# Patient Record
Sex: FEMALE | Race: WHITE | NOT HISPANIC OR LATINO | Employment: OTHER | URBAN - METROPOLITAN AREA
[De-identification: names, ages, dates, MRNs, and addresses within clinical notes are randomized per-mention and may not be internally consistent; named-entity substitution may affect disease eponyms.]

---

## 2017-03-01 ENCOUNTER — TELEPHONE (OUTPATIENT)
Dept: FAMILY MEDICINE | Facility: CLINIC | Age: 42
End: 2017-03-01

## 2017-03-01 DIAGNOSIS — Z11.3 SCREENING FOR STD (SEXUALLY TRANSMITTED DISEASE): Primary | ICD-10-CM

## 2017-03-01 NOTE — TELEPHONE ENCOUNTER
----- Message from Hanh Nguyen sent at 3/1/2017  2:39 PM CST -----  Patient is requesting a call back from nurse she is scheduled for labs on 03/14/17 she is requesting additional test contact patient at [phone].    Thank you

## 2017-03-07 ENCOUNTER — PATIENT OUTREACH (OUTPATIENT)
Dept: ADMINISTRATIVE | Facility: HOSPITAL | Age: 42
End: 2017-03-07

## 2017-03-07 NOTE — LETTER
March 13, 2017    Dinah Mitchell  [address] [place] [address]             [facility]  [address]  [place] [address]  Phone: [phone] Dear Mrs. Mitchell:    We have tried to reach you by mychart unsuccessfully.    [facility] is committed to your overall health.  To help you get the most out of each of your visits, we will review your information to make sure you are up to date on all of your recommended tests and/or procedures.       Dr. Sofia Nichole has found that you may be due for a mammogram and a flu immunization.     If you have had any of the above done at another facility, please bring the records or information with you so that your record at [facility] will be complete.     If you are currently taking medication, please bring it with you to your appointment for review.     If you have any questions or concerns, please don't hesitate to call.    Thank you for letting us care for you,  Eneida Foreman LPN Clinical Care Coordinator  [facility] [place]  [phone]

## 2017-03-14 ENCOUNTER — LAB VISIT (OUTPATIENT)
Dept: LAB | Facility: HOSPITAL | Age: 42
End: 2017-03-14
Attending: FAMILY MEDICINE
Payer: COMMERCIAL

## 2017-03-14 ENCOUNTER — TELEPHONE (OUTPATIENT)
Dept: FAMILY MEDICINE | Facility: CLINIC | Age: 42
End: 2017-03-14

## 2017-03-14 DIAGNOSIS — Z11.3 SCREENING FOR STD (SEXUALLY TRANSMITTED DISEASE): ICD-10-CM

## 2017-03-14 DIAGNOSIS — Z82.69 FAMILY HISTORY OF SYSTEMIC LUPUS ERYTHEMATOSUS: ICD-10-CM

## 2017-03-14 DIAGNOSIS — Z82.69 FAMILY HISTORY OF SYSTEMIC LUPUS ERYTHEMATOSUS: Primary | ICD-10-CM

## 2017-03-14 DIAGNOSIS — Z00.00 ROUTINE HEALTH MAINTENANCE: ICD-10-CM

## 2017-03-14 LAB
25(OH)D3+25(OH)D2 SERPL-MCNC: 25 NG/ML
ALBUMIN SERPL BCP-MCNC: 3.8 G/DL
ALP SERPL-CCNC: 53 U/L
ALT SERPL W/O P-5'-P-CCNC: 28 U/L
ANION GAP SERPL CALC-SCNC: 10 MMOL/L
AST SERPL-CCNC: 24 U/L
BASOPHILS # BLD AUTO: 0.03 K/UL
BASOPHILS NFR BLD: 0.5 %
BILIRUB SERPL-MCNC: 0.3 MG/DL
BUN SERPL-MCNC: 9 MG/DL
CALCIUM SERPL-MCNC: 9.2 MG/DL
CHLORIDE SERPL-SCNC: 107 MMOL/L
CHOLEST/HDLC SERPL: 4.6 {RATIO}
CO2 SERPL-SCNC: 23 MMOL/L
CREAT SERPL-MCNC: 1 MG/DL
CRP SERPL-MCNC: 4.1 MG/L
DIFFERENTIAL METHOD: ABNORMAL
EOSINOPHIL # BLD AUTO: 0.3 K/UL
EOSINOPHIL NFR BLD: 4.7 %
ERYTHROCYTE [DISTWIDTH] IN BLOOD BY AUTOMATED COUNT: 12.6 %
ERYTHROCYTE [SEDIMENTATION RATE] IN BLOOD BY WESTERGREN METHOD: 3 MM/HR
EST. GFR  (AFRICAN AMERICAN): >60 ML/MIN/1.73 M^2
EST. GFR  (NON AFRICAN AMERICAN): >60 ML/MIN/1.73 M^2
GLUCOSE SERPL-MCNC: 99 MG/DL
HCT VFR BLD AUTO: 46 %
HDL/CHOLESTEROL RATIO: 21.6 %
HDLC SERPL-MCNC: 190 MG/DL
HDLC SERPL-MCNC: 41 MG/DL
HGB BLD-MCNC: 15.4 G/DL
LDLC SERPL CALC-MCNC: 118.8 MG/DL
LYMPHOCYTES # BLD AUTO: 1.5 K/UL
LYMPHOCYTES NFR BLD: 24.8 %
MCH RBC QN AUTO: 31.2 PG
MCHC RBC AUTO-ENTMCNC: 33.5 %
MCV RBC AUTO: 93 FL
MONOCYTES # BLD AUTO: 0.4 K/UL
MONOCYTES NFR BLD: 5.7 %
NEUTROPHILS # BLD AUTO: 3.9 K/UL
NEUTROPHILS NFR BLD: 64 %
NONHDLC SERPL-MCNC: 149 MG/DL
PLATELET # BLD AUTO: 239 K/UL
PMV BLD AUTO: 11.2 FL
POTASSIUM SERPL-SCNC: 4.4 MMOL/L
PROT SERPL-MCNC: 7.2 G/DL
RBC # BLD AUTO: 4.93 M/UL
RHEUMATOID FACT SERPL-ACNC: <10 IU/ML
SODIUM SERPL-SCNC: 140 MMOL/L
T4 FREE SERPL-MCNC: 0.98 NG/DL
TRIGL SERPL-MCNC: 151 MG/DL
TSH SERPL DL<=0.005 MIU/L-ACNC: 4.12 UIU/ML
VIT B12 SERPL-MCNC: >2000 PG/ML
WBC # BLD AUTO: 6.12 K/UL

## 2017-03-14 PROCEDURE — 80053 COMPREHEN METABOLIC PANEL: CPT

## 2017-03-14 PROCEDURE — 82607 VITAMIN B-12: CPT

## 2017-03-14 PROCEDURE — 85651 RBC SED RATE NONAUTOMATED: CPT | Mod: PO

## 2017-03-14 PROCEDURE — 80061 LIPID PANEL: CPT

## 2017-03-14 PROCEDURE — 36415 COLL VENOUS BLD VENIPUNCTURE: CPT | Mod: PO

## 2017-03-14 PROCEDURE — 86140 C-REACTIVE PROTEIN: CPT

## 2017-03-14 PROCEDURE — 83036 HEMOGLOBIN GLYCOSYLATED A1C: CPT

## 2017-03-14 PROCEDURE — 86038 ANTINUCLEAR ANTIBODIES: CPT

## 2017-03-14 PROCEDURE — 86431 RHEUMATOID FACTOR QUANT: CPT

## 2017-03-14 PROCEDURE — 82306 VITAMIN D 25 HYDROXY: CPT

## 2017-03-14 PROCEDURE — 84443 ASSAY THYROID STIM HORMONE: CPT

## 2017-03-14 PROCEDURE — 84439 ASSAY OF FREE THYROXINE: CPT

## 2017-03-14 PROCEDURE — 86803 HEPATITIS C AB TEST: CPT

## 2017-03-14 PROCEDURE — 86703 HIV-1/HIV-2 1 RESULT ANTBDY: CPT

## 2017-03-14 PROCEDURE — 85025 COMPLETE CBC W/AUTO DIFF WBC: CPT

## 2017-03-14 NOTE — TELEPHONE ENCOUNTER
Pt states that her TWO first cousins has lupus and she would like to be tested. She had the lab draw extra tube of blood today when she had other labs done. If order is added lab will have to be called.  Please advise

## 2017-03-14 NOTE — TELEPHONE ENCOUNTER
----- Message from Rimma Keenan sent at 3/14/2017 11:21 AM CDT -----  Contact: self  Patient called regarding having labs completed. One of the labs were not ordered but marily blood anyway, need the orders to be sent to the lab before the blood goes bad, today. Please contact [phone] (home) and [phone]

## 2017-03-14 NOTE — TELEPHONE ENCOUNTER
Spoke to pt, she stated her cousin has lupus and thought she could have labs drawn for that as well but need to call lab today to to give order.  She had her labs drawn this am in [place].  Will need lab order put in at the end of the day if possible. Please advise.

## 2017-03-14 NOTE — TELEPHONE ENCOUNTER
----- Message from Rimma Keenan sent at 3/14/2017 11:21 AM CDT -----  Contact: self  Patient called regarding having labs completed. One of the labs were not ordered but marily blood anyway, need the orders to be sent to the lab before the blood goes bad, today. Please contact [phone] (home) and [phone]

## 2017-03-15 LAB
ANA SER QL IF: NORMAL
ESTIMATED AVG GLUCOSE: 114 MG/DL
HBA1C MFR BLD HPLC: 5.6 %
HCV AB SERPL QL IA: NEGATIVE
HIV 1+2 AB+HIV1 P24 AG SERPL QL IA: NEGATIVE

## 2017-03-21 ENCOUNTER — OFFICE VISIT (OUTPATIENT)
Dept: FAMILY MEDICINE | Facility: CLINIC | Age: 42
End: 2017-03-21
Payer: COMMERCIAL

## 2017-03-21 ENCOUNTER — TELEPHONE (OUTPATIENT)
Dept: FAMILY MEDICINE | Facility: CLINIC | Age: 42
End: 2017-03-21

## 2017-03-21 VITALS
TEMPERATURE: 98 F | BODY MASS INDEX: 43.61 KG/M2 | WEIGHT: 277.88 LBS | DIASTOLIC BLOOD PRESSURE: 70 MMHG | HEART RATE: 84 BPM | HEIGHT: 67 IN | SYSTOLIC BLOOD PRESSURE: 116 MMHG

## 2017-03-21 DIAGNOSIS — E03.9 HYPOTHYROIDISM, UNSPECIFIED TYPE: ICD-10-CM

## 2017-03-21 DIAGNOSIS — Z00.00 ROUTINE GENERAL MEDICAL EXAMINATION AT A HEALTH CARE FACILITY: Primary | ICD-10-CM

## 2017-03-21 DIAGNOSIS — J45.20 RAD (REACTIVE AIRWAY DISEASE), MILD INTERMITTENT, UNCOMPLICATED: ICD-10-CM

## 2017-03-21 DIAGNOSIS — E66.01 MORBID OBESITY, UNSPECIFIED OBESITY TYPE: ICD-10-CM

## 2017-03-21 DIAGNOSIS — R06.83 SNORING: ICD-10-CM

## 2017-03-21 PROCEDURE — 99999 PR PBB SHADOW E&M-EST. PATIENT-LVL III: CPT | Mod: PBBFAC,,, | Performed by: FAMILY MEDICINE

## 2017-03-21 PROCEDURE — 99396 PREV VISIT EST AGE 40-64: CPT | Mod: S$GLB,,, | Performed by: FAMILY MEDICINE

## 2017-03-21 RX ORDER — MONTELUKAST SODIUM 10 MG/1
10 TABLET ORAL NIGHTLY
Qty: 30 TABLET | Refills: 11 | Status: SHIPPED | OUTPATIENT
Start: 2017-03-21 | End: 2017-04-20

## 2017-03-21 RX ORDER — LEVOTHYROXINE SODIUM 50 UG/1
TABLET ORAL
Qty: 30 TABLET | Refills: 5 | Status: SHIPPED | OUTPATIENT
Start: 2017-03-21 | End: 2017-10-14 | Stop reason: SDUPTHER

## 2017-03-21 RX ORDER — LEVOTHYROXINE SODIUM 200 UG/1
TABLET ORAL
Qty: 30 TABLET | Refills: 5 | Status: SHIPPED | OUTPATIENT
Start: 2017-03-21 | End: 2017-10-14 | Stop reason: SDUPTHER

## 2017-03-21 NOTE — MR AVS SNAPSHOT
[facility]  [address]  [place] [address]  Phone: [phone]  Fax: [phone]                  Dinah Mitchell   3/21/2017 2:00 PM   Office Visit    Description:  Female : 1975   Provider:  Sofia Nichole MD   Department:  [facility]           Reason for Visit     Annual Exam           Diagnoses this Visit        Comments    Routine general medical examination at a health care facility    -  Primary     Hypothyroidism, unspecified type         Snoring         Morbid obesity, unspecified obesity type         RAD (reactive airway disease), mild intermittent, uncomplicated                To Do List           Future Appointments        Provider Department Dept Phone    2017 9:00 AM Anastacio Nevarez MD [facility] [phone]      Goals (5 Years of Data)     None      Follow-Up and Disposition     Return in about 1 year (around 3/21/2018), or if symptoms worsen or fail to improve, for annual exam.       These Medications        Disp Refills Start End    levothyroxine (SYNTHROID) 200 MCG tablet 30 tablet 5 3/21/2017     TAKE 1 TABLET (200 MCG TOTAL) BY MOUTH ONCE DAILY.    Pharmacy: [facility]/pharmacy #[id number] [place], [place] [address] Ph #: [phone]       levothyroxine (SYNTHROID) 50 MCG tablet 30 tablet 5 3/21/2017     TAKE 50 MCG TABLET BY MOUTH ALONG WITH 200 MCG TABLET EVERY DAY FOR A TOTAL DAILY DOSE OF 250MCG.    Pharmacy: [facility]/pharmacy #[address] [place] [address] [place] [address] Ph #: [phone]       beclomethasone (QVAR) 80 mcg/actuation Aero 120 each 5 3/21/2017 3/21/2018    Inhale 1 puff into the lungs 2 (two) times daily. Rinse mouth after use. Controller. - Inhalation    Pharmacy: [facility]/pharmacy #[id number] - [place], [place] - [address] Ph #: [phone]       montelukast (SINGULAIR) 10 mg tablet 30 tablet 11 3/21/2017 2017    Take 1 tablet (10 mg total) by mouth every evening. - Oral    Pharmacy:  [facility]/pharmacy [place][id number] - [place] [address] [address] #: [phone]         [facility]     [facility] Nurse Care Line - 24/7 Assistance  Registered nurses in the [facility] provide clinical advisement, health education, appointment booking, and other advisory services.  Call for this free service at [phone].             Medications           Message regarding Medications     Verify the changes and/or additions to your medication regime listed below are the same as discussed with your clinician today.  If any of these changes or additions are incorrect, please notify your healthcare provider.        START taking these NEW medications        Refills    beclomethasone (QVAR) 80 mcg/actuation Aero 5    Sig: Inhale 1 puff into the lungs 2 (two) times daily. Rinse mouth after use. Controller.    Class: Normal    Route: Inhalation    montelukast (SINGULAIR) 10 mg tablet 11    Sig: Take 1 tablet (10 mg total) by mouth every evening.    Class: Normal    Route: Oral           Verify that the below list of medications is an accurate representation of the medications you are currently taking.  If none reported, the list may be blank. If incorrect, please contact your healthcare provider. Carry this list with you in case of emergency.           Current Medications     albuterol (PROVENTIL) 2.5 mg /3 mL (0.083 %) nebulizer solution Take 3 mLs (2.5 mg total) by nebulization every 6 (six) hours as needed for Wheezing or Shortness of Breath.    alprazolam (XANAX) 1 MG tablet Take 1 tablet (1 mg total) by mouth daily as needed for Anxiety.    buPROPion (WELLBUTRIN XL) 150 MG TB24 tablet Take 1 tablet (150 mg total) by mouth once daily.    diphenoxylate-atropine 2.5-0.025 mg (LOMOTIL) 2.5-0.025 mg per tablet Take 1 tablet by mouth 4 (four) times daily as needed for Diarrhea.    duloxetine (CYMBALTA) 30 MG capsule Take 1 capsule (30 mg total) by mouth once daily. Take with 60 mg tabs for total of 90  "mg    duloxetine (CYMBALTA) 60 MG capsule Take 1 capsule (60 mg total) by mouth once daily. Take one tab with 30 mg tab for a total of 90 mg daily.    levothyroxine (SYNTHROID) 200 MCG tablet TAKE 1 TABLET (200 MCG TOTAL) BY MOUTH ONCE DAILY.    levothyroxine (SYNTHROID) 50 MCG tablet TAKE 50 MCG TABLET BY MOUTH ALONG WITH 200 MCG TABLET EVERY DAY FOR A TOTAL DAILY DOSE OF 250MCG.    tramadol (ULTRAM) 50 mg tablet TAKE 1 TABLET BY MOUTH DAILY TO TWICE DAILY FOR SEVERE PAIN    trazodone (DESYREL) 100 MG tablet Take 2 tablets (200 mg total) by mouth nightly as needed for Insomnia.    beclomethasone (QVAR) 80 mcg/actuation Aero Inhale 1 puff into the lungs 2 (two) times daily. Rinse mouth after use. Controller.    LYRICA 50 mg capsule Take 50 mg by mouth 2 (two) times daily.    medroxyprogesterone (DEPO-SUBQ PROVERA) 104 mg/0.65 mL injection Inject 0.65 mLs (104 mg total) into the skin every 3 (three) months.    montelukast (SINGULAIR) 10 mg tablet Take 1 tablet (10 mg total) by mouth every evening.    zinc gluconate 50 mg tablet Take 50 mg by mouth once daily.           Clinical Reference Information           Your Vitals Were     BP Pulse Temp Height Weight BMI    116/70 84 98.4 °F (36.9 °C) 5' 7" (1.702 m) 126.1 kg (277 lb 14.2 oz) 43.52 kg/m2      Blood Pressure          Most Recent Value    BP  116/70      Allergies as of 3/21/2017     Prazosin    Lamotrigine    Sulfa (Sulfonamide Antibiotics)      Immunizations Administered on Date of Encounter - 3/21/2017     None      Orders Placed During Today's Visit      Normal Orders This Visit    Ambulatory consult to Sleep Disorders       Language Assistance Services     ATTENTION: Language assistance services are available, free of charge. Please call [phone].      ATENCIÓN: Si stephenla jhoana, tiene a saini disposición servicios gratuitos de asistencia lingüística. Llame al [phone].     CHÚ Ý: N?u b?n nói Ti?ng Vi?t, có các d?ch v? h? tr? ngôn ng? mi?n lolita moralez " doretha martinez?nMarli G?i s? [phone].         [facility] complies with applicable Federal civil rights laws and does not discriminate on the basis of race, color, national origin, age, disability, or sex.

## 2017-03-21 NOTE — PROGRESS NOTES
Subjective:       Patient ID: Dinah Mitchell is a 42 y.o. female.    Chief Complaint: Annual Exam (pt is c/o fatigue )    HPI   The patient is a 42 year old female with a past medical history significant for morbid obesity, depression, hypothyroidism, Insomnia, and fibromyalgia. The patient is here for her annual exam and to discuss test results. The patient reports being more emotional as of late following discontinuation of her depo-provera shot. The patient also has longstanding complaints with fatigue and seasonal allergies.    Review of Systems   Constitutional: Positive for activity change and fatigue. Negative for chills, fever and unexpected weight change.   HENT: Positive for postnasal drip and sinus pressure. Negative for ear discharge, ear pain, sneezing, sore throat and trouble swallowing.    Respiratory: Negative for cough, shortness of breath and wheezing.    Cardiovascular: Negative for chest pain, palpitations and leg swelling.   Gastrointestinal: Negative for abdominal distention, abdominal pain, constipation, diarrhea, nausea and vomiting.   Musculoskeletal: Positive for back pain.   Allergic/Immunologic: Positive for environmental allergies.   Neurological: Positive for headaches.   Psychiatric/Behavioral: Positive for dysphoric mood and sleep disturbance. Negative for self-injury and suicidal ideas. The patient is not nervous/anxious.        Objective:      Physical Exam   Constitutional: She is oriented to person, place, and time. She appears well-developed and well-nourished.   HENT:   Head: Normocephalic and atraumatic.   Right Ear: External ear normal.   Left Ear: External ear normal.   Nose: Nose normal.   Mouth/Throat: Oropharynx is clear and moist.   Eyes: Conjunctivae and EOM are normal. Pupils are equal, round, and reactive to light.   Neck: Normal range of motion. Neck supple.   Cardiovascular: Normal rate, regular rhythm, normal heart sounds and intact distal pulses.     Pulmonary/Chest: Effort normal and breath sounds normal.   Abdominal: Soft. Bowel sounds are normal.   Neurological: She is alert and oriented to person, place, and time. She has normal reflexes.   Skin: Skin is warm and dry.   Psychiatric: Her behavior is normal. Judgment and thought content normal.   Patient is visibly distressed and emotional.         Routine general medical examination at a health care facility  Anticipatory guidance reviewed.  Hypothyroidism, unspecified type  -     levothyroxine (SYNTHROID) 200 MCG tablet; TAKE 1 TABLET (200 MCG TOTAL) BY MOUTH ONCE DAILY.  Dispense: 30 tablet; Refill: 5  Stable.  Snoring  -     Ambulatory consult to Sleep Disorders  Needs review.  Morbid obesity, unspecified obesity type  -     Ambulatory consult to Sleep Disorders  Goal weight reviewed as well as need for lifestyle modifications to incl caloric restriction, high protein/low carb, increased water intake, muscle-building exercises as well as cardio.    RAD (reactive airway disease), mild intermittent, uncomplicated  -     beclomethasone (QVAR) 80 mcg/actuation Aero; Inhale 1 puff into the lungs 2 (two) times daily. Rinse mouth after use. Controller.  Dispense: 120 each; Refill: 5  -     montelukast (SINGULAIR) 10 mg tablet; Take 1 tablet (10 mg total) by mouth every evening.  Dispense: 30 tablet; Refill: 11  Side effects and precautions of medication use reviewed with patient, expressed understanding. No questions or concerns.   Other orders  -     levothyroxine (SYNTHROID) 50 MCG tablet; TAKE 50 MCG TABLET BY MOUTH ALONG WITH 200 MCG TABLET EVERY DAY FOR A TOTAL DAILY DOSE OF 250MCG.  Dispense: 30 tablet; Refill: 5  Depression  - Depression managed by Dr. Wells, has an appointment scheduled for next week.  - Depression currently managed with Bupropion 150 mg, Duloxetine 90 mg, and Trazodone 200 mg.

## 2017-03-21 NOTE — TELEPHONE ENCOUNTER
----- Message from Haydee Barrett sent at 3/21/2017  3:27 PM CDT -----  Contact: Maria E [phone]  Patient told her that she needs the brand name synthroid.  Please correct the script.  Thank you!

## 2017-03-22 RX ORDER — DIPHENOXYLATE HYDROCHLORIDE AND ATROPINE SULFATE 2.5; .025 MG/1; MG/1
1 TABLET ORAL 4 TIMES DAILY PRN
Qty: 30 TABLET | Refills: 1 | Status: SHIPPED | OUTPATIENT
Start: 2017-03-22 | End: 2017-07-17 | Stop reason: SDUPTHER

## 2017-03-22 NOTE — TELEPHONE ENCOUNTER
"Synthroid was sent yesterday as "LAUREN" = brand name only. Please confirm with pharmacy.  Lomotil rx signed.  "

## 2017-03-22 NOTE — TELEPHONE ENCOUNTER
----- Message from Randi Combs sent at 3/22/2017  9:41 AM CDT -----  Contact: Patient  Patient needs a prescription for brand name Synthroid, 200 micro mg and 50 micro mg.  Patient also needs a refill Lomotril sent to [facility] on [address], [phone]. The [facility] stated they received prescription for Synthroid but it was for Generic not the brand. Any questions call patient at [phone].

## 2017-03-22 NOTE — TELEPHONE ENCOUNTER
Pt is requesting name brand only Synthroid 50mcg and Synthroid 200mcg. Also req refill on Lomotil. Please review and advise.

## 2017-03-23 NOTE — TELEPHONE ENCOUNTER
Spoke with Zoe at [facility], it was filled as name brand only and pt picked it up yesterday.

## 2017-03-29 ENCOUNTER — OFFICE VISIT (OUTPATIENT)
Dept: PSYCHIATRY | Facility: CLINIC | Age: 42
End: 2017-03-29
Payer: COMMERCIAL

## 2017-03-29 VITALS
BODY MASS INDEX: 43.52 KG/M2 | SYSTOLIC BLOOD PRESSURE: 147 MMHG | HEART RATE: 86 BPM | HEIGHT: 67 IN | WEIGHT: 277.25 LBS | DIASTOLIC BLOOD PRESSURE: 74 MMHG

## 2017-03-29 DIAGNOSIS — F60.3 BORDERLINE PERSONALITY DISORDER: ICD-10-CM

## 2017-03-29 DIAGNOSIS — F33.2 MAJOR DEPRESSIVE DISORDER, RECURRENT, SEVERE WITHOUT PSYCHOTIC FEATURES: Primary | ICD-10-CM

## 2017-03-29 DIAGNOSIS — E66.01 MORBID OBESITY WITH BMI OF 40.0-44.9, ADULT: ICD-10-CM

## 2017-03-29 DIAGNOSIS — F41.0 PANIC DISORDER WITHOUT AGORAPHOBIA: ICD-10-CM

## 2017-03-29 DIAGNOSIS — F43.10 PTSD (POST-TRAUMATIC STRESS DISORDER): ICD-10-CM

## 2017-03-29 DIAGNOSIS — F90.9 ATTENTION DEFICIT HYPERACTIVITY DISORDER (ADHD), UNSPECIFIED ADHD TYPE: ICD-10-CM

## 2017-03-29 PROCEDURE — 99999 PR PBB SHADOW E&M-EST. PATIENT-LVL II: CPT | Mod: PBBFAC,,, | Performed by: PSYCHIATRY & NEUROLOGY

## 2017-03-29 PROCEDURE — 99214 OFFICE O/P EST MOD 30 MIN: CPT | Mod: S$GLB,,, | Performed by: PSYCHIATRY & NEUROLOGY

## 2017-03-29 PROCEDURE — 90833 PSYTX W PT W E/M 30 MIN: CPT | Mod: S$GLB,,, | Performed by: PSYCHIATRY & NEUROLOGY

## 2017-03-29 PROCEDURE — 1160F RVW MEDS BY RX/DR IN RCRD: CPT | Mod: S$GLB,,, | Performed by: PSYCHIATRY & NEUROLOGY

## 2017-03-29 RX ORDER — BUPROPION HYDROCHLORIDE 150 MG/1
150 TABLET ORAL DAILY
Qty: 30 TABLET | Refills: 0 | Status: SHIPPED | OUTPATIENT
Start: 2017-03-29 | End: 2017-05-24 | Stop reason: SDUPTHER

## 2017-03-29 RX ORDER — NALTREXONE HYDROCHLORIDE 50 MG/1
25 TABLET, FILM COATED ORAL DAILY
Qty: 15 TABLET | Refills: 0 | Status: SHIPPED | OUTPATIENT
Start: 2017-03-29 | End: 2017-04-24

## 2017-03-29 RX ORDER — ALPRAZOLAM 1 MG/1
1 TABLET ORAL DAILY PRN
Qty: 30 TABLET | Refills: 0 | Status: SHIPPED | OUTPATIENT
Start: 2017-03-29 | End: 2017-05-24 | Stop reason: SDUPTHER

## 2017-03-29 RX ORDER — TRAZODONE HYDROCHLORIDE 100 MG/1
200 TABLET ORAL NIGHTLY PRN
Qty: 60 TABLET | Refills: 0 | Status: SHIPPED | OUTPATIENT
Start: 2017-03-29 | End: 2017-05-24 | Stop reason: SDUPTHER
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
## 2017-04-20 ENCOUNTER — TELEPHONE (OUTPATIENT)
Dept: FAMILY MEDICINE | Facility: CLINIC | Age: 42
End: 2017-04-20

## 2017-04-20 NOTE — TELEPHONE ENCOUNTER
----- Message from Chepe Tobar sent at 4/19/2017  3:52 PM CDT -----  Contact: Patient  Patient called regarding sleep study please call back to advise at [phone]. Thanks,

## 2017-04-20 NOTE — TELEPHONE ENCOUNTER
Pt called and advised that she has not beenSpoke to Dedrick at Dr giles' office. She stated they have made 2 attempts to contact pt to schedule sleep study.  I advised her per pt stated she would be home all day today.  Dedrick advised that she will contact pt today to schedule. Tried to call pt but no answer, no voicemail.

## 2017-04-21 ENCOUNTER — TELEPHONE (OUTPATIENT)
Dept: PSYCHIATRY | Facility: CLINIC | Age: 42
End: 2017-04-21

## 2017-04-21 DIAGNOSIS — Z12.31 OTHER SCREENING MAMMOGRAM: ICD-10-CM

## 2017-04-21 NOTE — TELEPHONE ENCOUNTER
Returned patient phone call couldn't leave message. Patient stated she was having issues with pharmacy.  Vikki

## 2017-04-24 ENCOUNTER — PATIENT OUTREACH (OUTPATIENT)
Dept: ADMINISTRATIVE | Facility: HOSPITAL | Age: 42
End: 2017-04-24

## 2017-04-24 NOTE — PROGRESS NOTES
A mammo bulk order was pended for this patient.    Last OV with Dr. Nichole 3/2017.  Due for a mammogram and a flu immunization     Letter/Embuehart message sent to notify patient.

## 2017-04-24 NOTE — LETTER
May 2, 2017    Dinah Mitchell  [address] [place] [address]             [facility]  [address]  [place] [address]  Phone: [phone] Dear Mrs. Mitchell:    We have tried to reach you by mychart unsuccessfully.    [facility] is committed to your overall health.  To help you get the most out of each of your visits, we will review your information to make sure you are up to date on all of your recommended tests and/or procedures.  We have Dr. Sofia Nichole listed as your primary care provider.     She has found that you may be due for a mammogram and a flu immunization.     If you have had any of the above done at an outside facility, please let us know so I can update your record.  If you have a copy of these records, please provide a copy for us to scan into your chart.  If not, please provide that provider/facilities contact information so that we may obtain copies from that facility.     Otherwise, please schedule these appointments at your earliest convenience.  You are due for your annual follow up with Dr. Nichole in March of 2018.     If you have any questions or concerns, please don't hesitate to call.    Thank you for letting us care for you,  Eneida Foreman LPN Clinical Care Coordinator  [facility] [place]  [phone]

## 2017-04-24 NOTE — TELEPHONE ENCOUNTER
Spoke with patient who stated that she needs our office to call the pharmacy and cancel the naltrexone, which she is not taking. So she can  the lomotil and trazodone; which the pharmacy is holding because of the drug interactions. She stated she needs those- not the diet pill.   Schedule patient for Wednesday 4/26/2017

## 2017-05-24 ENCOUNTER — OFFICE VISIT (OUTPATIENT)
Dept: PSYCHIATRY | Facility: CLINIC | Age: 42
End: 2017-05-24
Payer: COMMERCIAL

## 2017-05-24 VITALS
HEART RATE: 84 BPM | WEIGHT: 286.25 LBS | DIASTOLIC BLOOD PRESSURE: 90 MMHG | BODY MASS INDEX: 44.93 KG/M2 | HEIGHT: 67 IN | SYSTOLIC BLOOD PRESSURE: 155 MMHG

## 2017-05-24 DIAGNOSIS — F43.10 PTSD (POST-TRAUMATIC STRESS DISORDER): ICD-10-CM

## 2017-05-24 DIAGNOSIS — F33.2 MAJOR DEPRESSIVE DISORDER, RECURRENT, SEVERE WITHOUT PSYCHOTIC FEATURES: Primary | ICD-10-CM

## 2017-05-24 DIAGNOSIS — F41.0 PANIC DISORDER WITHOUT AGORAPHOBIA: ICD-10-CM

## 2017-05-24 DIAGNOSIS — F60.3 BORDERLINE PERSONALITY DISORDER: ICD-10-CM

## 2017-05-24 DIAGNOSIS — E66.01 MORBID OBESITY WITH BMI OF 40.0-44.9, ADULT: ICD-10-CM

## 2017-05-24 PROCEDURE — 99214 OFFICE O/P EST MOD 30 MIN: CPT | Mod: S$GLB,,, | Performed by: PSYCHIATRY & NEUROLOGY

## 2017-05-24 PROCEDURE — 1160F RVW MEDS BY RX/DR IN RCRD: CPT | Mod: S$GLB,,, | Performed by: PSYCHIATRY & NEUROLOGY

## 2017-05-24 PROCEDURE — 99999 PR PBB SHADOW E&M-EST. PATIENT-LVL II: CPT | Mod: PBBFAC,,, | Performed by: PSYCHIATRY & NEUROLOGY

## 2017-05-24 PROCEDURE — 90833 PSYTX W PT W E/M 30 MIN: CPT | Mod: S$GLB,,, | Performed by: PSYCHIATRY & NEUROLOGY

## 2017-05-24 RX ORDER — TRAZODONE HYDROCHLORIDE 100 MG/1
200 TABLET ORAL NIGHTLY PRN
Qty: 60 TABLET | Refills: 2 | Status: SHIPPED | OUTPATIENT
Start: 2017-05-24 | End: 2017-07-13 | Stop reason: SDUPTHER

## 2017-05-24 RX ORDER — BUPROPION HYDROCHLORIDE 150 MG/1
150 TABLET ORAL DAILY
Qty: 30 TABLET | Refills: 2 | Status: SHIPPED | OUTPATIENT
Start: 2017-05-24 | End: 2017-07-13 | Stop reason: SDUPTHER

## 2017-05-24 RX ORDER — ALPRAZOLAM 1 MG/1
1 TABLET ORAL DAILY PRN
Qty: 30 TABLET | Refills: 2 | Status: SHIPPED | OUTPATIENT
Start: 2017-05-24 | End: 2017-07-13 | Stop reason: SDUPTHER
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
## 2017-05-27 DIAGNOSIS — J40 BRONCHITIS: ICD-10-CM

## 2017-05-30 RX ORDER — ALBUTEROL SULFATE 90 UG/1
2 AEROSOL, METERED RESPIRATORY (INHALATION) EVERY 6 HOURS PRN
Qty: 18 INHALER | Refills: 0 | Status: SHIPPED | OUTPATIENT
Start: 2017-05-30 | End: 2018-09-05 | Stop reason: SDUPTHER

## 2017-06-19 ENCOUNTER — LAB VISIT (OUTPATIENT)
Dept: LAB | Facility: HOSPITAL | Age: 42
End: 2017-06-19
Attending: MEDICAL GENETICS
Payer: COMMERCIAL

## 2017-06-19 ENCOUNTER — OFFICE VISIT (OUTPATIENT)
Dept: GENETICS | Facility: CLINIC | Age: 42
End: 2017-06-19
Payer: COMMERCIAL

## 2017-06-19 VITALS — WEIGHT: 276.88 LBS | BODY MASS INDEX: 43.46 KG/M2 | HEIGHT: 67 IN

## 2017-06-19 DIAGNOSIS — H02.403 PTOSIS, BOTH EYELIDS: ICD-10-CM

## 2017-06-19 DIAGNOSIS — E66.01 MORBID OBESITY WITH BMI OF 40.0-44.9, ADULT: ICD-10-CM

## 2017-06-19 DIAGNOSIS — E88.40 MITOCHONDRIAL METABOLISM DISORDER: Primary | ICD-10-CM

## 2017-06-19 DIAGNOSIS — R27.0 ATAXIA: ICD-10-CM

## 2017-06-19 DIAGNOSIS — Z83.49: ICD-10-CM

## 2017-06-19 DIAGNOSIS — E88.40 MITOCHONDRIAL METABOLISM DISORDER: ICD-10-CM

## 2017-06-19 PROCEDURE — 83919 ORGANIC ACIDS QUAL EACH: CPT

## 2017-06-19 PROCEDURE — 99999 PR PBB SHADOW E&M-EST. PATIENT-LVL III: CPT | Mod: PBBFAC,,, | Performed by: MEDICAL GENETICS

## 2017-06-19 PROCEDURE — 99245 OFF/OP CONSLTJ NEW/EST HI 55: CPT | Mod: S$GLB,,, | Performed by: MEDICAL GENETICS

## 2017-06-19 RX ORDER — MONTELUKAST SODIUM 10 MG/1
TABLET ORAL
COMMUNITY
Start: 2017-06-18 | End: 2017-08-24 | Stop reason: SDUPTHER

## 2017-06-19 RX ORDER — LEVOCARNITINE 330 MG/1
660 TABLET ORAL 2 TIMES DAILY
Qty: 120 TABLET | Refills: 6 | Status: SHIPPED | OUTPATIENT
Start: 2017-06-19 | End: 2017-12-01 | Stop reason: SDUPTHER

## 2017-06-19 RX ORDER — OMEGA-3-ACID ETHYL ESTERS 1 G/1
2 CAPSULE, LIQUID FILLED ORAL 2 TIMES DAILY
Qty: 360 CAPSULE | Refills: 3 | Status: SHIPPED | OUTPATIENT
Start: 2017-06-19 | End: 2017-08-15

## 2017-06-19 NOTE — LETTER
June 19, 2017      Sofia Nichole MD  [address]  [place] [address]           [facility]  [address]  [facility] [address]  Phone: [phone]          Patient: Dinah Mitchell   MR Number: [id number]   YOB: 1975   Date of Visit: 6/19/2017       Dear Dr. Sofia Nichole:    Thank you for referring Dinah Mitchell to me for evaluation. Attached you will find relevant portions of my assessment and plan of care.    If you have questions, please do not hesitate to call me. I look forward to following Dinah Mitchell along with you.    Sincerely,    Anastacio Nevarez MD    Enclosure  CC:  No Recipients    If you would like to receive this communication electronically, please contact [email or web address] or [phone] to request more information on [employer] Link access.    For providers and/or their staff who would like to refer a patient to [facility], please contact us through our one-stop-shop provider referral line, [facility], at [phone].    If you feel you have received this communication in error or would no longer like to receive these types of communications, please e-mail [email or web address]

## 2017-06-20 ENCOUNTER — PATIENT MESSAGE (OUTPATIENT)
Dept: GENETICS | Facility: CLINIC | Age: 42
End: 2017-06-20

## 2017-06-20 NOTE — PROGRESS NOTES
Shante Mitchell  DOS: 17  : 79  MRN: [id number]    REFERING MD: Sofia Nichole MD                                                                               REASON FOR CONSULT: Our Medical Genetic Service was asked to evaluate this 42-year-old  female with a family history of a mitochondrial disorder. Mrs. Mitchell presents accompanied by her mother who assisted in providing the history..    HISTORY OF PRESENT ILLNESS: Mrs. Mitchell has a history of fibromyalgia, depression, fatigue and hypothyroidism. She reports that her symptoms started about 6 years ago. The patient reports a decline in memory/ cognitive skills.  The patient also reports poor sleep, a loss of interest in daily activities (like driving, taking a bath) and weight gain of about 100lbs. Mrs. Mitchell is followed by psychiatry but is not taking any medication for depression. She denied suicidal ideation today.     Mrs. Mitchell was evaluated by a rheumatologist and diagnosed with osteoarthritis. She has progressively worsening gait as well as ptosis. She reports inappropriate sweating and face flushing. The patient reports that she has breathing difficulty and snores. She had a sleep study many years ago.  Other concerns include poor wound healing, IBS, asthma, migraines and back pain. Past surgeries include right ankle tendon repair, tubal ligation, liposuction and bariatric surgery that apparently failed.     Mrs. Mitchell sister was diagnosed with sensory ataxic neuropathy, dysarthria, and ophthalmoparesis (MIMI) but I dont have a copy of the lab report. I do have a note from Dr. Saldaña from [facility] stating her sisters diagnosis. Mrs. Mitchell was recommended to see me by Dr. Saldaña.    PAST MEDICAL HISTORY: as above.     DEVELOPMENTAL HISTORY: Normal. Mrs. Mitchell has a degree in biology and chemistry. She worked as a forensic drug . Mrs. Mitchell has not worked in 6 years and has not applied for disability.    MEDICATIONS:  alprazolam (XANAX) 1 MG tablet     beclomethasone (QVAR) 80 mcg/actuation Aero    buPROPion (WELLBUTRIN XL) 150 MG TB24 tablet    diphenoxylate-atropine 2.5-0.025 mg (LOMOTIL) 2.5-0.025 mg per tablet    levothyroxine (SYNTHROID) 200 MCG tablet    levothyroxine (SYNTHROID) 50 MCG tablet    montelukast (SINGULAIR) 10 mg tablet    omega-3 acid ethyl esters (LOVAZA) 1 gram capsule    tramadol (ULTRAM) 50 mg tablet    trazodone (DESYREL) 100 MG tablet    VENTOLIN HFA 90 mcg/actuation inhaler     ALLERGIES: Prazocin, Lamotrigine, sulfa    SOCIAL HISTORY: Mrs. Mitchell has recently  from her . She was  10 years. The patient moved to the area in 9973-9938. Her family lives in [place]. Her parents are providing some financial help.     FAMILY HISTORY: At this visit, a 3 generation pedigree was constructed and will be scanned in the patients chart.  Mrs. Mitchell has no children. She has 3 full sisters. Her sister Terese who is 51-years-old has a diagnosis of MIMI. Per report, Terese was evaluated at various centers ( [facility] in [place], [place] in [place] and [place]). She had genetic testing and muscle biopsy (unfortunately copies of results were not available for our review). I dont have a genetic report showing POLG mutations. I do have a note from Dr. Saldaña from [facility] stating her sisters diagnosis    The patients other sister Kitty has a history of seizures. Marck son who is 23-years-old has Asperger. The patients parents are living. The father is 75-years-old and has a cardiac arrhythmia. The mother is 71-years-old and has a history of diabetes and IBS. On the maternal side, there is a first cousin who  in her 40s. Per report, this cousin had medical issues similar to Terese. This cousins daughter has a history of epilepsy and  at the age of 18. The remainder of the family was unremarkable. Maternal ancestry is [place]. Paternal ancestry is English and Ivorian. Ashkenazi [place] ancestry and  "consanguinity were denied.     PHYSICAL EXAM: Wt: 125.6 kg, Ht: 5' 7", BMI: 43.37 kg/m²  HEENT:  Her head is normocephalic and no dysmorphic features. Extraocular movements somewhat sluggish and she does have a mild ptosis bilaterally.  NECK:  Supple.  CHEST:  Normally formed.  HEART:  Regular rate and rhythm.  ABDOMEN:  Soft, non-tender, non-distended.  No organomegaly.  MUSCULOSKELETAL:  No appreciable dysmorphic features in the hands or feet. No hyperextensibility.  NEUROLOGIC: strength 5/5, DTRs 2+, normal speech and cognition. Her gait did not seem abnormal to me. She could do tandem gait.    IMPRESSION: I reviewed the patients medical and family history. 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 Recurrence in her sister is consistent with recessive inheritance. I do need her sisters report so that I could test the patient for known POLG mutations. Otherwise, broader workup would need to be considered including nuclear mitochondrial gene panel, mtDNA or Whole Exome Sequencing. Glenn obtained her metabolic studies today as well as POLG known mutation testing.     I did discuss possible treatment which would include antioxidant vitamins such as CoQ10, NAC or lipoic. I did start her on Levocarnitine today which could help with energy and will consider leucovorin which could help with cognition and memory. Glenn also recommended a sleep study and cornstarch at night. Glenn advised against fasting and she needs to have regular nutrition to prevent catabolism and exercise without overexertion.    RECOMMENDATIONS:  1. Sisters MIMI testing results.  2. Test the patient for known mutations or expanded workup.  3. Metabolic studies today.  4. PSG for PATRICIA.  5. Regular nutrition to prevent catabolism, cornstarch at night and exercise without overexertion.  6. Consider " antioxidants.  7. Levocarnitine 660 mg bid and consider leucovorin.  8. Follow up in 3 months.    TIME SPENT:  80 minutes, >50% counseling. The note will be faxed to the referring physician and is in epic.     Anastacio Nevarez M.D.                                                               Sylvia Christian MS  Section Head - Medical Genetics                                                  Genetic Counselor           [facility]

## 2017-06-21 ENCOUNTER — TELEPHONE (OUTPATIENT)
Dept: GENETICS | Facility: CLINIC | Age: 42
End: 2017-06-21

## 2017-06-23 ENCOUNTER — PATIENT MESSAGE (OUTPATIENT)
Dept: GENETICS | Facility: CLINIC | Age: 42
End: 2017-06-23

## 2017-06-23 LAB — ORGANIC ACIDS UR QL: NORMAL

## 2017-06-26 ENCOUNTER — TELEPHONE (OUTPATIENT)
Dept: GENETICS | Facility: CLINIC | Age: 42
End: 2017-06-26

## 2017-06-26 NOTE — TELEPHONE ENCOUNTER
Spoke with pt and informed her that Dr. Nevarez has been in clinic and that's why he hasn't emailed her back yet. Also informed her that I sent the PA for [place] on Friday. Waiting on a decision. Pt verbalized understanding.

## 2017-06-26 NOTE — TELEPHONE ENCOUNTER
----- Message from Megan Fang sent at 6/26/2017  1:08 PM CDT -----  Contact: Dinah [phone]  Dinah says she is still waiting for PA on the fish oil. She would also like to confirm the dosage for levothyroxine. Please call and advuse

## 2017-06-28 ENCOUNTER — TELEPHONE (OUTPATIENT)
Dept: GENETICS | Facility: CLINIC | Age: 42
End: 2017-06-28

## 2017-06-28 NOTE — TELEPHONE ENCOUNTER
Spoke with Madeline and informed her that PA was sent and we were waiting on pt's ins. Madeline verbalized understanding.

## 2017-06-28 NOTE — TELEPHONE ENCOUNTER
----- Message from Karyn Eli sent at 6/28/2017 12:41 PM CDT -----  Contact: antonina from [facility] [phone]  Selam would like a call back in regards to pt rx

## 2017-06-29 LAB
ACYLCARNITINE/C0 UR-RTO: 14.9 {RATIO} (ref 0.7–3.4)
CARNITINE FREE/CREAT UR: 10 NMOL/MG CR (ref 77–214)
CARNITINE/CREAT UR-RTO: 159 NMOL/MG CR (ref 180–412)
CLINICAL BIOCHEMIST REVIEW: ABNORMAL

## 2017-06-30 ENCOUNTER — TELEPHONE (OUTPATIENT)
Dept: FAMILY MEDICINE | Facility: CLINIC | Age: 42
End: 2017-06-30

## 2017-06-30 ENCOUNTER — PATIENT MESSAGE (OUTPATIENT)
Dept: FAMILY MEDICINE | Facility: CLINIC | Age: 42
End: 2017-06-30

## 2017-06-30 NOTE — TELEPHONE ENCOUNTER
----- Message from Adry Fuentes sent at 6/30/2017 12:05 PM CDT -----  Had a sleep study / shows obstructive disorder/ diagnosed with an assortment of diseases/ saw Genetics at main campus / her sister is terminal / asking to be fit in to see Dr Nichole /   call [phone]

## 2017-07-03 ENCOUNTER — PATIENT MESSAGE (OUTPATIENT)
Dept: GENETICS | Facility: CLINIC | Age: 42
End: 2017-07-03

## 2017-07-05 ENCOUNTER — PATIENT MESSAGE (OUTPATIENT)
Dept: GENETICS | Facility: CLINIC | Age: 42
End: 2017-07-05

## 2017-07-06 ENCOUNTER — OFFICE VISIT (OUTPATIENT)
Dept: FAMILY MEDICINE | Facility: CLINIC | Age: 42
End: 2017-07-06
Payer: COMMERCIAL

## 2017-07-06 VITALS
HEART RATE: 76 BPM | HEIGHT: 67 IN | BODY MASS INDEX: 42.28 KG/M2 | TEMPERATURE: 99 F | WEIGHT: 269.38 LBS | DIASTOLIC BLOOD PRESSURE: 72 MMHG | SYSTOLIC BLOOD PRESSURE: 98 MMHG

## 2017-07-06 DIAGNOSIS — E88.40 MITOCHONDRIAL METABOLISM DISORDER: ICD-10-CM

## 2017-07-06 DIAGNOSIS — F33.2 MAJOR DEPRESSIVE DISORDER, RECURRENT, SEVERE WITHOUT PSYCHOTIC FEATURES: Primary | ICD-10-CM

## 2017-07-06 DIAGNOSIS — N92.1 MENOMETRORRHAGIA: ICD-10-CM

## 2017-07-06 LAB
BILIRUB UR QL STRIP: NEGATIVE
CLARITY UR REFRACT.AUTO: ABNORMAL
COLOR UR AUTO: YELLOW
GLUCOSE UR QL STRIP: NEGATIVE
HGB UR QL STRIP: NEGATIVE
KETONES UR QL STRIP: NEGATIVE
LEUKOCYTE ESTERASE UR QL STRIP: NEGATIVE
NITRITE UR QL STRIP: NEGATIVE
PH UR STRIP: 5 [PH] (ref 5–8)
PROT UR QL STRIP: NEGATIVE
SP GR UR STRIP: 1.02 (ref 1–1.03)
URN SPEC COLLECT METH UR: ABNORMAL
UROBILINOGEN UR STRIP-ACNC: NEGATIVE EU/DL

## 2017-07-06 PROCEDURE — 81003 URINALYSIS AUTO W/O SCOPE: CPT

## 2017-07-06 PROCEDURE — 99214 OFFICE O/P EST MOD 30 MIN: CPT | Mod: S$GLB,,, | Performed by: FAMILY MEDICINE

## 2017-07-06 PROCEDURE — 99999 PR PBB SHADOW E&M-EST. PATIENT-LVL III: CPT | Mod: PBBFAC,,, | Performed by: FAMILY MEDICINE

## 2017-07-06 NOTE — PROGRESS NOTES
Subjective:       Patient ID: Dinah Mitchell is a 42 y.o. female.    Chief Complaint: Follow-up (pt states she recently saw a genetics specialist, Geri 6/19 was told disability needs to be started by PCP. )    HPI   Patient here today for f/u. She is accompanied to the office by her mom.  Currently under care of genetics specialist as her sister has a mitochondrial disease. Patient is very worried about the potential for confirmed diagnosis as well as long-term affects.  Also, complicated by unexpected, and recent separation from her . She is very stressed by this occurrence.  Considering need to apply for disability. Discussed starting process with SSI office.  Having heavy, frequent periods.   Has f/u with sleep clinic scheduled to discuss apnea test.    Review of Systems   Constitutional: Positive for activity change, fatigue and unexpected weight change.   HENT: Positive for trouble swallowing (chronic). Negative for hearing loss and rhinorrhea.    Eyes: Negative for discharge and visual disturbance.   Respiratory: Positive for apnea (test updated, waiting for f/u-eqpt) and wheezing. Negative for cough and chest tightness.    Cardiovascular: Positive for chest pain (attributed to stress). Negative for palpitations.   Gastrointestinal: Positive for constipation and diarrhea. Negative for blood in stool and vomiting.   Endocrine: Negative for polydipsia and polyuria.   Genitourinary: Positive for menstrual problem. Negative for difficulty urinating, dysuria and hematuria.   Musculoskeletal: Positive for arthralgias and neck pain. Negative for joint swelling.   Neurological: Positive for weakness and headaches.   Psychiatric/Behavioral: Positive for confusion and dysphoric mood.       Objective:      Physical Exam   Constitutional: She is oriented to person, place, and time. She appears well-developed and well-nourished. No distress.   HENT:   Head: Normocephalic and atraumatic.   Nose: Nose normal.  "  Mouth/Throat: Oropharynx is clear and moist.   Eyes: Conjunctivae are normal. No scleral icterus.   Cardiovascular: Normal rate and regular rhythm.    Pulmonary/Chest: Effort normal and breath sounds normal. No respiratory distress.   Musculoskeletal: Normal range of motion. She exhibits no edema.   Neurological: She is alert and oriented to person, place, and time.   Skin: Skin is warm and dry.   Psychiatric: She has a normal mood and affect. Her behavior is normal.   Nursing note and vitals reviewed.        Major depressive disorder, recurrent, severe without psychotic features  Denies si/hi. Compliant with regimen. Using xanax 2-3x/week for "headaches". New stressor related to health and  filing for divorce.  Menometrorrhagia  -     POCT urine pregnancy  -     Urinalysis; Future  Update lab. Recommend menstrual calendar and keep appt with gyn next month.  Mitochondrial metabolism disorder  Per genetics. Workup is ongoing.    "

## 2017-07-07 ENCOUNTER — HOSPITAL ENCOUNTER (OUTPATIENT)
Dept: RADIOLOGY | Facility: HOSPITAL | Age: 42
Discharge: HOME | End: 2017-07-07
Attending: FAMILY MEDICINE
Payer: COMMERCIAL

## 2017-07-07 DIAGNOSIS — Z12.31 OTHER SCREENING MAMMOGRAM: ICD-10-CM

## 2017-07-07 PROCEDURE — 77067 SCR MAMMO BI INCL CAD: CPT | Mod: 26,,, | Performed by: RADIOLOGY

## 2017-07-07 PROCEDURE — 77063 BREAST TOMOSYNTHESIS BI: CPT | Mod: 26,,, | Performed by: RADIOLOGY

## 2017-07-07 PROCEDURE — 77067 SCR MAMMO BI INCL CAD: CPT | Mod: TC

## 2017-07-10 ENCOUNTER — TELEPHONE (OUTPATIENT)
Dept: PSYCHIATRY | Facility: CLINIC | Age: 42
End: 2017-07-10

## 2017-07-10 NOTE — TELEPHONE ENCOUNTER
"Patient called requesting to be seen today if possible due to her spouse leaving their relationship.     Dr. Wells offered to see patient at 3 pm today 7/10/17. I called patient to offer time and she stated " I sedated myself so its not a good idea for me to be driving." I took a Xanax 1 mg, I was having bad anxiety and hysterical over the news I was hearing." It didn't work so after an hour I took another one."     I ask patient if she was in any danger of hurting herself or other and she said, No" I made an appointment with a grieving counselor Ms. Celena Lindsey in [place] for this  Wednesday 12th she specializes in relationships" I will keep my appointment with Dr. Wells for Thursday." Thanks for calling and offering appointment for today.  Vikki"

## 2017-07-13 ENCOUNTER — TELEPHONE (OUTPATIENT)
Dept: PSYCHIATRY | Facility: CLINIC | Age: 42
End: 2017-07-13

## 2017-07-13 ENCOUNTER — OFFICE VISIT (OUTPATIENT)
Dept: PSYCHIATRY | Facility: CLINIC | Age: 42
End: 2017-07-13
Payer: COMMERCIAL

## 2017-07-13 VITALS
WEIGHT: 267.5 LBS | BODY MASS INDEX: 41.99 KG/M2 | DIASTOLIC BLOOD PRESSURE: 66 MMHG | HEART RATE: 90 BPM | SYSTOLIC BLOOD PRESSURE: 103 MMHG | HEIGHT: 67 IN

## 2017-07-13 DIAGNOSIS — E88.40 MITOCHONDRIAL METABOLISM DISORDER: ICD-10-CM

## 2017-07-13 DIAGNOSIS — F43.10 PTSD (POST-TRAUMATIC STRESS DISORDER): ICD-10-CM

## 2017-07-13 DIAGNOSIS — E66.01 MORBID OBESITY WITH BMI OF 40.0-44.9, ADULT: ICD-10-CM

## 2017-07-13 DIAGNOSIS — F33.2 MAJOR DEPRESSIVE DISORDER, RECURRENT, SEVERE WITHOUT PSYCHOTIC FEATURES: Primary | ICD-10-CM

## 2017-07-13 DIAGNOSIS — F60.3 BORDERLINE PERSONALITY DISORDER: ICD-10-CM

## 2017-07-13 DIAGNOSIS — F90.0 ATTENTION DEFICIT HYPERACTIVITY DISORDER (ADHD), PREDOMINANTLY INATTENTIVE TYPE: ICD-10-CM

## 2017-07-13 DIAGNOSIS — F41.0 PANIC DISORDER WITHOUT AGORAPHOBIA: ICD-10-CM

## 2017-07-13 PROCEDURE — 99999 PR PBB SHADOW E&M-EST. PATIENT-LVL III: CPT | Mod: PBBFAC,,, | Performed by: PSYCHIATRY & NEUROLOGY

## 2017-07-13 PROCEDURE — 99214 OFFICE O/P EST MOD 30 MIN: CPT | Mod: S$GLB,,, | Performed by: PSYCHIATRY & NEUROLOGY

## 2017-07-13 PROCEDURE — 90833 PSYTX W PT W E/M 30 MIN: CPT | Mod: S$GLB,,, | Performed by: PSYCHIATRY & NEUROLOGY

## 2017-07-13 RX ORDER — ALPRAZOLAM 1 MG/1
1 TABLET ORAL DAILY PRN
Qty: 45 TABLET | Refills: 0 | Status: SHIPPED | OUTPATIENT
Start: 2017-07-13 | End: 2017-08-15 | Stop reason: SDUPTHER

## 2017-07-13 RX ORDER — TRAZODONE HYDROCHLORIDE 100 MG/1
200 TABLET ORAL NIGHTLY PRN
Qty: 60 TABLET | Refills: 2 | Status: SHIPPED | OUTPATIENT
Start: 2017-07-13 | End: 2017-08-15 | Stop reason: SDUPTHER

## 2017-07-13 RX ORDER — BUPROPION HYDROCHLORIDE 150 MG/1
150 TABLET ORAL DAILY
Qty: 30 TABLET | Refills: 2 | Status: SHIPPED | OUTPATIENT
Start: 2017-07-13 | End: 2017-08-15 | Stop reason: SDUPTHER

## 2017-07-13 NOTE — TELEPHONE ENCOUNTER
Patient was told by pharmacist her refill on prescription Xanax was too early for pick-up. Pharmacy requesting a note from Dr. Wells stating this would be a one time only early pick-up.    Patient states she has enough tablet for tonight, will  refill tomorrow.   Vikki

## 2017-07-13 NOTE — TELEPHONE ENCOUNTER
Called and spoke to the pharmacy.    One time early refill will be allowed.   #45 tabs for use of 1-1.5 tabs/day  Next month will order 30 tabs only.
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
## 2017-07-17 RX ORDER — DIPHENOXYLATE HYDROCHLORIDE AND ATROPINE SULFATE 2.5; .025 MG/1; MG/1
1 TABLET ORAL 4 TIMES DAILY PRN
Qty: 30 TABLET | Refills: 0 | Status: SHIPPED | OUTPATIENT
Start: 2017-07-17 | End: 2017-07-31 | Stop reason: SDUPTHER

## 2017-07-25 ENCOUNTER — TELEPHONE (OUTPATIENT)
Dept: GENETICS | Facility: CLINIC | Age: 42
End: 2017-07-25

## 2017-07-25 NOTE — TELEPHONE ENCOUNTER
----- Message from Kinza Weems sent at 7/25/2017  1:52 PM CDT -----  Contact: [phone]   Patient us Calling to give doctor information on what she should be tested for.

## 2017-07-25 NOTE — TELEPHONE ENCOUNTER
Spoke with pt who was calling to inform Dr. Nevarez that the pt's sister's doctor in [place], [place] will be faxing over records on the condition that her sister has per Dr. Nevarez.

## 2017-07-26 ENCOUNTER — PATIENT MESSAGE (OUTPATIENT)
Dept: GENETICS | Facility: CLINIC | Age: 42
End: 2017-07-26

## 2017-07-31 RX ORDER — DIPHENOXYLATE HYDROCHLORIDE AND ATROPINE SULFATE 2.5; .025 MG/1; MG/1
1 TABLET ORAL 4 TIMES DAILY PRN
Qty: 30 TABLET | Refills: 2 | Status: SHIPPED | OUTPATIENT
Start: 2017-07-31 | End: 2018-03-05 | Stop reason: SDUPTHER

## 2017-07-31 NOTE — TELEPHONE ENCOUNTER
----- Message from Lorena Castellanos sent at 7/31/2017  1:09 PM CDT -----  [facility] request [facility] Lom[facility]  She is on her last pill today. She states she cannot be without it.    [id number] [place] [facility] [address]  [address]  [facility] [address]  Phone: [phone] Fax: [phone] (home) or cell [phone]

## 2017-08-01 ENCOUNTER — TELEPHONE (OUTPATIENT)
Dept: FAMILY MEDICINE | Facility: CLINIC | Age: 42
End: 2017-08-01

## 2017-08-01 NOTE — TELEPHONE ENCOUNTER
----- Message from Lillian Freitas sent at 8/1/2017  2:09 PM CDT -----  Contact: pt   Pt states went to the chiropractor have lumbar compression fractures and whats to know if she needs a copy of the xray....[phone] (home)

## 2017-08-02 ENCOUNTER — PATIENT MESSAGE (OUTPATIENT)
Dept: GENETICS | Facility: CLINIC | Age: 42
End: 2017-08-02

## 2017-08-03 ENCOUNTER — TELEPHONE (OUTPATIENT)
Dept: GENETICS | Facility: CLINIC | Age: 42
End: 2017-08-03

## 2017-08-03 ENCOUNTER — TELEPHONE (OUTPATIENT)
Dept: FAMILY MEDICINE | Facility: CLINIC | Age: 42
End: 2017-08-03

## 2017-08-03 NOTE — TELEPHONE ENCOUNTER
----- Message from Dior Hernandez sent at 8/3/2017 10:47 AM CDT -----  Dr. Sanchez/April [phone] is calling because they would like to email office patient's xrays but does not have an email address. Please call back or fax them back at [phone].

## 2017-08-03 NOTE — TELEPHONE ENCOUNTER
----- Message from Anastacio Nevarez MD sent at 8/2/2017 12:57 PM CDT -----  Contact: Self [phone]  sure  ----- Message -----  From: Regina Gale MA  Sent: 8/1/2017  11:45 AM  To: Anastacio Nevarez MD    I gave you the test results for her sister ( I labeled it as for her sister with her mrn). Would you need to see her back with her sister?  ----- Message -----  From: Analisa Andrade  Sent: 8/1/2017  11:39 AM  To: [facility] Staff    Self [phone]-----calling to spk with the nurse regarding the the test results from her sister Lesa Negron so the abv provider will know how to test her. The pt is trying to see if those results on her sister was received because she haven't back from anyone. The pt is requesting a call back

## 2017-08-07 ENCOUNTER — PATIENT MESSAGE (OUTPATIENT)
Dept: GENETICS | Facility: CLINIC | Age: 42
End: 2017-08-07

## 2017-08-11 ENCOUNTER — PATIENT MESSAGE (OUTPATIENT)
Dept: GENETICS | Facility: CLINIC | Age: 42
End: 2017-08-11

## 2017-08-13 ENCOUNTER — PATIENT MESSAGE (OUTPATIENT)
Dept: GENETICS | Facility: CLINIC | Age: 42
End: 2017-08-13

## 2017-08-13 RX ORDER — ACETAMINOPHEN 500 MG
1 TABLET ORAL DAILY
Qty: 30 CAPSULE | Refills: 3 | COMMUNITY
Start: 2017-08-13 | End: 2017-08-15

## 2017-08-13 RX ORDER — FERROUS SULFATE 325(65) MG
325 TABLET ORAL
Qty: 30 TABLET | Refills: 0 | COMMUNITY
Start: 2017-08-13 | End: 2017-08-15

## 2017-08-15 ENCOUNTER — OFFICE VISIT (OUTPATIENT)
Dept: PSYCHIATRY | Facility: CLINIC | Age: 42
End: 2017-08-15
Payer: COMMERCIAL

## 2017-08-15 VITALS
WEIGHT: 265 LBS | DIASTOLIC BLOOD PRESSURE: 74 MMHG | HEART RATE: 84 BPM | BODY MASS INDEX: 41.59 KG/M2 | SYSTOLIC BLOOD PRESSURE: 111 MMHG | HEIGHT: 67 IN

## 2017-08-15 DIAGNOSIS — F43.10 PTSD (POST-TRAUMATIC STRESS DISORDER): ICD-10-CM

## 2017-08-15 DIAGNOSIS — E66.01 MORBID OBESITY WITH BMI OF 40.0-44.9, ADULT: ICD-10-CM

## 2017-08-15 DIAGNOSIS — F60.3 BORDERLINE PERSONALITY DISORDER: ICD-10-CM

## 2017-08-15 DIAGNOSIS — F33.2 MAJOR DEPRESSIVE DISORDER, RECURRENT, SEVERE WITHOUT PSYCHOTIC FEATURES: Primary | ICD-10-CM

## 2017-08-15 DIAGNOSIS — F41.0 PANIC DISORDER WITHOUT AGORAPHOBIA: ICD-10-CM

## 2017-08-15 PROCEDURE — 99999 PR PBB SHADOW E&M-EST. PATIENT-LVL III: CPT | Mod: PBBFAC,,, | Performed by: PSYCHIATRY & NEUROLOGY

## 2017-08-15 PROCEDURE — 99214 OFFICE O/P EST MOD 30 MIN: CPT | Mod: S$GLB,,, | Performed by: PSYCHIATRY & NEUROLOGY

## 2017-08-15 PROCEDURE — 90833 PSYTX W PT W E/M 30 MIN: CPT | Mod: S$GLB,,, | Performed by: PSYCHIATRY & NEUROLOGY

## 2017-08-15 PROCEDURE — 3008F BODY MASS INDEX DOCD: CPT | Mod: S$GLB,,, | Performed by: PSYCHIATRY & NEUROLOGY

## 2017-08-15 RX ORDER — ALPRAZOLAM 1 MG/1
1 TABLET ORAL DAILY PRN
Qty: 30 TABLET | Refills: 0 | Status: SHIPPED | OUTPATIENT
Start: 2017-08-15 | End: 2017-09-19 | Stop reason: SDUPTHER

## 2017-08-15 RX ORDER — TRAZODONE HYDROCHLORIDE 100 MG/1
200 TABLET ORAL NIGHTLY PRN
Qty: 60 TABLET | Refills: 2 | Status: SHIPPED | OUTPATIENT
Start: 2017-08-15 | End: 2017-09-19 | Stop reason: SDUPTHER

## 2017-08-15 RX ORDER — BUPROPION HYDROCHLORIDE 150 MG/1
150 TABLET ORAL DAILY
Qty: 30 TABLET | Refills: 2 | Status: SHIPPED | OUTPATIENT
Start: 2017-08-15 | End: 2017-09-19 | Stop reason: SDUPTHER
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
## 2017-08-24 RX ORDER — MONTELUKAST SODIUM 10 MG/1
10 TABLET ORAL DAILY
Qty: 90 TABLET | Refills: 3 | Status: SHIPPED | OUTPATIENT
Start: 2017-08-24 | End: 2018-09-11 | Stop reason: SDUPTHER

## 2017-08-26 ENCOUNTER — PATIENT MESSAGE (OUTPATIENT)
Dept: GENETICS | Facility: CLINIC | Age: 42
End: 2017-08-26

## 2017-09-19 ENCOUNTER — OFFICE VISIT (OUTPATIENT)
Dept: PSYCHIATRY | Facility: CLINIC | Age: 42
End: 2017-09-19
Payer: COMMERCIAL

## 2017-09-19 VITALS
SYSTOLIC BLOOD PRESSURE: 143 MMHG | BODY MASS INDEX: 39.48 KG/M2 | WEIGHT: 251.56 LBS | HEIGHT: 67 IN | HEART RATE: 80 BPM | DIASTOLIC BLOOD PRESSURE: 64 MMHG

## 2017-09-19 DIAGNOSIS — F90.0 ATTENTION DEFICIT HYPERACTIVITY DISORDER (ADHD), PREDOMINANTLY INATTENTIVE TYPE: ICD-10-CM

## 2017-09-19 DIAGNOSIS — F33.2 MAJOR DEPRESSIVE DISORDER, RECURRENT, SEVERE WITHOUT PSYCHOTIC FEATURES: ICD-10-CM

## 2017-09-19 DIAGNOSIS — F60.3 BORDERLINE PERSONALITY DISORDER: ICD-10-CM

## 2017-09-19 DIAGNOSIS — F43.10 PTSD (POST-TRAUMATIC STRESS DISORDER): Primary | ICD-10-CM

## 2017-09-19 DIAGNOSIS — F41.0 PANIC DISORDER WITHOUT AGORAPHOBIA: ICD-10-CM

## 2017-09-19 PROCEDURE — 99214 OFFICE O/P EST MOD 30 MIN: CPT | Mod: S$GLB,,, | Performed by: PSYCHIATRY & NEUROLOGY

## 2017-09-19 PROCEDURE — 99999 PR PBB SHADOW E&M-EST. PATIENT-LVL II: CPT | Mod: PBBFAC,,, | Performed by: PSYCHIATRY & NEUROLOGY

## 2017-09-19 PROCEDURE — 3008F BODY MASS INDEX DOCD: CPT | Mod: S$GLB,,, | Performed by: PSYCHIATRY & NEUROLOGY

## 2017-09-19 PROCEDURE — 90833 PSYTX W PT W E/M 30 MIN: CPT | Mod: S$GLB,,, | Performed by: PSYCHIATRY & NEUROLOGY

## 2017-09-19 RX ORDER — TRAZODONE HYDROCHLORIDE 100 MG/1
200 TABLET ORAL NIGHTLY PRN
Qty: 60 TABLET | Refills: 2 | Status: SHIPPED | OUTPATIENT
Start: 2017-09-19 | End: 2017-10-18

## 2017-09-19 RX ORDER — ALPRAZOLAM 1 MG/1
1 TABLET ORAL DAILY PRN
Qty: 30 TABLET | Refills: 0 | Status: SHIPPED | OUTPATIENT
Start: 2017-09-19 | End: 2017-10-18 | Stop reason: SDUPTHER

## 2017-09-19 RX ORDER — BUPROPION HYDROCHLORIDE 150 MG/1
150 TABLET ORAL DAILY
Qty: 30 TABLET | Refills: 2 | Status: SHIPPED | OUTPATIENT
Start: 2017-09-19 | End: 2017-10-18 | Stop reason: SDUPTHER
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
## 2017-09-26 ENCOUNTER — PATIENT MESSAGE (OUTPATIENT)
Dept: GENETICS | Facility: CLINIC | Age: 42
End: 2017-09-26

## 2017-10-14 DIAGNOSIS — E03.9 HYPOTHYROIDISM, UNSPECIFIED TYPE: ICD-10-CM

## 2017-10-16 RX ORDER — LEVOTHYROXINE SODIUM 50 UG/1
TABLET ORAL
Qty: 30 TABLET | Refills: 1 | Status: SHIPPED | OUTPATIENT
Start: 2017-10-16 | End: 2017-12-12 | Stop reason: SDUPTHER

## 2017-10-16 RX ORDER — LEVOTHYROXINE SODIUM 200 UG/1
TABLET ORAL
Qty: 30 TABLET | Refills: 1 | Status: SHIPPED | OUTPATIENT
Start: 2017-10-16 | End: 2017-12-12 | Stop reason: SDUPTHER

## 2017-10-18 ENCOUNTER — OFFICE VISIT (OUTPATIENT)
Dept: PSYCHIATRY | Facility: CLINIC | Age: 42
End: 2017-10-18
Payer: COMMERCIAL

## 2017-10-18 VITALS
HEART RATE: 79 BPM | DIASTOLIC BLOOD PRESSURE: 63 MMHG | WEIGHT: 249.13 LBS | SYSTOLIC BLOOD PRESSURE: 114 MMHG | HEIGHT: 67 IN | BODY MASS INDEX: 39.1 KG/M2

## 2017-10-18 DIAGNOSIS — F41.0 PANIC DISORDER WITHOUT AGORAPHOBIA: ICD-10-CM

## 2017-10-18 DIAGNOSIS — F33.2 MAJOR DEPRESSIVE DISORDER, RECURRENT, SEVERE WITHOUT PSYCHOTIC FEATURES: ICD-10-CM

## 2017-10-18 DIAGNOSIS — E03.9 HYPOTHYROIDISM, UNSPECIFIED TYPE: ICD-10-CM

## 2017-10-18 DIAGNOSIS — F43.10 PTSD (POST-TRAUMATIC STRESS DISORDER): ICD-10-CM

## 2017-10-18 PROCEDURE — 90833 PSYTX W PT W E/M 30 MIN: CPT | Mod: S$GLB,,, | Performed by: PSYCHIATRY & NEUROLOGY

## 2017-10-18 PROCEDURE — 99999 PR PBB SHADOW E&M-EST. PATIENT-LVL II: CPT | Mod: PBBFAC,,, | Performed by: PSYCHIATRY & NEUROLOGY

## 2017-10-18 PROCEDURE — 99214 OFFICE O/P EST MOD 30 MIN: CPT | Mod: S$GLB,,, | Performed by: PSYCHIATRY & NEUROLOGY

## 2017-10-18 RX ORDER — AMITRIPTYLINE HYDROCHLORIDE 50 MG/1
50 TABLET, FILM COATED ORAL NIGHTLY
Qty: 30 TABLET | Refills: 0 | Status: SHIPPED | OUTPATIENT
Start: 2017-10-18 | End: 2017-11-06 | Stop reason: SDUPTHER

## 2017-10-18 RX ORDER — BUPROPION HYDROCHLORIDE 150 MG/1
150 TABLET ORAL DAILY
Qty: 30 TABLET | Refills: 2 | Status: SHIPPED | OUTPATIENT
Start: 2017-10-18 | End: 2017-10-19 | Stop reason: SDUPTHER

## 2017-10-18 RX ORDER — LEVOTHYROXINE SODIUM 200 UG/1
TABLET ORAL
Qty: 30 TABLET | Refills: 5 | OUTPATIENT
Start: 2017-10-18

## 2017-10-18 RX ORDER — ALPRAZOLAM 1 MG/1
1 TABLET ORAL DAILY PRN
Qty: 30 TABLET | Refills: 0 | Status: SHIPPED | OUTPATIENT
Start: 2017-10-18 | End: 2017-11-15 | Stop reason: SDUPTHER

## 2017-10-18 RX ORDER — LEVOTHYROXINE SODIUM 50 UG/1
TABLET ORAL
Qty: 30 TABLET | Refills: 5 | OUTPATIENT
Start: 2017-10-18
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
## 2017-10-19 ENCOUNTER — TELEPHONE (OUTPATIENT)
Dept: PSYCHIATRY | Facility: CLINIC | Age: 42
End: 2017-10-19

## 2017-10-19 DIAGNOSIS — F33.2 MAJOR DEPRESSIVE DISORDER, RECURRENT, SEVERE WITHOUT PSYCHOTIC FEATURES: ICD-10-CM

## 2017-10-19 RX ORDER — BUPROPION HYDROCHLORIDE 150 MG/1
150 TABLET ORAL DAILY
Qty: 90 TABLET | Refills: 0 | Status: SHIPPED | OUTPATIENT
Start: 2017-10-19 | End: 2017-11-15 | Stop reason: SDUPTHER

## 2017-10-31 ENCOUNTER — TELEPHONE (OUTPATIENT)
Dept: GENETICS | Facility: CLINIC | Age: 42
End: 2017-10-31

## 2017-10-31 NOTE — TELEPHONE ENCOUNTER
Spoke with pt and rescheduled appt on 12/4 to 12/1 at 11am per Dr. Nevarez. Pt verbalized understanding.

## 2017-11-07 RX ORDER — AMITRIPTYLINE HYDROCHLORIDE 50 MG/1
50 TABLET, FILM COATED ORAL NIGHTLY
Qty: 30 TABLET | Refills: 0 | Status: SHIPPED | OUTPATIENT
Start: 2017-11-07 | End: 2017-11-15

## 2017-11-11 ENCOUNTER — NURSE TRIAGE (OUTPATIENT)
Dept: ADMINISTRATIVE | Facility: CLINIC | Age: 42
End: 2017-11-11
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
## 2017-11-13 NOTE — TELEPHONE ENCOUNTER
Spoke with pt, she states her hand looks great,she is keeping clean and covered. She did not see Urgent Care as she does not feel it is bad enough to need to be seen, advised her to call back with any changes and we will get her in to be evaluated. She expressed understanding.

## 2017-11-14 ENCOUNTER — TELEPHONE (OUTPATIENT)
Dept: PSYCHIATRY | Facility: CLINIC | Age: 42
End: 2017-11-14

## 2017-11-14 NOTE — TELEPHONE ENCOUNTER
"Patient called tearful, state   " the only thing that calms me down is taking my prescription Xanax, it stops me from having bad thoughts." I don't want to say it because I don't want to be institutionalize."     I asked patient if she was in any danger of harming herself or other, states "NO"     " it's just sometimes I feel this way but will never act upon it." I know to go to the ER."    Patient had a follow up appointment was rescheduled for 11/15/2017. Patient was grateful. Mode changed expressed gratitude.  Vikki"
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
## 2017-11-15 ENCOUNTER — TELEPHONE (OUTPATIENT)
Dept: PSYCHIATRY | Facility: CLINIC | Age: 42
End: 2017-11-15

## 2017-11-15 ENCOUNTER — OFFICE VISIT (OUTPATIENT)
Dept: PSYCHIATRY | Facility: CLINIC | Age: 42
End: 2017-11-15
Payer: COMMERCIAL

## 2017-11-15 VITALS
HEART RATE: 79 BPM | HEIGHT: 67 IN | WEIGHT: 251.63 LBS | DIASTOLIC BLOOD PRESSURE: 69 MMHG | SYSTOLIC BLOOD PRESSURE: 141 MMHG | BODY MASS INDEX: 39.49 KG/M2

## 2017-11-15 DIAGNOSIS — F41.0 PANIC DISORDER WITHOUT AGORAPHOBIA: ICD-10-CM

## 2017-11-15 DIAGNOSIS — E66.01 MORBID OBESITY WITH BMI OF 40.0-44.9, ADULT: Primary | ICD-10-CM

## 2017-11-15 DIAGNOSIS — F33.2 MAJOR DEPRESSIVE DISORDER, RECURRENT, SEVERE WITHOUT PSYCHOTIC FEATURES: ICD-10-CM

## 2017-11-15 DIAGNOSIS — E88.40 MITOCHONDRIAL METABOLISM DISORDER: ICD-10-CM

## 2017-11-15 DIAGNOSIS — F43.10 PTSD (POST-TRAUMATIC STRESS DISORDER): ICD-10-CM

## 2017-11-15 PROCEDURE — 99214 OFFICE O/P EST MOD 30 MIN: CPT | Mod: S$GLB,,, | Performed by: PSYCHIATRY & NEUROLOGY

## 2017-11-15 PROCEDURE — 99999 PR PBB SHADOW E&M-EST. PATIENT-LVL III: CPT | Mod: PBBFAC,,, | Performed by: PSYCHIATRY & NEUROLOGY

## 2017-11-15 PROCEDURE — 90833 PSYTX W PT W E/M 30 MIN: CPT | Mod: S$GLB,,, | Performed by: PSYCHIATRY & NEUROLOGY

## 2017-11-15 RX ORDER — BUPROPION HYDROCHLORIDE 150 MG/1
150 TABLET ORAL DAILY
Qty: 90 TABLET | Refills: 0 | Status: SHIPPED | OUTPATIENT
Start: 2017-11-15 | End: 2018-01-18 | Stop reason: SDUPTHER

## 2017-11-15 RX ORDER — ALPRAZOLAM 1 MG/1
1 TABLET ORAL DAILY PRN
Qty: 30 TABLET | Refills: 1 | Status: SHIPPED | OUTPATIENT
Start: 2017-11-15 | End: 2018-01-18 | Stop reason: SDUPTHER

## 2017-11-15 RX ORDER — PRAZOSIN HYDROCHLORIDE 1 MG/1
1 CAPSULE ORAL NIGHTLY
Qty: 30 CAPSULE | Refills: 1 | Status: SHIPPED | OUTPATIENT
Start: 2017-11-15 | End: 2018-01-18 | Stop reason: SDUPTHER

## 2017-11-15 RX ORDER — FLUOXETINE 10 MG/1
10 CAPSULE ORAL DAILY
Qty: 30 CAPSULE | Refills: 1 | Status: SHIPPED | OUTPATIENT
Start: 2017-11-15 | End: 2018-03-04 | Stop reason: SDUPTHER
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
## 2017-11-15 NOTE — TELEPHONE ENCOUNTER
Patient called [facility] pharmacy personnel told her it was too early for her prescription Xanax to be picked up.  Dr. Wells would need to contact pharmacy for clearance on early pick-up. [phone].    Called pharmacy spoke with Ms. Jimenez a pharmacy employee, [facility] patient last  date was 10/27/2017 count #30.  Vikki
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
## 2017-11-21 ENCOUNTER — TELEPHONE (OUTPATIENT)
Dept: PSYCHIATRY | Facility: CLINIC | Age: 42
End: 2017-11-21

## 2017-11-21 ENCOUNTER — PATIENT MESSAGE (OUTPATIENT)
Dept: PSYCHIATRY | Facility: CLINIC | Age: 42
End: 2017-11-21

## 2017-11-21 RX ORDER — DOXEPIN HYDROCHLORIDE 25 MG/1
25 CAPSULE ORAL NIGHTLY
Qty: 30 CAPSULE | Refills: 0 | Status: SHIPPED | OUTPATIENT
Start: 2017-11-21 | End: 2017-12-07 | Stop reason: SDUPTHER

## 2017-11-21 NOTE — TELEPHONE ENCOUNTER
"Has only used xanax 2x since her appt 1wk ago! Big improvement. "the prozac really is helping. It scares me that I got that low, but I'm feeling better." has been out of the house the last 2 days, getting errands done, cooking herself dinner at night.     Sleep has been the only cont'd issue.     Pt reports previous trials of lunesta and ambien- ineffective.     Also previously failed elavil and trazodone trials.    seroquel led to weight gain.    Will start a trial of doxepin 25mg po qhs PRN insomnia  Will talk to pt next week regarding response to doxepin  Denies acute safety concerns.  "

## 2017-11-21 NOTE — TELEPHONE ENCOUNTER
"Patient called to reschedule her follow-up appointment with  for 12/1/2017 due to same day Genetic testing on the [facility]. Would not be able to be seen for 6 months if appointment is missed.    Patient states " theres an improvement and can feel the difference on Prozac 10 mg." but sad and crying at night still can't sleep at night"    Please advice [phone]  Vikki    "

## 2017-11-21 NOTE — TELEPHONE ENCOUNTER
Called the pt back due to her message- no answer and no v/m prompt.     Per vikki, the pt reported she is tolerating the Prozac well. Evenings most difficult time- tearful, missing ex .     prozac was just started 6 days ago- per Vikki the pt is aware it will take more time for the medication to become fully effective.     Denies acute safety concerns. Only called to cancel appt for 12/1.

## 2017-12-01 ENCOUNTER — LAB VISIT (OUTPATIENT)
Dept: LAB | Facility: HOSPITAL | Age: 42
End: 2017-12-01
Attending: MEDICAL GENETICS
Payer: COMMERCIAL

## 2017-12-01 ENCOUNTER — OFFICE VISIT (OUTPATIENT)
Dept: GENETICS | Facility: CLINIC | Age: 42
End: 2017-12-01
Payer: COMMERCIAL

## 2017-12-01 VITALS — HEIGHT: 67 IN | WEIGHT: 248.25 LBS | BODY MASS INDEX: 38.96 KG/M2

## 2017-12-01 DIAGNOSIS — E88.40 MITOCHONDRIAL METABOLISM DISORDER: ICD-10-CM

## 2017-12-01 DIAGNOSIS — R53.83 FATIGUE, UNSPECIFIED TYPE: ICD-10-CM

## 2017-12-01 DIAGNOSIS — F33.2 MAJOR DEPRESSIVE DISORDER, RECURRENT, SEVERE WITHOUT PSYCHOTIC FEATURES: ICD-10-CM

## 2017-12-01 DIAGNOSIS — R27.0 ATAXIA: ICD-10-CM

## 2017-12-01 DIAGNOSIS — R53.83 FATIGUE, UNSPECIFIED TYPE: Primary | ICD-10-CM

## 2017-12-01 DIAGNOSIS — H02.403 PTOSIS, BOTH EYELIDS: ICD-10-CM

## 2017-12-01 PROCEDURE — 99215 OFFICE O/P EST HI 40 MIN: CPT | Mod: S$GLB,,, | Performed by: MEDICAL GENETICS

## 2017-12-01 PROCEDURE — 99358 PROLONG SERVICE W/O CONTACT: CPT | Mod: S$GLB,,, | Performed by: MEDICAL GENETICS

## 2017-12-01 PROCEDURE — 83520 IMMUNOASSAY QUANT NOS NONAB: CPT

## 2017-12-01 PROCEDURE — 99999 PR PBB SHADOW E&M-EST. PATIENT-LVL IV: CPT | Mod: PBBFAC,,, | Performed by: MEDICAL GENETICS

## 2017-12-01 RX ORDER — LEVOCARNITINE 330 MG/1
990 TABLET ORAL 2 TIMES DAILY
Qty: 180 TABLET | Refills: 6 | Status: SHIPPED | OUTPATIENT
Start: 2017-12-01 | End: 2020-03-17 | Stop reason: SDUPTHER

## 2017-12-01 NOTE — PROGRESS NOTES
Dinah Mitchell  DOS: 19  : 79  MRN: [id number]    HISTORY OF PRESENT ILLNESS: Ive seen this 42-year-old  female with a family history of a mitochondrial disorder. She has a history of fibromyalgia, depression, fatigue and hypothyroidism. She reports that her symptoms started about 6 years ago. The patient reports a decline in memory/ cognitive skills.  The patient also reports poor sleep, a loss of interest in daily activities (like driving, taking a bath) and weight gain of about 100lbs. Mrs. Mitchell is followed by psychiatry but is not taking any medication for depression. She denied suicidal ideation today.     Mrs. Mitchell was evaluated by a rheumatologist and diagnosed with osteoarthritis. She has progressively worsening gait as well as ptosis. She reports inappropriate sweating and face flushing. The patient reports that she has breathing difficulty and snores. She had a sleep study many years ago.  Other concerns include poor wound healing, IBS, asthma, migraines and back pain. Past surgeries include right ankle tendon repair, tubal ligation, liposuction and bariatric surgery that apparently failed.     Mrs. Mitchell sister was diagnosed with sensory ataxic neuropathy, dysarthria, and ophthalmoparesis (MIMI) but I didnt have a copy of the lab report. I do have a note from Dr. Saldaña from [facility] stating her sisters diagnosis. Mrs. Mitchell was recommended to see me by Dr. Saldaña.    At the original visit, I thought that MIMI was a possibility but I had to see biallelic POLG gene mutations. Neither Mrs. Mitchell nor her sister had any POLG mutations from the records. Moreover, Glenn obtained a panel of 319 nuclear sherrie genes and mtDNA and only 1 unclear variant in a recessive gene was found so this test was nondiagnostic. The patient now presents for a follow up. Glenn placed her on levocarnitine since her carnitine was low.    PAST MEDICAL HISTORY: as above.     DEVELOPMENTAL HISTORY: Normal.   Stephen has a degree in biology and chemistry. She worked as a forensic drug . Mrs. Mitchell has not worked in 6 years and has not applied for disability.    MEDICATIONS: alprazolam (XANAX) 1 MG tablet     beclomethasone (QVAR) 80 mcg/actuation Aero    buPROPion (WELLBUTRIN XL) 150 MG TB24 tablet    diphenoxylate-atropine 2.5-0.025 mg (LOMOTIL) 2.5-0.025 mg per tablet    levothyroxine (SYNTHROID) 200 MCG tablet    Levocarnitine 660 mg bid   montelukast (SINGULAIR) 10 mg tablet    omega-3 acid ethyl esters (LOVAZA) 1 gram capsule    tramadol (ULTRAM) 50 mg tablet    trazodone (DESYREL) 100 MG tablet    VENTOLIN HFA 90 mcg/actuation inhaler     ALLERGIES: Prazocin, Lamotrigine, sulfa    SOCIAL HISTORY: Mrs. Mitchell has recently  from her . She was  10 years. The patient moved to the area in [id number]. Her family lives in [place]. Her parents are providing some financial help.     FAMILY HISTORY: At this visit, a 3 generation pedigree was constructed and will be scanned in the patients chart.  Mrs. Mitchell has no children. She has 3 full sisters. Her sister Terese who is 51-years-old has a diagnosis of MIMI. Per report, Terese was evaluated at various centers ( [facility] in [place], [facility] in [place] and [place]). She had genetic testing and muscle biopsy (unfortunately copies of results were not available for our review). I dont have a genetic report showing POLG mutations. I do have a note from Dr. Saldaña from [facility] stating her sisters diagnosis    The patients other sister Kitty has a history of seizures. Marck son who is 23-years-old has Asperger. The patients parents are living. The father is 75-years-old and has a cardiac arrhythmia. The mother is 71-years-old and has a history of diabetes and IBS. On the maternal side, there is a first cousin who  in her 40s. Per report, this cousin had medical issues similar to Terese. This cousins daughter has a history of epilepsy and  at the  "age of 18. The remainder of the family was unremarkable. Maternal ancestry is Pashto. Paternal ancestry is English and Qatari. Ashkenazi Pentecostal ancestry and consanguinity were denied.     PHYSICAL EXAM: Wt: 248 lbs (20 lbs loss), Ht: 5' 7", BMI: 38. She did look thinner.  HEENT:  Her head is normocephalic and no dysmorphic features. Extraocular movements somewhat sluggish and she does have a mild ptosis bilaterally.  NECK:  Supple.  CHEST:  Normally formed.  HEART:  Regular rate and rhythm.  ABDOMEN:  Soft, non-tender, non-distended.  No organomegaly.  MUSCULOSKELETAL:  No appreciable dysmorphic features in the hands or feet. No hyperextensibility.  NEUROLOGIC: strength 5/5, DTRs 2+, normal speech and cognition. Her gait did not seem abnormal to me. She could do tandem gait.    IMPRESSION: I reviewed the patients medical and family history. She does not have MIMI since she doesnt have a single POLG mutation. Her 319 nuclear sherrie gene panel and mtDNA were unrevealing as well. She stated that her sister might have been diagnosed with Jaclyns disease. Glenn requested to provide these records. Well consider Whole Exome Sequencing and brain MRI. I did obtain GDF15 which is a better sherrie biomarker, as well as glutathione and carnitine.    I did discuss possible treatment which would include antioxidant vitamins such as CoQ10, NAC or lipoic. I did increase her Levocarnitine today and recommended Active Q (ubiquinol). Glenn also recommended a sleep study and cornstarch at night. Glenn advised against fasting and she needs to have regular nutrition to prevent catabolism and exercise without overexertion.    RECOMMENDATIONS:  1. Sisters records showing Jaclyns diagnosis.  2. GDF15, glutathione and carnitine.  3. PSG for PATRICIA.  4. Regular nutrition to prevent catabolism, cornstarch at night and exercise without overexertion.  5. Consider antioxidants.  6. Levocarnitine 990 mg bid, ActiveQ 300 mg bid.  7. Follow up in 3 " months.    Time spent: 60 minutes, more than 50% was spent in counseling. Additional 60 minutes were spent without direct contact, on research of the patients complex medical findings and formulating further diagnostic steps and treatment. The note is in epic.      Anastacio Nevarez M.D.                                                                 Section Head - Medical Genetics                                                    [facility]

## 2017-12-03 ENCOUNTER — PATIENT MESSAGE (OUTPATIENT)
Dept: GENETICS | Facility: CLINIC | Age: 42
End: 2017-12-03

## 2017-12-06 LAB
ACYLCARNITINE SERPL-SCNC: 13 UMOL/L (ref 5–29)
CARNITINE FREE SERPL-SCNC: 31 UMOL/L (ref 25–60)
CARNITINE SERPL-SCNC: 0.4 UMOL/L (ref 0.1–1)
CARNITINE SERPL-SCNC: 44 UMOL/L (ref 34–86)

## 2017-12-07 RX ORDER — DOXEPIN HYDROCHLORIDE 25 MG/1
50 CAPSULE ORAL NIGHTLY
Qty: 30 CAPSULE | Refills: 0 | Status: SHIPPED | OUTPATIENT
Start: 2017-12-07 | End: 2018-01-08 | Stop reason: ALTCHOICE

## 2017-12-08 LAB — MAYO MISCELLANEOUS RESULT (REF): NORMAL

## 2017-12-09 ENCOUNTER — PATIENT MESSAGE (OUTPATIENT)
Dept: GENETICS | Facility: CLINIC | Age: 42
End: 2017-12-09

## 2017-12-12 DIAGNOSIS — E03.9 HYPOTHYROIDISM, UNSPECIFIED TYPE: ICD-10-CM

## 2017-12-12 NOTE — PROGRESS NOTES
Refill Authorization Note     is requesting a refill authorization.    Brief assessment and rationale for refill: APPROVE: prr  Amount/Quantity of medication ordered: 90d         Refills Authorized: Yes  If authorized number of refills: 0           Medication Therapy Plan: fT4 WNL; approve 3 more mo   Name and strength of medication: levothyroxine 250 mcg (200 +50 mcg)  How patient will take medication: t1t po daily   Medication reconciliation completed: No  Comments:   Lab Results   Component Value Date    TSH 4.119 (H) 03/14/2017    T7SBGEV 6.5 01/03/2005    FREET4 0.98 03/14/2017

## 2017-12-13 RX ORDER — LEVOTHYROXINE SODIUM 50 UG/1
TABLET ORAL
Qty: 90 TABLET | Refills: 0 | Status: SHIPPED | OUTPATIENT
Start: 2017-12-13 | End: 2018-03-13 | Stop reason: SDUPTHER

## 2017-12-13 RX ORDER — LEVOTHYROXINE SODIUM 200 UG/1
TABLET ORAL
Qty: 90 TABLET | Refills: 0 | Status: SHIPPED | OUTPATIENT
Start: 2017-12-13 | End: 2018-03-18 | Stop reason: SDUPTHER

## 2017-12-18 ENCOUNTER — TELEPHONE (OUTPATIENT)
Dept: GENETICS | Facility: CLINIC | Age: 42
End: 2017-12-18

## 2017-12-18 DIAGNOSIS — R29.6 FALLS FREQUENTLY: Primary | ICD-10-CM

## 2017-12-18 NOTE — TELEPHONE ENCOUNTER
Spoke to Ms. Mitchell who is requesting an MRI. She informed me that her sister's records showing 'brain lesions' were sent to Dr Nevarez. I checked EPIC/ checked with [facility] but could not locate these records. Dr Nevarez told me that he did get clinic notes from the sister's neurologist. He does not feel the additional information would help guide additional testing for Ms. Mitchell.     Ms. Mitchell was frustrated to hear this. She was tearful.  She told me that she might not have insurance due to her divorce. She is meeting with a  to discuss an application for disability. Ms. Mitchell wants to see a neurologist and is requesting a referral. She also wants Dr Nevarez to recommend someone who can evaluate her for her history of falls.

## 2017-12-19 ENCOUNTER — PATIENT MESSAGE (OUTPATIENT)
Dept: GENETICS | Facility: CLINIC | Age: 42
End: 2017-12-19

## 2017-12-28 ENCOUNTER — PATIENT MESSAGE (OUTPATIENT)
Dept: GENETICS | Facility: CLINIC | Age: 42
End: 2017-12-28

## 2018-01-08 ENCOUNTER — TELEPHONE (OUTPATIENT)
Dept: PSYCHIATRY | Facility: CLINIC | Age: 43
End: 2018-01-08

## 2018-01-08 RX ORDER — DOXEPIN HYDROCHLORIDE 25 MG/1
50 CAPSULE ORAL NIGHTLY
Qty: 30 CAPSULE | Refills: 0 | OUTPATIENT
Start: 2018-01-08 | End: 2019-01-08

## 2018-01-08 RX ORDER — MIRTAZAPINE 15 MG/1
15 TABLET, FILM COATED ORAL NIGHTLY
Qty: 10 TABLET | Refills: 0 | Status: SHIPPED | OUTPATIENT
Start: 2018-01-08 | End: 2018-01-18 | Stop reason: ALTCHOICE

## 2018-01-08 RX ORDER — FLUOXETINE 10 MG/1
10 CAPSULE ORAL DAILY
Qty: 30 CAPSULE | Refills: 0 | Status: SHIPPED | OUTPATIENT
Start: 2018-01-08 | End: 2018-01-18 | Stop reason: SDUPTHER

## 2018-01-08 NOTE — TELEPHONE ENCOUNTER
"I spoke to Dinah in coverage for Dr Wells.  I spoke to the patient about 15 mins on phone.  She called regarding insomnia which she has lengthy hx of x apprx 8 yrs.  She has not slept x 3 nights.  She had a PSG and has been dx with PATRICIA - now using CPAP.  She endorses problems with sleep onset and maintenance.   Her anxiety levels are high, holidays have been very hard.  She mentioned several times that her  left her, he was their only income, she is barely leaving the home.  Pt is down, but denies any suicidal ideations. Dogs are a source of support.       Sleep hygiene: she drinks 3 Coke zeroes a day (stops at 5-6 pm), has a sound machine, tried aroma therapy, tries to reduce TV at night, although she could likely do this earlier.     Pt reports prior PSG showed her brain does not "shut down" at night.  She woke up x 2 when sedated during prior colonoscopy.     Pt has a thyroid condition - consideration should be given to rechecking TSH given high dose of synthroid that she takes    She was also seen by genetics - sister has a mitochrondrial d/o. Pt on levocarnitine    Currently, she is taking Prozac 10 mg and Wellbutrin  mg both in am, and Prazosin 1 mg nightly.  Some benefit noted from low dose SSRI.   No longer taking Tramadol.   Takes Xanax about every other day.   Doxepin 50 mg was ineffective for sleep - last dose 2 nights ago.     Other prior sleep trials: Amitriptyline, Seroquel (worked, but huge appetite increase), Trazodone (to 200 mg, worked, then got tolerant), Doxepin, Lunesta, Ambien, Temazepam (she took # 2 30 mg caps with poor response).      Complicated pt - likely has some tolerance to benzodiazepines. She took 1 mg of Xanax at night, which makes her a little tired, but does not help her to sleep.  She is trying to avoid taking this med.     Will add Mirtazapine 15 mg - advised may try 1/2 tab, if not asleep within 30-45 mins, can take the other 1/2 tab.  Pt advised of risks of " sedation, wt gain/increased appetite.  # 10 tabs sent.  Will forward message to Dr Wells for her to follow up if this tx is effective.  Her current appt with Dr Wells is 1/23/18  Pt would benefit from CBT-I.  Will need additional counseling regarding sleep hygiene, reduction of caffeine, to keep sleep diary.

## 2018-01-08 NOTE — TELEPHONE ENCOUNTER
Patient called to state that she has not slept in 3 days. At her last appointment she was started on doxepin 25 mg two tablets nightly and it is not working    Next appointment with dr. Wells is 1/23/2018 at 1:30pm  Added to cancellation waitlist    Advised patient dr. Wells is not in clinic- patient states she can not wait one more night  Please advise

## 2018-01-09 ENCOUNTER — TELEPHONE (OUTPATIENT)
Dept: PSYCHIATRY | Facility: CLINIC | Age: 43
End: 2018-01-09

## 2018-01-09 NOTE — TELEPHONE ENCOUNTER
"Tried to reach the pt regarding her phone message with  yesterday who was providing coverage for me-    She answered her home phone after 2 missed calls from me on cell-     Reports she picked up remeron this morning from pharmacy- therefore has not yet tried. "i'm just exhausted. i'll try it and we can talk about it later."     Instructed pt to try 1/2 tab initially and if still awake 1 hr later, take addt'l 1/2 tab. Discuss r/b/se's of med. PVU.     Will speak via phone later this week regarding response to med.               "

## 2018-01-09 NOTE — TELEPHONE ENCOUNTER
Called to f/u with the pt regarding her conversation with  and new medxn, Remeron. No answer. Left v/m. Will try again later.

## 2018-01-18 ENCOUNTER — TELEPHONE (OUTPATIENT)
Dept: PSYCHIATRY | Facility: CLINIC | Age: 43
End: 2018-01-18

## 2018-01-18 ENCOUNTER — OFFICE VISIT (OUTPATIENT)
Dept: PSYCHIATRY | Facility: CLINIC | Age: 43
End: 2018-01-18
Payer: COMMERCIAL

## 2018-01-18 VITALS
HEART RATE: 82 BPM | WEIGHT: 269.13 LBS | HEIGHT: 67 IN | BODY MASS INDEX: 42.24 KG/M2 | DIASTOLIC BLOOD PRESSURE: 72 MMHG | SYSTOLIC BLOOD PRESSURE: 137 MMHG

## 2018-01-18 DIAGNOSIS — F41.0 PANIC DISORDER WITHOUT AGORAPHOBIA: ICD-10-CM

## 2018-01-18 DIAGNOSIS — F60.3 BORDERLINE PERSONALITY DISORDER: ICD-10-CM

## 2018-01-18 DIAGNOSIS — F43.10 PTSD (POST-TRAUMATIC STRESS DISORDER): ICD-10-CM

## 2018-01-18 DIAGNOSIS — F90.2 ATTENTION DEFICIT HYPERACTIVITY DISORDER (ADHD), COMBINED TYPE: ICD-10-CM

## 2018-01-18 DIAGNOSIS — F33.2 MAJOR DEPRESSIVE DISORDER, RECURRENT, SEVERE WITHOUT PSYCHOTIC FEATURES: Primary | ICD-10-CM

## 2018-01-18 DIAGNOSIS — E66.01 MORBID OBESITY WITH BMI OF 40.0-44.9, ADULT: ICD-10-CM

## 2018-01-18 PROCEDURE — 99999 PR PBB SHADOW E&M-EST. PATIENT-LVL II: CPT | Mod: PBBFAC,,, | Performed by: PSYCHIATRY & NEUROLOGY

## 2018-01-18 PROCEDURE — 99214 OFFICE O/P EST MOD 30 MIN: CPT | Mod: S$GLB,,, | Performed by: PSYCHIATRY & NEUROLOGY

## 2018-01-18 RX ORDER — FLUOXETINE HYDROCHLORIDE 20 MG/1
20 CAPSULE ORAL DAILY
Qty: 30 CAPSULE | Refills: 0 | Status: SHIPPED | OUTPATIENT
Start: 2018-01-18 | End: 2018-03-01 | Stop reason: SDUPTHER

## 2018-01-18 RX ORDER — ALPRAZOLAM 1 MG/1
1 TABLET ORAL DAILY PRN
Qty: 30 TABLET | Refills: 1 | Status: SHIPPED | OUTPATIENT
Start: 2018-01-18 | End: 2018-03-13 | Stop reason: SDUPTHER

## 2018-01-18 RX ORDER — PRAZOSIN HYDROCHLORIDE 1 MG/1
1 CAPSULE ORAL NIGHTLY
Qty: 30 CAPSULE | Refills: 1 | Status: SHIPPED | OUTPATIENT
Start: 2018-01-18 | End: 2018-03-13 | Stop reason: SDUPTHER

## 2018-01-18 RX ORDER — ZOLPIDEM TARTRATE 6.25 MG/1
6.25 TABLET, FILM COATED, EXTENDED RELEASE ORAL NIGHTLY PRN
Qty: 30 TABLET | Refills: 0 | Status: SHIPPED | OUTPATIENT
Start: 2018-01-18 | End: 2018-01-21 | Stop reason: SDUPTHER

## 2018-01-18 RX ORDER — BUPROPION HYDROCHLORIDE 150 MG/1
150 TABLET ORAL DAILY
Qty: 90 TABLET | Refills: 0 | Status: SHIPPED | OUTPATIENT
Start: 2018-01-18 | End: 2018-03-13 | Stop reason: SDUPTHER
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
## 2018-01-18 NOTE — TELEPHONE ENCOUNTER
Patient called advising PA required for belsomra. PA initiated and medication is NOT covered by patient's plan. Will require alternative med   Please advise

## 2018-01-21 ENCOUNTER — PATIENT MESSAGE (OUTPATIENT)
Dept: PSYCHIATRY | Facility: CLINIC | Age: 43
End: 2018-01-21

## 2018-01-21 DIAGNOSIS — F43.10 PTSD (POST-TRAUMATIC STRESS DISORDER): ICD-10-CM

## 2018-01-21 RX ORDER — ZOLPIDEM TARTRATE 12.5 MG/1
12.5 TABLET, FILM COATED, EXTENDED RELEASE ORAL NIGHTLY PRN
Qty: 30 TABLET | Refills: 0 | Status: SHIPPED | OUTPATIENT
Start: 2018-01-21 | End: 2018-03-13 | Stop reason: SDUPTHER

## 2018-01-22 ENCOUNTER — TELEPHONE (OUTPATIENT)
Dept: NEUROLOGY | Facility: CLINIC | Age: 43
End: 2018-01-22

## 2018-01-22 ENCOUNTER — OFFICE VISIT (OUTPATIENT)
Dept: NEUROLOGY | Facility: CLINIC | Age: 43
End: 2018-01-22
Payer: COMMERCIAL

## 2018-01-22 VITALS
SYSTOLIC BLOOD PRESSURE: 139 MMHG | RESPIRATION RATE: 20 BRPM | HEART RATE: 89 BPM | DIASTOLIC BLOOD PRESSURE: 98 MMHG | BODY MASS INDEX: 41.46 KG/M2 | WEIGHT: 264.13 LBS | HEIGHT: 67 IN

## 2018-01-22 DIAGNOSIS — G62.9 NEUROPATHY: ICD-10-CM

## 2018-01-22 DIAGNOSIS — H02.403 PTOSIS OF BOTH EYELIDS: Primary | ICD-10-CM

## 2018-01-22 DIAGNOSIS — R27.0 ATAXIA: ICD-10-CM

## 2018-01-22 DIAGNOSIS — R29.898 WEAKNESS OF LEFT HAND: ICD-10-CM

## 2018-01-22 PROCEDURE — 99205 OFFICE O/P NEW HI 60 MIN: CPT | Mod: S$GLB,,, | Performed by: PSYCHIATRY & NEUROLOGY

## 2018-01-22 PROCEDURE — 99999 PR PBB SHADOW E&M-EST. PATIENT-LVL III: CPT | Mod: PBBFAC,,, | Performed by: PSYCHIATRY & NEUROLOGY

## 2018-01-22 RX ORDER — NABUMETONE 500 MG/1
500 TABLET, FILM COATED ORAL 2 TIMES DAILY
COMMUNITY
End: 2018-09-25

## 2018-01-22 RX ORDER — METHYLPREDNISOLONE 4 MG/1
TABLET ORAL
COMMUNITY
Start: 2018-01-19 | End: 2018-03-13 | Stop reason: ALTCHOICE

## 2018-01-22 RX ORDER — METHOCARBAMOL 500 MG/1
500 TABLET, FILM COATED ORAL 2 TIMES DAILY
COMMUNITY
End: 2018-02-15 | Stop reason: SDUPTHER

## 2018-01-22 NOTE — LETTER
January 22, 2018      Anastacio Nevarez MD  [address]  [place] [address]           [facility]  [address] [facility] [address] [place] [address]  Phone: [phone]  Fax: [phone]          Patient: Dinah Mitchell   MR Number: [id number]   YOB: 1975   Date of Visit: 1/22/2018       Dear Dr. Anastacio Nevarez:    Thank you for referring Dinah Mitchell to me for evaluation. Attached you will find relevant portions of my assessment and plan of care.    If you have questions, please do not hesitate to call me. I look forward to following Dinah Mitchell along with you.    Sincerely,    Nilam Fuentes MD    Enclosure  CC:  No Recipients    If you would like to receive this communication electronically, please contact [email or web address] or [phone] to request more information on [employer] access.    For providers and/or their staff who would like to refer a patient to [facility], please contact us through our one-stop-shop provider referral line, [facility], operated [facility], at [phone].    If you feel you have received this communication in error or would no longer like to receive these types of communications, please e-mail [email or web address]

## 2018-01-22 NOTE — PROGRESS NOTES
"    Date: 1/22/2018    Patient ID: Dinah Mitchell is a 42 y.o. female.    Referring Provider:  Anastacio Nevarez MD    Chief Complaint: Fall (started 4-5 years ago but getting worse ); Numbness (occasionally to hands and feet); Headache; and Extremity Weakness ("sometimes a glass of water is too heavy" )      History of Present Illness:  Ms. Mitchell is a 42 y.o. female who presents referred by Anastacio Nevarez MD today for evaluation of frequent falls.     Her past medical history is significant for depression, borderline personality disorder, PTSD, fibromyalgia, and hypothyroidism. She is followed by Dr. Wells in psychiatry. She has a family history of mitochondrial disease in her sister (dr. Saldaña at [facility] diagnosed her with MIMI (sensory ataxic neuropathy, dysarthria, and opthalmoparesis) but there is question of whether it is actually Jaclyn's). Her sister reportedly has lesions on her MRI. Her sister had weakness on one side, drooping eyelids, loss of coordination. She is doing better now. The patient recently visited with Dr. Nevarez and underwent some genetic testing which did not reveal any mitochondrial POLG genetic mutations. He was considering whole exome sequencing and MRI brain but these have not been performed. He requested neurology consult to evaluate her abnormal gait.     At her visit with Dr. Nevarez, she noted progressively worsening gait and bilateral ptosis. She feels that her legs are in quicksand and don't move normally. This has been going on and worsening over the past 4-5 years. She also notes lack of temperature sensation in her hands when picking up hot and cold things and her feet as well. She has noticed this worsening over the past 1-1.5 years. She notes weakness in picking up a glass, especially with her left hand. She feels this fluctuates. Her depth perception is off but the ophthalmologist said her vision was normal. This has been progressing over the past 3 years. Her " ptosis started about 2 years ago. This is worse when she is tired but they are never completely open. The right side is worse than the left. She denies double vision. Her last fall was last week and she is falling more frequently.     She has some muscle aches but this is not a significant problem. Of note, her serum lactate and pyruvate are normal. She has not had a MRI or EMG/NCS.     Her other sister, Mirian, has seizures. The sister with the mitochondrial disease also has seizures. Her mother had a stroke at age 30 there is no obvious residual deficit now. The patient has no history of seizures or stroke-like episodes.    The patient has frequent migraine headaches. These are mostly when waking in the morning. She hardly has them at night. They are severe and last 20 minutes to hours. Her sister, Mirian, with epilepsy has migraines as well.      She feels her intellect has declined and has difficulty getting words out or stop mid-sentence. She has been on depoprovera since age 18. She is a never-smoker.     Review of Systems:   Comprehensive review of systems is negative except as mentioned in the HPI    Allergies:  Review of patient's allergies indicates:   Allergen Reactions    Lamotrigine      Other reaction(s): Rash  Other reaction(s): Nausea    Sulfa (sulfonamide antibiotics) Nausea Only     Other reaction(s): Unknown       Current Medications:  Current Outpatient Prescriptions   Medication Sig Dispense Refill    ACETYLCYSTEINE (N-ACETYL-L-CYSTEINE MISC) 750 mg by Misc.(Non-Drug; Combo Route) route 4 (four) times daily.      ALPRAZolam (XANAX) 1 MG tablet Take 1 tablet (1 mg total) by mouth daily as needed for Anxiety. 30 tablet 1    buPROPion (WELLBUTRIN XL) 150 MG TB24 tablet Take 1 tablet (150 mg total) by mouth once daily. 90 tablet 0    diphenoxylate-atropine 2.5-0.025 mg (LOMOTIL) 2.5-0.025 mg per tablet Take 1 tablet by mouth 4 (four) times daily as needed for Diarrhea. 30 tablet 2    FLUoxetine  (PROZAC) 20 MG capsule Take 1 capsule (20 mg total) by mouth once daily. 30 capsule 0    levOCARNitine (CARNITOR) 330 mg Tab Take 3 tablets (990 mg total) by mouth 2 (two) times daily. 180 tablet 6    methocarbamol (ROBAXIN) 500 MG Tab Take 500 mg by mouth 2 (two) times daily.      methylPREDNISolone (MEDROL DOSEPACK) 4 mg tablet       montelukast (SINGULAIR) 10 mg tablet Take 1 tablet (10 mg total) by mouth once daily. 90 tablet 3    nabumetone (RELAFEN) 500 MG tablet Take 500 mg by mouth 2 (two) times daily.      prazosin (MINIPRESS) 1 MG Cap Take 1 capsule (1 mg total) by mouth every evening. For ptsd related nightmares 30 capsule 1    SYNTHROID 200 mcg tablet TAKE 1 TABLET (200 MCG TOTAL) BY MOUTH ONCE DAILY. 90 tablet 0    SYNTHROID 50 mcg tablet TAKE 50 MCG TABLET BY MOUTH ALONG WITH 200 MCG TABLET EVERY DAY FOR A TOTAL DAILY DOSE OF 250MCG. 90 tablet 0    tramadol (ULTRAM) 50 mg tablet TAKE 1 TABLET BY MOUTH DAILY TO TWICE DAILY FOR SEVERE PAIN  0    VENTOLIN HFA 90 mcg/actuation inhaler INHALE 2 PUFFS INTO THE LUNGS EVERY 6 (SIX) HOURS AS NEEDED FOR WHEEZING. 18 Inhaler 0    zolpidem (AMBIEN CR) 12.5 MG CR tablet Take 1 tablet (12.5 mg total) by mouth nightly as needed for Insomnia. 30 tablet 0     No current facility-administered medications for this visit.        Past Medical History:  Past Medical History:   Diagnosis Date    Abnormal Pap smear of cervix     colpo/ LEEP    Abnormal Pap smear of vagina     [facility]    Depression     Fibromyalgia     Thyroid disease        Past Surgical History:  Past Surgical History:   Procedure Laterality Date    BREAST SURGERY  1997    CERVICAL BIOPSY  W/ LOOP ELECTRODE EXCISION      LAPAROSCOPIC GASTRIC BANDING      removed due to abscess    PELVIC LAPAROSCOPY      TENDON REPAIR Right     x2       Family History:  family history includes Cancer (age of onset: 65) in her maternal grandfather; Cervical cancer in her sister; Colon cancer in her maternal  "grandmother; Diabetes in her mother and sister; Heart disease in her father; Hyperlipidemia in her father and mother; Hypertension in her mother; Mental illness in her mother and sister; Mitochondrial disorder in her sister; Ovarian cancer in her cousin, paternal aunt, and paternal aunt; Seizures in her sister and sister; Stroke (age of onset: 30) in her mother.    Social History:   reports that she has never smoked. She has never used smokeless tobacco. She reports that she drinks alcohol. She reports that she does not use drugs.    Physical Exam:  Vitals:    01/22/18 0812   BP: (!) 139/98   Pulse: 89   Resp: 20   Weight: 119.8 kg (264 lb 1.6 oz)   Height: 5' 7" (1.702 m)   PainSc:   8   PainLoc: Generalized     Body mass index is 41.36 kg/m².  General: Well developed, well nourished.  No acute distress.  HEENT: Atraumatic, normocephalic.  Cardiovascular: No carotid bruits.   Musculoskeletal: No obvious joint deformities, moves all extremities well.  Skin: No obvious rashes    Neurological Exam:  Mental status: Awake, alert, and oriented. Able to give a clear history.   Speech/Language: No dysarthria or aphasia on conversation.   Cranial nerves: Pupils equal round and reactive to light (7mm bilaterally, briskly reactive), bilateral ptosis (right > left), facilitation on exam with sustained upgaze has improvement bilaterally, extraocular movements intact, facial strength and sensation intact bilaterally, palate and tongue midline, hearing grossly intact bilaterally.  Motor: 5 out of 5 strength throughout the upper and lower extremities bilaterally except the left hand which has 3/5 in finger extensors and dorsal interossei. Normal bulk and tone. No myotonia.   Sensation: Intact to vibration bilaterally. Diminished to pinprick bilateral hands to just past the fingers. Normal pinprick in lower extremities.   DTR: 2+ at the knees and biceps bilaterally. Toes mute.   Coordination: Ataxia in left foot. Finger nose finger " and finger to nose is normal.   Gait: narrow based, slightly ataxic, difficulty with tandem gait.     Data:  I have personally reviewed the referring provider's notes, labs, & imaging made available to me today.     Labs:  CBC:   Lab Results   Component Value Date    WBC 6.12 03/14/2017    HGB 15.4 03/14/2017    HCT 46.0 03/14/2017     03/14/2017    MCV 93 03/14/2017    RDW 12.6 03/14/2017     BMP:   Lab Results   Component Value Date     03/14/2017    K 4.4 03/14/2017     03/14/2017    CO2 23 03/14/2017    BUN 9 03/14/2017    CREATININE 1.0 03/14/2017    GLU 99 03/14/2017    CALCIUM 9.2 03/14/2017     LFTS;   Lab Results   Component Value Date    PROT 7.2 03/14/2017    ALBUMIN 3.8 03/14/2017    BILITOT 0.3 03/14/2017    AST 24 03/14/2017    ALKPHOS 53 (L) 03/14/2017    ALT 28 03/14/2017     COAGS: No results found for: INR, PROTIME, PTT  FLP:   Lab Results   Component Value Date    CHOL 190 03/14/2017    HDL 41 03/14/2017    LDLCALC 118.8 03/14/2017    TRIG 151 (H) 03/14/2017    CHOLHDL 21.6 03/14/2017         Assessment and Plan:  Ms. Micthell is a 42 y.o. female referred to me by Anastacio eNvarez MD for evaluation of falls and ptosis.     Ms. Mitchell story and examination are concerning for a neurologic cause for her symptoms and warrants further evaluation. Localization is difficult. I do not know if this localizes to the CNS, neuromuscular junction (high possibility given the facilitation on examination), peripheral nerve, or muscle. We will obtain a MRI brain to look for lesions consistent with mitochondrial disease. Also, given her cognitive concerns, we will evaluate this. The neuromuscular junction, peripheral nerve, and muscle will be evaluated on EMG/NCS (including rep stim given the facilitation and consideration of single fiber to rule out NMJ disease). These tests will help guide further lab testing but I will check a creatinine and creatinine kinase.     Certainly, mitochondrial  disease could unify her diagnosis. She has been evaluated by Dr. Nevarez of medical genetics and had negative testing thus far, though whole exome sequencing may be the next step. This can be guided by the other testing and ruling out other diseases.     I will see her back in follow up after the testing to go over results and order additional tests if needed.     Ptosis of both eyelids  -     MRI Brain W WO Contrast; Future; Expected date: 01/22/2018  -     Creatinine, serum; Future; Expected date: 01/22/2018  -     CK; Future; Expected date: 01/22/2018  -     EMG W/ ULTRASOUND AND NERVE CONDUCTION TEST 4 Extremities; Future    Ataxia  -     MRI Brain W WO Contrast; Future; Expected date: 01/22/2018  -     Creatinine, serum; Future; Expected date: 01/22/2018  -     CK; Future; Expected date: 01/22/2018  -     EMG W/ ULTRASOUND AND NERVE CONDUCTION TEST 4 Extremities; Future    Neuropathy  -     MRI Brain W WO Contrast; Future; Expected date: 01/22/2018  -     Creatinine, serum; Future; Expected date: 01/22/2018  -     CK; Future; Expected date: 01/22/2018  -     EMG W/ ULTRASOUND AND NERVE CONDUCTION TEST 4 Extremities; Future    Weakness of left hand  -     MRI Brain W WO Contrast; Future; Expected date: 01/22/2018  -     Creatinine, serum; Future; Expected date: 01/22/2018  -     CK; Future; Expected date: 01/22/2018  -     EMG W/ ULTRASOUND AND NERVE CONDUCTION TEST 4 Extremities; Future

## 2018-01-23 NOTE — TELEPHONE ENCOUNTER
How often is she taking the diarrhea medication? The atropine component can create some neuro affects, and she is currently undergoing an extensive w/u.

## 2018-01-25 ENCOUNTER — PATIENT MESSAGE (OUTPATIENT)
Dept: PSYCHIATRY | Facility: CLINIC | Age: 43
End: 2018-01-25

## 2018-01-29 ENCOUNTER — LAB VISIT (OUTPATIENT)
Dept: LAB | Facility: HOSPITAL | Age: 43
End: 2018-01-29
Attending: PSYCHIATRY & NEUROLOGY
Payer: COMMERCIAL

## 2018-01-29 DIAGNOSIS — R29.898 WEAKNESS OF LEFT HAND: ICD-10-CM

## 2018-01-29 DIAGNOSIS — G62.9 NEUROPATHY: ICD-10-CM

## 2018-01-29 DIAGNOSIS — H02.403 PTOSIS OF BOTH EYELIDS: ICD-10-CM

## 2018-01-29 DIAGNOSIS — R27.0 ATAXIA: ICD-10-CM

## 2018-01-29 LAB
CK SERPL-CCNC: 86 U/L
CREAT SERPL-MCNC: 0.9 MG/DL
EST. GFR  (AFRICAN AMERICAN): >60 ML/MIN/1.73 M^2
EST. GFR  (NON AFRICAN AMERICAN): >60 ML/MIN/1.73 M^2

## 2018-01-29 PROCEDURE — 36415 COLL VENOUS BLD VENIPUNCTURE: CPT | Mod: PO

## 2018-01-29 PROCEDURE — 82565 ASSAY OF CREATININE: CPT

## 2018-01-29 PROCEDURE — 82550 ASSAY OF CK (CPK): CPT

## 2018-01-31 NOTE — TELEPHONE ENCOUNTER
Attempted to contact pt to find out how often she is taking diarrhea medication.     No answer; left message to return call.

## 2018-02-01 ENCOUNTER — PATIENT MESSAGE (OUTPATIENT)
Dept: PSYCHIATRY | Facility: CLINIC | Age: 43
End: 2018-02-01

## 2018-02-01 ENCOUNTER — TELEPHONE (OUTPATIENT)
Dept: PSYCHIATRY | Facility: CLINIC | Age: 43
End: 2018-02-01

## 2018-02-01 NOTE — TELEPHONE ENCOUNTER
I called the pt to discuss this message- I left a v/m.     The pt is currently prescribed ambien cr 12.5mg po qhs PRN insomnia- this is max recommended dose and she should not be taking more than 1 tab nightly. Rx was ordered on 1/21 with instructions given via portal in response to a pt message to me.     Left v/m asking her to please call back at [phone]

## 2018-02-02 NOTE — TELEPHONE ENCOUNTER
Spoke with pt and she said that a 30 day supply last her at least 4-5 months; she only takes it when she leaves the house or goes out to eat.

## 2018-02-05 ENCOUNTER — HOSPITAL ENCOUNTER (OUTPATIENT)
Dept: RADIOLOGY | Facility: HOSPITAL | Age: 43
Discharge: HOME | End: 2018-02-05
Attending: PSYCHIATRY & NEUROLOGY
Payer: COMMERCIAL

## 2018-02-05 DIAGNOSIS — R29.898 WEAKNESS OF LEFT HAND: ICD-10-CM

## 2018-02-05 DIAGNOSIS — G62.9 NEUROPATHY: ICD-10-CM

## 2018-02-05 DIAGNOSIS — G93.5 CHIARI I MALFORMATION: Primary | ICD-10-CM

## 2018-02-05 DIAGNOSIS — H02.403 PTOSIS OF BOTH EYELIDS: ICD-10-CM

## 2018-02-05 DIAGNOSIS — R27.0 ATAXIA: ICD-10-CM

## 2018-02-05 PROCEDURE — 70553 MRI BRAIN STEM W/O & W/DYE: CPT | Mod: TC,PO

## 2018-02-05 PROCEDURE — A9585 GADOBUTROL INJECTION: HCPCS | Mod: PO | Performed by: PSYCHIATRY & NEUROLOGY

## 2018-02-05 PROCEDURE — 25500020 PHARM REV CODE 255: Mod: PO | Performed by: PSYCHIATRY & NEUROLOGY

## 2018-02-05 PROCEDURE — 70553 MRI BRAIN STEM W/O & W/DYE: CPT | Mod: 26,,, | Performed by: RADIOLOGY

## 2018-02-05 RX ORDER — GADOBUTROL 604.72 MG/ML
10 INJECTION INTRAVENOUS
Status: COMPLETED | OUTPATIENT
Start: 2018-02-05 | End: 2018-02-05

## 2018-02-05 RX ORDER — FLUOXETINE 10 MG/1
10 CAPSULE ORAL DAILY
Qty: 30 CAPSULE | Refills: 0 | OUTPATIENT
Start: 2018-02-05 | End: 2019-02-05

## 2018-02-05 RX ORDER — DIPHENOXYLATE HYDROCHLORIDE AND ATROPINE SULFATE 2.5; .025 MG/1; MG/1
1 TABLET ORAL 4 TIMES DAILY PRN
Qty: 30 TABLET | Refills: 1 | Status: SHIPPED | OUTPATIENT
Start: 2018-02-05 | End: 2018-09-03 | Stop reason: SDUPTHER

## 2018-02-05 RX ADMIN — GADOBUTROL 10 ML: 604.72 INJECTION INTRAVENOUS at 11:02

## 2018-02-05 NOTE — PROGRESS NOTES
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 This is a relatively common finding and usually does not cause symptoms. However, when we see this, we want to get a MRI of the spinal cord to see if this malformation is affecting the spinal cord. I have put in the orders for this and the nurses can help you schedule these tests. We will go over all of this and your EMG results in more detail at your follow up appointment.

## 2018-02-07 ENCOUNTER — PATIENT MESSAGE (OUTPATIENT)
Dept: FAMILY MEDICINE | Facility: CLINIC | Age: 43
End: 2018-02-07

## 2018-02-15 ENCOUNTER — TELEPHONE (OUTPATIENT)
Dept: NEUROLOGY | Facility: CLINIC | Age: 43
End: 2018-02-15

## 2018-02-15 RX ORDER — TRAMADOL HYDROCHLORIDE 50 MG/1
50 TABLET ORAL EVERY 6 HOURS PRN
Qty: 30 TABLET | Refills: 0 | Status: SHIPPED | OUTPATIENT
Start: 2018-02-15 | End: 2018-03-14 | Stop reason: SDUPTHER

## 2018-02-15 RX ORDER — METHOCARBAMOL 500 MG/1
500 TABLET, FILM COATED ORAL 2 TIMES DAILY PRN
Qty: 30 TABLET | Refills: 0 | Status: SHIPPED | OUTPATIENT
Start: 2018-02-15 | End: 2018-03-14 | Stop reason: SDUPTHER

## 2018-02-15 NOTE — TELEPHONE ENCOUNTER
Spoke with pharmacy; verified they hadn't checked the voicemail yet but now the prescription was ready. Called patient to informed her of prescription being ready.

## 2018-02-15 NOTE — TELEPHONE ENCOUNTER
Spoke with patient. She stated that the back of her head and neck have had a lot of pain mainly in the last 2 days; causing her to have only slept 1 hour in two days, though she stated that she did sleep a little better last night. She complains of frequent charley horses in the back of her back and feet. She stated that the pain is on and off, but when it's on, it feels like a knife is in the back of her neck. She has had spells yesterday of stopping mid-sentence and forgetting what's going on; she stated that this happens sometimes normally but she didn't know if it was increased because of this or because she had a lack of sleep; hasn't happened today. She stated that this all stated to happen 2 weeks before she saw Dr. Fuentes; was seen by her Orthopedic surgeon and pain management and given Tramadol and Robaxin. She took the last of her Tramadol and Robaxin; Relafen and Advil did not help at all. She wanted to know if Dr. Fuentes could prescribe the Tramadol and Robaxin for her or if there was something else she thought would help the patient. Please advise.

## 2018-02-15 NOTE — TELEPHONE ENCOUNTER
----- Message from Chloe Barger sent at 2/14/2018  3:20 PM CST -----  Contact: self  Patient called asking for advice about pain in back of neck and head is really bad. Patient also states she is getting morelia horses in the back of neck and head.  Please call patient at [phone]. Thanks!

## 2018-02-15 NOTE — TELEPHONE ENCOUNTER
----- Message from Lena Cruz sent at 2/15/2018 10:50 AM CST -----  Contact: Patient  Patient is calling to let the doctors office know that the pharmacy did not receive her prescription for traMADol (ULTRAM) 50 mg tablet when her other one was sent over.  Please call patient once this has been called in.  Call Back#[phone]  Thanks     [facility]/pharmacy #[id number] - [place], [place] - [address] [facility] [address]  [address] [address]  Phone: [phone] Fax: [phone]

## 2018-02-15 NOTE — TELEPHONE ENCOUNTER
I have sent through a one time refill of those medications to get her through until we can complete the workup. If she has been given the prescriptions by pain management in the past though, she should have a contract and see them for future refills.

## 2018-02-21 ENCOUNTER — TELEPHONE (OUTPATIENT)
Dept: NEUROLOGY | Facility: CLINIC | Age: 43
End: 2018-02-21

## 2018-02-21 NOTE — TELEPHONE ENCOUNTER
Spoke with patient. She expressed being concerned to get her MRIs done because it's more than one and she stated that she had a hard time with the one she did before. She wanted to know if taking her Xanax 1mg tablet prior to the MRI would help or if you had any other ideas on what she could do to calm her anxiety about it. Please advise.

## 2018-02-21 NOTE — TELEPHONE ENCOUNTER
----- Message from Lena Cruz sent at 2/21/2018 12:06 PM CST -----  Contact: Patient  Patient is calling to speak with someone about possible sedation for her upcoming MRI's on 2/27.  Please call patient to discus.  Call Back#[phone]  Thanks

## 2018-02-27 ENCOUNTER — HOSPITAL ENCOUNTER (OUTPATIENT)
Dept: RADIOLOGY | Facility: HOSPITAL | Age: 43
Discharge: HOME | End: 2018-02-27
Attending: PSYCHIATRY & NEUROLOGY
Payer: COMMERCIAL

## 2018-02-27 DIAGNOSIS — G93.5 CHIARI I MALFORMATION: ICD-10-CM

## 2018-02-27 PROCEDURE — 72156 MRI NECK SPINE W/O & W/DYE: CPT | Mod: 26,,, | Performed by: RADIOLOGY

## 2018-02-27 PROCEDURE — 72157 MRI CHEST SPINE W/O & W/DYE: CPT | Mod: 26,,, | Performed by: RADIOLOGY

## 2018-02-27 PROCEDURE — A9585 GADOBUTROL INJECTION: HCPCS | Mod: PO | Performed by: PSYCHIATRY & NEUROLOGY

## 2018-02-27 PROCEDURE — 25500020 PHARM REV CODE 255: Mod: PO | Performed by: PSYCHIATRY & NEUROLOGY

## 2018-02-27 PROCEDURE — 72156 MRI NECK SPINE W/O & W/DYE: CPT | Mod: TC,PO

## 2018-02-27 PROCEDURE — 72157 MRI CHEST SPINE W/O & W/DYE: CPT | Mod: TC,PO

## 2018-02-27 RX ORDER — GADOBUTROL 604.72 MG/ML
10 INJECTION INTRAVENOUS
Status: COMPLETED | OUTPATIENT
Start: 2018-02-27 | End: 2018-02-27

## 2018-02-27 RX ADMIN — GADOBUTROL 10 ML: 604.72 INJECTION INTRAVENOUS at 02:02

## 2018-03-01 DIAGNOSIS — F41.0 PANIC DISORDER WITHOUT AGORAPHOBIA: ICD-10-CM

## 2018-03-01 DIAGNOSIS — F43.10 PTSD (POST-TRAUMATIC STRESS DISORDER): ICD-10-CM

## 2018-03-01 DIAGNOSIS — F60.3 BORDERLINE PERSONALITY DISORDER: ICD-10-CM

## 2018-03-01 DIAGNOSIS — F33.2 MAJOR DEPRESSIVE DISORDER, RECURRENT, SEVERE WITHOUT PSYCHOTIC FEATURES: ICD-10-CM

## 2018-03-01 RX ORDER — FLUOXETINE HYDROCHLORIDE 20 MG/1
20 CAPSULE ORAL DAILY
Qty: 30 CAPSULE | Refills: 0 | Status: SHIPPED | OUTPATIENT
Start: 2018-03-01 | End: 2018-03-13 | Stop reason: DRUGHIGH

## 2018-03-02 ENCOUNTER — TELEPHONE (OUTPATIENT)
Dept: GENETICS | Facility: CLINIC | Age: 43
End: 2018-03-02

## 2018-03-03 DIAGNOSIS — E03.9 HYPOTHYROIDISM, UNSPECIFIED TYPE: ICD-10-CM

## 2018-03-04 RX ORDER — FLUOXETINE 10 MG/1
10 CAPSULE ORAL DAILY
Qty: 30 CAPSULE | Refills: 1 | Status: SHIPPED | OUTPATIENT
Start: 2018-03-04 | End: 2018-03-13 | Stop reason: DRUGHIGH

## 2018-03-05 ENCOUNTER — TELEPHONE (OUTPATIENT)
Dept: NEUROLOGY | Facility: CLINIC | Age: 43
End: 2018-03-05

## 2018-03-05 RX ORDER — LEVOTHYROXINE SODIUM 200 UG/1
TABLET ORAL
Qty: 90 TABLET | Refills: 0 | Status: SHIPPED | OUTPATIENT
Start: 2018-03-05 | End: 2018-03-13 | Stop reason: SDUPTHER

## 2018-03-05 RX ORDER — LEVOTHYROXINE SODIUM 50 UG/1
TABLET ORAL
Qty: 90 TABLET | Refills: 0 | Status: SHIPPED | OUTPATIENT
Start: 2018-03-05 | End: 2018-06-06

## 2018-03-05 NOTE — TELEPHONE ENCOUNTER
----- Message from Beti Parks sent at 3/5/2018 12:26 PM CST -----  Contact: Patient  Dinah, patient [phone] calling to speak with office regarding rescheduling upcoming appointments, patient states that her dog may need to be put down and does not want to leave her side so she will need to reschedule. Please advise. Thanks.

## 2018-03-05 NOTE — PROGRESS NOTES
Refill Authorization Note     is requesting a refill authorization.    Brief assessment and rationale for refill: APPROVE: needs labs  Amount/Quantity of medication ordered: 90d         Refills Authorized: Yes  If authorized number of refills: 0        Medication-related problems identified:   Requires labs  Therapeutic duplication  Medication Therapy Plan: Will order; NTBS: of note; pt has 2 fluoxetine dosages listed; made with psych so cannot read notes so see what she should be taking   Name and strength of medication: SYNTHROID 50 MCG TABLET / 200 mcg  How patient will take medication: t1t po daily   Medication reconciliation completed: No  Comments: Dc'd duplicate meds  (lomotil)  Lab Results   Component Value Date    TSH 4.119 (H) 03/14/2017    O2SBTYN 6.5 01/03/2005    FREET4 0.98 03/14/2017

## 2018-03-09 NOTE — TELEPHONE ENCOUNTER
----- Message from Lillian Freitas sent at 3/9/2018  4:55 PM CST -----  Contact: pt  Pt states have no medicine left needs called in please her methocarbamol (ROBAXIN) 500 MG Tab and traMADol (ULTRAM) 50 mg tablet..[phone] (home)           .  [facility]/pharmacy #[id number] - [place], [place] - [address]  [address]  [facility] [address]  Phone: [phone] Fax: [phone]

## 2018-03-10 RX ORDER — METHOCARBAMOL 500 MG/1
500 TABLET, FILM COATED ORAL 2 TIMES DAILY PRN
Qty: 30 TABLET | Refills: 0 | OUTPATIENT
Start: 2018-03-10

## 2018-03-10 RX ORDER — TRAMADOL HYDROCHLORIDE 50 MG/1
50 TABLET ORAL EVERY 6 HOURS PRN
Qty: 30 TABLET | Refills: 0 | OUTPATIENT
Start: 2018-03-10

## 2018-03-10 RX ORDER — TRAMADOL HYDROCHLORIDE 50 MG/1
TABLET ORAL
Qty: 30 TABLET | Refills: 0 | OUTPATIENT
Start: 2018-03-10

## 2018-03-12 ENCOUNTER — TELEPHONE (OUTPATIENT)
Dept: NEUROLOGY | Facility: CLINIC | Age: 43
End: 2018-03-12

## 2018-03-12 NOTE — TELEPHONE ENCOUNTER
----- Message from Roopa Carver sent at 3/12/2018  3:06 PM CDT -----  Contact: Patient  Dinah, patient [phone], Patient is calling for a referral to Pain Management in [facility], with [facility].. Please advise, thanks.

## 2018-03-12 NOTE — TELEPHONE ENCOUNTER
Spoke with patient. She stated that she wanted a referral for pain management. She began to talk about how bad the pain was and about her life; one of her dogs passed of cancer last year and one is currently dying of cancer, her  left her for someone else and took her step sons, and she recently found out her mother is passing. She began to cry on the phone and express losing hope with everything that was going on and not knowing how things could look positive for her. I promised her that I would do everything I could to get things starting to move in a positive direction for her and that I would call her back; twelve minute call ended. I spoke with Charles Brewer about the patient and she found a cancellation and put it on hold for Wednesday. I called the patient and offered the appointment on Wednesday with Dr. Samano. She began to thank me for how fast I was able to work her in and stated that I was able to make her have some hope for things going right for her. I expressed to the patient that she can always call or message through [email or web address] if she needs something and I will do anything I can to help her. Nineteen minute call ended.

## 2018-03-13 ENCOUNTER — OFFICE VISIT (OUTPATIENT)
Dept: PSYCHIATRY | Facility: CLINIC | Age: 43
End: 2018-03-13
Payer: COMMERCIAL

## 2018-03-13 VITALS
BODY MASS INDEX: 40.24 KG/M2 | DIASTOLIC BLOOD PRESSURE: 64 MMHG | HEART RATE: 70 BPM | HEIGHT: 67 IN | WEIGHT: 256.38 LBS | SYSTOLIC BLOOD PRESSURE: 125 MMHG

## 2018-03-13 DIAGNOSIS — F33.2 MAJOR DEPRESSIVE DISORDER, RECURRENT, SEVERE WITHOUT PSYCHOTIC FEATURES: Primary | ICD-10-CM

## 2018-03-13 DIAGNOSIS — F60.3 BORDERLINE PERSONALITY DISORDER: ICD-10-CM

## 2018-03-13 DIAGNOSIS — E66.01 MORBID OBESITY WITH BMI OF 40.0-44.9, ADULT: ICD-10-CM

## 2018-03-13 DIAGNOSIS — H02.403 PTOSIS, BOTH EYELIDS: ICD-10-CM

## 2018-03-13 DIAGNOSIS — F43.10 PTSD (POST-TRAUMATIC STRESS DISORDER): ICD-10-CM

## 2018-03-13 DIAGNOSIS — Z98.84 BARIATRIC SURGERY STATUS: ICD-10-CM

## 2018-03-13 DIAGNOSIS — F90.0 ATTENTION DEFICIT HYPERACTIVITY DISORDER (ADHD), PREDOMINANTLY INATTENTIVE TYPE: ICD-10-CM

## 2018-03-13 DIAGNOSIS — F41.0 PANIC DISORDER WITHOUT AGORAPHOBIA: ICD-10-CM

## 2018-03-13 PROCEDURE — 99214 OFFICE O/P EST MOD 30 MIN: CPT | Mod: S$GLB,,, | Performed by: PSYCHIATRY & NEUROLOGY

## 2018-03-13 PROCEDURE — 99999 PR PBB SHADOW E&M-EST. PATIENT-LVL II: CPT | Mod: PBBFAC,,, | Performed by: PSYCHIATRY & NEUROLOGY

## 2018-03-13 RX ORDER — BUPROPION HYDROCHLORIDE 150 MG/1
150 TABLET ORAL DAILY
Qty: 90 TABLET | Refills: 0 | Status: SHIPPED | OUTPATIENT
Start: 2018-03-13 | End: 2018-05-11 | Stop reason: SDUPTHER

## 2018-03-13 RX ORDER — PRAZOSIN HYDROCHLORIDE 1 MG/1
1 CAPSULE ORAL NIGHTLY
Qty: 90 CAPSULE | Refills: 1 | Status: SHIPPED | OUTPATIENT
Start: 2018-03-13 | End: 2018-05-11 | Stop reason: SDUPTHER

## 2018-03-13 RX ORDER — FLUOXETINE HYDROCHLORIDE 40 MG/1
40 CAPSULE ORAL DAILY
Qty: 90 CAPSULE | Refills: 0 | Status: SHIPPED | OUTPATIENT
Start: 2018-03-13 | End: 2018-06-10 | Stop reason: SDUPTHER

## 2018-03-13 RX ORDER — DIMENHYDRINATE 50 MG
50 TABLET ORAL NIGHTLY PRN
COMMUNITY
End: 2018-09-25

## 2018-03-13 RX ORDER — ZOLPIDEM TARTRATE 12.5 MG/1
12.5 TABLET, FILM COATED, EXTENDED RELEASE ORAL NIGHTLY PRN
Qty: 30 TABLET | Refills: 0 | Status: SHIPPED | OUTPATIENT
Start: 2018-03-13 | End: 2018-04-10 | Stop reason: SDUPTHER

## 2018-03-13 RX ORDER — ALPRAZOLAM 1 MG/1
1 TABLET ORAL DAILY PRN
Qty: 30 TABLET | Refills: 1 | Status: SHIPPED | OUTPATIENT
Start: 2018-03-13 | End: 2018-05-11 | Stop reason: SDUPTHER
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
## 2018-03-14 ENCOUNTER — TELEPHONE (OUTPATIENT)
Dept: NEUROLOGY | Facility: CLINIC | Age: 43
End: 2018-03-14

## 2018-03-14 ENCOUNTER — OFFICE VISIT (OUTPATIENT)
Dept: NEUROLOGY | Facility: CLINIC | Age: 43
End: 2018-03-14
Payer: COMMERCIAL

## 2018-03-14 DIAGNOSIS — M79.18 CERVICAL MYOFASCIAL PAIN SYNDROME: ICD-10-CM

## 2018-03-14 DIAGNOSIS — G93.5 CHIARI MALFORMATION TYPE I: ICD-10-CM

## 2018-03-14 DIAGNOSIS — M47.812 SPONDYLOSIS OF CERVICAL REGION WITHOUT MYELOPATHY OR RADICULOPATHY: ICD-10-CM

## 2018-03-14 PROCEDURE — 99215 OFFICE O/P EST HI 40 MIN: CPT | Mod: S$GLB,,, | Performed by: PSYCHIATRY & NEUROLOGY

## 2018-03-14 PROCEDURE — 99999 PR PBB SHADOW E&M-EST. PATIENT-LVL II: CPT | Mod: PBBFAC,,, | Performed by: PSYCHIATRY & NEUROLOGY

## 2018-03-14 RX ORDER — TRAMADOL HYDROCHLORIDE 50 MG/1
50 TABLET ORAL EVERY 6 HOURS PRN
Qty: 30 TABLET | Refills: 0 | Status: SHIPPED | OUTPATIENT
Start: 2018-03-14 | End: 2018-03-26 | Stop reason: SDUPTHER

## 2018-03-14 RX ORDER — TIZANIDINE 4 MG/1
TABLET ORAL
Qty: 30 TABLET | Refills: 3 | Status: SHIPPED | OUTPATIENT
Start: 2018-03-14 | End: 2018-07-14 | Stop reason: SDUPTHER

## 2018-03-14 RX ORDER — METHOCARBAMOL 500 MG/1
500 TABLET, FILM COATED ORAL 2 TIMES DAILY PRN
Qty: 30 TABLET | Refills: 3 | Status: SHIPPED | OUTPATIENT
Start: 2018-03-14 | End: 2018-08-16 | Stop reason: SDUPTHER

## 2018-03-14 NOTE — TELEPHONE ENCOUNTER
"Called pt in regards to rescheduling injections appointment. Pt did not answer, unable to leave voicemail "busy signal".  "

## 2018-03-14 NOTE — PATIENT INSTRUCTIONS
TESTING:  -- new brain MRI CINE protocol to assess for flow of spinal fluid    PREVENTION OF PAIN:   -- start tizandine (new muscle relaxer) at night - try without ambien first to know how it affects you   -- return to clinic for neck trigger point injections (muscle injections)    AS-NEEDED TREATMENT OF PAIN:  -- resume tramadol 50mg 4x per day as needed #30   -- resume methocarbamol twice a day as needed - take only day time, and not at same time as tizanidine

## 2018-03-14 NOTE — TELEPHONE ENCOUNTER
----- Message from Wei Ramos sent at 3/14/2018 12:17 PM CDT -----  Contact: same  Patient called in and cancelled her injection appt for 3/19/18 and would like a call back to schedule sometime for next week.  Patient call back number is [phone]

## 2018-03-14 NOTE — LETTER
March 14, 2018      Nilam Fuentes MD  [address] [facility] [address] [place] [address]           [facility] [place]  [address] [facility] [address] [place] [address]  Phone: [phone]  Fax: [phone]          Patient: Dinah Mitchell   MR Number: [id number]   YOB: 1975   Date of Visit: 3/14/2018       Dear Dr. Nilam Fuentes:    Thank you for referring Dinah Mitchell to me for evaluation. Attached you will find relevant portions of my assessment and plan of care.    If you have questions, please do not hesitate to call me. I look forward to following Dinah Mitchell along with you.    Sincerely,    Noa Samano MD    Enclosure  CC:  No Recipients    If you would like to receive this communication electronically, please contact [email or web address] or [phone] to request more information on [employer] access.    For providers and/or their staff who would like to refer a patient to [facility], please contact us through our one-stop-shop provider referral line, [facility], at [phone].    If you feel you have received this communication in error or would no longer like to receive these types of communications, please e-mail [email or web address]

## 2018-03-14 NOTE — PROGRESS NOTES
"Date of service: 3/14/2018  Referring provider: Dr. Nilam Fuentes    Subjective:      Chief complaint: Headache       Patient ID: Dinah Mitchell is a 43 y.o. depression, borderline personality disorder, PTSD, fibromyalgia, and hypothyroidism presenting for evaluation of headaches and neck pain, new patient to me, had seen Dr Fuentes for other issues     History of Present Illness    ORIGINAL PAIN HISTORY - 3/14/18   Age at onset and course over time: 2011 and worse over time, this happened during a time when she had a long commute. Neck is her worst pain - just left to midline of middle neck. Was going to [facility] pain management receiving epidural steroids which would last only 2 weeks. She had a CMBB and was planning for RFA but wanted to get cervical MRI first and second opinion  Location: neck (sharp, stabbing); hands and feet (tingling, numb), hip/lower back (dull, aching). Also has intermittent left sided headaches starting occipitally radiating to temple.   Quality: as above  Severity: current 8.5; range 7 to 10  Duration: hours  Frequency: daily  Alleviated by: medication, ice  Exacerbated by: movement, light noise (head pain)  Associated with: left arm/hand gets numb/weak "a lot" no radicular pain; headaches on left which are random or triggered by straining in restroom/coughing/laughing. Headaches worst in the AM within 2 hours of waking up. Headaches do not wake her up but neck pain does. Having some depth perception issues causing her to break her foot - eyes checked and were ok. Transient visual loss? When in a lof of pain, not clear TVO with valsalva.   Sleep habits:    Current acute treatment:  -- ibuprofen    (xanax 1mg daily PRN for PTSD, anxiety from psychiatrist)     Current prevention:  (wellbutrin)  (prozac)    Previously tried:   -- TENS   -- tramadol - helped with overall pain   -- methocarbamol - helped with overall pain   -- relafen - did not help     Procedural history:   -- cervical " GREGOR  -- lumbar GREGOR  -- chiropractor for hip /neck - helped only hip  -- PT - none for neck just foot     Review of patient's allergies indicates:   Allergen Reactions    Lamotrigine      Other reaction(s): Rash  Other reaction(s): Nausea    Sulfa (sulfonamide antibiotics) Nausea Only     Other reaction(s): Unknown     Current Outpatient Prescriptions   Medication Sig Dispense Refill    ACETYLCYSTEINE (N-ACETYL-L-CYSTEINE MISC) 750 mg by Misc.(Non-Drug; Combo Route) route 4 (four) times daily.      ALPRAZolam (XANAX) 1 MG tablet Take 1 tablet (1 mg total) by mouth daily as needed for Anxiety. 30 tablet 1    buPROPion (WELLBUTRIN XL) 150 MG TB24 tablet Take 1 tablet (150 mg total) by mouth once daily. 90 tablet 0    dimenhyDRINATE (DRAMAMINE) 50 MG tablet Take 50 mg by mouth nightly as needed.      diphenoxylate-atropine 2.5-0.025 mg (LOMOTIL) 2.5-0.025 mg per tablet TAKE 1 TABLET BY MOUTH 4 (FOUR) TIMES DAILY AS NEEDED FOR DIARRHEA. 30 tablet 1    FLUoxetine (PROZAC) 40 MG capsule Take 1 capsule (40 mg total) by mouth once daily. 90 capsule 0    levOCARNitine (CARNITOR) 330 mg Tab Take 3 tablets (990 mg total) by mouth 2 (two) times daily. 180 tablet 6    methocarbamol (ROBAXIN) 500 MG Tab Take 1 tablet (500 mg total) by mouth 2 (two) times daily as needed. 30 tablet 3    montelukast (SINGULAIR) 10 mg tablet Take 1 tablet (10 mg total) by mouth once daily. 90 tablet 3    nabumetone (RELAFEN) 500 MG tablet Take 500 mg by mouth 2 (two) times daily.      prazosin (MINIPRESS) 1 MG Cap Take 1 capsule (1 mg total) by mouth every evening. For ptsd related nightmares 90 capsule 1    SYNTHROID 200 mcg tablet TAKE 1 TABLET (200 MCG TOTAL) BY MOUTH ONCE DAILY. 90 tablet 0    SYNTHROID 50 mcg tablet TAKE 50 MCG TABLET BY MOUTH ALONG WITH 200 MCG TABLET EVERY DAY FOR A TOTAL DAILY DOSE OF 250MCG. 90 tablet 0    tiZANidine (ZANAFLEX) 4 MG tablet Half or full tablet by mouth at night as needed for muscle spasm 30  "tablet 3    traMADol (ULTRAM) 50 mg tablet Take 1 tablet (50 mg total) by mouth every 6 (six) hours as needed for Pain (severe pain). 30 tablet 0    VENTOLIN HFA 90 mcg/actuation inhaler INHALE 2 PUFFS INTO THE LUNGS EVERY 6 (SIX) HOURS AS NEEDED FOR WHEEZING. 18 Inhaler 0    zolpidem (AMBIEN CR) 12.5 MG CR tablet Take 1 tablet (12.5 mg total) by mouth nightly as needed for Insomnia. 30 tablet 0     No current facility-administered medications for this visit.        Past Medical History  Past Medical History:   Diagnosis Date    Abnormal Pap smear of cervix     colpo/ LEEP    Abnormal Pap smear of vagina     [facility]    Depression     Fibromyalgia     Thyroid disease        Past Surgical History  Past Surgical History:   Procedure Laterality Date    BREAST SURGERY  1997    CERVICAL BIOPSY  W/ LOOP ELECTRODE EXCISION      LAPAROSCOPIC GASTRIC BANDING      removed due to abscess    PELVIC LAPAROSCOPY      TENDON REPAIR Right     x2       Family History  Family History   Problem Relation Age of Onset    Diabetes Sister     Seizures Sister     Mitochondrial disorder Sister      "trent"    Mental illness Sister     Seizures Sister     Cervical cancer Sister     Ovarian cancer Cousin     Ovarian cancer Paternal Aunt     Colon cancer Maternal Grandmother     Cancer Maternal Grandfather 65     throat ca    Diabetes Mother     Stroke Mother 30    Mental illness Mother      depression    Hyperlipidemia Mother     Hypertension Mother     Hyperlipidemia Father     Heart disease Father      "athletes heart"    Ovarian cancer Paternal Aunt     Breast cancer Neg Hx        Social History  Social History     Social History    Marital status:      Spouse name: N/A    Number of children: N/A    Years of education: N/A     Occupational History    Not on file.     Social History Main Topics    Smoking status: Never Smoker    Smokeless tobacco: Never Used    Alcohol use Yes      Comment: " occasional    Drug use: No    Sexual activity: Yes     Partners: Male     Birth control/ protection: Injection      Comment: depo since age 18     Other Topics Concern    Not on file     Social History Narrative    Not on disability, not currently working.        Review of Systems  Constitutional: no fever, no chills  Eyes: + change to vision, no redness, no tearing  Ears, nose, mouth, throat: no hearing loss, no tinnitus, no rhinorrhea, no difficulty swallowing   Cardiovascular: no palpitations  Respiratory: no shortness of breath  Gastrointestinal: no diarrhea, no constipation +nausea  Musculoskeletal: + joint pain +muscle pain   Skin: no rashes  Neurologic: + numbness, + weakness, change to speech, loss of coordination   Endocrine: no heat or cold intolerance +fatigue  Heme: no night sweats  Psych: + depression     Objective:        There were no vitals filed for this visit.  There is no height or weight on file to calculate BMI.    Constitutional: appears in no acute distress, well-developed, well-nourished     Eyes: normal conjunctiva, PERRLA, bilateral right worse than left ptosis (baseline). Optic discs sharp bilaterally.     Ears, nose, mouth, throat: external appearance of ears and nose normal, hearing reduced on left     Cardiovascular: regular rate and rhythm, no murmurs appreciated    Respiratory: unlabored respirations, breath sounds normal bilaterally    Gastrointestinal: no visible abdominal masses, no guarding, no visible hernia    Musculoskeletal: normal tone in all four extremities. No atrophy. No abnormal movements. Strength in all muscles groups of the upper and lower extremities is 5/5. Normal gait and station. Digits and nails normal.      Spine:  CERVICAL SPINE:  INSPECTION: no scars   ROM: slightly limited   MUSCLE SPASM: bilateral   FACET LOADING: left  SPURLING: neg  JEZ tender: bilateral     Psychiatric: normal judgment and insight. Oriented to person, place, and time.     Neurologic:    Cortical functions: recent and remote memory intact, normal attention span and concentration, speech fluent, adequate fund of knowledge   Cranial nerves: visual fields full, PERRLA, EOMI, facial sensation intact in V1-V3, symmetric facial strength, hearing intact to finger rub, palate elevates symmetrically, shoulder shrug 5/5, tongue protrudes midline   Reflexes: 2+ in the upper and lower extremities, no Vu, no babinski   Sensation: reduced to temperature in a length dependent manner in the hands and to knees, vibration reduced toes, ankles, knees  Coordination: normal    Data Review:     I have personally reviewed the referring provider's notes, labs, & imaging made available to me today.     RADIOLOGY STUDIES:  I have personally reviewed the pertinent images performed.     MRI cervical spine 2/27/18  1. The cerebellar tonsils extend below the foramen magnum approximately 8-9 mm, consistent with Chiari 1 malformation.  The cord is normal without evidence of syrinx, signal abnormality or abnormal enhancement.  2. There is mild degenerative change.  There is, however, no significant spinal canal or foraminal stenosis.  There is no cord compression.  There is no suspected nerve root compression.  Please see above discussion.    Results for orders placed or performed during the hospital encounter of 02/05/18   MRI Brain W WO Contrast    Narrative    Routine Multiplanar imaging through this 42-year-old females brain was obtained with utilization of 10 cc of gadolinium contrast    Hyperostosis frontalis appears to be present.     A Chiari one malformation appears to be present with the cerebellar tonsils extending 9 mm inferior to the foramen magnum with so-called pegging of the cerebellar tonsils.    An empty sella configuration of the pituitary gland is noted.    No diffusion positivity is identified to suggest recent infarction.    No gradient  susceptibility was noted just just presence of acute blood  products.    No worrisome enhancing lesions are noted intracranially    No diffusion positivity is identified to suggest recent infarction.    Impression    1. The cerebellar tonsils appear to extend inferior to the foramen magnum suggestive of a Chiari one malformation. No obvious cord signal abnormalities noted within the visualized cord, given the patient's history further evaluation of the cord may be of use to exclude a syrinx        Electronically signed by: Ismael Richmond MD  Date:     02/05/18  Time:    12:04    Results for orders placed or performed during the hospital encounter of 09/25/15   CT Head Without Contrast    Narrative    Comparison: None    Technique: 5 mm noncontrast axial images through the brain were obtained.    Findings:     The brain is normally formed with no indication of acute/recent major vascular distribution cerebral infarction, intraparenchymal hemorrhage, or intra-axial space occupying lesion. There is preserved gray-white matter junction differentiation.     The ventricular system is normal in appearance for age. No hydrocephalus. No effacement of the skull-base cisterns. No abnormal extra-axial fluid collections or blood products.     The paranasal sinuses and mastoid air cells are unremarkable.  There is incidentally observed hyperostosis frontalis.    Impression         No acute  intracranial abnormalities are appreciated.      Electronically signed by: Toyin Shin MD  Date:     09/25/15  Time:    13:41        Lab Results   Component Value Date     03/14/2017    K 4.4 03/14/2017     03/14/2017    CO2 23 03/14/2017    BUN 9 03/14/2017    CREATININE 0.9 01/29/2018    GLU 99 03/14/2017    HGBA1C 5.6 03/14/2017    AST 24 03/14/2017    ALT 28 03/14/2017    ALBUMIN 3.8 03/14/2017    PROT 7.2 03/14/2017    BILITOT 0.3 03/14/2017    CHOL 190 03/14/2017    HDL 41 03/14/2017    LDLCALC 118.8 03/14/2017    TRIG 151 (H) 03/14/2017       Lab Results   Component Value Date     WBC 6.12 03/14/2017    HGB 15.4 03/14/2017    HCT 46.0 03/14/2017    MCV 93 03/14/2017     03/14/2017       Lab Results   Component Value Date    TSH 4.119 (H) 03/14/2017       Assessment & Plan:       Problem List Items Addressed This Visit        Neuro    Chiari malformation type I    Overview     Headaches do have mild Chiari features (worst in Am get better as day goes on), posterior, increased with valsalva so will pursue CINE imaging to assess flow around the CM junction and if necessary after this is completed refer to neurosurgery          Relevant Orders    MRI Brain W WO Contrast    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    Overview     Mild cervical facet arthropathy otherwise unremarkable C spine         Relevant Medications    traMADol (ULTRAM) 50 mg tablet       Orthopedic    Cervical myofascial pain syndrome    Overview     I do agree her pain is partially facetogenic on the left, and she has very mild facet arthropathy at the mid-levels on her MRI, however she has a significant degree of muscle spasm and taking care of this alone may help and avoid CMBB/RFA         Relevant Medications    traMADol (ULTRAM) 50 mg tablet    methocarbamol (ROBAXIN) 500 MG Tab    tiZANidine (ZANAFLEX) 4 MG tablet              TESTING:  -- new brain MRI CINE protocol to assess for flow of spinal fluid    PREVENTION OF PAIN:   -- start tizandine (new muscle relaxer) at night - try without ambien first to know how it affects you   -- return to clinic for neck trigger point injections (muscle injections)    AS-NEEDED TREATMENT OF PAIN:  -- resume tramadol 50mg 4x per day as needed #30   -- resume methocarbamol twice a day as needed - take only day time, and not at same time as tizanidine     No Follow-up on file. TPI     A total of 60 minutes were spent face to face with the patient and more than half of this time was spent coordinating care and/or counseling.       Noa Samano MD

## 2018-03-15 ENCOUNTER — TELEPHONE (OUTPATIENT)
Dept: NEUROLOGY | Facility: CLINIC | Age: 43
End: 2018-03-15

## 2018-03-15 NOTE — TELEPHONE ENCOUNTER
----- Message from Lena Cruz sent at 3/15/2018 12:09 PM CDT -----  Contact: Patient  Patient is calling to reschedule her procedure that she was supposed to have on 3/19.  Please call patient to reschedule.  Call Back#[phone]  Thanks

## 2018-03-18 DIAGNOSIS — E03.9 HYPOTHYROIDISM, UNSPECIFIED TYPE: ICD-10-CM

## 2018-03-20 ENCOUNTER — PROCEDURE VISIT (OUTPATIENT)
Dept: NEUROLOGY | Facility: CLINIC | Age: 43
End: 2018-03-20
Payer: COMMERCIAL

## 2018-03-20 VITALS
HEIGHT: 67 IN | RESPIRATION RATE: 19 BRPM | HEART RATE: 83 BPM | DIASTOLIC BLOOD PRESSURE: 82 MMHG | SYSTOLIC BLOOD PRESSURE: 113 MMHG | WEIGHT: 256 LBS | BODY MASS INDEX: 40.18 KG/M2

## 2018-03-20 DIAGNOSIS — M79.18 CERVICAL MYOFASCIAL PAIN SYNDROME: Primary | ICD-10-CM

## 2018-03-20 DIAGNOSIS — R11.0 NAUSEA: ICD-10-CM

## 2018-03-20 DIAGNOSIS — M54.81 BILATERAL OCCIPITAL NEURALGIA: ICD-10-CM

## 2018-03-20 PROCEDURE — 20553 NJX 1/MLT TRIGGER POINTS 3/>: CPT | Mod: S$GLB,,, | Performed by: PSYCHIATRY & NEUROLOGY

## 2018-03-20 PROCEDURE — 64405 NJX AA&/STRD GR OCPL NRV: CPT | Mod: 50,59,S$GLB, | Performed by: PSYCHIATRY & NEUROLOGY

## 2018-03-20 RX ORDER — BUPIVACAINE HYDROCHLORIDE 2.5 MG/ML
20 INJECTION, SOLUTION EPIDURAL; INFILTRATION; INTRACAUDAL ONCE
Status: COMPLETED | OUTPATIENT
Start: 2018-03-20 | End: 2018-03-20

## 2018-03-20 RX ORDER — ONDANSETRON 2 MG/ML
8 INJECTION INTRAMUSCULAR; INTRAVENOUS ONCE
Status: COMPLETED | OUTPATIENT
Start: 2018-03-20 | End: 2018-03-20

## 2018-03-20 RX ORDER — LEVOTHYROXINE SODIUM 200 UG/1
TABLET ORAL
Qty: 90 TABLET | Refills: 0 | Status: SHIPPED | OUTPATIENT
Start: 2018-03-20 | End: 2018-12-31

## 2018-03-20 RX ORDER — LEVOTHYROXINE SODIUM 50 UG/1
TABLET ORAL
Qty: 90 TABLET | Refills: 0 | Status: SHIPPED | OUTPATIENT
Start: 2018-03-20 | End: 2018-05-29 | Stop reason: SDUPTHER

## 2018-03-20 RX ORDER — TRIAMCINOLONE ACETONIDE 40 MG/ML
40 INJECTION, SUSPENSION INTRA-ARTICULAR; INTRAMUSCULAR ONCE
Status: COMPLETED | OUTPATIENT
Start: 2018-03-20 | End: 2018-03-20

## 2018-03-20 RX ORDER — LIDOCAINE HYDROCHLORIDE 10 MG/ML
5 INJECTION, SOLUTION EPIDURAL; INFILTRATION; INTRACAUDAL; PERINEURAL ONCE
Status: COMPLETED | OUTPATIENT
Start: 2018-03-20 | End: 2018-03-20

## 2018-03-20 RX ADMIN — TRIAMCINOLONE ACETONIDE 40 MG: 40 INJECTION, SUSPENSION INTRA-ARTICULAR; INTRAMUSCULAR at 03:03

## 2018-03-20 RX ADMIN — ONDANSETRON 8 MG: 2 INJECTION INTRAMUSCULAR; INTRAVENOUS at 03:03

## 2018-03-20 RX ADMIN — BUPIVACAINE HYDROCHLORIDE 50 MG: 2.5 INJECTION, SOLUTION EPIDURAL; INFILTRATION; INTRACAUDAL at 03:03

## 2018-03-20 RX ADMIN — LIDOCAINE HYDROCHLORIDE 50 MG: 10 INJECTION, SOLUTION EPIDURAL; INFILTRATION; INTRACAUDAL; PERINEURAL at 03:03

## 2018-03-20 NOTE — PROCEDURES
Procedures   PROCEDURE #1     Greater Occipital Nerve Block, Bilateral     Diagnosis: occipital neuralgia     After risks and benefits were explained including bleeding, infection, worsening of the pain, damage to the area being injected, weakness, allergic reaction to medications, vascular injection, and nerve damage, signed consent was obtained. All questions were answered.     The area of the greater occipital nerve was identified as a point 1/3rds the distance  between the occipital protuberance and the ipsilateral mastoid process. The identified areas were prepped three times with alcohol and the alcohol allowed to dry. Next, a 27 gauge 1 inch needle was placed in the anatomical area of the JEZ. Once reproduction of the pain was elicited and negative aspiration confirmed, I proceeded with the injection. The exact procedure was repeated on the contralateral side.     Adequacy of the block was confirmed by sensory examination demonstrating anesthesia in the territory of the JEZ     Medication used: Marcaine 0.25%, lidocaine 1%, and Triamcinolone 20mg     Total volume injected:  6cc    Estimated blood loss: none    The patient tolerated the procedure well and there were no complications.     -----------------------------------------------------------------------------------------------------------------    PROCEDURE #2     TRIGGER POINT INJECTION (CERVICAL)     Indication: cervical myofascial pain     Anesthesia: none     After risks and benefits were explained including bleeding, infection, worsening of the pain, damage to the area being injected, weakness, allergic reaction to medications, vascular injection, and nerve damage, signed consent was obtained. All questions were answered.     Trigger points were identified by palpation of tight muscle fibers with reproduction of patient's pain and referral pattern upon palpation. The identified areas were prepped three times with alcohol and the alcohol allowed to  dry. Next, a 27 gauge 1 inch needle was placed in the anatomical area of the trigger point ot be injected. Once negative aspiration of air and heme was confirmed, I proceeded with the injection.     Medications used: bupivicaine 0.25% (18mg), lidocaine 1% (20mg) and Triamcinolone 20mg     Total volume injected: 15cc     Trigger points injected:  -- right and left suboccipital muscles   -- right and left semispinalis capitus   -- left upper trapezius    Estimated blood loss: none     The patient did tolerate the procedure well and there were no complications.    RTC as scheduled

## 2018-03-21 ENCOUNTER — PATIENT MESSAGE (OUTPATIENT)
Dept: NEUROLOGY | Facility: CLINIC | Age: 43
End: 2018-03-21

## 2018-03-26 DIAGNOSIS — M47.812 SPONDYLOSIS OF CERVICAL REGION WITHOUT MYELOPATHY OR RADICULOPATHY: ICD-10-CM

## 2018-03-26 DIAGNOSIS — M79.18 CERVICAL MYOFASCIAL PAIN SYNDROME: ICD-10-CM

## 2018-03-26 RX ORDER — TRAMADOL HYDROCHLORIDE 50 MG/1
50 TABLET ORAL EVERY 6 HOURS PRN
Qty: 120 TABLET | Refills: 0 | Status: SHIPPED | OUTPATIENT
Start: 2018-03-26 | End: 2018-04-25

## 2018-03-26 NOTE — TELEPHONE ENCOUNTER
----- Message from Maryan Bolton sent at 3/26/2018  3:40 PM CDT -----  Contact: self   Patient need a refill on Tramadol please send to [facility], any questions please call back at [phone] (home)     [facility]/pharmacy #[id number] - [place], [place] - [address]  [address]  [facility] [address]  Phone: [phone] Fax: [phone]

## 2018-03-28 ENCOUNTER — TELEPHONE (OUTPATIENT)
Dept: NEUROLOGY | Facility: CLINIC | Age: 43
End: 2018-03-28

## 2018-03-28 ENCOUNTER — PATIENT MESSAGE (OUTPATIENT)
Dept: GENETICS | Facility: CLINIC | Age: 43
End: 2018-03-28

## 2018-03-28 ENCOUNTER — PATIENT MESSAGE (OUTPATIENT)
Dept: NEUROLOGY | Facility: CLINIC | Age: 43
End: 2018-03-28

## 2018-03-28 ENCOUNTER — TELEPHONE (OUTPATIENT)
Dept: GENETICS | Facility: CLINIC | Age: 43
End: 2018-03-28

## 2018-03-28 RX ORDER — PREDNISONE 10 MG/1
TABLET ORAL
Qty: 20 TABLET | Refills: 0 | Status: SHIPPED | OUTPATIENT
Start: 2018-03-28 | End: 2018-05-29 | Stop reason: SDUPTHER

## 2018-03-28 NOTE — TELEPHONE ENCOUNTER
LM for pt asking her to not show up for appt on 4/3. Dr. Nevarez will not be in. I'll also send pt a msg in [facility]

## 2018-03-28 NOTE — TELEPHONE ENCOUNTER
----- Message from Roopa Carver sent at 3/28/2018  1:09 PM CDT -----  Dinah, [phone], Patient is calling about the severe neck pain and is not able to get any relief. She would like to discuss other options. She has had the pain since the last visit on 3/20/18 and it is getting worst every day. IM to Nurse. Offered the [facility] on call nurse, patient declined. She is using medication and ice. Please advise. Thanks.

## 2018-03-31 ENCOUNTER — PATIENT MESSAGE (OUTPATIENT)
Dept: NEUROLOGY | Facility: CLINIC | Age: 43
End: 2018-03-31

## 2018-04-02 ENCOUNTER — TELEPHONE (OUTPATIENT)
Dept: FAMILY MEDICINE | Facility: CLINIC | Age: 43
End: 2018-04-02

## 2018-04-02 ENCOUNTER — HOSPITAL ENCOUNTER (OUTPATIENT)
Dept: RADIOLOGY | Facility: HOSPITAL | Age: 43
Discharge: HOME | End: 2018-04-02
Attending: PSYCHIATRY & NEUROLOGY
Payer: COMMERCIAL

## 2018-04-02 DIAGNOSIS — F33.2 MAJOR DEPRESSIVE DISORDER, RECURRENT, SEVERE WITHOUT PSYCHOTIC FEATURES: ICD-10-CM

## 2018-04-02 DIAGNOSIS — F41.0 PANIC DISORDER WITHOUT AGORAPHOBIA: ICD-10-CM

## 2018-04-02 DIAGNOSIS — F43.10 PTSD (POST-TRAUMATIC STRESS DISORDER): ICD-10-CM

## 2018-04-02 DIAGNOSIS — F60.3 BORDERLINE PERSONALITY DISORDER: ICD-10-CM

## 2018-04-02 DIAGNOSIS — G93.5 CHIARI MALFORMATION TYPE I: ICD-10-CM

## 2018-04-02 PROCEDURE — 70551 MRI BRAIN STEM W/O DYE: CPT | Mod: TC,PO

## 2018-04-02 PROCEDURE — 70551 MRI BRAIN STEM W/O DYE: CPT | Mod: 26,,, | Performed by: RADIOLOGY

## 2018-04-02 RX ORDER — FLUOXETINE HYDROCHLORIDE 20 MG/1
20 CAPSULE ORAL DAILY
Qty: 30 CAPSULE | Refills: 0 | OUTPATIENT
Start: 2018-04-02 | End: 2019-04-02

## 2018-04-02 RX ORDER — ONDANSETRON 4 MG/1
4 TABLET, ORALLY DISINTEGRATING ORAL EVERY 6 HOURS PRN
Qty: 30 TABLET | Refills: 3 | Status: SHIPPED | OUTPATIENT
Start: 2018-04-02 | End: 2018-12-04

## 2018-04-03 NOTE — TELEPHONE ENCOUNTER
Thyroid levels are abnormal, Significantly elevated     looking at current medication levels and previous lab test the findings are suspicious for previous noncompliance followed by a dosage increase then sudden compliance resulting in a rapid increase in levels.     Although I can't confirm this and it's not my intention to accuse the patient of any intentional wrongdoing, but that is my concern     Need to find out immediately how much medication she is actively taking. it looks like she should be on 250 MCG daily (one 200 mcg tab and one 50 mcg tab)   Although the 50 MCG tablet is listed twice, is it possible that she's taking more than prescribed?     Is it possible that she wasn't taking it regularly and then all the sudden started taking it regularly?     either way we need to reduce the dosage but I can not be certain exactly how much to reduce this until I know exactly what she is taking.     So instruct her to hold her medication for the next few days, then provide me with information so I know exactly what she has been taking.     Also patient does have a history of mental health disorders and it's not clear to me how reliable she is with providing information and/or following instructions on medication. I don't know if there's any deficit here or not but the more information I have the better I can help her until her PCP returns

## 2018-04-03 NOTE — TELEPHONE ENCOUNTER
LM for pt to return call.  Need to find out how pt is takign thyroid medication (see Dr. Avelar;s previous message..)

## 2018-04-05 ENCOUNTER — PATIENT MESSAGE (OUTPATIENT)
Dept: FAMILY MEDICINE | Facility: CLINIC | Age: 43
End: 2018-04-05

## 2018-04-05 NOTE — TELEPHONE ENCOUNTER
Her TSH is very low, undetectable.  Her T4 is elevated.  This means that her thyroid levels are too high, in other words the dosage of her medication is too high    Previous readings suggested thyroid levels were low.    So there has to be some explanation as to why her levels have been low up to this point and then now suddenly they're too high.  Again compliance or irregularities in taking her medication must be considered.    If not then I other possibilities include: the dosage of the medication may have changed or possibly the brand changed  Rarely somebody can develop hyperthyroid after years of low thyroid but this is not very common.    My recommendation would be to reduce the dosage to the 200 MCG only and follow with her primary care physician in the next few weeks for further evaluation.

## 2018-04-05 NOTE — TELEPHONE ENCOUNTER
Pt said that she is taking 250 mg of thyroid medication every day. She takes 200 mcg with 50 mcg daiy.     Please see prior message.     Thank you.

## 2018-04-10 DIAGNOSIS — F43.10 PTSD (POST-TRAUMATIC STRESS DISORDER): ICD-10-CM

## 2018-04-10 RX ORDER — ZOLPIDEM TARTRATE 12.5 MG/1
12.5 TABLET, FILM COATED, EXTENDED RELEASE ORAL NIGHTLY PRN
Qty: 30 TABLET | Refills: 0 | Status: SHIPPED | OUTPATIENT
Start: 2018-04-10 | End: 2018-05-11 | Stop reason: SDUPTHER

## 2018-04-19 ENCOUNTER — TELEPHONE (OUTPATIENT)
Dept: NEUROLOGY | Facility: CLINIC | Age: 43
End: 2018-04-19

## 2018-04-19 RX ORDER — PREDNISONE 10 MG/1
TABLET ORAL
Qty: 20 TABLET | Refills: 0 | Status: SHIPPED | OUTPATIENT
Start: 2018-04-19 | End: 2018-09-25

## 2018-04-19 RX ORDER — BUTALBITAL, ACETAMINOPHEN AND CAFFEINE 50; 325; 40 MG/1; MG/1; MG/1
TABLET ORAL
Qty: 14 TABLET | Refills: 0 | Status: SHIPPED | OUTPATIENT
Start: 2018-04-19 | End: 2018-09-25

## 2018-04-19 NOTE — TELEPHONE ENCOUNTER
"Returned call and spoke with patient. Informed patient per Dr. Samano. "Ok. I cannot offer her another block then until after I return in May. I will send over a Fioricet taper which she can use in the meantime and follow with a prednisone taper if the fioricet alone is insufficient." Appointment scheduled in May. Patient verbalized understanding.   "

## 2018-04-19 NOTE — TELEPHONE ENCOUNTER
Called and spoke with patient. Offered appointment for 3:45 pm today. Patient declined. She said she has not slept in 3 days to her headache and she does not trust her self driving. I instructed patient not to drive and asked if she had anyone that could drive her. Patient said no. Please advise.

## 2018-04-19 NOTE — TELEPHONE ENCOUNTER
----- Message from Joy Aguirre sent at 4/19/2018 10:57 AM CDT -----  Contact: patient  Type: Needs Medical Advice    Who Called:  Patient  Symptoms (please be specific):  NA  How long has patient had these symptoms: HEATH  Pharmacy name and phone #: [facility]  Best Call Back Number:   Additional Information: Calling to schedule another procedure. Please advise.

## 2018-05-01 ENCOUNTER — PROCEDURE VISIT (OUTPATIENT)
Dept: NEUROLOGY | Facility: CLINIC | Age: 43
End: 2018-05-01
Payer: COMMERCIAL

## 2018-05-01 DIAGNOSIS — R29.898 WEAKNESS OF LEFT HAND: ICD-10-CM

## 2018-05-01 DIAGNOSIS — R27.0 ATAXIA: ICD-10-CM

## 2018-05-01 DIAGNOSIS — G62.9 NEUROPATHY: ICD-10-CM

## 2018-05-01 DIAGNOSIS — H02.403 PTOSIS OF BOTH EYELIDS: ICD-10-CM

## 2018-05-01 PROCEDURE — 95886 MUSC TEST DONE W/N TEST COMP: CPT | Mod: S$GLB,,, | Performed by: PSYCHIATRY & NEUROLOGY

## 2018-05-01 PROCEDURE — 95937 NEUROMUSCULAR JUNCTION TEST: CPT | Mod: S$GLB,,, | Performed by: PSYCHIATRY & NEUROLOGY

## 2018-05-01 PROCEDURE — 95912 NRV CNDJ TEST 11-12 STUDIES: CPT | Mod: S$GLB,,, | Performed by: PSYCHIATRY & NEUROLOGY

## 2018-05-02 ENCOUNTER — PATIENT MESSAGE (OUTPATIENT)
Dept: GENETICS | Facility: CLINIC | Age: 43
End: 2018-05-02

## 2018-05-02 ENCOUNTER — OFFICE VISIT (OUTPATIENT)
Dept: GENETICS | Facility: CLINIC | Age: 43
End: 2018-05-02
Payer: COMMERCIAL

## 2018-05-02 VITALS
HEART RATE: 80 BPM | SYSTOLIC BLOOD PRESSURE: 137 MMHG | BODY MASS INDEX: 40 KG/M2 | HEIGHT: 67 IN | DIASTOLIC BLOOD PRESSURE: 88 MMHG | WEIGHT: 254.88 LBS

## 2018-05-02 DIAGNOSIS — G93.5 CHIARI MALFORMATION TYPE I: ICD-10-CM

## 2018-05-02 DIAGNOSIS — R27.0 ATAXIA: ICD-10-CM

## 2018-05-02 DIAGNOSIS — E88.40 MITOCHONDRIAL METABOLISM DISORDER: Primary | ICD-10-CM

## 2018-05-02 DIAGNOSIS — F60.3 BORDERLINE PERSONALITY DISORDER: ICD-10-CM

## 2018-05-02 DIAGNOSIS — R44.9 SENSORY DEFICIT PRESENT: Primary | ICD-10-CM

## 2018-05-02 DIAGNOSIS — H02.403 PTOSIS, BOTH EYELIDS: ICD-10-CM

## 2018-05-02 PROCEDURE — 99358 PROLONG SERVICE W/O CONTACT: CPT | Mod: S$GLB,,, | Performed by: MEDICAL GENETICS

## 2018-05-02 PROCEDURE — 99999 PR PBB SHADOW E&M-EST. PATIENT-LVL V: CPT | Mod: PBBFAC,,, | Performed by: MEDICAL GENETICS

## 2018-05-02 PROCEDURE — 99215 OFFICE O/P EST HI 40 MIN: CPT | Mod: S$GLB,,, | Performed by: MEDICAL GENETICS

## 2018-05-02 RX ORDER — DRONABINOL 2.5 MG/1
2.5 CAPSULE ORAL
Qty: 60 CAPSULE | Refills: 3 | Status: SHIPPED | OUTPATIENT
Start: 2018-05-02 | End: 2024-02-26

## 2018-05-02 NOTE — PROGRESS NOTES
Dinah Mitchell  DOS: 18  : 79  MRN: [id number]    HISTORY OF PRESENT ILLNESS: Ivkelle seen this 43-year-old  female with a family history of a mitochondrial disorder. She has a history of fibromyalgia, depression, fatigue and hypothyroidism. She reports that her symptoms started about 6 years ago. The patient reports a decline in memory/ cognitive skills.  The patient also reports poor sleep, a loss of interest in daily activities (like driving, taking a bath) and weight gain of about 100lbs. Mrs. Mitchell is followed by psychiatry but is not taking any medication for depression. She has a diagnosis of borderline personality disorder.    Mrs. Mitchell was evaluated by a rheumatologist and diagnosed with osteoarthritis. She has progressively worsening gait as well as ptosis. She reports inappropriate sweating and face flushing. The patient reports that she has breathing difficulty and snores. She had a sleep study many years ago.  Other concerns include poor wound healing, IBS, asthma, migraines and back pain. Past surgeries include right ankle tendon repair, tubal ligation, liposuction and bariatric surgery that apparently failed.     Mrs. Mitchell sister was diagnosed with sensory ataxic neuropathy, dysarthria, and ophthalmoparesis (MIMI) but I didnt have a copy of the lab report. I do have a note from Dr. Saldaña from [facility] stating her sisters diagnosis. Mrs. Mitchell was recommended to see me by Dr. Saldaña.    At the original visit, I thought that MIMI was a possibility but I had to see biallelic POLG gene mutations. Neither Mrs. Mitchell nor her sister had any POLG mutations from the records. Moreover, Glenn obtained a panel of 319 nuclear sherrie genes and mtDNA and only 1 unclear variant in a recessive gene was found so this test was nondiagnostic. The patient now presents for a follow up. Glenn placed her on levocarnitine since her carnitine was low and it improved her energy. She was diagnosed with Chiari  type 1 and treated by neurology. Her EMG/NCV were normal.    PAST MEDICAL HISTORY: ADHD (attention deficit hyperactivity disorder)  Bariatric surgery status  Major depressive disorder, recurrent, severe without psychotic features  PTSD (post-traumatic stress disorder)  Panic disorder without agoraphobia  Borderline personality disorder  Morbid obesity with BMI of 40.0-44.9, adult  Mitochondrial metabolism disorder  Ptosis, both eyelids  FHx: endocrine and metabolic disease  Ataxia  Chiari malformation type I  Cervical myofascial pain syndrome  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    DEVELOPMENTAL HISTORY: Normal. Mrs. Mitchell has a degree in biology and chemistry. She worked as a forensic drug . Mrs. Mitchell has not worked in 6 years and has not applied for disability.    MEDICATIONS:    ALPRAZolam (XANAX) 1 MG tablet    buPROPion (WELLBUTRIN XL) 150 MG TB24 tablet    butalbital-acetaminophen-caffeine -40 mg (FIORICET, ESGIC) -40 mg per tablet    dimenhyDRINATE (DRAMAMINE) 50 MG tablet    diphenoxylate-atropine 2.5-0.025 mg (LOMOTIL) 2.5-0.025 mg per tablet    FLUoxetine (PROZAC) 40 MG capsule    levOCARNitine (CARNITOR) 330 mg Tab    methocarbamol (ROBAXIN) 500 MG Tab    montelukast (SINGULAIR) 10 mg tablet    nabumetone (RELAFEN) 500 MG tablet    ondansetron (ZOFRAN-ODT) 4 MG TbDL    prazosin (MINIPRESS) 1 MG Cap    predniSONE (DELTASONE) 10 MG tablet    SYNTHROID 50 mcg tablet    tiZANidine (ZANAFLEX) 4 MG tablet    VENTOLIN HFA 90 mcg/actuation inhaler    zolpidem (AMBIEN CR) 12.5 MG CR tablet      ALLERGIES: Prazocin, Lamotrigine, sulfa    SOCIAL HISTORY: Mrs. Mitchell has recently  from her . She was  10 years. The patient moved to the area in [id number]. Her family lives in [place]. Her parents are providing some financial help.     FAMILY HISTORY: A 3 generation pedigree was scanned in the patients chart.  Mrs. Mitchell has no children.  "She has 3 full sisters. Her sister Terese who is 51-years-old has a diagnosis of MIMI. Per report, Terese was evaluated at various centers ( [facility] in [place], [place] in [place] and [place]). She had genetic testing and muscle biopsy (her records however show no mitochondrial gene defects). The patients other sister Kitty has a history of seizures. Marck son who is 23-years-old has Asperger. The patients parents are living. The father is 75-years-old and has a cardiac arrhythmia. The mother is 71-years-old and has a history of diabetes and IBS. On the maternal side, there is a first cousin who  in her 40s. Per report, this cousin had medical issues similar to Terese. This cousins daughter has a history of epilepsy and  at the age of 18. The remainder of the family was unremarkable. Maternal ancestry is [place]. Paternal ancestry is English and [place]. Ashkenazi Evangelical ancestry and consanguinity were denied.     PHYSICAL EXAM: Wt: 254 lbs, Ht: 5' 7", BMI: 39.9. Morbidly obese.  Her head is normocephalic and no dysmorphic features. Extraocular movements somewhat sluggish and she does have a mild ptosis bilaterally (MG excluded). She has no hyperextensibility. She has normal strength, speech and cognition. Shes teary.    IMPRESSION: I reviewed the patients medical and family history. She does not have MIMI since she doesnt have biallelic POLG mutations. Her 319 nuclear sherrie gene panel and mtDNA were unrevealing as well. Her GDF15 was normal (more specific sherrie marker). She stated that her sister might have been diagnosed with Jaclyns disease. Glenn requested to provide these records but have not found any confirmation of that in her records. We can consider Whole Exome Sequencing and brain MRI.     She does have low glutathione (measure of antioxidant activity in blood) so Ill prescribe subcutaneous injections of N-acetyl-L-cysteine (NAC) which is a glutathione precursor. She does have a lot of difficulty with " nausea so Pollye prescribed marinol. We can consider gabapentin for pain in the future (Lyrica didnt help her). Glenn again discussed the important of nutrition and exercise. Pollye referred her to PT for cervical muscle tightness.    RECOMMENDATIONS:  1. Regular nutrition and exercise without overexertion.  2. NAC 50 mg SC qod.  3. Marinol 2.5 mg bid.  4. PT referral.    Time spent: 60 minutes, more than 50% was spent in counseling. Additional 60 minutes were spent without direct contact, on research of the patients complex medical findings and formulating further diagnostic steps and treatment. The note is in epic.      Anastacio Nevarez M.D.                                                                 Section Head - Medical Genetics                                                    [facility]

## 2018-05-05 ENCOUNTER — PATIENT MESSAGE (OUTPATIENT)
Dept: GENETICS | Facility: CLINIC | Age: 43
End: 2018-05-05

## 2018-05-08 ENCOUNTER — TELEPHONE (OUTPATIENT)
Dept: NEUROLOGY | Facility: CLINIC | Age: 43
End: 2018-05-08

## 2018-05-08 NOTE — TELEPHONE ENCOUNTER
----- Message from Benny Jasso sent at 5/8/2018 10:00 AM CDT -----  Contact: pt  She's calling in regards to her scheduled procedure today, [phone] (home)

## 2018-05-08 NOTE — TELEPHONE ENCOUNTER
Called pt in regards to rescheduling appointment today. Appointment successfully rescheduled. Pt verbalized understanding.

## 2018-05-10 ENCOUNTER — PATIENT MESSAGE (OUTPATIENT)
Dept: PSYCHIATRY | Facility: CLINIC | Age: 43
End: 2018-05-10

## 2018-05-11 ENCOUNTER — TELEPHONE (OUTPATIENT)
Dept: NEUROLOGY | Facility: CLINIC | Age: 43
End: 2018-05-11

## 2018-05-11 DIAGNOSIS — F41.0 PANIC DISORDER WITHOUT AGORAPHOBIA: ICD-10-CM

## 2018-05-11 DIAGNOSIS — F60.3 BORDERLINE PERSONALITY DISORDER: ICD-10-CM

## 2018-05-11 DIAGNOSIS — F43.10 PTSD (POST-TRAUMATIC STRESS DISORDER): ICD-10-CM

## 2018-05-11 DIAGNOSIS — F33.2 MAJOR DEPRESSIVE DISORDER, RECURRENT, SEVERE WITHOUT PSYCHOTIC FEATURES: ICD-10-CM

## 2018-05-11 RX ORDER — PRAZOSIN HYDROCHLORIDE 1 MG/1
1 CAPSULE ORAL NIGHTLY
Qty: 90 CAPSULE | Refills: 1 | Status: SHIPPED | OUTPATIENT
Start: 2018-05-11 | End: 2018-06-06 | Stop reason: SDUPTHER

## 2018-05-11 RX ORDER — FLUOXETINE HYDROCHLORIDE 40 MG/1
40 CAPSULE ORAL DAILY
Qty: 90 CAPSULE | Refills: 0 | Status: SHIPPED | OUTPATIENT
Start: 2018-05-11 | End: 2018-06-06 | Stop reason: SDUPTHER

## 2018-05-11 RX ORDER — ALPRAZOLAM 1 MG/1
1 TABLET ORAL DAILY PRN
Qty: 30 TABLET | Refills: 1 | Status: SHIPPED | OUTPATIENT
Start: 2018-05-11 | End: 2018-06-06 | Stop reason: SDUPTHER

## 2018-05-11 RX ORDER — ZOLPIDEM TARTRATE 12.5 MG/1
12.5 TABLET, FILM COATED, EXTENDED RELEASE ORAL NIGHTLY PRN
Qty: 30 TABLET | Refills: 0 | Status: SHIPPED | OUTPATIENT
Start: 2018-05-11 | End: 2018-06-06 | Stop reason: SDUPTHER

## 2018-05-11 RX ORDER — BUPROPION HYDROCHLORIDE 150 MG/1
150 TABLET ORAL DAILY
Qty: 90 TABLET | Refills: 0 | Status: SHIPPED | OUTPATIENT
Start: 2018-05-11 | End: 2018-06-06 | Stop reason: SDUPTHER

## 2018-05-11 NOTE — TELEPHONE ENCOUNTER
Called pt in regards to rescheduling appointment. No answer, unable to leave voicemail due to no voicemail being set up.

## 2018-05-29 ENCOUNTER — LAB VISIT (OUTPATIENT)
Dept: LAB | Facility: HOSPITAL | Age: 43
End: 2018-05-29
Attending: PSYCHIATRY & NEUROLOGY
Payer: COMMERCIAL

## 2018-05-29 ENCOUNTER — OFFICE VISIT (OUTPATIENT)
Dept: NEUROLOGY | Facility: CLINIC | Age: 43
End: 2018-05-29
Payer: COMMERCIAL

## 2018-05-29 VITALS
SYSTOLIC BLOOD PRESSURE: 129 MMHG | DIASTOLIC BLOOD PRESSURE: 81 MMHG | RESPIRATION RATE: 20 BRPM | HEART RATE: 78 BPM | HEIGHT: 67 IN | BODY MASS INDEX: 38.91 KG/M2 | WEIGHT: 247.88 LBS

## 2018-05-29 DIAGNOSIS — F33.2 MAJOR DEPRESSIVE DISORDER, RECURRENT, SEVERE WITHOUT PSYCHOTIC FEATURES: ICD-10-CM

## 2018-05-29 DIAGNOSIS — R41.3 MEMORY LOSS: Primary | ICD-10-CM

## 2018-05-29 DIAGNOSIS — R27.0 ATAXIA: ICD-10-CM

## 2018-05-29 DIAGNOSIS — H02.403 PTOSIS, BOTH EYELIDS: ICD-10-CM

## 2018-05-29 DIAGNOSIS — R41.3 MEMORY LOSS: ICD-10-CM

## 2018-05-29 DIAGNOSIS — R44.9 SENSORY DEFICIT PRESENT: ICD-10-CM

## 2018-05-29 DIAGNOSIS — E88.40 MITOCHONDRIAL METABOLISM DISORDER: ICD-10-CM

## 2018-05-29 DIAGNOSIS — F90.0 ATTENTION DEFICIT HYPERACTIVITY DISORDER (ADHD), PREDOMINANTLY INATTENTIVE TYPE: ICD-10-CM

## 2018-05-29 DIAGNOSIS — F60.3 BORDERLINE PERSONALITY DISORDER: ICD-10-CM

## 2018-05-29 DIAGNOSIS — M79.18 CERVICAL MYOFASCIAL PAIN SYNDROME: ICD-10-CM

## 2018-05-29 DIAGNOSIS — G93.5 CHIARI MALFORMATION TYPE I: ICD-10-CM

## 2018-05-29 DIAGNOSIS — F43.10 PTSD (POST-TRAUMATIC STRESS DISORDER): ICD-10-CM

## 2018-05-29 LAB
25(OH)D3+25(OH)D2 SERPL-MCNC: 19 NG/ML
CERULOPLASMIN SERPL-MCNC: 22 MG/DL
FOLATE SERPL-MCNC: 6.7 NG/ML
VIT B12 SERPL-MCNC: 591 PG/ML

## 2018-05-29 PROCEDURE — 84425 ASSAY OF VITAMIN B-1: CPT

## 2018-05-29 PROCEDURE — 84630 ASSAY OF ZINC: CPT

## 2018-05-29 PROCEDURE — 99215 OFFICE O/P EST HI 40 MIN: CPT | Mod: S$GLB,,, | Performed by: PSYCHIATRY & NEUROLOGY

## 2018-05-29 PROCEDURE — 3008F BODY MASS INDEX DOCD: CPT | Mod: CPTII,S$GLB,, | Performed by: PSYCHIATRY & NEUROLOGY

## 2018-05-29 PROCEDURE — 82390 ASSAY OF CERULOPLASMIN: CPT

## 2018-05-29 PROCEDURE — 84207 ASSAY OF VITAMIN B-6: CPT

## 2018-05-29 PROCEDURE — 82607 VITAMIN B-12: CPT

## 2018-05-29 PROCEDURE — 99999 PR PBB SHADOW E&M-EST. PATIENT-LVL III: CPT | Mod: PBBFAC,,, | Performed by: PSYCHIATRY & NEUROLOGY

## 2018-05-29 PROCEDURE — 84446 ASSAY OF VITAMIN E: CPT

## 2018-05-29 PROCEDURE — 82746 ASSAY OF FOLIC ACID SERUM: CPT

## 2018-05-29 PROCEDURE — 84255 ASSAY OF SELENIUM: CPT

## 2018-05-29 PROCEDURE — 82306 VITAMIN D 25 HYDROXY: CPT

## 2018-05-29 PROCEDURE — 84252 ASSAY OF VITAMIN B-2: CPT

## 2018-05-29 NOTE — PROGRESS NOTES
Date: 5/29/2018    Patient ID: Dinah Mitchell is a 43 y.o. female.    Chief Complaint: Fall      History of Present Illness:  Ms. Mitchell is a 43 y.o. female who presents for follow-up after testing.     She had an EMG which was normal and did not show any signs of myopathy, myasthenia with rep stim, or peripheral neuropathy. Dr. Mcgowan recommended bloodwork looking for vitamin deficiencies but these have not been drawn. She had a gastric band but this abscessed and it was taken out. She had it removed for 5-6 years.     Her  has not found a genetic disorder in her that would explain her symptoms. She had MRI brain which showed hypoostosis of the frontal bone and chiari malformation. There were not any definite concerning findings on the MRI for mitochondrial disease. The MRI of the cervical and thoracic spine were unremarkable. She has been started on levocarnitine and NAC injections and marinol for nausea (helps significantly). The levocarnitine helps with her energy level but she is still very fatigued.     She saw Dr. Samano for headaches and chiari malformation. MRI CINE protocol was ordered by Dr. Samano and this showed adequate CSF flow around the chiari. She can tell when she strains, bends over, carry something heavy that she can't function for 15-20 seconds and it can start a headache that lasts all day. It is a different type of headache. She had an occipital nerve block by Dr. Samano.     The headaches, nausea, and pain are most bothersome. She finds her memory is worse. She is on multiple medications that can affect thinking and memory including ambien, zanaflex, prazosin, relafen, robaxin, prozac, wellbutrin, and xanax.     Review of Systems:   Comprehensive review of systems is negative except as mentioned in the HPI    Allergies:  Review of patient's allergies indicates:   Allergen Reactions    Lamotrigine      Other reaction(s): Rash  Other reaction(s): Nausea    Sulfa (sulfonamide  antibiotics) Nausea Only     Other reaction(s): Unknown       Current Medications:  Current Outpatient Prescriptions   Medication Sig Dispense Refill    ACETYLCYSTEINE (N-ACETYL-L-CYSTEINE MISC) 750 mg by Misc.(Non-Drug; Combo Route) route 4 (four) times daily.      ALPRAZolam (XANAX) 1 MG tablet Take 1 tablet (1 mg total) by mouth daily as needed for Anxiety. 30 tablet 1    buPROPion (WELLBUTRIN XL) 150 MG TB24 tablet Take 1 tablet (150 mg total) by mouth once daily. 90 tablet 0    butalbital-acetaminophen-caffeine -40 mg (FIORICET, ESGIC) -40 mg per tablet 1 tab PO TID x 3 days, then 1 tab BID x 2 days, then 1 tab daily x 1 day then stop 14 tablet 0    dimenhyDRINATE (DRAMAMINE) 50 MG tablet Take 50 mg by mouth nightly as needed.      diphenoxylate-atropine 2.5-0.025 mg (LOMOTIL) 2.5-0.025 mg per tablet TAKE 1 TABLET BY MOUTH 4 (FOUR) TIMES DAILY AS NEEDED FOR DIARRHEA. 30 tablet 1    dronabinol (MARINOL) 2.5 MG capsule Take 1 capsule (2.5 mg total) by mouth 2 (two) times daily before meals. 60 capsule 3    FLUoxetine (PROZAC) 40 MG capsule Take 1 capsule (40 mg total) by mouth once daily. 90 capsule 0    levOCARNitine (CARNITOR) 330 mg Tab Take 3 tablets (990 mg total) by mouth 2 (two) times daily. 180 tablet 6    methocarbamol (ROBAXIN) 500 MG Tab Take 1 tablet (500 mg total) by mouth 2 (two) times daily as needed. 30 tablet 3    montelukast (SINGULAIR) 10 mg tablet Take 1 tablet (10 mg total) by mouth once daily. 90 tablet 3    ondansetron (ZOFRAN-ODT) 4 MG TbDL Take 1 tablet (4 mg total) by mouth every 6 (six) hours as needed (nausea). 30 tablet 3    prazosin (MINIPRESS) 1 MG Cap Take 1 capsule (1 mg total) by mouth every evening. For ptsd related nightmares 90 capsule 1    predniSONE (DELTASONE) 10 MG tablet 4 tab PO in AM  X 2 days, 3 tab in AM x 2 days, 2 tab in AM x 2 days, 1 tab in AM x 2 days then stop. 20 tablet 0    SYNTHROID 200 mcg tablet TAKE 1 TABLET (200 MCG TOTAL) BY  MOUTH ONCE DAILY. 90 tablet 0    SYNTHROID 50 mcg tablet TAKE 50 MCG TABLET BY MOUTH ALONG WITH 200 MCG TABLET EVERY DAY FOR A TOTAL DAILY DOSE OF 250MCG. 90 tablet 0    tiZANidine (ZANAFLEX) 4 MG tablet Half or full tablet by mouth at night as needed for muscle spasm 30 tablet 3    UNABLE TO FIND Physical Therapy - evaluate and treat neck muscles to alleviate cramping 1 each 0    UNABLE TO FIND N-acetyl-L-cysteine 50 mg subcutaneously 3 times a week 10 mL 6    VENTOLIN HFA 90 mcg/actuation inhaler INHALE 2 PUFFS INTO THE LUNGS EVERY 6 (SIX) HOURS AS NEEDED FOR WHEEZING. 18 Inhaler 0    zolpidem (AMBIEN CR) 12.5 MG CR tablet Take 1 tablet (12.5 mg total) by mouth nightly as needed for Insomnia. 30 tablet 0    nabumetone (RELAFEN) 500 MG tablet Take 500 mg by mouth 2 (two) times daily.       No current facility-administered medications for this visit.        Past Medical History:  Past Medical History:   Diagnosis Date    Abnormal Pap smear of cervix     colpo/ LEEP    Abnormal Pap smear of vagina     CK[facility]    Depression     Fibromyalgia     Thyroid disease        Past Surgical History:  Past Surgical History:   Procedure Laterality Date    BREAST SURGERY  1997    CERVICAL BIOPSY  W/ LOOP ELECTRODE EXCISION      LAPAROSCOPIC GASTRIC BANDING      removed due to abscess    PELVIC LAPAROSCOPY      TENDON REPAIR Right     x2       Family History:  family history includes Cancer (age of onset: 65) in her maternal grandfather; Cervical cancer in her sister; Colon cancer in her maternal grandmother; Diabetes in her mother and sister; Heart disease in her father; Hyperlipidemia in her father and mother; Hypertension in her mother; Mental illness in her mother and sister; Mitochondrial disorder in her sister; Ovarian cancer in her cousin, paternal aunt, and paternal aunt; Seizures in her sister and sister; Stroke (age of onset: 30) in her mother.    Social History:   reports that she has never smoked. She has  "never used smokeless tobacco. She reports that she drinks alcohol. She reports that she does not use drugs.    Physical Exam:  Vitals:    05/29/18 0750   BP: 129/81   Pulse: 78   Resp: 20   Weight: 112.4 kg (247 lb 14.4 oz)   Height: 5' 7" (1.702 m)   PainSc:   5   PainLoc: Neck     Body mass index is 38.83 kg/m².  General: Well developed, well nourished.  No acute distress.  HEENT: Atraumatic, normocephalic.  Musculoskeletal: No obvious joint deformities, moves all extremities well.  Skin: No obvious rashes    Neurological Exam:  Mental status: Awake, alert. MMSE 27/30 (didn't know date, recall 1/3 but got a second word with clues).  Speech language: No dysarthria or aphasia on conversation  Cranial nerves: Face symmetric, bilateral ptosis.     Data:  I have personally reviewed the referring provider's notes, labs, & imaging made available to me today.       Labs:  CBC:   Lab Results   Component Value Date    WBC 6.12 03/14/2017    HGB 15.4 03/14/2017    HCT 46.0 03/14/2017     03/14/2017    MCV 93 03/14/2017    RDW 12.6 03/14/2017     BMP:   Lab Results   Component Value Date     03/14/2017    K 4.4 03/14/2017     03/14/2017    CO2 23 03/14/2017    BUN 9 03/14/2017    CREATININE 0.9 01/29/2018    GLU 99 03/14/2017    CALCIUM 9.2 03/14/2017     LFTS;   Lab Results   Component Value Date    PROT 7.2 03/14/2017    ALBUMIN 3.8 03/14/2017    BILITOT 0.3 03/14/2017    AST 24 03/14/2017    ALKPHOS 53 (L) 03/14/2017    ALT 28 03/14/2017     COAGS: No results found for: INR, PROTIME, PTT  FLP:   Lab Results   Component Value Date    CHOL 190 03/14/2017    HDL 41 03/14/2017    LDLCALC 118.8 03/14/2017    TRIG 151 (H) 03/14/2017    CHOLHDL 21.6 03/14/2017         Imaging:  I have personally reviewed the imaging, MRI brain revealed a chiari malformation of 9mm but good CSF flow surrounding. She does have hyperostosis of the frontalis bone.    Assessment and Plan:  Ms. Mitchell is a 43 y.o. female who returns " for followup after testing. She is most concerned today about her chiari malformation, pain, headaches, nausea, and memory loss.     Regarding the headaches and pain, I will defer to Dr. Samano. The patient needs to see her back in follow up.     I reassured the patient that the chiari is not the cause for her memory problems. The memory concerns are potentially due to psychiatric comorbidities and medications. She reports getting sentences and words mixed up and wonders if she could have dyslexia. We will obtain vitamin levels and neuropsych testing for further evaluation. I will see her back after this testing to review results but contact her regarding the vitamin levels. She will continue to follow up with psychiatry.     She wonders if she needs to be scanned periodically for her chiari malformation and I discussed that usually we don't unless there is concerning for progressive symptoms.     She will follow with her  regarding possible whole exome sequencing and for treatment of a presumed mitochondrial disorder.       Memory loss  -     Vitamin B12; Future; Expected date: 05/29/2018  -     FOLATE; Future; Expected date: 05/29/2018  -     VITAMIN D; Future; Expected date: 05/29/2018  -     Neuropsychological testing; Future; Expected date: 05/30/2018    Ataxia    Chiari malformation type I    Major depressive disorder, recurrent, severe without psychotic features    PTSD (post-traumatic stress disorder)    Attention deficit hyperactivity disorder (ADHD), predominantly inattentive type    Borderline personality disorder    Ptosis, both eyelids    Mitochondrial metabolism disorder    Cervical myofascial pain syndrome      Greater than 50% of the patient's 40 minute clinic visit was spent on counseling and arranging care.

## 2018-05-30 ENCOUNTER — TELEPHONE (OUTPATIENT)
Dept: NEUROLOGY | Facility: CLINIC | Age: 43
End: 2018-05-30

## 2018-05-30 RX ORDER — TRAMADOL HYDROCHLORIDE 50 MG/1
50 TABLET ORAL EVERY 6 HOURS PRN
Qty: 120 TABLET | Refills: 0 | Status: SHIPPED | OUTPATIENT
Start: 2018-05-30 | End: 2018-08-16 | Stop reason: SDUPTHER

## 2018-05-30 NOTE — TELEPHONE ENCOUNTER
Returned call and spoke with patient. Patient appointment rescheduled. Informed patient that I was going to send the refill request to Dr. Samano. Patient verbalized understanding.

## 2018-05-30 NOTE — TELEPHONE ENCOUNTER
----- Message from Lena Cruz sent at 5/30/2018  3:40 PM CDT -----  Contact: Patient  The patient is calling to see if she can come in sooner for her procedure that is scheduled for 6/21.  She is also requesting a refill on Tramadol.  Call Back#[phone]  Thanks     [id number] - [place], [place] - [address]  [facility] [address]  Phone: [phone] Fax: [phone]

## 2018-05-31 LAB
PYRIDOXAL SERPL-MCNC: 37 UG/L (ref 5–50)
VIT B2 SERPL-MCNC: 2 MCG/L (ref 1–19)
ZINC SERPL-MCNC: 77 UG/DL (ref 60–130)

## 2018-06-01 LAB
A-TOCOPHEROL VIT E SERPL-MCNC: 1069 UG/DL (ref 500–1800)
VIT B1 SERPL-MCNC: 43 UG/L (ref 38–122)

## 2018-06-02 LAB — SELENIUM SERPL-MCNC: 133 UG/L (ref 23–190)

## 2018-06-06 ENCOUNTER — OFFICE VISIT (OUTPATIENT)
Dept: PSYCHIATRY | Facility: CLINIC | Age: 43
End: 2018-06-06
Payer: COMMERCIAL

## 2018-06-06 VITALS
BODY MASS INDEX: 39.35 KG/M2 | HEIGHT: 67 IN | DIASTOLIC BLOOD PRESSURE: 77 MMHG | SYSTOLIC BLOOD PRESSURE: 134 MMHG | WEIGHT: 250.69 LBS | HEART RATE: 79 BPM

## 2018-06-06 DIAGNOSIS — F41.0 PANIC DISORDER WITHOUT AGORAPHOBIA: ICD-10-CM

## 2018-06-06 DIAGNOSIS — E66.01 MORBID OBESITY WITH BMI OF 40.0-44.9, ADULT: ICD-10-CM

## 2018-06-06 DIAGNOSIS — F33.2 MAJOR DEPRESSIVE DISORDER, RECURRENT, SEVERE WITHOUT PSYCHOTIC FEATURES: ICD-10-CM

## 2018-06-06 DIAGNOSIS — F90.0 ATTENTION DEFICIT HYPERACTIVITY DISORDER (ADHD), PREDOMINANTLY INATTENTIVE TYPE: Primary | ICD-10-CM

## 2018-06-06 DIAGNOSIS — F60.3 BORDERLINE PERSONALITY DISORDER: ICD-10-CM

## 2018-06-06 DIAGNOSIS — F43.10 PTSD (POST-TRAUMATIC STRESS DISORDER): ICD-10-CM

## 2018-06-06 DIAGNOSIS — G93.5 CHIARI MALFORMATION TYPE I: ICD-10-CM

## 2018-06-06 PROCEDURE — 99214 OFFICE O/P EST MOD 30 MIN: CPT | Mod: S$GLB,,, | Performed by: PSYCHIATRY & NEUROLOGY

## 2018-06-06 PROCEDURE — 3008F BODY MASS INDEX DOCD: CPT | Mod: CPTII,S$GLB,, | Performed by: PSYCHIATRY & NEUROLOGY

## 2018-06-06 PROCEDURE — 90833 PSYTX W PT W E/M 30 MIN: CPT | Mod: S$GLB,,, | Performed by: PSYCHIATRY & NEUROLOGY

## 2018-06-06 PROCEDURE — 99999 PR PBB SHADOW E&M-EST. PATIENT-LVL III: CPT | Mod: PBBFAC,,, | Performed by: PSYCHIATRY & NEUROLOGY

## 2018-06-06 RX ORDER — ZOLPIDEM TARTRATE 12.5 MG/1
12.5 TABLET, FILM COATED, EXTENDED RELEASE ORAL NIGHTLY PRN
Qty: 30 TABLET | Refills: 0 | Status: SHIPPED | OUTPATIENT
Start: 2018-06-06 | End: 2018-07-16 | Stop reason: SDUPTHER

## 2018-06-06 RX ORDER — ALPRAZOLAM 1 MG/1
1 TABLET ORAL DAILY PRN
Qty: 30 TABLET | Refills: 1 | Status: SHIPPED | OUTPATIENT
Start: 2018-06-06 | End: 2018-08-16 | Stop reason: SDUPTHER

## 2018-06-06 RX ORDER — BUPROPION HYDROCHLORIDE 150 MG/1
150 TABLET ORAL DAILY
Qty: 90 TABLET | Refills: 0 | Status: SHIPPED | OUTPATIENT
Start: 2018-06-06 | End: 2018-09-25 | Stop reason: SDUPTHER

## 2018-06-06 RX ORDER — FLUOXETINE HYDROCHLORIDE 40 MG/1
40 CAPSULE ORAL DAILY
Qty: 90 CAPSULE | Refills: 0 | Status: SHIPPED | OUTPATIENT
Start: 2018-06-06 | End: 2018-09-25 | Stop reason: SDUPTHER

## 2018-06-06 RX ORDER — PRAZOSIN HYDROCHLORIDE 1 MG/1
1 CAPSULE ORAL NIGHTLY
Qty: 90 CAPSULE | Refills: 1 | Status: SHIPPED | OUTPATIENT
Start: 2018-06-06 | End: 2018-09-25 | Stop reason: SDUPTHER
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
## 2018-06-07 ENCOUNTER — TELEPHONE (OUTPATIENT)
Dept: NEUROLOGY | Facility: CLINIC | Age: 43
End: 2018-06-07

## 2018-06-07 NOTE — TELEPHONE ENCOUNTER
----- Message from Olimpia Florian sent at 6/7/2018 12:15 PM CDT -----  Contact: self  Patient will like to cancel and reschedule her nerve block that is schedule for 6/12    Please call patient back [phone]

## 2018-06-09 DIAGNOSIS — F60.3 BORDERLINE PERSONALITY DISORDER: ICD-10-CM

## 2018-06-09 DIAGNOSIS — F41.0 PANIC DISORDER WITHOUT AGORAPHOBIA: ICD-10-CM

## 2018-06-09 DIAGNOSIS — F33.2 MAJOR DEPRESSIVE DISORDER, RECURRENT, SEVERE WITHOUT PSYCHOTIC FEATURES: ICD-10-CM

## 2018-06-09 DIAGNOSIS — F43.10 PTSD (POST-TRAUMATIC STRESS DISORDER): ICD-10-CM

## 2018-06-10 RX ORDER — FLUOXETINE HYDROCHLORIDE 40 MG/1
40 CAPSULE ORAL DAILY
Qty: 90 CAPSULE | Refills: 0 | Status: SHIPPED | OUTPATIENT
Start: 2018-06-10 | End: 2018-09-25 | Stop reason: SDUPTHER

## 2018-06-19 ENCOUNTER — PROCEDURE VISIT (OUTPATIENT)
Dept: NEUROLOGY | Facility: CLINIC | Age: 43
End: 2018-06-19
Payer: COMMERCIAL

## 2018-06-19 VITALS
SYSTOLIC BLOOD PRESSURE: 136 MMHG | HEIGHT: 67 IN | DIASTOLIC BLOOD PRESSURE: 101 MMHG | RESPIRATION RATE: 16 BRPM | WEIGHT: 250.69 LBS | BODY MASS INDEX: 39.35 KG/M2 | HEART RATE: 74 BPM

## 2018-06-19 DIAGNOSIS — M54.81 BILATERAL OCCIPITAL NEURALGIA: Primary | ICD-10-CM

## 2018-06-19 PROCEDURE — 64405 NJX AA&/STRD GR OCPL NRV: CPT | Mod: 50,S$GLB,, | Performed by: PSYCHIATRY & NEUROLOGY

## 2018-06-19 PROCEDURE — 64450 NJX AA&/STRD OTHER PN/BRANCH: CPT | Mod: 50,51,S$GLB, | Performed by: PSYCHIATRY & NEUROLOGY

## 2018-06-19 RX ORDER — BUPIVACAINE HYDROCHLORIDE 2.5 MG/ML
7 INJECTION, SOLUTION EPIDURAL; INFILTRATION; INTRACAUDAL ONCE
Status: COMPLETED | OUTPATIENT
Start: 2018-06-19 | End: 2018-06-19

## 2018-06-19 RX ORDER — GABAPENTIN 600 MG/1
600 TABLET ORAL 3 TIMES DAILY
Qty: 90 TABLET | Refills: 11 | Status: SHIPPED | OUTPATIENT
Start: 2018-06-19 | End: 2019-08-09 | Stop reason: SDUPTHER

## 2018-06-19 RX ORDER — LIDOCAINE HYDROCHLORIDE 10 MG/ML
2 INJECTION, SOLUTION EPIDURAL; INFILTRATION; INTRACAUDAL; PERINEURAL ONCE
Status: COMPLETED | OUTPATIENT
Start: 2018-06-19 | End: 2018-06-19

## 2018-06-19 RX ORDER — TRIAMCINOLONE ACETONIDE 40 MG/ML
40 INJECTION, SUSPENSION INTRA-ARTICULAR; INTRAMUSCULAR ONCE
Status: COMPLETED | OUTPATIENT
Start: 2018-06-19 | End: 2018-06-19

## 2018-06-19 RX ADMIN — LIDOCAINE HYDROCHLORIDE 20 MG: 10 INJECTION, SOLUTION EPIDURAL; INFILTRATION; INTRACAUDAL; PERINEURAL at 05:06

## 2018-06-19 RX ADMIN — TRIAMCINOLONE ACETONIDE 40 MG: 40 INJECTION, SUSPENSION INTRA-ARTICULAR; INTRAMUSCULAR at 05:06

## 2018-06-19 RX ADMIN — BUPIVACAINE HYDROCHLORIDE 17.5 MG: 2.5 INJECTION, SOLUTION EPIDURAL; INFILTRATION; INTRACAUDAL at 05:06

## 2018-06-19 NOTE — PROCEDURES
Procedures     Greater and Lesser Occipital Nerve Block, Bilateral     After risks and benefits were explained including bleeding, infection, worsening of the pain, damage to the area being injected, weakness, allergic reaction to medications, vascular injection, and nerve damage, signed consent was obtained. All questions were answered.     The area of the greater occipital nerve was identified as a point 1/3rds the distance  between the occipital protuberance and the ipsilateral mastoid process. The area of the lesser occipital nerve was identified as a point 2/3rds the distance between the occipital protuberance and the ipsilateral mastoid process.The identified areas were prepped three times with alcohol and the alcohol allowed to dry. Next, a 27 gauge 1 inch needle was placed in the anatomical area of the JEZ. Once reproduction of the pain was elicited and negative aspiration confirmed, I proceeded with the injection. This was repeated for the STU. The exact procedure was repeated on the contralateral side.     Adequacy of the block was confirmed by sensory examination demonstrating anesthesia in the territory of the JEZ and the STU.     Medication used: Marcaine 0.25% (17.5 mg), Lidocaine 1% (20mg) and Triamcinolone 40mg     Total volume infused:  10cc    The patient did tolerate the procedure well and there were no complications.    RTC later in week

## 2018-06-19 NOTE — PATIENT INSTRUCTIONS
Gabapentin (Neurontin brand)    Gabapentin is an anti-epileptic medication that is beneficial for several other neurological conditions. While it is only FDA approved for seizures and a specific type of nerve pain (post-herpetic neuralgia), it has proven useful in the treatment of diabetic and other painful neuropathies, trigeminal or occipital neuralgia, and migraines. While it is generally quite safe and well tolerated, it may cause side effects, including but not limited to the following:     Dizziness   Ankle swelling   Weight gain   Sleepiness / sedation   Difficulty thinking or concentrating   Suicidal thoughts (rare; a potential problem with any seizure drug)    Serious reactions are very rare. Dosing should be increased gradually. It is usually administered three times a day. Use the following schedule as a guide to find the lowest dose that seems to be effective for you. Check off each dose increase as you go. Usually the dose is increased every 3-5 days.        Number of 600mg Tablets       Morning  Noon  Evening    Week 1  None  none  Half    Week 2 Half  none  Half    Week 3  Half  Half  Half    Week 4  Half  Half  Full    Week 5 Full  Half  Full    Week 6 Full  Full  Full    Week 7  Full  Full  1 and a half    Week 8 1 and a half  Full  1 and a half    Week 9  1 and a half  1 and a half  1 and a half    Week 10  1 and a half  1 and a half  2   Week 11  2  1 and a half  2   Week 12  2  2 2         This schedule will bring you up to a fairly high dose, 1200 mg three times a day. Do not increase dose by more than ½ tablet per day every 3 days. If needed, you can increase more slowly than suggested. Stop increasing your dose if you experience side effects.Many patients find that 600mg 3x/day is optimal.    If your condition responds to the medication at a lower strength, stop increasing your dose - the idea is to find the minimally effective strength you need to feel better. If you are better at 1  pill 3x / day, then that is your dose!    If you believe you are experiencing a severe side effect, discontinue the medication and call your doctor or contact an emergency room.

## 2018-06-22 ENCOUNTER — TELEPHONE (OUTPATIENT)
Dept: NEUROLOGY | Facility: CLINIC | Age: 43
End: 2018-06-22

## 2018-07-14 DIAGNOSIS — M79.18 CERVICAL MYOFASCIAL PAIN SYNDROME: ICD-10-CM

## 2018-07-16 DIAGNOSIS — F43.10 PTSD (POST-TRAUMATIC STRESS DISORDER): ICD-10-CM

## 2018-07-16 RX ORDER — TIZANIDINE 4 MG/1
TABLET ORAL
Qty: 30 TABLET | Refills: 11 | Status: ON HOLD | OUTPATIENT
Start: 2018-07-16 | End: 2018-12-17 | Stop reason: HOSPADM

## 2018-07-16 RX ORDER — ZOLPIDEM TARTRATE 12.5 MG/1
12.5 TABLET, FILM COATED, EXTENDED RELEASE ORAL NIGHTLY PRN
Qty: 30 TABLET | Refills: 0 | Status: SHIPPED | OUTPATIENT
Start: 2018-07-16 | End: 2018-08-16 | Stop reason: SDUPTHER

## 2018-08-16 DIAGNOSIS — F43.10 PTSD (POST-TRAUMATIC STRESS DISORDER): ICD-10-CM

## 2018-08-16 DIAGNOSIS — M79.18 CERVICAL MYOFASCIAL PAIN SYNDROME: ICD-10-CM

## 2018-08-16 DIAGNOSIS — F41.0 PANIC DISORDER WITHOUT AGORAPHOBIA: ICD-10-CM

## 2018-08-17 RX ORDER — ZOLPIDEM TARTRATE 12.5 MG/1
12.5 TABLET, FILM COATED, EXTENDED RELEASE ORAL NIGHTLY PRN
Qty: 30 TABLET | Refills: 0 | Status: SHIPPED | OUTPATIENT
Start: 2018-08-17 | End: 2018-09-15 | Stop reason: SDUPTHER

## 2018-08-17 RX ORDER — ALPRAZOLAM 1 MG/1
1 TABLET ORAL DAILY PRN
Qty: 30 TABLET | Refills: 1 | Status: SHIPPED | OUTPATIENT
Start: 2018-08-17 | End: 2018-11-15 | Stop reason: SDUPTHER

## 2018-08-17 RX ORDER — METHOCARBAMOL 500 MG/1
500 TABLET, FILM COATED ORAL 2 TIMES DAILY PRN
Qty: 30 TABLET | Refills: 3 | Status: ON HOLD | OUTPATIENT
Start: 2018-08-17 | End: 2018-12-15 | Stop reason: HOSPADM

## 2018-08-17 RX ORDER — TRAMADOL HYDROCHLORIDE 50 MG/1
50 TABLET ORAL EVERY 6 HOURS PRN
Qty: 120 TABLET | Refills: 0 | Status: SHIPPED | OUTPATIENT
Start: 2018-08-17 | End: 2018-10-15 | Stop reason: SDUPTHER

## 2018-09-04 RX ORDER — DIPHENOXYLATE HYDROCHLORIDE AND ATROPINE SULFATE 2.5; .025 MG/1; MG/1
1 TABLET ORAL 4 TIMES DAILY PRN
Qty: 30 TABLET | Refills: 1 | Status: SHIPPED | OUTPATIENT
Start: 2018-09-04 | End: 2019-09-04

## 2018-09-05 DIAGNOSIS — F43.10 PTSD (POST-TRAUMATIC STRESS DISORDER): ICD-10-CM

## 2018-09-05 DIAGNOSIS — J40 BRONCHITIS: ICD-10-CM

## 2018-09-06 RX ORDER — ALBUTEROL SULFATE 90 UG/1
2 AEROSOL, METERED RESPIRATORY (INHALATION) EVERY 6 HOURS PRN
Qty: 18 INHALER | Refills: 0 | Status: SHIPPED | OUTPATIENT
Start: 2018-09-06 | End: 2018-10-09 | Stop reason: SDUPTHER

## 2018-09-12 ENCOUNTER — PATIENT MESSAGE (OUTPATIENT)
Dept: NEUROLOGY | Facility: CLINIC | Age: 43
End: 2018-09-12

## 2018-09-12 RX ORDER — MONTELUKAST SODIUM 10 MG/1
10 TABLET ORAL DAILY
Qty: 90 TABLET | Refills: 1 | Status: SHIPPED | OUTPATIENT
Start: 2018-09-12 | End: 2019-03-13 | Stop reason: SDUPTHER

## 2018-09-13 ENCOUNTER — PATIENT MESSAGE (OUTPATIENT)
Dept: NEUROLOGY | Facility: CLINIC | Age: 43
End: 2018-09-13

## 2018-09-14 ENCOUNTER — PATIENT MESSAGE (OUTPATIENT)
Dept: NEUROLOGY | Facility: CLINIC | Age: 43
End: 2018-09-14

## 2018-09-15 DIAGNOSIS — F43.10 PTSD (POST-TRAUMATIC STRESS DISORDER): ICD-10-CM

## 2018-09-16 RX ORDER — ZOLPIDEM TARTRATE 12.5 MG/1
12.5 TABLET, FILM COATED, EXTENDED RELEASE ORAL NIGHTLY PRN
Qty: 30 TABLET | Refills: 0 | Status: SHIPPED | OUTPATIENT
Start: 2018-09-16 | End: 2018-09-25 | Stop reason: SDUPTHER

## 2018-09-19 ENCOUNTER — TELEPHONE (OUTPATIENT)
Dept: NEUROLOGY | Facility: CLINIC | Age: 43
End: 2018-09-19

## 2018-09-19 ENCOUNTER — PROCEDURE VISIT (OUTPATIENT)
Dept: NEUROLOGY | Facility: CLINIC | Age: 43
End: 2018-09-19
Payer: COMMERCIAL

## 2018-09-19 VITALS
RESPIRATION RATE: 17 BRPM | DIASTOLIC BLOOD PRESSURE: 79 MMHG | BODY MASS INDEX: 39.76 KG/M2 | WEIGHT: 253.31 LBS | HEIGHT: 67 IN | HEART RATE: 75 BPM | SYSTOLIC BLOOD PRESSURE: 135 MMHG

## 2018-09-19 DIAGNOSIS — M54.81 BILATERAL OCCIPITAL NEURALGIA: Primary | ICD-10-CM

## 2018-09-19 DIAGNOSIS — M79.18 CERVICAL MYOFASCIAL PAIN SYNDROME: ICD-10-CM

## 2018-09-19 PROCEDURE — 64405 NJX AA&/STRD GR OCPL NRV: CPT | Mod: 50,S$GLB,, | Performed by: PSYCHIATRY & NEUROLOGY

## 2018-09-19 PROCEDURE — 20552 NJX 1/MLT TRIGGER POINT 1/2: CPT | Mod: 51,S$GLB,, | Performed by: PSYCHIATRY & NEUROLOGY

## 2018-09-19 RX ORDER — LIDOCAINE HYDROCHLORIDE 10 MG/ML
5 INJECTION, SOLUTION EPIDURAL; INFILTRATION; INTRACAUDAL; PERINEURAL ONCE
Status: COMPLETED | OUTPATIENT
Start: 2018-09-19 | End: 2018-09-19

## 2018-09-19 RX ORDER — BUPIVACAINE HYDROCHLORIDE 2.5 MG/ML
10 INJECTION, SOLUTION EPIDURAL; INFILTRATION; INTRACAUDAL ONCE
Status: COMPLETED | OUTPATIENT
Start: 2018-09-19 | End: 2018-09-19

## 2018-09-19 RX ORDER — TRIAMCINOLONE ACETONIDE 40 MG/ML
40 INJECTION, SUSPENSION INTRA-ARTICULAR; INTRAMUSCULAR ONCE
Status: COMPLETED | OUTPATIENT
Start: 2018-09-19 | End: 2018-09-19

## 2018-09-19 RX ADMIN — LIDOCAINE HYDROCHLORIDE 50 MG: 10 INJECTION, SOLUTION EPIDURAL; INFILTRATION; INTRACAUDAL; PERINEURAL at 12:09

## 2018-09-19 RX ADMIN — TRIAMCINOLONE ACETONIDE 40 MG: 40 INJECTION, SUSPENSION INTRA-ARTICULAR; INTRAMUSCULAR at 12:09

## 2018-09-19 RX ADMIN — BUPIVACAINE HYDROCHLORIDE 25 MG: 2.5 INJECTION, SOLUTION EPIDURAL; INFILTRATION; INTRACAUDAL at 12:09

## 2018-09-19 NOTE — PROCEDURES
PROCEDURE #1     Greater and Lesser Occipital Nerve Block, Bilateral     Diagnosis: occipital neuralgia     After risks and benefits were explained including bleeding, infection, worsening of the pain, damage to the area being injected, weakness, allergic reaction to medications, vascular injection, and nerve damage, signed consent was obtained. All questions were answered.     The area of the greater occipital nerve was identified as a point 1/3rds the distance  between the occipital protuberance and the ipsilateral mastoid process. The area of the lesser occipital nerve was identified as a point 2/3rds the distance between the occipital protuberance and the ipsilateral mastoid process.The identified areas were prepped three times with alcohol and the alcohol allowed to dry. Next, a 27 gauge 1 inch needle was placed in the anatomical area of the JEZ. Once reproduction of the pain was elicited and negative aspiration confirmed, I proceeded with the injection. This was repeated for the STU. The exact procedure was repeated on the contralateral side.     Adequacy of the block was confirmed by sensory examination demonstrating anesthesia in the territory of the JEZ and the STU.     Medication used: Marcaine 0.25% (18mg), lidocaine 1% (20mg) and Triamcinolone 40mg     Total volume injected:  10cc    Estimated blood loss: none    The patient tolerated the procedure well and there were no complications.     -----------------------------------------------------------------------------------------------------------------    PROCEDURE #2     TRIGGER POINT INJECTION (CERVICAL), 1 muscle unilateral    Indication: cervical myofascial pain     Anesthesia: none     After risks and benefits were explained including bleeding, infection, worsening of the pain, damage to the area being injected, weakness, allergic reaction to medications, vascular injection, and nerve damage, signed consent was obtained. All questions were answered.      Trigger points were identified by palpation of tight muscle fibers with reproduction of patient's pain and referral pattern upon palpation. The identified areas were prepped three times with alcohol and the alcohol allowed to dry. Next, a 27 gauge 1 inch needle was placed in the anatomical area of the trigger point ot be injected. Once negative aspiration of air and heme was confirmed, I proceeded with the injection.     Medications used: bupivicaine 0.25%, lidocaine 1%     Total volume injected: 3cc    Trigger points injected:  -- semispinalis capitus     Estimated blood loss: none     The patient did tolerate the procedure well and there were no complications.    RTC as needed

## 2018-09-20 ENCOUNTER — TELEPHONE (OUTPATIENT)
Dept: NEUROLOGY | Facility: CLINIC | Age: 43
End: 2018-09-20

## 2018-09-20 ENCOUNTER — PATIENT MESSAGE (OUTPATIENT)
Dept: NEUROLOGY | Facility: CLINIC | Age: 43
End: 2018-09-20

## 2018-09-20 NOTE — TELEPHONE ENCOUNTER
----- Message from Andrey Hooper sent at 9/20/2018 10:01 AM CDT -----  Type: Needs Medical Advice    Who Called:  Self   Symptoms (please be specific):  NA How long has patient had these symptoms:    Pharmacy name and phone #:    Best Call Back Number: [phone]  Additional Information: Patient is schedule with a neurosurgeon at [facility] main [facility]. Patient states she need orders for mri of the head,neck. The mri films can not be more than 6 months in order to see neurosurgeon.

## 2018-09-20 NOTE — TELEPHONE ENCOUNTER
----- Message from Estrellita Caballero sent at 9/20/2018  9:43 AM CDT -----  Contact: self @ [phone]  Would like to schedule a Cons with Dr Mak for Chiari malformation.  Pt says she read on-line that Dr Mak specializes in this.

## 2018-09-22 ENCOUNTER — PATIENT MESSAGE (OUTPATIENT)
Dept: NEUROLOGY | Facility: CLINIC | Age: 43
End: 2018-09-22

## 2018-09-25 ENCOUNTER — OFFICE VISIT (OUTPATIENT)
Dept: PSYCHIATRY | Facility: CLINIC | Age: 43
End: 2018-09-25
Payer: COMMERCIAL

## 2018-09-25 VITALS
BODY MASS INDEX: 39.08 KG/M2 | DIASTOLIC BLOOD PRESSURE: 94 MMHG | HEIGHT: 67 IN | HEART RATE: 71 BPM | SYSTOLIC BLOOD PRESSURE: 141 MMHG | WEIGHT: 249 LBS

## 2018-09-25 DIAGNOSIS — G93.5 CHIARI MALFORMATION TYPE I: ICD-10-CM

## 2018-09-25 DIAGNOSIS — F41.0 PANIC DISORDER WITHOUT AGORAPHOBIA: ICD-10-CM

## 2018-09-25 DIAGNOSIS — F33.2 MAJOR DEPRESSIVE DISORDER, RECURRENT, SEVERE WITHOUT PSYCHOTIC FEATURES: ICD-10-CM

## 2018-09-25 DIAGNOSIS — F60.3 BORDERLINE PERSONALITY DISORDER: ICD-10-CM

## 2018-09-25 DIAGNOSIS — F43.10 PTSD (POST-TRAUMATIC STRESS DISORDER): Primary | ICD-10-CM

## 2018-09-25 DIAGNOSIS — E66.01 MORBID OBESITY WITH BMI OF 40.0-44.9, ADULT: ICD-10-CM

## 2018-09-25 PROCEDURE — 90833 PSYTX W PT W E/M 30 MIN: CPT | Mod: S$GLB,,, | Performed by: PSYCHIATRY & NEUROLOGY

## 2018-09-25 PROCEDURE — 3008F BODY MASS INDEX DOCD: CPT | Mod: CPTII,S$GLB,, | Performed by: PSYCHIATRY & NEUROLOGY

## 2018-09-25 PROCEDURE — 99999 PR PBB SHADOW E&M-EST. PATIENT-LVL III: CPT | Mod: PBBFAC,,, | Performed by: PSYCHIATRY & NEUROLOGY

## 2018-09-25 PROCEDURE — 99214 OFFICE O/P EST MOD 30 MIN: CPT | Mod: S$GLB,,, | Performed by: PSYCHIATRY & NEUROLOGY

## 2018-09-25 RX ORDER — FLUOXETINE HYDROCHLORIDE 40 MG/1
40 CAPSULE ORAL DAILY
Qty: 90 CAPSULE | Refills: 0 | Status: SHIPPED | OUTPATIENT
Start: 2018-09-25 | End: 2018-12-11 | Stop reason: SDUPTHER

## 2018-09-25 RX ORDER — ZOLPIDEM TARTRATE 12.5 MG/1
12.5 TABLET, FILM COATED, EXTENDED RELEASE ORAL NIGHTLY PRN
Qty: 30 TABLET | Refills: 0 | Status: SHIPPED | OUTPATIENT
Start: 2018-09-25 | End: 2018-11-15 | Stop reason: SDUPTHER

## 2018-09-25 RX ORDER — PRAZOSIN HYDROCHLORIDE 1 MG/1
1 CAPSULE ORAL NIGHTLY
Qty: 90 CAPSULE | Refills: 1 | Status: SHIPPED | OUTPATIENT
Start: 2018-09-25 | End: 2018-12-11 | Stop reason: SDUPTHER

## 2018-09-25 RX ORDER — BUPROPION HYDROCHLORIDE 150 MG/1
150 TABLET ORAL DAILY
Qty: 90 TABLET | Refills: 0 | Status: SHIPPED | OUTPATIENT
Start: 2018-09-25 | End: 2018-12-11 | Stop reason: SDUPTHER
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
## 2018-10-09 DIAGNOSIS — J40 BRONCHITIS: ICD-10-CM

## 2018-10-09 RX ORDER — ALBUTEROL SULFATE 90 UG/1
AEROSOL, METERED RESPIRATORY (INHALATION)
Qty: 18 INHALER | Refills: 0 | Status: SHIPPED | OUTPATIENT
Start: 2018-10-09 | End: 2020-06-09

## 2018-10-09 NOTE — TELEPHONE ENCOUNTER
Tried to reach pt. No answer, left msg to call back.    [facility] Follow up and labs according to wog.

## 2018-10-09 NOTE — LETTER
October 17, 2018    Dinah Mitchell  [address]  [place] [address]             [facility]  [address]  [place] [address]  Phone: [phone]  Fax: [phone] Dear Mrs. Mitchell:    Please contact the office at your earliest convenience to schedule an appointment to see your primary care provider and blood work.        If you have any questions or concerns, please don't hesitate to call.    Sincerely,        Adriana Roblero LPN

## 2018-10-15 DIAGNOSIS — E03.9 HYPOTHYROIDISM, UNSPECIFIED TYPE: ICD-10-CM

## 2018-10-15 RX ORDER — TRAMADOL HYDROCHLORIDE 50 MG/1
50 TABLET ORAL EVERY 6 HOURS PRN
Qty: 120 TABLET | Refills: 0 | Status: SHIPPED | OUTPATIENT
Start: 2018-10-15 | End: 2018-11-14

## 2018-10-17 NOTE — TELEPHONE ENCOUNTER
Tried to reach pt. No answer, left msg to call back.    [facility] Schedule [facility]. Sending letter/

## 2018-10-22 RX ORDER — LEVOTHYROXINE SODIUM 200 UG/1
TABLET ORAL
Qty: 90 TABLET | Refills: 0 | OUTPATIENT
Start: 2018-10-22

## 2018-10-23 ENCOUNTER — PATIENT MESSAGE (OUTPATIENT)
Dept: NEUROLOGY | Facility: CLINIC | Age: 43
End: 2018-10-23

## 2018-10-24 ENCOUNTER — TELEPHONE (OUTPATIENT)
Dept: GENETICS | Facility: CLINIC | Age: 43
End: 2018-10-24

## 2018-10-24 ENCOUNTER — TELEPHONE (OUTPATIENT)
Dept: PEDIATRIC NEUROLOGY | Facility: CLINIC | Age: 43
End: 2018-10-24

## 2018-10-24 NOTE — TELEPHONE ENCOUNTER
----- Message from Mattbarbara Sy sent at 10/24/2018  8:07 AM CDT -----  Contact: Self [phone]  Needs Advice    Reason for call: reschedule appt         Communication Preference: Self [phone]    Additional Information:  Pt stated that her dog had emergency surgery on yesterday and she has no one to watch him. If possible she would like to reschedule her appt within the next few weeks. She is requesting a call back.

## 2018-10-24 NOTE — TELEPHONE ENCOUNTER
----- Message from Elina Hooper sent at 10/24/2018 12:06 PM CDT -----  Contact: THE [phone]  Patient Returning Call from [facility]    Who Left Message for Patient: Dr. Nevarez  office  Communication Preference:Requesting a call back   Additional Information: Calling about moving her apt . She spoke to the nurse earlier today

## 2018-10-24 NOTE — TELEPHONE ENCOUNTER
Spoke with pt and rescheduled appt form Thursday 10/25 to 12/14 at 10am. Pt verbalized understanding.
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
## 2018-11-06 ENCOUNTER — TELEPHONE (OUTPATIENT)
Dept: NEUROLOGY | Facility: CLINIC | Age: 43
End: 2018-11-06

## 2018-11-06 NOTE — TELEPHONE ENCOUNTER
Called patient and scheduled TPI per patient request. Appointment successfully scheduled. Patient verbalized understanding.

## 2018-11-06 NOTE — TELEPHONE ENCOUNTER
----- Message from Chepe Tobar sent at 11/6/2018 11:58 AM CST -----  Contact: patient  Type: Needs Medical Advice    Who Called:  patient  Symptoms (please be specific):    How long has patient had these symptoms:    Pharmacy name and phone #:    Best Call Back Number: [phone]  Additional Information: requesting a call back regarding scheduling injections patient is aware that  is out

## 2018-11-07 ENCOUNTER — TELEPHONE (OUTPATIENT)
Dept: GENETICS | Facility: CLINIC | Age: 43
End: 2018-11-07

## 2018-11-07 NOTE — TELEPHONE ENCOUNTER
Spoke with pt and rescheduled appt from 12/14 to 12/28 per Dr. Nevarez. Pt verbalized understanding.

## 2018-11-13 ENCOUNTER — TELEPHONE (OUTPATIENT)
Dept: NEUROLOGY | Facility: CLINIC | Age: 43
End: 2018-11-13

## 2018-11-13 NOTE — TELEPHONE ENCOUNTER
----- Message from Anton Tong sent at 11/13/2018 10:01 AM CST -----  Contact: Self  Patient needs to cancel her procedure scheduled for tomorrow 11-14  Please call back to maryule [phone] (home)

## 2018-11-13 NOTE — TELEPHONE ENCOUNTER
Called patient in regards to rescheduling appointment per patient request. Patient verbalized understanding.

## 2018-11-15 DIAGNOSIS — F41.0 PANIC DISORDER WITHOUT AGORAPHOBIA: ICD-10-CM

## 2018-11-15 DIAGNOSIS — F43.10 PTSD (POST-TRAUMATIC STRESS DISORDER): ICD-10-CM

## 2018-11-16 RX ORDER — ALPRAZOLAM 1 MG/1
1 TABLET ORAL DAILY PRN
Qty: 30 TABLET | Refills: 2 | Status: SHIPPED | OUTPATIENT
Start: 2018-11-16 | End: 2019-02-23 | Stop reason: SDUPTHER

## 2018-11-16 RX ORDER — ZOLPIDEM TARTRATE 12.5 MG/1
12.5 TABLET, FILM COATED, EXTENDED RELEASE ORAL NIGHTLY PRN
Qty: 30 TABLET | Refills: 2 | Status: SHIPPED | OUTPATIENT
Start: 2018-11-16 | End: 2018-12-11 | Stop reason: SDUPTHER

## 2018-11-20 ENCOUNTER — TELEPHONE (OUTPATIENT)
Dept: NEUROSURGERY | Facility: CLINIC | Age: 43
End: 2018-11-20

## 2018-11-20 ENCOUNTER — OFFICE VISIT (OUTPATIENT)
Dept: NEUROSURGERY | Facility: CLINIC | Age: 43
End: 2018-11-20
Payer: COMMERCIAL

## 2018-11-20 DIAGNOSIS — G93.5 CHIARI I MALFORMATION: Primary | ICD-10-CM

## 2018-11-20 DIAGNOSIS — R51.9 INTRACTABLE HEADACHE, UNSPECIFIED CHRONICITY PATTERN, UNSPECIFIED HEADACHE TYPE: ICD-10-CM

## 2018-11-20 PROCEDURE — 99999 PR PBB SHADOW E&M-EST. PATIENT-LVL III: CPT | Mod: PBBFAC,,, | Performed by: NEUROLOGICAL SURGERY

## 2018-11-20 PROCEDURE — 99204 OFFICE O/P NEW MOD 45 MIN: CPT | Mod: S$GLB,,, | Performed by: NEUROLOGICAL SURGERY

## 2018-11-20 NOTE — PROGRESS NOTES
Subjective:    I, Khloe Cummins, attest that this documentation has been prepared under the direction and in the presence of GENNARO Busch MD.     Patient ID: Dinah Mitchell is a 43 y.o. female.    Chief Complaint: No chief complaint on file.    HPI   Pt is a 43 y.o. female who presents for evaluation of chiari malformation. Pt states that she endures exertional related anterior and occipital HA with associated nausea, and falls; occipital HA more severe. Pt states that she has endured HA for the past 4 years. Pt currently on Neurontin.     Review of Systems   Constitutional: Negative for chills, fatigue and fever.   HENT: Negative for sinus pressure and trouble swallowing.    Eyes: Negative.  Negative for visual disturbance.   Respiratory: Negative.    Cardiovascular: Negative.    Gastrointestinal: Positive for nausea (Associated with HA). Negative for vomiting.   Endocrine: Negative.    Genitourinary: Negative.    Musculoskeletal: Negative.    Neurological: Positive for headaches. Negative for dizziness, seizures, syncope, speech difficulty, weakness and numbness.       Objective:      Physical Exam:  Nursing note and vitals reviewed.    Constitutional: She appears well-developed.     Eyes: Pupils are equal, round, and reactive to light. Conjunctivae and EOM are normal.     Cardiovascular: Normal rate, regular rhythm, normal pulses and intact distal pulses.     Abdominal: Soft.     Psych/Behavior: She is alert. She is oriented to person, place, and time. She has a normal mood and affect.     Musculoskeletal: Gait is normal.        Neck: Range of motion is full. There is no tenderness. Muscle strength is 5/5. Tone is normal.        Back: Range of motion is full. There is no tenderness. Muscle strength is 5/5. Tone is normal.        Right Upper Extremities: Range of motion is full. There is no tenderness. Muscle strength is 5/5. Tone is normal.        Left Upper Extremities: Range of motion is full. There is no  tenderness. Muscle strength is 5/5. Tone is normal.       Right Lower Extremities: Range of motion is full. There is no tenderness. Muscle strength is 5/5. Tone is normal.        Left Lower Extremities: Range of motion is full. There is no tenderness. Muscle strength is 5/5. Tone is normal.     Neurological:        Coordination: She has a normal Romberg Test, normal finger to nose coordination, normal heel to shin coordination and normal tandem walking coordination.        DTRs: DTRs are normal. Tricep reflexes are 2+ on the right side and 2+ on the left side. Bicep reflexes are 2+ on the right side and 2+ on the left side. Brachioradialis reflexes are 2+ on the right side and 2+ on the left side. Patellar reflexes are 2+ on the right side and 2+ on the left side. Achilles reflexes are 2+ on the right side and 2+ on the left side.        Cranial nerves: Cranial nerve(s) II, III, IV, V, VI, VII, VIII, IX, X, XI and XII are intact.       Pt who has bifrontal and also occipital parietal HA. Occipital HA are dominant type. She states that the HA are exacerbated by activity, coughing, sneezing and valsalva maneuver.     In clinic, HA are reproducible by valsalva maneuver and appear to be pretty debilitating.    No focal deficits.   Cranial nerves intact.     Imaging:   MRI C spine, dated 2/18, shows cerebellar tonsils do sit past ring of C1 with some flattening of the dorsal aspect of the cerebellum. No obvious syrinx. I do see a little CSF past the cerebellum.     I, GENNARO Busch MD, personally reviewed the imaging and interpreted independent of the radiology report.    Assessment/Plan:   Pt with radiographic and clinical evidence of chiari malformation. Becoming more and more symptomatic and is now impairing her daily living, making her HA fairly intractable. I think this warrants surgical intervention. Pt needs C1 laminectomy and duraplasty. Plan for MRI of cervical spine with CSF flow study and follow her objectively down  the road.     I have discussed the risks/benefits, indications, and alternatives for the proposed procedure in detail. I have answered all of their questions and patient wishes to proceed with surgery. We will schedule patient.     I, GENNARO Busch MD, personally performed the services described in this documentation. All medical record entries made by the scribe, Khloe Cummins, were at my direction and in my presence.  I have reviewed the chart and agree that the record reflects my personal performance and is accurate and complete.

## 2018-11-20 NOTE — H&P (VIEW-ONLY)
PT Name: Joe Herrera  MR #: [id number]    Physician Query Form - Postoperative Relationship Clarification     CDS/: Brittany Valverde RN, CCDS             Contact information: [email or web address]    This form is a permanent document in the medical record.     Query Date: November 15, 2017    Dear Provider,    By submitting this query, we are merely seeking further clarification of documentation. Please utilize your independent clinical judgment when addressing the question(s) below.    The Medical Record contains the following:    Supporting Clinical Findings   Location in Medical Record     underwent closure of the VSD and ASD with resection of RVOT muscle bundles and a pulmonary valvotomy on 11/10.    Cardiogenic postoperative shock    Continue milrinone infusion at 0.5mcg/kg/min and calcium gtt at 15    Arrhythmias all throughout shift.  HR , 12 lead obtained, cardiology at bedside   Perfusion poor when in tachycardic rhythm.  Cold, lowest temp 92.9 placed bear hugger on patient to warm   11/11 progress note                  11/11 nurse plan of care        Please clarify the term post operative   [ X ] Condition occurred in the post operative period (not a complication of surgery)   [  ] Condition is a complication of surgery   [  ] Other intended meaning (please specify) ____________________________     Subjective:    I, Khloe Cummins, attest that this documentation has been prepared under the direction and in the presence of GENNARO Busch MD.     Patient ID: Dinah Mitchell is a 43 y.o. female.    Chief Complaint: No chief complaint on file.    HPI   Pt is a 43 y.o. female who presents for evaluation of chiari malformation. Pt states that she endures exertional related anterior and occipital HA with associated nausea, and falls; occipital HA more severe. Pt states that she has endured HA for the past 4 years. Pt currently on Neurontin.     Review of Systems   Constitutional: Negative for chills, fatigue and fever.   HENT: Negative for sinus pressure and trouble swallowing.    Eyes: Negative.  Negative for visual disturbance.   Respiratory: Negative.    Cardiovascular: Negative.    Gastrointestinal: Positive for nausea (Associated with HA). Negative for vomiting.   Endocrine: Negative.    Genitourinary: Negative.    Musculoskeletal: Negative.    Neurological: Positive for headaches. Negative for dizziness, seizures, syncope, speech difficulty, weakness and numbness.       Objective:      Physical Exam:  Nursing note and vitals reviewed.    Constitutional: She appears well-developed.     Eyes: Pupils are equal, round, and reactive to light. Conjunctivae and EOM are normal.     Cardiovascular: Normal rate, regular rhythm, normal pulses and intact distal pulses.     Abdominal: Soft.     Psych/Behavior: She is alert. She is oriented to person, place, and time. She has a normal mood and affect.     Musculoskeletal: Gait is normal.        Neck: Range of motion is full. There is no tenderness. Muscle strength is 5/5. Tone is normal.        Back: Range of motion is full. There is no tenderness. Muscle strength is 5/5. Tone is normal.        Right Upper Extremities: Range of motion is full. There is no tenderness. Muscle strength is 5/5. Tone is normal.        Left Upper Extremities: Range of motion is full. There is no  tenderness. Muscle strength is 5/5. Tone is normal.       Right Lower Extremities: Range of motion is full. There is no tenderness. Muscle strength is 5/5. Tone is normal.        Left Lower Extremities: Range of motion is full. There is no tenderness. Muscle strength is 5/5. Tone is normal.     Neurological:        Coordination: She has a normal Romberg Test, normal finger to nose coordination, normal heel to shin coordination and normal tandem walking coordination.        DTRs: DTRs are normal. Tricep reflexes are 2+ on the right side and 2+ on the left side. Bicep reflexes are 2+ on the right side and 2+ on the left side. Brachioradialis reflexes are 2+ on the right side and 2+ on the left side. Patellar reflexes are 2+ on the right side and 2+ on the left side. Achilles reflexes are 2+ on the right side and 2+ on the left side.        Cranial nerves: Cranial nerve(s) II, III, IV, V, VI, VII, VIII, IX, X, XI and XII are intact.       Pt who has bifrontal and also occipital parietal HA. Occipital HA are dominant type. She states that the HA are exacerbated by activity, coughing, sneezing and valsalva maneuver.     In clinic, HA are reproducible by valsalva maneuver and appear to be pretty debilitating.    No focal deficits.   Cranial nerves intact.     Imaging:   MRI C spine, dated 2/18, shows cerebellar tonsils do sit past ring of C1 with some flattening of the dorsal aspect of the cerebellum. No obvious syrinx. I do see a little CSF past the cerebellum.     I, GENNARO Busch MD, personally reviewed the imaging and interpreted independent of the radiology report.    Assessment/Plan:   Pt with radiographic and clinical evidence of chiari malformation. Becoming more and more symptomatic and is now impairing her daily living, making her HA fairly intractable. I think this warrants surgical intervention. Pt needs C1 laminectomy and duraplasty. Plan for MRI of cervical spine with CSF flow study and follow her objectively down  the road.     I have discussed the risks/benefits, indications, and alternatives for the proposed procedure in detail. I have answered all of their questions and patient wishes to proceed with surgery. We will schedule patient.     I, GENNARO Busch MD, personally performed the services described in this documentation. All medical record entries made by the scribe, Khloe Cummins, were at my direction and in my presence.  I have reviewed the chart and agree that the record reflects my personal performance and is accurate and complete.

## 2018-11-26 ENCOUNTER — TELEPHONE (OUTPATIENT)
Dept: PREADMISSION TESTING | Facility: HOSPITAL | Age: 43
End: 2018-11-26

## 2018-11-26 ENCOUNTER — TELEPHONE (OUTPATIENT)
Dept: FAMILY MEDICINE | Facility: CLINIC | Age: 43
End: 2018-11-26

## 2018-11-26 ENCOUNTER — PATIENT MESSAGE (OUTPATIENT)
Dept: SURGERY | Facility: HOSPITAL | Age: 43
End: 2018-11-26

## 2018-11-26 ENCOUNTER — PATIENT MESSAGE (OUTPATIENT)
Dept: GENETICS | Facility: CLINIC | Age: 43
End: 2018-11-26

## 2018-11-26 ENCOUNTER — ANESTHESIA EVENT (OUTPATIENT)
Dept: SURGERY | Facility: HOSPITAL | Age: 43
DRG: 025 | End: 2018-11-26
Payer: COMMERCIAL

## 2018-11-26 DIAGNOSIS — Z01.818 PREOPERATIVE TESTING: Primary | ICD-10-CM

## 2018-11-26 NOTE — TELEPHONE ENCOUNTER
----- Message from Stephanie Kerr RN sent at 11/26/2018  8:33 AM CST -----  Please schedule poc appt and lab no sooner than 11/29. Thanks!

## 2018-11-26 NOTE — ANESTHESIA PREPROCEDURE EVALUATION
"    Anesthesia Assessment: Preoperative EQUATION     Planned Procedure: Procedure(s) (LRB):  DECOMPRESSION, CHIARI MALFORMATION, BY 1ST CERVICAL VERTEBRA POSTERIOR ARCH REMOVAL (N/A)  Requested Anesthesia Type:General  Surgeon: Sergio Busch MD  Service: Neurosurgery  Known or anticipated Date of Surgery:12/13/2018     Surgeon notes: reviewed     Electronic QUestionnaire Assessment completed via nurse interview with patient.      NO AQ     Triage considerations:         Previous anesthesia records:Not available  Patient stated "woke up" twice during a colonoscopy but did not have any other problems with general anesthesia with any of her surgeries.  Last PCP note: seeing new PCP 12/4 Erinn Davey with [facility] in [place].  Subspecialty notes: Neurology, Psychiatry, genetics & NEUROSURGERY     Other important co-morbidities: PER Epic: OBESITY, THYROID DISEASE,PATRICIA,& FIBROMYALGIA     Tests already available:  Available tests,  > 1 year ago , within [facility] .12/27/2016 EKG                            Instructions given. (See in Nurse's note)     Optimization:  Anesthesia Preop Clinic Assessment  Indicated    Medical Opinion Indicated                            Plan:              Testing:  CBC, BMP, PT/INR, PTT, T&S, UA and TSH   Pre-anesthesia  visit                                        Visit focus: concerns in complex and/or prolonged anesthesia                           Consultation:Patient's PCP for re-evaluation Patient's PCP for a statement of optimization                            Patient  has previously scheduled Medical Appointment:11/29 IBAN SCHRADER, 12/4 ERINN DAVEY &MRI     Navigation: Tests Scheduled. TBD                        Consults scheduled.TBD                        Results will be tracked by Preop Clinic.                                     Electronically signed by Stephanie Kerr RN at 11/26/2018  8:20 AM       Pre-admit on 12/13/2018            Detailed Report    12/5 Labs and UA " resulted and noted.   Stephanie Kerr RN BSN   Preop Center                                                                                                                  [facility]  Anesthesia Pre-Operative Evaluation         Patient Name: Dinah Mitchell  YOB: 1975  MRN: [id number]    SUBJECTIVE:     Pre-operative evaluation for Procedure(s) (LRB):  DECOMPRESSION, CHIARI MALFORMATION, BY 1ST CERVICAL VERTEBRA POSTERIOR ARCH REMOVAL (N/A)     12/13/2018    Dinah Mitchell is a 43 y.o. female w/ a significant PMHx of ADHD, hx of bariatric sx, and chiari malformation with associated HA, nausea and falls who presents for the above procedure.      Prev airway: None documented.       Patient Active Problem List   Diagnosis    ADHD (attention deficit hyperactivity disorder)    Bariatric surgery status    Major depressive disorder, recurrent, severe without psychotic features    PTSD (post-traumatic stress disorder)    Panic disorder without agoraphobia    Borderline personality disorder    Ptosis, both eyelids    Ataxia    Chiari malformation type I    Cervical myofascial pain syndrome    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    Memory loss    Bilateral occipital neuralgia       Review of patient's allergies indicates:   Allergen Reactions    Lamotrigine      Other reaction(s): Rash  Other reaction(s): Nausea    Sulfa (sulfonamide antibiotics) Nausea Only     Other reaction(s): Unknown       Current Inpatient Medications:      No current facility-administered medications on file prior to encounter.      Current Outpatient Medications on File Prior to Encounter   Medication Sig Dispense Refill    ALPRAZolam (XANAX) 1 MG tablet TAKE 1 TABLET (1 MG TOTAL) BY MOUTH DAILY AS NEEDED FOR ANXIETY. (Patient taking differently: Take 1 mg by mouth every 6 (six) hours as needed for Anxiety. ) 30 tablet 2    dronabinol (MARINOL) 2.5 MG capsule Take 1 capsule  (2.5 mg total) by mouth 2 (two) times daily before meals. 60 capsule 3    gabapentin (NEURONTIN) 600 MG tablet Take 1 tablet (600 mg total) by mouth 3 (three) times daily. 90 tablet 11    levOCARNitine (CARNITOR) 330 mg Tab Take 3 tablets (990 mg total) by mouth 2 (two) times daily. 180 tablet 6    medroxyPROGESTERone (DEPO-PROVERA) 150 mg/mL injection Inject 150 mg into the muscle every 3 (three) months.      methocarbamol (ROBAXIN) 500 MG Tab TAKE 1 TABLET (500 MG TOTAL) BY MOUTH 2 (TWO) TIMES DAILY AS NEEDED. 30 tablet 3    montelukast (SINGULAIR) 10 mg tablet TAKE 1 TABLET (10 MG TOTAL) BY MOUTH ONCE DAILY. 90 tablet 1    tiZANidine (ZANAFLEX) 4 MG tablet TAKE HALF OR FULL TABLET BY MOUTH AT NIGHT AS NEEDED FOR MUSCLE SPASM 30 tablet 11    UNABLE TO FIND N-acetyl-L-cysteine 50 mg subcutaneously 3 times a week 10 mL 6    VENTOLIN HFA 90 mcg/actuation inhaler TAKE 2 PUFFS BY MOUTH EVERY 6 HOURS AS NEEDED FOR WHEEZE 18 Inhaler 0    diphenoxylate-atropine 2.5-0.025 mg (LOMOTIL) 2.5-0.025 mg per tablet TAKE 1 TABLET BY MOUTH 4 (FOUR) TIMES DAILY AS NEEDED FOR DIARRHEA. 30 tablet 1    SYNTHROID 200 mcg tablet TAKE 1 TABLET (200 MCG TOTAL) BY MOUTH ONCE DAILY. 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     PATRICIA (obstructive sleep apnea)     severe - doesn't use CPAP    Panic attack     PTSD (post-traumatic stress disorder)        Past Surgical History:   Procedure Laterality Date    ANKLE SURGERY Right     x2 (#1 for ligament injury and #2 for fx and  ligament)    AUGMENTATION OF BREAST      BREAST SURGERY  1997    B implants    CERVICAL BIOPSY  W/ LOOP ELECTRODE EXCISION      LAPAROSCOPIC GASTRIC BANDING      with subsequent removal due to abscess    LIPOSUCTION      x 2    PELVIC LAPAROSCOPY      for endometriosis eval with BTL    TUBAL LIGATION      wtih pelvic lap        Social History     Socioeconomic History    Marital status:      Spouse name: Not on file    Number of children: Not on file    Years of education: Not on file    Highest education level: Not on file   Social Needs    Financial resource strain: Not on file    Food insecurity - worry: Not on file    Food insecurity - inability: Not on file    Transportation needs - medical: Not on file    Transportation needs - non-medical: Not on file   Occupational History    Occupation: [employer]     Tobacco Use    Smoking status: Never Smoker    Smokeless tobacco: Never Used   Substance and Sexual Activity    Alcohol use: Yes     Comment: occasional    Drug use: No    Sexual activity: Yes     Partners: Male     Birth control/protection: Injection     Comment: depo since age 18   Other Topics Concern    Not on file   Social History Narrative    Not on disability, not currently working.       OBJECTIVE:     Vital Signs Range (Last 24H):  Temp:  [36.7 °C (98.1 °F)]   Pulse:  [83]   Resp:  [16]   BP: (138)/(86)   SpO2:  [98 %]       CBC:   Lab Results   Component Value Date    WBC 5.90 12/04/2018    HGB 13.1 12/04/2018    HCT 39.7 12/04/2018    MCV 97 12/04/2018     12/04/2018       CMP:   CMP  Sodium   Date Value Ref Range Status   12/04/2018 138 136 - 145 mmol/L Final     Potassium   Date Value Ref Range Status   12/04/2018 4.3 3.5 - 5.1 mmol/L Final     Chloride   Date Value Ref Range Status   12/04/2018 105 95 - 110 mmol/L Final     CO2   Date Value Ref Range Status   12/04/2018 28 23 - 29 mmol/L Final     Glucose   Date Value Ref Range Status   12/04/2018 97  70 - 110 mg/dL Final     BUN, Bld   Date Value Ref Range Status   2018 13 6 - 20 mg/dL Final     Creatinine   Date Value Ref Range Status   2018 0.9 0.5 - 1.4 mg/dL Final     Calcium   Date Value Ref Range Status   2018 9.5 8.7 - 10.5 mg/dL Final     Total Protein   Date Value Ref Range Status   2018 7.5 6.0 - 8.4 g/dL Final     Albumin   Date Value Ref Range Status   2018 4.2 3.5 - 5.2 g/dL Final     Total Bilirubin   Date Value Ref Range Status   2018 0.6 0.1 - 1.0 mg/dL Final     Comment:     For infants and newborns, interpretation of results should be based  on gestational age, weight and in agreement with clinical  observations.  Premature Infant recommended reference ranges:  Up to 24 hours.............<8.0 mg/dL  Up to 48 hours............<12.0 mg/dL  3-5 days..................<15.0 mg/dL  6-29 days.................<15.0 mg/dL       Alkaline Phosphatase   Date Value Ref Range Status   2018 66 55 - 135 U/L Final     AST   Date Value Ref Range Status   2018 22 10 - 40 U/L Final     ALT   Date Value Ref Range Status   2018 25 10 - 44 U/L Final     Anion Gap   Date Value Ref Range Status   2018 5 (L) 8 - 16 mmol/L Final     eGFR if    Date Value Ref Range Status   2018 >60.0 >60 mL/min/1.73 m^2 Final     eGFR if non    Date Value Ref Range Status   2018 >60.0 >60 mL/min/1.73 m^2 Final     Comment:     Calculation used to obtain the estimated glomerular filtration  rate (eGFR) is the CKD-EPI equation.          Lab Results   Component Value Date    INR 1.0 2018       Diagnostic Studies: No relevant studies.    EK18  NSR 78 bpm      ASSESSMENT/PLAN:         Anesthesia Evaluation    I have reviewed the Patient Summary Reports.     I have reviewed the Nursing Notes.   I have reviewed the Medications.     Review of Systems  Anesthesia Hx:  No problems with previous Anesthesia Denies Hx of Anesthetic  "complications  History of prior surgery of interest to airway management or planning: Previous anesthesia: General 2 years ago: Right ankle surgery with general anesthesia.  Denies Family Hx of Anesthesia complications.  Personal Hx of Anesthesia complications  Unintended Unpleasent Intraoperative Awareness (during Colonoscopy) and during MAC / sedation only   Social:  Non-Smoker, Social Alcohol Use  Denies Tobacco Use. 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 We do not have records from this reported disorder. She has had multiple anesthetics previously without complication.     EENT/Dental:  EENT/Dental Normal Throat Symptoms  include Swallowing difficulty or pain   Jaw Problems:  Clicking (TMJ)   Cardiovascular:   Exercise tolerance: poor Denies Hypertension.  Denies CAD.     Denies Angina. ECG has been reviewed.  Functional Capacity good / => 4 METS, can walk up two flight of stairs: denies CP/SOB  Denies Deep Venous Thrombosis (DVT)   Denies Hypertension.    Pulmonary:   Asthma Denies Shortness of breath. Sleep Apnea, CPAP  Asthma: (used inhaler 2 nights ago: due to SOB (Unsure if panic attack or asthma))  last episode was < 1 month ago.   Inhaler use is rescue inhaler PRN.  Obstructive Sleep Apnea (PATRICIA). No CPAP use   Renal/:  Renal/ Normal   Denies Kidney Function/Disease    Hepatic/GI:   GERD (infrequent, no meds) Denies Hepatitis.  Hepatic/GI Symptoms: heartburn.  Denies Liver Disease    Musculoskeletal:  Muscle Disorders: Fibromyalgia  Joint Disease:  Arthritis, Osteoarthritis  Cervical Spine Disorder Spondylosis   Neurological:   Denies CVA. Headaches Denies Seizures. Chiari 1 malformation Neuro Symptoms of numbness, tingling BIlateral hand/BLEDx of Headaches Osteoarthritis  Denies Seizure Disorder  Nervous System Malformations, Chiari " Malformation (Type 1)  Denies CVA - Cerebrovasular Accident  Denies TIA - Transient Ischemic Attack    Endocrine:   Denies Diabetes. Hypothyroidism Recently had mildly elevated TSH, supposed to increase her synthroid dose but has not started yet Denies Diabetes  Thyroid Disease Hx of Hypothyroidism, Treated with Replacement Rx  Metabolic Disorders, Morbid Obesity / BMI > 40  Psych:   anxiety depression  Anxiety Disorder and Panic Attacks.  Attention Deficit Disorder. Depression and Major Depression, Treated. Borderline Personality Disorder         Physical Exam  General:  Well nourished, Obesity    Airway/Jaw/Neck:  Airway Findings: Mouth Opening: Normal Tongue: Normal  General Airway Assessment: Adult  Mallampati: II  TM Distance: Normal, at least 6 cm  Jaw/Neck Findings:  Micrognathia: Negative Neck ROM: Extension Decreased, Mild, Decreased Lateral Motion, to the right, to the left, Extension Painful  Neck Findings:  Girth Increased   TMJ, jaw pops    Dental:  Dental Findings: In tact   Chest/Lungs:  Chest/Lungs Findings: Clear to auscultation, Normal Respiratory Rate     Heart/Vascular:  Heart Findings: Rate: Normal  Rhythm: Regular Rhythm  Sounds: Normal  Vascular Findings: Normal    Abdomen:  Abdomen Findings:  Normal     Musculoskeletal:  Musculoskeletal Findings:    Skin:  Skin Findings:     Mental Status:  Mental Status Findings:  Alert and Oriented, Cooperative         Anesthesia Plan  Type of Anesthesia, risks & benefits discussed:  Anesthesia Type:  general  Patient's Preference:   Intra-op Monitoring Plan: standard ASA monitors and arterial line  Intra-op Monitoring Plan Comments:   Post Op Pain Control Plan: per primary service following discharge from PACU, IV/PO Opioids PRN and multimodal analgesia  Post Op Pain Control Plan Comments:   Induction:   IV  Beta Blocker:  Patient is not currently on a Beta-Blocker (No further documentation required).       Informed Consent: Patient understands risks and  agrees with Anesthesia plan.  Questions answered. Anesthesia consent signed with patient.  ASA Score: 3     Day of Surgery Review of History & Physical:    H&P update referred to the surgeon.     Anesthesia Plan Notes: Plan GETA. A-line post-induction. All questions answered. Informed consent obtained. Pt agrees to proceed.           Ready For Surgery From Anesthesia Perspective.     Cleared per PCP (Attila) 12/10/18    Maru Busch MD

## 2018-11-26 NOTE — PRE-PROCEDURE INSTRUCTIONS
Patient stated woke up twice during a colonoscopy but has had no other problems with anesthesia with any other surgeries.Will need medical clearance from your PCP. She said she has a new PCP, Amaya Aiken at [facility] in [place]. She has an appt with her on 12/4.She said she has a form from Dr Busch with labs needed to give toher PCP for preop testing.  Will also need a poc appt. Our  will call to set up this appt.Preop instructions given. Hold asa, asa containing products, nsaids, vitamins and supplements one week prior to surgery. Verbalizes understanding.

## 2018-11-26 NOTE — PRE ADMISSION SCREENING
Anesthesia Assessment: Preoperative EQUATION    Planned Procedure: Procedure(s) (LRB):  DECOMPRESSION, CHIARI MALFORMATION, BY 1ST CERVICAL VERTEBRA POSTERIOR ARCH REMOVAL (N/A)  Requested Anesthesia Type:General  Surgeon: Sergio Busch MD  Service: Neurosurgery  Known or anticipated Date of Surgery:12/13/2018    Surgeon notes: reviewed    Electronic QUestionnaire Assessment completed via nurse interview with patient.      NO AQ    Triage considerations:       Previous anesthesia records:Not available  Patient stated woke up twice during a colonoscopy but did not have any other problems with anesthesia with any of her surgeries.  Last PCP note: seeing new PCP 12/4 Erinn Davey with [facility] in [place].  Subspecialty notes: Neurology, Psychiatry, genetics & NEUROSURGERY    Other important co-morbidities: PER Epic: OBESITY, THYROID DISEASE,PATRICIA,& FIBROMYALGIA     Tests already available:  Available tests,  > 1 year ago , within [facility] .12/27/2016 EKG            Instructions given. (See in Nurse's note)    Optimization:  Anesthesia Preop Clinic Assessment  Indicated    Medical Opinion Indicated       Plan:    Testing:  CBC, BMP, PT/INR, PTT, T&S, UA and TSH   Pre-anesthesia  visit       Visit focus: concerns in complex and/or prolonged anesthesia     Consultation:Patient's PCP for re-evaluation Patient's PCP for a statement of optimization      Patient  has previously scheduled Medical Appointment:11/29 IBAN SCHRADER, 12/4 ERINN DAVEY &MRI    Navigation: Tests Scheduled. TBD             Consults scheduled.TBD             Results will be tracked by Preop Clinic.

## 2018-11-29 ENCOUNTER — TELEPHONE (OUTPATIENT)
Dept: NEUROSURGERY | Facility: CLINIC | Age: 43
End: 2018-11-29

## 2018-11-29 NOTE — TELEPHONE ENCOUNTER
----- Message from Osvaldo Ftizpatrick sent at 11/29/2018  3:00 PM CST -----  Contact: Pt. [phone]  Needs Advice    Reason for call:The patient would like to speak to someone regarding a steroid shot she's trying to get before surgery. Please contact the patient to discuss further.          Communication Preference:PHONE     Additional Information:

## 2018-12-03 RX ORDER — MEDROXYPROGESTERONE ACETATE 150 MG/ML
150 INJECTION, SUSPENSION INTRAMUSCULAR
COMMUNITY
End: 2020-06-09

## 2018-12-04 ENCOUNTER — HOSPITAL ENCOUNTER (OUTPATIENT)
Dept: RADIOLOGY | Facility: HOSPITAL | Age: 43
Discharge: HOME | End: 2018-12-04
Attending: FAMILY MEDICINE
Payer: MEDICARE

## 2018-12-04 ENCOUNTER — HOSPITAL ENCOUNTER (OUTPATIENT)
Dept: RADIOLOGY | Facility: HOSPITAL | Age: 43
Discharge: HOME | End: 2018-12-04
Attending: SPECIALIST
Payer: MEDICARE

## 2018-12-04 ENCOUNTER — OFFICE VISIT (OUTPATIENT)
Dept: OBSTETRICS AND GYNECOLOGY | Facility: CLINIC | Age: 43
End: 2018-12-04
Payer: MEDICARE

## 2018-12-04 ENCOUNTER — CLINICAL SUPPORT (OUTPATIENT)
Dept: CARDIOLOGY | Facility: CLINIC | Age: 43
End: 2018-12-04
Payer: MEDICARE

## 2018-12-04 ENCOUNTER — HOSPITAL ENCOUNTER (OUTPATIENT)
Dept: RADIOLOGY | Facility: HOSPITAL | Age: 43
Discharge: HOME | End: 2018-12-04
Attending: NEUROLOGICAL SURGERY
Payer: COMMERCIAL

## 2018-12-04 ENCOUNTER — OFFICE VISIT (OUTPATIENT)
Dept: FAMILY MEDICINE | Facility: CLINIC | Age: 43
End: 2018-12-04
Payer: MEDICARE

## 2018-12-04 VITALS
HEART RATE: 60 BPM | BODY MASS INDEX: 39.12 KG/M2 | RESPIRATION RATE: 18 BRPM | HEIGHT: 67 IN | DIASTOLIC BLOOD PRESSURE: 80 MMHG | TEMPERATURE: 99 F | WEIGHT: 249.25 LBS | SYSTOLIC BLOOD PRESSURE: 140 MMHG

## 2018-12-04 VITALS — BODY MASS INDEX: 38.99 KG/M2 | WEIGHT: 248.44 LBS | HEIGHT: 67 IN

## 2018-12-04 VITALS — BODY MASS INDEX: 38.92 KG/M2 | HEIGHT: 67 IN | WEIGHT: 248 LBS

## 2018-12-04 DIAGNOSIS — Q07.00 ARNOLD-CHIARI MALFORMATION: ICD-10-CM

## 2018-12-04 DIAGNOSIS — Z00.00 ANNUAL PHYSICAL EXAM: Primary | ICD-10-CM

## 2018-12-04 DIAGNOSIS — E66.9 OBESITY (BMI 35.0-39.9 WITHOUT COMORBIDITY): ICD-10-CM

## 2018-12-04 DIAGNOSIS — G47.33 OSA (OBSTRUCTIVE SLEEP APNEA): ICD-10-CM

## 2018-12-04 DIAGNOSIS — N94.6 DYSMENORRHEA: ICD-10-CM

## 2018-12-04 DIAGNOSIS — Z00.00 ANNUAL PHYSICAL EXAM: ICD-10-CM

## 2018-12-04 DIAGNOSIS — Z01.419 WELL WOMAN EXAM: ICD-10-CM

## 2018-12-04 DIAGNOSIS — G93.5 CHIARI I MALFORMATION: ICD-10-CM

## 2018-12-04 DIAGNOSIS — R51.9 INTRACTABLE HEADACHE, UNSPECIFIED CHRONICITY PATTERN, UNSPECIFIED HEADACHE TYPE: ICD-10-CM

## 2018-12-04 DIAGNOSIS — Z01.419 WELL WOMAN EXAM: Primary | ICD-10-CM

## 2018-12-04 DIAGNOSIS — Z01.818 PRE-OPERATIVE CLEARANCE: ICD-10-CM

## 2018-12-04 DIAGNOSIS — E03.4 HYPOTHYROIDISM DUE TO ACQUIRED ATROPHY OF THYROID: ICD-10-CM

## 2018-12-04 DIAGNOSIS — Z30.42 ENCOUNTER FOR DEPO-PROVERA CONTRACEPTION: ICD-10-CM

## 2018-12-04 PROBLEM — Z83.49: Status: RESOLVED | Noted: 2017-06-19 | Resolved: 2018-12-04

## 2018-12-04 PROBLEM — E88.40 MITOCHONDRIAL METABOLISM DISORDER: Status: RESOLVED | Noted: 2017-06-19 | Resolved: 2018-12-04

## 2018-12-04 LAB
B-HCG UR QL: NEGATIVE
CTP QC/QA: YES

## 2018-12-04 PROCEDURE — 88175 CYTOPATH C/V AUTO FLUID REDO: CPT

## 2018-12-04 PROCEDURE — G0008 ADMIN INFLUENZA VIRUS VAC: HCPCS | Mod: S$GLB,,, | Performed by: FAMILY MEDICINE

## 2018-12-04 PROCEDURE — 71046 X-RAY EXAM CHEST 2 VIEWS: CPT | Mod: 26,,, | Performed by: RADIOLOGY

## 2018-12-04 PROCEDURE — 72141 MRI NECK SPINE W/O DYE: CPT | Mod: TC,PO

## 2018-12-04 PROCEDURE — 90686 IIV4 VACC NO PRSV 0.5 ML IM: CPT | Mod: S$GLB,,, | Performed by: FAMILY MEDICINE

## 2018-12-04 PROCEDURE — 96372 THER/PROPH/DIAG INJ SC/IM: CPT | Mod: PBBFAC,PN

## 2018-12-04 PROCEDURE — G0101 CA SCREEN;PELVIC/BREAST EXAM: HCPCS | Mod: S$PBB,,, | Performed by: SPECIALIST

## 2018-12-04 PROCEDURE — 72141 MRI NECK SPINE W/O DYE: CPT | Mod: 26,,, | Performed by: RADIOLOGY

## 2018-12-04 PROCEDURE — 99215 OFFICE O/P EST HI 40 MIN: CPT | Mod: PBBFAC,25,PN | Performed by: SPECIALIST

## 2018-12-04 PROCEDURE — 93010 ELECTROCARDIOGRAM REPORT: CPT | Mod: S$PBB,,, | Performed by: INTERNAL MEDICINE

## 2018-12-04 PROCEDURE — 93005 ELECTROCARDIOGRAM TRACING: CPT | Mod: PBBFAC,PO | Performed by: INTERNAL MEDICINE

## 2018-12-04 PROCEDURE — 71046 X-RAY EXAM CHEST 2 VIEWS: CPT | Mod: TC,FY,PO

## 2018-12-04 PROCEDURE — 99396 PREV VISIT EST AGE 40-64: CPT | Mod: 25,S$GLB,, | Performed by: FAMILY MEDICINE

## 2018-12-04 PROCEDURE — 81025 URINE PREGNANCY TEST: CPT | Mod: PBBFAC,PN | Performed by: SPECIALIST

## 2018-12-04 PROCEDURE — 87624 HPV HI-RISK TYP POOLED RSLT: CPT

## 2018-12-04 PROCEDURE — 77063 BREAST TOMOSYNTHESIS BI: CPT | Mod: 26,,, | Performed by: RADIOLOGY

## 2018-12-04 PROCEDURE — 77063 BREAST TOMOSYNTHESIS BI: CPT | Mod: TC,PN

## 2018-12-04 PROCEDURE — 77067 SCR MAMMO BI INCL CAD: CPT | Mod: 26,,, | Performed by: RADIOLOGY

## 2018-12-04 PROCEDURE — 99999 PR PBB SHADOW E&M-EST. PATIENT-LVL V: CPT | Mod: PBBFAC,,, | Performed by: SPECIALIST

## 2018-12-04 PROCEDURE — 77067 SCR MAMMO BI INCL CAD: CPT | Mod: TC,PN

## 2018-12-04 RX ORDER — MEDROXYPROGESTERONE ACETATE 150 MG/ML
150 INJECTION, SUSPENSION INTRAMUSCULAR
Status: DISCONTINUED | OUTPATIENT
Start: 2018-12-04 | End: 2020-08-04

## 2018-12-04 RX ADMIN — MEDROXYPROGESTERONE ACETATE 150 MG: 150 INJECTION, SUSPENSION INTRAMUSCULAR at 03:12

## 2018-12-04 NOTE — Clinical Note
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

## 2018-12-04 NOTE — PROGRESS NOTES
Subjective:       Patient ID: Dinah Mitchell is a 43 y.o. female.    Chief Complaint: Establish Care (Flu shot today) and Pre-op Exam (Chiari Decompression Surgery:  18,  Dr Busch)    HPI   The patient is a 43-year-old who is here today to establish care with me and to have a preop evaluation.  She is scheduled for Arnold-Chiari decompression surgery on  with Dr. Busch.  She needs preop labs, chest x-ray and EKG.  She is anxious about having the surgery but is hopeful she will have symptom improvement following the surgery.    Today we discussed the followin)   PATRICIA.  She has been told that she has severe sleep apnea while ago.  She does not wear a CPAP.   2)    Hypothyroidism.  She is currently taking Synthroid 200 mcg once a day.  Her dose was recently adjusted after an abnormal TSH value.  She is due to have a repeat TSH after this recent dose change and would be willing to have this done today  3) borderline personality disorder, depression, panic attacks and PTSD.  She does see a psychiatrist regularly.  She currently takes Wellbutrin  mg once a day, Prozac 40 mg, Minipress 1 mg at night, and p.r.n. Xanax   3)  Fibromyalgia.  She is taking Neurontin 600 mg 3 times a day.  She is also taking Robaxin or Zanaflex as needed and she alternates between these 2 medicines    4) asthma.  She does use her albuterol infrequently.  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  She is following with the .  She is currently taking carnitine, subcu injections of NAC and p.r.n Marinol  6) IBS.  She does have chronic diarrhea from IBS.  She has met with the gastroenterology team previously and had unremarkable testing  7)  health maintenance issues.  She is scheduled to have her Pap smear and mammogram later today.  She does want to resume Depo-Provera for cycle regulation.  She does need a flu shot and then after that her immunizations are  up-to-date      Review of Systems   Constitutional: Negative for appetite change, chills, diaphoresis, fatigue, fever and unexpected weight change.   HENT: Negative for congestion, dental problem, ear pain, hearing loss, postnasal drip, rhinorrhea, sneezing, sore throat and trouble swallowing.    Eyes: Negative for photophobia, pain, discharge and visual disturbance.   Respiratory: Negative for cough, chest tightness, shortness of breath and wheezing.    Cardiovascular: Negative for chest pain, palpitations and leg swelling.   Gastrointestinal: Positive for diarrhea (from IBS). Negative for abdominal distention, abdominal pain, blood in stool, constipation, nausea and vomiting.   Endocrine: Negative for cold intolerance, heat intolerance, polydipsia and polyuria.   Genitourinary: Negative for dysuria, flank pain, frequency, genital sores, hematuria, menstrual problem and vaginal discharge.   Musculoskeletal: Negative for arthralgias, joint swelling and myalgias.   Skin: Negative for rash.   Neurological: Positive for weakness and headaches. Negative for dizziness, syncope and light-headedness.   Hematological: Negative for adenopathy. Does not bruise/bleed easily.   Psychiatric/Behavioral: Positive for dysphoric mood. Negative for self-injury, sleep disturbance and suicidal ideas. The patient is nervous/anxious.        Objective:      Physical Exam   Constitutional: She is oriented to person, place, and time. She appears well-developed and well-nourished. No distress.   HENT:   Head: Normocephalic and atraumatic.   Right Ear: Hearing, tympanic membrane, external ear and ear canal normal.   Left Ear: Hearing, tympanic membrane, external ear and ear canal normal.   Nose: Nose normal.   Mouth/Throat: Oropharynx is clear and moist and mucous membranes are normal. No oral lesions. No oropharyngeal exudate, posterior oropharyngeal edema or posterior oropharyngeal erythema.   Eyes: Conjunctivae, EOM and lids are normal.  "Pupils are equal, round, and reactive to light. No scleral icterus.   Neck: Normal range of motion. Neck supple. Carotid bruit is not present. No thyroid mass and no thyromegaly present.   Cardiovascular: Normal rate, regular rhythm and normal heart sounds.  No extrasystoles are present. PMI is not displaced. Exam reveals no gallop.   No murmur heard.  Pulmonary/Chest: Effort normal and breath sounds normal. No accessory muscle usage. No respiratory distress.   Clear to auscultation bilaterally.   Abdominal: Soft. Normal appearance and bowel sounds are normal. She exhibits no abdominal bruit. There is no hepatosplenomegaly. There is no tenderness. There is no rebound.   Lymphadenopathy:        Head (right side): No submental and no submandibular adenopathy present.        Head (left side): No submental and no submandibular adenopathy present.        Right cervical: No superficial cervical, no deep cervical and no posterior cervical adenopathy present.       Left cervical: No superficial cervical, no deep cervical and no posterior cervical adenopathy present.        Right: No supraclavicular adenopathy present.        Left: No supraclavicular adenopathy present.   Neurological: She is alert and oriented to person, place, and time. She has normal strength. No cranial nerve deficit or sensory deficit.   Skin: Skin is warm, dry and intact.   Psychiatric: Her speech is normal and behavior is normal. Thought content normal. Cognition and memory are normal. She exhibits a depressed mood.     Blood pressure (!) 140/80, pulse 60, temperature 98.9 °F (37.2 °C), temperature source Oral, resp. rate 18, height 5' 7" (1.702 m), weight 113 kg (249 lb 3.7 oz), last menstrual period 11/20/2018.Body mass index is 39.03 kg/m².            A/P:  1) Arnold Chiari.  She will follow up with the neurosurgeon as planned.  We will check preop labs, EKG and chest x-ray.  I anticipate that she will be a low risk surgical candidate aside from " her PATRICIA and possible mitochondrial disorder but will make the final determination once her preop testing is completed  2) PATRICIA.  Severe by history.  New to me.  Status unknown. When she recovers from her surgery, we will  refer her for a repeat sleep evaluation   3)  Hypothyroidism.  New to me.  Status unknown.  We will check a TSH  4) borderline personality disorder, depression, panic attacks and PTSD.  New to me.  Follow up with Psychiatry and continue with medication for their direction    5) fibromyalgia.  New to me.  Continue with Neurontin and as needed muscle relaxers alternating between Robaxin and Zanaflex  6) asthma.  New to me.  Mild and intermittent.    Continue with p.r.n. albuterol.  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  Follow up with  and continue with medication as planned  8) IBS.  New to me.  Chronic.  9)  obesity with a BMI of 39.  When she is able to, she will work on weight loss by improving her diet and exercising  10)    health maintenance issues.  She will keep her appointment for her Pap smear and her mammogram later today.  We will administer flu shot today.  When she is able to, she will work on weight loss

## 2018-12-04 NOTE — PROGRESS NOTES
"42 yo WF presents for annual gyn evaluation, in addition, complains amenorrhea. Pt denies vaginal d/c, dysuria, DUB, constipation/diarrhea, weight loss/gain. H/O BTL.    Past Medical History:   Diagnosis Date    Abnormal Pap smear of cervix     colpo/ LEEP and CKC    Arnold-Chiari deformity     Asthma     Borderline personality disorder     Carnitine deficiency     Depression     Fibromyalgia     Hypothyroidism     IBS (irritable bowel syndrome)     GI smith negative    PATRICIA (obstructive sleep apnea)     severe - doesn't use CPAP    Panic attack     PTSD (post-traumatic stress disorder)        Past Surgical History:   Procedure Laterality Date    ANKLE SURGERY Right     x2 (#1 for ligament injury and #2 for fx and ligament)    BREAST SURGERY  1997    B implants    CERVICAL BIOPSY  W/ LOOP ELECTRODE EXCISION      LAPAROSCOPIC GASTRIC BANDING      with subsequent removal due to abscess    LIPOSUCTION      x 2    PELVIC LAPAROSCOPY      for endometriosis eval with BTL    TUBAL LIGATION      wtih pelvic lap        Family History   Problem Relation Age of Onset    Diabetes Sister     Seizures Sister     Mitochondrial disorder Sister         "trent"    Mental illness Sister     Seizures Sister     Cervical cancer Sister     Ovarian cancer Cousin     Ovarian cancer Paternal Aunt     Colon cancer Maternal Grandmother     Cancer Maternal Grandfather 65        throat ca    Diabetes Mother     Stroke Mother 30    Mental illness Mother         depression    Hyperlipidemia Mother     Hypertension Mother     Hyperlipidemia Father     Heart disease Father         "athletes heart"    Ovarian cancer Paternal Aunt     Breast cancer Neg Hx        Social History     Socioeconomic History    Marital status:      Spouse name: None    Number of children: None    Years of education: None    Highest education level: None   Social Needs    Financial resource strain: None    Food insecurity - " worry: None    Food insecurity - inability: None    Transportation needs - medical: None    Transportation needs - non-medical: None   Occupational History    Occupation: [employer]     Tobacco Use    Smoking status: Never Smoker    Smokeless tobacco: Never Used   Substance and Sexual Activity    Alcohol use: Yes     Comment: occasional    Drug use: No    Sexual activity: Yes     Partners: Male     Birth control/protection: Injection     Comment: depkelly since age 18   Other Topics Concern    None   Social History Narrative    Not on disability, not currently working.       Current Outpatient Medications   Medication Sig Dispense Refill    ALPRAZolam (XANAX) 1 MG tablet TAKE 1 TABLET (1 MG TOTAL) BY MOUTH DAILY AS NEEDED FOR ANXIETY. 30 tablet 2    buPROPion (WELLBUTRIN XL) 150 MG TB24 tablet Take 1 tablet (150 mg total) by mouth once daily. 90 tablet 0    diphenoxylate-atropine 2.5-0.025 mg (LOMOTIL) 2.5-0.025 mg per tablet TAKE 1 TABLET BY MOUTH 4 (FOUR) TIMES DAILY AS NEEDED FOR DIARRHEA. 30 tablet 1    dronabinol (MARINOL) 2.5 MG capsule Take 1 capsule (2.5 mg total) by mouth 2 (two) times daily before meals. 60 capsule 3    FLUoxetine (PROZAC) 40 MG capsule Take 1 capsule (40 mg total) by mouth once daily. 90 capsule 0    gabapentin (NEURONTIN) 600 MG tablet Take 1 tablet (600 mg total) by mouth 3 (three) times daily. 90 tablet 11    levOCARNitine (CARNITOR) 330 mg Tab Take 3 tablets (990 mg total) by mouth 2 (two) times daily. 180 tablet 6    medroxyPROGESTERone (DEPO-PROVERA) 150 mg/mL injection Inject 150 mg into the muscle every 3 (three) months.      methocarbamol (ROBAXIN) 500 MG Tab TAKE 1 TABLET (500 MG TOTAL) BY MOUTH 2 (TWO) TIMES DAILY AS NEEDED. 30 tablet 3    montelukast (SINGULAIR) 10 mg tablet TAKE 1 TABLET (10 MG TOTAL) BY MOUTH ONCE DAILY. 90 tablet 1    prazosin (MINIPRESS) 1 MG Cap Take 1 capsule (1 mg total) by mouth every evening. For ptsd related nightmares 90  capsule 1    SYNTHROID 200 mcg tablet TAKE 1 TABLET (200 MCG TOTAL) BY MOUTH ONCE DAILY. 90 tablet 0    tiZANidine (ZANAFLEX) 4 MG tablet TAKE HALF OR FULL TABLET BY MOUTH AT NIGHT AS NEEDED FOR MUSCLE SPASM 30 tablet 11    VENTOLIN HFA 90 mcg/actuation inhaler TAKE 2 PUFFS BY MOUTH EVERY 6 HOURS AS NEEDED FOR WHEEZE 18 Inhaler 0    zolpidem (AMBIEN CR) 12.5 MG CR tablet TAKE 1 TABLET (12.5 MG TOTAL) BY MOUTH NIGHTLY AS NEEDED FOR INSOMNIA. 30 tablet 2    UNABLE TO FIND N-acetyl-L-cysteine 50 mg subcutaneously 3 times a week 10 mL 6     No current facility-administered medications for this visit.        Review of patient's allergies indicates:   Allergen Reactions    Lamotrigine      Other reaction(s): Rash  Other reaction(s): Nausea    Sulfa (sulfonamide antibiotics) Nausea Only     Other reaction(s): Unknown       Review of System:   General: no chills, fever, night sweats, weight gain or weight loss  Psychological: no depression or suicidal ideation  Breasts: no new or changing breast lumps, nipple discharge or masses.  Respiratory: no cough, shortness of breath, or wheezing  Cardiovascular: no chest pain or dyspnea on exertion  Gastrointestinal: no abdominal pain, change in bowel habits, or black or bloody stools  Genito-Urinary: no incontinence, urinary frequency/urgency or vulvar/vaginal symptoms, pelvic pain or abnormal vaginal bleeding. POSITIVE DYSMENORRHEA  Musculoskeletal: no gait disturbance or muscular weakness                                              General Appearance    A and O x 4, Cooperative, no distress   Breasts    Abdomen   Symmetrical, no masses, no discharge, skin changes , erythema or retraction. No adenopathy  Soft, non-tender, bowel sounds active all four quadrants,  no masses, no organomegaly    Genitourinary:   External rectal exam shows no thrombosed external hemorrhoids.   Pelvic exam was performed with patient supine.  No labial fusion.  There is no rash, lesion or injury  on the right labia.  There is no rash, lesion or injury on the left labia.  No bleeding and no signs of injury around the vaginal introitus, urethra is without lesions and well supported. The cervix is visualized with no discharge, lesions or friability.  No vaginal discharge found.  No significant Cystocele, Enterocele or rectocele, and uterus well supported.  Bimanual exam:  The urethra is normal to palpation and there are no palpable vaginal wall masses.  Uterus is not deviated, not enlarged, not fixed, normal shape and not tender.  Cervix exhibits no motion tenderness.   Right adnexum displays no mass and no tenderness.  Left adnexum displays no mass and no tenderness.   Extremities: Extremities normal, atraumatic, no cyanosis or edema                       PAP submitted    I discussed dysmenorrhea and discussed a possible trial of induced amenorrhea   Discussed risks and benefits of DPMA and pt desires same.  Also discussed importance of screening mammogram as well as monthly BSE  Will begin DMA  Mammo screeningtoday  Will follow

## 2018-12-04 NOTE — LETTER
December 4, 2018      Amaya Aiken MD  [address] [address] [address]           [facility] at [address], [address] [place] [id number]  Phone: [phone]  Fax: [phone]          Patient: Dinah Mitchell   MR Number: [id number]   YOB: 1975   Date of Visit: 12/4/2018       Dear Dr. Amaya Aiken:    Thank you for referring Dinah Mitchell to me for evaluation. Attached you will find relevant portions of my assessment and plan of care.    If you have questions, please do not hesitate to call me. I look forward to following Dinah Mitchell along with you.    Sincerely,    Nikhil Franco MD    Enclosure  CC:  No Recipients    If you would like to receive this communication electronically, please contact [email or web address] or [phone] to request more information on [facility] access.    For providers and/or their staff who would like to refer a patient to [facility], please contact us through our one-stop-shop provider referral line, [facility], at [phone].    If you feel you have received this communication in error or would no longer like to receive these types of communications, please e-mail [email or web address]

## 2018-12-05 ENCOUNTER — TELEPHONE (OUTPATIENT)
Dept: NEUROSURGERY | Facility: CLINIC | Age: 43
End: 2018-12-05

## 2018-12-05 ENCOUNTER — PATIENT MESSAGE (OUTPATIENT)
Dept: FAMILY MEDICINE | Facility: CLINIC | Age: 43
End: 2018-12-05

## 2018-12-05 RX ORDER — TRAMADOL HYDROCHLORIDE 50 MG/1
50 TABLET ORAL EVERY 6 HOURS
Status: ON HOLD | COMMUNITY
End: 2018-12-15 | Stop reason: HOSPADM

## 2018-12-05 NOTE — TELEPHONE ENCOUNTER
----- Message from Nba Kat sent at 12/5/2018  9:22 AM CST -----  Contact: Patient @ [phone]  Patient requesting a return call with questions she has relating to surgery, pls call to discuss

## 2018-12-07 ENCOUNTER — PATIENT MESSAGE (OUTPATIENT)
Dept: FAMILY MEDICINE | Facility: CLINIC | Age: 43
End: 2018-12-07

## 2018-12-07 ENCOUNTER — TELEPHONE (OUTPATIENT)
Dept: NEUROSURGERY | Facility: CLINIC | Age: 43
End: 2018-12-07

## 2018-12-07 ENCOUNTER — TELEPHONE (OUTPATIENT)
Dept: FAMILY MEDICINE | Facility: CLINIC | Age: 43
End: 2018-12-07

## 2018-12-07 ENCOUNTER — HOSPITAL ENCOUNTER (OUTPATIENT)
Dept: PREADMISSION TESTING | Facility: HOSPITAL | Age: 43
Discharge: HOME | End: 2018-12-07
Attending: ANESTHESIOLOGY
Payer: COMMERCIAL

## 2018-12-07 VITALS
RESPIRATION RATE: 16 BRPM | WEIGHT: 247 LBS | DIASTOLIC BLOOD PRESSURE: 89 MMHG | BODY MASS INDEX: 38.77 KG/M2 | HEIGHT: 67 IN | HEART RATE: 75 BPM | SYSTOLIC BLOOD PRESSURE: 130 MMHG | TEMPERATURE: 99 F | OXYGEN SATURATION: 97 %

## 2018-12-07 DIAGNOSIS — E03.4 HYPOTHYROIDISM DUE TO ACQUIRED ATROPHY OF THYROID: Primary | ICD-10-CM

## 2018-12-07 NOTE — TELEPHONE ENCOUNTER
----- Message from Jarocho Guevara RN sent at 12/7/2018  3:10 PM CST -----  Regarding: Home Health questions  Ms. Mitchell is scheduled with Dr. Busch on Thursday 12/13. She has questions about possible Home Health Care after surgery since her parents are elderly. She is requesting a phone call.    Thanks,  Jarocho Guevara RN  [facility]  Ext. [phone]

## 2018-12-07 NOTE — TELEPHONE ENCOUNTER
----- Message from Jarocho Guevara RN sent at 12/7/2018  3:05 PM CST -----  Regarding: Final PCP clearance  We are looking for a final PCP clearance before surgery scheduled with Dr. Busch  on 12/13/18 for Decompression of Chiari Malformation. She was seen by Dr. Aiken on 12/4, clearance was pending labs.    Thanks,  Jarocho Guevara RN  [facility]  Ext. [phone]

## 2018-12-07 NOTE — DISCHARGE INSTRUCTIONS
Your surgery has been scheduled for:__________________________________________    You should report to:  ____[facility], located on the [place] side of the first floor of the           [facility] ([phone])  ____[facility], located on the [facility] side of the            Second floor of the [facility] ([phone])  ____3rd Floor [facility] located on the [facility] side of the [facility] [phone]  Please Note   - Tell your doctor if you take Aspirin, products containing Aspirin, herbal medications  or blood thinners, such as Coumadin, Ticlid, or Plavix.  (Consult your provider regarding holding or stopping before surgery).  - Arrange for someone to drive you home following surgery.  You will not be allowed to leave the surgical facility alone or drive yourself home following sedation and anesthesia.  Before Surgery  - Stop taking all herbal medications 14days prior to surgery  - No Motrin/Advil (Ibuprofen) 7 days before surgery  - No Aleve (Naproxen) 7 days before surgery  - Stop Taking Asprin, products containing Asprin _____days before surgery  - Stop taking blood thinners_______days before surgery  - No Goody's/BC  Powder 7 days before surgery  - Refrain from drinking alcoholic beverages for 24hours before and after surgery  - Stop or limit smoking _________days before surgery  - You may take Tylenol for pain  Night before Surgery  Stop ALL solid food, gum, candy (including vitamins) 8 hours before arrival time.  (Please note: If your surgeon gives you different eating and drinking instructions, please follow surgeon's directions.)  Stop all CLOUDY liquids: coffee with creamer, formula, tube feeds, cloudy juices, non-human milk and breast milk with additives, 6 hours prior to arrival time.  Stop plain breast milk 4 hours prior to arrival time.  The patient should be ENCOURAGED to drink carbohydrate-rich  clear liquids (sports drinks, clear juices) until 2 hours prior to arrival time.  CLEAR liquids include only water, black coffee NO creamer, clear oral rehydration drinks, clear sports drinks or clear fruit juices (no orange juice, no pulpy juices, no apple cider). Advise patients if they can read newsprint through the liquid, it qualifies as clear liquid.   IF IN DOUBT, drink water instead.   - Take a shower or bath (shower is recommended).  Bathe with Hibiclens soap or an antibacterial soap from the neck down.  If not supplied by your surgeon, hibiclens soap will need to be purchased over the counter in pharmacy.  Rinse soap off thoroughly.  - Shampoo your hair with your regular shampoo  The Day of Surgery  · NOTHING TO  DRINK 2 hours before arrival time. If you are told to take medication on the morning of surgery, it may be taken with a sip of water.   - Take another bath or shower with hibiclens or any antibacterial soap, to reduce the chance of infection.  - Take heart and blood pressure medications with a small sip of water, as advised by the perioperative team.  - Do not take fluid pills  - You may brush your teeth and rinse your mouth, but do not swall any additional water.   - Do not apply perfumes, powder, body lotions or deodorant on the day of surgery.  - Nail polish should be removed.  - Do not wear makeup or moisturizer  - Wear comfortable clothes, such as a button front shirt and loose fitting pants.  - Leave all jewelry, including body piercings, and valuables at home.    - Bring any devices you will neeed after surgery such as crutches or canes.  - If you have sleep apnea, please bring your CPAP machine  In the event that your physical condition changes including the onset of a cold or respiratory illness, or if you have to delay or cancel your surgery, please notify your surgeon.  Anesthesia: General Anesthesia     You are watched continuously during your procedure by your anesthesia provider.      Youre due to have surgery. During surgery, youll be given medicine called anesthesia or anesthetic. This will keep you comfortable and pain-free. Your anesthesia provider will use general anesthesia.  What is general anesthesia?  General anesthesia puts you into a state like deep sleep. It goes into the bloodstream (IV anesthetics), into the lungs (gas anesthetics), or both. You feel nothing during the procedure. You will not remember it. During the procedure, the anesthesia provider monitors you continuously. He or she checks your heart rate and rhythm, blood pressure, breathing, and blood oxygen.  · IV anesthetics. IV anesthetics are given through an IV line in your arm. Theyre often given first. This is so you are asleep before a gas anesthetic is started. Some kinds of IV anesthetics relieve pain. Others relax you. Your doctor will decide which kind is best in your case.  · Gas anesthetics. Gas anesthetics are breathed into the lungs. They are often used to keep you asleep. They can be given through a facemask or a tube placed in your larynx or trachea (breathing tube).  ? If you have a facemask, your anesthesia provider will most likely place it over your nose and mouth while youre still awake. Youll breathe oxygen through the mask as your IV anesthetic is started. Gas anesthetic may be added through the mask.  ? If you have a tube in the larynx or trachea, it will be inserted into your throat after youre asleep.  Anesthesia tools and medicines  You will likely have:  · IV anesthetics. These are put into an IV line into your bloodstream.  · Gas anesthetics. You breathe these anesthetics into your lungs, where they pass into your bloodstream.  · Pulse oximeter. This is a small clip that is attached to the end of your finger. This measures your blood oxygen level.  · Electrocardiography leads (electrodes). These are small sticky pads that are placed on your chest. They record your heart rate and  rhythm.  · Blood pressure cuff. This reads your blood pressure.  Risks and possible complications  General anesthesia has some risks. These include:  · Breathing problems  · Nausea and vomiting  · Sore throat or hoarseness (usually temporary)  · Allergic reaction to the anesthetic  · Irregular heartbeat (rare)  · Cardiac arrest (rare)   Anesthesia safety  · Follow all instructions you are given for how long not to eat or drink before your procedure.  · Be sure your doctor knows what medicines and drugs you take. This includes over-the-counter medicines, herbs, supplements, alcohol or other drugs. You will be asked when those were last taken.  · Have an adult family member or friend drive you home after the procedure.  · For the first 24 hours after your surgery:  ? Do not drive or use heavy equipment.  ? Do not make important decisions or sign legal documents. If important decisions or signing legal documents is necessary during the first 24 hours after surgery, have a trusted family member or spouse act on your behalf.  ? Avoid alcohol.  ? Have a responsible adult stay with you. He or she can watch for problems and help keep you safe.  Date Last Reviewed: 12/1/2016  © 0027-4346 [employer]. [address] [place] [address], [place] [address]. All rights reserved. This information is not intended as a substitute for professional medical care. Always follow your healthcare professional's instructions

## 2018-12-09 ENCOUNTER — PATIENT MESSAGE (OUTPATIENT)
Dept: SURGERY | Facility: HOSPITAL | Age: 43
End: 2018-12-09

## 2018-12-10 ENCOUNTER — TELEPHONE (OUTPATIENT)
Dept: FAMILY MEDICINE | Facility: CLINIC | Age: 43
End: 2018-12-10

## 2018-12-10 NOTE — TELEPHONE ENCOUNTER
Pls notify surgeon:    Preop eval shows pt is a low risk surgical candidate  No absolute contraindications to proceeding with surgery

## 2018-12-11 ENCOUNTER — OFFICE VISIT (OUTPATIENT)
Dept: PSYCHIATRY | Facility: CLINIC | Age: 43
End: 2018-12-11
Payer: MEDICARE

## 2018-12-11 VITALS
DIASTOLIC BLOOD PRESSURE: 91 MMHG | SYSTOLIC BLOOD PRESSURE: 141 MMHG | BODY MASS INDEX: 38.4 KG/M2 | WEIGHT: 244.69 LBS | HEIGHT: 67 IN | HEART RATE: 76 BPM

## 2018-12-11 DIAGNOSIS — F60.3 BORDERLINE PERSONALITY DISORDER: ICD-10-CM

## 2018-12-11 DIAGNOSIS — G93.5 CHIARI MALFORMATION TYPE I: ICD-10-CM

## 2018-12-11 DIAGNOSIS — F43.10 PTSD (POST-TRAUMATIC STRESS DISORDER): ICD-10-CM

## 2018-12-11 DIAGNOSIS — F90.0 ATTENTION DEFICIT HYPERACTIVITY DISORDER (ADHD), PREDOMINANTLY INATTENTIVE TYPE: Primary | ICD-10-CM

## 2018-12-11 DIAGNOSIS — F33.2 MAJOR DEPRESSIVE DISORDER, RECURRENT, SEVERE WITHOUT PSYCHOTIC FEATURES: ICD-10-CM

## 2018-12-11 DIAGNOSIS — F41.0 PANIC DISORDER WITHOUT AGORAPHOBIA: ICD-10-CM

## 2018-12-11 LAB
HPV HR 12 DNA CVX QL NAA+PROBE: NEGATIVE
HPV16 AG SPEC QL: NEGATIVE
HPV18 DNA SPEC QL NAA+PROBE: NEGATIVE

## 2018-12-11 PROCEDURE — 90833 PSYTX W PT W E/M 30 MIN: CPT | Mod: ,,, | Performed by: PSYCHIATRY & NEUROLOGY

## 2018-12-11 PROCEDURE — 99213 OFFICE O/P EST LOW 20 MIN: CPT | Mod: PBBFAC,PO | Performed by: PSYCHIATRY & NEUROLOGY

## 2018-12-11 PROCEDURE — 99999 PR PBB SHADOW E&M-EST. PATIENT-LVL III: CPT | Mod: PBBFAC,,, | Performed by: PSYCHIATRY & NEUROLOGY

## 2018-12-11 PROCEDURE — 99214 OFFICE O/P EST MOD 30 MIN: CPT | Mod: S$PBB,,, | Performed by: PSYCHIATRY & NEUROLOGY

## 2018-12-11 RX ORDER — LEVOTHYROXINE SODIUM 200 UG/1
200 TABLET ORAL DAILY
Qty: 30 TABLET | Refills: 1 | Status: SHIPPED | OUTPATIENT
Start: 2018-12-11 | End: 2019-02-09 | Stop reason: SDUPTHER

## 2018-12-11 RX ORDER — ZOLPIDEM TARTRATE 12.5 MG/1
12.5 TABLET, FILM COATED, EXTENDED RELEASE ORAL NIGHTLY PRN
Qty: 30 TABLET | Refills: 2 | Status: SHIPPED | OUTPATIENT
Start: 2018-12-11 | End: 2019-06-11 | Stop reason: SDUPTHER

## 2018-12-11 RX ORDER — FLUOXETINE HYDROCHLORIDE 40 MG/1
40 CAPSULE ORAL DAILY
Qty: 90 CAPSULE | Refills: 0 | Status: SHIPPED | OUTPATIENT
Start: 2018-12-11 | End: 2019-06-11 | Stop reason: SDUPTHER

## 2018-12-11 RX ORDER — LEVOTHYROXINE SODIUM 25 UG/1
25 TABLET ORAL DAILY
Qty: 30 TABLET | Refills: 1 | Status: SHIPPED | OUTPATIENT
Start: 2018-12-11 | End: 2019-02-06 | Stop reason: SDUPTHER

## 2018-12-11 RX ORDER — BUPROPION HYDROCHLORIDE 150 MG/1
150 TABLET ORAL DAILY
Qty: 90 TABLET | Refills: 0 | Status: SHIPPED | OUTPATIENT
Start: 2018-12-11 | End: 2019-02-28 | Stop reason: SDUPTHER

## 2018-12-11 RX ORDER — PRAZOSIN HYDROCHLORIDE 1 MG/1
1 CAPSULE ORAL NIGHTLY
Qty: 90 CAPSULE | Refills: 1 | Status: SHIPPED | OUTPATIENT
Start: 2018-12-11 | End: 2019-02-28 | Stop reason: SDUPTHER

## 2018-12-12 ENCOUNTER — TELEPHONE (OUTPATIENT)
Dept: NEUROSURGERY | Facility: CLINIC | Age: 43
End: 2018-12-12

## 2018-12-12 NOTE — TELEPHONE ENCOUNTER
Spoke with patient informed her of her 10:30 am surgery time. Patient has been informed to fast after midnight the night before surgery. Patient was informed to take a complete bath or shower the night before and the morning of surgery. Patient was instructed to report to the 2nd floor day of surgery center.

## 2018-12-13 ENCOUNTER — HOSPITAL ENCOUNTER (INPATIENT)
Facility: HOSPITAL | Age: 43
LOS: 4 days | Discharge: HOME HEALTH | DRG: 025 | End: 2018-12-17
Attending: NEUROLOGICAL SURGERY | Admitting: NEUROLOGICAL SURGERY
Payer: COMMERCIAL

## 2018-12-13 ENCOUNTER — ANESTHESIA (OUTPATIENT)
Dept: SURGERY | Facility: HOSPITAL | Age: 43
DRG: 025 | End: 2018-12-13
Payer: COMMERCIAL

## 2018-12-13 DIAGNOSIS — Q07.00 ARNOLD-CHIARI MALFORMATION: Primary | ICD-10-CM

## 2018-12-13 LAB
ANION GAP SERPL CALC-SCNC: 8 MMOL/L
B-HCG UR QL: NEGATIVE
BASOPHILS # BLD AUTO: 0.02 K/UL
BASOPHILS NFR BLD: 0.3 %
BUN SERPL-MCNC: 14 MG/DL
CALCIUM SERPL-MCNC: 8.5 MG/DL
CHLORIDE SERPL-SCNC: 107 MMOL/L
CO2 SERPL-SCNC: 21 MMOL/L
CREAT SERPL-MCNC: 0.9 MG/DL
CTP QC/QA: YES
DIFFERENTIAL METHOD: ABNORMAL
EOSINOPHIL # BLD AUTO: 0 K/UL
EOSINOPHIL NFR BLD: 0.6 %
ERYTHROCYTE [DISTWIDTH] IN BLOOD BY AUTOMATED COUNT: 12.4 %
EST. GFR  (AFRICAN AMERICAN): >60 ML/MIN/1.73 M^2
EST. GFR  (NON AFRICAN AMERICAN): >60 ML/MIN/1.73 M^2
GLUCOSE SERPL-MCNC: 133 MG/DL
HCT VFR BLD AUTO: 37.1 %
HGB BLD-MCNC: 12.5 G/DL
IMM GRANULOCYTES # BLD AUTO: 0.03 K/UL
IMM GRANULOCYTES NFR BLD AUTO: 0.4 %
LYMPHOCYTES # BLD AUTO: 1.4 K/UL
LYMPHOCYTES NFR BLD: 19 %
MCH RBC QN AUTO: 32.6 PG
MCHC RBC AUTO-ENTMCNC: 33.7 G/DL
MCV RBC AUTO: 97 FL
MONOCYTES # BLD AUTO: 0.2 K/UL
MONOCYTES NFR BLD: 2.8 %
NEUTROPHILS # BLD AUTO: 5.6 K/UL
NEUTROPHILS NFR BLD: 76.9 %
NRBC BLD-RTO: 0 /100 WBC
PLATELET # BLD AUTO: 243 K/UL
PMV BLD AUTO: 10.6 FL
POTASSIUM SERPL-SCNC: 4.5 MMOL/L
RBC # BLD AUTO: 3.83 M/UL
SODIUM SERPL-SCNC: 136 MMOL/L
WBC # BLD AUTO: 7.21 K/UL

## 2018-12-13 PROCEDURE — 37000008 HC ANESTHESIA 1ST 15 MINUTES: Performed by: NEUROLOGICAL SURGERY

## 2018-12-13 PROCEDURE — 71000033 HC RECOVERY, INTIAL HOUR: Performed by: NEUROLOGICAL SURGERY

## 2018-12-13 PROCEDURE — 36000711: Performed by: NEUROLOGICAL SURGERY

## 2018-12-13 PROCEDURE — 00NW0ZZ RELEASE CERVICAL SPINAL CORD, OPEN APPROACH: ICD-10-PCS | Performed by: NEUROLOGICAL SURGERY

## 2018-12-13 PROCEDURE — 27201423 OPTIME MED/SURG SUP & DEVICES STERILE SUPPLY: Performed by: NEUROLOGICAL SURGERY

## 2018-12-13 PROCEDURE — 37000009 HC ANESTHESIA EA ADD 15 MINS: Performed by: NEUROLOGICAL SURGERY

## 2018-12-13 PROCEDURE — 25000003 PHARM REV CODE 250: Performed by: NEUROLOGICAL SURGERY

## 2018-12-13 PROCEDURE — D9220A PRA ANESTHESIA: Mod: CRNA,,, | Performed by: NURSE ANESTHETIST, CERTIFIED REGISTERED

## 2018-12-13 PROCEDURE — 63600175 PHARM REV CODE 636 W HCPCS: Performed by: NURSE ANESTHETIST, CERTIFIED REGISTERED

## 2018-12-13 PROCEDURE — 20000000 HC ICU ROOM

## 2018-12-13 PROCEDURE — 81025 URINE PREGNANCY TEST: CPT | Performed by: NEUROLOGICAL SURGERY

## 2018-12-13 PROCEDURE — 27201037 HC PRESSURE MONITORING SET UP

## 2018-12-13 PROCEDURE — 63600175 PHARM REV CODE 636 W HCPCS: Performed by: NEUROLOGICAL SURGERY

## 2018-12-13 PROCEDURE — 69990 MICROSURGERY ADD-ON: CPT | Mod: ,,, | Performed by: NEUROLOGICAL SURGERY

## 2018-12-13 PROCEDURE — 71000039 HC RECOVERY, EACH ADD'L HOUR: Performed by: NEUROLOGICAL SURGERY

## 2018-12-13 PROCEDURE — 36000710: Performed by: NEUROLOGICAL SURGERY

## 2018-12-13 PROCEDURE — 00U207Z SUPPLEMENT DURA MATER WITH AUTOLOGOUS TISSUE SUBSTITUTE, OPEN APPROACH: ICD-10-PCS | Performed by: NEUROLOGICAL SURGERY

## 2018-12-13 PROCEDURE — 80048 BASIC METABOLIC PNL TOTAL CA: CPT

## 2018-12-13 PROCEDURE — 63600175 PHARM REV CODE 636 W HCPCS: Performed by: ANESTHESIOLOGY

## 2018-12-13 PROCEDURE — 36415 COLL VENOUS BLD VENIPUNCTURE: CPT

## 2018-12-13 PROCEDURE — C1713 ANCHOR/SCREW BN/BN,TIS/BN: HCPCS | Performed by: NEUROLOGICAL SURGERY

## 2018-12-13 PROCEDURE — 25000003 PHARM REV CODE 250: Performed by: NURSE ANESTHETIST, CERTIFIED REGISTERED

## 2018-12-13 PROCEDURE — D9220A PRA ANESTHESIA: Mod: ANES,,, | Performed by: ANESTHESIOLOGY

## 2018-12-13 PROCEDURE — 8E09XBZ COMPUTER ASSISTED PROCEDURE OF HEAD AND NECK REGION: ICD-10-PCS | Performed by: NEUROLOGICAL SURGERY

## 2018-12-13 PROCEDURE — 85025 COMPLETE CBC W/AUTO DIFF WBC: CPT

## 2018-12-13 PROCEDURE — 61343 CRNEC SOPL CRV LAM DCMPRN: CPT | Mod: ,,, | Performed by: NEUROLOGICAL SURGERY

## 2018-12-13 PROCEDURE — 00NC0ZZ RELEASE CEREBELLUM, OPEN APPROACH: ICD-10-PCS | Performed by: NEUROLOGICAL SURGERY

## 2018-12-13 RX ORDER — ACETAMINOPHEN 325 MG/1
650 TABLET ORAL EVERY 6 HOURS PRN
Status: DISCONTINUED | OUTPATIENT
Start: 2018-12-13 | End: 2018-12-17

## 2018-12-13 RX ORDER — MUPIROCIN 20 MG/G
OINTMENT TOPICAL
Status: DISCONTINUED | OUTPATIENT
Start: 2018-12-13 | End: 2018-12-14

## 2018-12-13 RX ORDER — BACITRACIN 50000 [IU]/1
INJECTION, POWDER, FOR SOLUTION INTRAMUSCULAR
Status: DISCONTINUED | OUTPATIENT
Start: 2018-12-13 | End: 2018-12-13 | Stop reason: HOSPADM

## 2018-12-13 RX ORDER — LIDOCAINE HYDROCHLORIDE AND EPINEPHRINE 10; 10 MG/ML; UG/ML
INJECTION, SOLUTION INFILTRATION; PERINEURAL
Status: DISCONTINUED | OUTPATIENT
Start: 2018-12-13 | End: 2018-12-13 | Stop reason: HOSPADM

## 2018-12-13 RX ORDER — PROPOFOL 10 MG/ML
VIAL (ML) INTRAVENOUS CONTINUOUS PRN
Status: DISCONTINUED | OUTPATIENT
Start: 2018-12-13 | End: 2018-12-13

## 2018-12-13 RX ORDER — FENTANYL CITRATE 50 UG/ML
INJECTION, SOLUTION INTRAMUSCULAR; INTRAVENOUS
Status: DISCONTINUED | OUTPATIENT
Start: 2018-12-13 | End: 2018-12-13

## 2018-12-13 RX ORDER — DEXAMETHASONE SODIUM PHOSPHATE 4 MG/ML
INJECTION, SOLUTION INTRA-ARTICULAR; INTRALESIONAL; INTRAMUSCULAR; INTRAVENOUS; SOFT TISSUE
Status: DISCONTINUED | OUTPATIENT
Start: 2018-12-13 | End: 2018-12-13

## 2018-12-13 RX ORDER — PHENYLEPHRINE HYDROCHLORIDE 10 MG/ML
INJECTION INTRAVENOUS
Status: DISCONTINUED | OUTPATIENT
Start: 2018-12-13 | End: 2018-12-13

## 2018-12-13 RX ORDER — BUPROPION HYDROCHLORIDE 150 MG/1
150 TABLET ORAL DAILY
Status: DISCONTINUED | OUTPATIENT
Start: 2018-12-14 | End: 2018-12-17 | Stop reason: HOSPADM

## 2018-12-13 RX ORDER — HYDROMORPHONE HYDROCHLORIDE 1 MG/ML
0.2 INJECTION, SOLUTION INTRAMUSCULAR; INTRAVENOUS; SUBCUTANEOUS EVERY 5 MIN PRN
Status: DISCONTINUED | OUTPATIENT
Start: 2018-12-13 | End: 2018-12-15 | Stop reason: HOSPADM

## 2018-12-13 RX ORDER — FLUOXETINE HYDROCHLORIDE 20 MG/1
40 CAPSULE ORAL DAILY
Status: DISCONTINUED | OUTPATIENT
Start: 2018-12-14 | End: 2018-12-17 | Stop reason: HOSPADM

## 2018-12-13 RX ORDER — SODIUM CHLORIDE AND POTASSIUM CHLORIDE 150; 900 MG/100ML; MG/100ML
INJECTION, SOLUTION INTRAVENOUS CONTINUOUS
Status: DISCONTINUED | OUTPATIENT
Start: 2018-12-13 | End: 2018-12-14

## 2018-12-13 RX ORDER — ONDANSETRON 2 MG/ML
4 INJECTION INTRAMUSCULAR; INTRAVENOUS DAILY PRN
Status: DISCONTINUED | OUTPATIENT
Start: 2018-12-13 | End: 2018-12-15 | Stop reason: HOSPADM

## 2018-12-13 RX ORDER — HYDROMORPHONE HYDROCHLORIDE 1 MG/ML
0.5 INJECTION, SOLUTION INTRAMUSCULAR; INTRAVENOUS; SUBCUTANEOUS
Status: DISCONTINUED | OUTPATIENT
Start: 2018-12-13 | End: 2018-12-15

## 2018-12-13 RX ORDER — ROCURONIUM BROMIDE 10 MG/ML
INJECTION, SOLUTION INTRAVENOUS
Status: DISCONTINUED | OUTPATIENT
Start: 2018-12-13 | End: 2018-12-13

## 2018-12-13 RX ORDER — CEFAZOLIN SODIUM 1 G/3ML
2 INJECTION, POWDER, FOR SOLUTION INTRAMUSCULAR; INTRAVENOUS
Status: COMPLETED | OUTPATIENT
Start: 2018-12-14 | End: 2018-12-14

## 2018-12-13 RX ORDER — MUPIROCIN 20 MG/G
1 OINTMENT TOPICAL
Status: COMPLETED | OUTPATIENT
Start: 2018-12-13 | End: 2018-12-13

## 2018-12-13 RX ORDER — CEFAZOLIN SODIUM 1 G/3ML
2 INJECTION, POWDER, FOR SOLUTION INTRAMUSCULAR; INTRAVENOUS
Status: COMPLETED | OUTPATIENT
Start: 2018-12-13 | End: 2018-12-13

## 2018-12-13 RX ORDER — ONDANSETRON 8 MG/1
8 TABLET, ORALLY DISINTEGRATING ORAL EVERY 6 HOURS PRN
Status: DISCONTINUED | OUTPATIENT
Start: 2018-12-13 | End: 2018-12-17 | Stop reason: HOSPADM

## 2018-12-13 RX ORDER — LEVOTHYROXINE SODIUM 25 UG/1
25 TABLET ORAL DAILY
Status: DISCONTINUED | OUTPATIENT
Start: 2018-12-14 | End: 2018-12-17 | Stop reason: HOSPADM

## 2018-12-13 RX ORDER — DIAZEPAM 5 MG/1
5 TABLET ORAL EVERY 6 HOURS PRN
Status: DISCONTINUED | OUTPATIENT
Start: 2018-12-13 | End: 2018-12-15

## 2018-12-13 RX ORDER — FENTANYL CITRATE 50 UG/ML
50 INJECTION, SOLUTION INTRAMUSCULAR; INTRAVENOUS EVERY 5 MIN PRN
Status: DISCONTINUED | OUTPATIENT
Start: 2018-12-13 | End: 2018-12-15 | Stop reason: HOSPADM

## 2018-12-13 RX ORDER — ACETAMINOPHEN 10 MG/ML
INJECTION, SOLUTION INTRAVENOUS
Status: DISCONTINUED | OUTPATIENT
Start: 2018-12-13 | End: 2018-12-13

## 2018-12-13 RX ORDER — MUPIROCIN 20 MG/G
1 OINTMENT TOPICAL 2 TIMES DAILY
Status: DISCONTINUED | OUTPATIENT
Start: 2018-12-13 | End: 2018-12-17 | Stop reason: HOSPADM

## 2018-12-13 RX ORDER — BACITRACIN ZINC 500 UNIT/G
OINTMENT (GRAM) TOPICAL
Status: DISCONTINUED | OUTPATIENT
Start: 2018-12-13 | End: 2018-12-13 | Stop reason: HOSPADM

## 2018-12-13 RX ORDER — ACETAMINOPHEN 650 MG/1
650 SUPPOSITORY RECTAL EVERY 6 HOURS PRN
Status: DISCONTINUED | OUTPATIENT
Start: 2018-12-13 | End: 2018-12-17

## 2018-12-13 RX ORDER — ACETAMINOPHEN 325 MG/1
650 TABLET ORAL EVERY 4 HOURS PRN
Status: DISCONTINUED | OUTPATIENT
Start: 2018-12-13 | End: 2018-12-17 | Stop reason: HOSPADM

## 2018-12-13 RX ORDER — HEPARIN SODIUM 5000 [USP'U]/ML
5000 INJECTION, SOLUTION INTRAVENOUS; SUBCUTANEOUS EVERY 8 HOURS
Status: DISCONTINUED | OUTPATIENT
Start: 2018-12-14 | End: 2018-12-17 | Stop reason: HOSPADM

## 2018-12-13 RX ORDER — PROPOFOL 10 MG/ML
VIAL (ML) INTRAVENOUS
Status: DISCONTINUED | OUTPATIENT
Start: 2018-12-13 | End: 2018-12-13

## 2018-12-13 RX ORDER — SODIUM CHLORIDE 0.9 % (FLUSH) 0.9 %
3 SYRINGE (ML) INJECTION
Status: DISCONTINUED | OUTPATIENT
Start: 2018-12-13 | End: 2018-12-15 | Stop reason: HOSPADM

## 2018-12-13 RX ORDER — HYDROCODONE BITARTRATE AND ACETAMINOPHEN 5; 325 MG/1; MG/1
1 TABLET ORAL EVERY 4 HOURS PRN
Status: DISCONTINUED | OUTPATIENT
Start: 2018-12-13 | End: 2018-12-15

## 2018-12-13 RX ORDER — MIDAZOLAM HYDROCHLORIDE 1 MG/ML
INJECTION, SOLUTION INTRAMUSCULAR; INTRAVENOUS
Status: DISCONTINUED | OUTPATIENT
Start: 2018-12-13 | End: 2018-12-13

## 2018-12-13 RX ORDER — LIDOCAINE HCL/PF 100 MG/5ML
SYRINGE (ML) INTRAVENOUS
Status: DISCONTINUED | OUTPATIENT
Start: 2018-12-13 | End: 2018-12-13

## 2018-12-13 RX ORDER — PANTOPRAZOLE SODIUM 40 MG/10ML
40 INJECTION, POWDER, LYOPHILIZED, FOR SOLUTION INTRAVENOUS DAILY
Status: DISCONTINUED | OUTPATIENT
Start: 2018-12-14 | End: 2018-12-15

## 2018-12-13 RX ORDER — SODIUM CHLORIDE 9 MG/ML
INJECTION, SOLUTION INTRAVENOUS CONTINUOUS PRN
Status: DISCONTINUED | OUTPATIENT
Start: 2018-12-13 | End: 2018-12-13

## 2018-12-13 RX ORDER — SODIUM CHLORIDE 0.9 % (FLUSH) 0.9 %
3 SYRINGE (ML) INJECTION
Status: DISCONTINUED | OUTPATIENT
Start: 2018-12-13 | End: 2018-12-17 | Stop reason: HOSPADM

## 2018-12-13 RX ORDER — KETAMINE HCL IN 0.9 % NACL 50 MG/5 ML
SYRINGE (ML) INTRAVENOUS
Status: DISCONTINUED | OUTPATIENT
Start: 2018-12-13 | End: 2018-12-13

## 2018-12-13 RX ORDER — LEVOTHYROXINE SODIUM 100 UG/1
200 TABLET ORAL DAILY
Status: DISCONTINUED | OUTPATIENT
Start: 2018-12-14 | End: 2018-12-17 | Stop reason: HOSPADM

## 2018-12-13 RX ADMIN — SODIUM CHLORIDE, SODIUM GLUCONATE, SODIUM ACETATE, POTASSIUM CHLORIDE, MAGNESIUM CHLORIDE, SODIUM PHOSPHATE, DIBASIC, AND POTASSIUM PHOSPHATE: .53; .5; .37; .037; .03; .012; .00082 INJECTION, SOLUTION INTRAVENOUS at 04:12

## 2018-12-13 RX ADMIN — DEXAMETHASONE SODIUM PHOSPHATE 8 MG: 4 INJECTION, SOLUTION INTRAMUSCULAR; INTRAVENOUS at 03:12

## 2018-12-13 RX ADMIN — FENTANYL CITRATE 100 MCG: 50 INJECTION, SOLUTION INTRAMUSCULAR; INTRAVENOUS at 03:12

## 2018-12-13 RX ADMIN — SODIUM CHLORIDE, SODIUM GLUCONATE, SODIUM ACETATE, POTASSIUM CHLORIDE, MAGNESIUM CHLORIDE, SODIUM PHOSPHATE, DIBASIC, AND POTASSIUM PHOSPHATE: .53; .5; .37; .037; .03; .012; .00082 INJECTION, SOLUTION INTRAVENOUS at 03:12

## 2018-12-13 RX ADMIN — SODIUM CHLORIDE 0.25 MCG/KG/MIN: 9 INJECTION, SOLUTION INTRAVENOUS at 04:12

## 2018-12-13 RX ADMIN — CEFAZOLIN 2 G: 330 INJECTION, POWDER, FOR SOLUTION INTRAMUSCULAR; INTRAVENOUS at 03:12

## 2018-12-13 RX ADMIN — HYDROMORPHONE HYDROCHLORIDE 0.5 MG: 1 INJECTION, SOLUTION INTRAMUSCULAR; INTRAVENOUS; SUBCUTANEOUS at 09:12

## 2018-12-13 RX ADMIN — MIDAZOLAM HYDROCHLORIDE 2 MG: 1 INJECTION, SOLUTION INTRAMUSCULAR; INTRAVENOUS at 03:12

## 2018-12-13 RX ADMIN — PHENYLEPHRINE HYDROCHLORIDE 100 MCG: 10 INJECTION INTRAVENOUS at 04:12

## 2018-12-13 RX ADMIN — FENTANYL CITRATE 50 MCG: 50 INJECTION, SOLUTION INTRAMUSCULAR; INTRAVENOUS at 07:12

## 2018-12-13 RX ADMIN — PROPOFOL 50 MG: 10 INJECTION, EMULSION INTRAVENOUS at 04:12

## 2018-12-13 RX ADMIN — Medication 10 MG: at 05:12

## 2018-12-13 RX ADMIN — Medication 10 MG: at 06:12

## 2018-12-13 RX ADMIN — PROPOFOL 20 MG: 10 INJECTION, EMULSION INTRAVENOUS at 07:12

## 2018-12-13 RX ADMIN — MUPIROCIN 1 G: 20 OINTMENT TOPICAL at 01:12

## 2018-12-13 RX ADMIN — FENTANYL CITRATE 50 MCG: 50 INJECTION INTRAMUSCULAR; INTRAVENOUS at 08:12

## 2018-12-13 RX ADMIN — MUPIROCIN 1 G: 20 OINTMENT TOPICAL at 09:12

## 2018-12-13 RX ADMIN — REMIFENTANIL HYDROCHLORIDE 0.05 MCG/KG/MIN: 1 INJECTION, POWDER, LYOPHILIZED, FOR SOLUTION INTRAVENOUS at 03:12

## 2018-12-13 RX ADMIN — ACETAMINOPHEN 1000 MG: 10 INJECTION, SOLUTION INTRAVENOUS at 06:12

## 2018-12-13 RX ADMIN — HYDROCODONE BITARTRATE AND ACETAMINOPHEN 1 TABLET: 5; 325 TABLET ORAL at 07:12

## 2018-12-13 RX ADMIN — SODIUM CHLORIDE: 0.9 INJECTION, SOLUTION INTRAVENOUS at 03:12

## 2018-12-13 RX ADMIN — SODIUM CHLORIDE AND POTASSIUM CHLORIDE: .9; .15 SOLUTION INTRAVENOUS at 08:12

## 2018-12-13 RX ADMIN — PROPOFOL 200 MG: 10 INJECTION, EMULSION INTRAVENOUS at 03:12

## 2018-12-13 RX ADMIN — FENTANYL CITRATE 50 MCG: 50 INJECTION INTRAMUSCULAR; INTRAVENOUS at 07:12

## 2018-12-13 RX ADMIN — HYDROMORPHONE HYDROCHLORIDE 0.5 MG: 1 INJECTION, SOLUTION INTRAMUSCULAR; INTRAVENOUS; SUBCUTANEOUS at 10:12

## 2018-12-13 RX ADMIN — LIDOCAINE HYDROCHLORIDE 100 MG: 20 INJECTION, SOLUTION INTRAVENOUS at 03:12

## 2018-12-13 RX ADMIN — ROCURONIUM BROMIDE 5 MG: 10 INJECTION, SOLUTION INTRAVENOUS at 03:12

## 2018-12-13 RX ADMIN — PROPOFOL 150 MCG/KG/MIN: 10 INJECTION, EMULSION INTRAVENOUS at 03:12

## 2018-12-13 RX ADMIN — Medication 20 MG: at 04:12

## 2018-12-13 NOTE — H&P
HPI   Pt is a 43 y.o. female who presents for evaluation of chiari malformation. Pt states that she endures exertional related anterior and occipital HA with associated nausea, and falls; occipital HA more severe. Pt states that she has endured HA for the past 4 years. Pt currently on Neurontin.      Review of Systems   Constitutional: Negative for chills, fatigue and fever.   HENT: Negative for sinus pressure and trouble swallowing.    Eyes: Negative.  Negative for visual disturbance.   Respiratory: Negative.    Cardiovascular: Negative.    Gastrointestinal: Positive for nausea (Associated with HA). Negative for vomiting.   Endocrine: Negative.    Genitourinary: Negative.    Musculoskeletal: Negative.    Neurological: Positive for headaches. Negative for dizziness, seizures, syncope, speech difficulty, weakness and numbness.       Objective:   Physical Exam:  Nursing note and vitals reviewed.     Constitutional: She appears well-developed.      Eyes: Pupils are equal, round, and reactive to light. Conjunctivae and EOM are normal.      Cardiovascular: Normal rate, regular rhythm, normal pulses and intact distal pulses.      Abdominal: Soft.     Psych/Behavior: She is alert. She is oriented to person, place, and time. She has a normal mood and affect.     Musculoskeletal: Gait is normal.        Neck: Range of motion is full. There is no tenderness. Muscle strength is 5/5. Tone is normal.        Back: Range of motion is full. There is no tenderness. Muscle strength is 5/5. Tone is normal.        Right Upper Extremities: Range of motion is full. There is no tenderness. Muscle strength is 5/5. Tone is normal.        Left Upper Extremities: Range of motion is full. There is no tenderness. Muscle strength is 5/5. Tone is normal.       Right Lower Extremities: Range of motion is full. There is no tenderness. Muscle strength is 5/5. Tone is normal.        Left Lower Extremities: Range of motion is full. There is no  tenderness. Muscle strength is 5/5. Tone is normal.     Neurological:        Coordination: She has a normal Romberg Test, normal finger to nose coordination, normal heel to shin coordination and normal tandem walking coordination.        DTRs: DTRs are normal. Tricep reflexes are 2+ on the right side and 2+ on the left side. Bicep reflexes are 2+ on the right side and 2+ on the left side. Brachioradialis reflexes are 2+ on the right side and 2+ on the left side. Patellar reflexes are 2+ on the right side and 2+ on the left side. Achilles reflexes are 2+ on the right side and 2+ on the left side.        Cranial nerves: Cranial nerve(s) II, III, IV, V, VI, VII, VIII, IX, X, XI and XII are intact.       Pt who has bifrontal and also occipital parietal HA. Occipital HA are dominant type. She states that the HA are exacerbated by activity, coughing, sneezing and valsalva maneuver.      In clinic, HA are reproducible by valsalva maneuver and appear to be pretty debilitating.    No focal deficits.   Cranial nerves intact.      Imaging:   MRI C spine, dated 2/18, shows cerebellar tonsils do sit past ring of C1 with some flattening of the dorsal aspect of the cerebellum. No obvious syrinx. I do see a little CSF past the cerebellum.         Assessment/Plan:   Pt with radiographic and clinical evidence of chiari malformation. Becoming more and more symptomatic and is now impairing her daily living, making her HA fairly intractable. I think this warrants surgical intervention. Pt needs C1 laminectomy and duraplasty. Plan for MRI of cervical spine with CSF flow study and follow her objectively down the road.      I have discussed the risks/benefits, indications, and alternatives for the proposed procedure in detail. I have answered all of their questions and patient wishes to proceed with surgery. We will schedule patient.

## 2018-12-14 PROBLEM — E03.9 THYROID ACTIVITY DECREASED: Status: ACTIVE | Noted: 2018-12-14

## 2018-12-14 PROBLEM — E03.9 ACQUIRED HYPOTHYROIDISM: Status: ACTIVE | Noted: 2018-12-14

## 2018-12-14 LAB
ANION GAP SERPL CALC-SCNC: 8 MMOL/L
BASOPHILS # BLD AUTO: 0.03 K/UL
BASOPHILS NFR BLD: 0.2 %
BUN SERPL-MCNC: 12 MG/DL
CALCIUM SERPL-MCNC: 8.6 MG/DL
CHLORIDE SERPL-SCNC: 110 MMOL/L
CO2 SERPL-SCNC: 18 MMOL/L
CREAT SERPL-MCNC: 0.8 MG/DL
DIFFERENTIAL METHOD: ABNORMAL
EOSINOPHIL # BLD AUTO: 0 K/UL
EOSINOPHIL NFR BLD: 0 %
ERYTHROCYTE [DISTWIDTH] IN BLOOD BY AUTOMATED COUNT: 12.3 %
EST. GFR  (AFRICAN AMERICAN): >60 ML/MIN/1.73 M^2
EST. GFR  (NON AFRICAN AMERICAN): >60 ML/MIN/1.73 M^2
GLUCOSE SERPL-MCNC: 131 MG/DL
HCT VFR BLD AUTO: 38.4 %
HGB BLD-MCNC: 13.1 G/DL
IMM GRANULOCYTES # BLD AUTO: 0.09 K/UL
IMM GRANULOCYTES NFR BLD AUTO: 0.7 %
LYMPHOCYTES # BLD AUTO: 0.6 K/UL
LYMPHOCYTES NFR BLD: 4.6 %
MAGNESIUM SERPL-MCNC: 2.3 MG/DL
MCH RBC QN AUTO: 32.5 PG
MCHC RBC AUTO-ENTMCNC: 34.1 G/DL
MCV RBC AUTO: 95 FL
MONOCYTES # BLD AUTO: 0.3 K/UL
MONOCYTES NFR BLD: 2.4 %
NEUTROPHILS # BLD AUTO: 11.5 K/UL
NEUTROPHILS NFR BLD: 92.1 %
NRBC BLD-RTO: 0 /100 WBC
PHOSPHATE SERPL-MCNC: 2.7 MG/DL
PLATELET # BLD AUTO: 285 K/UL
PMV BLD AUTO: 10.9 FL
POTASSIUM SERPL-SCNC: 5 MMOL/L
RBC # BLD AUTO: 4.03 M/UL
SODIUM SERPL-SCNC: 136 MMOL/L
WBC # BLD AUTO: 12.5 K/UL

## 2018-12-14 PROCEDURE — 63600175 PHARM REV CODE 636 W HCPCS: Performed by: NEUROLOGICAL SURGERY

## 2018-12-14 PROCEDURE — 25000003 PHARM REV CODE 250: Performed by: NURSE PRACTITIONER

## 2018-12-14 PROCEDURE — 63600175 PHARM REV CODE 636 W HCPCS: Performed by: NURSE PRACTITIONER

## 2018-12-14 PROCEDURE — 84100 ASSAY OF PHOSPHORUS: CPT

## 2018-12-14 PROCEDURE — 99223 1ST HOSP IP/OBS HIGH 75: CPT | Mod: ,,, | Performed by: PSYCHIATRY & NEUROLOGY

## 2018-12-14 PROCEDURE — 83735 ASSAY OF MAGNESIUM: CPT

## 2018-12-14 PROCEDURE — C9113 INJ PANTOPRAZOLE SODIUM, VIA: HCPCS | Performed by: NEUROLOGICAL SURGERY

## 2018-12-14 PROCEDURE — 80048 BASIC METABOLIC PNL TOTAL CA: CPT

## 2018-12-14 PROCEDURE — 85025 COMPLETE CBC W/AUTO DIFF WBC: CPT

## 2018-12-14 PROCEDURE — 20000000 HC ICU ROOM

## 2018-12-14 PROCEDURE — 25000003 PHARM REV CODE 250: Performed by: NEUROLOGICAL SURGERY

## 2018-12-14 PROCEDURE — 63600175 PHARM REV CODE 636 W HCPCS: Performed by: ANESTHESIOLOGY

## 2018-12-14 PROCEDURE — 94761 N-INVAS EAR/PLS OXIMETRY MLT: CPT

## 2018-12-14 RX ORDER — HYDRALAZINE HYDROCHLORIDE 20 MG/ML
INJECTION INTRAMUSCULAR; INTRAVENOUS
Status: DISCONTINUED
Start: 2018-12-14 | End: 2018-12-14 | Stop reason: WASHOUT

## 2018-12-14 RX ORDER — METHOCARBAMOL 500 MG/1
500 TABLET, FILM COATED ORAL 2 TIMES DAILY PRN
Status: DISCONTINUED | OUTPATIENT
Start: 2018-12-14 | End: 2018-12-17 | Stop reason: HOSPADM

## 2018-12-14 RX ORDER — HYDRALAZINE HYDROCHLORIDE 20 MG/ML
10 INJECTION INTRAMUSCULAR; INTRAVENOUS EVERY 4 HOURS PRN
Status: DISCONTINUED | OUTPATIENT
Start: 2018-12-14 | End: 2018-12-17 | Stop reason: HOSPADM

## 2018-12-14 RX ORDER — HYDRALAZINE HYDROCHLORIDE 20 MG/ML
10 INJECTION INTRAMUSCULAR; INTRAVENOUS
Status: DISCONTINUED | OUTPATIENT
Start: 2018-12-14 | End: 2018-12-14

## 2018-12-14 RX ORDER — GABAPENTIN 300 MG/1
600 CAPSULE ORAL 3 TIMES DAILY
Status: DISCONTINUED | OUTPATIENT
Start: 2018-12-14 | End: 2018-12-17 | Stop reason: HOSPADM

## 2018-12-14 RX ORDER — TIZANIDINE 4 MG/1
4 TABLET ORAL NIGHTLY
Status: DISCONTINUED | OUTPATIENT
Start: 2018-12-14 | End: 2018-12-15

## 2018-12-14 RX ADMIN — HYDROMORPHONE HYDROCHLORIDE 0.5 MG: 1 INJECTION, SOLUTION INTRAMUSCULAR; INTRAVENOUS; SUBCUTANEOUS at 04:12

## 2018-12-14 RX ADMIN — HEPARIN SODIUM 5000 UNITS: 5000 INJECTION, SOLUTION INTRAVENOUS; SUBCUTANEOUS at 02:12

## 2018-12-14 RX ADMIN — HYDROMORPHONE HYDROCHLORIDE 0.5 MG: 1 INJECTION, SOLUTION INTRAMUSCULAR; INTRAVENOUS; SUBCUTANEOUS at 10:12

## 2018-12-14 RX ADMIN — HYDROMORPHONE HYDROCHLORIDE 0.5 MG: 1 INJECTION, SOLUTION INTRAMUSCULAR; INTRAVENOUS; SUBCUTANEOUS at 05:12

## 2018-12-14 RX ADMIN — HYDROCODONE BITARTRATE AND ACETAMINOPHEN 1 TABLET: 5; 325 TABLET ORAL at 07:12

## 2018-12-14 RX ADMIN — HYDROMORPHONE HYDROCHLORIDE 0.5 MG: 1 INJECTION, SOLUTION INTRAMUSCULAR; INTRAVENOUS; SUBCUTANEOUS at 12:12

## 2018-12-14 RX ADMIN — DIAZEPAM 5 MG: 5 TABLET ORAL at 03:12

## 2018-12-14 RX ADMIN — HEPARIN SODIUM 5000 UNITS: 5000 INJECTION, SOLUTION INTRAVENOUS; SUBCUTANEOUS at 05:12

## 2018-12-14 RX ADMIN — HEPARIN SODIUM 5000 UNITS: 5000 INJECTION, SOLUTION INTRAVENOUS; SUBCUTANEOUS at 10:12

## 2018-12-14 RX ADMIN — DIAZEPAM 5 MG: 5 TABLET ORAL at 09:12

## 2018-12-14 RX ADMIN — FLUOXETINE HYDROCHLORIDE 40 MG: 20 CAPSULE ORAL at 09:12

## 2018-12-14 RX ADMIN — LEVOTHYROXINE SODIUM 200 MCG: 100 TABLET ORAL at 09:12

## 2018-12-14 RX ADMIN — HYDROCODONE BITARTRATE AND ACETAMINOPHEN 1 TABLET: 5; 325 TABLET ORAL at 10:12

## 2018-12-14 RX ADMIN — CEFAZOLIN 2 G: 330 INJECTION, POWDER, FOR SOLUTION INTRAMUSCULAR; INTRAVENOUS at 07:12

## 2018-12-14 RX ADMIN — DIAZEPAM 5 MG: 5 TABLET ORAL at 02:12

## 2018-12-14 RX ADMIN — METHOCARBAMOL TABLETS 500 MG: 500 TABLET, COATED ORAL at 12:12

## 2018-12-14 RX ADMIN — HYDROCODONE BITARTRATE AND ACETAMINOPHEN 1 TABLET: 5; 325 TABLET ORAL at 02:12

## 2018-12-14 RX ADMIN — CEFAZOLIN 2 G: 330 INJECTION, POWDER, FOR SOLUTION INTRAMUSCULAR; INTRAVENOUS at 12:12

## 2018-12-14 RX ADMIN — GABAPENTIN 600 MG: 300 CAPSULE ORAL at 11:12

## 2018-12-14 RX ADMIN — HYDROMORPHONE HYDROCHLORIDE 0.5 MG: 1 INJECTION, SOLUTION INTRAMUSCULAR; INTRAVENOUS; SUBCUTANEOUS at 09:12

## 2018-12-14 RX ADMIN — HYDROCODONE BITARTRATE AND ACETAMINOPHEN 1 TABLET: 5; 325 TABLET ORAL at 12:12

## 2018-12-14 RX ADMIN — PANTOPRAZOLE SODIUM 40 MG: 40 INJECTION, POWDER, LYOPHILIZED, FOR SOLUTION INTRAVENOUS at 09:12

## 2018-12-14 RX ADMIN — BUPROPION HYDROCHLORIDE 150 MG: 150 TABLET, FILM COATED, EXTENDED RELEASE ORAL at 11:12

## 2018-12-14 RX ADMIN — GABAPENTIN 600 MG: 300 CAPSULE ORAL at 07:12

## 2018-12-14 RX ADMIN — LEVOTHYROXINE SODIUM 25 MCG: 25 TABLET ORAL at 09:12

## 2018-12-14 RX ADMIN — HYDROMORPHONE HYDROCHLORIDE 0.5 MG: 1 INJECTION, SOLUTION INTRAMUSCULAR; INTRAVENOUS; SUBCUTANEOUS at 07:12

## 2018-12-14 RX ADMIN — MUPIROCIN 1 G: 20 OINTMENT TOPICAL at 10:12

## 2018-12-14 RX ADMIN — ONDANSETRON 4 MG: 2 INJECTION INTRAMUSCULAR; INTRAVENOUS at 07:12

## 2018-12-14 RX ADMIN — HYDROMORPHONE HYDROCHLORIDE 0.5 MG: 1 INJECTION, SOLUTION INTRAMUSCULAR; INTRAVENOUS; SUBCUTANEOUS at 01:12

## 2018-12-14 RX ADMIN — HYDROMORPHONE HYDROCHLORIDE 0.5 MG: 1 INJECTION, SOLUTION INTRAMUSCULAR; INTRAVENOUS; SUBCUTANEOUS at 02:12

## 2018-12-14 RX ADMIN — DIAZEPAM 5 MG: 5 TABLET ORAL at 10:12

## 2018-12-14 RX ADMIN — SODIUM CHLORIDE AND POTASSIUM CHLORIDE: .9; .15 SOLUTION INTRAVENOUS at 07:12

## 2018-12-14 NOTE — SUBJECTIVE & OBJECTIVE
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     PATRICIA (obstructive sleep apnea)     severe - doesn't use CPAP    Panic attack     PTSD (post-traumatic stress disorder)      Past Surgical History:   Procedure Laterality Date    ANKLE SURGERY Right     x2 (#1 for ligament injury and #2 for fx and ligament)    AUGMENTATION OF BREAST      BREAST SURGERY  1997    B implants    CERVICAL BIOPSY  W/ LOOP ELECTRODE EXCISION      LAPAROSCOPIC GASTRIC BANDING      with subsequent removal due to abscess    LIPOSUCTION      x 2    PELVIC LAPAROSCOPY      for endometriosis eval with BTL    TUBAL LIGATION      wtih pelvic lap       No current facility-administered medications on file prior to encounter.      Current Outpatient Medications on File Prior to Encounter   Medication Sig Dispense Refill    ALPRAZolam (XANAX) 1 MG tablet TAKE 1 TABLET (1 MG TOTAL) BY MOUTH DAILY AS NEEDED FOR ANXIETY. (Patient taking differently: Take 1 mg by mouth every 6 (six) hours as needed for Anxiety. ) 30 tablet 2    dronabinol (MARINOL) 2.5 MG capsule Take 1 capsule (2.5 mg total) by mouth 2 (two) times daily before meals. 60 capsule 3    gabapentin (NEURONTIN) 600 MG tablet Take 1 tablet (600 mg total) by mouth 3 (three) times daily. 90 tablet 11    levOCARNitine (CARNITOR) 330 mg Tab Take 3 tablets (990 mg total) by mouth 2 (two) times daily. 180 tablet 6    medroxyPROGESTERone (DEPO-PROVERA) 150 mg/mL injection Inject 150 mg into the muscle every 3 (three) months.      methocarbamol (ROBAXIN) 500 MG Tab TAKE 1 TABLET (500 MG TOTAL) BY MOUTH 2 (TWO) TIMES DAILY AS NEEDED. 30 tablet 3    montelukast (SINGULAIR) 10 mg  "tablet TAKE 1 TABLET (10 MG TOTAL) BY MOUTH ONCE DAILY. 90 tablet 1    tiZANidine (ZANAFLEX) 4 MG tablet TAKE HALF OR FULL TABLET BY MOUTH AT NIGHT AS NEEDED FOR MUSCLE SPASM 30 tablet 11    UNABLE TO FIND N-acetyl-L-cysteine 50 mg subcutaneously 3 times a week 10 mL 6    VENTOLIN HFA 90 mcg/actuation inhaler TAKE 2 PUFFS BY MOUTH EVERY 6 HOURS AS NEEDED FOR WHEEZE 18 Inhaler 0    diphenoxylate-atropine 2.5-0.025 mg (LOMOTIL) 2.5-0.025 mg per tablet TAKE 1 TABLET BY MOUTH 4 (FOUR) TIMES DAILY AS NEEDED FOR DIARRHEA. 30 tablet 1    SYNTHROID 200 mcg tablet TAKE 1 TABLET (200 MCG TOTAL) BY MOUTH ONCE DAILY. 90 tablet 0    [DISCONTINUED] amphetamine-dextroamphetamine (AMPHETAMINE-DEXTROAMPHETAMINE) 20 mg Tab Take 2 Tabs PO qAM 60 tablet 0      Allergies: Lamotrigine and Sulfa (sulfonamide antibiotics)    Family History   Problem Relation Age of Onset    Diabetes Sister     Seizures Sister     Mitochondrial disorder Sister         "trent"    Mental illness Sister     Seizures Sister     Cervical cancer Sister     Cervical cancer Sister     Ovarian cancer Cousin     Ovarian cancer Paternal Aunt     Colon cancer Maternal Grandmother     Cancer Maternal Grandfather 65        throat ca    Diabetes Mother     Stroke Mother 30    Mental illness Mother         depression    Hyperlipidemia Mother     Hypertension Mother     Hyperlipidemia Father     Heart disease Father         "athletes heart"    Ovarian cancer Paternal Aunt     Cervical cancer Paternal Cousin     Cervical cancer Other     Breast cancer Neg Hx      Social History     Tobacco Use    Smoking status: Never Smoker    Smokeless tobacco: Never Used   Substance Use Topics    Alcohol use: Yes     Comment: occasional    Drug use: No     Review of Systems   Constitutional: Negative for fever.   HENT: Negative for ear pain.    Eyes: Negative for photophobia, pain and visual disturbance.   Respiratory: Negative for shortness of breath.  "   Cardiovascular: Negative for chest pain.   Gastrointestinal: Negative for abdominal distention.   Musculoskeletal: Negative for neck pain and neck stiffness.   Skin: Negative for color change.   Neurological: Positive for headaches. Negative for dizziness, seizures, speech difficulty, light-headedness and numbness.   Psychiatric/Behavioral: Negative for agitation, behavioral problems and confusion.     Objective:     Vitals:    Temp: 98.2 °F (36.8 °C)  Pulse: 72  Rhythm: sinus bradycardia, normal sinus rhythm  BP: 132/67  MAP (mmHg): 94  Resp: 11  SpO2: 96 %  O2 Device (Oxygen Therapy): room air    Temp  Min: 97.5 °F (36.4 °C)  Max: 98.4 °F (36.9 °C)  Pulse  Min: 51  Max: 97  BP  Min: 126/68  Max: 168/99  MAP (mmHg)  Min: 89  Max: 126  Resp  Min: 8  Max: 31  SpO2  Min: 93 %  Max: 100 %    12/13 0701 - 12/14 0700  In: 2450 [I.V.:2450]  Out: 2185 [Urine:2185]           Physical Exam   Constitutional: She appears well-developed.   HENT:   Head: Normocephalic and atraumatic.   Eyes: Pupils are equal, round, and reactive to light.   Neck: Normal range of motion.   Cardiovascular: Normal rate and regular rhythm.   Pulmonary/Chest: Effort normal and breath sounds normal.   Abdominal: Soft. Bowel sounds are normal.   Neurological:   E4V5M6 GCS (15)   AAOx4   Cranial II - XII grossly intact   Strength   RUE +5/5  LUE +5/5  RLE +5/5   LLE  +5/5  Sensory, Motor, and Coordination intact      Skin: Skin is warm and dry.       Today I personally reviewed pertinent medications, lines/drains/airways, imaging, laboratory results, notably:      Physical Exam:    Constitutional: She appears well-developed.     Eyes: Pupils are equal, round, and reactive to light.     Cardiovascular: Normal rate and regular rhythm.     Abdominal: Soft. Bowel sounds are normal.     Neurological:   E4V5M6 GCS (15)   AAOx4   Cranial II - XII grossly intact   Strength   RUE +5/5  LUE +5/5  RLE +5/5   LLE  +5/5  Sensory, Motor, and Coordination intact

## 2018-12-14 NOTE — PROGRESS NOTES
Notified Alida Ott. NP, that pt has not voided in last 6-7 hours, bladder scanned pt which showed >1000ml. Pt had to be bladder scanned and straight cathed twice over night. NP aware, states to straight cath again.

## 2018-12-14 NOTE — HPI
Pt is a 44 y/o female with a PMHx of PTSD, Mood Disorder, ADHD, and Hypothyroidism s/p decompression of chiari malformation by removal of the 1st posterior cervical arch. Pt was seen in the PACU for admission to [facility]. Pt prior to procedure report complaints of falls and HA. Pt admitted to [facility] for monitoring.

## 2018-12-14 NOTE — ANESTHESIA POSTPROCEDURE EVALUATION
"Anesthesia Post Evaluation    Patient: Dinah Mitchell    Procedure(s) Performed: Procedure(s) (LRB):  DECOMPRESSION, CHIARI MALFORMATION, BY 1ST CERVICAL VERTEBRA POSTERIOR ARCH REMOVAL (N/A)    Final Anesthesia Type: general  Patient location during evaluation: PACU  Patient participation: Yes- Able to Participate  Level of consciousness: awake  Post-procedure vital signs: reviewed and stable  Pain management: adequate  Airway patency: patent  PONV status at discharge: No PONV  Anesthetic complications: no      Cardiovascular status: hemodynamically stable  Respiratory status: spontaneous ventilation and face mask  Hydration status: euvolemic  Follow-up not needed.        Visit Vitals  /86 (BP Location: Right arm, Patient Position: Lying)   Pulse 81   Temp 36.5 °C (97.7 °F) (Temporal)   Resp 14   Ht 5' 7" (1.702 m)   Wt 112 kg (247 lb)   LMP 11/12/2018   SpO2 100%   Breastfeeding? No   BMI 38.69 kg/m²       Pain/Jatin Score: Pain Rating Prior to Med Admin: 10 (12/13/2018  9:01 PM)        "

## 2018-12-14 NOTE — HOSPITAL COURSE
12/12: Surgical Decompression Chiari Malformation   12/13: Admit to [facility] s/p surgery    12/14: stepdown to NSGY team

## 2018-12-14 NOTE — PROGRESS NOTES
Pt with sensation to void but unable to. Bladder scan 975ml. Straight cath 1160ml. Notified AV Gonzalez. Orders to bladder scan q4hr.

## 2018-12-14 NOTE — ASSESSMENT & PLAN NOTE
Levothyroxine 225 mcg   - pt reports 250 mcg   - home meds 225 mg - needs clarification  TSH panel

## 2018-12-14 NOTE — PROGRESS NOTES
Pt transported to CT scan via bed at 0450. Connected to cardiac monitor. Neuro intact. VS stable. Returned to PACU at 0505. No change in status. No signs of distress noted.

## 2018-12-14 NOTE — ASSESSMENT & PLAN NOTE
Neurosurgery Following   S/p Decompression Chiari Malformation (12/12 )   Diazepam ~ muscle spasms PRN   Q1H neuro checks   SBP goal < 160  PT/OT/SLP

## 2018-12-14 NOTE — H&P
[facility]  Neurocritical Care  History & Physical    Admit Date: 12/13/2018  Service Date: 12/14/2018  Length of Stay: 1    Subjective:     Chief Complaint: <principal problem not specified>    History of Present Illness: Pt is a 44 y/o female with a PMHx of PTSD, Mood Disorder, ADHD, and Hypothyroidism s/p decompression of chiari malformation by removal of the 1st posterior cervical arch. Pt was seen in the PACU for admission to [facility]. Pt prior to procedure report complaints of falls and HA. Pt admitted to [facility] for monitoring.     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     PATRICIA (obstructive sleep apnea)     severe - doesn't use CPAP    Panic attack     PTSD (post-traumatic stress disorder)      Past Surgical History:   Procedure Laterality Date    ANKLE SURGERY Right     x2 (#1 for ligament injury and #2 for fx and ligament)    AUGMENTATION OF BREAST      BREAST SURGERY  1997    B implants    CERVICAL BIOPSY  W/ LOOP ELECTRODE EXCISION      LAPAROSCOPIC GASTRIC BANDING      with subsequent removal due to abscess    LIPOSUCTION      x 2    PELVIC LAPAROSCOPY      for endometriosis eval with BTL    TUBAL LIGATION      wtih pelvic lap       No current facility-administered medications on file prior to encounter.      Current Outpatient Medications on File Prior to Encounter   Medication Sig Dispense Refill    ALPRAZolam (XANAX) 1 MG tablet TAKE 1 TABLET (1 MG TOTAL) BY MOUTH DAILY AS NEEDED FOR ANXIETY. (Patient taking differently: Take 1 mg by mouth every 6 (six) hours as needed for Anxiety. ) 30 tablet 2    dronabinol (MARINOL) 2.5 MG capsule Take 1 capsule (2.5 mg  "total) by mouth 2 (two) times daily before meals. 60 capsule 3    gabapentin (NEURONTIN) 600 MG tablet Take 1 tablet (600 mg total) by mouth 3 (three) times daily. 90 tablet 11    levOCARNitine (CARNITOR) 330 mg Tab Take 3 tablets (990 mg total) by mouth 2 (two) times daily. 180 tablet 6    medroxyPROGESTERone (DEPO-PROVERA) 150 mg/mL injection Inject 150 mg into the muscle every 3 (three) months.      methocarbamol (ROBAXIN) 500 MG Tab TAKE 1 TABLET (500 MG TOTAL) BY MOUTH 2 (TWO) TIMES DAILY AS NEEDED. 30 tablet 3    montelukast (SINGULAIR) 10 mg tablet TAKE 1 TABLET (10 MG TOTAL) BY MOUTH ONCE DAILY. 90 tablet 1    tiZANidine (ZANAFLEX) 4 MG tablet TAKE HALF OR FULL TABLET BY MOUTH AT NIGHT AS NEEDED FOR MUSCLE SPASM 30 tablet 11    UNABLE TO FIND N-acetyl-L-cysteine 50 mg subcutaneously 3 times a week 10 mL 6    VENTOLIN HFA 90 mcg/actuation inhaler TAKE 2 PUFFS BY MOUTH EVERY 6 HOURS AS NEEDED FOR WHEEZE 18 Inhaler 0    diphenoxylate-atropine 2.5-0.025 mg (LOMOTIL) 2.5-0.025 mg per tablet TAKE 1 TABLET BY MOUTH 4 (FOUR) TIMES DAILY AS NEEDED FOR DIARRHEA. 30 tablet 1    SYNTHROID 200 mcg tablet TAKE 1 TABLET (200 MCG TOTAL) BY MOUTH ONCE DAILY. 90 tablet 0    [DISCONTINUED] amphetamine-dextroamphetamine (AMPHETAMINE-DEXTROAMPHETAMINE) 20 mg Tab Take 2 Tabs PO qAM 60 tablet 0      Allergies: Lamotrigine and Sulfa (sulfonamide antibiotics)    Family History   Problem Relation Age of Onset    Diabetes Sister     Seizures Sister     Mitochondrial disorder Sister         "trent"    Mental illness Sister     Seizures Sister     Cervical cancer Sister     Cervical cancer Sister     Ovarian cancer Cousin     Ovarian cancer Paternal Aunt     Colon cancer Maternal Grandmother     Cancer Maternal Grandfather 65        throat ca    Diabetes Mother     Stroke Mother 30    Mental illness Mother         depression    Hyperlipidemia Mother     Hypertension Mother     Hyperlipidemia Father     Heart " "disease Father         "athletes heart"    Ovarian cancer Paternal Aunt     Cervical cancer Paternal Cousin     Cervical cancer Other     Breast cancer Neg Hx      Social History     Tobacco Use    Smoking status: Never Smoker    Smokeless tobacco: Never Used   Substance Use Topics    Alcohol use: Yes     Comment: occasional    Drug use: No     Review of Systems   Constitutional: Negative for fever.   HENT: Negative for ear pain.    Eyes: Negative for photophobia, pain and visual disturbance.   Respiratory: Negative for shortness of breath.    Cardiovascular: Negative for chest pain.   Gastrointestinal: Negative for abdominal distention.   Musculoskeletal: Negative for neck pain and neck stiffness.   Skin: Negative for color change.   Neurological: Positive for headaches. Negative for dizziness, seizures, speech difficulty, light-headedness and numbness.   Psychiatric/Behavioral: Negative for agitation, behavioral problems and confusion.     Objective:     Vitals:    Temp: 97.7 °F (36.5 °C)  Pulse: 64  Rhythm: normal sinus rhythm  BP: 132/70  MAP (mmHg): 93  Resp: 13  SpO2: 96 %  O2 Device (Oxygen Therapy): Simple Face Mask    Temp  Min: 97.7 °F (36.5 °C)  Max: 98.1 °F (36.7 °C)  Pulse  Min: 62  Max: 87  BP  Min: 126/68  Max: 168/99  MAP (mmHg)  Min: 90  Max: 126  Resp  Min: 8  Max: 22  SpO2  Min: 93 %  Max: 100 %    No intake/output data recorded.           Physical Exam   Constitutional: She appears well-developed.   HENT:   Head: Normocephalic and atraumatic.   Eyes: Pupils are equal, round, and reactive to light.   Neck: Normal range of motion.   Cardiovascular: Normal rate and regular rhythm.   Pulmonary/Chest: Effort normal and breath sounds normal.   Abdominal: Soft. Bowel sounds are normal.   Neurological:   E4V5M6 GCS (15)   AAOx4   Cranial II - XII grossly intact   Strength   RUE +5/5  LUE +5/5  RLE +5/5   LLE  +5/5  Sensory, Motor, and Coordination intact      Skin: Skin is warm and dry. "       Today I personally reviewed pertinent medications, lines/drains/airways, imaging, laboratory results, notably:        Assessment/Plan:     Neuro   Arnold-Chiari malformation    Neurosurgery Following   S/p Decompression Chiari Malformation (12/12 )   Diazepam ~ muscle spasms PRN   Q1H neuro checks   SBP goal < 160  PT/OT/SLP      Psychiatric   Major depressive disorder, recurrent, severe without psychotic features    Home meds restart   - Bupropion 150   - Fluoxetine 40 mg        Endocrine   Thyroid activity decreased    Levothyroxine 225 mcg   - pt reports 250 mcg   - home meds 225 mg - needs clarification  TSH panel           The patient is being Prophylaxed for:  Venous Thromboembolism with: Mechanical  Stress Ulcer with: None  Ventilator Pneumonia with: not applicable    Activity Orders          None        Level 3   I spent >70 minutes reviewing patient records, examining, and counseling the patient with greater than 50% of the time spent with direct patient care and coordination.       Michael Yoon PA-C  Neurocritical Care  [facility]

## 2018-12-14 NOTE — SUBJECTIVE & OBJECTIVE
Review of Systems  Constitutional: Denies fevers, weight loss, chills, or weakness.  Eyes: Denies changes in vision.  ENT: Denies dysphagia, nasal discharge, ear pain or discharge.  Cardiovascular: Denies chest pain, palpitations, orthopnea, or claudication.  Respiratory: Denies shortness of breath, cough, hemoptysis, or wheezing.  GI: Denies nausea/vomitting, hematochezia, melena, abd pain, or changes in appetite.  : Denies dysuria, incontinence, or hematuria.  Musculoskeletal: Denies joint pain or myalgias. Complains of neck pain  Skin/breast: Denies rashes, lumps, lesions, or discharge.  Neurologic: Denies headache, dizziness, vertigo, or paresthesias.  Psychiatric: Denies changes in mood or hallucinations.  Endocrine: Denies polyuria, polydipsia, heat/cold intolerance.  Hematologic/Lymph: Denies lymphadenopathy, easy bruising or easy bleeding.  Allergic/Immunologic: Denies rash, rhinitis.   Objective:     Vitals:  Temp: 98.2 °F (36.8 °C)  Pulse: 72  Rhythm: sinus bradycardia, normal sinus rhythm  BP: 132/67  MAP (mmHg): 94  Resp: 12  SpO2: 99 %  O2 Device (Oxygen Therapy): room air    Temp  Min: 97.5 °F (36.4 °C)  Max: 98.4 °F (36.9 °C)  Pulse  Min: 51  Max: 97  BP  Min: 126/68  Max: 168/99  MAP (mmHg)  Min: 89  Max: 126  Resp  Min: 8  Max: 31  SpO2  Min: 93 %  Max: 100 %    12/13 0701 - 12/14 0700  In: 2450 [I.V.:2450]  Out: 2185 [Urine:2185]           Physical Exam  GA: Alert, comfortable, anxious  HEENT: No scleral icterus or JVD.   Pulmonary: Clear to auscultation A/L.  Cardiac: RRR S1 & S2 w/o rubs/murmurs/gallops.   Abdominal: Bowel sounds present x 4. No appreciable hepatosplenomegaly.  Skin: No jaundice, rashes, or visible lesions.  Neuro:  --GCS: E4 V5 M6  --Mental Status:  AAOX4, follows commands,   --CN II-XII grossly intact.   --Pupils 3->2mm, PERRL.   --Corneal reflex, gag, cough intact.  --TALAVERA spontaneously    Medications:  Continuous Scheduled  buPROPion 150 mg Daily   FLUoxetine 40 mg Daily    gabapentin 600 mg TID   heparin (porcine) 5,000 Units Q8H   levothyroxine 200 mcg Daily   levothyroxine 25 mcg Daily   mupirocin 1 g BID   pantoprazole 40 mg Daily   sodium chloride 0.9% 250 mL Once   tiZANidine 4 mg QHS   PRN  acetaminophen 650 mg Q6H PRN   acetaminophen 650 mg Q6H PRN   acetaminophen 650 mg Q4H PRN   diazePAM 5 mg Q6H PRN   fentaNYL 50 mcg Q5 Min PRN   hydrALAZINE 10 mg Q4H PRN   HYDROcodone-acetaminophen 1 tablet Q4H PRN   HYDROmorphone 0.2 mg Q5 Min PRN   HYDROmorphone 0.5 mg Q1H PRN   methocarbamol 500 mg BID PRN   mupirocin  On Call Procedure   ondansetron 8 mg Q6H PRN   ondansetron 4 mg Daily PRN   promethazine (PHENERGAN) IVPB 6.25 mg Q10 Min PRN   sodium chloride 0.9% 3 mL PRN   sodium chloride 0.9% 3 mL PRN     Today I personally reviewed pertinent medications, lines/drains/airways, imaging, laboratory results,    Diet  Diet Adult Regular (IDDSI Level 7)

## 2018-12-14 NOTE — TRANSFER OF CARE
"Anesthesia Transfer of Care Note    Patient: Dinah Mitchell    Procedure(s) Performed: Procedure(s) (LRB):  DECOMPRESSION, CHIARI MALFORMATION, BY 1ST CERVICAL VERTEBRA POSTERIOR ARCH REMOVAL (N/A)    Patient location: PACU    Anesthesia Type: general    Transport from OR: Transported from OR on 100% O2 by closed face mask with adequate spontaneous ventilation    Post pain: adequate analgesia    Post assessment: no apparent anesthetic complications    Post vital signs: stable    Level of consciousness: awake    Nausea/Vomiting: no nausea/vomiting    Complications: none    Transfer of care protocol was followed      Last vitals:   Visit Vitals  /86 (BP Location: Right arm, Patient Position: Lying)   Pulse 81   Temp 36.5 °C (97.7 °F) (Temporal)   Resp 14   Ht 5' 7" (1.702 m)   Wt 112 kg (247 lb)   LMP 11/12/2018   SpO2 100%   Breastfeeding? No   BMI 38.69 kg/m²     "

## 2018-12-14 NOTE — ASSESSMENT & PLAN NOTE
44 yo female with chiari I malformation now s/p decompression (12/13).    No acute events overnight. Surgical incision id dry, intact, and non-inflammed.    --Continue care per primary team.  --Pt cleared from neurosurgical perspective for stepdown under neurosurgery service.   --We will continue to monitor closely, please contact us with any questions or concerns.

## 2018-12-14 NOTE — PROGRESS NOTES
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     PATRICIA (obstructive sleep apnea)     severe - doesn't use CPAP    Panic attack     PTSD (post-traumatic stress disorder)      Past Surgical History:   Procedure Laterality Date    ANKLE SURGERY Right     x2 (#1 for ligament injury and #2 for fx and ligament)    AUGMENTATION OF BREAST      BREAST SURGERY  1997    B implants    CERVICAL BIOPSY  W/ LOOP ELECTRODE EXCISION      LAPAROSCOPIC GASTRIC BANDING      with subsequent removal due to abscess    LIPOSUCTION      x 2    PELVIC LAPAROSCOPY      for endometriosis eval with BTL    TUBAL LIGATION      wtih pelvic lap       No current facility-administered medications on file prior to encounter.      Current Outpatient Medications on File Prior to Encounter   Medication Sig Dispense Refill    ALPRAZolam (XANAX) 1 MG tablet TAKE 1 TABLET (1 MG TOTAL) BY MOUTH DAILY AS NEEDED FOR ANXIETY. (Patient taking differently: Take 1 mg by mouth every 6 (six) hours as needed for Anxiety. ) 30 tablet 2    dronabinol (MARINOL) 2.5 MG capsule Take 1 capsule (2.5 mg total) by mouth 2 (two) times daily before meals. 60 capsule 3    gabapentin (NEURONTIN) 600 MG tablet Take 1 tablet (600 mg total) by mouth 3 (three) times daily. 90 tablet 11    levOCARNitine (CARNITOR) 330 mg Tab Take 3 tablets (990 mg total) by mouth 2 (two) times daily. 180 tablet  "6    medroxyPROGESTERone (DEPO-PROVERA) 150 mg/mL injection Inject 150 mg into the muscle every 3 (three) months.      methocarbamol (ROBAXIN) 500 MG Tab TAKE 1 TABLET (500 MG TOTAL) BY MOUTH 2 (TWO) TIMES DAILY AS NEEDED. 30 tablet 3    montelukast (SINGULAIR) 10 mg tablet TAKE 1 TABLET (10 MG TOTAL) BY MOUTH ONCE DAILY. 90 tablet 1    tiZANidine (ZANAFLEX) 4 MG tablet TAKE HALF OR FULL TABLET BY MOUTH AT NIGHT AS NEEDED FOR MUSCLE SPASM 30 tablet 11    UNABLE TO FIND N-acetyl-L-cysteine 50 mg subcutaneously 3 times a week 10 mL 6    VENTOLIN HFA 90 mcg/actuation inhaler TAKE 2 PUFFS BY MOUTH EVERY 6 HOURS AS NEEDED FOR WHEEZE 18 Inhaler 0    diphenoxylate-atropine 2.5-0.025 mg (LOMOTIL) 2.5-0.025 mg per tablet TAKE 1 TABLET BY MOUTH 4 (FOUR) TIMES DAILY AS NEEDED FOR DIARRHEA. 30 tablet 1    SYNTHROID 200 mcg tablet TAKE 1 TABLET (200 MCG TOTAL) BY MOUTH ONCE DAILY. 90 tablet 0    [DISCONTINUED] amphetamine-dextroamphetamine (AMPHETAMINE-DEXTROAMPHETAMINE) 20 mg Tab Take 2 Tabs PO qAM 60 tablet 0      Allergies: Lamotrigine and Sulfa (sulfonamide antibiotics)    Family History   Problem Relation Age of Onset    Diabetes Sister     Seizures Sister     Mitochondrial disorder Sister         "trent"    Mental illness Sister     Seizures Sister     Cervical cancer Sister     Cervical cancer Sister     Ovarian cancer Cousin     Ovarian cancer Paternal Aunt     Colon cancer Maternal Grandmother     Cancer Maternal Grandfather 65        throat ca    Diabetes Mother     Stroke Mother 30    Mental illness Mother         depression    Hyperlipidemia Mother     Hypertension Mother     Hyperlipidemia Father     Heart disease Father         "athletes heart"    Ovarian cancer Paternal Aunt     Cervical cancer Paternal Cousin     Cervical cancer Other     Breast cancer Neg Hx      Social History     Tobacco Use    Smoking status: Never Smoker    Smokeless tobacco: Never Used   Substance Use Topics "    Alcohol use: Yes     Comment: occasional    Drug use: No     Review of Systems   Constitutional: Negative for fever.   HENT: Negative for ear pain.    Eyes: Negative for photophobia, pain and visual disturbance.   Respiratory: Negative for shortness of breath.    Cardiovascular: Negative for chest pain.   Gastrointestinal: Negative for abdominal distention.   Musculoskeletal: Negative for neck pain and neck stiffness.   Skin: Negative for color change.   Neurological: Positive for headaches. Negative for dizziness, seizures, speech difficulty, light-headedness and numbness.   Psychiatric/Behavioral: Negative for agitation, behavioral problems and confusion.     Objective:     Vitals:    Temp: 98.2 °F (36.8 °C)  Pulse: 72  Rhythm: sinus bradycardia, normal sinus rhythm  BP: 132/67  MAP (mmHg): 94  Resp: 11  SpO2: 96 %  O2 Device (Oxygen Therapy): room air    Temp  Min: 97.5 °F (36.4 °C)  Max: 98.4 °F (36.9 °C)  Pulse  Min: 51  Max: 97  BP  Min: 126/68  Max: 168/99  MAP (mmHg)  Min: 89  Max: 126  Resp  Min: 8  Max: 31  SpO2  Min: 93 %  Max: 100 %    12/13 0701 - 12/14 0700  In: 2450 [I.V.:2450]  Out: 2185 [Urine:2185]           Physical Exam   Constitutional: She appears well-developed.   HENT:   Head: Normocephalic and atraumatic.   Eyes: Pupils are equal, round, and reactive to light.   Neck: Normal range of motion.   Cardiovascular: Normal rate and regular rhythm.   Pulmonary/Chest: Effort normal and breath sounds normal.   Abdominal: Soft. Bowel sounds are normal.   Neurological:   E4V5M6 GCS (15)   AAOx4   Cranial II - XII grossly intact   Strength   RUE +5/5  LUE +5/5  RLE +5/5   LLE  +5/5  Sensory, Motor, and Coordination intact      Skin: Skin is warm and dry.       Today I personally reviewed pertinent medications, lines/drains/airways, imaging, laboratory results, notably:      Physical Exam:    Constitutional: She appears well-developed.     Eyes: Pupils are equal, round, and reactive to light.      Cardiovascular: Normal rate and regular rhythm.     Abdominal: Soft. Bowel sounds are normal.     Neurological:   E4V5M6 GCS (15)   AAOx4   Cranial II - XII grossly intact   Strength   RUE +5/5  LUE +5/5  RLE +5/5   LLE  +5/5  Sensory, Motor, and Coordination intact            Assessment/Plan:     Arnold-Chiari malformation    44 yo female with chiari I malformation now s/p decompression (12/13).    No acute events overnight. Surgical incision id dry, intact, and non-inflammed.    --Continue care per primary team.  --Pt cleared from neurosurgical perspective for stepdown under neurosurgery service.   --We will continue to monitor closely, please contact us with any questions or concerns.         Derick Rodriguez MD  Neurosurgery  [facility]

## 2018-12-14 NOTE — PROGRESS NOTES
Pt unable to void. AV Gonzalez at bedside. Orders to bladder scan if >200ml straight cath. Bladder scan shows 925ml. Straight cath performed. Urine output 1025ml. See flowsheet for details.

## 2018-12-14 NOTE — OP NOTE
[facility]  Neurosurgery  Operative Note    SUMMARY      Date of Procedure: 12/13/2018     Procedure: Procedure(s) (LRB):  DECOMPRESSION, CHIARI MALFORMATION, BY 1ST CERVICAL VERTEBRA POSTERIOR ARCH REMOVAL (N/A)     Surgeon(s) and Role:     * Sergio Busch MD - Primary    Assisting Surgeon: Darrius Santacruz MD    Pre-Operative Diagnosis: Chiari I malformation [G93.5]    Post-Operative Diagnosis: Post-Op Diagnosis Codes:     * Chiari I malformation [G93.5]    Anesthesia: General    Technical Procedures Used: Chiari decompression    Description of the Findings of the Procedure: see full op note    Complications: No    Estimated Blood Loss (EBL): * No values recorded between 12/13/2018  4:29 PM and 12/13/2018  6:41 PM *           Specimens:   Specimen (12h ago, onward)    None           Implants: * No implants in log *           Condition: Good    Disposition: ICU - extubated and stable.

## 2018-12-14 NOTE — SUBJECTIVE & OBJECTIVE
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     PATRICIA (obstructive sleep apnea)     severe - doesn't use CPAP    Panic attack     PTSD (post-traumatic stress disorder)      Past Surgical History:   Procedure Laterality Date    ANKLE SURGERY Right     x2 (#1 for ligament injury and #2 for fx and ligament)    AUGMENTATION OF BREAST      BREAST SURGERY  1997    B implants    CERVICAL BIOPSY  W/ LOOP ELECTRODE EXCISION      LAPAROSCOPIC GASTRIC BANDING      with subsequent removal due to abscess    LIPOSUCTION      x 2    PELVIC LAPAROSCOPY      for endometriosis eval with BTL    TUBAL LIGATION      wtih pelvic lap       No current facility-administered medications on file prior to encounter.      Current Outpatient Medications on File Prior to Encounter   Medication Sig Dispense Refill    ALPRAZolam (XANAX) 1 MG tablet TAKE 1 TABLET (1 MG TOTAL) BY MOUTH DAILY AS NEEDED FOR ANXIETY. (Patient taking differently: Take 1 mg by mouth every 6 (six) hours as needed for Anxiety. ) 30 tablet 2    dronabinol (MARINOL) 2.5 MG capsule Take 1 capsule (2.5 mg total) by mouth 2 (two) times daily before meals. 60 capsule 3    gabapentin (NEURONTIN) 600 MG tablet Take 1 tablet (600 mg total) by mouth 3 (three) times daily. 90 tablet 11    levOCARNitine (CARNITOR) 330 mg Tab Take 3 tablets (990 mg total) by mouth 2 (two) times daily. 180 tablet 6    medroxyPROGESTERone (DEPO-PROVERA) 150 mg/mL injection Inject 150 mg into the muscle every 3 (three) months.      methocarbamol (ROBAXIN) 500 MG Tab TAKE 1 TABLET (500 MG TOTAL) BY MOUTH 2 (TWO) TIMES DAILY AS NEEDED. 30 tablet 3    montelukast (SINGULAIR) 10 mg  "tablet TAKE 1 TABLET (10 MG TOTAL) BY MOUTH ONCE DAILY. 90 tablet 1    tiZANidine (ZANAFLEX) 4 MG tablet TAKE HALF OR FULL TABLET BY MOUTH AT NIGHT AS NEEDED FOR MUSCLE SPASM 30 tablet 11    UNABLE TO FIND N-acetyl-L-cysteine 50 mg subcutaneously 3 times a week 10 mL 6    VENTOLIN HFA 90 mcg/actuation inhaler TAKE 2 PUFFS BY MOUTH EVERY 6 HOURS AS NEEDED FOR WHEEZE 18 Inhaler 0    diphenoxylate-atropine 2.5-0.025 mg (LOMOTIL) 2.5-0.025 mg per tablet TAKE 1 TABLET BY MOUTH 4 (FOUR) TIMES DAILY AS NEEDED FOR DIARRHEA. 30 tablet 1    SYNTHROID 200 mcg tablet TAKE 1 TABLET (200 MCG TOTAL) BY MOUTH ONCE DAILY. 90 tablet 0    [DISCONTINUED] amphetamine-dextroamphetamine (AMPHETAMINE-DEXTROAMPHETAMINE) 20 mg Tab Take 2 Tabs PO qAM 60 tablet 0      Allergies: Lamotrigine and Sulfa (sulfonamide antibiotics)    Family History   Problem Relation Age of Onset    Diabetes Sister     Seizures Sister     Mitochondrial disorder Sister         "trent"    Mental illness Sister     Seizures Sister     Cervical cancer Sister     Cervical cancer Sister     Ovarian cancer Cousin     Ovarian cancer Paternal Aunt     Colon cancer Maternal Grandmother     Cancer Maternal Grandfather 65        throat ca    Diabetes Mother     Stroke Mother 30    Mental illness Mother         depression    Hyperlipidemia Mother     Hypertension Mother     Hyperlipidemia Father     Heart disease Father         "athletes heart"    Ovarian cancer Paternal Aunt     Cervical cancer Paternal Cousin     Cervical cancer Other     Breast cancer Neg Hx      Social History     Tobacco Use    Smoking status: Never Smoker    Smokeless tobacco: Never Used   Substance Use Topics    Alcohol use: Yes     Comment: occasional    Drug use: No     Review of Systems   Constitutional: Negative for fever.   HENT: Negative for ear pain.    Eyes: Negative for photophobia, pain and visual disturbance.   Respiratory: Negative for shortness of breath.  "   Cardiovascular: Negative for chest pain.   Gastrointestinal: Negative for abdominal distention.   Musculoskeletal: Negative for neck pain and neck stiffness.   Skin: Negative for color change.   Neurological: Positive for headaches. Negative for dizziness, seizures, speech difficulty, light-headedness and numbness.   Psychiatric/Behavioral: Negative for agitation, behavioral problems and confusion.     Objective:     Vitals:    Temp: 97.7 °F (36.5 °C)  Pulse: 64  Rhythm: normal sinus rhythm  BP: 132/70  MAP (mmHg): 93  Resp: 13  SpO2: 96 %  O2 Device (Oxygen Therapy): Simple Face Mask    Temp  Min: 97.7 °F (36.5 °C)  Max: 98.1 °F (36.7 °C)  Pulse  Min: 62  Max: 87  BP  Min: 126/68  Max: 168/99  MAP (mmHg)  Min: 90  Max: 126  Resp  Min: 8  Max: 22  SpO2  Min: 93 %  Max: 100 %    No intake/output data recorded.           Physical Exam   Constitutional: She appears well-developed.   HENT:   Head: Normocephalic and atraumatic.   Eyes: Pupils are equal, round, and reactive to light.   Neck: Normal range of motion.   Cardiovascular: Normal rate and regular rhythm.   Pulmonary/Chest: Effort normal and breath sounds normal.   Abdominal: Soft. Bowel sounds are normal.   Neurological:   E4V5M6 GCS (15)   AAOx4   Cranial II - XII grossly intact   Strength   RUE +5/5  LUE +5/5  RLE +5/5   LLE  +5/5  Sensory, Motor, and Coordination intact      Skin: Skin is warm and dry.       Today I personally reviewed pertinent medications, lines/drains/airways, imaging, laboratory results, notably:

## 2018-12-14 NOTE — ASSESSMENT & PLAN NOTE
Neurosurgery Following   S/p Decompression Chiari Malformation (12/12 )   Diazepam ~ muscle spasms PRN   Q1H neuro checks   SBP goal < 160  PT/OT/SLP   12/14: restarted home med gabapentin and PRN methocarbamol  -NAEON, pending stepdown to NSGY team

## 2018-12-15 LAB — PHOSPHATE SERPL-MCNC: 2.7 MG/DL

## 2018-12-15 PROCEDURE — 84100 ASSAY OF PHOSPHORUS: CPT

## 2018-12-15 PROCEDURE — 25000003 PHARM REV CODE 250: Performed by: NURSE PRACTITIONER

## 2018-12-15 PROCEDURE — 63600175 PHARM REV CODE 636 W HCPCS: Performed by: NEUROLOGICAL SURGERY

## 2018-12-15 PROCEDURE — 20600001 HC STEP DOWN PRIVATE ROOM

## 2018-12-15 PROCEDURE — 36415 COLL VENOUS BLD VENIPUNCTURE: CPT

## 2018-12-15 PROCEDURE — 25000003 PHARM REV CODE 250: Performed by: NEUROLOGICAL SURGERY

## 2018-12-15 PROCEDURE — 25000003 PHARM REV CODE 250: Performed by: PHYSICIAN ASSISTANT

## 2018-12-15 PROCEDURE — 63600175 PHARM REV CODE 636 W HCPCS: Performed by: PHYSICIAN ASSISTANT

## 2018-12-15 PROCEDURE — C9113 INJ PANTOPRAZOLE SODIUM, VIA: HCPCS | Performed by: NEUROLOGICAL SURGERY

## 2018-12-15 RX ORDER — OXYCODONE AND ACETAMINOPHEN 7.5; 325 MG/1; MG/1
1 TABLET ORAL EVERY 4 HOURS PRN
Status: DISCONTINUED | OUTPATIENT
Start: 2018-12-15 | End: 2018-12-17 | Stop reason: HOSPADM

## 2018-12-15 RX ORDER — HYDROCODONE BITARTRATE AND ACETAMINOPHEN 5; 325 MG/1; MG/1
1 TABLET ORAL EVERY 6 HOURS PRN
Qty: 45 TABLET | Refills: 0 | Status: SHIPPED | OUTPATIENT
Start: 2018-12-15 | End: 2018-12-27 | Stop reason: SDUPTHER

## 2018-12-15 RX ORDER — DIAZEPAM 5 MG/1
5 TABLET ORAL EVERY 6 HOURS
Status: DISCONTINUED | OUTPATIENT
Start: 2018-12-15 | End: 2018-12-15

## 2018-12-15 RX ORDER — DIAZEPAM 5 MG/1
5 TABLET ORAL EVERY 6 HOURS PRN
Qty: 45 TABLET | Refills: 0 | Status: SHIPPED | OUTPATIENT
Start: 2018-12-15 | End: 2018-12-31

## 2018-12-15 RX ORDER — DIAZEPAM 5 MG/1
5 TABLET ORAL EVERY 6 HOURS
Status: DISCONTINUED | OUTPATIENT
Start: 2018-12-15 | End: 2018-12-17 | Stop reason: HOSPADM

## 2018-12-15 RX ORDER — HYDROMORPHONE HYDROCHLORIDE 1 MG/ML
0.5 INJECTION, SOLUTION INTRAMUSCULAR; INTRAVENOUS; SUBCUTANEOUS EVERY 4 HOURS PRN
Status: DISPENSED | OUTPATIENT
Start: 2018-12-15 | End: 2018-12-16

## 2018-12-15 RX ADMIN — LEVOTHYROXINE SODIUM 200 MCG: 100 TABLET ORAL at 07:12

## 2018-12-15 RX ADMIN — GABAPENTIN 600 MG: 300 CAPSULE ORAL at 08:12

## 2018-12-15 RX ADMIN — MUPIROCIN 1 G: 20 OINTMENT TOPICAL at 09:12

## 2018-12-15 RX ADMIN — HYDROMORPHONE HYDROCHLORIDE 0.5 MG: 1 INJECTION, SOLUTION INTRAMUSCULAR; INTRAVENOUS; SUBCUTANEOUS at 07:12

## 2018-12-15 RX ADMIN — HEPARIN SODIUM 5000 UNITS: 5000 INJECTION, SOLUTION INTRAVENOUS; SUBCUTANEOUS at 02:12

## 2018-12-15 RX ADMIN — HYDROMORPHONE HYDROCHLORIDE 0.5 MG: 1 INJECTION, SOLUTION INTRAMUSCULAR; INTRAVENOUS; SUBCUTANEOUS at 03:12

## 2018-12-15 RX ADMIN — OXYCODONE AND ACETAMINOPHEN 1 TABLET: 7.5; 325 TABLET ORAL at 06:12

## 2018-12-15 RX ADMIN — HEPARIN SODIUM 5000 UNITS: 5000 INJECTION, SOLUTION INTRAVENOUS; SUBCUTANEOUS at 08:12

## 2018-12-15 RX ADMIN — PANTOPRAZOLE SODIUM 40 MG: 40 INJECTION, POWDER, LYOPHILIZED, FOR SOLUTION INTRAVENOUS at 08:12

## 2018-12-15 RX ADMIN — HYDROMORPHONE HYDROCHLORIDE 0.5 MG: 1 INJECTION, SOLUTION INTRAMUSCULAR; INTRAVENOUS; SUBCUTANEOUS at 01:12

## 2018-12-15 RX ADMIN — HYDROCODONE BITARTRATE AND ACETAMINOPHEN 1 TABLET: 5; 325 TABLET ORAL at 01:12

## 2018-12-15 RX ADMIN — FLUOXETINE HYDROCHLORIDE 40 MG: 20 CAPSULE ORAL at 07:12

## 2018-12-15 RX ADMIN — HYDROCODONE BITARTRATE AND ACETAMINOPHEN 1 TABLET: 5; 325 TABLET ORAL at 10:12

## 2018-12-15 RX ADMIN — HYDROMORPHONE HYDROCHLORIDE 0.5 MG: 1 INJECTION, SOLUTION INTRAMUSCULAR; INTRAVENOUS; SUBCUTANEOUS at 04:12

## 2018-12-15 RX ADMIN — LEVOTHYROXINE SODIUM 25 MCG: 25 TABLET ORAL at 08:12

## 2018-12-15 RX ADMIN — BUPROPION HYDROCHLORIDE 150 MG: 150 TABLET, FILM COATED, EXTENDED RELEASE ORAL at 08:12

## 2018-12-15 RX ADMIN — METHOCARBAMOL TABLETS 500 MG: 500 TABLET, COATED ORAL at 06:12

## 2018-12-15 RX ADMIN — DIAZEPAM 5 MG: 5 TABLET ORAL at 04:12

## 2018-12-15 RX ADMIN — Medication 0.5 MG: at 08:12

## 2018-12-15 RX ADMIN — HYDROMORPHONE HYDROCHLORIDE 0.5 MG: 1 INJECTION, SOLUTION INTRAMUSCULAR; INTRAVENOUS; SUBCUTANEOUS at 12:12

## 2018-12-15 RX ADMIN — OXYCODONE AND ACETAMINOPHEN 1 TABLET: 7.5; 325 TABLET ORAL at 02:12

## 2018-12-15 RX ADMIN — GABAPENTIN 600 MG: 300 CAPSULE ORAL at 02:12

## 2018-12-15 RX ADMIN — DIAZEPAM 5 MG: 5 TABLET ORAL at 06:12

## 2018-12-15 RX ADMIN — HYDROMORPHONE HYDROCHLORIDE 0.5 MG: 1 INJECTION, SOLUTION INTRAMUSCULAR; INTRAVENOUS; SUBCUTANEOUS at 06:12

## 2018-12-15 RX ADMIN — DIAZEPAM 5 MG: 5 TABLET ORAL at 12:12

## 2018-12-15 RX ADMIN — HYDROCODONE BITARTRATE AND ACETAMINOPHEN 1 TABLET: 5; 325 TABLET ORAL at 06:12

## 2018-12-15 RX ADMIN — Medication 0.5 MG: at 04:12

## 2018-12-15 RX ADMIN — MUPIROCIN 1 G: 20 OINTMENT TOPICAL at 08:12

## 2018-12-15 RX ADMIN — HEPARIN SODIUM 5000 UNITS: 5000 INJECTION, SOLUTION INTRAVENOUS; SUBCUTANEOUS at 06:12

## 2018-12-15 NOTE — HOSPITAL COURSE
12/15/2018 Poor pain control. Regimen adjusted.  12/16: Endorses positional swelling, better pain control, requesting HH.   12/17/2018 HAs, incisional pain improved. Stable for dc home today.

## 2018-12-15 NOTE — SUBJECTIVE & OBJECTIVE
"Interval History: POD#2. Reports pain uncontrolled on current regimen. Denies n/v, HAs.    Medications:  Continuous Infusions:  Scheduled Meds:   buPROPion  150 mg Oral Daily    diazePAM  5 mg Oral Q6H    FLUoxetine  40 mg Oral Daily    gabapentin  600 mg Oral TID    heparin (porcine)  5,000 Units Subcutaneous Q8H    levothyroxine  200 mcg Oral Daily    levothyroxine  25 mcg Oral Daily    mupirocin  1 g Nasal BID    pantoprazole  40 mg Intravenous Daily     PRN Meds:acetaminophen, acetaminophen, acetaminophen, hydrALAZINE, HYDROmorphone, methocarbamol, ondansetron, oxyCODONE-acetaminophen, sodium chloride 0.9%     Review of Systems  Objective:     Weight: 112.1 kg (247 lb 3.2 oz)  Body mass index is 38.72 kg/m².  Vital Signs (Most Recent):  Temp: 98 °F (36.7 °C) (12/15/18 1132)  Pulse: 74 (12/15/18 1132)  Resp: 18 (12/15/18 1132)  BP: 118/63 (12/15/18 1132)  SpO2: 98 % (12/15/18 1132) Vital Signs (24h Range):  Temp:  [96.7 °F (35.9 °C)-99 °F (37.2 °C)] 98 °F (36.7 °C)  Pulse:  [60-83] 74  Resp:  [16-22] 18  SpO2:  [94 %-100 %] 98 %  BP: (108-170)/(53-79) 118/63                           Urethral Catheter 12/14/18 1930 16 Fr. (Active)   Site Assessment Clean;Intact 12/15/2018  7:58 AM   Collection Container Urimeter 12/15/2018  7:58 AM   Securement Method secured to top of thigh w/ adhesive device 12/15/2018  7:58 AM   Catheter Care Performed yes 12/15/2018  7:58 AM   Reason for Continuing Urinary Catheterization Urinary retention 12/15/2018  7:58 AM   CAUTI Prevention Bundle StatLock in place w 1" slack;Intact seal between catheter & drainage tubing;Drainage bag off the floor;Green sheeting clip in use;No dependent loops or kinks;Drainage bag not overfilled (<2/3 full);Drainage bag below bladder 12/15/2018  7:58 AM   Output (mL) 300 mL 12/14/2018 10:00 PM       Neurosurgery Physical Exam  General: well developed, well nourished, no distress.   Head: normocephalic, atraumatic  Neurologic: Alert and oriented. " Thought content appropriate.  GCS: Motor: 6/Verbal: 5/Eyes: 4 GCS Total: 15  Mental Status: Awake, Alert, Oriented x 4  Language: No aphasia  Speech: No dysarthria  Cranial nerves: face symmetric, tongue midline, CN II-XII grossly intact.   Eyes: pupils equal, round, reactive to light with accomodation, EOMI.  Pulmonary: normal respirations, no signs of respiratory distress  Abdomen: soft, non-distended, not tender to palpation  Sensory: intact to light touch throughout    Motor Strength: Moves all extremities spontaneously with good tone.  Full strength upper and lower extremities. No abnormal movements seen.     DTR's: 2 + and symmetric in UE and LE  Pronator Drift: no drift noted  Finger-to-nose: Intact bilaterally  Vu: absent  Clonus: absent  Babinski: absent  Pulses: 2+ and symmetric radial and dorsalis pedis.  Skin: Skin is warm, dry and intact.    Incisions c/d/i well approximated with staples.      Significant Labs:  Recent Labs   Lab 12/13/18 1943 12/14/18  0404   * 131*    136   K 4.5 5.0    110   CO2 21* 18*   BUN 14 12   CREATININE 0.9 0.8   CALCIUM 8.5* 8.6*   MG  --  2.3     Recent Labs   Lab 12/13/18 1943 12/14/18  0404   WBC 7.21 12.50   HGB 12.5 13.1   HCT 37.1 38.4    285     No results for input(s): LABPT, INR, APTT in the last 48 hours.  Microbiology Results (last 7 days)     ** No results found for the last 168 hours. **        None    Significant Diagnostics:  I have reviewed all pertinent imaging results/findings within the past 24 hours.

## 2018-12-15 NOTE — PLAN OF CARE
Problem: Infection  Goal: Infection Symptom Resolution    Intervention: Prevent or Manage Infection   12/15/18 0230   Prevent or Manage Infection   Fever Reduction/Comfort Measures lightweight bedding;lightweight clothing   Infection Management aseptic technique maintained

## 2018-12-15 NOTE — CARE UPDATE
Spoke with Dr. Feldman about pt bladder scan result of >999. MD order to place morris since pt has been straight cathed x 3 and is still unable to void on her own.

## 2018-12-15 NOTE — ASSESSMENT & PLAN NOTE
44 yo female with chiari I malformation now s/p decompression (12/13).      - Doing well post operatively.  - Post operative imaging is satisfactory.  - Drain: none  - Pain control: Scheduled valium Q6h, incr PO pain med to percocet 7.5  - Atelectasis ppx: IS Q2h while awake.  - DVT ppx: TEDs, SCDs, SQH.  - GI: Diet as tolerated. Continue bowel regimen.  - Bacitracin to incision BID.  - PT/OT: n/a  - Disposition: Home tomorrow vs Monday

## 2018-12-15 NOTE — PROGRESS NOTES
"[facility]  Neurosurgery  Progress Note    Subjective:     History of Present Illness: 43 y.o. female who presents for elective decompression of Chiari malformation.    Post-Op Info:  Procedure(s) (LRB):  DECOMPRESSION, CHIARI MALFORMATION, BY 1ST CERVICAL VERTEBRA POSTERIOR ARCH REMOVAL (N/A)   2 Days Post-Op     Interval History: POD#2. Reports pain uncontrolled on current regimen. Denies n/v, HAs.    Medications:  Continuous Infusions:  Scheduled Meds:   buPROPion  150 mg Oral Daily    diazePAM  5 mg Oral Q6H    FLUoxetine  40 mg Oral Daily    gabapentin  600 mg Oral TID    heparin (porcine)  5,000 Units Subcutaneous Q8H    levothyroxine  200 mcg Oral Daily    levothyroxine  25 mcg Oral Daily    mupirocin  1 g Nasal BID    pantoprazole  40 mg Intravenous Daily     PRN Meds:acetaminophen, acetaminophen, acetaminophen, hydrALAZINE, HYDROmorphone, methocarbamol, ondansetron, oxyCODONE-acetaminophen, sodium chloride 0.9%     Review of Systems  Objective:     Weight: 112.1 kg (247 lb 3.2 oz)  Body mass index is 38.72 kg/m².  Vital Signs (Most Recent):  Temp: 98 °F (36.7 °C) (12/15/18 1132)  Pulse: 74 (12/15/18 1132)  Resp: 18 (12/15/18 1132)  BP: 118/63 (12/15/18 1132)  SpO2: 98 % (12/15/18 1132) Vital Signs (24h Range):  Temp:  [96.7 °F (35.9 °C)-99 °F (37.2 °C)] 98 °F (36.7 °C)  Pulse:  [60-83] 74  Resp:  [16-22] 18  SpO2:  [94 %-100 %] 98 %  BP: (108-170)/(53-79) 118/63                           Urethral Catheter 12/14/18 1930 16 Fr. (Active)   Site Assessment Clean;Intact 12/15/2018  7:58 AM   Collection Container Urimeter 12/15/2018  7:58 AM   Securement Method secured to top of thigh w/ adhesive device 12/15/2018  7:58 AM   Catheter Care Performed yes 12/15/2018  7:58 AM   Reason for Continuing Urinary Catheterization Urinary retention 12/15/2018  7:58 AM   CAUTI Prevention Bundle StatLock in place w 1" slack;Intact seal between catheter & drainage tubing;Drainage bag off the " floor;Green sheeting clip in use;No dependent loops or kinks;Drainage bag not overfilled (<2/3 full);Drainage bag below bladder 12/15/2018  7:58 AM   Output (mL) 300 mL 12/14/2018 10:00 PM       Neurosurgery Physical Exam  General: well developed, well nourished, no distress.   Head: normocephalic, atraumatic  Neurologic: Alert and oriented. Thought content appropriate.  GCS: Motor: 6/Verbal: 5/Eyes: 4 GCS Total: 15  Mental Status: Awake, Alert, Oriented x 4  Language: No aphasia  Speech: No dysarthria  Cranial nerves: face symmetric, tongue midline, CN II-XII grossly intact.   Eyes: pupils equal, round, reactive to light with accomodation, EOMI.  Pulmonary: normal respirations, no signs of respiratory distress  Abdomen: soft, non-distended, not tender to palpation  Sensory: intact to light touch throughout    Motor Strength: Moves all extremities spontaneously with good tone.  Full strength upper and lower extremities. No abnormal movements seen.     DTR's: 2 + and symmetric in UE and LE  Pronator Drift: no drift noted  Finger-to-nose: Intact bilaterally  Vu: absent  Clonus: absent  Babinski: absent  Pulses: 2+ and symmetric radial and dorsalis pedis.  Skin: Skin is warm, dry and intact.    Incisions c/d/i well approximated with staples.      Significant Labs:  Recent Labs   Lab 12/13/18 1943 12/14/18  0404   * 131*    136   K 4.5 5.0    110   CO2 21* 18*   BUN 14 12   CREATININE 0.9 0.8   CALCIUM 8.5* 8.6*   MG  --  2.3     Recent Labs   Lab 12/13/18 1943 12/14/18  0404   WBC 7.21 12.50   HGB 12.5 13.1   HCT 37.1 38.4    285     No results for input(s): LABPT, INR, APTT in the last 48 hours.  Microbiology Results (last 7 days)     ** No results found for the last 168 hours. **        None    Significant Diagnostics:  I have reviewed all pertinent imaging results/findings within the past 24 hours.    Assessment/Plan:     * Arnold-Chiari malformation    42 yo female with chiari I  malformation now s/p decompression (12/13).      - Doing well post operatively.  - Post operative imaging is satisfactory.  - Drain: none  - Pain control: Scheduled valium Q6h, incr PO pain med to percocet 7.5  - Atelectasis ppx: IS Q2h while awake.  - DVT ppx: TEDs, SCDs, SQH.  - GI: Diet as tolerated. Continue bowel regimen.  - Bacitracin to incision BID.  - PT/OT: n/a  - Disposition: Home tomorrow vs Monday           Alida Ott PA-C  Neurosurgery  [facility]

## 2018-12-16 LAB — PHOSPHATE SERPL-MCNC: 3.1 MG/DL

## 2018-12-16 PROCEDURE — 36415 COLL VENOUS BLD VENIPUNCTURE: CPT

## 2018-12-16 PROCEDURE — 63600175 PHARM REV CODE 636 W HCPCS: Performed by: NEUROLOGICAL SURGERY

## 2018-12-16 PROCEDURE — 84100 ASSAY OF PHOSPHORUS: CPT

## 2018-12-16 PROCEDURE — 25000003 PHARM REV CODE 250: Performed by: NURSE PRACTITIONER

## 2018-12-16 PROCEDURE — 25000003 PHARM REV CODE 250: Performed by: NEUROLOGICAL SURGERY

## 2018-12-16 PROCEDURE — 20600001 HC STEP DOWN PRIVATE ROOM

## 2018-12-16 PROCEDURE — 25000003 PHARM REV CODE 250: Performed by: PHYSICIAN ASSISTANT

## 2018-12-16 PROCEDURE — 63600175 PHARM REV CODE 636 W HCPCS: Performed by: PHYSICIAN ASSISTANT

## 2018-12-16 RX ORDER — POLYETHYLENE GLYCOL 3350 17 G/17G
17 POWDER, FOR SOLUTION ORAL DAILY
Status: DISCONTINUED | OUTPATIENT
Start: 2018-12-17 | End: 2018-12-17 | Stop reason: HOSPADM

## 2018-12-16 RX ORDER — DOCUSATE SODIUM 100 MG/1
100 CAPSULE, LIQUID FILLED ORAL DAILY
Status: DISCONTINUED | OUTPATIENT
Start: 2018-12-17 | End: 2018-12-17 | Stop reason: HOSPADM

## 2018-12-16 RX ADMIN — DIAZEPAM 5 MG: 5 TABLET ORAL at 06:12

## 2018-12-16 RX ADMIN — FLUOXETINE HYDROCHLORIDE 40 MG: 20 CAPSULE ORAL at 08:12

## 2018-12-16 RX ADMIN — BUPROPION HYDROCHLORIDE 150 MG: 150 TABLET, FILM COATED, EXTENDED RELEASE ORAL at 08:12

## 2018-12-16 RX ADMIN — OXYCODONE AND ACETAMINOPHEN 1 TABLET: 7.5; 325 TABLET ORAL at 11:12

## 2018-12-16 RX ADMIN — HEPARIN SODIUM 5000 UNITS: 5000 INJECTION, SOLUTION INTRAVENOUS; SUBCUTANEOUS at 08:12

## 2018-12-16 RX ADMIN — LEVOTHYROXINE SODIUM 25 MCG: 25 TABLET ORAL at 08:12

## 2018-12-16 RX ADMIN — Medication 0.5 MG: at 03:12

## 2018-12-16 RX ADMIN — HEPARIN SODIUM 5000 UNITS: 5000 INJECTION, SOLUTION INTRAVENOUS; SUBCUTANEOUS at 02:12

## 2018-12-16 RX ADMIN — METHOCARBAMOL TABLETS 500 MG: 500 TABLET, COATED ORAL at 08:12

## 2018-12-16 RX ADMIN — OXYCODONE AND ACETAMINOPHEN 1 TABLET: 7.5; 325 TABLET ORAL at 02:12

## 2018-12-16 RX ADMIN — LEVOTHYROXINE SODIUM 200 MCG: 100 TABLET ORAL at 08:12

## 2018-12-16 RX ADMIN — DIAZEPAM 5 MG: 5 TABLET ORAL at 12:12

## 2018-12-16 RX ADMIN — HEPARIN SODIUM 5000 UNITS: 5000 INJECTION, SOLUTION INTRAVENOUS; SUBCUTANEOUS at 06:12

## 2018-12-16 RX ADMIN — DIAZEPAM 5 MG: 5 TABLET ORAL at 11:12

## 2018-12-16 RX ADMIN — GABAPENTIN 600 MG: 300 CAPSULE ORAL at 02:12

## 2018-12-16 RX ADMIN — GABAPENTIN 600 MG: 300 CAPSULE ORAL at 08:12

## 2018-12-16 RX ADMIN — OXYCODONE AND ACETAMINOPHEN 1 TABLET: 7.5; 325 TABLET ORAL at 06:12

## 2018-12-16 RX ADMIN — MUPIROCIN 1 G: 20 OINTMENT TOPICAL at 09:12

## 2018-12-16 RX ADMIN — Medication 0.5 MG: at 11:12

## 2018-12-16 RX ADMIN — OXYCODONE AND ACETAMINOPHEN 1 TABLET: 7.5; 325 TABLET ORAL at 12:12

## 2018-12-16 RX ADMIN — OXYCODONE AND ACETAMINOPHEN 1 TABLET: 7.5; 325 TABLET ORAL at 10:12

## 2018-12-16 RX ADMIN — OXYCODONE AND ACETAMINOPHEN 1 TABLET: 7.5; 325 TABLET ORAL at 04:12

## 2018-12-16 NOTE — SUBJECTIVE & OBJECTIVE
"Interval History: POD#3, endorses improved pain control and positional swelling from lying on one side of head. Requesting HH.     Medications:  Continuous Infusions:  Scheduled Meds:   buPROPion  150 mg Oral Daily    diazePAM  5 mg Oral Q6H    FLUoxetine  40 mg Oral Daily    gabapentin  600 mg Oral TID    heparin (porcine)  5,000 Units Subcutaneous Q8H    levothyroxine  200 mcg Oral Daily    levothyroxine  25 mcg Oral Daily    mupirocin  1 g Nasal BID     PRN Meds:acetaminophen, acetaminophen, acetaminophen, hydrALAZINE, methocarbamol, ondansetron, oxyCODONE-acetaminophen, sodium chloride 0.9%     Review of Systems  Objective:     Weight: 112.1 kg (247 lb 3.2 oz)  Body mass index is 38.72 kg/m².  Vital Signs (Most Recent):  Temp: 98.1 °F (36.7 °C) (12/16/18 1513)  Pulse: 79 (12/16/18 1513)  Resp: 18 (12/16/18 1513)  BP: 136/73 (12/16/18 1513)  SpO2: 97 % (12/16/18 1513) Vital Signs (24h Range):  Temp:  [97.2 °F (36.2 °C)-98.4 °F (36.9 °C)] 98.1 °F (36.7 °C)  Pulse:  [69-80] 79  Resp:  [16-18] 18  SpO2:  [94 %-99 %] 97 %  BP: (114-136)/(58-90) 136/73                           Urethral Catheter 12/14/18 1930 16 Fr. (Active)   Site Assessment Clean;Intact 12/16/2018  8:30 AM   Collection Container Leg bag 12/16/2018  8:30 AM   Securement Method secured to top of thigh w/ adhesive device 12/16/2018  8:30 AM   Catheter Care Performed yes 12/15/2018  7:58 AM   Reason for Continuing Urinary Catheterization Urinary retention 12/15/2018  7:58 AM   CAUTI Prevention Bundle StatLock in place w 1" slack;Intact seal between catheter & drainage tubing;Drainage bag off the floor;Green sheeting clip in use;No dependent loops or kinks;Drainage bag not overfilled (<2/3 full);Drainage bag below bladder 12/15/2018  7:58 AM   Output (mL) 1500 mL 12/15/2018  6:13 PM       Neurosurgery Physical Exam   General: well developed, well nourished, no distress.   Head: normocephalic, atraumatic  Neurologic: Alert and oriented. Thought " content appropriate.  GCS: Motor: 6/Verbal: 5/Eyes: 4 GCS Total: 15  Mental Status: Awake, Alert, Oriented x 4  Language: No aphasia  Speech: No dysarthria  Cranial nerves: face symmetric, tongue midline, CN II-XII grossly intact.   Eyes: pupils equal, round, reactive to light with accomodation, EOMI.  Sensory: intact to light touch throughout     Motor Strength: Moves all extremities spontaneously with good tone.  Full strength upper and lower extremities. No abnormal movements seen.      DTR's: 2 + and symmetric in UE and LE  Pronator Drift: no drift noted  Finger-to-nose: Intact bilaterally  Skin: Skin is warm, dry and intact.     Incisions c/d/i well approximated with staples. Slight swelling to L of midline in comparison to R (patient has been lying on R side of head, states is improved).        Significant Labs:  No results for input(s): GLU, NA, K, CL, CO2, BUN, CREATININE, CALCIUM, MG in the last 48 hours.  No results for input(s): WBC, HGB, HCT, PLT in the last 48 hours.  No results for input(s): LABPT, INR, APTT in the last 48 hours.  Microbiology Results (last 7 days)     ** No results found for the last 168 hours. **        Recent Lab Results       12/16/18  0643        Phosphorus 3.1           Significant Diagnostics:  I have reviewed all pertinent imaging results/findings within the past 24 hours.

## 2018-12-16 NOTE — PROGRESS NOTES
"[facility]  Neurosurgery  Progress Note    Subjective:     History of Present Illness: 43 y.o. female who presents for elective decompression of Chiari malformation.    Post-Op Info:  Procedure(s) (LRB):  DECOMPRESSION, CHIARI MALFORMATION, BY 1ST CERVICAL VERTEBRA POSTERIOR ARCH REMOVAL (N/A)   3 Days Post-Op     Interval History: POD#3, endorses improved pain control and positional swelling from lying on one side of head. Requesting HH.     Medications:  Continuous Infusions:  Scheduled Meds:   buPROPion  150 mg Oral Daily    diazePAM  5 mg Oral Q6H    FLUoxetine  40 mg Oral Daily    gabapentin  600 mg Oral TID    heparin (porcine)  5,000 Units Subcutaneous Q8H    levothyroxine  200 mcg Oral Daily    levothyroxine  25 mcg Oral Daily    mupirocin  1 g Nasal BID     PRN Meds:acetaminophen, acetaminophen, acetaminophen, hydrALAZINE, methocarbamol, ondansetron, oxyCODONE-acetaminophen, sodium chloride 0.9%     Review of Systems  Objective:     Weight: 112.1 kg (247 lb 3.2 oz)  Body mass index is 38.72 kg/m².  Vital Signs (Most Recent):  Temp: 98.1 °F (36.7 °C) (12/16/18 1513)  Pulse: 79 (12/16/18 1513)  Resp: 18 (12/16/18 1513)  BP: 136/73 (12/16/18 1513)  SpO2: 97 % (12/16/18 1513) Vital Signs (24h Range):  Temp:  [97.2 °F (36.2 °C)-98.4 °F (36.9 °C)] 98.1 °F (36.7 °C)  Pulse:  [69-80] 79  Resp:  [16-18] 18  SpO2:  [94 %-99 %] 97 %  BP: (114-136)/(58-90) 136/73                           Urethral Catheter 12/14/18 1930 16 Fr. (Active)   Site Assessment Clean;Intact 12/16/2018  8:30 AM   Collection Container Leg bag 12/16/2018  8:30 AM   Securement Method secured to top of thigh w/ adhesive device 12/16/2018  8:30 AM   Catheter Care Performed yes 12/15/2018  7:58 AM   Reason for Continuing Urinary Catheterization Urinary retention 12/15/2018  7:58 AM   CAUTI Prevention Bundle StatLock in place w 1" slack;Intact seal between catheter & drainage tubing;Drainage bag off the floor;Green sheeting " clip in use;No dependent loops or kinks;Drainage bag not overfilled (<2/3 full);Drainage bag below bladder 12/15/2018  7:58 AM   Output (mL) 1500 mL 12/15/2018  6:13 PM       Neurosurgery Physical Exam   General: well developed, well nourished, no distress.   Head: normocephalic, atraumatic  Neurologic: Alert and oriented. Thought content appropriate.  GCS: Motor: 6/Verbal: 5/Eyes: 4 GCS Total: 15  Mental Status: Awake, Alert, Oriented x 4  Language: No aphasia  Speech: No dysarthria  Cranial nerves: face symmetric, tongue midline, CN II-XII grossly intact.   Eyes: pupils equal, round, reactive to light with accomodation, EOMI.  Sensory: intact to light touch throughout     Motor Strength: Moves all extremities spontaneously with good tone.  Full strength upper and lower extremities. No abnormal movements seen.      DTR's: 2 + and symmetric in UE and LE  Pronator Drift: no drift noted  Finger-to-nose: Intact bilaterally  Skin: Skin is warm, dry and intact.     Incisions c/d/i well approximated with staples. Slight swelling to L of midline in comparison to R (patient has been lying on R side of head, states is improved).        Significant Labs:  No results for input(s): GLU, NA, K, CL, CO2, BUN, CREATININE, CALCIUM, MG in the last 48 hours.  No results for input(s): WBC, HGB, HCT, PLT in the last 48 hours.  No results for input(s): LABPT, INR, APTT in the last 48 hours.  Microbiology Results (last 7 days)     ** No results found for the last 168 hours. **        Recent Lab Results       12/16/18  0643        Phosphorus 3.1           Significant Diagnostics:  I have reviewed all pertinent imaging results/findings within the past 24 hours.    Assessment/Plan:     * Arnold-Chiari malformation    42 yo female with chiari I malformation now s/p decompression (12/13).    - Doing well post operatively, pain control improved  - Post operative imaging is satisfactory.  - Drain: none  - Pain control: Scheduled valium Q6h,  incr PO pain med to percocet 7.5  - Atelectasis ppx: IS Q2h while awake.  - DVT ppx: TEDs, SCDs, SQH.  - GI: Diet as tolerated. Continue bowel regimen.  - Bacitracin to incision BID.  - PT/OT: n/a  - Disposition: Home tomorrow vs Monday. Patient requesting home health.            Raeann Roth MD  Neurosurgery  [facility]

## 2018-12-16 NOTE — ASSESSMENT & PLAN NOTE
44 yo female with chiari I malformation now s/p decompression (12/13).    - Doing well post operatively, pain control improved  - Post operative imaging is satisfactory.  - Drain: none  - Pain control: Scheduled valium Q6h, incr PO pain med to percocet 7.5  - Atelectasis ppx: IS Q2h while awake.  - DVT ppx: TEDs, SCDs, SQH.  - GI: Diet as tolerated. Continue bowel regimen.  - Bacitracin to incision BID.  - PT/OT: n/a  - Disposition: Home tomorrow vs Monday. Patient requesting home health.

## 2018-12-17 VITALS
HEART RATE: 69 BPM | OXYGEN SATURATION: 98 % | TEMPERATURE: 99 F | SYSTOLIC BLOOD PRESSURE: 121 MMHG | RESPIRATION RATE: 16 BRPM | DIASTOLIC BLOOD PRESSURE: 75 MMHG | BODY MASS INDEX: 38.8 KG/M2 | WEIGHT: 247.19 LBS | HEIGHT: 67 IN

## 2018-12-17 LAB — PHOSPHATE SERPL-MCNC: 3.7 MG/DL

## 2018-12-17 PROCEDURE — 25000003 PHARM REV CODE 250: Performed by: NURSE PRACTITIONER

## 2018-12-17 PROCEDURE — 84100 ASSAY OF PHOSPHORUS: CPT

## 2018-12-17 PROCEDURE — 25000003 PHARM REV CODE 250: Performed by: NEUROLOGICAL SURGERY

## 2018-12-17 PROCEDURE — 25000003 PHARM REV CODE 250: Performed by: PHYSICIAN ASSISTANT

## 2018-12-17 PROCEDURE — 36415 COLL VENOUS BLD VENIPUNCTURE: CPT

## 2018-12-17 PROCEDURE — 63600175 PHARM REV CODE 636 W HCPCS: Performed by: NEUROLOGICAL SURGERY

## 2018-12-17 RX ORDER — DIAZEPAM 5 MG/1
5 TABLET ORAL EVERY 6 HOURS PRN
Qty: 30 TABLET | Refills: 0 | Status: SHIPPED | OUTPATIENT
Start: 2018-12-17 | End: 2018-12-27 | Stop reason: SDUPTHER

## 2018-12-17 RX ORDER — OXYCODONE AND ACETAMINOPHEN 7.5; 325 MG/1; MG/1
1 TABLET ORAL EVERY 6 HOURS PRN
Qty: 30 TABLET | Refills: 0 | Status: SHIPPED | OUTPATIENT
Start: 2018-12-17 | End: 2018-12-31

## 2018-12-17 RX ADMIN — DOCUSATE SODIUM 100 MG: 100 CAPSULE, LIQUID FILLED ORAL at 08:12

## 2018-12-17 RX ADMIN — BUPROPION HYDROCHLORIDE 150 MG: 150 TABLET, FILM COATED, EXTENDED RELEASE ORAL at 08:12

## 2018-12-17 RX ADMIN — DIAZEPAM 5 MG: 5 TABLET ORAL at 05:12

## 2018-12-17 RX ADMIN — LEVOTHYROXINE SODIUM 25 MCG: 25 TABLET ORAL at 08:12

## 2018-12-17 RX ADMIN — HEPARIN SODIUM 5000 UNITS: 5000 INJECTION, SOLUTION INTRAVENOUS; SUBCUTANEOUS at 05:12

## 2018-12-17 RX ADMIN — METHOCARBAMOL TABLETS 500 MG: 500 TABLET, COATED ORAL at 09:12

## 2018-12-17 RX ADMIN — GABAPENTIN 600 MG: 300 CAPSULE ORAL at 08:12

## 2018-12-17 RX ADMIN — OXYCODONE AND ACETAMINOPHEN 1 TABLET: 7.5; 325 TABLET ORAL at 08:12

## 2018-12-17 RX ADMIN — OXYCODONE AND ACETAMINOPHEN 1 TABLET: 7.5; 325 TABLET ORAL at 03:12

## 2018-12-17 RX ADMIN — POLYETHYLENE GLYCOL 3350 17 G: 17 POWDER, FOR SOLUTION ORAL at 08:12

## 2018-12-17 RX ADMIN — OXYCODONE AND ACETAMINOPHEN 1 TABLET: 7.5; 325 TABLET ORAL at 01:12

## 2018-12-17 RX ADMIN — FLUOXETINE HYDROCHLORIDE 40 MG: 20 CAPSULE ORAL at 08:12

## 2018-12-17 RX ADMIN — LEVOTHYROXINE SODIUM 200 MCG: 100 TABLET ORAL at 08:12

## 2018-12-17 RX ADMIN — DIAZEPAM 5 MG: 5 TABLET ORAL at 11:12

## 2018-12-17 RX ADMIN — MUPIROCIN 1 G: 20 OINTMENT TOPICAL at 08:12

## 2018-12-17 NOTE — ASSESSMENT & PLAN NOTE
44 yo female with chiari I malformation now s/p decompression (12/13).    - Doing well post operatively, pain control improved  - Post operative imaging is satisfactory.  - Drain: none  - Pain control: Scheduled valium Q6h, incr PO pain med to percocet 7.5  - Atelectasis ppx: IS Q2h while awake.  - DVT ppx: TEDs, SCDs, SQH.  - GI: Diet as tolerated. Continue bowel regimen.  - Bacitracin to incision BID.  - Remove morris today.  - PT/OT: n/a  - Disposition: Home today with home health once voids after morris removed.

## 2018-12-17 NOTE — PLAN OF CARE
CM met with patient to obtain discharge planning assessment.  Patient is POD 4 for decompression of chiari malformation.  Planned discharge is home with home health - Plan (A) or home - Plan (B).    PCP:  Amaya Aiken MD     Payor:  Payor: [facility] BLUE SHIELD / Plan: BLUE CONNECT / Product Type: HMO /      Pharmacy:    [id number] - [place], [place] - [address]  [address]  [facility] [address]  Phone: [phone] Fax: [phone]       12/17/18 6840   Discharge Assessment   Assessment Type Discharge Planning Assessment   Confirmed/corrected address and phone number on facesheet? Yes   Assessment information obtained from? Patient   Communicated expected length of stay with patient/caregiver no   Prior to hospitilization cognitive status: Alert/Oriented   Prior to hospitalization functional status: Assistive Equipment   Current cognitive status: Alert/Oriented   Current Functional Status: Independent   Lives With alone   Able to Return to Prior Arrangements yes   Is patient able to care for self after discharge? Yes   Who are your caregiver(s) and their phone number(s)? Maricruz - mother [phone]   Patient's perception of discharge disposition home health   Readmission Within the Last 30 Days no previous admission in last 30 days   Patient currently being followed by outpatient case management? No   Patient currently receives any other outside agency services? No   Equipment Currently Used at Home walker, rolling;cane, quad;bath bench   Do you have any problems affording any of your prescribed medications? No   Is the patient taking medications as prescribed? yes   Does the patient have transportation home? Yes   Transportation Anticipated family or friend will provide   Discharge Plan A Home Health   Discharge Plan B Home   Patient/Family in Agreement with Plan yes

## 2018-12-17 NOTE — NURSING
Discharge instructions reviewed with pt and family. All questions encouraged and answered. IV removed. Prescriptions given. Pt voided x1 following morris removal. W/C escort down to parking garage provided. Family to provide transportation home. All belongings with pt.

## 2018-12-17 NOTE — PROGRESS NOTES
[facility]  Neurosurgery  Progress Note    Subjective:     History of Present Illness: 43 y.o. female who presents for elective decompression of Chiari malformation.    Post-Op Info:  Procedure(s) (LRB):  DECOMPRESSION, CHIARI MALFORMATION, BY 1ST CERVICAL VERTEBRA POSTERIOR ARCH REMOVAL (N/A)   4 Days Post-Op     Interval History: POD#4. Improved pain control and positional swelling from lying on one side of head. Requesting HH. Stable for dc today.    Medications:  Continuous Infusions:  Scheduled Meds:   buPROPion  150 mg Oral Daily    diazePAM  5 mg Oral Q6H    docusate sodium  100 mg Oral Daily    FLUoxetine  40 mg Oral Daily    gabapentin  600 mg Oral TID    heparin (porcine)  5,000 Units Subcutaneous Q8H    levothyroxine  200 mcg Oral Daily    levothyroxine  25 mcg Oral Daily    mupirocin  1 g Nasal BID    polyethylene glycol  17 g Oral Daily     PRN Meds:acetaminophen, hydrALAZINE, methocarbamol, ondansetron, oxyCODONE-acetaminophen, sodium chloride 0.9%     Review of Systems    Objective:     Weight: 112.1 kg (247 lb 3.2 oz)  Body mass index is 38.72 kg/m².  Vital Signs (Most Recent):  Temp: 99 °F (37.2 °C) (12/17/18 1126)  Pulse: 69 (12/17/18 1126)  Resp: 16 (12/17/18 0739)  BP: 121/75 (12/17/18 1126)  SpO2: 98 % (12/17/18 1126) Vital Signs (24h Range):  Temp:  [98.1 °F (36.7 °C)-99.1 °F (37.3 °C)] 99 °F (37.2 °C)  Pulse:  [64-81] 69  Resp:  [16-18] 16  SpO2:  [96 %-98 %] 98 %  BP: (107-136)/(52-91) 121/75     Date 12/17/18 0700 - 12/18/18 0659   Shift 7759-1873 7754-8693 4801-7123 24 Hour Total   INTAKE   Shift Total(mL/kg)       OUTPUT   Urine(mL/kg/hr) 550   550   Shift Total(mL/kg) 550(4.9)   550(4.9)   Weight (kg) 112.1 112.1 112.1 112.1                        Urethral Catheter 12/14/18 1930 16 Fr. (Active)   Site Assessment Clean;Intact 12/16/2018  8:30 AM   Collection Container Leg bag 12/16/2018  8:30 AM   Securement Method secured to top of thigh w/ adhesive device  "12/16/2018  8:30 AM   Catheter Care Performed yes 12/15/2018  7:58 AM   Reason for Continuing Urinary Catheterization Urinary retention 12/15/2018  7:58 AM   CAUTI Prevention Bundle StatLock in place w 1" slack;Intact seal between catheter & drainage tubing;Drainage bag off the floor;Green sheeting clip in use;No dependent loops or kinks;Drainage bag not overfilled (<2/3 full);Drainage bag below bladder 12/15/2018  7:58 AM   Output (mL) 1500 mL 12/15/2018  6:13 PM       Neurosurgery Physical Exam     General: well developed, well nourished, no distress.   Head: normocephalic, atraumatic  Neurologic: Alert and oriented. Thought content appropriate.  GCS: Motor: 6/Verbal: 5/Eyes: 4 GCS Total: 15  Mental Status: Awake, Alert, Oriented x 4  Language: No aphasia  Speech: No dysarthria  Cranial nerves: face symmetric, tongue midline, CN II-XII grossly intact.   Eyes: pupils equal, round, reactive to light with accomodation, EOMI.  Sensory: intact to light touch throughout     Motor Strength: Moves all extremities spontaneously with good tone.  Full strength upper and lower extremities. No abnormal movements seen.      DTR's: 2 + and symmetric in UE and LE  Pronator Drift: no drift noted  Finger-to-nose: Intact bilaterally  Skin: Skin is warm, dry and intact.     Incisions c/d/i well approximated with staples. Slight swelling to L of midline in comparison to R (patient has been lying on R side of head, states is improved).        Significant Labs:  No results for input(s): GLU, NA, K, CL, CO2, BUN, CREATININE, CALCIUM, MG in the last 48 hours.  No results for input(s): WBC, HGB, HCT, PLT in the last 48 hours.  No results for input(s): LABPT, INR, APTT in the last 48 hours.  Microbiology Results (last 7 days)     ** No results found for the last 168 hours. **        Recent Lab Results       12/17/18  0517        Phosphorus 3.7           Significant Diagnostics:  I have reviewed all pertinent imaging results/findings within " the past 24 hours.    Assessment/Plan:     * Arnold-Chiari malformation    42 yo female with chiari I malformation now s/p decompression (12/13).    - Doing well post operatively, pain control improved  - Post operative imaging is satisfactory.  - Drain: none  - Pain control: Scheduled valium Q6h, incr PO pain med to percocet 7.5  - Atelectasis ppx: IS Q2h while awake.  - DVT ppx: TEDs, SCDs, SQH.  - GI: Diet as tolerated. Continue bowel regimen.  - Bacitracin to incision BID.  - Remove morris today.  - PT/OT: n/a  - Disposition: Home today with home health once voids after morris removed.           MONIKA TariqC  Neurosurgery  [facility]

## 2018-12-17 NOTE — OP NOTE
DATE OF PROCEDURE:  12/13/2018.    PREOPERATIVE DIAGNOSIS:  Chiari I malformation.    POSTOPERATIVE DIAGNOSIS:  Chiari I malformation.    OPERATIVE PROCEDURE UNDERGONE:  Suboccipital decompressive craniectomy, a C1   laminectomy with duraplasty with microsurgical technique.    SURGEON:  Sergio Busch M.D.    :  Darrius Santacruz M.D. (RES)/    ANESTHESIA:  General.    INDICATIONS FOR PROCEDURE:  This is a 43-year-old female with a history of   intractable Valsalva-induced headaches, who had failed medical treatment and was   found to have a significant Chiari malformation, and hence, we opted for   surgical intervention.    OPERATIVE NOTE IN DETAIL:  The patient was taken in the Operating Room,   anesthetized and intubated by Anesthesia.  Preoperative antibiotics were   administered.  The patient was placed in the prone position in a Sousa head   pin.  Head and neck were shaved, prepped and draped in sterile fashion.  A   midline incision was carried out.  Dissection was taken down to expose the   suboccipital region and the C1 lamina.  A craniotome was used to complete a C1   laminectomy.  Two sri holes were made in the suboccipital area.  Using a   craniotome, we were able to complete the craniectomy.  M8 was used to drill   through the foramen magnum.  The ligamentous band at the craniocervical junction   was taken down.  The microscope was brought in the field.  The dura was opened   in a linear fashion protecting the arachnoid.  The dura was tacked up.  Then,   microsurgical technique was used to perform a subarachnoid dissection and was   able to find the cerebellar tonsils were low, exposed the outlet of the fourth   ventricle.  We did buzz the bilateral tonsils to be able to ensure that the flow   of CSF out of the fourth ventricle was unimpeded.  Then, we turned our   attention to the occipital area, made a separate incision, harvested a   pericranial graft, used that for the duraplasty  repair under microsurgical   technique reinforced dural closure with DuraSeal, closed the graft site wound   and the rest of the wound in layers.  A sterile dressing was put in place.  The   patient was taken out of [place] head pin, extubated and brought to the ICU   without any problems or complication.  EBL was around 50 mL.  No specimens were   sent.      ARELY/DOMINIK  dd: 12/17/2018 10:18:31 (CST)  td: 12/17/2018 11:26:31 (CST)  Doc ID   #[id number]  Job ID #[id number]    CC:

## 2018-12-17 NOTE — PLAN OF CARE
Patient discharged home.  The patient will have home health upon discharge with [facility] which was set up per .  Family provided transportation home.  Neurosurgery clinic to schedule follow up appointment.    Future Appointments   Date Time Provider Department [place]   12/27/2018  2:20 PM Melanie Hdz RN [facility] NEUROS7 [facility]   1/18/2019 11:00 AM Anastacio Nevarez MD [facility]   1/22/2019 10:10 AM LAB, [facility] LAB [place]   1/29/2019  2:45 PM Sergio Busch MD [facility] NEUROS [place] [facility]   2/28/2019  1:00 PM Cori Wells MD [facility] PSYCH [place]        12/17/18 9810   Final Note   Assessment Type Final Discharge Note   Anticipated Discharge Disposition Home-Health   Hospital Follow Up  Appt(s) scheduled? (Neurosurgery clinic to schedule follow up appointment.)   Discharge plans and expectations educations in teach back method with documentation complete? Yes

## 2018-12-17 NOTE — SUBJECTIVE & OBJECTIVE
"Interval History: POD#4. Improved pain control and positional swelling from lying on one side of head. Requesting HH. Stable for dc today.    Medications:  Continuous Infusions:  Scheduled Meds:   buPROPion  150 mg Oral Daily    diazePAM  5 mg Oral Q6H    docusate sodium  100 mg Oral Daily    FLUoxetine  40 mg Oral Daily    gabapentin  600 mg Oral TID    heparin (porcine)  5,000 Units Subcutaneous Q8H    levothyroxine  200 mcg Oral Daily    levothyroxine  25 mcg Oral Daily    mupirocin  1 g Nasal BID    polyethylene glycol  17 g Oral Daily     PRN Meds:acetaminophen, hydrALAZINE, methocarbamol, ondansetron, oxyCODONE-acetaminophen, sodium chloride 0.9%     Review of Systems    Objective:     Weight: 112.1 kg (247 lb 3.2 oz)  Body mass index is 38.72 kg/m².  Vital Signs (Most Recent):  Temp: 99 °F (37.2 °C) (12/17/18 1126)  Pulse: 69 (12/17/18 1126)  Resp: 16 (12/17/18 0739)  BP: 121/75 (12/17/18 1126)  SpO2: 98 % (12/17/18 1126) Vital Signs (24h Range):  Temp:  [98.1 °F (36.7 °C)-99.1 °F (37.3 °C)] 99 °F (37.2 °C)  Pulse:  [64-81] 69  Resp:  [16-18] 16  SpO2:  [96 %-98 %] 98 %  BP: (107-136)/(52-91) 121/75     Date 12/17/18 0700 - 12/18/18 0659   Shift 0334-6769 3706-2756 8864-7305 24 Hour Total   INTAKE   Shift Total(mL/kg)       OUTPUT   Urine(mL/kg/hr) 550   550   Shift Total(mL/kg) 550(4.9)   550(4.9)   Weight (kg) 112.1 112.1 112.1 112.1                        Urethral Catheter 12/14/18 1930 16 Fr. (Active)   Site Assessment Clean;Intact 12/16/2018  8:30 AM   Collection Container Leg bag 12/16/2018  8:30 AM   Securement Method secured to top of thigh w/ adhesive device 12/16/2018  8:30 AM   Catheter Care Performed yes 12/15/2018  7:58 AM   Reason for Continuing Urinary Catheterization Urinary retention 12/15/2018  7:58 AM   CAUTI Prevention Bundle StatLock in place w 1" slack;Intact seal between catheter & drainage tubing;Drainage bag off the floor;Green sheeting clip in use;No dependent loops or " kinks;Drainage bag not overfilled (<2/3 full);Drainage bag below bladder 12/15/2018  7:58 AM   Output (mL) 1500 mL 12/15/2018  6:13 PM       Neurosurgery Physical Exam     General: well developed, well nourished, no distress.   Head: normocephalic, atraumatic  Neurologic: Alert and oriented. Thought content appropriate.  GCS: Motor: 6/Verbal: 5/Eyes: 4 GCS Total: 15  Mental Status: Awake, Alert, Oriented x 4  Language: No aphasia  Speech: No dysarthria  Cranial nerves: face symmetric, tongue midline, CN II-XII grossly intact.   Eyes: pupils equal, round, reactive to light with accomodation, EOMI.  Sensory: intact to light touch throughout     Motor Strength: Moves all extremities spontaneously with good tone.  Full strength upper and lower extremities. No abnormal movements seen.      DTR's: 2 + and symmetric in UE and LE  Pronator Drift: no drift noted  Finger-to-nose: Intact bilaterally  Skin: Skin is warm, dry and intact.     Incisions c/d/i well approximated with staples. Slight swelling to L of midline in comparison to R (patient has been lying on R side of head, states is improved).        Significant Labs:  No results for input(s): GLU, NA, K, CL, CO2, BUN, CREATININE, CALCIUM, MG in the last 48 hours.  No results for input(s): WBC, HGB, HCT, PLT in the last 48 hours.  No results for input(s): LABPT, INR, APTT in the last 48 hours.  Microbiology Results (last 7 days)     ** No results found for the last 168 hours. **        Recent Lab Results       12/17/18  0517        Phosphorus 3.7           Significant Diagnostics:  I have reviewed all pertinent imaging results/findings within the past 24 hours.

## 2018-12-17 NOTE — DISCHARGE INSTRUCTIONS
Please follow ONLY the instructions that are checked below.    Activity Restrictions:  [x]  Return to work will be determined on an individual basis.  [x]  No lifting greater than 10 pounds.  [x]  No driving or operating machinery:  [x]  until cleared by your surgeon.  [x]  while taking narcotic pain medications or muscle relaxants.  [x]  Increase ambulation over the next 2 weeks so that you are walking 2 miles per day at 2 weeks post-operatively.  [x]  Walk on paved surfaces only. It is okay to walk up and down stairs while holding onto a side rail.  [x]  No sexual activity for 2-3 weeks.    Discharge Medication/Follow-up:  [x]  Please refer to discharge medication reconciliation form.  [x]  Do not take ANY non-steroidal anti-inflammatory drugs (NSAIDS), including the following: ibuprofen, naprosyn, Aleve, Advil, Indocin, Mobic, or Celebrex for:  [x]  4 weeks  []  8 weeks  []  6 months  [x]  Prescriptions for appropriate medication will be given upon discharge.   []  Pain control:             []  Other:             [x]  Take docusate (Colace 100 mg): take one capsule a day as needed for constipation. You can get this over the counter.  [x]  Follow-up appointment:  [x]  10-14 days post-op for wound check by physician assistant/nurse  [x]  4-6 weeks with MD:  [x]  An appointment will be mailed to you.    Wound Care:  []  Remove dressing or bandaid in    days.  [x]  No bandage required. Keep your incision open to the air.  [x]  You may shower on the 2nd day after your surgery. Have the force of water hit you opposite from the incision. Pat the incision dry after your shower; do not scrub the incision.  [x]  You cannot take a bath until 8 weeks after surgery.  [x]  Apply bacitracin to incision twice a day for  12  more days.    Call your doctor or go to the Emergency Room for any signs of infection, including: increased redness, drainage, pain, or fever (temperature ?101.5 for 24 hours). Call your doctor or go to the  Emergency Room if there are any localized neurological changes; problems with speech, vision, numbness, tingling, weakness, or severe headache; or for other concerns.    Special Instructions:  [x]  No use of tobacco products.  [x]  Diet: Please eat a regular diet as tolerated.  []  Other diet:              Specific physician instructions:           Physicians need 3 days' notice for pain medicine to be refilled. Pain medicine will only be refilled between 8 AM and 5 PM, Monday through Friday, due to Food and Drug Administration regulation of documentation.    If you have any questions about this form, please call [phone].    Form No. 29305 (Revised 10/31/2013)

## 2018-12-17 NOTE — PLAN OF CARE
[facility] following for DC needs. SW in communication with CM.    Patient does not have a preference for HH. SW sent HH orders to [facility]  via [facility].      12/17/18 1147   Post-Acute Status   Post-Acute Authorization Placement   Post-Acute Placement Status Referrals Sent     UPDATE 1:30 PM  [facility] accepted the patient.     Harriett Juares LMSW  [facility]  [id number]

## 2018-12-18 NOTE — DISCHARGE SUMMARY
[facility]  Neurosurgery  Discharge Summary      Patient Name: Dinah Mitchell  MRN: [id number]  Admission Date: 12/13/2018  Hospital Length of Stay: 4 days  Discharge Date and Time: 12/17/2018  2:35 PM  Attending Physician: No att. providers found   Discharging Provider: Alida Ott PA-C  Primary Care Provider: Amaya Aiken MD    HPI:   43 y.o. female who presents for elective decompression of Chiari malformation.    Procedure(s) (LRB):  DECOMPRESSION, CHIARI MALFORMATION, BY 1ST CERVICAL VERTEBRA POSTERIOR ARCH REMOVAL (N/A)     Hospital Course: 12/15/2018 Poor pain control. Regimen adjusted.  12/16: Endorses positional swelling, better pain control, requesting HH.   12/17/2018 HAs, incisional pain improved. Stable for dc home today.    Consults: none    Significant Diagnostic Studies: cbc, bmp, mri flow study    Pending Diagnostic Studies:     None        Final Active Diagnoses:    Diagnosis Date Noted POA    PRINCIPAL PROBLEM:  Arnold-Chiari malformation [Q07.00] 12/13/2018 Yes    Thyroid activity decreased [E03.9] 12/14/2018 Yes      Problems Resolved During this Admission:      Discharged Condition: stable    Disposition: Home-Health Care [facility]    Follow Up: See the patient instruction tab for detailed discharge instructions and follow up information.    Patient Instructions:      Ambulatory referral to Home Health   Referral Priority: Routine Referral Type: Home Health   Referral Reason: Specialty Services Required   Requested Specialty: Home Health Services   Number of Visits Requested: 1     Medications:  Reconciled Home Medications:      Medication List      START taking these medications    * diazePAM 5 MG tablet  Commonly known as:  VALIUM  Take 1 tablet (5 mg total) by mouth every 6 (six) hours as needed (muscle spasm).     * diazePAM 5 MG tablet  Commonly known as:  VALIUM  Take 1 tablet (5 mg total) by mouth every 6 (six) hours as needed (muscle spasm).      HYDROcodone-acetaminophen 5-325 mg per tablet  Commonly known as:  NORCO  Take 1 tablet by mouth every 6 (six) hours as needed for Pain.     oxyCODONE-acetaminophen 7.5-325 mg per tablet  Commonly known as:  PERCOCET  Take 1 tablet by mouth every 6 (six) hours as needed for Pain.         * This list has 2 medication(s) that are the same as other medications prescribed for you. Read the directions carefully, and ask your doctor or other care provider to review them with you.            CHANGE how you take these medications    ALPRAZolam 1 MG tablet  Commonly known as:  XANAX  TAKE 1 TABLET (1 MG TOTAL) BY MOUTH DAILY AS NEEDED FOR ANXIETY.  What changed:  when to take this        CONTINUE taking these medications    buPROPion 150 MG TB24 tablet  Commonly known as:  WELLBUTRIN XL  Take 1 tablet (150 mg total) by mouth once daily.     diphenoxylate-atropine 2.5-0.025 mg 2.5-0.025 mg per tablet  Commonly known as:  LOMOTIL  TAKE 1 TABLET BY MOUTH 4 (FOUR) TIMES DAILY AS NEEDED FOR DIARRHEA.     dronabinol 2.5 MG capsule  Commonly known as:  MARINOL  Take 1 capsule (2.5 mg total) by mouth 2 (two) times daily before meals.     FLUoxetine 40 MG capsule  Take 1 capsule (40 mg total) by mouth once daily.     gabapentin 600 MG tablet  Commonly known as:  NEURONTIN  Take 1 tablet (600 mg total) by mouth 3 (three) times daily.     levOCARNitine 330 mg Tab  Commonly known as:  CARNITOR  Take 3 tablets (990 mg total) by mouth 2 (two) times daily.     medroxyPROGESTERone 150 mg/mL injection  Commonly known as:  DEPO-PROVERA  Inject 150 mg into the muscle every 3 (three) months.     montelukast 10 mg tablet  Commonly known as:  SINGULAIR  TAKE 1 TABLET (10 MG TOTAL) BY MOUTH ONCE DAILY.     prazosin 1 MG Cap  Commonly known as:  MINIPRESS  Take 1 capsule (1 mg total) by mouth every evening. For ptsd related nightmares     * SYNTHROID 200 MCG tablet  Generic drug:  levothyroxine  TAKE 1 TABLET (200 MCG TOTAL) BY MOUTH ONCE  DAILY.     * levothyroxine 200 MCG tablet  Commonly known as:  SYNTHROID  Take 1 tablet (200 mcg total) by mouth once daily.     * levothyroxine 25 MCG tablet  Commonly known as:  SYNTHROID  Take 1 tablet (25 mcg total) by mouth once daily.     UNABLE TO FIND  N-acetyl-L-cysteine 50 mg subcutaneously 3 times a week     VENTOLIN HFA 90 mcg/actuation inhaler  Generic drug:  albuterol  TAKE 2 PUFFS BY MOUTH EVERY 6 HOURS AS NEEDED FOR WHEEZE     zolpidem 12.5 MG CR tablet  Commonly known as:  AMBIEN CR  Take 1 tablet (12.5 mg total) by mouth nightly as needed for Insomnia.         * This list has 3 medication(s) that are the same as other medications prescribed for you. Read the directions carefully, and ask your doctor or other care provider to review them with you.            STOP taking these medications    dextroamphetamine-amphetamine 20 mg tablet  Commonly known as:  AMPHETAMINE SALT COMBO     methocarbamol 500 MG Tab  Commonly known as:  ROBAXIN     tiZANidine 4 MG tablet  Commonly known as:  ZANAFLEX     traMADol 50 mg tablet  Commonly known as:  ULTRAM            Alida Ott PA-C  Neurosurgery  [facility]

## 2018-12-19 ENCOUNTER — PATIENT OUTREACH (OUTPATIENT)
Dept: ADMINISTRATIVE | Facility: CLINIC | Age: 43
End: 2018-12-19

## 2018-12-19 ENCOUNTER — TELEPHONE (OUTPATIENT)
Dept: FAMILY MEDICINE | Facility: CLINIC | Age: 43
End: 2018-12-19

## 2018-12-19 ENCOUNTER — TELEPHONE (OUTPATIENT)
Dept: NEUROSURGERY | Facility: CLINIC | Age: 43
End: 2018-12-19

## 2018-12-19 NOTE — TELEPHONE ENCOUNTER
Advised that I spoke with someone the other day, they can have ST and OT evaluate her if needed. Just send us the order for DR Busch to sign

## 2018-12-19 NOTE — TELEPHONE ENCOUNTER
Please advise regarding patient's current medication regimen, patient requesting advice on whether Ambien should be taken with other medications (oxycodone, hydrocodone).

## 2018-12-19 NOTE — TELEPHONE ENCOUNTER
----- Message from Bozena Crooks LPN sent at 12/19/2018  3:46 PM CST -----  Spoke with patient during TCC call. Patient is concerned about taking Ambien while taking other narcotics. Would like to know if she can take the Ambien a few hours after taking pain meds. Please call patient to discuss.    Respectfully,  Bozena Crooks LPN  [facility]    [email or web address]       Please do not reply to this message, as this inbox is not routinely monitored.

## 2018-12-19 NOTE — PATIENT INSTRUCTIONS
Sepsis     To treat sepsis, antibiotics and fluids may by given through an intravenous (IV) line.     Sepsis happens when your body responds with widespread inflammation to a bad infection or bacteremia--the presence of bacteria in your bloodstream. Sepsis can be deadly. Blood pressure may drop and the lungs and kidneys may start to fail. Emergency care for sepsis is crucial.  Risk factors  Those most at risk for sepsis are:  · Infants or older adults  · People who have an illness that weakens their immune system, such as cancer, AIDS, or diabetes  · People being treated with chemotherapy medicines or radiation, which weakens the immune system  · People who have had a transplant  · People with a very severe infection such as pneumonia, meningitis, or a urinary tract infection  When to go to the emergency department (ED)  Sepsis is an emergency. Go to the nearest ED if you have a fever with any of these symptoms:  · Chills and shaking  · Rapid heartbeat and breathing  · Trouble breathing  · Severe nausea or uncontrolled vomiting  · Confusion, disorientation, drowsiness, or dizziness  · Decreased urination  · Severe pain, including in the back or joints   What to expect in the ED  · Blood and urine tests are done to look for bacteria. They also check for organ failure.  · Blood, urine, or sputum cultures may be taken. The samples are sent to a lab. They are placed in a special container. Any bacteria should grow in 24 hours.  · X-rays or other imaging tests may be done.  A person with sepsis will be admitted to the hospital and treated with antibiotics. Treatment may also include oxygen and intravenous fluids.  Date Last Reviewed: 10/1/2016  © 7254-6017 [employer]. [address] [place] [address], [place] [address]. All rights reserved. This information is not intended as a substitute for professional medical care. Always follow your healthcare professional's instructions.

## 2018-12-19 NOTE — TELEPHONE ENCOUNTER
----- Message from Osvaldo Fitzpatrick sent at 12/19/2018  8:46 AM CST -----  Contact: Kylee ([facility]) [phone]  Needs Advice    Reason for call: Kylee called to speak to someone regarding speech therapy. Please contact her to discuss further.          Communication Preference:PHONE     Additional Information:

## 2018-12-20 ENCOUNTER — TELEPHONE (OUTPATIENT)
Dept: PSYCHIATRY | Facility: CLINIC | Age: 43
End: 2018-12-20

## 2018-12-20 NOTE — TELEPHONE ENCOUNTER
Called pt to discuss her question.     Recommended she NOT cont ambien while taking opioid pain mgmt. She expressed understanding and agrees to cont'd pain mgmt per neurosurgery. Once pain has subsided and pain meds weaned she can restart txmt with ambien.

## 2018-12-20 NOTE — TELEPHONE ENCOUNTER
----- Message from Vikki Chavez MA sent at 12/19/2018  4:05 PM CST -----      ----- Message -----  From: Ning Alonzo MA  Sent: 12/19/2018   3:59 PM  To: [facility] Staff        ----- Message -----  From: Bozena Crooks LPN  Sent: 12/19/2018   3:46 PM  To: Attila Bedoya, Narayan [facility]TRAN Staff    Spoke with patient during TCC call. Patient is concerned about taking Ambien while taking other narcotics. Would like to know if she can take the Ambien a few hours after taking pain meds. Please call patient to discuss.    Respectfully,  Bozena Crooks LPN  [facility]    [email or web address]       Please do not reply to this message, as this inbox is not routinely monitored.

## 2018-12-20 NOTE — TELEPHONE ENCOUNTER
Called the pt to discuss her question. I recommended that she NOT take ambien while still taking narcotic pain medication.     She expresses understanding. Will cont with appropriate pain mgmt per neurosurgery

## 2018-12-26 RX ORDER — DIAZEPAM 5 MG/1
TABLET ORAL
Qty: 30 TABLET | Refills: 0 | OUTPATIENT
Start: 2018-12-26

## 2018-12-26 NOTE — TELEPHONE ENCOUNTER
----- Message from Estrellita Caballero sent at 12/26/2018 10:55 AM CST -----  Contact: self @ [phone]  Pt is req a refill for diazePAM (VALIUM) 5 MG tablet and oxycodone 7.5/325.  Pt says she is out of medication.  Pls call pt when ready to be picked up.

## 2018-12-26 NOTE — TELEPHONE ENCOUNTER
----- Message from Estrellita Caballero sent at 12/26/2018 10:55 AM CST -----  Contact: self @ [phone]  Pt is req a refill for diazePAM (VALIUM) 5 MG tablet and oxycodone 7.5/325.  Pt says she is out of medication.  Pls call pt when ready to be picked up.

## 2018-12-26 NOTE — TELEPHONE ENCOUNTER
----- Message from Estrellita Caballero sent at 12/26/2018 10:55 AM CST -----  Contact: self @ [phone]  Pt is req a refill for diazePAM (VALIUM) 5 MG tablet and oxycodone 7.5/325.  Pt says she is out of medication.  Pls call pt when ready to be picked up.

## 2018-12-27 ENCOUNTER — TELEPHONE (OUTPATIENT)
Dept: NEUROSURGERY | Facility: CLINIC | Age: 43
End: 2018-12-27

## 2018-12-27 ENCOUNTER — CLINICAL SUPPORT (OUTPATIENT)
Dept: NEUROSURGERY | Facility: CLINIC | Age: 43
End: 2018-12-27
Payer: COMMERCIAL

## 2018-12-27 PROCEDURE — 99999 PR PBB SHADOW E&M-EST. PATIENT-LVL I: CPT | Mod: PBBFAC,,,

## 2018-12-27 RX ORDER — HYDROCODONE BITARTRATE AND ACETAMINOPHEN 5; 325 MG/1; MG/1
1 TABLET ORAL EVERY 6 HOURS PRN
Qty: 60 TABLET | Refills: 0 | Status: SHIPPED | OUTPATIENT
Start: 2018-12-27 | End: 2019-02-12

## 2018-12-27 RX ORDER — DIAZEPAM 5 MG/1
5 TABLET ORAL EVERY 6 HOURS PRN
Qty: 30 TABLET | Refills: 0 | Status: SHIPPED | OUTPATIENT
Start: 2018-12-27 | End: 2019-01-09 | Stop reason: SDUPTHER

## 2018-12-27 NOTE — PROGRESS NOTES
Patient seen in clinic for 2 week post op s/p chiari decompression with Dr Busch on 12/13/2018           Incision on posterior head and neck assessed, removed staples patient tolerated well, little redness and irritation, no swelling or purulent drainage, edges well approximated.     _    Patient was instructed as follows:    Discontinue Bacitracin after tonight.   May shower normally but pat dry after shower.   Do not submerge wound in bath tub or go swimming until released by the physician   Keep incision clean, dry and open to air as much as possible.   Patient encouraged to walk as much as possible but advised to walk with family member or friend and rest as necessary.   No lifting >10lbs.   Avoid bending and twisting the area of your surgery more than 45 degrees from neutral position in any direction.   Return to work will be determined on an individual basis.   No driving or operating machinery while taking narcotic pain medication or muscle relaxants and until cleared by a surgeon.      A copy of post-operative instructions provided to the patient.    All questions were answered. Patient will follow up with Dr Busch 01/29/2019 . Patient was encouraged to call clinic with any future concerns prior to follow up appt. If any worsening symptoms, patient should report to ED.       Melanie Hdz RN, BSN  Neurosurgery

## 2018-12-27 NOTE — TELEPHONE ENCOUNTER
----- Message from Santasammy Dominguez sent at 12/27/2018  3:57 PM CST -----  Contact: Pt  Calling in regards to her prescriptions not being covered by her insuracnce for HYDROcodone-acetaminophen (NORCO) 5-325 mg per tablet and diazePAM (VALIUM) 5 MG tablet. Pt stated she has to pay out of pocket, but would like to know if there was something to be done so she can get the medication through her insurance instead of paying out of pocket.       Call back number: [phone] or Home: [phone]

## 2018-12-28 VITALS — TEMPERATURE: 99 F

## 2018-12-31 ENCOUNTER — OFFICE VISIT (OUTPATIENT)
Dept: FAMILY MEDICINE | Facility: CLINIC | Age: 43
End: 2018-12-31
Payer: MEDICARE

## 2018-12-31 VITALS
RESPIRATION RATE: 18 BRPM | HEIGHT: 67 IN | HEART RATE: 88 BPM | TEMPERATURE: 99 F | BODY MASS INDEX: 37.54 KG/M2 | SYSTOLIC BLOOD PRESSURE: 110 MMHG | WEIGHT: 239.19 LBS | DIASTOLIC BLOOD PRESSURE: 70 MMHG

## 2018-12-31 DIAGNOSIS — Z09 HOSPITAL DISCHARGE FOLLOW-UP: Primary | ICD-10-CM

## 2018-12-31 DIAGNOSIS — Q07.00 ARNOLD-CHIARI MALFORMATION: ICD-10-CM

## 2018-12-31 DIAGNOSIS — S46.812A STRAIN OF LEFT TRAPEZIUS MUSCLE, INITIAL ENCOUNTER: ICD-10-CM

## 2018-12-31 DIAGNOSIS — F33.2 MAJOR DEPRESSIVE DISORDER, RECURRENT, SEVERE WITHOUT PSYCHOTIC FEATURES: ICD-10-CM

## 2018-12-31 DIAGNOSIS — M47.812 SPONDYLOSIS OF CERVICAL REGION WITHOUT MYELOPATHY OR RADICULOPATHY: ICD-10-CM

## 2018-12-31 PROCEDURE — 99214 OFFICE O/P EST MOD 30 MIN: CPT | Mod: S$GLB,,, | Performed by: NURSE PRACTITIONER

## 2018-12-31 PROCEDURE — 99214 PR OFFICE/OUTPT VISIT, EST, LEVL IV, 30-39 MIN: ICD-10-PCS | Mod: S$GLB,,, | Performed by: NURSE PRACTITIONER

## 2018-12-31 RX ORDER — TIZANIDINE 4 MG/1
1 TABLET ORAL NIGHTLY
COMMUNITY
End: 2019-03-19 | Stop reason: SDUPTHER

## 2018-12-31 NOTE — PROGRESS NOTES
Subjective:       Patient ID: Dinah Mitchell is a 43 y.o. female.    Chief Complaint: Hospital Follow Up (Decompression Chiari Malformation surgery: Surgery 12/13)    HPI Answers for HPI/ROS submitted by the patient on 12/29/2018   activity change: Yes  unexpected weight change: No  neck pain: Yes  hearing loss: No  rhinorrhea: No  trouble swallowing: Yes  eye discharge: No  visual disturbance: No  chest tightness: No  wheezing: No  chest pain: No  palpitations: No  blood in stool: No  constipation: No  vomiting: No  diarrhea: No  polydipsia: No  polyuria: No  difficulty urinating: No  hematuria: No  menstrual problem: No  dysuria: No  joint swelling: No  arthralgias: Yes  headaches: Yes  weakness: Yes  confusion: Yes  dysphoric mood: Yes    She is here for hospital follow up. She had Decompression Chiari Malformation repair. She feels that the headaches are better, but not completely resolved. She has a complicated PMH, PSH. She has followed up with the neurosurgery doctor. The incision to the back of her neck and scalp has healed well. She states she has been over doing it. She has her mother and aunt here helping her but states she ends up having to take care of them. She states the lifted a heavy bag of garbage and pulled the left upper back area. She is aware that she is only two weeks post surgery and that she is not cleared to lift more than 5 lbs. She has to followed instructions. She denies any vision changes. She states she is tired all the time and feels that since the surgery she is a little more depressed. She states she is taking her medication as prescribed for her depression. She does not feel like she wants to harm herself. She denies any other concerns. See ROS.    The following portion of the patients history was reviewed and updated as appropriate: allergies, current medications, past medical and surgical history. Past social history and problem list reviewed. Family PMH and Past social history  "reviewed. Tobacco, Illicit drug use reviewed.     She is doing OT/PT. Having some left trapezius spasms and pain. She is taking pain medication and muscle relaxers. Left hand numbness is better. No fever.     The following portion of the patients history was reviewed and updated as appropriate: allergies, current medications, past medical and surgical history. Past social history and problem list reviewed. Family PMH and Past social history reviewed. Tobacco, Illicit drug use reviewed.     Review of Systems   Constitutional: Positive for activity change and fatigue. Negative for fever and unexpected weight change.        Two weeks post surgery   HENT: Positive for trouble swallowing (she was evaluted for swollowing issues when in the hospital. no choking). Negative for hearing loss and rhinorrhea.    Eyes: Negative for discharge and visual disturbance.   Respiratory: Negative for chest tightness and wheezing.    Cardiovascular: Negative for chest pain and palpitations.   Gastrointestinal: Negative for blood in stool, constipation, diarrhea and vomiting.   Endocrine: Negative for polydipsia and polyuria.   Genitourinary: Negative for difficulty urinating, dysuria, hematuria and menstrual problem.   Musculoskeletal: Positive for arthralgias and neck pain. Negative for joint swelling.   Neurological: Positive for weakness and headaches (getting better).   Psychiatric/Behavioral: Positive for dysphoric mood and sleep disturbance (due to neck discomfort.). Negative for confusion.       Objective:     /70   Pulse 88   Temp 98.8 °F (37.1 °C) (Oral)   Resp 18   Ht 5' 7" (1.702 m)   Wt 108.5 kg (239 lb 3.2 oz)   LMP 11/15/2018 (Approximate)   BMI 37.46 kg/m²      Physical Exam   Constitutional: oriented to person, place, and time. well-developed and well-nourished. she appears tired.  HENT: normal nares, throat clear. TM and canals clear.  Head: Normocephalic.   Eyes: Conjunctivae are normal. Pupils are equal, " round, and reactive to light.   Neck: Normal range of motion. Neck supple. No tracheal deviation present. No thyromegaly present.   Cardiovascular: Normal rate, regular rhythm and normal heart sounds.    Pulmonary/Chest: Effort normal and breath sounds normal. No respiratory distress. No wheezes.   Abdominal: Soft. Bowel sounds are normal. No distension. There is no tenderness.   Musculoskeletal: Normal range of motion. Good flection to neck. She has tightness and spasms noted to the left trapezius muscle.  strong, equal   Neurological: oriented to person, place, and time. facial expression. Facial expressions symmetrical. No slurred speech.  Skin: Skin is warm and dry. No rashes or lesions. Surgical incision to back of scalp and neck is healing well.   Psychiatric: depressed mood and affect.Behavior is normal. Judgment and thought content normal.   Assessment:       1. Hospital discharge follow-up    2. Arnold-Chiari malformation    3. 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    4. Major depressive disorder, recurrent, severe without psychotic features    5. Strain of left trapezius muscle, initial encounter        Plan:         Dinah was seen today for hospital follow up.    Diagnoses and all orders for this visit:    Hospital discharge follow-up    Arnold-Chiari malformation: incision is healing well. Headaches have improved. No neurological deficits noted. She is to follow with Neurology.     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: continue to follow with neurosurgery.    Major depressive disorder, recurrent, severe without psychotic features: continue on current medications.     Strain of the left trapezius: light stretching, moist heat, massage. She needs to STOP lifting heavy objects. She is only 2 weeks post op and not cleared for lifting. She is aware of the dangers of not following post op neurosurgical recommendations.    Continue current  medication  Take medications only as prescribed  Healthy diet, exercise  Adequate rest  Adequate hydration  Avoid allergens  Avoid excessive caffeine

## 2018-12-31 NOTE — PROGRESS NOTES
Answers for HPI/ROS submitted by the patient on 12/29/2018   activity change: Yes  unexpected weight change: No  neck pain: Yes  hearing loss: No  rhinorrhea: No  trouble swallowing: Yes  eye discharge: No  visual disturbance: No  chest tightness: No  wheezing: No  chest pain: No  palpitations: No  blood in stool: No  constipation: No  vomiting: No  diarrhea: No  polydipsia: No  polyuria: No  difficulty urinating: No  hematuria: No  menstrual problem: No  dysuria: No  joint swelling: No  arthralgias: Yes  headaches: Yes  weakness: Yes  confusion: Yes  dysphoric mood: Yes

## 2019-01-02 ENCOUNTER — TELEPHONE (OUTPATIENT)
Dept: ADMINISTRATIVE | Facility: CLINIC | Age: 44
End: 2019-01-02

## 2019-01-03 ENCOUNTER — TELEPHONE (OUTPATIENT)
Dept: GENETICS | Facility: CLINIC | Age: 44
End: 2019-01-03

## 2019-01-03 NOTE — TELEPHONE ENCOUNTER
----- Message from Denise Hooper sent at 1/3/2019  3:26 PM CST -----  Contact: [phone]  PT  Patient Requesting Sooner Appointment.     Reason for sooner appt.: Follow up apt    When is the first available appointment? 7-    Communication Preference: [phone]    Additional Information: pt had decompression surgery and pt wants to be seen earlier.  She can't drive now, but she needs to seen before July 2019. Pt wants to be seen in March 2019 please.

## 2019-01-04 ENCOUNTER — TELEPHONE (OUTPATIENT)
Dept: NEUROSURGERY | Facility: CLINIC | Age: 44
End: 2019-01-04

## 2019-01-04 NOTE — TELEPHONE ENCOUNTER
[facility] called to notify me the patients blood pressure waS 136/101. PATIENT SAID SHE HAS A HEADACHE at the base of her head. I asked if there was any swelling or drainage around the incision, the nurse said no, that the incision looks great. I advised them and the patient to keep an eye on the pressure, maybe a log. If it is consistently high then she needs to see her PCP

## 2019-01-13 RX ORDER — DIAZEPAM 5 MG/1
TABLET ORAL
Qty: 30 TABLET | Refills: 0 | Status: SHIPPED | OUTPATIENT
Start: 2019-01-13 | End: 2019-02-12

## 2019-01-18 ENCOUNTER — TELEPHONE (OUTPATIENT)
Dept: FAMILY MEDICINE | Facility: CLINIC | Age: 44
End: 2019-01-18

## 2019-01-18 DIAGNOSIS — R13.10 DYSPHAGIA, UNSPECIFIED TYPE: Primary | ICD-10-CM

## 2019-01-18 NOTE — TELEPHONE ENCOUNTER
----- Message from Chepe Tobar sent at 1/17/2019  1:59 PM CST -----  Contact: Raeann  Type: Needs Medical Advice    Who Called:  Raeann with [facility]  Symptoms (please be specific):    How long has patient had these symptoms:    Pharmacy name and phone #:    Best Call Back Number: [phone]  Additional Information:patient has been experiencing difficulties with swallowing requesting order for modified swallowing study,

## 2019-01-21 ENCOUNTER — TELEPHONE (OUTPATIENT)
Dept: FAMILY MEDICINE | Facility: CLINIC | Age: 44
End: 2019-01-21

## 2019-01-21 DIAGNOSIS — Z30.42 ENCOUNTER FOR SURVEILLANCE OF INJECTABLE CONTRACEPTIVE: Primary | ICD-10-CM

## 2019-01-21 RX ORDER — TRAMADOL HYDROCHLORIDE 50 MG/1
50 TABLET ORAL EVERY 6 HOURS PRN
Qty: 120 TABLET | Refills: 0 | Status: SHIPPED | OUTPATIENT
Start: 2019-01-21 | End: 2019-02-20

## 2019-01-21 RX ORDER — MEDROXYPROGESTERONE ACETATE 150 MG/ML
150 INJECTION, SUSPENSION INTRAMUSCULAR
Status: SHIPPED | OUTPATIENT
Start: 2019-01-21 | End: 2019-10-18

## 2019-01-21 NOTE — TELEPHONE ENCOUNTER
----- Message from Osvaldo Fitzpatrick sent at 1/21/2019  8:55 AM CST -----  Contact: Pt. [phone]  Needs Advice    Reason for call:  The patient would like to speak to someone regarding her pain medication. Please contact the patient to discuss further.          Communication Preference:PHONE    Additional Information:

## 2019-01-21 NOTE — TELEPHONE ENCOUNTER
This should be fine. She will need a pregnancy test before we give it. She had her last one 12/4/18, please check the Depo calender to see when her next one is due. The orders are placed.

## 2019-01-21 NOTE — TELEPHONE ENCOUNTER
Modified barium swallow study must be done at [facility] ( [facility] ) aware. Centralized sched # [phone] given to [facility] so that pt can set up test date. Order has been entered --lp

## 2019-01-21 NOTE — TELEPHONE ENCOUNTER
Patient normally gets her Depo injections through her GYN, .  States that since she had brain surgery, she can't drive and would like to come here for her injections from now on.  Patient thinks she was given the date February 6th.  Started her cycle today.  Please advise.

## 2019-01-21 NOTE — TELEPHONE ENCOUNTER
----- Message from Enrique Shepherd sent at 1/21/2019  9:08 AM CST -----  Type: Needs Medical Advice    Who Called:  Patient    Best Call Back Number: [phone]  Additional Information: Caller states that she would like a callback regarding a depo shot

## 2019-01-22 NOTE — TELEPHONE ENCOUNTER
Pt is due Feb 19th - March 5 th . Pt aware she needs pregnancy test prior . Pt will call back to sched an appt .--lp

## 2019-01-23 RX ORDER — TIZANIDINE 4 MG/1
4 TABLET ORAL NIGHTLY
Qty: 30 TABLET | Refills: 0 | Status: CANCELLED | OUTPATIENT
Start: 2019-01-23

## 2019-01-23 RX ORDER — METHOCARBAMOL 750 MG/1
750 TABLET, FILM COATED ORAL 4 TIMES DAILY
Qty: 60 TABLET | Refills: 0 | Status: CANCELLED | OUTPATIENT
Start: 2019-01-23 | End: 2019-02-07

## 2019-01-23 RX ORDER — TRAMADOL HYDROCHLORIDE 50 MG/1
50 TABLET ORAL EVERY 6 HOURS PRN
Qty: 60 TABLET | Refills: 0 | Status: CANCELLED | OUTPATIENT
Start: 2019-01-23

## 2019-01-28 ENCOUNTER — PATIENT MESSAGE (OUTPATIENT)
Dept: NEUROLOGY | Facility: CLINIC | Age: 44
End: 2019-01-28

## 2019-01-29 ENCOUNTER — OFFICE VISIT (OUTPATIENT)
Dept: NEUROSURGERY | Facility: CLINIC | Age: 44
End: 2019-01-29
Payer: COMMERCIAL

## 2019-01-29 VITALS — SYSTOLIC BLOOD PRESSURE: 119 MMHG | HEART RATE: 79 BPM | DIASTOLIC BLOOD PRESSURE: 79 MMHG | TEMPERATURE: 98 F

## 2019-01-29 DIAGNOSIS — G93.5 CHIARI I MALFORMATION: Primary | ICD-10-CM

## 2019-01-29 PROCEDURE — 99024 PR POST-OP FOLLOW-UP VISIT: ICD-10-PCS | Mod: S$GLB,,, | Performed by: NEUROLOGICAL SURGERY

## 2019-01-29 PROCEDURE — 99024 POSTOP FOLLOW-UP VISIT: CPT | Mod: S$GLB,,, | Performed by: NEUROLOGICAL SURGERY

## 2019-01-29 PROCEDURE — 99999 PR PBB SHADOW E&M-EST. PATIENT-LVL IV: ICD-10-PCS | Mod: PBBFAC,,, | Performed by: NEUROLOGICAL SURGERY

## 2019-01-29 PROCEDURE — 99999 PR PBB SHADOW E&M-EST. PATIENT-LVL IV: CPT | Mod: PBBFAC,,, | Performed by: NEUROLOGICAL SURGERY

## 2019-01-29 RX ORDER — METHOCARBAMOL 500 MG/1
TABLET, FILM COATED ORAL
COMMUNITY
End: 2020-03-26 | Stop reason: CLARIF

## 2019-01-29 NOTE — PROGRESS NOTES
Subjective:    I, Khloe Cummins, attest that this documentation has been prepared under the direction and in the presence of GENNARO Busch MD.     Patient ID: Dinah Mitchell is a 43 y.o. female.    Chief Complaint: Follow-up    HPI   Pt is a 43 y.o. female who presents for follow up after last evaluation on 11/20/2018.Pt staes that her HA have significantly decreased pre-op, but does note some neck stifness.     Review of Systems   Constitutional: Negative for chills, fatigue and fever.   HENT: Negative for sinus pressure and trouble swallowing.    Eyes: Negative.  Negative for visual disturbance.   Respiratory: Negative.    Cardiovascular: Negative.    Gastrointestinal: Negative.  Negative for nausea and vomiting.   Endocrine: Negative.    Genitourinary: Negative.    Musculoskeletal: Positive for neck stiffness.   Neurological: Negative for dizziness, seizures, syncope, speech difficulty, weakness and numbness.       Objective:      Physical Exam:  Nursing note and vitals reviewed.    Constitutional: She appears well-developed.     Eyes: Pupils are equal, round, and reactive to light. Conjunctivae and EOM are normal.     Cardiovascular: Normal rate, regular rhythm, normal pulses and intact distal pulses.     Abdominal: Soft.     Psych/Behavior: She is alert. She is oriented to person, place, and time. She has a normal mood and affect.     Musculoskeletal: Gait is normal.        Neck: Range of motion is full. There is no tenderness. Muscle strength is 5/5. Tone is normal.        Back: Range of motion is full. There is no tenderness. Muscle strength is 5/5. Tone is normal.        Right Upper Extremities: Range of motion is full. There is no tenderness. Muscle strength is 5/5. Tone is normal.        Left Upper Extremities: Range of motion is full. There is no tenderness. Muscle strength is 5/5. Tone is normal.       Right Lower Extremities: Range of motion is full. There is no tenderness. Muscle strength is 5/5. Tone is  normal.        Left Lower Extremities: Range of motion is full. There is no tenderness. Muscle strength is 5/5. Tone is normal.     Neurological:        Coordination: She has a normal Romberg Test, normal finger to nose coordination, normal heel to shin coordination and normal tandem walking coordination.        DTRs: DTRs are normal. Tricep reflexes are 2+ on the right side and 2+ on the left side. Bicep reflexes are 2+ on the right side and 2+ on the left side. Brachioradialis reflexes are 2+ on the right side and 2+ on the left side. Patellar reflexes are 2+ on the right side and 2+ on the left side. Achilles reflexes are 2+ on the right side and 2+ on the left side.        Cranial nerves: Cranial nerve(s) II, III, IV, V, VI, VII, VIII, IX, X, XI and XII are intact.       Pt is doing well postoperatively her wound is well-healed is no signs of pseudomeningocele.  Patient has no focal deficits.    Imaging:       GENNARO CRUZ MD, personally reviewed the imaging and interpreted independent of the radiology report.      Assessment/Plan:   Pt with a history of Chiari 1 malformation status post suboccipital craniectomy, C1 laminectomy, and autologous duraplasty done on 12/13/2018.  Patient is doing well we will advance her activity level and plan for follow-up MRI scan in 6 months    GENNARO CRUZ MD, personally performed the services described in this documentation. All medical record entries made by the scribe, Khloe Cummins, were at my direction and in my presence.  I have reviewed the chart and agree that the record reflects my personal performance and is accurate and complete.

## 2019-02-07 RX ORDER — LEVOTHYROXINE SODIUM 25 UG/1
TABLET ORAL
Qty: 30 TABLET | Refills: 0 | Status: SHIPPED | OUTPATIENT
Start: 2019-02-07 | End: 2019-03-31 | Stop reason: SDUPTHER

## 2019-02-07 NOTE — TELEPHONE ENCOUNTER
DEFER; SYNTHROID 25 mcg tablet,  last commented Needs Labs 01/19 (6 weeks), lab rescheduled for 3/19. Defer to you for refill.   [Of note, both strengths due]

## 2019-02-07 NOTE — PROGRESS NOTES
Refill Authorization Note     is requesting a refill authorization.    Brief assessment and rationale for refill: DEFER; last commented Needs Labs 01/19  Name and strength of medication: SYNTHROID 25 mcg tablet  Medication-related problems identified: Therapeutic duplication    Medication Therapy Plan: Thyroid-224mcg daily dose- lco(lov); Per pt email from 12/07/18 (see bookmark)--labs orginally requested for 01/2019 (6 weeks) future labs are scheduled for march ; defer to you     Medication reconciliation completed: No   Pharmacist Review Requested: No          How patient will take medication: t1t po qd          Comments: DCed duplicates  Last Thyroid (12 months or within 3 months of dosage adjustment)  Lab Results   Component Value Date    TSH 5.198 (H) 12/04/2018    TSH <0.010 (L) 04/02/2018    TSH 4.119 (H) 03/14/2017    Lab Results   Component Value Date    FREET4 1.26 12/04/2018    FREET4 1.66 (H) 04/02/2018    FREET4 0.98 03/14/2017        Appointments (past 12 m or future 3m authorizing provider)  LAST VISIT DATE  Amaya Aiken MD 12/4/2018         NEXT VISIT DATE  Amaya Aiken MD Visit date not found      Future Appointments   Date Time Provider Department Center   3/1/2019 10:00 AM Anastacio Nevarez MD [facility] GENETIC [address]   3/6/2019  3:00 PM [facility] LAB [place]   3/8/2019 10:30 AM Noa Samano MD [facility] HEADACH [place]

## 2019-02-11 RX ORDER — LEVOTHYROXINE SODIUM 200 UG/1
TABLET ORAL
Qty: 30 TABLET | Refills: 0 | Status: SHIPPED | OUTPATIENT
Start: 2019-02-11 | End: 2019-03-31 | Stop reason: SDUPTHER

## 2019-02-11 NOTE — TELEPHONE ENCOUNTER
DEFER; SYNTHROID 200 mcg tablet    Last commented Needs Labs 01/19 (6 weeks), lab rescheduled for 3/6/19. Defer to you for refill.   [Of note, other strengths (25 mcg) filled by you on 2/7/19]

## 2019-02-11 NOTE — PROGRESS NOTES
Refill Authorization Note     is requesting a refill authorization.    Brief assessment and rationale for refill: DEFER; Needs follow-up lab  Name and strength of medication: SYNTHROID 200 mcg tablet (levothyroxine)       Medication Therapy Plan: Thyroid-225 mcg daily dose- lco(lov); Per pt email from 12/07/18 (see bookmark)--labs orginally requested for 01/2019 (6 weeks) future labs are scheduled for march ; defer to you     Medication reconciliation completed: No              How patient will take medication: t1t po qd          Comments: see below

## 2019-02-11 NOTE — PROGRESS NOTES
Refill Authorization Note     is requesting a refill authorization.    Brief assessment and rationale for refill: APPROVE: prr  Name and strength of medication: SYNTHROID 200 mcg tablet (levothyroxine)       Medication Therapy Plan: Thyroid-2245cg daily dose- lco(lov); Per pt email from 12/07/18 (see bookmark)--labs orginally requested for 01/2019 (6 weeks) future labs are scheduled for march ; defer to you     Medication reconciliation completed: No              How patient will take medication: t1t po qd          Comments:   Last Thyroid (12 months or within 3 months of dosage adjustment)  Lab Results   Component Value Date    TSH 5.198 (H) 12/04/2018    TSH <0.010 (L) 04/02/2018    TSH 4.119 (H) 03/14/2017    Lab Results   Component Value Date    FREET4 1.26 12/04/2018    FREET4 1.66 (H) 04/02/2018    FREET4 0.98 03/14/2017        Appointments (past 12 m or future  authorizing provider)  LAST VISIT DATE  Amaya Aiken MD 12/4/2018         NEXT VISIT DATE  Amaya Aiken MD Visit date not found        Future Appointments   Date Time Provider Department Center   2/12/2019  1:15 PM Noa Samano MD [facility] HEADACH [place]   2/28/2019  1:00 PM Cori Wells MD [facility] PSYCH [place]   3/1/2019 10:00 AM Anastacio Nevarez MD [facility] GENETIC [address]   3/6/2019  3:00 PM LAB [facility] LAB [place]

## 2019-02-12 ENCOUNTER — OFFICE VISIT (OUTPATIENT)
Dept: NEUROLOGY | Facility: CLINIC | Age: 44
End: 2019-02-12
Payer: COMMERCIAL

## 2019-02-12 VITALS
SYSTOLIC BLOOD PRESSURE: 117 MMHG | BODY MASS INDEX: 37.27 KG/M2 | DIASTOLIC BLOOD PRESSURE: 78 MMHG | RESPIRATION RATE: 16 BRPM | HEART RATE: 71 BPM | HEIGHT: 67 IN | WEIGHT: 237.44 LBS

## 2019-02-12 DIAGNOSIS — M54.81 BILATERAL OCCIPITAL NEURALGIA: ICD-10-CM

## 2019-02-12 DIAGNOSIS — G93.5 CHIARI MALFORMATION TYPE I: ICD-10-CM

## 2019-02-12 DIAGNOSIS — M79.18 CERVICAL MYOFASCIAL PAIN SYNDROME: ICD-10-CM

## 2019-02-12 PROCEDURE — 3008F BODY MASS INDEX DOCD: CPT | Mod: CPTII,S$GLB,, | Performed by: PSYCHIATRY & NEUROLOGY

## 2019-02-12 PROCEDURE — 3008F PR BODY MASS INDEX (BMI) DOCUMENTED: ICD-10-PCS | Mod: CPTII,S$GLB,, | Performed by: PSYCHIATRY & NEUROLOGY

## 2019-02-12 PROCEDURE — 99999 PR PBB SHADOW E&M-EST. PATIENT-LVL IV: ICD-10-PCS | Mod: PBBFAC,,, | Performed by: PSYCHIATRY & NEUROLOGY

## 2019-02-12 PROCEDURE — 99214 PR OFFICE/OUTPT VISIT, EST, LEVL IV, 30-39 MIN: ICD-10-PCS | Mod: S$GLB,,, | Performed by: PSYCHIATRY & NEUROLOGY

## 2019-02-12 PROCEDURE — 99999 PR PBB SHADOW E&M-EST. PATIENT-LVL IV: CPT | Mod: PBBFAC,,, | Performed by: PSYCHIATRY & NEUROLOGY

## 2019-02-12 PROCEDURE — 99214 OFFICE O/P EST MOD 30 MIN: CPT | Mod: S$GLB,,, | Performed by: PSYCHIATRY & NEUROLOGY

## 2019-02-12 NOTE — PATIENT INSTRUCTIONS
TESTING:  -- none     REFERRALS:  -- none     PREVENTION OF PAIN:   -- continue gabapentin 600mg three times per day     AS-NEEDED TREATMENT OF PAIN:  -- continue tramadol as needed

## 2019-02-12 NOTE — PROGRESS NOTES
Date of service: 2/12/2019  Referring provider: No ref. provider found    Subjective:      Chief complaint: Headache and Neck Pain       Patient ID: Dinah Mitchell is a 43 y.o. depression, borderline personality disorder, PTSD, fibromyalgia, and hypothyroidism presenting for follow-up of headache and neck pain    History of Present Illness    INTERVAL HISTORY - 2/12/19     In the interim since last visit one year ago she has had bilateral occipital nerve block in cervical trigger point injection x3 with me with good relief.      She reports, that while I was on leave, she felt worse and worse. She was more nauseous and kept falling. She saw Dr. Busch for her Chiari malformation, which I previously recommended against surgery on as her symptoms were not clearly Chiari related and she had a non-obstructive CSF flow pattern on CINE. She had posterior fossa decompression on 12/13/18. She had severe headache and neck pain afterwards. She says the surgery helped her disabling headaches, she doesn't have headache any more when straining or bending over but she remains with the other headaches documented below. She has multiple questions about the surgery itself.     She reports she is still wobbly. She still has depth perception issues.     New symptoms - lump she feels on the the posterior parietal scalp, this area has never hurt before. Neck feels weak. Has to lay against something for support. Arms feel more weak then before. Sensation in her feet is returning.     Headaches - left temple, gradual onset, photo/phonophobia/nausea. These occur 4 days per week. Last at least 1-2 hours.     Her current pain score is seven for headache, nine for neck.  With a range of 6-9.    ORIGINAL PAIN HISTORY - 3/14/18   Age at onset and course over time: 2011 and worse over time, this happened during a time when she had a long commute. Neck is her worst pain - just left to midline of middle neck. Was going to [facility] pain management  "receiving epidural steroids which would last only 2 weeks. She had a CMBB and was planning for RFA but wanted to get cervical MRI first and second opinion  Location: neck (sharp, stabbing); hands and feet (tingling, numb), hip/lower back (dull, aching). Also has intermittent left sided headaches starting occipitally radiating to temple.   Quality: as above  Severity: current 8.5; range 7 to 10  Duration: hours  Frequency: daily  Alleviated by: medication, ice  Exacerbated by: movement, light noise (head pain)  Associated with: left arm/hand gets numb/weak "a lot" no radicular pain; headaches on left which are random or triggered by straining in restroom/coughing/laughing. Headaches worst in the AM within 2 hours of waking up. Headaches do not wake her up but neck pain does. Having some depth perception issues causing her to break her foot - eyes checked and were ok. Transient visual loss? When in a lof of pain, not clear TVO with valsalva.   Sleep habits:    Current acute treatment:  -- tramadol 50mg - 2 tab per daily, daily   -- robaxin during the day   -- tizanidine 4mg nightly     (xanax 1mg daily PRN for PTSD, anxiety from psychiatrist)     Current prevention:  -- gabapentin 600mg TID   (wellbutrin)  (prozac)    Previously tried:   -- TENS   -- tramadol - helped with overall pain   -- methocarbamol - helped with overall pain   -- relafen - did not help     Procedural history:   -- cervical GREGOR  -- lumbar GREGOR  -- chiropractor for hip /neck - helped only hip  -- PT - none for neck just foot     Review of patient's allergies indicates:   Allergen Reactions    Lamotrigine      Other reaction(s): Rash  Other reaction(s): Nausea    Sulfa (sulfonamide antibiotics) Nausea Only     Other reaction(s): Unknown     Current Outpatient Medications   Medication Sig Dispense Refill    ALPRAZolam (XANAX) 1 MG tablet TAKE 1 TABLET (1 MG TOTAL) BY MOUTH DAILY AS NEEDED FOR ANXIETY. (Patient taking differently: Take 1 mg by " mouth every 6 (six) hours as needed for Anxiety. ) 30 tablet 2    buPROPion (WELLBUTRIN XL) 150 MG TB24 tablet Take 1 tablet (150 mg total) by mouth once daily. 90 tablet 0    diphenoxylate-atropine 2.5-0.025 mg (LOMOTIL) 2.5-0.025 mg per tablet TAKE 1 TABLET BY MOUTH 4 (FOUR) TIMES DAILY AS NEEDED FOR DIARRHEA. 30 tablet 1    dronabinol (MARINOL) 2.5 MG capsule Take 1 capsule (2.5 mg total) by mouth 2 (two) times daily before meals. 60 capsule 3    FLUoxetine 40 MG capsule Take 1 capsule (40 mg total) by mouth once daily. 90 capsule 0    gabapentin (NEURONTIN) 600 MG tablet Take 1 tablet (600 mg total) by mouth 3 (three) times daily. 90 tablet 11    levOCARNitine (CARNITOR) 330 mg Tab Take 3 tablets (990 mg total) by mouth 2 (two) times daily. 180 tablet 6    medroxyPROGESTERone (DEPO-PROVERA) 150 mg/mL injection Inject 150 mg into the muscle every 3 (three) months.      methocarbamol (ROBAXIN) 500 MG Tab       prazosin (MINIPRESS) 1 MG Cap Take 1 capsule (1 mg total) by mouth every evening. For ptsd related nightmares 90 capsule 1    SYNTHROID 200 mcg tablet TAKE 1 TABLET (200 MCG TOTAL) BY MOUTH ONCE DAILY. 30 tablet 0    SYNTHROID 25 mcg tablet TAKE 1 TABLET (25 MCG TOTAL) BY MOUTH ONCE DAILY. 30 tablet 0    tiZANidine (ZANAFLEX) 4 MG tablet Take 1 tablet by mouth every evening.      traMADol (ULTRAM) 50 mg tablet TAKE 1 TABLET (50 MG TOTAL) BY MOUTH EVERY 6 (SIX) HOURS AS NEEDED FOR PAIN. 120 tablet 0    UNABLE TO FIND N-acetyl-L-cysteine 50 mg subcutaneously 3 times a week 10 mL 6    VENTOLIN HFA 90 mcg/actuation inhaler TAKE 2 PUFFS BY MOUTH EVERY 6 HOURS AS NEEDED FOR WHEEZE 18 Inhaler 0    zolpidem (AMBIEN CR) 12.5 MG CR tablet Take 1 tablet (12.5 mg total) by mouth nightly as needed for Insomnia. 30 tablet 2    montelukast (SINGULAIR) 10 mg tablet TAKE 1 TABLET (10 MG TOTAL) BY MOUTH ONCE DAILY. 90 tablet 1     Current Facility-Administered Medications   Medication Dose Route Frequency  "Provider Last Rate Last Dose    medroxyPROGESTERone (DEPO-PROVERA) injection 150 mg  150 mg Intramuscular Q90 Days Nikhil Franco MD   150 mg at 12/04/18 1508    medroxyPROGESTERone (DEPO-PROVERA) injection 150 mg  150 mg Intramuscular Q90 Days Barbi Wilson NP           Past Medical History  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     PATRICIA (obstructive sleep apnea)     severe - doesn't use CPAP    Panic attack     PTSD (post-traumatic stress disorder)        Past Surgical History  Past Surgical History:   Procedure Laterality Date    ANKLE SURGERY Right     x2 (#1 for ligament injury and #2 for fx and ligament)    AUGMENTATION OF BREAST      BREAST SURGERY  1997    B implants    CERVICAL BIOPSY  W/ LOOP ELECTRODE EXCISION      DECOMPRESSION, CHIARI MALFORMATION, BY 1ST CERVICAL VERTEBRA POSTERIOR ARCH REMOVAL N/A 12/13/2018    Per[facility]    LAPAROSCOPIC GASTRIC BANDING      with subsequent removal due to abscess    LIPOSUCTION      x 2    PELVIC LAPAROSCOPY      for endometriosis eval with BTL    TUBAL LIGATION      wtih pelvic lap        Family History  Family History   Problem Relation Age of Onset    Diabetes Sister     Seizures Sister     Mitochondrial disorder Sister         "trent"    Mental illness Sister     Seizures Sister     Cervical cancer Sister     Cervical cancer Sister     Ovarian cancer Cousin     Ovarian cancer Paternal Aunt     Colon cancer Maternal Grandmother     Cancer Maternal Grandfather 65        throat ca    Diabetes Mother     Stroke Mother 30    Mental illness Mother         depression    " "Hyperlipidemia Mother     Hypertension Mother     Hyperlipidemia Father     Heart disease Father         "athletes heart"    Ovarian cancer Paternal Aunt     Cervical cancer Paternal Cousin     Cervical cancer Other     Breast cancer Neg Hx        Social History  Social History     Socioeconomic History    Marital status:      Spouse name: Not on file    Number of children: Not on file    Years of education: Not on file    Highest education level: Not on file   Social Needs    Financial resource strain: Not on file    Food insecurity - worry: Not on file    Food insecurity - inability: Not on file    Transportation needs - medical: Not on file    Transportation needs - non-medical: Not on file   Occupational History    Occupation: [employer]     Tobacco Use    Smoking status: Never Smoker    Smokeless tobacco: Never Used   Substance and Sexual Activity    Alcohol use: Yes     Comment: occasional    Drug use: No    Sexual activity: Yes     Partners: Male     Birth control/protection: Injection     Comment: depo since age 18   Other Topics Concern    Not on file   Social History Narrative    Not on disability, not currently working.        Review of Systems  14-point review of systems as follows:   No check mariam indicates NEGATIVE response   Constitutional: [] weight loss, [x] change to appetite   Eyes: [] change in vision, [] double vision   Ears, nose, mouth, throat: [] frequent nose bleeds, [] ringing in the ears   Respiratory: [] cough, [] wheezing   Cardiovascular: [] chest pain, [] palpitations   Gastrointestinal: [] jaundice, [] nausea/vomiting   Genitourinary: [] incontinence, [] burning with urination   Hematologic/lymphatic: [x] easy bruising/bleeding, [] night sweats   Neurological: [x] numbness, [x] weakness   Endocrine: [] fatigue, [] heat/cold intolerance   Allergy/Immunologic: [] fevers, [] chills   Musculoskeletal: [x] muscle pain, [] joint pain   Psychiatric: " [] thoughts of harming self/others, [] depression   Integumentary: [] rashes, [] sores that do not heal       Objective:        Vitals:    02/12/19 1322   BP: 117/78   Pulse: 71   Resp: 16     Body mass index is 37.19 kg/m².    2/12/19  General: Well developed, well nourished.  No acute distress.  HEENT: Atraumatic, normocephalic. Bilateral ptosis (chronic).  Optic discs sharp bilaterally. The lumps she showed me are bony prominences of the calvarium  Neck: Trachea midline. Well-healed midline posterior surgical scar.  Multiple areas of muscle tenderness around neck  Cardiovascular: Vitals reviewed. Normal peripheral perfusion.   Pulmonary: No increased work of breathing.  Abdomen/GI: No guarding  Musculoskeletal: No obvious joint deformities, moves all extremities symmetrically and well.  No drift, no orbit, symmetric finger tapping.     Neurological exam:  Mental status: Awake and alert. Oriented to situation.  Speech fluent and appropriate. Recent and remote memory appear to be intact.  Fund of knowledge normal.  Cranial nerves: Pupils equal round, extraocular movements intact, facial strength intact bilaterally, reports hearing is asymmetric  Sensation:  Normal to temperature  Reflexes: 2+ throughout, negative Vu  Coordination:  Normal finger-to-nose and heel-to-shin  Gait: Normal         3/14/18   Constitutional: appears in no acute distress, well-developed, well-nourished     Eyes: normal conjunctiva, PERRLA, bilateral right worse than left ptosis (baseline). Optic discs sharp bilaterally.     Ears, nose, mouth, throat: external appearance of ears and nose normal, hearing reduced on left     Cardiovascular: regular rate and rhythm, no murmurs appreciated    Respiratory: unlabored respirations, breath sounds normal bilaterally    Gastrointestinal: no visible abdominal masses, no guarding, no visible hernia    Musculoskeletal: normal tone in all four extremities. No atrophy. No abnormal movements. Strength  in all muscles groups of the upper and lower extremities is 5/5. Normal gait and station. Digits and nails normal.      Spine:  CERVICAL SPINE:  INSPECTION: no scars   ROM: slightly limited   MUSCLE SPASM: bilateral   FACET LOADING: left  SPURLING: neg  JEZ tender: bilateral     Psychiatric: normal judgment and insight. Oriented to person, place, and time.     Neurologic:   Cortical functions: recent and remote memory intact, normal attention span and concentration, speech fluent, adequate fund of knowledge   Cranial nerves: visual fields full, PERRLA, EOMI, facial sensation intact in V1-V3, symmetric facial strength, hearing intact to finger rub, palate elevates symmetrically, shoulder shrug 5/5, tongue protrudes midline   Reflexes: 2+ in the upper and lower extremities, no Vu, no babinski   Sensation: reduced to temperature in a length dependent manner in the hands and to knees, vibration reduced toes, ankles, knees  Coordination: normal    Data Review:     I have personally reviewed the referring provider's notes, labs, & imaging made available to me today.     RADIOLOGY STUDIES:  I have personally reviewed the pertinent images performed.     MRI cervical spine 2/27/18  1. The cerebellar tonsils extend below the foramen magnum approximately 8-9 mm, consistent with Chiari 1 malformation.  The cord is normal without evidence of syrinx, signal abnormality or abnormal enhancement.  2. There is mild degenerative change.  There is, however, no significant spinal canal or foraminal stenosis.  There is no cord compression.  There is no suspected nerve root compression.  Please see above discussion.    Results for orders placed or performed during the hospital encounter of 02/05/18   MRI Brain W WO Contrast    Narrative    Routine Multiplanar imaging through this 42-year-old females brain was obtained with utilization of 10 cc of gadolinium contrast    Hyperostosis frontalis appears to be present.     A Chiari one  malformation appears to be present with the cerebellar tonsils extending 9 mm inferior to the foramen magnum with so-called pegging of the cerebellar tonsils.    An empty sella configuration of the pituitary gland is noted.    No diffusion positivity is identified to suggest recent infarction.    No gradient  susceptibility was noted just just presence of acute blood products.    No worrisome enhancing lesions are noted intracranially    No diffusion positivity is identified to suggest recent infarction.    Impression    1. The cerebellar tonsils appear to extend inferior to the foramen magnum suggestive of a Chiari one malformation. No obvious cord signal abnormalities noted within the visualized cord, given the patient's history further evaluation of the cord may be of use to exclude a syrinx        Electronically signed by: Ismael Richmond MD  Date:     02/05/18  Time:    12:04    Results for orders placed or performed during the hospital encounter of 09/25/15   CT Head Without Contrast    Narrative    Comparison: None    Technique: 5 mm noncontrast axial images through the brain were obtained.    Findings:     The brain is normally formed with no indication of acute/recent major vascular distribution cerebral infarction, intraparenchymal hemorrhage, or intra-axial space occupying lesion. There is preserved gray-white matter junction differentiation.     The ventricular system is normal in appearance for age. No hydrocephalus. No effacement of the skull-base cisterns. No abnormal extra-axial fluid collections or blood products.     The paranasal sinuses and mastoid air cells are unremarkable.  There is incidentally observed hyperostosis frontalis.    Impression         No acute  intracranial abnormalities are appreciated.      Electronically signed by: Toyin Shin MD  Date:     09/25/15  Time:    13:41        Lab Results   Component Value Date     12/14/2018    K 5.0 12/14/2018    MG 2.3 12/14/2018    CL  110 12/14/2018    CO2 18 (L) 12/14/2018    BUN 12 12/14/2018    CREATININE 0.8 12/14/2018     (H) 12/14/2018    HGBA1C 4.9 12/04/2018    AST 22 12/04/2018    ALT 25 12/04/2018    ALBUMIN 4.2 12/04/2018    PROT 7.5 12/04/2018    BILITOT 0.6 12/04/2018    CHOL 232 (H) 12/04/2018    HDL 61 12/04/2018    LDLCALC 152.4 12/04/2018    TRIG 93 12/04/2018       Lab Results   Component Value Date    WBC 12.50 12/14/2018    HGB 13.1 12/14/2018    HCT 38.4 12/14/2018    MCV 95 12/14/2018     12/14/2018       Lab Results   Component Value Date    TSH 5.198 (H) 12/04/2018       Assessment & Plan:       Problem List Items Addressed This Visit        Neuro    Chiari malformation type I    Overview     13 mm below foramen magnum, seen on imaging from 2/18 and 11/18 and stable.  Some but not all symptoms could have been attributed to Chiari malformation and as exam was largely normal, and CINE 3/2018 was without CSF obstruction I did not recommend surgery at this time which I explained to patient multiple times during her visits for nerve blocks/injections. In my absence she felt that her symptoms were progressing so she saught consultation with neurosurgery and had decompression done on 12/13/18. Valsalva induced pain resolved as did the numbness she felt in her arms and legs (there was no syrinx on scans) but migrainous headaches, myofacial neck pain, vision issues, hearing issues continue. I again explained that these symptoms are not related to Chiari. I explained the muscular issus related to surgery will take time to heal and before we make arrangements for this we should allow more time to pass.             Orthopedic    Cervical myofascial pain syndrome    Overview     No further trigger points in the upper neck can be offered at this time    In the past we had been planning on a visit to address her neck pain with interventional approaches but she cancelled these appointments.         Bilateral occipital  neuralgia    Overview     No further occipital nerve blocks can be offered based on the level of posterior fossa decompression that she has had                   TESTING:  -- none     REFERRALS:  -- none     PREVENTION OF PAIN:   -- continue gabapentin 600mg three times per day     AS-NEEDED TREATMENT OF PAIN:  -- continue tramadol as needed      Follow-up in about 2 months (around 4/12/2019).         Noa Samano MD

## 2019-02-19 ENCOUNTER — TELEPHONE (OUTPATIENT)
Dept: NEUROLOGY | Facility: CLINIC | Age: 44
End: 2019-02-19

## 2019-02-19 NOTE — TELEPHONE ENCOUNTER
----- Message from Chepe Tobar sent at 2/19/2019  9:59 AM CST -----  Contact: patient  Type: Needs Medical Advice    Who Called:  Patient  Symptoms (please be specific):    How long has patient had these symptoms:    Pharmacy name and phone #:    Best Call Back Number: [phone]  Additional Information: wanted to know if she could continue to have physica therapy and speech and swallow therapy at home?,stated can't turn head to drive call back to advise

## 2019-02-20 RX ORDER — TIZANIDINE 4 MG/1
TABLET ORAL
Qty: 30 TABLET | Refills: 0 | OUTPATIENT
Start: 2019-02-20

## 2019-02-20 NOTE — TELEPHONE ENCOUNTER
"Returned call and spoke with patient. Informed patient per Dr. Samano, "I did not refer patient to physical therapy. At this point, any therapy, in my mind, would fall under post surgical care thus, those questions should be directed to Dr. Busch." Patient verbalized understanding.   "

## 2019-02-21 ENCOUNTER — PATIENT MESSAGE (OUTPATIENT)
Dept: GENETICS | Facility: CLINIC | Age: 44
End: 2019-02-21

## 2019-02-23 DIAGNOSIS — F43.10 PTSD (POST-TRAUMATIC STRESS DISORDER): ICD-10-CM

## 2019-02-23 DIAGNOSIS — F41.0 PANIC DISORDER WITHOUT AGORAPHOBIA: ICD-10-CM

## 2019-02-23 RX ORDER — ALPRAZOLAM 1 MG/1
1 TABLET ORAL DAILY PRN
Qty: 30 TABLET | Refills: 2 | Status: SHIPPED | OUTPATIENT
Start: 2019-02-23 | End: 2019-02-28 | Stop reason: SDUPTHER

## 2019-02-28 ENCOUNTER — OFFICE VISIT (OUTPATIENT)
Dept: PSYCHIATRY | Facility: CLINIC | Age: 44
End: 2019-02-28
Payer: COMMERCIAL

## 2019-02-28 VITALS
HEART RATE: 71 BPM | HEIGHT: 67 IN | BODY MASS INDEX: 37.84 KG/M2 | DIASTOLIC BLOOD PRESSURE: 85 MMHG | SYSTOLIC BLOOD PRESSURE: 124 MMHG | WEIGHT: 241.13 LBS

## 2019-02-28 DIAGNOSIS — F90.0 ATTENTION DEFICIT HYPERACTIVITY DISORDER (ADHD), PREDOMINANTLY INATTENTIVE TYPE: ICD-10-CM

## 2019-02-28 DIAGNOSIS — F41.0 PANIC DISORDER WITHOUT AGORAPHOBIA: ICD-10-CM

## 2019-02-28 DIAGNOSIS — F33.2 MAJOR DEPRESSIVE DISORDER, RECURRENT, SEVERE WITHOUT PSYCHOTIC FEATURES: Primary | ICD-10-CM

## 2019-02-28 DIAGNOSIS — Q07.00 ARNOLD-CHIARI MALFORMATION: ICD-10-CM

## 2019-02-28 DIAGNOSIS — G93.5 CHIARI MALFORMATION TYPE I: ICD-10-CM

## 2019-02-28 DIAGNOSIS — F60.3 BORDERLINE PERSONALITY DISORDER: ICD-10-CM

## 2019-02-28 DIAGNOSIS — F43.10 PTSD (POST-TRAUMATIC STRESS DISORDER): ICD-10-CM

## 2019-02-28 PROCEDURE — 3008F PR BODY MASS INDEX (BMI) DOCUMENTED: ICD-10-PCS | Mod: CPTII,S$GLB,, | Performed by: PSYCHIATRY & NEUROLOGY

## 2019-02-28 PROCEDURE — 99214 PR OFFICE/OUTPT VISIT, EST, LEVL IV, 30-39 MIN: ICD-10-PCS | Mod: S$GLB,,, | Performed by: PSYCHIATRY & NEUROLOGY

## 2019-02-28 PROCEDURE — 99999 PR PBB SHADOW E&M-EST. PATIENT-LVL III: ICD-10-PCS | Mod: PBBFAC,,, | Performed by: PSYCHIATRY & NEUROLOGY

## 2019-02-28 PROCEDURE — 99999 PR PBB SHADOW E&M-EST. PATIENT-LVL III: CPT | Mod: PBBFAC,,, | Performed by: PSYCHIATRY & NEUROLOGY

## 2019-02-28 PROCEDURE — 99214 OFFICE O/P EST MOD 30 MIN: CPT | Mod: S$GLB,,, | Performed by: PSYCHIATRY & NEUROLOGY

## 2019-02-28 PROCEDURE — 90833 PSYTX W PT W E/M 30 MIN: CPT | Mod: S$GLB,,, | Performed by: PSYCHIATRY & NEUROLOGY

## 2019-02-28 PROCEDURE — 3008F BODY MASS INDEX DOCD: CPT | Mod: CPTII,S$GLB,, | Performed by: PSYCHIATRY & NEUROLOGY

## 2019-02-28 PROCEDURE — 90833 PR PSYCHOTHERAPY W/PATIENT W/E&M, 30 MIN (ADD ON): ICD-10-PCS | Mod: S$GLB,,, | Performed by: PSYCHIATRY & NEUROLOGY

## 2019-02-28 RX ORDER — ALPRAZOLAM 1 MG/1
1 TABLET ORAL DAILY PRN
Qty: 30 TABLET | Refills: 2 | Status: SHIPPED | OUTPATIENT
Start: 2019-02-28 | End: 2019-06-11 | Stop reason: SDUPTHER

## 2019-02-28 RX ORDER — BUPROPION HYDROCHLORIDE 150 MG/1
150 TABLET ORAL DAILY
Qty: 90 TABLET | Refills: 0 | Status: SHIPPED | OUTPATIENT
Start: 2019-02-28 | End: 2019-06-11 | Stop reason: SDUPTHER

## 2019-02-28 RX ORDER — PRAZOSIN HYDROCHLORIDE 1 MG/1
1 CAPSULE ORAL NIGHTLY
Qty: 90 CAPSULE | Refills: 1 | Status: SHIPPED | OUTPATIENT
Start: 2019-02-28 | End: 2020-01-30
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
## 2019-03-13 RX ORDER — MONTELUKAST SODIUM 10 MG/1
TABLET ORAL
Qty: 90 TABLET | Refills: 1 | Status: SHIPPED | OUTPATIENT
Start: 2019-03-13 | End: 2020-03-30 | Stop reason: SDUPTHER

## 2019-03-19 RX ORDER — TIZANIDINE 4 MG/1
4 TABLET ORAL NIGHTLY
Qty: 30 TABLET | Refills: 11 | Status: SHIPPED | OUTPATIENT
Start: 2019-03-19 | End: 2020-01-03

## 2019-03-19 NOTE — TELEPHONE ENCOUNTER
----- Message from Olimpia Florian sent at 3/19/2019 11:43 AM CDT -----  Contact: self  Patient need refill on tiZANidine (ZANAFLEX) 4 MG tablet     And tramdol 50 mg        Please call to advice [phone] (home)         Patient states please call her if scripts can not be filled [phone] (home)       [facility]/pharmacy #[id number] - [place], [place] - [address]  [address] [address]  Phone: [phone] Fax: [phone]                  Thank you

## 2019-03-22 RX ORDER — TRAMADOL HYDROCHLORIDE 50 MG/1
50 TABLET ORAL EVERY 6 HOURS PRN
Qty: 60 TABLET | Refills: 0 | Status: SHIPPED | OUTPATIENT
Start: 2019-03-22 | End: 2019-04-24 | Stop reason: SDUPTHER

## 2019-03-27 ENCOUNTER — TELEPHONE (OUTPATIENT)
Dept: NEUROLOGY | Facility: CLINIC | Age: 44
End: 2019-03-27

## 2019-03-27 NOTE — TELEPHONE ENCOUNTER
Received fax from [facility] for medical clearance for dental work. Clearance form completed from a neurological standpoint. Referred them to get post surgery clearance from the neurosurgeon. Clearance form faxed back to [facility] at [phone].

## 2019-03-27 NOTE — TELEPHONE ENCOUNTER
----- Message from Hanh Quigley sent at 3/27/2019 12:10 PM CDT -----  Type: Needs Medical Clearance    Who Called:  Tory with [facility]  Best Call Back Number: [phone]  Additional Information: Needs clearance for patient's dental work/please call back.

## 2019-03-27 NOTE — TELEPHONE ENCOUNTER
Called and spoke with  at [facility]. Informed them I had faxed it over at 8:11 am this morning. They did not receive it. Clearance with successfully sent confirmation faxed to them again.

## 2019-03-29 ENCOUNTER — TELEPHONE (OUTPATIENT)
Dept: FAMILY MEDICINE | Facility: CLINIC | Age: 44
End: 2019-03-29

## 2019-03-29 NOTE — TELEPHONE ENCOUNTER
Pt needs a pregnancy test before she gets next depo injection . Sinus issues as well would like an appt. Scheduled her with PCP.  Pt verbalized appt date, time, and location with no further questions

## 2019-03-29 NOTE — TELEPHONE ENCOUNTER
----- Message from Anton Tong sent at 3/29/2019 10:14 AM CDT -----  Contact: Patient  Advised needs to see the Dr and would like to come in for her Depo Provera shot with the nurse. Would also like to see the Dr for a sinus issues. No appnt available until 04-16-19.  Please call [phone] ()

## 2019-04-01 RX ORDER — LEVOTHYROXINE SODIUM 200 UG/1
TABLET ORAL
Qty: 30 TABLET | Refills: 0 | Status: SHIPPED | OUTPATIENT
Start: 2019-04-01 | End: 2019-04-15 | Stop reason: SDUPTHER

## 2019-04-01 RX ORDER — LEVOTHYROXINE SODIUM 25 UG/1
TABLET ORAL
Qty: 30 TABLET | Refills: 0 | Status: SHIPPED | OUTPATIENT
Start: 2019-04-01 | End: 2019-09-12

## 2019-04-11 ENCOUNTER — PATIENT MESSAGE (OUTPATIENT)
Dept: FAMILY MEDICINE | Facility: CLINIC | Age: 44
End: 2019-04-11

## 2019-04-15 RX ORDER — LEVOTHYROXINE SODIUM 200 UG/1
TABLET ORAL
Qty: 30 TABLET | Refills: 0 | Status: SHIPPED | OUTPATIENT
Start: 2019-04-15 | End: 2019-07-10 | Stop reason: SDUPTHER

## 2019-04-15 NOTE — TELEPHONE ENCOUNTER
Patient scheduled for TSH on Wednesday 4/17/19. Please advise regarding refill when results are received.

## 2019-04-16 RX ORDER — TRAMADOL HYDROCHLORIDE 50 MG/1
TABLET ORAL
Qty: 60 TABLET | Refills: 0 | Status: SHIPPED | OUTPATIENT
Start: 2019-04-16 | End: 2019-11-25

## 2019-04-18 ENCOUNTER — TELEPHONE (OUTPATIENT)
Dept: NEUROSURGERY | Facility: CLINIC | Age: 44
End: 2019-04-18

## 2019-04-18 NOTE — TELEPHONE ENCOUNTER
----- Message from Jaclyn Orozco RN sent at 4/18/2019  8:09 AM CDT -----  Tory from [facility] called asking about clearance for sedation for this patient. She said she had sent something over to us but wasn't sure she had received it back yet. Pt is at the office for a procedure. Tory can be reached at [phone].

## 2019-04-18 NOTE — TELEPHONE ENCOUNTER
Notified that she had a chiari decompression, no contraindication to getting sedation for a dental procedure

## 2019-04-24 RX ORDER — TRAMADOL HYDROCHLORIDE 50 MG/1
TABLET ORAL
Qty: 60 TABLET | Refills: 0 | Status: SHIPPED | OUTPATIENT
Start: 2019-04-24 | End: 2019-05-29 | Stop reason: SDUPTHER

## 2019-04-29 RX ORDER — METHOCARBAMOL 750 MG/1
TABLET, FILM COATED ORAL
Qty: 60 TABLET | Refills: 0 | Status: SHIPPED | OUTPATIENT
Start: 2019-04-29 | End: 2019-07-10 | Stop reason: SDUPTHER

## 2019-05-30 RX ORDER — TRAMADOL HYDROCHLORIDE 50 MG/1
TABLET ORAL
Qty: 60 TABLET | Refills: 0 | Status: SHIPPED | OUTPATIENT
Start: 2019-05-30 | End: 2019-07-10 | Stop reason: SDUPTHER

## 2019-06-11 ENCOUNTER — OFFICE VISIT (OUTPATIENT)
Dept: PSYCHIATRY | Facility: CLINIC | Age: 44
End: 2019-06-11
Payer: MEDICARE

## 2019-06-11 DIAGNOSIS — F43.10 PTSD (POST-TRAUMATIC STRESS DISORDER): ICD-10-CM

## 2019-06-11 DIAGNOSIS — F60.3 BORDERLINE PERSONALITY DISORDER: ICD-10-CM

## 2019-06-11 DIAGNOSIS — F90.0 ATTENTION DEFICIT HYPERACTIVITY DISORDER (ADHD), PREDOMINANTLY INATTENTIVE TYPE: ICD-10-CM

## 2019-06-11 DIAGNOSIS — F33.2 MAJOR DEPRESSIVE DISORDER, RECURRENT, SEVERE WITHOUT PSYCHOTIC FEATURES: Primary | ICD-10-CM

## 2019-06-11 DIAGNOSIS — F41.0 PANIC DISORDER WITHOUT AGORAPHOBIA: ICD-10-CM

## 2019-06-11 PROCEDURE — 99214 OFFICE O/P EST MOD 30 MIN: CPT | Mod: S$PBB,,, | Performed by: PSYCHIATRY & NEUROLOGY

## 2019-06-11 PROCEDURE — 99999 PR PBB SHADOW E&M-EST. PATIENT-LVL I: ICD-10-PCS | Mod: PBBFAC,,, | Performed by: PSYCHIATRY & NEUROLOGY

## 2019-06-11 PROCEDURE — 99214 PR OFFICE/OUTPT VISIT, EST, LEVL IV, 30-39 MIN: ICD-10-PCS | Mod: S$PBB,,, | Performed by: PSYCHIATRY & NEUROLOGY

## 2019-06-11 PROCEDURE — 90833 PR PSYCHOTHERAPY W/PATIENT W/E&M, 30 MIN (ADD ON): ICD-10-PCS | Mod: ,,, | Performed by: PSYCHIATRY & NEUROLOGY

## 2019-06-11 PROCEDURE — 99999 PR PBB SHADOW E&M-EST. PATIENT-LVL I: CPT | Mod: PBBFAC,,, | Performed by: PSYCHIATRY & NEUROLOGY

## 2019-06-11 PROCEDURE — 99211 OFF/OP EST MAY X REQ PHY/QHP: CPT | Mod: PBBFAC,PO | Performed by: PSYCHIATRY & NEUROLOGY

## 2019-06-11 PROCEDURE — 90833 PSYTX W PT W E/M 30 MIN: CPT | Mod: ,,, | Performed by: PSYCHIATRY & NEUROLOGY

## 2019-06-11 RX ORDER — LEVOTHYROXINE SODIUM 200 UG/1
TABLET ORAL
Qty: 30 TABLET | Refills: 0 | OUTPATIENT
Start: 2019-06-11

## 2019-06-11 RX ORDER — ZOLPIDEM TARTRATE 12.5 MG/1
12.5 TABLET, FILM COATED, EXTENDED RELEASE ORAL NIGHTLY PRN
Qty: 30 TABLET | Refills: 2 | Status: SHIPPED | OUTPATIENT
Start: 2019-06-11 | End: 2019-09-04 | Stop reason: SDUPTHER

## 2019-06-11 RX ORDER — FLUOXETINE HYDROCHLORIDE 40 MG/1
40 CAPSULE ORAL DAILY
Qty: 90 CAPSULE | Refills: 0 | Status: SHIPPED | OUTPATIENT
Start: 2019-06-11 | End: 2019-11-08 | Stop reason: SDUPTHER

## 2019-06-11 RX ORDER — ALPRAZOLAM 1 MG/1
1 TABLET ORAL DAILY PRN
Qty: 30 TABLET | Refills: 2 | Status: SHIPPED | OUTPATIENT
Start: 2019-06-11 | End: 2019-12-07 | Stop reason: SDUPTHER

## 2019-06-11 RX ORDER — BUPROPION HYDROCHLORIDE 150 MG/1
150 TABLET ORAL DAILY
Qty: 90 TABLET | Refills: 0 | Status: SHIPPED | OUTPATIENT
Start: 2019-06-11 | End: 2020-01-02
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
## 2019-07-10 RX ORDER — TRAMADOL HYDROCHLORIDE 50 MG/1
TABLET ORAL
Qty: 60 TABLET | Refills: 0 | Status: SHIPPED | OUTPATIENT
Start: 2019-07-10 | End: 2019-09-06 | Stop reason: SDUPTHER

## 2019-07-10 RX ORDER — METHOCARBAMOL 750 MG/1
TABLET, FILM COATED ORAL
Qty: 60 TABLET | Refills: 0 | Status: SHIPPED | OUTPATIENT
Start: 2019-07-10 | End: 2019-08-07 | Stop reason: SDUPTHER

## 2019-07-11 RX ORDER — LEVOTHYROXINE SODIUM 200 UG/1
TABLET ORAL
Qty: 30 TABLET | Refills: 0 | Status: SHIPPED | OUTPATIENT
Start: 2019-07-11 | End: 2019-09-12 | Stop reason: SDUPTHER

## 2019-08-05 RX ORDER — LEVOTHYROXINE SODIUM 200 UG/1
TABLET ORAL
Qty: 30 TABLET | Refills: 0 | OUTPATIENT
Start: 2019-08-05

## 2019-08-08 RX ORDER — LEVOTHYROXINE SODIUM 200 UG/1
TABLET ORAL
Qty: 30 TABLET | Refills: 0 | OUTPATIENT
Start: 2019-08-08

## 2019-08-08 RX ORDER — METHOCARBAMOL 750 MG/1
TABLET, FILM COATED ORAL
Qty: 60 TABLET | Refills: 0 | Status: SHIPPED | OUTPATIENT
Start: 2019-08-08 | End: 2019-12-07 | Stop reason: SDUPTHER

## 2019-08-09 RX ORDER — GABAPENTIN 600 MG/1
600 TABLET ORAL 3 TIMES DAILY
Qty: 90 TABLET | Refills: 11 | Status: SHIPPED | OUTPATIENT
Start: 2019-08-09 | End: 2020-03-18 | Stop reason: SDUPTHER

## 2019-08-22 ENCOUNTER — TELEPHONE (OUTPATIENT)
Dept: NEUROSURGERY | Facility: CLINIC | Age: 44
End: 2019-08-22

## 2019-08-22 ENCOUNTER — PATIENT MESSAGE (OUTPATIENT)
Dept: FAMILY MEDICINE | Facility: CLINIC | Age: 44
End: 2019-08-22

## 2019-08-22 DIAGNOSIS — E78.5 HYPERLIPIDEMIA, UNSPECIFIED HYPERLIPIDEMIA TYPE: Primary | ICD-10-CM

## 2019-08-22 NOTE — TELEPHONE ENCOUNTER
Was able to reassign a sooner date for 6 MOS F/U with MRI for visit with [facility] could leave message will mail appt slip to address on file today

## 2019-08-29 ENCOUNTER — TELEPHONE (OUTPATIENT)
Dept: NEUROSURGERY | Facility: CLINIC | Age: 44
End: 2019-08-29

## 2019-08-29 NOTE — TELEPHONE ENCOUNTER
Patient requested  different radiology dept in  [place]  Instead of [facility] will mail new scan appt slip today

## 2019-09-04 DIAGNOSIS — F43.10 PTSD (POST-TRAUMATIC STRESS DISORDER): ICD-10-CM

## 2019-09-05 RX ORDER — ZOLPIDEM TARTRATE 12.5 MG/1
12.5 TABLET, FILM COATED, EXTENDED RELEASE ORAL NIGHTLY PRN
Qty: 30 TABLET | Refills: 2 | Status: SHIPPED | OUTPATIENT
Start: 2019-09-05 | End: 2019-12-09 | Stop reason: SDUPTHER

## 2019-09-09 RX ORDER — TRAMADOL HYDROCHLORIDE 50 MG/1
TABLET ORAL
Qty: 60 TABLET | Refills: 0 | Status: SHIPPED | OUTPATIENT
Start: 2019-09-09 | End: 2019-11-25

## 2019-09-11 ENCOUNTER — LAB VISIT (OUTPATIENT)
Dept: LAB | Facility: HOSPITAL | Age: 44
End: 2019-09-11
Attending: FAMILY MEDICINE
Payer: MEDICARE

## 2019-09-11 ENCOUNTER — OFFICE VISIT (OUTPATIENT)
Dept: NEUROLOGY | Facility: CLINIC | Age: 44
End: 2019-09-11
Payer: MEDICARE

## 2019-09-11 VITALS
SYSTOLIC BLOOD PRESSURE: 148 MMHG | WEIGHT: 272.81 LBS | RESPIRATION RATE: 16 BRPM | BODY MASS INDEX: 42.82 KG/M2 | HEIGHT: 67 IN | DIASTOLIC BLOOD PRESSURE: 102 MMHG

## 2019-09-11 DIAGNOSIS — R27.0 ATAXIA: ICD-10-CM

## 2019-09-11 DIAGNOSIS — G43.719 CHRONIC MIGRAINE WITHOUT AURA, INTRACTABLE, WITHOUT STATUS MIGRAINOSUS: Primary | ICD-10-CM

## 2019-09-11 DIAGNOSIS — E78.5 HYPERLIPIDEMIA, UNSPECIFIED HYPERLIPIDEMIA TYPE: ICD-10-CM

## 2019-09-11 DIAGNOSIS — E03.4 HYPOTHYROIDISM DUE TO ACQUIRED ATROPHY OF THYROID: ICD-10-CM

## 2019-09-11 DIAGNOSIS — R53.1 WEAKNESS: ICD-10-CM

## 2019-09-11 LAB
CHOLEST SERPL-MCNC: 212 MG/DL (ref 120–199)
CHOLEST/HDLC SERPL: 3.9 {RATIO} (ref 2–5)
HDLC SERPL-MCNC: 54 MG/DL (ref 40–75)
HDLC SERPL: 25.5 % (ref 20–50)
LDLC SERPL CALC-MCNC: 122.8 MG/DL (ref 63–159)
NONHDLC SERPL-MCNC: 158 MG/DL
TRIGL SERPL-MCNC: 176 MG/DL (ref 30–150)
TSH SERPL DL<=0.005 MIU/L-ACNC: 2.01 UIU/ML (ref 0.4–4)

## 2019-09-11 PROCEDURE — 99999 PR PBB SHADOW E&M-EST. PATIENT-LVL III: ICD-10-PCS | Mod: PBBFAC,,, | Performed by: PSYCHIATRY & NEUROLOGY

## 2019-09-11 PROCEDURE — 99214 PR OFFICE/OUTPT VISIT, EST, LEVL IV, 30-39 MIN: ICD-10-PCS | Mod: S$PBB,,, | Performed by: PSYCHIATRY & NEUROLOGY

## 2019-09-11 PROCEDURE — 80061 LIPID PANEL: CPT

## 2019-09-11 PROCEDURE — 99214 OFFICE O/P EST MOD 30 MIN: CPT | Mod: S$PBB,,, | Performed by: PSYCHIATRY & NEUROLOGY

## 2019-09-11 PROCEDURE — 36415 COLL VENOUS BLD VENIPUNCTURE: CPT | Mod: PO

## 2019-09-11 PROCEDURE — 84443 ASSAY THYROID STIM HORMONE: CPT

## 2019-09-11 PROCEDURE — 99213 OFFICE O/P EST LOW 20 MIN: CPT | Mod: PBBFAC,PO | Performed by: PSYCHIATRY & NEUROLOGY

## 2019-09-11 PROCEDURE — 99999 PR PBB SHADOW E&M-EST. PATIENT-LVL III: CPT | Mod: PBBFAC,,, | Performed by: PSYCHIATRY & NEUROLOGY

## 2019-09-11 RX ORDER — GALCANEZUMAB 120 MG/ML
INJECTION, SOLUTION SUBCUTANEOUS
Qty: 2 ML | Refills: 0 | Status: SHIPPED | OUTPATIENT
Start: 2019-09-11 | End: 2019-10-09

## 2019-09-11 NOTE — PATIENT INSTRUCTIONS
TESTING:  -- none     REFERRALS:  -- home physical therapy for arm strength and falling    PREVENTION OF PAIN:   -- continue gabapentin 600mg three times per day   -- Start Emgality injection once every 28 days (2 prescriptions, first month give yourself TWO shots, all other months give yourself ONE shot) (will come from [facility], they will call you)    AS-NEEDED TREATMENT OF PAIN:  -- continue tramadol as needed

## 2019-09-11 NOTE — PROGRESS NOTES
Date of service: 9/11/2019  Referring provider: No ref. provider found    Subjective:      Chief complaint: No chief complaint on file.       Patient ID: Dinah Mitchell is a 44 y.o. depression, borderline personality disorder, PTSD, fibromyalgia, and hypothyroidism presenting for follow-up of headache and neck pain    History of Present Illness    INTERVAL HISTORY - 9/11/19    She was last seen approximately 7 months ago.  At that point she was having headaches 4 days per week.  We continued her regimen of gabapentin and tramadol    She is on gabapentin 600 mg 3 times a day, Robaxin, tizanidine, the tramadol has not been filled since July.    She sustained some head trauma, hit her left back of head on the cabinet, 6/9/19. Feels nauseous, worse headache.     Today she reports she is about the same.  She reports memory loss, confusion, headache, nausea, falling frequently, neck pain, numbness in the hands and feet, no depth perception.    The headaches are left temporal, back of the head, feels like a vice , neck pain. Her current pain score several much for to 10.    She feels like she is coming down with the flu.     Headaches, nausea, and falling are her most concerning symptoms. Has not been in PT.     2 types of headaches  - temple, associated with light sensitivity, aggravated by activiity. Another like a vice . Wakes up with them.    Her dog passed away      INTERVAL HISTORY - 2/12/19     In the interim since last visit one year ago she has had bilateral occipital nerve block in cervical trigger point injection x3 with me with good relief.      She reports, that while I was on leave, she felt worse and worse. She was more nauseous and kept falling. She saw Dr. Busch for her Chiari malformation, which I previously recommended against surgery on as her symptoms were not clearly Chiari related and she had a non-obstructive CSF flow pattern on CINE. She had posterior fossa decompression on 12/13/18. She had  "severe headache and neck pain afterwards. She says the surgery helped her disabling headaches, she doesn't have headache any more when straining or bending over but she remains with the other headaches documented below. She has multiple questions about the surgery itself.     She reports she is still wobbly. She still has depth perception issues.     New symptoms - lump she feels on the the posterior parietal scalp, this area has never hurt before. Neck feels weak. Has to lay against something for support. Arms feel more weak then before. Sensation in her feet is returning.     Headaches - left temple, gradual onset, photo/phonophobia/nausea. These occur 4 days per week. Last at least 1-2 hours.     Her current pain score is seven for headache, nine for neck.  With a range of 6-9.    ORIGINAL PAIN HISTORY - 3/14/18   Age at onset and course over time: 2011 and worse over time, this happened during a time when she had a long commute. Neck is her worst pain - just left to midline of middle neck. Was going to [facility] pain management receiving epidural steroids which would last only 2 weeks. She had a CMBB and was planning for RFA but wanted to get cervical MRI first and second opinion  Location: neck (sharp, stabbing); hands and feet (tingling, numb), hip/lower back (dull, aching). Also has intermittent left sided headaches starting occipitally radiating to temple.   Quality: as above  Severity: current 8.5; range 7 to 10  Duration: hours  Frequency: daily  Alleviated by: medication, ice  Exacerbated by: movement, light noise (head pain)  Associated with: left arm/hand gets numb/weak "a lot" no radicular pain; headaches on left which are random or triggered by straining in restroom/coughing/laughing. Headaches worst in the AM within 2 hours of waking up. Headaches do not wake her up but neck pain does. Having some depth perception issues causing her to break her foot - eyes checked and were ok. Transient visual " loss? When in a lof of pain, not clear TVO with valsalva.   Sleep habits:    Current acute treatment:  -- tramadol 50mg - 2 tab per daily, daily   -- robaxin during the day   -- tizanidine 4mg nightly     (xanax 1mg daily PRN for PTSD, anxiety from psychiatrist)     Current prevention:  -- gabapentin 600mg TID   (wellbutrin)  (prozac)    Previously tried:   -- TENS   -- tramadol - helped with overall pain   -- methocarbamol - helped with overall pain   -- relafen - did not help     Procedural history:   -- cervical GREGOR  -- lumbar GREGOR  -- chiropractor for hip /neck - helped only hip  -- PT - none for neck just foot     Review of patient's allergies indicates:   Allergen Reactions    Lamotrigine      Other reaction(s): Rash  Other reaction(s): Nausea    Sulfa (sulfonamide antibiotics) Nausea Only     Other reaction(s): Unknown     Current Outpatient Medications   Medication Sig Dispense Refill    ALPRAZolam (XANAX) 1 MG tablet Take 1 tablet (1 mg total) by mouth daily as needed for Anxiety. 30 tablet 2    buPROPion (WELLBUTRIN XL) 150 MG TB24 tablet Take 1 tablet (150 mg total) by mouth once daily. 90 tablet 0    dronabinol (MARINOL) 2.5 MG capsule Take 1 capsule (2.5 mg total) by mouth 2 (two) times daily before meals. 60 capsule 3    FLUoxetine 40 MG capsule Take 1 capsule (40 mg total) by mouth once daily. 90 capsule 0    gabapentin (NEURONTIN) 600 MG tablet TAKE 1 TABLET (600 MG TOTAL) BY MOUTH 3 (THREE) TIMES DAILY. 90 tablet 11    levOCARNitine (CARNITOR) 330 mg Tab Take 3 tablets (990 mg total) by mouth 2 (two) times daily. 180 tablet 6    medroxyPROGESTERone (DEPO-PROVERA) 150 mg/mL injection Inject 150 mg into the muscle every 3 (three) months.      methocarbamol (ROBAXIN) 500 MG Tab       methocarbamol (ROBAXIN) 750 MG Tab TAKE 1 TABLET TWICE A DAY AS NEEDED FOR MUSCLE SPASMS 60 tablet 0    montelukast (SINGULAIR) 10 mg tablet TAKE 1 TABLET BY MOUTH EVERY DAY 90 tablet 1    prazosin (MINIPRESS)  1 MG Cap Take 1 capsule (1 mg total) by mouth every evening. For ptsd related nightmares 90 capsule 1    SYNTHROID 200 mcg tablet TAKE 1 TABLET (200 MCG TOTAL) BY MOUTH ONCE DAILY. 30 tablet 0    SYNTHROID 25 mcg tablet TAKE 1 TABLET (25 MCG TOTAL) BY MOUTH ONCE DAILY. 30 tablet 0    tiZANidine (ZANAFLEX) 4 MG tablet Take 1 tablet (4 mg total) by mouth every evening. 30 tablet 11    traMADol (ULTRAM) 50 mg tablet TAKE 1 TABLET EVERY 6 HOURS AS NEEDED FOR PAIN 60 tablet 0    traMADol (ULTRAM) 50 mg tablet TAKE 1 TABLET BY MOUTH EVERY 6 HOURS AS NEEDED FOR PAIN 60 tablet 0    UNABLE TO FIND N-acetyl-L-cysteine 50 mg subcutaneously 3 times a week 10 mL 6    VENTOLIN HFA 90 mcg/actuation inhaler TAKE 2 PUFFS BY MOUTH EVERY 6 HOURS AS NEEDED FOR WHEEZE 18 Inhaler 0    zolpidem (AMBIEN CR) 12.5 MG CR tablet TAKE 1 TABLET (12.5 MG TOTAL) BY MOUTH NIGHTLY AS NEEDED FOR INSOMNIA. 30 tablet 2    EMGALITY  mg/mL PnIj Inject 240mg (2 injections) subcutaneously at separate sites, once (loading dose). Start maintenance dose 28 days later. 2 Syringe 0    [START ON 10/9/2019] galcanezumab-gnlm (EMGALITY PEN) 120 mg/mL PnIj Inject 120 mg into the skin every 28 days. 1 Syringe 11     Current Facility-Administered Medications   Medication Dose Route Frequency Provider Last Rate Last Dose    medroxyPROGESTERone (DEPO-PROVERA) injection 150 mg  150 mg Intramuscular Q90 Days Nikhil Franco MD   150 mg at 12/04/18 1508    medroxyPROGESTERone (DEPO-PROVERA) injection 150 mg  150 mg Intramuscular Q90 Days Barbi Wilson NP           Past Medical History  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  "    PATRICIA (obstructive sleep apnea)     severe - doesn't use CPAP    Panic attack     PTSD (post-traumatic stress disorder)        Past Surgical History  Past Surgical History:   Procedure Laterality Date    ANKLE SURGERY Right     x2 (#1 for ligament injury and #2 for fx and ligament)    AUGMENTATION OF BREAST      BREAST SURGERY  1997    B implants    CERVICAL BIOPSY  W/ LOOP ELECTRODE EXCISION      DECOMPRESSION, CHIARI MALFORMATION, BY 1ST CERVICAL VERTEBRA POSTERIOR ARCH REMOVAL N/A 12/13/2018    [facility] [employer] [facility]    LAPAROSCOPIC GASTRIC BANDING      with subsequent removal due to abscess    LIPOSUCTION      x 2    PELVIC LAPAROSCOPY      for endometriosis eval with BTL    TUBAL LIGATION      wtih pelvic lap        Family History  Family History   Problem Relation Age of Onset    Diabetes Sister     Seizures Sister     Mitochondrial disorder Sister         "trent"    Mental illness Sister     Seizures Sister     Cervical cancer Sister     Cervical cancer Sister     Ovarian cancer Cousin     Ovarian cancer Paternal Aunt     Colon cancer Maternal Grandmother     Cancer Maternal Grandfather 65        throat ca    Diabetes Mother     Stroke Mother 30    Mental illness Mother         depression    Hyperlipidemia Mother     Hypertension Mother     Hyperlipidemia Father     Heart disease Father         "athletes heart"    Ovarian cancer Paternal Aunt     Cervical cancer Paternal Cousin     Cervical cancer Other     Breast cancer Neg Hx        Social History  Social History     Socioeconomic History    Marital status:      Spouse name: Not on file    Number of children: Not on file    Years of education: Not on file    Highest education level: Not on file   Occupational History    Occupation: foresenic     Social Needs    Financial resource strain: Not on file    Food insecurity:     Worry: Not on file     Inability: Not on " file    Transportation needs:     Medical: Yes     Non-medical: Yes   Tobacco Use    Smoking status: Never Smoker    Smokeless tobacco: Never Used   Substance and Sexual Activity    Alcohol use: Yes     Frequency: Monthly or less     Drinks per session: 1 or 2     Binge frequency: Never     Comment: occasional    Drug use: No    Sexual activity: Yes     Partners: Male     Birth control/protection: Injection     Comment: depo since age 18   Lifestyle    Physical activity:     Days per week: 0 days     Minutes per session: 0 min    Stress: Very much   Relationships    Social connections:     Talks on phone: More than three times a week     Gets together: Never     Attends Restorationist service: Not on file     Active member of club or organization: No     Attends meetings of clubs or organizations: Never     Relationship status:    Other Topics Concern    Not on file   Social History Narrative    Not on disability, not currently working.        Review of Systems  14-point review of systems as follows:   No check mariam indicates NEGATIVE response   Constitutional: [] weight loss, [x] change to appetite   Eyes: [] change in vision, [] double vision   Ears, nose, mouth, throat: [] frequent nose bleeds, [] ringing in the ears   Respiratory: [] cough, [] wheezing   Cardiovascular: [] chest pain, [] palpitations   Gastrointestinal: [] jaundice, [] nausea/vomiting   Genitourinary: [] incontinence, [] burning with urination   Hematologic/lymphatic: [x] easy bruising/bleeding, [] night sweats   Neurological: [x] numbness, [x] weakness   Endocrine: [] fatigue, [] heat/cold intolerance   Allergy/Immunologic: [] fevers, [] chills   Musculoskeletal: [x] muscle pain, [] joint pain   Psychiatric: [] thoughts of harming self/others, [] depression   Integumentary: [] rashes, [] sores that do not heal       Objective:        Vitals:    09/11/19 1512   BP: (!) 148/102   Resp: 16     Body mass index is 42.73 kg/m².      Reflexes full throughout  Confrontational strength 5/5 in all cervical dermatomes    2/12/19  General: Well developed, well nourished.  No acute distress.  HEENT: Atraumatic, normocephalic. Bilateral ptosis (chronic).  Optic discs sharp bilaterally. The lumps she showed me are bony prominences of the calvarium  Neck: Trachea midline. Well-healed midline posterior surgical scar.  Multiple areas of muscle tenderness around neck  Cardiovascular: Vitals reviewed. Normal peripheral perfusion.   Pulmonary: No increased work of breathing.  Abdomen/GI: No guarding  Musculoskeletal: No obvious joint deformities, moves all extremities symmetrically and well.  No drift, no orbit, symmetric finger tapping.     Neurological exam:  Mental status: Awake and alert. Oriented to situation.  Speech fluent and appropriate. Recent and remote memory appear to be intact.  Fund of knowledge normal.  Cranial nerves: Pupils equal round, extraocular movements intact, facial strength intact bilaterally, reports hearing is asymmetric  Sensation:  Normal to temperature  Reflexes: 2+ throughout, negative Vu  Coordination:  Normal finger-to-nose and heel-to-shin  Gait: Normal         3/14/18   Constitutional: appears in no acute distress, well-developed, well-nourished     Eyes: normal conjunctiva, PERRLA, bilateral right worse than left ptosis (baseline). Optic discs sharp bilaterally.     Ears, nose, mouth, throat: external appearance of ears and nose normal, hearing reduced on left     Cardiovascular: regular rate and rhythm, no murmurs appreciated    Respiratory: unlabored respirations, breath sounds normal bilaterally    Gastrointestinal: no visible abdominal masses, no guarding, no visible hernia    Musculoskeletal: normal tone in all four extremities. No atrophy. No abnormal movements. Strength in all muscles groups of the upper and lower extremities is 5/5. Normal gait and station. Digits and nails normal.      Spine:  CERVICAL  SPINE:  INSPECTION: no scars   ROM: slightly limited   MUSCLE SPASM: bilateral   FACET LOADING: left  SPURLING: neg  JEZ tender: bilateral     Psychiatric: normal judgment and insight. Oriented to person, place, and time.     Neurologic:   Cortical functions: recent and remote memory intact, normal attention span and concentration, speech fluent, adequate fund of knowledge   Cranial nerves: visual fields full, PERRLA, EOMI, facial sensation intact in V1-V3, symmetric facial strength, hearing intact to finger rub, palate elevates symmetrically, shoulder shrug 5/5, tongue protrudes midline   Reflexes: 2+ in the upper and lower extremities, no Vu, no babinski   Sensation: reduced to temperature in a length dependent manner in the hands and to knees, vibration reduced toes, ankles, knees  Coordination: normal    Data Review:     I have personally reviewed the referring provider's notes, labs, & imaging made available to me today.     RADIOLOGY STUDIES:  I have personally reviewed the pertinent images performed.     MRI cervical spine 2/27/18  1. The cerebellar tonsils extend below the foramen magnum approximately 8-9 mm, consistent with Chiari 1 malformation.  The cord is normal without evidence of syrinx, signal abnormality or abnormal enhancement.  2. There is mild degenerative change.  There is, however, no significant spinal canal or foraminal stenosis.  There is no cord compression.  There is no suspected nerve root compression.  Please see above discussion.    Results for orders placed or performed during the hospital encounter of 02/05/18   MRI Brain W WO Contrast    Narrative    Routine Multiplanar imaging through this 42-year-old females brain was obtained with utilization of 10 cc of gadolinium contrast    Hyperostosis frontalis appears to be present.     A Chiari one malformation appears to be present with the cerebellar tonsils extending 9 mm inferior to the foramen magnum with so-called pegging of the  cerebellar tonsils.    An empty sella configuration of the pituitary gland is noted.    No diffusion positivity is identified to suggest recent infarction.    No gradient  susceptibility was noted just just presence of acute blood products.    No worrisome enhancing lesions are noted intracranially    No diffusion positivity is identified to suggest recent infarction.    Impression    1. The cerebellar tonsils appear to extend inferior to the foramen magnum suggestive of a Chiari one malformation. No obvious cord signal abnormalities noted within the visualized cord, given the patient's history further evaluation of the cord may be of use to exclude a syrinx        Electronically signed by: Ismael Richmond MD  Date:     02/05/18  Time:    12:04    Results for orders placed or performed during the hospital encounter of 09/25/15   CT Head Without Contrast    Narrative    Comparison: None    Technique: 5 mm noncontrast axial images through the brain were obtained.    Findings:     The brain is normally formed with no indication of acute/recent major vascular distribution cerebral infarction, intraparenchymal hemorrhage, or intra-axial space occupying lesion. There is preserved gray-white matter junction differentiation.     The ventricular system is normal in appearance for age. No hydrocephalus. No effacement of the skull-base cisterns. No abnormal extra-axial fluid collections or blood products.     The paranasal sinuses and mastoid air cells are unremarkable.  There is incidentally observed hyperostosis frontalis.    Impression         No acute  intracranial abnormalities are appreciated.      Electronically signed by: Toyin Shin MD  Date:     09/25/15  Time:    13:41        Lab Results   Component Value Date     12/14/2018    K 5.0 12/14/2018    MG 2.3 12/14/2018     12/14/2018    CO2 18 (L) 12/14/2018    BUN 12 12/14/2018    CREATININE 0.8 12/14/2018     (H) 12/14/2018    HGBA1C 4.9  12/04/2018    AST 22 12/04/2018    ALT 25 12/04/2018    ALBUMIN 4.2 12/04/2018    PROT 7.5 12/04/2018    BILITOT 0.6 12/04/2018    CHOL 232 (H) 12/04/2018    HDL 61 12/04/2018    LDLCALC 152.4 12/04/2018    TRIG 93 12/04/2018       Lab Results   Component Value Date    WBC 12.50 12/14/2018    HGB 13.1 12/14/2018    HCT 38.4 12/14/2018    MCV 95 12/14/2018     12/14/2018       Lab Results   Component Value Date    TSH 5.198 (H) 12/04/2018       Assessment & Plan:       Problem List Items Addressed This Visit        Neuro    Chronic migraine without aura, intractable, without status migrainosus - Primary    Overview     The patient has chronic migraines (G43.719) and suffers from headaches more than 15 days a month lasting more than 4 hours a day with no relief of symptoms despite trying multiple medications including but not limited to anti-epileptics (GABAPENTIN, LAMOTRIGINE), and antidepressants (FLUOXETINE). Botox treatment was approved for chronic migraines in October 2010. The patient will be an ideal candidate for Botox. We are planning for 3 treatments 3 months apart and aiming for at least 50% improvement in the symptoms. If we see no improvement after 3 treatments, we will discontinue the injections.           Relevant Medications    EMGALITY  mg/mL PnIj    galcanezumab-gnlm (EMGALITY PEN) 120 mg/mL PnIj (Start on 10/9/2019)    Ataxia    Relevant Orders    Ambulatory Referral to Home Health      Other Visit Diagnoses     Weakness        Relevant Orders    Ambulatory Referral to Home Health              TESTING:  -- none     REFERRALS:  -- home physical therapy for arm strength and falling    PREVENTION OF PAIN:   -- continue gabapentin 600mg three times per day   -- Start Emgality injection once every 28 days (2 prescriptions, first month give yourself TWO shots, all other months give yourself ONE shot) (will come from [facility], they will call you)    AS-NEEDED TREATMENT OF  PAIN:  -- continue tramadol as needed      Follow up in about 3 months (around 12/11/2019).         Noa Samano MD

## 2019-09-12 ENCOUNTER — TELEPHONE (OUTPATIENT)
Dept: PHARMACY | Facility: CLINIC | Age: 44
End: 2019-09-12

## 2019-09-12 ENCOUNTER — PATIENT MESSAGE (OUTPATIENT)
Dept: FAMILY MEDICINE | Facility: CLINIC | Age: 44
End: 2019-09-12

## 2019-09-12 PROCEDURE — G0180 PR HOME HEALTH MD CERTIFICATION: ICD-10-PCS | Mod: ,,, | Performed by: PSYCHIATRY & NEUROLOGY

## 2019-09-12 PROCEDURE — G0180 MD CERTIFICATION HHA PATIENT: HCPCS | Mod: ,,, | Performed by: PSYCHIATRY & NEUROLOGY

## 2019-09-12 RX ORDER — LEVOTHYROXINE SODIUM 200 UG/1
200 TABLET ORAL
Qty: 90 TABLET | Refills: 1 | Status: SHIPPED | OUTPATIENT
Start: 2019-09-12 | End: 2020-02-04

## 2019-09-12 NOTE — TELEPHONE ENCOUNTER
No  set up. Calling to notify the patient we received the prescription for Emgality, and to see if she has any active prescription insurance. We will continue to follow up.

## 2019-09-17 ENCOUNTER — TELEPHONE (OUTPATIENT)
Dept: PHARMACY | Facility: CLINIC | Age: 44
End: 2019-09-17

## 2019-09-18 ENCOUNTER — EXTERNAL HOME HEALTH (OUTPATIENT)
Dept: HOME HEALTH SERVICES | Facility: HOSPITAL | Age: 44
End: 2019-09-18
Payer: MEDICARE

## 2019-09-20 ENCOUNTER — TELEPHONE (OUTPATIENT)
Dept: PHARMACY | Facility: CLINIC | Age: 44
End: 2019-09-20

## 2019-09-26 ENCOUNTER — PATIENT MESSAGE (OUTPATIENT)
Dept: PHARMACY | Facility: CLINIC | Age: 44
End: 2019-09-26

## 2019-09-26 NOTE — TELEPHONE ENCOUNTER
Notified the patient we received the prescription for Emgality. She confirmed she does NOT have insurance right now. We will look into assistance for her. Patient voiced understanding.

## 2019-09-27 NOTE — TELEPHONE ENCOUNTER
FYI: Patient would like to cash pay for her Emgality   while [facility] work on [employer] PAP - Please send prescription to [facility] for (CASH PAY), which has been added to the patients EPIC profile. Patient has been notified and provided with the necessary info to call and schedule a delivery.    MD and Staff Message Sent

## 2019-09-27 NOTE — TELEPHONE ENCOUNTER
Action Required:     I have faxed over [employer] forms to your office. Completion of the Assistance forms need to be completed by the physician for the patients Emgality prescription assistance. Please complete the prescription sections of the  forms.     Once completed, you can fax them to [facility]. Any questions or concerns regarding the program or completion of the forms, please reach out to [facility].     Md and staff message sent

## 2019-10-07 ENCOUNTER — TELEPHONE (OUTPATIENT)
Dept: NEUROSURGERY | Facility: CLINIC | Age: 44
End: 2019-10-07

## 2019-10-07 ENCOUNTER — HOSPITAL ENCOUNTER (OUTPATIENT)
Dept: RADIOLOGY | Facility: HOSPITAL | Age: 44
Discharge: HOME | End: 2019-10-07
Attending: NEUROLOGICAL SURGERY
Payer: MEDICARE

## 2019-10-07 DIAGNOSIS — G93.5 CHIARI I MALFORMATION: ICD-10-CM

## 2019-10-07 PROCEDURE — 72141 MRI NECK SPINE W/O DYE: CPT | Mod: 26,,, | Performed by: RADIOLOGY

## 2019-10-07 PROCEDURE — 72141 MRI CERVICAL SPINE WITHOUT CONTRAST: ICD-10-PCS | Mod: 26,,, | Performed by: RADIOLOGY

## 2019-10-07 PROCEDURE — 72141 MRI NECK SPINE W/O DYE: CPT | Mod: TC,PO

## 2019-10-07 NOTE — TELEPHONE ENCOUNTER
----- Message from Skylar Henley sent at 10/7/2019 11:14 AM CDT -----  Contact: jessica /[phone]/[phone]    Name of Who is Calling:     What is the request in detail:  Request  Call back in reference to MRI ..patient is at location waiting Please contact to further discuss and advise      Can the clinic reply by [facility]: no     What Number to Call Back if not in [facility]:  jessica [phone]/yisel

## 2019-10-12 DIAGNOSIS — G43.719 CHRONIC MIGRAINE WITHOUT AURA, INTRACTABLE, WITHOUT STATUS MIGRAINOSUS: ICD-10-CM

## 2019-10-14 ENCOUNTER — PATIENT OUTREACH (OUTPATIENT)
Dept: ADMINISTRATIVE | Facility: OTHER | Age: 44
End: 2019-10-14

## 2019-10-14 ENCOUNTER — TELEPHONE (OUTPATIENT)
Dept: HOME HEALTH SERVICES | Facility: HOSPITAL | Age: 44
End: 2019-10-14

## 2019-10-14 RX ORDER — GALCANEZUMAB 120 MG/ML
INJECTION, SOLUTION SUBCUTANEOUS
Qty: 2 SYRINGE | Refills: 0 | OUTPATIENT
Start: 2019-10-14

## 2019-10-15 ENCOUNTER — OFFICE VISIT (OUTPATIENT)
Dept: NEUROSURGERY | Facility: CLINIC | Age: 44
End: 2019-10-15
Payer: MEDICARE

## 2019-10-15 VITALS
BODY MASS INDEX: 44.29 KG/M2 | DIASTOLIC BLOOD PRESSURE: 63 MMHG | HEIGHT: 67 IN | TEMPERATURE: 98 F | HEART RATE: 75 BPM | SYSTOLIC BLOOD PRESSURE: 126 MMHG | WEIGHT: 282.19 LBS

## 2019-10-15 DIAGNOSIS — M50.20 CERVICAL DISC HERNIATION: Primary | ICD-10-CM

## 2019-10-15 DIAGNOSIS — G93.5 CHIARI I MALFORMATION: ICD-10-CM

## 2019-10-15 PROCEDURE — 99213 OFFICE O/P EST LOW 20 MIN: CPT | Mod: PBBFAC | Performed by: NEUROLOGICAL SURGERY

## 2019-10-15 PROCEDURE — 99999 PR PBB SHADOW E&M-EST. PATIENT-LVL III: ICD-10-PCS | Mod: PBBFAC,,, | Performed by: NEUROLOGICAL SURGERY

## 2019-10-15 PROCEDURE — 99999 PR PBB SHADOW E&M-EST. PATIENT-LVL III: CPT | Mod: PBBFAC,,, | Performed by: NEUROLOGICAL SURGERY

## 2019-10-15 PROCEDURE — 99214 OFFICE O/P EST MOD 30 MIN: CPT | Mod: S$PBB,,, | Performed by: NEUROLOGICAL SURGERY

## 2019-10-15 PROCEDURE — 99214 PR OFFICE/OUTPT VISIT, EST, LEVL IV, 30-39 MIN: ICD-10-PCS | Mod: S$PBB,,, | Performed by: NEUROLOGICAL SURGERY

## 2019-10-16 ENCOUNTER — TELEPHONE (OUTPATIENT)
Dept: PAIN MEDICINE | Facility: CLINIC | Age: 44
End: 2019-10-16

## 2019-10-16 ENCOUNTER — PATIENT MESSAGE (OUTPATIENT)
Dept: NEUROSURGERY | Facility: CLINIC | Age: 44
End: 2019-10-16

## 2019-10-16 ENCOUNTER — TELEPHONE (OUTPATIENT)
Dept: HOME HEALTH SERVICES | Facility: HOSPITAL | Age: 44
End: 2019-10-16

## 2019-10-16 DIAGNOSIS — M50.20 CERVICAL DISC HERNIATION: Primary | ICD-10-CM

## 2019-10-16 NOTE — PROGRESS NOTES
Patient comes back to see me for follow-up after my last evaluation of her on January 29, 2019.  This is a patient who you we had perform a suboccipital decompressive craniectomy with C1 laminectomy and duraplasty on 12/13/2018 for significant Chiari malformation.  Postoperatively her exertional headaches in a posterior headaches are gone but she still has a fair amount of neck pain and neck discomfort.  She complains now of severe neck pain with certain activities but states that her headaches are much better.    Currently most of her pain is neck going down the trapezius and more paraspinal not really going down the arms or legs.    On exam her neck wound is well-healed is no evidence of of a pseudomeningocele.  She has 5/5 in both upper and lower extremities all muscle groups tested she has no Vu's no clonus.  Her reflexes equal and symmetric throughout.    Her follow-up MRI scan of the cervical spine shows excellent resolution of the Chiari.  She had no pseudomeningocele and that the cerebellar tonsils of elevated self and well above the foramen magnum.  She has a C5-6 disc bulge that is been little bit worse compared to the scan done last year but no significant central or foraminal stenosis.    Overall I think patient has done very well from a Bridgett prospective her symptoms are better radiographically no evidence of Chiari I think was going on now is probably cervical disc related will try physical therapy and a C5-C6 epidural injection see how she does.

## 2019-10-16 NOTE — TELEPHONE ENCOUNTER
----- Message from Charlotte Mcdowell sent at 10/16/2019 11:20 AM CDT -----  Contact: patient   Pt saw her neurosurgeon  yesterday and he put in orders for some shots for her neck, she want to know if Dr Samano can do it. Please call back at [phone] (home)

## 2019-10-17 DIAGNOSIS — G43.719 CHRONIC MIGRAINE WITHOUT AURA, INTRACTABLE, WITHOUT STATUS MIGRAINOSUS: ICD-10-CM

## 2019-10-17 NOTE — TELEPHONE ENCOUNTER
Returned call and spoke with patient. Patient will need to pay cash for her Emgality. Patient would like it sent to [facility].

## 2019-10-17 NOTE — TELEPHONE ENCOUNTER
Returned call and spoke with patient. Informed patient per Dr. Samano that she could do the procedure but on at the C7-T1 level. Patient says she trust Dr. Samano and would prefer her to do it. Patient will discuss further with Dr. Samano at her appointment in November. Patient will call and cancel her procedure with pain management.

## 2019-10-17 NOTE — TELEPHONE ENCOUNTER
----- Message from Tiff Arana sent at 10/17/2019  2:38 PM CDT -----  Contact: patient  Type: Needs Medical Advice    Who Called:  patient  Symptoms (please be specific):  na  How long has patient had these symptoms:  wil[facility]  Pharmacy name and phone #:  [facility]  Best Call Back Number: [phone]  Additional Information: Patient states she needs galcanezumab-gnlm (EMGALITY PEN) 120 mg/mL PnIj,, has to be sent to pharmacy as soon as possible patient states she has paid thousands of dollars for this medication and needs to have a refill .  Please call to advise as soon as possible. Thanks!

## 2019-10-22 DIAGNOSIS — G43.719 CHRONIC MIGRAINE WITHOUT AURA, INTRACTABLE, WITHOUT STATUS MIGRAINOSUS: ICD-10-CM

## 2019-10-22 RX ORDER — GALCANEZUMAB 120 MG/ML
INJECTION, SOLUTION SUBCUTANEOUS
Qty: 2 SYRINGE | Refills: 0 | Status: SHIPPED | OUTPATIENT
Start: 2019-10-22 | End: 2019-12-08 | Stop reason: SDUPTHER

## 2019-10-23 ENCOUNTER — TELEPHONE (OUTPATIENT)
Dept: PAIN MEDICINE | Facility: CLINIC | Age: 44
End: 2019-10-23

## 2019-10-23 NOTE — TELEPHONE ENCOUNTER
Staff returned called to pt regarding canceling procedure      Pt stated Neurologist on [facility] will do procedures as she does not have family to travel with her to get it done here.    Staff informed pt that schedulers will contact her to cancel the procedure

## 2019-10-23 NOTE — TELEPHONE ENCOUNTER
----- Message from Cornelia Yoder sent at 10/23/2019  3:06 PM CDT -----  Contact: Pt  Name of Who is Calling:JACINTO LINTON [[id number]]    What is the request in detail: Patient state she would like a call back regarding canceling procedure , states the procedure maybe to dangerous Please contact to further discuss and advise    Can the clinic reply by [facility]: no      What Number to Call Back if not in [facility]: [phone]

## 2019-10-31 ENCOUNTER — OFFICE VISIT (OUTPATIENT)
Dept: PSYCHIATRY | Facility: CLINIC | Age: 44
End: 2019-10-31
Payer: MEDICARE

## 2019-10-31 VITALS
DIASTOLIC BLOOD PRESSURE: 94 MMHG | BODY MASS INDEX: 44.22 KG/M2 | HEART RATE: 72 BPM | SYSTOLIC BLOOD PRESSURE: 136 MMHG | HEIGHT: 67 IN | WEIGHT: 281.75 LBS

## 2019-10-31 DIAGNOSIS — F60.3 BORDERLINE PERSONALITY DISORDER: ICD-10-CM

## 2019-10-31 DIAGNOSIS — F33.2 MAJOR DEPRESSIVE DISORDER, RECURRENT, SEVERE WITHOUT PSYCHOTIC FEATURES: Primary | ICD-10-CM

## 2019-10-31 DIAGNOSIS — E66.01 MORBID OBESITY WITH BMI OF 40.0-44.9, ADULT: ICD-10-CM

## 2019-10-31 DIAGNOSIS — F43.10 PTSD (POST-TRAUMATIC STRESS DISORDER): ICD-10-CM

## 2019-10-31 DIAGNOSIS — Q07.00 ARNOLD-CHIARI MALFORMATION: ICD-10-CM

## 2019-10-31 DIAGNOSIS — F90.0 ATTENTION DEFICIT HYPERACTIVITY DISORDER (ADHD), PREDOMINANTLY INATTENTIVE TYPE: ICD-10-CM

## 2019-10-31 DIAGNOSIS — F41.0 PANIC DISORDER WITHOUT AGORAPHOBIA: ICD-10-CM

## 2019-10-31 PROCEDURE — 99214 OFFICE O/P EST MOD 30 MIN: CPT | Mod: S$PBB,,, | Performed by: PSYCHIATRY & NEUROLOGY

## 2019-10-31 PROCEDURE — 90833 PSYTX W PT W E/M 30 MIN: CPT | Mod: ,,, | Performed by: PSYCHIATRY & NEUROLOGY

## 2019-10-31 PROCEDURE — 99999 PR PBB SHADOW E&M-EST. PATIENT-LVL III: CPT | Mod: PBBFAC,,, | Performed by: PSYCHIATRY & NEUROLOGY

## 2019-10-31 PROCEDURE — 99213 OFFICE O/P EST LOW 20 MIN: CPT | Mod: PBBFAC,PO | Performed by: PSYCHIATRY & NEUROLOGY

## 2019-10-31 PROCEDURE — 99214 PR OFFICE/OUTPT VISIT, EST, LEVL IV, 30-39 MIN: ICD-10-PCS | Mod: S$PBB,,, | Performed by: PSYCHIATRY & NEUROLOGY

## 2019-10-31 PROCEDURE — 90833 PR PSYCHOTHERAPY W/PATIENT W/E&M, 30 MIN (ADD ON): ICD-10-PCS | Mod: ,,, | Performed by: PSYCHIATRY & NEUROLOGY

## 2019-10-31 PROCEDURE — 99999 PR PBB SHADOW E&M-EST. PATIENT-LVL III: ICD-10-PCS | Mod: PBBFAC,,, | Performed by: PSYCHIATRY & NEUROLOGY
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
## 2019-11-01 ENCOUNTER — PATIENT MESSAGE (OUTPATIENT)
Dept: NEUROLOGY | Facility: CLINIC | Age: 44
End: 2019-11-01

## 2019-11-01 ENCOUNTER — TELEPHONE (OUTPATIENT)
Dept: NEUROLOGY | Facility: CLINIC | Age: 44
End: 2019-11-01

## 2019-11-04 ENCOUNTER — TELEPHONE (OUTPATIENT)
Dept: NEUROSURGERY | Facility: CLINIC | Age: 44
End: 2019-11-04

## 2019-11-04 DIAGNOSIS — M50.20 CERVICAL DISC HERNIATION: Primary | ICD-10-CM

## 2019-11-04 DIAGNOSIS — R51.9 INTRACTABLE HEADACHE, UNSPECIFIED CHRONICITY PATTERN, UNSPECIFIED HEADACHE TYPE: ICD-10-CM

## 2019-11-06 ENCOUNTER — TELEPHONE (OUTPATIENT)
Dept: NEUROSURGERY | Facility: CLINIC | Age: 44
End: 2019-11-06

## 2019-11-08 DIAGNOSIS — F60.3 BORDERLINE PERSONALITY DISORDER: ICD-10-CM

## 2019-11-08 DIAGNOSIS — F41.0 PANIC DISORDER WITHOUT AGORAPHOBIA: ICD-10-CM

## 2019-11-08 DIAGNOSIS — F33.2 MAJOR DEPRESSIVE DISORDER, RECURRENT, SEVERE WITHOUT PSYCHOTIC FEATURES: ICD-10-CM

## 2019-11-08 DIAGNOSIS — F43.10 PTSD (POST-TRAUMATIC STRESS DISORDER): ICD-10-CM

## 2019-11-08 RX ORDER — FLUOXETINE HYDROCHLORIDE 40 MG/1
CAPSULE ORAL
Qty: 90 CAPSULE | Refills: 0 | Status: SHIPPED | OUTPATIENT
Start: 2019-11-08 | End: 2020-01-08

## 2019-11-15 ENCOUNTER — OFFICE VISIT (OUTPATIENT)
Dept: NEUROLOGY | Facility: CLINIC | Age: 44
End: 2019-11-15
Payer: MEDICARE

## 2019-11-15 VITALS
BODY MASS INDEX: 44.18 KG/M2 | DIASTOLIC BLOOD PRESSURE: 98 MMHG | RESPIRATION RATE: 16 BRPM | SYSTOLIC BLOOD PRESSURE: 167 MMHG | HEIGHT: 67 IN | HEART RATE: 78 BPM | WEIGHT: 281.5 LBS

## 2019-11-15 DIAGNOSIS — M54.12 CERVICAL RADICULOPATHY: ICD-10-CM

## 2019-11-15 DIAGNOSIS — G43.719 CHRONIC MIGRAINE WITHOUT AURA, INTRACTABLE, WITHOUT STATUS MIGRAINOSUS: Primary | ICD-10-CM

## 2019-11-15 DIAGNOSIS — M47.812 SPONDYLOSIS OF CERVICAL REGION WITHOUT MYELOPATHY OR RADICULOPATHY: ICD-10-CM

## 2019-11-15 DIAGNOSIS — R41.3 MEMORY LOSS: ICD-10-CM

## 2019-11-15 PROCEDURE — 99999 PR PBB SHADOW E&M-EST. PATIENT-LVL V: CPT | Mod: PBBFAC,,, | Performed by: PSYCHIATRY & NEUROLOGY

## 2019-11-15 PROCEDURE — 99214 OFFICE O/P EST MOD 30 MIN: CPT | Mod: S$PBB,,, | Performed by: PSYCHIATRY & NEUROLOGY

## 2019-11-15 PROCEDURE — 99214 PR OFFICE/OUTPT VISIT, EST, LEVL IV, 30-39 MIN: ICD-10-PCS | Mod: S$PBB,,, | Performed by: PSYCHIATRY & NEUROLOGY

## 2019-11-15 PROCEDURE — 99999 PR PBB SHADOW E&M-EST. PATIENT-LVL V: ICD-10-PCS | Mod: PBBFAC,,, | Performed by: PSYCHIATRY & NEUROLOGY

## 2019-11-15 PROCEDURE — 99215 OFFICE O/P EST HI 40 MIN: CPT | Mod: PBBFAC,PO | Performed by: PSYCHIATRY & NEUROLOGY

## 2019-11-15 RX ORDER — SODIUM CHLORIDE, SODIUM LACTATE, POTASSIUM CHLORIDE, CALCIUM CHLORIDE 600; 310; 30; 20 MG/100ML; MG/100ML; MG/100ML; MG/100ML
INJECTION, SOLUTION INTRAVENOUS CONTINUOUS
Status: CANCELLED | OUTPATIENT
Start: 2019-12-05

## 2019-11-15 NOTE — H&P (VIEW-ONLY)
Date of service: 11/15/2019  Referring provider: No ref. provider found    Subjective:      Chief complaint: Migraine and Neck Pain       Patient ID: Dinah Mitchell is a 44 y.o. depression, borderline personality disorder, PTSD, fibromyalgia, and hypothyroidism presenting for follow-up of headache and neck pain to discuss procedure    History of Present Illness    INTERVAL HISTORY - 11/15/2019    She was last seen 2 months ago at which point we started Emgality for migraine headache.  We also had her complete home physical therapy for strength and falling.  In the interim she saw her neurosurgeon Dr. Busch who recommended to trial cervical epidural injection.  The patient preferred to do this procedure with me so she did not have to travel to the [place].  She is here to discuss this procedure. She has had ESIs with either Dr Vela in the past with relief of same pain     Her last cervical imaging was 06/09/2019 which showed -   1. Loss of the normal cervical lordosis suggesting paraspinous muscle spasm.  2. Postoperative changes about the skull base.  3. Cervical degenerative disc disease.  Changes within the discs particularly at C4-5 and C5-C6 were present on the previous examination.    In regards to her headaches, she reports the little better.  After taking the shot the headaches are less frequent.  After about 2 and half weeks they come back the same.  The pain is in the back and head, left temple, behind the eyes. Hands go numb but so do feet. Neck pain is mainly axial, shoulder blades to neck. Current pain score is 5 with a range of 2-10.    She remains on Emgality, gabapentin, tramadol, ibuprofen, Marinol for nausea, Robaxin, tizanidine for muscle spasm.  She is not using anything else for rescue    She feels her memory is impaired, she gets confused, for the last couple of years.  For example, today she is very upset because she showed up very early for her appointment, not realizing how early she is  despite her knowing what time her ointment was.  She reports she makes these mistakes often.  She is frustrated with this    INTERVAL HISTORY - 9/11/19    She was last seen approximately 7 months ago.  At that point she was having headaches 4 days per week.  We continued her regimen of gabapentin and tramadol    She is on gabapentin 600 mg 3 times a day, Robaxin, tizanidine, the tramadol has not been filled since July.    She sustained some head trauma, hit her left back of head on the cabinet, 6/9/19. Feels nauseous, worse headache.     Today she reports she is about the same.  She reports memory loss, confusion, headache, nausea, falling frequently, neck pain, numbness in the hands and feet, no depth perception.    The headaches are left temporal, back of the head, feels like a vice , neck pain. Her current pain score several much for to 10.    She feels like she is coming down with the flu.     Headaches, nausea, and falling are her most concerning symptoms. Has not been in PT.     2 types of headaches  - temple, associated with light sensitivity, aggravated by activiity. Another like a vice . Wakes up with them.    Her dog passed away      INTERVAL HISTORY - 2/12/19     In the interim since last visit one year ago she has had bilateral occipital nerve block in cervical trigger point injection x3 with me with good relief.      She reports, that while I was on leave, she felt worse and worse. She was more nauseous and kept falling. She saw Dr. Busch for her Chiari malformation, which I previously recommended against surgery on as her symptoms were not clearly Chiari related and she had a non-obstructive CSF flow pattern on CINE. She had posterior fossa decompression on 12/13/18. She had severe headache and neck pain afterwards. She says the surgery helped her disabling headaches, she doesn't have headache any more when straining or bending over but she remains with the other headaches documented below. She  "has multiple questions about the surgery itself.     She reports she is still wobbly. She still has depth perception issues.     New symptoms - lump she feels on the the posterior parietal scalp, this area has never hurt before. Neck feels weak. Has to lay against something for support. Arms feel more weak then before. Sensation in her feet is returning.     Headaches - left temple, gradual onset, photo/phonophobia/nausea. These occur 4 days per week. Last at least 1-2 hours.     Her current pain score is seven for headache, nine for neck.  With a range of 6-9.    ORIGINAL PAIN HISTORY - 3/14/18   Age at onset and course over time: 2011 and worse over time, this happened during a time when she had a long commute. Neck is her worst pain - just left to midline of middle neck. Was going to [facility] pain management receiving epidural steroids which would last only 2 weeks. She had a CMBB and was planning for RFA but wanted to get cervical MRI first and second opinion  Location: neck (sharp, stabbing); hands and feet (tingling, numb), hip/lower back (dull, aching). Also has intermittent left sided headaches starting occipitally radiating to temple.   Quality: as above  Severity: current 8.5; range 7 to 10  Duration: hours  Frequency: daily  Alleviated by: medication, ice  Exacerbated by: movement, light noise (head pain)  Associated with: left arm/hand gets numb/weak "a lot" no radicular pain; headaches on left which are random or triggered by straining in restroom/coughing/laughing. Headaches worst in the AM within 2 hours of waking up. Headaches do not wake her up but neck pain does. Having some depth perception issues causing her to break her foot - eyes checked and were ok. Transient visual loss? When in a lof of pain, not clear TVO with valsalva.   Sleep habits:    Current acute treatment:  -- tramadol 50mg - 2 tab per daily, daily   -- robaxin during the day   -- tizanidine 4mg nightly     (xanax 1mg daily PRN " for PTSD, anxiety from psychiatrist)     Current prevention:  -- emgality - started 9/2019 - effective to date   -- gabapentin 600mg TID   (wellbutrin)  (prozac)    Previously tried:   -- TENS   -- tramadol - helped with overall pain   -- methocarbamol - helped with overall pain   -- relafen - did not help     Procedural history:   -- cervical GREGOR  -- lumbar GREGOR  -- chiropractor for hip /neck - helped only hip  -- PT - none for neck just foot     Review of patient's allergies indicates:   Allergen Reactions    Lamotrigine      Other reaction(s): Rash  Other reaction(s): Nausea    Sulfa (sulfonamide antibiotics) Nausea Only     Other reaction(s): Unknown     Current Outpatient Medications   Medication Sig Dispense Refill    ALPRAZolam (XANAX) 1 MG tablet Take 1 tablet (1 mg total) by mouth daily as needed for Anxiety. 30 tablet 2    buPROPion (WELLBUTRIN XL) 150 MG TB24 tablet Take 1 tablet (150 mg total) by mouth once daily. 90 tablet 0    dronabinol (MARINOL) 2.5 MG capsule Take 1 capsule (2.5 mg total) by mouth 2 (two) times daily before meals. 60 capsule 3    EMGALITY  mg/mL PnIj INJECT 2 PENS (240MG) SUBQ AT SEPARATE SITES ONCE AS LOADING DOSE THEN MAINTENANCE DOSE 28DAYS LATER 2 Syringe 0    FLUoxetine 40 MG capsule TAKE 1 CAPSULE BY MOUTH EVERY DAY 90 capsule 0    gabapentin (NEURONTIN) 600 MG tablet TAKE 1 TABLET (600 MG TOTAL) BY MOUTH 3 (THREE) TIMES DAILY. 90 tablet 11    levOCARNitine (CARNITOR) 330 mg Tab Take 3 tablets (990 mg total) by mouth 2 (two) times daily. 180 tablet 6    medroxyPROGESTERone (DEPO-PROVERA) 150 mg/mL injection Inject 150 mg into the muscle every 3 (three) months.      methocarbamol (ROBAXIN) 500 MG Tab       methocarbamol (ROBAXIN) 750 MG Tab TAKE 1 TABLET TWICE A DAY AS NEEDED FOR MUSCLE SPASMS 60 tablet 0    montelukast (SINGULAIR) 10 mg tablet TAKE 1 TABLET BY MOUTH EVERY DAY 90 tablet 1    prazosin (MINIPRESS) 1 MG Cap Take 1 capsule (1 mg total) by mouth  every evening. For ptsd related nightmares 90 capsule 1    SYNTHROID 200 mcg tablet Take 1 tablet (200 mcg total) by mouth before breakfast. 90 tablet 1    tiZANidine (ZANAFLEX) 4 MG tablet Take 1 tablet (4 mg total) by mouth every evening. 30 tablet 11    traMADol (ULTRAM) 50 mg tablet TAKE 1 TABLET EVERY 6 HOURS AS NEEDED FOR PAIN 60 tablet 0    traMADol (ULTRAM) 50 mg tablet TAKE 1 TABLET BY MOUTH EVERY 6 HOURS AS NEEDED FOR PAIN 60 tablet 0    UNABLE TO FIND N-acetyl-L-cysteine 50 mg subcutaneously 3 times a week 10 mL 6    VENTOLIN HFA 90 mcg/actuation inhaler TAKE 2 PUFFS BY MOUTH EVERY 6 HOURS AS NEEDED FOR WHEEZE 18 Inhaler 0    zolpidem (AMBIEN CR) 12.5 MG CR tablet TAKE 1 TABLET (12.5 MG TOTAL) BY MOUTH NIGHTLY AS NEEDED FOR INSOMNIA. 30 tablet 2     Current Facility-Administered Medications   Medication Dose Route Frequency Provider Last Rate Last Dose    medroxyPROGESTERone (DEPO-PROVERA) injection 150 mg  150 mg Intramuscular Q90 Days Nikhil Franco MD   150 mg at 12/04/18 1508       Past Medical History  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     PATRICIA (obstructive sleep apnea)     severe - doesn't use CPAP    Panic attack     PTSD (post-traumatic stress disorder)        Past Surgical History  Past Surgical History:   Procedure Laterality Date    ANKLE SURGERY Right     x2 (#1 for ligament injury and #2 for fx and ligament)    AUGMENTATION OF BREAST      BREAST SURGERY  1997    B implants    CERVICAL BIOPSY  W/ LOOP ELECTRODE EXCISION      DECOMPRESSION OF CHIARI MALFORMATION BY REMOVAL OF POSTERIOR ARCH OF FIRST CERVICAL VERTEBRA N/A 12/13/2018    Procedure:  "DECOMPRESSION, CHIARI MALFORMATION, BY 1ST CERVICAL VERTEBRA POSTERIOR ARCH REMOVAL;  Surgeon: Sergio Busch MD;  [facility];  Service: Neurosurgery;  Laterality: N/A;    LAPAROSCOPIC GASTRIC BANDING      with subsequent removal due to abscess    LIPOSUCTION      x 2    PELVIC LAPAROSCOPY      for endometriosis eval with BTL    TUBAL LIGATION      wtih pelvic lap        Family History  Family History   Problem Relation Age of Onset    Diabetes Sister     Seizures Sister     Mitochondrial disorder Sister         "trent"    Mental illness Sister     Seizures Sister     Cervical cancer Sister     Cervical cancer Sister     Ovarian cancer Cousin     Ovarian cancer Paternal Aunt     Colon cancer Maternal Grandmother     Cancer Maternal Grandfather 65        throat ca    Diabetes Mother     Stroke Mother 30    Mental illness Mother         depression    Hyperlipidemia Mother     Hypertension Mother     Hyperlipidemia Father     Heart disease Father         "athletes heart"    Ovarian cancer Paternal Aunt     Cervical cancer Paternal Cousin     Cervical cancer Other     Breast cancer Neg Hx        Social History  Social History     Socioeconomic History    Marital status:      Spouse name: Not on file    Number of children: Not on file    Years of education: Not on file    Highest education level: Not on file   Occupational History    Occupation: foresenic     Social Needs    Financial resource strain: Not on file    Food insecurity:     Worry: Not on file     Inability: Not on file    Transportation needs:     Medical: Yes     Non-medical: Yes   Tobacco Use    Smoking status: Never Smoker    Smokeless tobacco: Never Used   Substance and Sexual Activity    Alcohol use: Yes     Frequency: Monthly or less     Drinks per session: 1 or 2     Binge frequency: Never     Comment: occasional    Drug use: No    Sexual activity: Yes     Partners: Male     Birth " control/protection: Injection     Comment: depo since age 18   Lifestyle    Physical activity:     Days per week: 0 days     Minutes per session: 0 min    Stress: Very much   Relationships    Social connections:     Talks on phone: More than three times a week     Gets together: Never     Attends [employer] service: Not on file     Active member of club or organization: No     Attends meetings of clubs or organizations: Never     Relationship status:    Other Topics Concern    Not on file   Social History Narrative    Not on disability, not currently working.        Review of Systems  14-point review of systems as follows:   No check mariam indicates NEGATIVE response   Constitutional: [] weight loss, [x] change to appetite   Eyes: [] change in vision, [] double vision   Ears, nose, mouth, throat: [] frequent nose bleeds, [] ringing in the ears   Respiratory: [] cough, [] wheezing   Cardiovascular: [] chest pain, [] palpitations   Gastrointestinal: [] jaundice, [] nausea/vomiting   Genitourinary: [] incontinence, [] burning with urination   Hematologic/lymphatic: [] easy bruising/bleeding, [] night sweats   Neurological: [x] numbness, [x] weakness   Endocrine: [] fatigue, [] heat/cold intolerance   Allergy/Immunologic: [] fevers, [] chills   Musculoskeletal: [] muscle pain, [] joint pain   Psychiatric: [] thoughts of harming self/others, [] depression   Integumentary: [] rashes, [] sores that do not heal       Objective:        Vitals:    11/15/19 0757   BP: (!) 167/98   Pulse: 78   Resp: 16     Body mass index is 44.09 kg/m².     Reflexes full throughout  Confrontational strength 5/5 in all cervical dermatomes  Sensation to temperature intact all cervical dermatomes    CERVICAL SPINE:  INSPECTION:  Posterior decompression scar, tender  ROM:  Limited rotation  MUSCLE SPASM:  Diffuse  FACET LOADING:  Bilateral  SPURLING:  Negative      2/12/19  General: Well developed, well nourished.  No acute  distress.  HEENT: Atraumatic, normocephalic. Bilateral ptosis (chronic).  Optic discs sharp bilaterally. The lumps she showed me are bony prominences of the calvarium  Neck: Trachea midline. Well-healed midline posterior surgical scar.  Multiple areas of muscle tenderness around neck  Cardiovascular: Vitals reviewed. Normal peripheral perfusion.   Pulmonary: No increased work of breathing.  Abdomen/GI: No guarding  Musculoskeletal: No obvious joint deformities, moves all extremities symmetrically and well.  No drift, no orbit, symmetric finger tapping.     Neurological exam:  Mental status: Awake and alert. Oriented to situation.  Speech fluent and appropriate. Recent and remote memory appear to be intact.  Fund of knowledge normal.  Cranial nerves: Pupils equal round, extraocular movements intact, facial strength intact bilaterally, reports hearing is asymmetric  Sensation:  Normal to temperature  Reflexes: 2+ throughout, negative Vu  Coordination:  Normal finger-to-nose and heel-to-shin  Gait: Normal         3/14/18   Constitutional: appears in no acute distress, well-developed, well-nourished     Eyes: normal conjunctiva, PERRLA, bilateral right worse than left ptosis (baseline). Optic discs sharp bilaterally.     Ears, nose, mouth, throat: external appearance of ears and nose normal, hearing reduced on left     Cardiovascular: regular rate and rhythm, no murmurs appreciated    Respiratory: unlabored respirations, breath sounds normal bilaterally    Gastrointestinal: no visible abdominal masses, no guarding, no visible hernia    Musculoskeletal: normal tone in all four extremities. No atrophy. No abnormal movements. Strength in all muscles groups of the upper and lower extremities is 5/5. Normal gait and station. Digits and nails normal.      Spine:  CERVICAL SPINE:  INSPECTION: no scars   ROM: slightly limited   MUSCLE SPASM: bilateral   FACET LOADING: left  SPURLING: neg  JEZ tender: bilateral      Psychiatric: normal judgment and insight. Oriented to person, place, and time.     Neurologic:   Cortical functions: recent and remote memory intact, normal attention span and concentration, speech fluent, adequate fund of knowledge   Cranial nerves: visual fields full, PERRLA, EOMI, facial sensation intact in V1-V3, symmetric facial strength, hearing intact to finger rub, palate elevates symmetrically, shoulder shrug 5/5, tongue protrudes midline   Reflexes: 2+ in the upper and lower extremities, no Vu, no babinski   Sensation: reduced to temperature in a length dependent manner in the hands and to knees, vibration reduced toes, ankles, knees  Coordination: normal    Data Review:     I have personally reviewed the referring provider's notes, labs, & imaging made available to me today.     RADIOLOGY STUDIES:  I have personally reviewed the pertinent images performed.     MRI cervical spine 2/27/18  1. The cerebellar tonsils extend below the foramen magnum approximately 8-9 mm, consistent with Chiari 1 malformation.  The cord is normal without evidence of syrinx, signal abnormality or abnormal enhancement.  2. There is mild degenerative change.  There is, however, no significant spinal canal or foraminal stenosis.  There is no cord compression.  There is no suspected nerve root compression.  Please see above discussion.    Results for orders placed or performed during the hospital encounter of 02/05/18   MRI Brain W WO Contrast    Narrative    Routine Multiplanar imaging through this 42-year-old females brain was obtained with utilization of 10 cc of gadolinium contrast    Hyperostosis frontalis appears to be present.     A Chiari one malformation appears to be present with the cerebellar tonsils extending 9 mm inferior to the foramen magnum with so-called pegging of the cerebellar tonsils.    An empty sella configuration of the pituitary gland is noted.    No diffusion positivity is identified to suggest  recent infarction.    No gradient  susceptibility was noted just just presence of acute blood products.    No worrisome enhancing lesions are noted intracranially    No diffusion positivity is identified to suggest recent infarction.    Impression    1. The cerebellar tonsils appear to extend inferior to the foramen magnum suggestive of a Chiari one malformation. No obvious cord signal abnormalities noted within the visualized cord, given the patient's history further evaluation of the cord may be of use to exclude a syrinx        Electronically signed by: Ismael Richmond MD  Date:     02/05/18  Time:    12:04    Results for orders placed or performed during the hospital encounter of 09/25/15   CT Head Without Contrast    Narrative    Comparison: None    Technique: 5 mm noncontrast axial images through the brain were obtained.    Findings:     The brain is normally formed with no indication of acute/recent major vascular distribution cerebral infarction, intraparenchymal hemorrhage, or intra-axial space occupying lesion. There is preserved gray-white matter junction differentiation.     The ventricular system is normal in appearance for age. No hydrocephalus. No effacement of the skull-base cisterns. No abnormal extra-axial fluid collections or blood products.     The paranasal sinuses and mastoid air cells are unremarkable.  There is incidentally observed hyperostosis frontalis.    Impression         No acute  intracranial abnormalities are appreciated.      Electronically signed by: Toyin Shin MD  Date:     09/25/15  Time:    13:41        Lab Results   Component Value Date     12/14/2018    K 5.0 12/14/2018    MG 2.3 12/14/2018     12/14/2018    CO2 18 (L) 12/14/2018    BUN 12 12/14/2018    CREATININE 0.8 12/14/2018     (H) 12/14/2018    HGBA1C 4.9 12/04/2018    AST 22 12/04/2018    ALT 25 12/04/2018    ALBUMIN 4.2 12/04/2018    PROT 7.5 12/04/2018    BILITOT 0.6 12/04/2018    CHOL 212 (H)  09/11/2019    HDL 54 09/11/2019    LDLCALC 122.8 09/11/2019    TRIG 176 (H) 09/11/2019       Lab Results   Component Value Date    WBC 12.50 12/14/2018    HGB 13.1 12/14/2018    HCT 38.4 12/14/2018    MCV 95 12/14/2018     12/14/2018       Lab Results   Component Value Date    TSH 2.014 09/11/2019       Assessment & Plan:       Problem List Items Addressed This Visit        Neuro    Chronic migraine without aura, intractable, without status migrainosus - Primary    Overview     The patient has chronic migraines (G43.719) and suffers from headaches more than 15 days a month lasting more than 4 hours a day with no relief of symptoms despite trying multiple medications including but not limited to anti-epileptics (GABAPENTIN, LAMOTRIGINE), and antidepressants (FLUOXETINE).     Trialing Emgality.           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    Overview     Mild cervical facet arthropathy  DDD C4-5, C5-6  Previous good response cervical epidurals, current neck pain may be more postsurgical from the Chiari decompression which I explained to patient may not yield the same result from the cervical epidural but worth trying    This patient has failed multiple conservative and non-invasive therapies. This procedure is medically necessary to improve the patient's quality of life through decreased pain and improved function. To date the patient has tried and failed at least 2 months of opioid medications, physical therapy, and NSAID therapy. After the procedure the plan is to continue home exercise regimen and rehabilitation as tolerated.     Discussed the risk, benefits, and alternatives with the patient. Patient wishes to proceed with scheduling of cervical epidural at C7-T1    The patient voiced understanding that the produce has risk including but not limited to coma, paralysis, myocardial infarction, infection, bleeding, and death.           Memory loss    Overview     Several years,  probably pain and medication induced, refer for neurocognitive evaluation         Relevant Orders    Ambulatory referral to Neuropsychology              TESTING:  -- schedule neurocognitive evaluation     REFERRALS:  -- none     PREVENTION OF PAIN:   -- continue gabapentin 600mg three times per day   -- continue Emgality at this time   -- schedule cervical epidural injection at C7-T1 on 12/5/19 - need , fast after midnight, stop ibuprofen 5 days before (last dose 11/30/19)    AS-NEEDED TREATMENT OF PAIN:  -- continue tramadol as needed    -- continue robaxin as needed     Follow up in about 2 months (around 1/15/2020) for office visit post procedure .         Noa Samano MD

## 2019-11-15 NOTE — PROGRESS NOTES
Date of service: 11/15/2019  Referring provider: No ref. provider found    Subjective:      Chief complaint: Migraine and Neck Pain       Patient ID: Dinah Mitchell is a 44 y.o. depression, borderline personality disorder, PTSD, fibromyalgia, and hypothyroidism presenting for follow-up of headache and neck pain to discuss procedure    History of Present Illness    INTERVAL HISTORY - 11/15/2019    She was last seen 2 months ago at which point we started Emgality for migraine headache.  We also had her complete home physical therapy for strength and falling.  In the interim she saw her neurosurgeon Dr. Bucsh who recommended to trial cervical epidural injection.  The patient preferred to do this procedure with me so she did not have to travel to the [place].  She is here to discuss this procedure. She has had ESIs with either Dr Vela in the past with relief of same pain     Her last cervical imaging was 06/09/2019 which showed -   1. Loss of the normal cervical lordosis suggesting paraspinous muscle spasm.  2. Postoperative changes about the skull base.  3. Cervical degenerative disc disease.  Changes within the discs particularly at C4-5 and C5-C6 were present on the previous examination.    In regards to her headaches, she reports the little better.  After taking the shot the headaches are less frequent.  After about 2 and half weeks they come back the same.  The pain is in the back and head, left temple, behind the eyes. Hands go numb but so do feet. Neck pain is mainly axial, shoulder blades to neck. Current pain score is 5 with a range of 2-10.    She remains on Emgality, gabapentin, tramadol, ibuprofen, Marinol for nausea, Robaxin, tizanidine for muscle spasm.  She is not using anything else for rescue    She feels her memory is impaired, she gets confused, for the last couple of years.  For example, today she is very upset because she showed up very early for her appointment, not realizing how early she is  despite her knowing what time her ointment was.  She reports she makes these mistakes often.  She is frustrated with this    INTERVAL HISTORY - 9/11/19    She was last seen approximately 7 months ago.  At that point she was having headaches 4 days per week.  We continued her regimen of gabapentin and tramadol    She is on gabapentin 600 mg 3 times a day, Robaxin, tizanidine, the tramadol has not been filled since July.    She sustained some head trauma, hit her left back of head on the cabinet, 6/9/19. Feels nauseous, worse headache.     Today she reports she is about the same.  She reports memory loss, confusion, headache, nausea, falling frequently, neck pain, numbness in the hands and feet, no depth perception.    The headaches are left temporal, back of the head, feels like a vice , neck pain. Her current pain score several much for to 10.    She feels like she is coming down with the flu.     Headaches, nausea, and falling are her most concerning symptoms. Has not been in PT.     2 types of headaches  - temple, associated with light sensitivity, aggravated by activiity. Another like a vice . Wakes up with them.    Her dog passed away      INTERVAL HISTORY - 2/12/19     In the interim since last visit one year ago she has had bilateral occipital nerve block in cervical trigger point injection x3 with me with good relief.      She reports, that while I was on leave, she felt worse and worse. She was more nauseous and kept falling. She saw Dr. Busch for her Chiari malformation, which I previously recommended against surgery on as her symptoms were not clearly Chiari related and she had a non-obstructive CSF flow pattern on CINE. She had posterior fossa decompression on 12/13/18. She had severe headache and neck pain afterwards. She says the surgery helped her disabling headaches, she doesn't have headache any more when straining or bending over but she remains with the other headaches documented below. She  "has multiple questions about the surgery itself.     She reports she is still wobbly. She still has depth perception issues.     New symptoms - lump she feels on the the posterior parietal scalp, this area has never hurt before. Neck feels weak. Has to lay against something for support. Arms feel more weak then before. Sensation in her feet is returning.     Headaches - left temple, gradual onset, photo/phonophobia/nausea. These occur 4 days per week. Last at least 1-2 hours.     Her current pain score is seven for headache, nine for neck.  With a range of 6-9.    ORIGINAL PAIN HISTORY - 3/14/18   Age at onset and course over time: 2011 and worse over time, this happened during a time when she had a long commute. Neck is her worst pain - just left to midline of middle neck. Was going to [facility] pain management receiving epidural steroids which would last only 2 weeks. She had a CMBB and was planning for RFA but wanted to get cervical MRI first and second opinion  Location: neck (sharp, stabbing); hands and feet (tingling, numb), hip/lower back (dull, aching). Also has intermittent left sided headaches starting occipitally radiating to temple.   Quality: as above  Severity: current 8.5; range 7 to 10  Duration: hours  Frequency: daily  Alleviated by: medication, ice  Exacerbated by: movement, light noise (head pain)  Associated with: left arm/hand gets numb/weak "a lot" no radicular pain; headaches on left which are random or triggered by straining in restroom/coughing/laughing. Headaches worst in the AM within 2 hours of waking up. Headaches do not wake her up but neck pain does. Having some depth perception issues causing her to break her foot - eyes checked and were ok. Transient visual loss? When in a lof of pain, not clear TVO with valsalva.   Sleep habits:    Current acute treatment:  -- tramadol 50mg - 2 tab per daily, daily   -- robaxin during the day   -- tizanidine 4mg nightly     (xanax 1mg daily PRN " for PTSD, anxiety from psychiatrist)     Current prevention:  -- emgality - started 9/2019 - effective to date   -- gabapentin 600mg TID   (wellbutrin)  (prozac)    Previously tried:   -- TENS   -- tramadol - helped with overall pain   -- methocarbamol - helped with overall pain   -- relafen - did not help     Procedural history:   -- cervical GREGOR  -- lumbar GREGOR  -- chiropractor for hip /neck - helped only hip  -- PT - none for neck just foot     Review of patient's allergies indicates:   Allergen Reactions    Lamotrigine      Other reaction(s): Rash  Other reaction(s): Nausea    Sulfa (sulfonamide antibiotics) Nausea Only     Other reaction(s): Unknown     Current Outpatient Medications   Medication Sig Dispense Refill    ALPRAZolam (XANAX) 1 MG tablet Take 1 tablet (1 mg total) by mouth daily as needed for Anxiety. 30 tablet 2    buPROPion (WELLBUTRIN XL) 150 MG TB24 tablet Take 1 tablet (150 mg total) by mouth once daily. 90 tablet 0    dronabinol (MARINOL) 2.5 MG capsule Take 1 capsule (2.5 mg total) by mouth 2 (two) times daily before meals. 60 capsule 3    EMGALITY  mg/mL PnIj INJECT 2 PENS (240MG) SUBQ AT SEPARATE SITES ONCE AS LOADING DOSE THEN MAINTENANCE DOSE 28DAYS LATER 2 Syringe 0    FLUoxetine 40 MG capsule TAKE 1 CAPSULE BY MOUTH EVERY DAY 90 capsule 0    gabapentin (NEURONTIN) 600 MG tablet TAKE 1 TABLET (600 MG TOTAL) BY MOUTH 3 (THREE) TIMES DAILY. 90 tablet 11    levOCARNitine (CARNITOR) 330 mg Tab Take 3 tablets (990 mg total) by mouth 2 (two) times daily. 180 tablet 6    medroxyPROGESTERone (DEPO-PROVERA) 150 mg/mL injection Inject 150 mg into the muscle every 3 (three) months.      methocarbamol (ROBAXIN) 500 MG Tab       methocarbamol (ROBAXIN) 750 MG Tab TAKE 1 TABLET TWICE A DAY AS NEEDED FOR MUSCLE SPASMS 60 tablet 0    montelukast (SINGULAIR) 10 mg tablet TAKE 1 TABLET BY MOUTH EVERY DAY 90 tablet 1    prazosin (MINIPRESS) 1 MG Cap Take 1 capsule (1 mg total) by mouth  every evening. For ptsd related nightmares 90 capsule 1    SYNTHROID 200 mcg tablet Take 1 tablet (200 mcg total) by mouth before breakfast. 90 tablet 1    tiZANidine (ZANAFLEX) 4 MG tablet Take 1 tablet (4 mg total) by mouth every evening. 30 tablet 11    traMADol (ULTRAM) 50 mg tablet TAKE 1 TABLET EVERY 6 HOURS AS NEEDED FOR PAIN 60 tablet 0    traMADol (ULTRAM) 50 mg tablet TAKE 1 TABLET BY MOUTH EVERY 6 HOURS AS NEEDED FOR PAIN 60 tablet 0    UNABLE TO FIND N-acetyl-L-cysteine 50 mg subcutaneously 3 times a week 10 mL 6    VENTOLIN HFA 90 mcg/actuation inhaler TAKE 2 PUFFS BY MOUTH EVERY 6 HOURS AS NEEDED FOR WHEEZE 18 Inhaler 0    zolpidem (AMBIEN CR) 12.5 MG CR tablet TAKE 1 TABLET (12.5 MG TOTAL) BY MOUTH NIGHTLY AS NEEDED FOR INSOMNIA. 30 tablet 2     Current Facility-Administered Medications   Medication Dose Route Frequency Provider Last Rate Last Dose    medroxyPROGESTERone (DEPO-PROVERA) injection 150 mg  150 mg Intramuscular Q90 Days Nikhil Franco MD   150 mg at 12/04/18 1508       Past Medical History  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     PATRICIA (obstructive sleep apnea)     severe - doesn't use CPAP    Panic attack     PTSD (post-traumatic stress disorder)        Past Surgical History  Past Surgical History:   Procedure Laterality Date    ANKLE SURGERY Right     x2 (#1 for ligament injury and #2 for fx and ligament)    AUGMENTATION OF BREAST      BREAST SURGERY  1997    B implants    CERVICAL BIOPSY  W/ LOOP ELECTRODE EXCISION      DECOMPRESSION OF CHIARI MALFORMATION BY REMOVAL OF POSTERIOR ARCH OF FIRST CERVICAL VERTEBRA N/A 12/13/2018    Procedure:  "DECOMPRESSION, CHIARI MALFORMATION, BY 1ST CERVICAL VERTEBRA POSTERIOR ARCH REMOVAL;  Surgeon: Sergio Busch MD;  [facility];  Service: Neurosurgery;  Laterality: N/A;    LAPAROSCOPIC GASTRIC BANDING      with subsequent removal due to abscess    LIPOSUCTION      x 2    PELVIC LAPAROSCOPY      for endometriosis eval with BTL    TUBAL LIGATION      wtih pelvic lap        Family History  Family History   Problem Relation Age of Onset    Diabetes Sister     Seizures Sister     Mitochondrial disorder Sister         "trent"    Mental illness Sister     Seizures Sister     Cervical cancer Sister     Cervical cancer Sister     Ovarian cancer Cousin     Ovarian cancer Paternal Aunt     Colon cancer Maternal Grandmother     Cancer Maternal Grandfather 65        throat ca    Diabetes Mother     Stroke Mother 30    Mental illness Mother         depression    Hyperlipidemia Mother     Hypertension Mother     Hyperlipidemia Father     Heart disease Father         "athletes heart"    Ovarian cancer Paternal Aunt     Cervical cancer Paternal Cousin     Cervical cancer Other     Breast cancer Neg Hx        Social History  Social History     Socioeconomic History    Marital status:      Spouse name: Not on file    Number of children: Not on file    Years of education: Not on file    Highest education level: Not on file   Occupational History    Occupation: foresenic     Social Needs    Financial resource strain: Not on file    Food insecurity:     Worry: Not on file     Inability: Not on file    Transportation needs:     Medical: Yes     Non-medical: Yes   Tobacco Use    Smoking status: Never Smoker    Smokeless tobacco: Never Used   Substance and Sexual Activity    Alcohol use: Yes     Frequency: Monthly or less     Drinks per session: 1 or 2     Binge frequency: Never     Comment: occasional    Drug use: No    Sexual activity: Yes     Partners: Male     Birth " control/protection: Injection     Comment: depo since age 18   Lifestyle    Physical activity:     Days per week: 0 days     Minutes per session: 0 min    Stress: Very much   Relationships    Social connections:     Talks on phone: More than three times a week     Gets together: Never     Attends [employer] service: Not on file     Active member of club or organization: No     Attends meetings of clubs or organizations: Never     Relationship status:    Other Topics Concern    Not on file   Social History Narrative    Not on disability, not currently working.        Review of Systems  14-point review of systems as follows:   No check mariam indicates NEGATIVE response   Constitutional: [] weight loss, [x] change to appetite   Eyes: [] change in vision, [] double vision   Ears, nose, mouth, throat: [] frequent nose bleeds, [] ringing in the ears   Respiratory: [] cough, [] wheezing   Cardiovascular: [] chest pain, [] palpitations   Gastrointestinal: [] jaundice, [] nausea/vomiting   Genitourinary: [] incontinence, [] burning with urination   Hematologic/lymphatic: [] easy bruising/bleeding, [] night sweats   Neurological: [x] numbness, [x] weakness   Endocrine: [] fatigue, [] heat/cold intolerance   Allergy/Immunologic: [] fevers, [] chills   Musculoskeletal: [] muscle pain, [] joint pain   Psychiatric: [] thoughts of harming self/others, [] depression   Integumentary: [] rashes, [] sores that do not heal       Objective:        Vitals:    11/15/19 0757   BP: (!) 167/98   Pulse: 78   Resp: 16     Body mass index is 44.09 kg/m².     Reflexes full throughout  Confrontational strength 5/5 in all cervical dermatomes  Sensation to temperature intact all cervical dermatomes    CERVICAL SPINE:  INSPECTION:  Posterior decompression scar, tender  ROM:  Limited rotation  MUSCLE SPASM:  Diffuse  FACET LOADING:  Bilateral  SPURLING:  Negative      2/12/19  General: Well developed, well nourished.  No acute  distress.  HEENT: Atraumatic, normocephalic. Bilateral ptosis (chronic).  Optic discs sharp bilaterally. The lumps she showed me are bony prominences of the calvarium  Neck: Trachea midline. Well-healed midline posterior surgical scar.  Multiple areas of muscle tenderness around neck  Cardiovascular: Vitals reviewed. Normal peripheral perfusion.   Pulmonary: No increased work of breathing.  Abdomen/GI: No guarding  Musculoskeletal: No obvious joint deformities, moves all extremities symmetrically and well.  No drift, no orbit, symmetric finger tapping.     Neurological exam:  Mental status: Awake and alert. Oriented to situation.  Speech fluent and appropriate. Recent and remote memory appear to be intact.  Fund of knowledge normal.  Cranial nerves: Pupils equal round, extraocular movements intact, facial strength intact bilaterally, reports hearing is asymmetric  Sensation:  Normal to temperature  Reflexes: 2+ throughout, negative Vu  Coordination:  Normal finger-to-nose and heel-to-shin  Gait: Normal         3/14/18   Constitutional: appears in no acute distress, well-developed, well-nourished     Eyes: normal conjunctiva, PERRLA, bilateral right worse than left ptosis (baseline). Optic discs sharp bilaterally.     Ears, nose, mouth, throat: external appearance of ears and nose normal, hearing reduced on left     Cardiovascular: regular rate and rhythm, no murmurs appreciated    Respiratory: unlabored respirations, breath sounds normal bilaterally    Gastrointestinal: no visible abdominal masses, no guarding, no visible hernia    Musculoskeletal: normal tone in all four extremities. No atrophy. No abnormal movements. Strength in all muscles groups of the upper and lower extremities is 5/5. Normal gait and station. Digits and nails normal.      Spine:  CERVICAL SPINE:  INSPECTION: no scars   ROM: slightly limited   MUSCLE SPASM: bilateral   FACET LOADING: left  SPURLING: neg  JEZ tender: bilateral      Psychiatric: normal judgment and insight. Oriented to person, place, and time.     Neurologic:   Cortical functions: recent and remote memory intact, normal attention span and concentration, speech fluent, adequate fund of knowledge   Cranial nerves: visual fields full, PERRLA, EOMI, facial sensation intact in V1-V3, symmetric facial strength, hearing intact to finger rub, palate elevates symmetrically, shoulder shrug 5/5, tongue protrudes midline   Reflexes: 2+ in the upper and lower extremities, no Vu, no babinski   Sensation: reduced to temperature in a length dependent manner in the hands and to knees, vibration reduced toes, ankles, knees  Coordination: normal    Data Review:     I have personally reviewed the referring provider's notes, labs, & imaging made available to me today.     RADIOLOGY STUDIES:  I have personally reviewed the pertinent images performed.     MRI cervical spine 2/27/18  1. The cerebellar tonsils extend below the foramen magnum approximately 8-9 mm, consistent with Chiari 1 malformation.  The cord is normal without evidence of syrinx, signal abnormality or abnormal enhancement.  2. There is mild degenerative change.  There is, however, no significant spinal canal or foraminal stenosis.  There is no cord compression.  There is no suspected nerve root compression.  Please see above discussion.    Results for orders placed or performed during the hospital encounter of 02/05/18   MRI Brain W WO Contrast    Narrative    Routine Multiplanar imaging through this 42-year-old females brain was obtained with utilization of 10 cc of gadolinium contrast    Hyperostosis frontalis appears to be present.     A Chiari one malformation appears to be present with the cerebellar tonsils extending 9 mm inferior to the foramen magnum with so-called pegging of the cerebellar tonsils.    An empty sella configuration of the pituitary gland is noted.    No diffusion positivity is identified to suggest  recent infarction.    No gradient  susceptibility was noted just just presence of acute blood products.    No worrisome enhancing lesions are noted intracranially    No diffusion positivity is identified to suggest recent infarction.    Impression    1. The cerebellar tonsils appear to extend inferior to the foramen magnum suggestive of a Chiari one malformation. No obvious cord signal abnormalities noted within the visualized cord, given the patient's history further evaluation of the cord may be of use to exclude a syrinx        Electronically signed by: Ismael Richmond MD  Date:     02/05/18  Time:    12:04    Results for orders placed or performed during the hospital encounter of 09/25/15   CT Head Without Contrast    Narrative    Comparison: None    Technique: 5 mm noncontrast axial images through the brain were obtained.    Findings:     The brain is normally formed with no indication of acute/recent major vascular distribution cerebral infarction, intraparenchymal hemorrhage, or intra-axial space occupying lesion. There is preserved gray-white matter junction differentiation.     The ventricular system is normal in appearance for age. No hydrocephalus. No effacement of the skull-base cisterns. No abnormal extra-axial fluid collections or blood products.     The paranasal sinuses and mastoid air cells are unremarkable.  There is incidentally observed hyperostosis frontalis.    Impression         No acute  intracranial abnormalities are appreciated.      Electronically signed by: Toyin Shin MD  Date:     09/25/15  Time:    13:41        Lab Results   Component Value Date     12/14/2018    K 5.0 12/14/2018    MG 2.3 12/14/2018     12/14/2018    CO2 18 (L) 12/14/2018    BUN 12 12/14/2018    CREATININE 0.8 12/14/2018     (H) 12/14/2018    HGBA1C 4.9 12/04/2018    AST 22 12/04/2018    ALT 25 12/04/2018    ALBUMIN 4.2 12/04/2018    PROT 7.5 12/04/2018    BILITOT 0.6 12/04/2018    CHOL 212 (H)  09/11/2019    HDL 54 09/11/2019    LDLCALC 122.8 09/11/2019    TRIG 176 (H) 09/11/2019       Lab Results   Component Value Date    WBC 12.50 12/14/2018    HGB 13.1 12/14/2018    HCT 38.4 12/14/2018    MCV 95 12/14/2018     12/14/2018       Lab Results   Component Value Date    TSH 2.014 09/11/2019       Assessment & Plan:       Problem List Items Addressed This Visit        Neuro    Chronic migraine without aura, intractable, without status migrainosus - Primary    Overview     The patient has chronic migraines (G43.719) and suffers from headaches more than 15 days a month lasting more than 4 hours a day with no relief of symptoms despite trying multiple medications including but not limited to anti-epileptics (GABAPENTIN, LAMOTRIGINE), and antidepressants (FLUOXETINE).     Trialing Emgality.           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    Overview     Mild cervical facet arthropathy  DDD C4-5, C5-6  Previous good response cervical epidurals, current neck pain may be more postsurgical from the Chiari decompression which I explained to patient may not yield the same result from the cervical epidural but worth trying    This patient has failed multiple conservative and non-invasive therapies. This procedure is medically necessary to improve the patient's quality of life through decreased pain and improved function. To date the patient has tried and failed at least 2 months of opioid medications, physical therapy, and NSAID therapy. After the procedure the plan is to continue home exercise regimen and rehabilitation as tolerated.     Discussed the risk, benefits, and alternatives with the patient. Patient wishes to proceed with scheduling of cervical epidural at C7-T1    The patient voiced understanding that the produce has risk including but not limited to coma, paralysis, myocardial infarction, infection, bleeding, and death.           Memory loss    Overview     Several years,  probably pain and medication induced, refer for neurocognitive evaluation         Relevant Orders    Ambulatory referral to Neuropsychology              TESTING:  -- schedule neurocognitive evaluation     REFERRALS:  -- none     PREVENTION OF PAIN:   -- continue gabapentin 600mg three times per day   -- continue Emgality at this time   -- schedule cervical epidural injection at C7-T1 on 12/5/19 - need , fast after midnight, stop ibuprofen 5 days before (last dose 11/30/19)    AS-NEEDED TREATMENT OF PAIN:  -- continue tramadol as needed    -- continue robaxin as needed     Follow up in about 2 months (around 1/15/2020) for office visit post procedure .         Noa Samano MD

## 2019-11-15 NOTE — PATIENT INSTRUCTIONS
Please call our clinic at [phone] or send a message to Dr. Samano on the [employer] portal if there are any changes to the plan described below, for example,if you are not contacted for the requested tests, referral(s) within one week, if you are unable to receive the medications prescribed, or if you feel you need to change the treatment course for any reason.     TESTING:  -- schedule neurocognitive evaluation -- call us if not contacted within 2 weeks     REFERRALS:  -- none     PREVENTION OF PAIN:   -- continue gabapentin 600mg three times per day   -- continue Emgality at this time   -- schedule cervical epidural injection at C7-T1 on 12/5/19 - need , fast after midnight, stop ibuprofen 5 days before (last dose 11/30/19)    AS-NEEDED TREATMENT OF PAIN:  -- continue tramadol as needed    -- continue robaxin as needed

## 2019-11-16 ENCOUNTER — TELEPHONE (OUTPATIENT)
Dept: PAIN MEDICINE | Facility: CLINIC | Age: 44
End: 2019-11-16

## 2019-11-16 NOTE — TELEPHONE ENCOUNTER
This patient is already established with Dr. Samano and looks like she is scheduled for the injection. Thanks

## 2019-11-16 NOTE — TELEPHONE ENCOUNTER
----- Message from Marium Sky LPN sent at 11/15/2019 11:18 AM CST -----      ----- Message -----  From: Renetta Pizano RN  Sent: 11/6/2019   4:24 PM CST  To: Marium Sky LPN        ----- Message -----  From: Melanie Hdz RN  Sent: 11/6/2019   9:25 AM CST  To: [facility] Staff    Can we schedule this patient for injections C5-6 GREGOR per Dr Busch neurosurgery [facility] [place]

## 2019-11-20 RX ORDER — TRAMADOL HYDROCHLORIDE 50 MG/1
TABLET ORAL
Qty: 60 TABLET | Refills: 0 | OUTPATIENT
Start: 2019-11-20

## 2019-11-22 ENCOUNTER — PATIENT MESSAGE (OUTPATIENT)
Dept: SURGERY | Facility: HOSPITAL | Age: 44
End: 2019-11-22

## 2019-11-22 DIAGNOSIS — M47.812 SPONDYLOSIS OF CERVICAL REGION WITHOUT MYELOPATHY OR RADICULOPATHY: Primary | ICD-10-CM

## 2019-11-25 RX ORDER — TRAMADOL HYDROCHLORIDE 50 MG/1
50 TABLET ORAL EVERY 6 HOURS PRN
Qty: 60 TABLET | Refills: 0 | Status: SHIPPED | OUTPATIENT
Start: 2019-11-25 | End: 2019-12-09 | Stop reason: SDUPTHER

## 2019-12-04 DIAGNOSIS — M50.30 DDD (DEGENERATIVE DISC DISEASE), CERVICAL: Primary | ICD-10-CM

## 2019-12-05 ENCOUNTER — HOSPITAL ENCOUNTER (OUTPATIENT)
Facility: HOSPITAL | Age: 44
Discharge: HOME | End: 2019-12-05
Attending: PSYCHIATRY & NEUROLOGY | Admitting: PSYCHIATRY & NEUROLOGY
Payer: MEDICARE

## 2019-12-05 ENCOUNTER — HOSPITAL ENCOUNTER (OUTPATIENT)
Dept: RADIOLOGY | Facility: HOSPITAL | Age: 44
Discharge: HOME | End: 2019-12-05
Attending: PSYCHIATRY & NEUROLOGY
Payer: MEDICARE

## 2019-12-05 DIAGNOSIS — M50.30 DDD (DEGENERATIVE DISC DISEASE), CERVICAL: ICD-10-CM

## 2019-12-05 DIAGNOSIS — G43.719 CHRONIC MIGRAINE WITHOUT AURA, INTRACTABLE, WITHOUT STATUS MIGRAINOSUS: ICD-10-CM

## 2019-12-05 DIAGNOSIS — M54.12 CERVICAL RADICULOPATHY: Primary | ICD-10-CM

## 2019-12-05 DIAGNOSIS — M47.812 SPONDYLOSIS OF CERVICAL REGION WITHOUT MYELOPATHY OR RADICULOPATHY: ICD-10-CM

## 2019-12-05 LAB
B-HCG UR QL: NEGATIVE
CTP QC/QA: YES

## 2019-12-05 PROCEDURE — 62321 PR INJ CERV/THORAC, W/GUIDANCE: ICD-10-PCS | Mod: LT,,, | Performed by: PSYCHIATRY & NEUROLOGY

## 2019-12-05 PROCEDURE — 81025 URINE PREGNANCY TEST: CPT | Mod: PO | Performed by: PSYCHIATRY & NEUROLOGY

## 2019-12-05 PROCEDURE — 25500020 PHARM REV CODE 255: Mod: PO | Performed by: PSYCHIATRY & NEUROLOGY

## 2019-12-05 PROCEDURE — 62321 NJX INTERLAMINAR CRV/THRC: CPT | Mod: PO | Performed by: PSYCHIATRY & NEUROLOGY

## 2019-12-05 PROCEDURE — 25000003 PHARM REV CODE 250: Mod: PO | Performed by: PSYCHIATRY & NEUROLOGY

## 2019-12-05 PROCEDURE — 62321 NJX INTERLAMINAR CRV/THRC: CPT | Mod: LT,,, | Performed by: PSYCHIATRY & NEUROLOGY

## 2019-12-05 PROCEDURE — 76000 FLUOROSCOPY <1 HR PHYS/QHP: CPT | Mod: TC,PO

## 2019-12-05 PROCEDURE — 63600175 PHARM REV CODE 636 W HCPCS: Mod: PO | Performed by: PSYCHIATRY & NEUROLOGY

## 2019-12-05 RX ORDER — SODIUM CHLORIDE, SODIUM LACTATE, POTASSIUM CHLORIDE, CALCIUM CHLORIDE 600; 310; 30; 20 MG/100ML; MG/100ML; MG/100ML; MG/100ML
INJECTION, SOLUTION INTRAVENOUS CONTINUOUS
Status: DISCONTINUED | OUTPATIENT
Start: 2019-12-05 | End: 2019-12-05 | Stop reason: HOSPADM

## 2019-12-05 RX ORDER — METHYLPREDNISOLONE ACETATE 80 MG/ML
INJECTION, SUSPENSION INTRA-ARTICULAR; INTRALESIONAL; INTRAMUSCULAR; SOFT TISSUE
Status: DISCONTINUED | OUTPATIENT
Start: 2019-12-05 | End: 2019-12-05 | Stop reason: HOSPADM

## 2019-12-05 RX ORDER — FENTANYL CITRATE 50 UG/ML
INJECTION, SOLUTION INTRAMUSCULAR; INTRAVENOUS
Status: DISCONTINUED | OUTPATIENT
Start: 2019-12-05 | End: 2019-12-05 | Stop reason: HOSPADM

## 2019-12-05 RX ORDER — MIDAZOLAM HYDROCHLORIDE 1 MG/ML
INJECTION INTRAMUSCULAR; INTRAVENOUS
Status: DISCONTINUED | OUTPATIENT
Start: 2019-12-05 | End: 2019-12-05 | Stop reason: HOSPADM

## 2019-12-05 RX ORDER — LIDOCAINE HYDROCHLORIDE 10 MG/ML
INJECTION, SOLUTION EPIDURAL; INFILTRATION; INTRACAUDAL; PERINEURAL
Status: DISCONTINUED | OUTPATIENT
Start: 2019-12-05 | End: 2019-12-05 | Stop reason: HOSPADM

## 2019-12-05 RX ORDER — SODIUM CHLORIDE 0.9 G/100ML
IRRIGANT IRRIGATION
Status: DISCONTINUED | OUTPATIENT
Start: 2019-12-05 | End: 2019-12-05 | Stop reason: HOSPADM

## 2019-12-05 RX ADMIN — SODIUM CHLORIDE, SODIUM LACTATE, POTASSIUM CHLORIDE, AND CALCIUM CHLORIDE: .6; .31; .03; .02 INJECTION, SOLUTION INTRAVENOUS at 09:12

## 2019-12-05 NOTE — OP NOTE
PROCEDURE DATE: 12/5/2019    Procedure: cervical interlaminar epidural steroid injection under utilizing fluoroscopy.    LEVEL: C7-T1 directed to the left     Diagnosis: Cervical Radiculopathy    POSTOP DIAGNOSIS: SAME    Physician: Noa Samano MD    Medications injected:  Depomedrol 80mg with 4mL sterile, preservative-free normal saline.    Local anesthetic used: Lidocaine 1%, 5 ml.    ANESTHESIA: midazolam 4mg IV, fentanyl 25mcg IV     Complications: none     Estimated blood loss: <5cc     Technique:  A time-out was taken to identify patient and procedure prior to starting the procedure.  IV sedation was administered by a dedicated sedation nurse under my direct supervision. With the patient laying in a prone position with the neck in a mid-flexed forward position, the area was prepped and draped in the usual sterile fashion using ChloraPrep and a fenestrated drape. Then using fluoroscopic guidance under an AP view I identified the C7-T1 interlaminar space as the most accessible and the skin overlying it was anesthetized with 1% lidocaine. Then using both AP and lateral fluoroscopic guidance I advanced a 20-gauge Tuohy needle using the loss-of-resistance to air technique until the tip of the needle was epidurally placed. Proper positioning was confirmed with Omnipaque showing good epidural spread with no signs of intrathecal or intravascular uptake. Once again after negative aspiration of CSF, heme, and negative paresthesia I proceeded to slowly inject a 6 mL mixture of preservative-free sterile normal saline and 8 mg of dexamethasone.    The patient tolerated the procedure well.    The patient was monitored after the procedure and was given post-procedure and discharge instructions to follow at home. The patient was discharged in a stable condition with follow up as previously scheduled.

## 2019-12-05 NOTE — DISCHARGE SUMMARY
[facility]  Discharge Note  Short Stay    Admit Date: 12/5/2019    Discharge Date: 12/5/2019    Attending Physician: Noa Samano MD     Discharge Provider: Noa Samano    Diagnoses:  Active Hospital Problems    Diagnosis  POA    *Cervical radiculopathy [M54.12]  Yes      Resolved Hospital Problems   No resolved problems to display.       Discharged Condition: good    Final Diagnoses: Chronic migraine without aura, intractable, without status migrainosus [G43.719]  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 [M47.812]    Disposition: Home or Self Care    Hospital Course: no complications, uneventful    Outcome of Hospitalization, Treatment, Procedure, or Surgery:  Patient was admitted for outpatient procedure. The patient underwent procedure without complications and are discharged home    Follow up/Patient Instructions:  Follow up as scheduled/Patient has received instructions and follow up date    Medications:  Continue previous medications    Discharge Procedure Orders   Notify your health care provider if you experience any of the following:  temperature >100.4     Notify your health care provider if you experience any of the following:  persistent nausea and vomiting or diarrhea     Notify your health care provider if you experience any of the following:  severe uncontrolled pain     Notify your health care provider if you experience any of the following:  redness, tenderness, or signs of infection (pain, swelling, redness, odor or green/yellow discharge around incision site)     Notify your health care provider if you experience any of the following:  difficulty breathing or increased cough     Notify your health care provider if you experience any of the following:  severe persistent headache     Notify your health care provider if you experience any of the following:  worsening rash     Notify your health care provider if you experience any of the following:  persistent  dizziness, light-headedness, or visual disturbances     Notify your health care provider if you experience any of the following:  increased confusion or weakness     No dressing needed         Discharge Procedure Orders (must include Diet, Follow-up, Activity):   Discharge Procedure Orders (must include Diet, Follow-up, Activity)   Notify your health care provider if you experience any of the following:  temperature >100.4     Notify your health care provider if you experience any of the following:  persistent nausea and vomiting or diarrhea     Notify your health care provider if you experience any of the following:  severe uncontrolled pain     Notify your health care provider if you experience any of the following:  redness, tenderness, or signs of infection (pain, swelling, redness, odor or green/yellow discharge around incision site)     Notify your health care provider if you experience any of the following:  difficulty breathing or increased cough     Notify your health care provider if you experience any of the following:  severe persistent headache     Notify your health care provider if you experience any of the following:  worsening rash     Notify your health care provider if you experience any of the following:  persistent dizziness, light-headedness, or visual disturbances     Notify your health care provider if you experience any of the following:  increased confusion or weakness     No dressing needed

## 2019-12-05 NOTE — DISCHARGE INSTRUCTIONS
PAIN MANAGEMENT    Home care instructions   Apply ice pack to the injection site for 20 minute prior for the first 24 hours for soreness/discomfort at injection site   DO NOT USE HEAT FOR 24 HOURS   Keep site clean and dry for 24 hours, remove bandaid when desired   Do not drive until tomorrow  Take care when walking after a lumbar injection     STEROIDS OR RADIOFREQUENCY    May take 10-14 days for full effects  Avoid strenuous exercises for 2 days        Resume Aspirin, Plavix, or Coumadin the day after the procedure unless other wise instructed  Resume home medication as prescribed today      CALL PHYSICIAN FOR:   Severe increase in your usual pain or appearance of new pain   Prolonged or increasing weakness or numbness in the legs or arms   Fever greater then 100 degrees F..   Drainage from the incision site, redness, active bleeding or increased swelling at the injection site   Headache that increases when your head is upright and decreases when you lie flat    FOR EMERGENCIES:   Go directly to Emergency Department for Shortness of breath, chest pain, or problems breathing

## 2019-12-05 NOTE — INTERVAL H&P NOTE
The patient has been examined and the H&P has been reviewed:    I concur with the findings and no changes have occurred since H&P was written. malampatti 3    Anesthesia/Surgery risks, benefits and alternative options discussed and understood by patient/family.          Active Hospital Problems    Diagnosis  POA    Cervical radiculopathy [M54.12]  Yes      Resolved Hospital Problems   No resolved problems to display.

## 2019-12-06 ENCOUNTER — TELEPHONE (OUTPATIENT)
Dept: NEUROLOGY | Facility: CLINIC | Age: 44
End: 2019-12-06

## 2019-12-06 VITALS
RESPIRATION RATE: 18 BRPM | TEMPERATURE: 98 F | HEART RATE: 78 BPM | OXYGEN SATURATION: 98 % | SYSTOLIC BLOOD PRESSURE: 140 MMHG | DIASTOLIC BLOOD PRESSURE: 74 MMHG

## 2019-12-06 NOTE — TELEPHONE ENCOUNTER
"Called and spoke with patient. Patient reports she is doing  "good". Patient rates her relief at 60%. Appointment scheduled. Patient verbalized understanding.   "

## 2019-12-06 NOTE — TELEPHONE ENCOUNTER
----- Message from Noa Samano MD sent at 12/5/2019 11:42 AM CST -----   Please call patient Friday/next business day to -    3. GREGOR - check on how patient is doing. Determine relief if any. Remind that full relief will take up to 2 weeks. Make sure they have a clinic appt within 2 to 4 weeks for follow up.

## 2019-12-06 NOTE — TELEPHONE ENCOUNTER
----- Message from Noa Samano MD sent at 12/5/2019 10:47 AM CST -----   Please call patient Friday/next business day to -    3. GREGOR - check on how patient is doing. Determine relief if any. Remind that full relief will take up to 2 weeks. Make sure they have a clinic appt within 2 to 4 weeks for follow up.

## 2019-12-07 DIAGNOSIS — F43.10 PTSD (POST-TRAUMATIC STRESS DISORDER): ICD-10-CM

## 2019-12-07 DIAGNOSIS — F41.0 PANIC DISORDER WITHOUT AGORAPHOBIA: ICD-10-CM

## 2019-12-08 DIAGNOSIS — G43.719 CHRONIC MIGRAINE WITHOUT AURA, INTRACTABLE, WITHOUT STATUS MIGRAINOSUS: ICD-10-CM

## 2019-12-09 DIAGNOSIS — M47.812 SPONDYLOSIS OF CERVICAL REGION WITHOUT MYELOPATHY OR RADICULOPATHY: ICD-10-CM

## 2019-12-09 DIAGNOSIS — F43.10 PTSD (POST-TRAUMATIC STRESS DISORDER): ICD-10-CM

## 2019-12-09 RX ORDER — ALPRAZOLAM 1 MG/1
1 TABLET ORAL DAILY PRN
Qty: 30 TABLET | Refills: 2 | Status: SHIPPED | OUTPATIENT
Start: 2019-12-09 | End: 2020-01-30 | Stop reason: SDUPTHER

## 2019-12-09 RX ORDER — METHOCARBAMOL 750 MG/1
TABLET, FILM COATED ORAL
Qty: 60 TABLET | Refills: 0 | Status: SHIPPED | OUTPATIENT
Start: 2019-12-09 | End: 2020-01-05

## 2019-12-10 RX ORDER — TRAMADOL HYDROCHLORIDE 50 MG/1
50 TABLET ORAL EVERY 6 HOURS PRN
Qty: 60 TABLET | Refills: 0 | Status: SHIPPED | OUTPATIENT
Start: 2019-12-10 | End: 2020-01-17

## 2019-12-10 RX ORDER — ZOLPIDEM TARTRATE 12.5 MG/1
12.5 TABLET, FILM COATED, EXTENDED RELEASE ORAL NIGHTLY PRN
Qty: 30 TABLET | Refills: 2 | Status: SHIPPED | OUTPATIENT
Start: 2019-12-10 | End: 2020-01-30 | Stop reason: SDUPTHER

## 2020-01-01 DIAGNOSIS — F33.2 MAJOR DEPRESSIVE DISORDER, RECURRENT, SEVERE WITHOUT PSYCHOTIC FEATURES: ICD-10-CM

## 2020-01-02 RX ORDER — BUPROPION HYDROCHLORIDE 150 MG/1
TABLET ORAL
Qty: 90 TABLET | Refills: 0 | Status: SHIPPED | OUTPATIENT
Start: 2020-01-02 | End: 2020-01-30 | Stop reason: SDUPTHER

## 2020-01-03 RX ORDER — TIZANIDINE 4 MG/1
4 TABLET ORAL NIGHTLY
Qty: 90 TABLET | Refills: 3 | Status: SHIPPED | OUTPATIENT
Start: 2020-01-03 | End: 2020-03-18 | Stop reason: SDUPTHER

## 2020-01-05 RX ORDER — METHOCARBAMOL 500 MG/1
TABLET, FILM COATED ORAL
Qty: 90 TABLET | Refills: 0 | Status: SHIPPED | OUTPATIENT
Start: 2020-01-05 | End: 2020-03-18 | Stop reason: SDUPTHER

## 2020-01-08 DIAGNOSIS — F33.2 MAJOR DEPRESSIVE DISORDER, RECURRENT, SEVERE WITHOUT PSYCHOTIC FEATURES: ICD-10-CM

## 2020-01-08 DIAGNOSIS — F41.0 PANIC DISORDER WITHOUT AGORAPHOBIA: ICD-10-CM

## 2020-01-08 DIAGNOSIS — F60.3 BORDERLINE PERSONALITY DISORDER: ICD-10-CM

## 2020-01-08 DIAGNOSIS — F43.10 PTSD (POST-TRAUMATIC STRESS DISORDER): ICD-10-CM

## 2020-01-08 RX ORDER — FLUOXETINE HYDROCHLORIDE 40 MG/1
CAPSULE ORAL
Qty: 30 CAPSULE | Refills: 2 | Status: SHIPPED | OUTPATIENT
Start: 2020-01-08 | End: 2020-01-30 | Stop reason: SDUPTHER

## 2020-01-13 ENCOUNTER — TELEPHONE (OUTPATIENT)
Dept: FAMILY MEDICINE | Facility: CLINIC | Age: 45
End: 2020-01-13

## 2020-01-17 DIAGNOSIS — M47.812 SPONDYLOSIS OF CERVICAL REGION WITHOUT MYELOPATHY OR RADICULOPATHY: ICD-10-CM

## 2020-01-17 RX ORDER — TRAMADOL HYDROCHLORIDE 50 MG/1
50 TABLET ORAL EVERY 6 HOURS PRN
Qty: 60 TABLET | Refills: 0 | Status: SHIPPED | OUTPATIENT
Start: 2020-01-17 | End: 2020-02-24

## 2020-01-22 ENCOUNTER — TELEPHONE (OUTPATIENT)
Dept: FAMILY MEDICINE | Facility: CLINIC | Age: 45
End: 2020-01-22

## 2020-01-22 RX ORDER — DRONABINOL 2.5 MG/1
2.5 CAPSULE ORAL
Qty: 60 CAPSULE | OUTPATIENT
Start: 2020-01-22

## 2020-01-22 RX ORDER — DIPHENOXYLATE HYDROCHLORIDE AND ATROPINE SULFATE 2.5; .025 MG/1; MG/1
1 TABLET ORAL 4 TIMES DAILY PRN
Qty: 80 TABLET | Refills: 0 | Status: SHIPPED | OUTPATIENT
Start: 2020-01-22 | End: 2024-03-10 | Stop reason: SDUPTHER

## 2020-01-22 RX ORDER — DIPHENOXYLATE HYDROCHLORIDE AND ATROPINE SULFATE 2.5; .025 MG/1; MG/1
1 TABLET ORAL 4 TIMES DAILY PRN
Qty: 30 TABLET | OUTPATIENT
Start: 2020-01-22 | End: 2021-01-21

## 2020-01-22 NOTE — TELEPHONE ENCOUNTER
Fax requesting Rx for Diphenoxylate-atrop 2.5-0.025    Sig: take 1 tablet by mouth 4 (four) times daily as needed for diarrhea    Previously prescribed by Dr. Nichole

## 2020-01-30 ENCOUNTER — OFFICE VISIT (OUTPATIENT)
Dept: FAMILY MEDICINE | Facility: CLINIC | Age: 45
End: 2020-01-30
Payer: MEDICARE

## 2020-01-30 ENCOUNTER — OFFICE VISIT (OUTPATIENT)
Dept: PSYCHIATRY | Facility: CLINIC | Age: 45
End: 2020-01-30
Payer: MEDICARE

## 2020-01-30 VITALS
OXYGEN SATURATION: 98 % | TEMPERATURE: 98 F | HEIGHT: 67 IN | SYSTOLIC BLOOD PRESSURE: 130 MMHG | DIASTOLIC BLOOD PRESSURE: 80 MMHG | WEIGHT: 276.44 LBS | BODY MASS INDEX: 43.39 KG/M2 | HEART RATE: 83 BPM | RESPIRATION RATE: 20 BRPM

## 2020-01-30 VITALS
SYSTOLIC BLOOD PRESSURE: 122 MMHG | WEIGHT: 276.88 LBS | HEIGHT: 67 IN | BODY MASS INDEX: 43.46 KG/M2 | DIASTOLIC BLOOD PRESSURE: 80 MMHG

## 2020-01-30 DIAGNOSIS — F43.10 PTSD (POST-TRAUMATIC STRESS DISORDER): ICD-10-CM

## 2020-01-30 DIAGNOSIS — F60.3 BORDERLINE PERSONALITY DISORDER: ICD-10-CM

## 2020-01-30 DIAGNOSIS — R06.2 WHEEZING: ICD-10-CM

## 2020-01-30 DIAGNOSIS — F90.0 ATTENTION DEFICIT HYPERACTIVITY DISORDER (ADHD), PREDOMINANTLY INATTENTIVE TYPE: ICD-10-CM

## 2020-01-30 DIAGNOSIS — R09.81 NASAL CONGESTION: ICD-10-CM

## 2020-01-30 DIAGNOSIS — F41.0 PANIC DISORDER WITHOUT AGORAPHOBIA: ICD-10-CM

## 2020-01-30 DIAGNOSIS — J45.909 ASTHMA, UNSPECIFIED ASTHMA SEVERITY, UNSPECIFIED WHETHER COMPLICATED, UNSPECIFIED WHETHER PERSISTENT: Primary | ICD-10-CM

## 2020-01-30 DIAGNOSIS — G43.719 CHRONIC MIGRAINE WITHOUT AURA, INTRACTABLE, WITHOUT STATUS MIGRAINOSUS: ICD-10-CM

## 2020-01-30 DIAGNOSIS — F33.2 MAJOR DEPRESSIVE DISORDER, RECURRENT, SEVERE WITHOUT PSYCHOTIC FEATURES: Primary | ICD-10-CM

## 2020-01-30 PROCEDURE — 99213 PR OFFICE/OUTPT VISIT, EST, LEVL III, 20-29 MIN: ICD-10-PCS | Mod: 25,S$GLB,, | Performed by: NURSE PRACTITIONER

## 2020-01-30 PROCEDURE — 99212 OFFICE O/P EST SF 10 MIN: CPT | Mod: PBBFAC,PO | Performed by: PSYCHIATRY & NEUROLOGY

## 2020-01-30 PROCEDURE — 90833 PSYTX W PT W E/M 30 MIN: CPT | Mod: S$GLB,,, | Performed by: PSYCHIATRY & NEUROLOGY

## 2020-01-30 PROCEDURE — 99213 OFFICE O/P EST LOW 20 MIN: CPT | Mod: 25,S$GLB,, | Performed by: NURSE PRACTITIONER

## 2020-01-30 PROCEDURE — 99214 OFFICE O/P EST MOD 30 MIN: CPT | Mod: S$GLB,,, | Performed by: PSYCHIATRY & NEUROLOGY

## 2020-01-30 PROCEDURE — 96372 THER/PROPH/DIAG INJ SC/IM: CPT | Mod: S$GLB,,, | Performed by: NURSE PRACTITIONER

## 2020-01-30 PROCEDURE — 99999 PR PBB SHADOW E&M-EST. PATIENT-LVL II: CPT | Mod: PBBFAC,,, | Performed by: PSYCHIATRY & NEUROLOGY

## 2020-01-30 PROCEDURE — 3008F PR BODY MASS INDEX (BMI) DOCUMENTED: ICD-10-PCS | Mod: S$GLB,,, | Performed by: NURSE PRACTITIONER

## 2020-01-30 PROCEDURE — 99999 PR PBB SHADOW E&M-EST. PATIENT-LVL II: ICD-10-PCS | Mod: PBBFAC,,, | Performed by: PSYCHIATRY & NEUROLOGY

## 2020-01-30 PROCEDURE — 90833 PR PSYCHOTHERAPY W/PATIENT W/E&M, 30 MIN (ADD ON): ICD-10-PCS | Mod: S$GLB,,, | Performed by: PSYCHIATRY & NEUROLOGY

## 2020-01-30 PROCEDURE — 3008F BODY MASS INDEX DOCD: CPT | Mod: S$GLB,,, | Performed by: NURSE PRACTITIONER

## 2020-01-30 PROCEDURE — 96372 PR INJECTION,THERAP/PROPH/DIAG2ST, IM OR SUBCUT: ICD-10-PCS | Mod: S$GLB,,, | Performed by: NURSE PRACTITIONER

## 2020-01-30 PROCEDURE — 99214 PR OFFICE/OUTPT VISIT, EST, LEVL IV, 30-39 MIN: ICD-10-PCS | Mod: S$GLB,,, | Performed by: PSYCHIATRY & NEUROLOGY

## 2020-01-30 RX ORDER — ZOLPIDEM TARTRATE 12.5 MG/1
12.5 TABLET, FILM COATED, EXTENDED RELEASE ORAL NIGHTLY PRN
Qty: 30 TABLET | Refills: 2 | Status: SHIPPED | OUTPATIENT
Start: 2020-01-30 | End: 2020-03-10 | Stop reason: SDUPTHER

## 2020-01-30 RX ORDER — PREDNISONE 20 MG/1
TABLET ORAL
Qty: 18 TABLET | Refills: 0 | Status: SHIPPED | OUTPATIENT
Start: 2020-01-30 | End: 2020-04-26

## 2020-01-30 RX ORDER — FLUTICASONE PROPIONATE 50 MCG
2 SPRAY, SUSPENSION (ML) NASAL DAILY
Qty: 15.8 ML | Refills: 2 | Status: SHIPPED | OUTPATIENT
Start: 2020-01-30 | End: 2020-02-29

## 2020-01-30 RX ORDER — FLUOXETINE HYDROCHLORIDE 40 MG/1
40 CAPSULE ORAL DAILY
Qty: 90 CAPSULE | Refills: 1 | Status: SHIPPED | OUTPATIENT
Start: 2020-01-30 | End: 2020-03-10 | Stop reason: SDUPTHER

## 2020-01-30 RX ORDER — BETAMETHASONE SODIUM PHOSPHATE AND BETAMETHASONE ACETATE 3; 3 MG/ML; MG/ML
6 INJECTION, SUSPENSION INTRA-ARTICULAR; INTRALESIONAL; INTRAMUSCULAR; SOFT TISSUE ONCE
Status: COMPLETED | OUTPATIENT
Start: 2020-01-30 | End: 2020-01-30

## 2020-01-30 RX ORDER — ALPRAZOLAM 1 MG/1
1 TABLET ORAL DAILY PRN
Qty: 30 TABLET | Refills: 2 | Status: SHIPPED | OUTPATIENT
Start: 2020-01-30 | End: 2020-03-10 | Stop reason: SDUPTHER

## 2020-01-30 RX ORDER — BUPROPION HYDROCHLORIDE 150 MG/1
150 TABLET ORAL DAILY
Qty: 90 TABLET | Refills: 1 | Status: SHIPPED | OUTPATIENT
Start: 2020-01-30 | End: 2020-03-10 | Stop reason: SDUPTHER

## 2020-01-30 RX ADMIN — BETAMETHASONE SODIUM PHOSPHATE AND BETAMETHASONE ACETATE 6 MG: 3; 3 INJECTION, SUSPENSION INTRA-ARTICULAR; INTRALESIONAL; INTRAMUSCULAR; SOFT TISSUE at 02:01
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
## 2020-01-30 NOTE — PROGRESS NOTES
Patient, Dinah Mitchell (MRN #[id number]), presented with a recorded BMI of 43.37 kg/m^2 consistent with the definition of morbid obesity (ICD-10 E66.01). The patient's morbid obesity was monitored, evaluated, addressed and/or treated. This addendum to the medical record is made on 01/30/2020.

## 2020-01-30 NOTE — PATIENT INSTRUCTIONS
Educated on supportive care and return precautions to the clinic.  Follow up for further evaluation if S/S worsen or fail to improve.  You have been prescribed a rescue inhaler and a daily maintenances inhaler for the wheezing.  Please begin steroid pack today.   Please use flonase nasal spray one time at night for nasal congestion only.

## 2020-01-30 NOTE — PROGRESS NOTES
Subjective:       Patient ID: Dinah Mitchell is a 44 y.o. female.    Chief Complaint: Sinus Problem (dec 23. had amoxicillin and prednisone); Cough (nonproductive); Fever; and Hand Injury (left hand 3 days ago. fell on it)    Ms. Mitchell is new patient to me.  She is here today for nasal congestion, cough and wheezing.  She had an urgent care visit in December where she was treated with an antibiotic and steroid pack.  She said it has not gone away.  Once she starts coughing she will wheeze and it does not stop.  She is having trouble sleeping with the cough and the nasal congestion.    Vitals:    01/30/20 1420   BP: 130/80   Pulse: 83   Resp: 20   Temp: 98.3 °F (36.8 °C)     Review of Systems   Constitutional: Negative for activity change, appetite change, chills, fatigue and fever.   HENT: Positive for congestion, postnasal drip, rhinorrhea and sore throat. Negative for ear pain, sinus pressure, sinus pain, sneezing and trouble swallowing.    Eyes: Negative for pain, redness and visual disturbance.   Respiratory: Positive for cough, chest tightness, shortness of breath and wheezing. Negative for choking.    Cardiovascular: Negative for chest pain, palpitations and leg swelling.   Gastrointestinal: Negative for abdominal pain, blood in stool, constipation, diarrhea, nausea and vomiting.   Endocrine: Negative.    Genitourinary: Negative for decreased urine volume, difficulty urinating, dysuria, flank pain, hematuria and urgency.   Musculoskeletal: Positive for gait problem. Negative for back pain, myalgias and neck pain.        Left arm pain and tingling after previous fall at home    Skin: Negative for color change, pallor and rash.   Allergic/Immunologic: Negative.    Neurological: Negative for dizziness, speech difficulty, weakness, light-headedness, numbness and headaches.   Hematological: Does not bruise/bleed easily.   Psychiatric/Behavioral: Negative for self-injury and suicidal ideas. The patient is not  nervous/anxious.        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 Vital signs are normal. She appears well-developed and well-nourished. She is active. No distress.   HENT:   Head: Normocephalic and atraumatic. Head is without right periorbital erythema and without left periorbital erythema.   Right Ear: Hearing, tympanic membrane and external ear normal.   Left Ear: Hearing, tympanic membrane and external ear normal.   Nose: Mucosal edema and rhinorrhea present. Right sinus exhibits no maxillary sinus tenderness and no frontal sinus tenderness. Left sinus exhibits no maxillary sinus tenderness and no frontal sinus tenderness.   Mouth/Throat: Uvula is midline and mucous membranes are normal. Uvula swelling present. Posterior oropharyngeal erythema present.   Eyes: Pupils are equal, round, and reactive to light. Conjunctivae and EOM are normal.   Neck: Trachea normal, normal range of motion and full passive range of motion without pain. Neck supple.   Cardiovascular: Normal rate, regular rhythm, S1 normal, S2 normal, normal heart sounds, intact distal pulses and normal pulses.   Pulmonary/Chest: Effort normal. No apnea, no tachypnea and no bradypnea. No respiratory distress. She has no decreased breath sounds. She has wheezes.   Positive for cough and wheezing during clinic visit   Abdominal: Soft. Normal appearance and bowel sounds are normal.   Musculoskeletal: Normal range of  motion.        Arms:  Left arm and left hand did not appear warm, tender, swollen or bruised. Patient had full ROM in left arm and hand as compared to the right side.   Neurological: She is alert and oriented to person, place, and time.   Skin: Skin is warm and dry. Capillary refill takes less than 2 seconds. No lesion, no petechiae and no rash noted. She is not diaphoretic. No erythema.   Psychiatric: Judgment and thought content normal. Her speech is delayed. Her speech is not rapid and/or pressured. She is slowed. Cognition and memory are normal. She exhibits a depressed mood.   Nursing note and vitals reviewed.      Assessment:       1. Asthma, unspecified asthma severity, unspecified whether complicated, unspecified whether persistent    2. Wheezing    3. Nasal congestion        Plan:       Asthma, unspecified asthma severity, unspecified whether complicated, unspecified whether persistent  -     ipratropium-albuterol (COMBIVENT)  mcg/actuation inhaler; Inhale 1 puff into the lungs every 4 (four) hours as needed for Wheezing. Rescue  Dispense: 4 g; Refill: 2  -     albuterol sulfate (PROAIR RESPICLICK) 90 mcg/actuation AePB; Inhale 180 mcg into the lungs every 4 (four) hours. Rescue  Dispense: 1 each; Refill: 2    Wheezing  -     predniSONE (DELTASONE) 20 MG tablet; Take 3 tablets daily for 3 days, then 2 tablets daily for 3 days, then 1 tablet daily for 3 days  Dispense: 18 tablet; Refill: 0  -     betamethasone acetate-betamethasone sodium phosphate injection 6 mg    Nasal congestion  -     fluticasone propionate (FLONASE) 50 mcg/actuation nasal spray; 2 sprays (100 mcg total) by Each Nostril route once daily.  Dispense: 15.8 mL; Refill: 2    Patient requested refills on her inhalers for her asthma diagnosis.  She could not stop coughing and needed relief from the intense coughing episodes.  Both the left and right hand of the patient appeared to be the same color and size upon examination.  There was  not any evidence of swelling, warmth tenderness, bruising or decreased ROM in the left hand and left arm over the right.  Patient had full ROM in left arm and hand without evidence of pain.      Patient was advised to report any changes in her left arm regarding pain, swelling, bruising, discoloration, tenderness or decreased range of motion from her previous fall at home to the clinic immediately.    Follow up in about 1 month (around 2/29/2020), or if symptoms worsen or fail to improve.

## 2020-01-31 RX ORDER — DRONABINOL 2.5 MG/1
2.5 CAPSULE ORAL
Qty: 60 CAPSULE | OUTPATIENT
Start: 2020-01-31

## 2020-02-03 ENCOUNTER — PATIENT OUTREACH (OUTPATIENT)
Dept: ADMINISTRATIVE | Facility: OTHER | Age: 45
End: 2020-02-03

## 2020-02-04 RX ORDER — LEVOTHYROXINE SODIUM 200 UG/1
200 TABLET ORAL
Qty: 90 TABLET | Refills: 1 | Status: SHIPPED | OUTPATIENT
Start: 2020-02-04 | End: 2020-03-11 | Stop reason: SDUPTHER

## 2020-02-13 ENCOUNTER — TELEPHONE (OUTPATIENT)
Dept: NEUROLOGY | Facility: CLINIC | Age: 45
End: 2020-02-13

## 2020-02-13 NOTE — TELEPHONE ENCOUNTER
----- Message from Delia Beltran sent at 2/13/2020  9:23 AM CST -----  Contact: Patient  Patient called to state that her pharmacy told her her script needed a PA.     Medication: galcanezumab-gnlm (EMGALITY PEN) 120 mg/mL PnIj    Pharmacy: [id number]/pharmacy #[id number] - [place] - [address] [phone] (Phone)     Patient phone: [phone]    #pharmacy told her they were sending it over also- worried it won't be filled in time and she will have to pay to start over.#

## 2020-02-23 DIAGNOSIS — M47.812 SPONDYLOSIS OF CERVICAL REGION WITHOUT MYELOPATHY OR RADICULOPATHY: ICD-10-CM

## 2020-02-24 RX ORDER — TRAMADOL HYDROCHLORIDE 50 MG/1
50 TABLET ORAL EVERY 6 HOURS PRN
Qty: 60 TABLET | Refills: 0 | Status: SHIPPED | OUTPATIENT
Start: 2020-02-24 | End: 2020-06-08 | Stop reason: SDUPTHER

## 2020-03-10 DIAGNOSIS — F33.2 MAJOR DEPRESSIVE DISORDER, RECURRENT, SEVERE WITHOUT PSYCHOTIC FEATURES: ICD-10-CM

## 2020-03-10 DIAGNOSIS — F41.0 PANIC DISORDER WITHOUT AGORAPHOBIA: ICD-10-CM

## 2020-03-10 DIAGNOSIS — F43.10 PTSD (POST-TRAUMATIC STRESS DISORDER): ICD-10-CM

## 2020-03-10 DIAGNOSIS — F60.3 BORDERLINE PERSONALITY DISORDER: ICD-10-CM

## 2020-03-10 RX ORDER — ALPRAZOLAM 1 MG/1
1 TABLET ORAL DAILY PRN
Qty: 30 TABLET | Refills: 2 | Status: SHIPPED | OUTPATIENT
Start: 2020-03-10 | End: 2020-04-08 | Stop reason: SDUPTHER

## 2020-03-10 RX ORDER — ZOLPIDEM TARTRATE 12.5 MG/1
12.5 TABLET, FILM COATED, EXTENDED RELEASE ORAL NIGHTLY PRN
Qty: 30 TABLET | Refills: 2 | Status: SHIPPED | OUTPATIENT
Start: 2020-03-10 | End: 2020-04-08 | Stop reason: SDUPTHER

## 2020-03-10 RX ORDER — BUPROPION HYDROCHLORIDE 150 MG/1
150 TABLET ORAL DAILY
Qty: 90 TABLET | Refills: 1 | Status: SHIPPED | OUTPATIENT
Start: 2020-03-10 | End: 2020-04-06

## 2020-03-10 RX ORDER — FLUOXETINE HYDROCHLORIDE 40 MG/1
40 CAPSULE ORAL DAILY
Qty: 90 CAPSULE | Refills: 1 | Status: SHIPPED | OUTPATIENT
Start: 2020-03-10 | End: 2020-04-08

## 2020-03-11 ENCOUNTER — PATIENT MESSAGE (OUTPATIENT)
Dept: NEUROLOGY | Facility: CLINIC | Age: 45
End: 2020-03-11

## 2020-03-11 RX ORDER — DIPHENOXYLATE HYDROCHLORIDE AND ATROPINE SULFATE 2.5; .025 MG/1; MG/1
1 TABLET ORAL 4 TIMES DAILY PRN
Qty: 80 TABLET | Refills: 0 | OUTPATIENT
Start: 2020-03-11

## 2020-03-11 RX ORDER — LEVOTHYROXINE SODIUM 200 UG/1
200 TABLET ORAL
Qty: 90 TABLET | Refills: 2 | Status: SHIPPED | OUTPATIENT
Start: 2020-03-11 | End: 2021-10-08 | Stop reason: SDUPTHER

## 2020-03-12 ENCOUNTER — PATIENT MESSAGE (OUTPATIENT)
Dept: NEUROLOGY | Facility: CLINIC | Age: 45
End: 2020-03-12

## 2020-03-12 ENCOUNTER — TELEPHONE (OUTPATIENT)
Dept: NEUROLOGY | Facility: CLINIC | Age: 45
End: 2020-03-12

## 2020-03-12 ENCOUNTER — PATIENT OUTREACH (OUTPATIENT)
Dept: ADMINISTRATIVE | Facility: OTHER | Age: 45
End: 2020-03-12

## 2020-03-16 ENCOUNTER — PATIENT MESSAGE (OUTPATIENT)
Dept: NEUROLOGY | Facility: CLINIC | Age: 45
End: 2020-03-16

## 2020-03-16 ENCOUNTER — PATIENT MESSAGE (OUTPATIENT)
Dept: FAMILY MEDICINE | Facility: CLINIC | Age: 45
End: 2020-03-16

## 2020-03-17 RX ORDER — LEVOCARNITINE 330 MG/1
990 TABLET ORAL 2 TIMES DAILY
Qty: 180 TABLET | Refills: 6 | Status: SHIPPED | OUTPATIENT
Start: 2020-03-17 | End: 2021-02-10 | Stop reason: SDUPTHER

## 2020-03-18 DIAGNOSIS — G43.719 CHRONIC MIGRAINE WITHOUT AURA, INTRACTABLE, WITHOUT STATUS MIGRAINOSUS: ICD-10-CM

## 2020-03-18 RX ORDER — GABAPENTIN 600 MG/1
600 TABLET ORAL 3 TIMES DAILY
Qty: 90 TABLET | Refills: 11 | Status: SHIPPED | OUTPATIENT
Start: 2020-03-18 | End: 2020-09-01 | Stop reason: SDUPTHER

## 2020-03-18 RX ORDER — METHOCARBAMOL 500 MG/1
TABLET, FILM COATED ORAL
Qty: 90 TABLET | Refills: 0 | Status: SHIPPED | OUTPATIENT
Start: 2020-03-18 | End: 2020-03-26

## 2020-03-18 RX ORDER — TIZANIDINE 4 MG/1
4 TABLET ORAL NIGHTLY
Qty: 90 TABLET | Refills: 3 | Status: SHIPPED | OUTPATIENT
Start: 2020-03-18 | End: 2020-08-31 | Stop reason: SDUPTHER

## 2020-03-25 ENCOUNTER — TELEPHONE (OUTPATIENT)
Dept: NEUROLOGY | Facility: CLINIC | Age: 45
End: 2020-03-25

## 2020-03-25 ENCOUNTER — PATIENT OUTREACH (OUTPATIENT)
Dept: ADMINISTRATIVE | Facility: OTHER | Age: 45
End: 2020-03-25

## 2020-03-25 NOTE — TELEPHONE ENCOUNTER
Spoke with pt and rescheduled appt to virtual visit due to COVID-19 concerns. Appt date/time confirmed. Verbalized understanding.

## 2020-03-26 ENCOUNTER — TELEPHONE (OUTPATIENT)
Dept: FAMILY MEDICINE | Facility: CLINIC | Age: 45
End: 2020-03-26

## 2020-03-26 ENCOUNTER — OFFICE VISIT (OUTPATIENT)
Dept: NEUROLOGY | Facility: CLINIC | Age: 45
End: 2020-03-26
Payer: MEDICARE

## 2020-03-26 ENCOUNTER — PATIENT MESSAGE (OUTPATIENT)
Dept: FAMILY MEDICINE | Facility: CLINIC | Age: 45
End: 2020-03-26

## 2020-03-26 DIAGNOSIS — J45.909 ASTHMA, UNSPECIFIED ASTHMA SEVERITY, UNSPECIFIED WHETHER COMPLICATED, UNSPECIFIED WHETHER PERSISTENT: ICD-10-CM

## 2020-03-26 DIAGNOSIS — M47.812 SPONDYLOSIS OF CERVICAL REGION WITHOUT MYELOPATHY OR RADICULOPATHY: ICD-10-CM

## 2020-03-26 DIAGNOSIS — G43.719 CHRONIC MIGRAINE WITHOUT AURA, INTRACTABLE, WITHOUT STATUS MIGRAINOSUS: Primary | ICD-10-CM

## 2020-03-26 PROCEDURE — 99213 PR OFFICE/OUTPT VISIT, EST, LEVL III, 20-29 MIN: ICD-10-PCS | Mod: 95,,, | Performed by: PSYCHIATRY & NEUROLOGY

## 2020-03-26 PROCEDURE — 99213 OFFICE O/P EST LOW 20 MIN: CPT | Mod: 95,,, | Performed by: PSYCHIATRY & NEUROLOGY

## 2020-03-26 RX ORDER — METHOCARBAMOL 500 MG/1
TABLET, FILM COATED ORAL
Qty: 90 TABLET | Refills: 11 | Status: SHIPPED | OUTPATIENT
Start: 2020-03-26 | End: 2021-03-19

## 2020-03-26 NOTE — PROGRESS NOTES
Date of service: 3/26/2020  Referring provider: No ref. provider found    Subjective:      Chief complaint: Pain       Patient ID: Dinah Mitchell is a 45 y.o. depression, borderline personality disorder, PTSD, fibromyalgia, and hypothyroidism presenting for follow-up of headache and neck pain    History of Present Illness    INTERVAL HISTORY - 3/26/2020    The patient location is: home   The chief complaint leading to consultation is: headache and neck pain   Visit type: Virtual visit with synchronous audio and video  Total time spent with patient: 15 min   Each patient to whom he or she provides medical services by telemedicine is:  (1) informed of the relationship between the physician and patient and the respective role of any other health care provider with respect to management of the patient; and (2) notified that he or she may decline to receive medical services by telemedicine and may withdraw from such care at any time.    Last saw patient on 11/15/2019. At that point we continued Emgality and gabapentin for her migraines and planned a cervical epidural for her neck pain as requested by the neurosurgeon. I did the epidural on 12/5/19. Initial relief on phone call was 60%. I referred her for a neurocognitive evaluation due to persistent concerns with memory.     Today patient reports the cervical epidural made her feel worse for the first few days and then the pain returned to baseline without any further relief. The neck pain continues to be daily. She is dependent on the muscle relaxers for relief. Develops widespread spasms if she misses them.    She has been having bad headaches in parallel with being sick (upper respiratory) since [place]. The emgality is definitely helping. Due to pharmacy issues, she had been late on almost all the doses and finds that the 3 days before she is due she gets horrible headaches.       INTERVAL HISTORY - 11/15/2019    She was last seen 2 months ago at which point we  started Emgality for migraine headache.  We also had her complete home physical therapy for strength and falling.  In the interim she saw her neurosurgeon Dr. Busch who recommended to trial cervical epidural injection.  The patient preferred to do this procedure with me so she did not have to travel to the [place].  She is here to discuss this procedure. She has had ESIs with either Dr Vela in the past with relief of same pain     Her last cervical imaging was 06/09/2019 which showed -   1. Loss of the normal cervical lordosis suggesting paraspinous muscle spasm.  2. Postoperative changes about the skull base.  3. Cervical degenerative disc disease.  Changes within the discs particularly at C4-5 and C5-C6 were present on the previous examination.    In regards to her headaches, she reports the little better.  After taking the shot the headaches are less frequent.  After about 2 and half weeks they come back the same.  The pain is in the back and head, left temple, behind the eyes. Hands go numb but so do feet. Neck pain is mainly axial, shoulder blades to neck. Current pain score is 5 with a range of 2-10.    She remains on Emgality, gabapentin, tramadol, ibuprofen, Marinol for nausea, Robaxin, tizanidine for muscle spasm.  She is not using anything else for rescue    She feels her memory is impaired, she gets confused, for the last couple of years.  For example, today she is very upset because she showed up very early for her appointment, not realizing how early she is despite her knowing what time her ointment was.  She reports she makes these mistakes often.  She is frustrated with this    INTERVAL HISTORY - 9/11/19    She was last seen approximately 7 months ago.  At that point she was having headaches 4 days per week.  We continued her regimen of gabapentin and tramadol    She is on gabapentin 600 mg 3 times a day, Robaxin, tizanidine, the tramadol has not been filled since July.    She sustained some head  trauma, hit her left back of head on the cabinet, 6/9/19. Feels nauseous, worse headache.     Today she reports she is about the same.  She reports memory loss, confusion, headache, nausea, falling frequently, neck pain, numbness in the hands and feet, no depth perception.    The headaches are left temporal, back of the head, feels like a vice , neck pain. Her current pain score several much for to 10.    She feels like she is coming down with the flu.     Headaches, nausea, and falling are her most concerning symptoms. Has not been in PT.     2 types of headaches  - temple, associated with light sensitivity, aggravated by activiity. Another like a vice . Wakes up with them.    Her dog passed away      INTERVAL HISTORY - 2/12/19     In the interim since last visit one year ago she has had bilateral occipital nerve block in cervical trigger point injection x3 with me with good relief.      She reports, that while I was on leave, she felt worse and worse. She was more nauseous and kept falling. She saw Dr. Busch for her Chiari malformation, which I previously recommended against surgery on as her symptoms were not clearly Chiari related and she had a non-obstructive CSF flow pattern on CINE. She had posterior fossa decompression on 12/13/18. She had severe headache and neck pain afterwards. She says the surgery helped her disabling headaches, she doesn't have headache any more when straining or bending over but she remains with the other headaches documented below. She has multiple questions about the surgery itself.     She reports she is still wobbly. She still has depth perception issues.     New symptoms - lump she feels on the the posterior parietal scalp, this area has never hurt before. Neck feels weak. Has to lay against something for support. Arms feel more weak then before. Sensation in her feet is returning.     Headaches - left temple, gradual onset, photo/phonophobia/nausea. These occur 4 days per  "week. Last at least 1-2 hours.     Her current pain score is seven for headache, nine for neck.  With a range of 6-9.    ORIGINAL PAIN HISTORY - 3/14/18   Age at onset and course over time: 2011 and worse over time, this happened during a time when she had a long commute. Neck is her worst pain - just left to midline of middle neck. Was going to [facility] pain management receiving epidural steroids which would last only 2 weeks. She had a CMBB and was planning for RFA but wanted to get cervical MRI first and second opinion  Location: neck (sharp, stabbing); hands and feet (tingling, numb), hip/lower back (dull, aching). Also has intermittent left sided headaches starting occipitally radiating to temple.   Quality: as above  Severity: current 8.5; range 7 to 10  Duration: hours  Frequency: daily  Alleviated by: medication, ice  Exacerbated by: movement, light noise (head pain)  Associated with: left arm/hand gets numb/weak "a lot" no radicular pain; headaches on left which are random or triggered by straining in restroom/coughing/laughing. Headaches worst in the AM within 2 hours of waking up. Headaches do not wake her up but neck pain does. Having some depth perception issues causing her to break her foot - eyes checked and were ok. Transient visual loss? When in a lof of pain, not clear TVO with valsalva.   Sleep habits:    Current acute treatment:  -- tramadol 50mg - 2 tab per daily, daily   -- robaxin during the day   -- tizanidine 4mg nightly     (xanax 1mg daily PRN for PTSD, anxiety from psychiatrist)     Current prevention:  -- emgality - started 9/2019 - effective to date   -- gabapentin 600mg TID   (wellbutrin)  (prozac)    Previously tried:   -- TENS   -- tramadol - helped with overall pain   -- methocarbamol - helped with overall pain   -- relafen - did not help     Procedural history:   -- cervical GREGOR  -- lumbar GREGOR  -- chiropractor for hip /neck - helped only hip  -- PT - none for neck just foot "     Review of patient's allergies indicates:   Allergen Reactions    Lamotrigine      Other reaction(s): Rash  Other reaction(s): Nausea    Sulfa (sulfonamide antibiotics) Nausea Only     Other reaction(s): Unknown     Current Outpatient Medications   Medication Sig Dispense Refill    gabapentin (NEURONTIN) 600 MG tablet Take 1 tablet (600 mg total) by mouth 3 (three) times daily. 90 tablet 11    galcanezumab-gnlm (EMGALITY PEN) 120 mg/mL PnIj Inject 120 mg into the skin every 28 days. 1 Syringe 11    methocarbamoL (ROBAXIN) 500 MG Tab TAKE ONE AND ONE-HALF TABLETS TWICE A DAY AS NEEDED FOR MUSCLE SPASMS 90 tablet 11    tiZANidine (ZANAFLEX) 4 MG tablet Take 1 tablet (4 mg total) by mouth every evening. 90 tablet 3    traMADol (ULTRAM) 50 mg tablet TAKE 1 TABLET (50 MG TOTAL) BY MOUTH EVERY 6 (SIX) HOURS AS NEEDED. 60 tablet 0    albuterol sulfate (PROAIR RESPICLICK) 90 mcg/actuation AePB Inhale 180 mcg into the lungs every 4 (four) hours. Rescue 1 each 2    ALPRAZolam (XANAX) 1 MG tablet Take 1 tablet (1 mg total) by mouth daily as needed for Anxiety. 30 tablet 2    buPROPion (WELLBUTRIN XL) 150 MG TB24 tablet Take 1 tablet (150 mg total) by mouth once daily. 90 tablet 1    diphenoxylate-atropine 2.5-0.025 mg (LOMOTIL) 2.5-0.025 mg per tablet Take 1 tablet by mouth 4 (four) times daily as needed. 80 tablet 0    dronabinol (MARINOL) 2.5 MG capsule Take 1 capsule (2.5 mg total) by mouth 2 (two) times daily before meals. 60 capsule 3    FLUoxetine 40 MG capsule Take 1 capsule (40 mg total) by mouth once daily. 90 capsule 1    ipratropium-albuterol (COMBIVENT)  mcg/actuation inhaler Inhale 1 puff into the lungs every 4 (four) hours as needed for Wheezing. Rescue 4 g 2    levOCARNitine (CARNITOR) 330 mg Tab Take 3 tablets (990 mg total) by mouth 2 (two) times daily. 180 tablet 6    medroxyPROGESTERone (DEPO-PROVERA) 150 mg/mL injection Inject 150 mg into the muscle every 3 (three) months.       montelukast (SINGULAIR) 10 mg tablet TAKE 1 TABLET BY MOUTH EVERY DAY (Patient not taking: Reported on 1/30/2020) 90 tablet 1    predniSONE (DELTASONE) 20 MG tablet Take 3 tablets daily for 3 days, then 2 tablets daily for 3 days, then 1 tablet daily for 3 days 18 tablet 0    SYNTHROID 200 mcg tablet Take 1 tablet (200 mcg total) by mouth before breakfast. 90 tablet 2    UNABLE TO FIND N-acetyl-L-cysteine 50 mg subcutaneously 3 times a week (Patient not taking: Reported on 1/30/2020) 10 mL 6    VENTOLIN HFA 90 mcg/actuation inhaler TAKE 2 PUFFS BY MOUTH EVERY 6 HOURS AS NEEDED FOR WHEEZE 18 Inhaler 0    zolpidem (AMBIEN CR) 12.5 MG CR tablet Take 1 tablet (12.5 mg total) by mouth nightly as needed for Insomnia. 30 tablet 2     Current Facility-Administered Medications   Medication Dose Route Frequency Provider Last Rate Last Dose    medroxyPROGESTERone (DEPO-PROVERA) injection 150 mg  150 mg Intramuscular Q90 Days Nikhil Franco MD   150 mg at 12/04/18 1508       Past Medical History  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     PATRICIA (obstructive sleep apnea)     severe - doesn't use CPAP    Panic attack     PTSD (post-traumatic stress disorder)        Past Surgical History  Past Surgical History:   Procedure Laterality Date    ANKLE SURGERY Right     x2 (#1 for ligament injury and #2 for fx and ligament)    AUGMENTATION OF BREAST      BRAIN SURGERY  12/2018    decompression/craniotomy Arnold-Chiari syndrome    BREAST SURGERY  1997    B implants    CERVICAL BIOPSY  W/ LOOP ELECTRODE EXCISION      DECOMPRESSION OF CHIARI MALFORMATION BY REMOVAL OF POSTERIOR ARCH OF FIRST CERVICAL  "VERTEBRA N/A 12/13/2018    Procedure: DECOMPRESSION, CHIARI MALFORMATION, BY 1ST CERVICAL VERTEBRA POSTERIOR ARCH REMOVAL;  Surgeon: Sergio Busch MD;  [facility];  Service: Neurosurgery;  Laterality: N/A;    EPIDURAL STEROID INJECTION INTO CERVICAL SPINE N/A 12/5/2019    Procedure: Injection-steroid-epidural-cervical-C7-T1;  Surgeon: Noa Samano MD;  [facility];  Service: Neurosurgery;  Laterality: N/A;    LAPAROSCOPIC GASTRIC BANDING      with subsequent removal due to abscess    LIPOSUCTION      x 2    PELVIC LAPAROSCOPY      for endometriosis eval with BTL    TUBAL LIGATION      wtih pelvic lap        Family History  Family History   Problem Relation Age of Onset    Diabetes Sister     Seizures Sister     Mitochondrial disorder Sister         "trent"    Mental illness Sister     Seizures Sister     Cervical cancer Sister     Cervical cancer Sister     Ovarian cancer Cousin     Ovarian cancer Paternal Aunt     Colon cancer Maternal Grandmother     Cancer Maternal Grandfather 65        throat ca    Diabetes Mother     Stroke Mother 30    Mental illness Mother         depression    Hyperlipidemia Mother     Hypertension Mother     Hyperlipidemia Father     Heart disease Father         "athletes heart"    Ovarian cancer Paternal Aunt     Cervical cancer Paternal Cousin     Cervical cancer Other     Breast cancer Neg Hx        Social History  Social History     Socioeconomic History    Marital status:      Spouse name: Not on file    Number of children: Not on file    Years of education: Not on file    Highest education level: Not on file   Occupational History    Occupation: foresenic     Social Needs    Financial resource strain: Not on file    Food insecurity:     Worry: Not on file     Inability: Not on file    Transportation needs:     Medical: Yes     Non-medical: Yes   Tobacco Use    Smoking status: Never Smoker    Smokeless tobacco: " "Never Used   Substance and Sexual Activity    Alcohol use: Yes     Frequency: Monthly or less     Drinks per session: 1 or 2     Binge frequency: Never     Comment: occasional "maybe monthly"    Drug use: No    Sexual activity: Yes     Partners: Male     Birth control/protection: Injection     Comment: depo since age 18   Lifestyle    Physical activity:     Days per week: 0 days     Minutes per session: 0 min    Stress: Very much   Relationships    Social connections:     Talks on phone: More than three times a week     Gets together: Never     Attends Taoism service: Not on file     Active member of club or organization: No     Attends meetings of clubs or organizations: Never     Relationship status:    Other Topics Concern    Not on file   Social History Narrative    Not on disability, not currently working.        Review of Systems  14-point review of systems as follows:   No check mariam indicates NEGATIVE response   Constitutional: [] weight loss, [] change to appetite   Eyes: [] change in vision, [] double vision   Ears, nose, mouth, throat: [] frequent nose bleeds, [] ringing in the ears   Respiratory: [] cough, [] wheezing   Cardiovascular: [] chest pain, [] palpitations   Gastrointestinal: [] jaundice, [] nausea/vomiting   Genitourinary: [] incontinence, [] burning with urination   Hematologic/lymphatic: [] easy bruising/bleeding, [] night sweats   Neurological: [] numbness, [] weakness   Endocrine: [] fatigue, [] heat/cold intolerance   Allergy/Immunologic: [] fevers, [] chills   Musculoskeletal: [x] muscle pain, [] joint pain   Psychiatric: [] thoughts of harming self/others, [x] depression   Integumentary: [] rashes, [] sores that do not heal       Objective:        There were no vitals filed for this visit.  There is no height or weight on file to calculate BMI.     PREVIOUS:   Reflexes full throughout  Confrontational strength 5/5 in all cervical dermatomes  Sensation to temperature " intact all cervical dermatomes    CERVICAL SPINE:  INSPECTION:  Posterior decompression scar, tender  ROM:  Limited rotation  MUSCLE SPASM:  Diffuse  FACET LOADING:  Bilateral  SPURLING:  Negative      2/12/19  General: Well developed, well nourished.  No acute distress.  HEENT: Atraumatic, normocephalic. Bilateral ptosis (chronic).  Optic discs sharp bilaterally. The lumps she showed me are bony prominences of the calvarium  Neck: Trachea midline. Well-healed midline posterior surgical scar.  Multiple areas of muscle tenderness around neck  Cardiovascular: Vitals reviewed. Normal peripheral perfusion.   Pulmonary: No increased work of breathing.  Abdomen/GI: No guarding  Musculoskeletal: No obvious joint deformities, moves all extremities symmetrically and well.  No drift, no orbit, symmetric finger tapping.     Neurological exam:  Mental status: Awake and alert. Oriented to situation.  Speech fluent and appropriate. Recent and remote memory appear to be intact.  Fund of knowledge normal.  Cranial nerves: Pupils equal round, extraocular movements intact, facial strength intact bilaterally, reports hearing is asymmetric  Sensation:  Normal to temperature  Reflexes: 2+ throughout, negative Vu  Coordination:  Normal finger-to-nose and heel-to-shin  Gait: Normal         3/14/18   Constitutional: appears in no acute distress, well-developed, well-nourished     Eyes: normal conjunctiva, PERRLA, bilateral right worse than left ptosis (baseline). Optic discs sharp bilaterally.     Ears, nose, mouth, throat: external appearance of ears and nose normal, hearing reduced on left     Cardiovascular: regular rate and rhythm, no murmurs appreciated    Respiratory: unlabored respirations, breath sounds normal bilaterally    Gastrointestinal: no visible abdominal masses, no guarding, no visible hernia    Musculoskeletal: normal tone in all four extremities. No atrophy. No abnormal movements. Strength in all muscles groups of  the upper and lower extremities is 5/5. Normal gait and station. Digits and nails normal.      Spine:  CERVICAL SPINE:  INSPECTION: no scars   ROM: slightly limited   MUSCLE SPASM: bilateral   FACET LOADING: left  SPURLING: neg  JEZ tender: bilateral     Psychiatric: normal judgment and insight. Oriented to person, place, and time.     Neurologic:   Cortical functions: recent and remote memory intact, normal attention span and concentration, speech fluent, adequate fund of knowledge   Cranial nerves: visual fields full, PERRLA, EOMI, facial sensation intact in V1-V3, symmetric facial strength, hearing intact to finger rub, palate elevates symmetrically, shoulder shrug 5/5, tongue protrudes midline   Reflexes: 2+ in the upper and lower extremities, no Vu, no babinski   Sensation: reduced to temperature in a length dependent manner in the hands and to knees, vibration reduced toes, ankles, knees  Coordination: normal    Data Review:     I have personally reviewed the referring provider's notes, labs, & imaging made available to me today.     RADIOLOGY STUDIES:  I have personally reviewed the pertinent images performed.     MRI cervical spine 2/27/18  1. The cerebellar tonsils extend below the foramen magnum approximately 8-9 mm, consistent with Chiari 1 malformation.  The cord is normal without evidence of syrinx, signal abnormality or abnormal enhancement.  2. There is mild degenerative change.  There is, however, no significant spinal canal or foraminal stenosis.  There is no cord compression.  There is no suspected nerve root compression.  Please see above discussion.    Results for orders placed or performed during the hospital encounter of 02/05/18   MRI Brain W WO Contrast    Narrative    Routine Multiplanar imaging through this 42-year-old females brain was obtained with utilization of 10 cc of gadolinium contrast    Hyperostosis frontalis appears to be present.     A Chiari one malformation appears to be  present with the cerebellar tonsils extending 9 mm inferior to the foramen magnum with so-called pegging of the cerebellar tonsils.    An empty sella configuration of the pituitary gland is noted.    No diffusion positivity is identified to suggest recent infarction.    No gradient  susceptibility was noted just just presence of acute blood products.    No worrisome enhancing lesions are noted intracranially    No diffusion positivity is identified to suggest recent infarction.    Impression    1. The cerebellar tonsils appear to extend inferior to the foramen magnum suggestive of a Chiari one malformation. No obvious cord signal abnormalities noted within the visualized cord, given the patient's history further evaluation of the cord may be of use to exclude a syrinx        Electronically signed by: Ismael Richmond MD  Date:     02/05/18  Time:    12:04    Results for orders placed or performed during the hospital encounter of 09/25/15   CT Head Without Contrast    Narrative    Comparison: None    Technique: 5 mm noncontrast axial images through the brain were obtained.    Findings:     The brain is normally formed with no indication of acute/recent major vascular distribution cerebral infarction, intraparenchymal hemorrhage, or intra-axial space occupying lesion. There is preserved gray-white matter junction differentiation.     The ventricular system is normal in appearance for age. No hydrocephalus. No effacement of the skull-base cisterns. No abnormal extra-axial fluid collections or blood products.     The paranasal sinuses and mastoid air cells are unremarkable.  There is incidentally observed hyperostosis frontalis.    Impression         No acute  intracranial abnormalities are appreciated.      Electronically signed by: Toyin Shin MD  Date:     09/25/15  Time:    13:41        Lab Results   Component Value Date     12/14/2018    K 5.0 12/14/2018    MG 2.3 12/14/2018     12/14/2018    CO2 18  (L) 12/14/2018    BUN 12 12/14/2018    CREATININE 0.8 12/14/2018     (H) 12/14/2018    HGBA1C 4.9 12/04/2018    AST 22 12/04/2018    ALT 25 12/04/2018    ALBUMIN 4.2 12/04/2018    PROT 7.5 12/04/2018    BILITOT 0.6 12/04/2018    CHOL 212 (H) 09/11/2019    HDL 54 09/11/2019    LDLCALC 122.8 09/11/2019    TRIG 176 (H) 09/11/2019       Lab Results   Component Value Date    WBC 12.50 12/14/2018    HGB 13.1 12/14/2018    HCT 38.4 12/14/2018    MCV 95 12/14/2018     12/14/2018       Lab Results   Component Value Date    TSH 2.014 09/11/2019       Assessment & Plan:       Problem List Items Addressed This Visit        Neuro    Chronic migraine without aura, intractable, without status migrainosus - Primary    Overview     The patient has chronic migraines (G43.719) and suffers from headaches more than 15 days a month lasting more than 4 hours a day with no relief of symptoms despite trying multiple medications including but not limited to anti-epileptics (GABAPENTIN, LAMOTRIGINE), and antidepressants (FLUOXETINE).     Doing very well on Emgality; continue            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    Overview     Mild cervical facet arthropathy  DDD C4-5, C5-6  12/5/2019 no relief from cervical GREGOR C7-T1    Next consider trial of bilateral CMBB                   TESTING:  -- none     REFERRALS:  -- none     PREVENTION OF PAIN:   -- continue gabapentin 600mg three times per day   -- continue Emgality at this time   -- return to clinic in 4-6 weeks for consideration of bilateral cervical medial branch blocks     AS-NEEDED TREATMENT OF PAIN:  -- continue tramadol as needed    -- continue robaxin as needed     Follow up in about 6 weeks (around 5/7/2020) for office visit.         Noa Samano MD

## 2020-03-26 NOTE — PATIENT INSTRUCTIONS
TESTING:  -- none     REFERRALS:  -- none     PREVENTION OF PAIN:   -- continue gabapentin 600mg three times per day   -- continue Emgality at this time   -- return to clinic in 4-6 weeks for consideration of bilateral cervical medial branch blocks     AS-NEEDED TREATMENT OF PAIN:  -- continue tramadol as needed    -- continue robaxin as needed

## 2020-03-30 ENCOUNTER — OFFICE VISIT (OUTPATIENT)
Dept: FAMILY MEDICINE | Facility: CLINIC | Age: 45
End: 2020-03-30
Payer: MEDICARE

## 2020-03-30 DIAGNOSIS — J45.901 EXACERBATION OF ASTHMA, UNSPECIFIED ASTHMA SEVERITY, UNSPECIFIED WHETHER PERSISTENT: Primary | ICD-10-CM

## 2020-03-30 PROCEDURE — 99213 PR OFFICE/OUTPT VISIT, EST, LEVL III, 20-29 MIN: ICD-10-PCS | Mod: 95,,, | Performed by: FAMILY MEDICINE

## 2020-03-30 PROCEDURE — 99213 OFFICE O/P EST LOW 20 MIN: CPT | Mod: 95,,, | Performed by: FAMILY MEDICINE

## 2020-03-30 RX ORDER — FLUTICASONE FUROATE AND VILANTEROL 200; 25 UG/1; UG/1
1 POWDER RESPIRATORY (INHALATION) DAILY
Qty: 1 EACH | Refills: 2 | Status: SHIPPED | OUTPATIENT
Start: 2020-03-30 | End: 2020-06-09 | Stop reason: SDUPTHER

## 2020-03-30 RX ORDER — ALBUTEROL SULFATE 90 UG/1
2 AEROSOL, METERED RESPIRATORY (INHALATION) EVERY 6 HOURS PRN
Qty: 18 G | Refills: 2 | Status: SHIPPED | OUTPATIENT
Start: 2020-03-30 | End: 2021-03-30

## 2020-03-30 RX ORDER — MONTELUKAST SODIUM 10 MG/1
10 TABLET ORAL DAILY
Qty: 90 TABLET | Refills: 1 | Status: SHIPPED | OUTPATIENT
Start: 2020-03-30 | End: 2022-07-14 | Stop reason: SDUPTHER

## 2020-03-30 NOTE — TELEPHONE ENCOUNTER
Refill Routing Note     Medication(s) are appropriate for refill:    Non Participating Provider in Refill Center     Appointments  past 15m or future 3m with PCP    Date Provider   Last Visit   12/4/2018 Amaya Aiken MD   Next Visit   3/30/2020 Amaya Aiken MD       Automatic Epic Protocol Generated Data:    Requested Prescriptions   Pending Prescriptions Disp Refills    albuterol sulfate (PROAIR RESPICLICK) 90 mcg/actuation inhaler       Sig: Inhale into the lungs every 4 (four) hours. Rescue       Short Acting Inhaled Beta-Agonists Protocol Passed - 3/30/2020  2:34 PM        Passed - Patient is not currently pregnant        Passed - No positive pregnancy test in past 12 months         Passed - Visit with authorizing provider in past 12 months or upcoming 90 days        Passed - Pulse on file in past 12 months      Last 3 pulse readings   01/30/20 83   12/05/19 78   11/15/19 78                Note created:2:53 PM 03/30/2020

## 2020-03-31 ENCOUNTER — TELEPHONE (OUTPATIENT)
Dept: NEUROLOGY | Facility: CLINIC | Age: 45
End: 2020-03-31

## 2020-03-31 NOTE — TELEPHONE ENCOUNTER
Spoke with pt and informed Robaxin approved with insurance. Pt received through [facility]. Verbalized understanding.

## 2020-04-02 ENCOUNTER — TELEPHONE (OUTPATIENT)
Dept: FAMILY MEDICINE | Facility: CLINIC | Age: 45
End: 2020-04-02

## 2020-04-02 NOTE — PROGRESS NOTES
Subjective:       Patient ID: Dinah Mitchell is a 45 y.o. female.    Chief Complaint: Follow-up    HPI   The patient is a 45-year-old who is here today because she has been sick on and off since Christmas.  Currently, her symptoms include sinus congestion, itchy watery eyes, rhinorrhea, postnasal drainage, and a cough.  Last week she had low-grade fevers in the  range but she has not had any recent fevers.  Usually when she gets sick she will keep a bout of bronchitis for couple of months.  She does have issues with asthma and allergies  She does not need her albuterol inhaler often.  Of note, in January she was treated with an asthma exacerbation and her symptoms did improved but quickly recurred    Review of Systems   Constitutional: Negative for appetite change, chills, diaphoresis, fatigue, fever and unexpected weight change.   HENT: Positive for congestion, postnasal drip and rhinorrhea. Negative for ear pain, sinus pressure, sneezing, sore throat and trouble swallowing.    Eyes: Negative for pain, discharge and visual disturbance.   Respiratory: Positive for cough. Negative for chest tightness, shortness of breath and wheezing.    Cardiovascular: Negative for chest pain, palpitations and leg swelling.   Gastrointestinal: Negative for abdominal distention, abdominal pain, blood in stool, constipation, diarrhea, nausea and vomiting.   Skin: Negative for rash.       Objective:      Physical Exam  There were no vitals taken for this visit.There is no height or weight on file to calculate BMI.          A/P:  1) asthma exacerbation due to URI versus allergic rhinitis.  We are going to start Breo 1 puff once a day and a short course of prednisone.  She is going to resume her Singulair for her allergies and asthma.  She will use her albuterol p.r.n..  She will use Flonase and antihistamine consistently for allergy symptoms.  If she develops any new worsening symptoms, she will let me know.  We will reassess her  response to this treatment in 3-4 weeks      The patient location is: home  The chief complaint leading to consultation is:  Persistent cough  Visit type: Virtual visit with synchronous audio and video  Total time spent with patient: 15 min    Each patient to whom he or she provides medical services by telemedicine is:  (1) informed of the relationship between the physician and patient and the respective role of any other health care provider with respect to management of the patient; and (2) notified that he or she may decline to receive medical services by telemedicine and may withdraw from such care at any time.

## 2020-04-05 DIAGNOSIS — F33.2 MAJOR DEPRESSIVE DISORDER, RECURRENT, SEVERE WITHOUT PSYCHOTIC FEATURES: ICD-10-CM

## 2020-04-06 ENCOUNTER — PATIENT MESSAGE (OUTPATIENT)
Dept: FAMILY MEDICINE | Facility: CLINIC | Age: 45
End: 2020-04-06

## 2020-04-06 RX ORDER — BUPROPION HYDROCHLORIDE 150 MG/1
TABLET ORAL
Qty: 90 TABLET | Refills: 0 | Status: SHIPPED | OUTPATIENT
Start: 2020-04-06 | End: 2020-05-19 | Stop reason: SDUPTHER

## 2020-04-08 ENCOUNTER — OFFICE VISIT (OUTPATIENT)
Dept: PSYCHIATRY | Facility: CLINIC | Age: 45
End: 2020-04-08
Payer: MEDICARE

## 2020-04-08 DIAGNOSIS — F90.0 ATTENTION DEFICIT HYPERACTIVITY DISORDER (ADHD), PREDOMINANTLY INATTENTIVE TYPE: ICD-10-CM

## 2020-04-08 DIAGNOSIS — F33.2 MAJOR DEPRESSIVE DISORDER, RECURRENT, SEVERE WITHOUT PSYCHOTIC FEATURES: Primary | ICD-10-CM

## 2020-04-08 DIAGNOSIS — F43.10 PTSD (POST-TRAUMATIC STRESS DISORDER): ICD-10-CM

## 2020-04-08 DIAGNOSIS — F41.0 PANIC DISORDER WITHOUT AGORAPHOBIA: ICD-10-CM

## 2020-04-08 DIAGNOSIS — F60.3 BORDERLINE PERSONALITY DISORDER: ICD-10-CM

## 2020-04-08 PROCEDURE — 90833 PR PSYCHOTHERAPY W/PATIENT W/E&M, 30 MIN (ADD ON): ICD-10-PCS | Mod: 95,,, | Performed by: PSYCHIATRY & NEUROLOGY

## 2020-04-08 PROCEDURE — 99214 OFFICE O/P EST MOD 30 MIN: CPT | Mod: 95,,, | Performed by: PSYCHIATRY & NEUROLOGY

## 2020-04-08 PROCEDURE — 99214 PR OFFICE/OUTPT VISIT, EST, LEVL IV, 30-39 MIN: ICD-10-PCS | Mod: 95,,, | Performed by: PSYCHIATRY & NEUROLOGY

## 2020-04-08 PROCEDURE — 90833 PSYTX W PT W E/M 30 MIN: CPT | Mod: 95,,, | Performed by: PSYCHIATRY & NEUROLOGY

## 2020-04-08 RX ORDER — ZOLPIDEM TARTRATE 12.5 MG/1
12.5 TABLET, FILM COATED, EXTENDED RELEASE ORAL NIGHTLY PRN
Qty: 30 TABLET | Refills: 2 | Status: SHIPPED | OUTPATIENT
Start: 2020-04-08 | End: 2020-05-19 | Stop reason: SDUPTHER

## 2020-04-08 RX ORDER — FLUOXETINE HYDROCHLORIDE 20 MG/1
20 CAPSULE ORAL DAILY
Qty: 90 CAPSULE | Refills: 0 | Status: SHIPPED | OUTPATIENT
Start: 2020-04-08 | End: 2020-05-19 | Stop reason: ALTCHOICE

## 2020-04-08 RX ORDER — ALPRAZOLAM 1 MG/1
1 TABLET ORAL DAILY PRN
Qty: 30 TABLET | Refills: 2 | Status: SHIPPED | OUTPATIENT
Start: 2020-04-08 | End: 2020-05-19 | Stop reason: SDUPTHER
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
## 2020-04-22 ENCOUNTER — PATIENT MESSAGE (OUTPATIENT)
Dept: FAMILY MEDICINE | Facility: CLINIC | Age: 45
End: 2020-04-22

## 2020-04-23 ENCOUNTER — OFFICE VISIT (OUTPATIENT)
Dept: FAMILY MEDICINE | Facility: CLINIC | Age: 45
End: 2020-04-23
Payer: MEDICARE

## 2020-04-23 DIAGNOSIS — J01.10 ACUTE FRONTAL SINUSITIS, RECURRENCE NOT SPECIFIED: Primary | ICD-10-CM

## 2020-04-23 DIAGNOSIS — J40 BRONCHITIS: ICD-10-CM

## 2020-04-23 PROCEDURE — 99213 PR OFFICE/OUTPT VISIT, EST, LEVL III, 20-29 MIN: ICD-10-PCS | Mod: 95,,, | Performed by: FAMILY MEDICINE

## 2020-04-23 PROCEDURE — 99213 OFFICE O/P EST LOW 20 MIN: CPT | Mod: 95,,, | Performed by: FAMILY MEDICINE

## 2020-04-23 RX ORDER — AZELASTINE HYDROCHLORIDE 0.5 MG/ML
1 SOLUTION/ DROPS OPHTHALMIC 2 TIMES DAILY
Qty: 4 ML | Refills: 1 | Status: SHIPPED | OUTPATIENT
Start: 2020-04-23 | End: 2020-05-13 | Stop reason: SDUPTHER

## 2020-04-23 RX ORDER — LEVOFLOXACIN 500 MG/1
500 TABLET, FILM COATED ORAL DAILY
Qty: 10 TABLET | Refills: 0 | Status: SHIPPED | OUTPATIENT
Start: 2020-04-23 | End: 2020-04-30

## 2020-04-24 ENCOUNTER — PATIENT MESSAGE (OUTPATIENT)
Dept: NEUROLOGY | Facility: CLINIC | Age: 45
End: 2020-04-24

## 2020-04-26 NOTE — PROGRESS NOTES
Subjective:       Patient ID: Dinah Mitchell is a 45 y.o. female.    Chief Complaint: Follow-up    HPI   The patient is a 45-year-old who is here today for follow-up.  At her last visit, she was treated for an asthma exacerbation.  She was started on Breo, a short course of oral prednisone and Singulair.  She feels that her asthma is doing better but she is still having sinus problems.  She complains of sinus congestion, rhinorrhea and postnasal drainage.  She has been using the Flonase which does help her nose.  She feels that she is at a point that she needs a prescription for Levaquin.  She usually only needs a course for Levaquin every 3 years or so when her sinuses get bad like they are now.  She denies any chest congestion or wheezing.  She denies any fevers or chills    Review of Systems   Constitutional: Negative for appetite change, chills, diaphoresis, fatigue, fever and unexpected weight change.   HENT: Positive for congestion, postnasal drip, rhinorrhea and sinus pressure. Negative for ear pain, sneezing, sore throat and trouble swallowing.    Eyes: Negative for pain, discharge and visual disturbance.   Respiratory: Negative for cough, chest tightness, shortness of breath and wheezing.    Cardiovascular: Negative for chest pain, palpitations and leg swelling.   Gastrointestinal: Negative for abdominal distention, abdominal pain, blood in stool, constipation, diarrhea, nausea and vomiting.   Skin: Negative for rash.   Neurological: Positive for headaches.       Objective:      Physical Exam   Constitutional: She appears well-developed and well-nourished.     There were no vitals taken for this visit.There is no height or weight on file to calculate BMI.            A/P:  1) sinusitis.  Persistent.  We will treat her with a course of Levaquin.  If her symptoms do not improve or if they worsen, she will let us know   2)  Asthma.  Well controlled.  Continue with Breo and Singulair.  If she develops any new  or worsening symptoms, she will let us know      The patient location is: home  The chief complaint leading to consultation is: follow up  Visit type: Virtual visit with synchronous audio and video  Total time spent with patient: 15 min    Each patient to whom he or she provides medical services by telemedicine is:  (1) informed of the relationship between the physician and patient and the respective role of any other health care provider with respect to management of the patient; and (2) notified that he or she may decline to receive medical services by telemedicine and may withdraw from such care at any time.

## 2020-04-27 ENCOUNTER — PATIENT MESSAGE (OUTPATIENT)
Dept: NEUROLOGY | Facility: CLINIC | Age: 45
End: 2020-04-27

## 2020-04-27 ENCOUNTER — TELEPHONE (OUTPATIENT)
Dept: NEUROLOGY | Facility: CLINIC | Age: 45
End: 2020-04-27

## 2020-04-27 NOTE — TELEPHONE ENCOUNTER
Spoke with pt who states she would like to reschedule appointment. Date/time/location confirmed. Verbalized understanding.

## 2020-04-27 NOTE — TELEPHONE ENCOUNTER
----- Message from Jesus Rogers sent at 4/27/2020  4:22 PM CDT -----  Contact: Patient  Type: Needs Medical Advice    Who Called:  Patient  Best Call Back Number: [phone]  Additional Information: Patient would like to discuss recent fall and would like to discuss medication. Please call to advise. Thanks!

## 2020-05-14 RX ORDER — AZELASTINE HYDROCHLORIDE 0.5 MG/ML
1 SOLUTION/ DROPS OPHTHALMIC 2 TIMES DAILY
Qty: 6 ML | Refills: 1 | Status: SHIPPED | OUTPATIENT
Start: 2020-05-14 | End: 2020-06-15

## 2020-05-19 ENCOUNTER — OFFICE VISIT (OUTPATIENT)
Dept: PSYCHIATRY | Facility: CLINIC | Age: 45
End: 2020-05-19
Payer: MEDICARE

## 2020-05-19 DIAGNOSIS — Q07.00 ARNOLD-CHIARI MALFORMATION: ICD-10-CM

## 2020-05-19 DIAGNOSIS — F60.3 BORDERLINE PERSONALITY DISORDER: ICD-10-CM

## 2020-05-19 DIAGNOSIS — F33.2 MAJOR DEPRESSIVE DISORDER, RECURRENT, SEVERE WITHOUT PSYCHOTIC FEATURES: Primary | ICD-10-CM

## 2020-05-19 DIAGNOSIS — F41.0 PANIC DISORDER WITHOUT AGORAPHOBIA: ICD-10-CM

## 2020-05-19 DIAGNOSIS — F43.10 PTSD (POST-TRAUMATIC STRESS DISORDER): ICD-10-CM

## 2020-05-19 PROCEDURE — 90833 PSYTX W PT W E/M 30 MIN: CPT | Mod: 95,,, | Performed by: PSYCHIATRY & NEUROLOGY

## 2020-05-19 PROCEDURE — 99214 PR OFFICE/OUTPT VISIT, EST, LEVL IV, 30-39 MIN: ICD-10-PCS | Mod: 95,,, | Performed by: PSYCHIATRY & NEUROLOGY

## 2020-05-19 PROCEDURE — 90833 PR PSYCHOTHERAPY W/PATIENT W/E&M, 30 MIN (ADD ON): ICD-10-PCS | Mod: 95,,, | Performed by: PSYCHIATRY & NEUROLOGY

## 2020-05-19 PROCEDURE — 99214 OFFICE O/P EST MOD 30 MIN: CPT | Mod: 95,,, | Performed by: PSYCHIATRY & NEUROLOGY

## 2020-05-19 RX ORDER — FLUOXETINE HYDROCHLORIDE 40 MG/1
40 CAPSULE ORAL DAILY
Qty: 90 CAPSULE | Refills: 0 | Status: SHIPPED | OUTPATIENT
Start: 2020-05-19 | End: 2020-09-01 | Stop reason: SDUPTHER

## 2020-05-19 RX ORDER — ALPRAZOLAM 1 MG/1
1 TABLET ORAL DAILY PRN
Qty: 30 TABLET | Refills: 2 | Status: SHIPPED | OUTPATIENT
Start: 2020-05-19 | End: 2020-09-01 | Stop reason: SDUPTHER

## 2020-05-19 RX ORDER — ZOLPIDEM TARTRATE 12.5 MG/1
12.5 TABLET, FILM COATED, EXTENDED RELEASE ORAL NIGHTLY PRN
Qty: 30 TABLET | Refills: 2 | Status: SHIPPED | OUTPATIENT
Start: 2020-05-19 | End: 2020-07-31 | Stop reason: SDUPTHER

## 2020-05-19 RX ORDER — BUPROPION HYDROCHLORIDE 150 MG/1
150 TABLET ORAL DAILY
Qty: 90 TABLET | Refills: 0 | Status: SHIPPED | OUTPATIENT
Start: 2020-05-19 | End: 2020-09-01 | Stop reason: SDUPTHER
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
## 2020-06-08 DIAGNOSIS — M47.812 SPONDYLOSIS OF CERVICAL REGION WITHOUT MYELOPATHY OR RADICULOPATHY: ICD-10-CM

## 2020-06-08 RX ORDER — TRAMADOL HYDROCHLORIDE 50 MG/1
50 TABLET ORAL EVERY 6 HOURS PRN
Qty: 60 TABLET | Refills: 0 | Status: SHIPPED | OUTPATIENT
Start: 2020-06-08 | End: 2020-06-10 | Stop reason: SDUPTHER

## 2020-06-08 NOTE — TELEPHONE ENCOUNTER
Called pt. Advised that we need to reschedule appt with Dr. Samano on 6/17/2020. Dr. Samano will not be in the office that day. Rescheduled to 7/8/2020. Pt asking for on Tramadol to be sent to [facility] on [address]. Please advise.  Thanks, Christi

## 2020-06-08 NOTE — TELEPHONE ENCOUNTER
----- Message from Angie Simmons MA sent at 6/8/2020 12:37 PM CDT -----  Contact: pt   Wants call back   Tramadol   Pharmacy [facility]  [facility] [address]  Call back  [phone]

## 2020-06-08 NOTE — TELEPHONE ENCOUNTER
Returned patients message. Informed Dr. Samano is in full clinic but we sent her request  And we will contact her once Dr. Samano gets back with us. Patient expressed understanding

## 2020-06-09 ENCOUNTER — OFFICE VISIT (OUTPATIENT)
Dept: FAMILY MEDICINE | Facility: CLINIC | Age: 45
End: 2020-06-09
Payer: MEDICARE

## 2020-06-09 VITALS
BODY MASS INDEX: 43.52 KG/M2 | HEART RATE: 97 BPM | WEIGHT: 277.25 LBS | DIASTOLIC BLOOD PRESSURE: 82 MMHG | OXYGEN SATURATION: 99 % | HEIGHT: 67 IN | TEMPERATURE: 99 F | RESPIRATION RATE: 18 BRPM | SYSTOLIC BLOOD PRESSURE: 112 MMHG

## 2020-06-09 DIAGNOSIS — R05.9 COUGH: ICD-10-CM

## 2020-06-09 DIAGNOSIS — E66.01 MORBID OBESITY WITH BMI OF 40.0-44.9, ADULT: ICD-10-CM

## 2020-06-09 DIAGNOSIS — Z00.00 ANNUAL PHYSICAL EXAM: Primary | ICD-10-CM

## 2020-06-09 DIAGNOSIS — J30.9 ALLERGIC RHINITIS, UNSPECIFIED SEASONALITY, UNSPECIFIED TRIGGER: ICD-10-CM

## 2020-06-09 DIAGNOSIS — J45.909 ASTHMA, UNSPECIFIED ASTHMA SEVERITY, UNSPECIFIED WHETHER COMPLICATED, UNSPECIFIED WHETHER PERSISTENT: ICD-10-CM

## 2020-06-09 PROCEDURE — 99396 PREV VISIT EST AGE 40-64: CPT | Mod: S$GLB,,, | Performed by: FAMILY MEDICINE

## 2020-06-09 PROCEDURE — 99396 PR PREVENTIVE VISIT,EST,40-64: ICD-10-PCS | Mod: S$GLB,,, | Performed by: FAMILY MEDICINE

## 2020-06-09 RX ORDER — FLUTICASONE FUROATE AND VILANTEROL 200; 25 UG/1; UG/1
1 POWDER RESPIRATORY (INHALATION) DAILY
Qty: 1 EACH | Refills: 2 | Status: SHIPPED | OUTPATIENT
Start: 2020-06-09 | End: 2020-06-24 | Stop reason: SDUPTHER

## 2020-06-09 NOTE — PATIENT INSTRUCTIONS
Please contact pulmonology for a consult appt:    [facility]  [address] [place] [address]  [phone]    MD Anton Ledezma MD David Cressy, DO Donald Kuebal, MD Chad VanAsselberg, MD Wendy Pratt, P

## 2020-06-10 ENCOUNTER — TELEPHONE (OUTPATIENT)
Dept: NEUROLOGY | Facility: CLINIC | Age: 45
End: 2020-06-10

## 2020-06-10 DIAGNOSIS — M47.812 SPONDYLOSIS OF CERVICAL REGION WITHOUT MYELOPATHY OR RADICULOPATHY: Primary | ICD-10-CM

## 2020-06-10 RX ORDER — TRAMADOL HYDROCHLORIDE 50 MG/1
50 TABLET ORAL EVERY 6 HOURS PRN
Qty: 60 TABLET | Refills: 0 | Status: SHIPPED | OUTPATIENT
Start: 2020-06-10 | End: 2020-08-10 | Stop reason: SDUPTHER

## 2020-06-10 NOTE — TELEPHONE ENCOUNTER
----- Message from Jesus Rogers sent at 6/10/2020  9:48 AM CDT -----  Contact: Patient  Type:  RX Refill Request    Who Called:  Patient  Refill or New Rx:  Refill  RX Name and Strength:  traMADoL (ULTRAM) 50 mg tablet  How is the patient currently taking it? (ex. 1XDay):  As ordered  Is this a 30 day or 90 day RX:  30  Preferred Pharmacy with phone number:    [id number] - [facility] [address] [address] AT [facility] [address]  [id number] [place] [address]  Phone: [phone] Fax: [phone]  Local or Mail Order:  Local  Ordering Provider:  Dr. Selwyn Bee Call Back Number:  [phone]; [phone]  Additional Information:  Patient states pharmacy is requesting a PA. Please call to advise. Thanks!

## 2020-06-12 ENCOUNTER — TELEPHONE (OUTPATIENT)
Dept: FAMILY MEDICINE | Facility: CLINIC | Age: 45
End: 2020-06-12

## 2020-06-12 ENCOUNTER — TELEPHONE (OUTPATIENT)
Dept: NEUROLOGY | Facility: CLINIC | Age: 45
End: 2020-06-12

## 2020-06-12 NOTE — TELEPHONE ENCOUNTER
----- Message from Tiff Arana sent at 6/12/2020  1:10 PM CDT -----  Contact: Patient  Type: Needs Medical Advice  Who Called:  patient  Best Call Back Number: [phone]  Additional Information: Patient states that she fell and broke her arm and it's in a cast.  Patient wanted office to be aware of this and may not have to have surgery. Patient was Rx 5 350mg hydrocodone pain pills. Thanks!

## 2020-06-12 NOTE — TELEPHONE ENCOUNTER
----- Message from Tiff Arana sent at 6/12/2020  1:10 PM CDT -----  Contact: Patient  Type: Needs Medical Advice  Who Called:  patient  Best Call Back Number: [phone]  Additional Information: Patient states that she fell and broke her arm and it's in a cast.  Patient wanted office to be aware of this and may not have to have surgery. Patient was Rx 5 350mg hydrocodone pain pills. Thanks!

## 2020-06-12 NOTE — PROGRESS NOTES
Subjective:       Patient ID: Dinah Mitchell is a 45 y.o. female.    Chief Complaint: Annual Exam and Cough (cough and sinus problems off and on for a couple of years )    HPI   The patient is a 75-year-old who is here today for chronic follow-up.  Today we discussed the followin) PTSD.  She is working with her psychiatrist and seeing her counselor on a weekly basis.  She is a bit agoraphobic and so she has not had any problems staying isolated at home during the pandemic.  Her psychiatric medications include Prozac, Wellbutrin  andXanax  2)  Allergy and asthma.  She continues to have issues with her allergy and asthma symptoms.  She does feel like her sinus infection has cleared with the Levaquin.  She is having allergy symptoms which consists of sinus congestion, rhinorrhea and postnasal drainage.  She is having asthma symptoms which include cough, wheezing and chest congestion.  Her chest congestion is particularly bad when she lays down as she will feel as if there is an elephant on her chest.  She is currently using QVAR and Combivent.  She could not get the Breo filled at the pharmacy but would like to try to get that filled again.    3) hypothyroidism.  She is taking her Synthroid consistently.  She would be willing to have a TSH drawn soon  4)  chronic headaches.  She is working with her neurologist on this.  She has had some improvement with the emgality.  She does occasionally have some injections.  She has recovered from her Arnold-Chiari malformation surgery  5)  fibromyalgia.  She does have a lot of aches and pains.  She is taking her Neurontin which help somewhat.  6)  health maintenance issues.  She is going to be seeing her gynecologist for her annual exam and her mammogram.  She does need fasting labs and would be willing to have that done soon.  Her immunizations are up-to-date.  She realizes that she needs to lose weight but is not able to do so    Review of Systems   Constitutional:  Negative for appetite change, chills, diaphoresis, fatigue, fever and unexpected weight change.   HENT: Positive for congestion and postnasal drip. Negative for dental problem, ear pain, hearing loss, rhinorrhea, sneezing, sore throat and trouble swallowing.    Eyes: Negative for photophobia, pain, discharge and visual disturbance.   Respiratory: Positive for cough, shortness of breath and wheezing. Negative for chest tightness.    Cardiovascular: Positive for chest pain. Negative for palpitations and leg swelling.   Gastrointestinal: Negative for abdominal distention, abdominal pain, blood in stool, constipation, diarrhea, nausea and vomiting.   Endocrine: Negative for cold intolerance, heat intolerance, polydipsia and polyuria.   Genitourinary: Negative for dysuria, flank pain, frequency, genital sores, hematuria, menstrual problem and vaginal discharge.   Musculoskeletal: Positive for arthralgias and myalgias. Negative for joint swelling.   Skin: Negative for rash.   Allergic/Immunologic: Positive for environmental allergies.   Neurological: Positive for headaches. Negative for dizziness, syncope and light-headedness.   Hematological: Negative for adenopathy. Does not bruise/bleed easily.   Psychiatric/Behavioral: Negative for dysphoric mood, self-injury, sleep disturbance and suicidal ideas. The patient is not nervous/anxious.        Objective:      Physical Exam   Constitutional: She is oriented to person, place, and time. She appears well-developed and well-nourished. No distress.   HENT:   Head: Normocephalic and atraumatic.   Right Ear: Hearing, tympanic membrane, external ear and ear canal normal.   Left Ear: Hearing, tympanic membrane, external ear and ear canal normal.   Nose: Nose normal.   Mouth/Throat: Oropharynx is clear and moist and mucous membranes are normal. No oral lesions. No oropharyngeal exudate, posterior oropharyngeal edema or posterior oropharyngeal erythema.   Eyes: Pupils are equal,  "round, and reactive to light. Conjunctivae, EOM and lids are normal. No scleral icterus.   Neck: Normal range of motion. Neck supple. Carotid bruit is not present. No thyroid mass and no thyromegaly present.   Cardiovascular: Normal rate, regular rhythm and normal heart sounds.  No extrasystoles are present. PMI is not displaced. Exam reveals no gallop.   No murmur heard.  Pulmonary/Chest: Effort normal and breath sounds normal. No accessory muscle usage. No respiratory distress.   Clear to auscultation bilaterally.   Abdominal: Soft. Normal appearance and bowel sounds are normal. She exhibits no abdominal bruit. There is no hepatosplenomegaly. There is no tenderness. There is no rebound.   Lymphadenopathy:        Head (right side): No submental and no submandibular adenopathy present.        Head (left side): No submental and no submandibular adenopathy present.        Right cervical: No superficial cervical, no deep cervical and no posterior cervical adenopathy present.       Left cervical: No superficial cervical, no deep cervical and no posterior cervical adenopathy present.        Right: No supraclavicular adenopathy present.        Left: No supraclavicular adenopathy present.   Neurological: She is alert and oriented to person, place, and time.   Skin: Skin is warm, dry and intact.   Psychiatric: Her speech is normal and behavior is normal. Judgment and thought content normal. Cognition and memory are normal. She exhibits a depressed mood.     Blood pressure 112/82, pulse 97, temperature 98.9 °F (37.2 °C), temperature source Oral, resp. rate 18, height 5' 7" (1.702 m), weight 125.8 kg (277 lb 3.7 oz), last menstrual period 05/19/2020, SpO2 99 %.Body mass index is 43.42 kg/m².          A/P:  1) PTSD.  Chronic.  Continue with psychiatric team and  treatment under their direction    2) allergies and asthma.  Uncontrolled.  We will check a chest x-ray.  We will change her QVAR to Breo.  we will refer her to the " allergist and the pulmonologist for further evaluation and testing.  If she develops any new or worsening symptoms as we proceed with her evaluation, she will let me know    3) chronic headaches.  Follow-up with specialist and continue with interventions and medications under their their direction   4)  hypothyroidism.  Status unknown.  We will check a TSH soon  5)  morbid obesity.  We will check fasting labs.  She is going to work on diet exercise weight loss  6)  fibromyalgia.  Chronic.  Continue current medication  7)  annual exam.  Health maintenance issues and anticipatory guidance issues were discussed.  She will work on diet exercise and weight loss.  We will check fasting labs.  She will schedule with her gynecologist for her Pap smear and mammogram.

## 2020-06-19 ENCOUNTER — HOSPITAL ENCOUNTER (OUTPATIENT)
Dept: RADIOLOGY | Facility: HOSPITAL | Age: 45
Discharge: HOME | End: 2020-06-19
Attending: FAMILY MEDICINE
Payer: MEDICARE

## 2020-06-19 DIAGNOSIS — R05.9 COUGH: ICD-10-CM

## 2020-06-19 PROCEDURE — 71046 XR CHEST PA AND LATERAL: ICD-10-PCS | Mod: 26,,, | Performed by: RADIOLOGY

## 2020-06-19 PROCEDURE — 71046 X-RAY EXAM CHEST 2 VIEWS: CPT | Mod: 26,,, | Performed by: RADIOLOGY

## 2020-06-19 PROCEDURE — 71046 X-RAY EXAM CHEST 2 VIEWS: CPT | Mod: TC,FY,PO

## 2020-06-24 DIAGNOSIS — J45.909 ASTHMA, UNSPECIFIED ASTHMA SEVERITY, UNSPECIFIED WHETHER COMPLICATED, UNSPECIFIED WHETHER PERSISTENT: Primary | ICD-10-CM

## 2020-06-24 RX ORDER — FLUTICASONE FUROATE AND VILANTEROL TRIFENATATE 200; 25 UG/1; UG/1
1 POWDER RESPIRATORY (INHALATION) DAILY
Qty: 3 EACH | Refills: 0 | Status: SHIPPED | OUTPATIENT
Start: 2020-06-24 | End: 2020-09-22

## 2020-07-06 ENCOUNTER — PATIENT MESSAGE (OUTPATIENT)
Dept: FAMILY MEDICINE | Facility: CLINIC | Age: 45
End: 2020-07-06

## 2020-07-06 DIAGNOSIS — Z12.39 BREAST CANCER SCREENING: Primary | ICD-10-CM

## 2020-07-08 ENCOUNTER — TELEPHONE (OUTPATIENT)
Dept: NEUROLOGY | Facility: CLINIC | Age: 45
End: 2020-07-08

## 2020-07-08 NOTE — TELEPHONE ENCOUNTER
Returned patient call and informed her that she has the first available appointment. Informed patient that I will place her on the wait list. Patient expressed understanding.

## 2020-07-08 NOTE — TELEPHONE ENCOUNTER
----- Message from Lisa Houston sent at 7/8/2020 10:51 AM CDT -----  Contact: call   pt [phone]      Type: Needs Medical Advice  Who Called: pt  Symptoms (please be specific):   pt  fell  this  morning and  hurt  l  hand  thumb  How long has patient hadcall   pt [phone] these symptoms:   this  morning and    rescheduled  [place]  Best Call Back Number:   [phone]  Additional Information:   please call for details

## 2020-07-16 ENCOUNTER — PATIENT OUTREACH (OUTPATIENT)
Dept: ADMINISTRATIVE | Facility: OTHER | Age: 45
End: 2020-07-16

## 2020-07-16 ENCOUNTER — TELEPHONE (OUTPATIENT)
Dept: NEUROLOGY | Facility: CLINIC | Age: 45
End: 2020-07-16

## 2020-07-16 NOTE — PROGRESS NOTES
Requested updates within Care Everywhere.  Patient's chart was reviewed for overdue JOSE ALEJANDRO topics.  Immunizations reconciled.    Mammogram tasked to patient.  
the status of her neg hx is unknown. SOCIAL HISTORY       Social History     Tobacco Use    Smoking status: Never Smoker    Smokeless tobacco: Never Used   Substance Use Topics    Alcohol use: No    Drug use: No     PHYSICAL EXAM     INITIAL VITALS: BP (!) 146/59   Pulse 74   Temp 98.1 °F (36.7 °C) (Oral)   Resp 18   SpO2 93%    Physical Exam   Constitutional: She is oriented to person, place, and time. She appears well-developed and well-nourished. No distress. HENT:   Head: Normocephalic. Mouth/Throat:       Dry mucus membranes    Eyes: Pupils are equal, round, and reactive to light. Cardiovascular: Normal rate, regular rhythm and normal heart sounds. Pulmonary/Chest: Effort normal and breath sounds normal. No respiratory distress. Abdominal: Soft. Bowel sounds are normal. There is no tenderness. Musculoskeletal: Normal range of motion. Neurological: She is alert and oriented to person, place, and time. Skin: Skin is warm and dry. Psychiatric: She has a normal mood and affect. MEDICAL DECISION MAKING:     Fairly severe dysphasia causing decreased p.o. intake and inability to take medications related to acute esophagitis likely candidal by nature. Patient had been on steroids recently for respiratory infection and COPD exacerbation. She is hemodynamically stable here in the emergency department and shows no evidence for systemic infection. She has normal white count and temperature. I believe due to her severe dysphasia and fairly significant candidal esophagitis she will not do well at home decision was made to have her admitted. IV fluconazole started here in the emergency department. ED Course as of Sep 25 2150   Wed Sep 25, 2019   1442 Discussed patient with Dr. Uziel Cruz- plan is for admission at this time as patient is not tolerating oral intake.     [EG]      ED Course User Index  [EG] Liz Ivan MD       CRITICAL CARE:

## 2020-07-16 NOTE — TELEPHONE ENCOUNTER
Called patient and rescheduled appointment due to Dr. Samano being out of clinic sick. Patient expressed understanding

## 2020-07-17 ENCOUNTER — TELEPHONE (OUTPATIENT)
Dept: NEUROLOGY | Facility: CLINIC | Age: 45
End: 2020-07-17

## 2020-07-17 NOTE — TELEPHONE ENCOUNTER
----- Message from Lillian Freitas sent at 7/17/2020  1:42 PM CDT -----  Contact: pt  Pt is calling wanting to switch her 7/23 appointment to a virtual appointment,please ..[phone] (home)

## 2020-07-17 NOTE — TELEPHONE ENCOUNTER
Contacted patient to get her rescheduled to a virtual visit. Patient accepted the same date/time for the visit. Patient expressed understanding.

## 2020-07-23 ENCOUNTER — TELEPHONE (OUTPATIENT)
Dept: NEUROLOGY | Facility: CLINIC | Age: 45
End: 2020-07-23

## 2020-07-23 ENCOUNTER — OFFICE VISIT (OUTPATIENT)
Dept: NEUROLOGY | Facility: CLINIC | Age: 45
End: 2020-07-23
Payer: MEDICARE

## 2020-07-23 DIAGNOSIS — M47.812 SPONDYLOSIS OF CERVICAL REGION WITHOUT MYELOPATHY OR RADICULOPATHY: Primary | ICD-10-CM

## 2020-07-23 DIAGNOSIS — G43.719 CHRONIC MIGRAINE WITHOUT AURA, INTRACTABLE, WITHOUT STATUS MIGRAINOSUS: ICD-10-CM

## 2020-07-23 DIAGNOSIS — G47.33 OSA (OBSTRUCTIVE SLEEP APNEA): ICD-10-CM

## 2020-07-23 DIAGNOSIS — R29.6 REPEATED FALLS: ICD-10-CM

## 2020-07-23 PROCEDURE — 99214 PR OFFICE/OUTPT VISIT, EST, LEVL IV, 30-39 MIN: ICD-10-PCS | Mod: 95,,, | Performed by: PSYCHIATRY & NEUROLOGY

## 2020-07-23 PROCEDURE — 99214 OFFICE O/P EST MOD 30 MIN: CPT | Mod: 95,,, | Performed by: PSYCHIATRY & NEUROLOGY

## 2020-07-23 NOTE — Clinical Note
Scheduleing C4-5-6 cervical medial branch block on 8/6/2020. Please schedule COVID test on Monday before.

## 2020-07-23 NOTE — PROGRESS NOTES
Established Patient - Audio Only Telehealth Visit     The patient location is: home   The chief complaint leading to consultation is: headache   Visit type: Virtual visit with audio only (telephone)  Total time spent with patient: 20 min        The reason for the audio only service rather than synchronous audio and video virtual visit was related to technical difficulties or patient preference/necessity.     Each patient to whom I provide medical services by telemedicine is:  (1) informed of the relationship between the physician and patient and the respective role of any other health care provider with respect to management of the patient; and (2) notified that they may decline to receive medical services by telemedicine and may withdraw from such care at any time. Patient verbally consented to receive this service via voice-only telephone call.    Date of service: 7/23/2020  Referring provider: No ref. provider found    Subjective:      Chief complaint: Headache       Patient ID: Dinah Mitchell is a 45 y.o. depression, borderline personality disorder, PTSD, fibromyalgia, and hypothyroidism presenting for follow-up of headache and neck pain    History of Present Illness    INTERVAL HISTORY - 7/23/2020     Today she reports she is doing worse. She has been falling - stumbling over her feet. Broke her right wrist. She is not losing consciousness.     The emgality is helping. She is having headaches every other day or every other morning upon waking up. These last 30 mins. She has sleep apnea. She does not use her CPAP - it needs to be cleaned.     She is taking ibuprofen or tramadol several times per week.     Her neck pain has been much worse. It is axial and most severe in the mid-neck down to the shoulder blades. Same pain that we had discussed treating with CMBB multiple times.       INTERVAL HISTORY - 3/26/2020    The patient location is: home   The chief complaint leading to consultation is: headache and neck  pain   Visit type: Virtual visit with synchronous audio and video  Total time spent with patient: 15 min   Each patient to whom he or she provides medical services by telemedicine is:  (1) informed of the relationship between the physician and patient and the respective role of any other health care provider with respect to management of the patient; and (2) notified that he or she may decline to receive medical services by telemedicine and may withdraw from such care at any time.    Last saw patient on 11/15/2019. At that point we continued Emgality and gabapentin for her migraines and planned a cervical epidural for her neck pain as requested by the neurosurgeon. I did the epidural on 12/5/19. Initial relief on phone call was 60%. I referred her for a neurocognitive evaluation due to persistent concerns with memory.     Today patient reports the cervical epidural made her feel worse for the first few days and then the pain returned to baseline without any further relief. The neck pain continues to be daily. She is dependent on the muscle relaxers for relief. Develops widespread spasms if she misses them.    She has been having bad headaches in parallel with being sick (upper respiratory) since Christmas. The emgality is definitely helping. Due to pharmacy issues, she had been late on almost all the doses and finds that the 3 days before she is due she gets horrible headaches.       INTERVAL HISTORY - 11/15/2019    She was last seen 2 months ago at which point we started Emgality for migraine headache.  We also had her complete home physical therapy for strength and falling.  In the interim she saw her neurosurgeon Dr. Busch who recommended to trial cervical epidural injection.  The patient preferred to do this procedure with me so she did not have to travel to the [place].  She is here to discuss this procedure. She has had ESIs with either Dr Vela in the past with relief of same pain     Her last cervical  imaging was 06/09/2019 which showed -   1. Loss of the normal cervical lordosis suggesting paraspinous muscle spasm.  2. Postoperative changes about the skull base.  3. Cervical degenerative disc disease.  Changes within the discs particularly at C4-5 and C5-C6 were present on the previous examination.    In regards to her headaches, she reports the little better.  After taking the shot the headaches are less frequent.  After about 2 and half weeks they come back the same.  The pain is in the back and head, left temple, behind the eyes. Hands go numb but so do feet. Neck pain is mainly axial, shoulder blades to neck. Current pain score is 5 with a range of 2-10.    She remains on Emgality, gabapentin, tramadol, ibuprofen, Marinol for nausea, Robaxin, tizanidine for muscle spasm.  She is not using anything else for rescue    She feels her memory is impaired, she gets confused, for the last couple of years.  For example, today she is very upset because she showed up very early for her appointment, not realizing how early she is despite her knowing what time her ointment was.  She reports she makes these mistakes often.  She is frustrated with this    INTERVAL HISTORY - 9/11/19    She was last seen approximately 7 months ago.  At that point she was having headaches 4 days per week.  We continued her regimen of gabapentin and tramadol    She is on gabapentin 600 mg 3 times a day, Robaxin, tizanidine, the tramadol has not been filled since July.    She sustained some head trauma, hit her left back of head on the cabinet, 6/9/19. Feels nauseous, worse headache.     Today she reports she is about the same.  She reports memory loss, confusion, headache, nausea, falling frequently, neck pain, numbness in the hands and feet, no depth perception.    The headaches are left temporal, back of the head, feels like a vice , neck pain. Her current pain score several much for to 10.    She feels like she is coming down with the  flu.     Headaches, nausea, and falling are her most concerning symptoms. Has not been in PT.     2 types of headaches  - temple, associated with light sensitivity, aggravated by activiity. Another like a vice . Wakes up with them.    Her dog passed away      INTERVAL HISTORY - 2/12/19     In the interim since last visit one year ago she has had bilateral occipital nerve block in cervical trigger point injection x3 with me with good relief.      She reports, that while I was on leave, she felt worse and worse. She was more nauseous and kept falling. She saw Dr. Busch for her Chiari malformation, which I previously recommended against surgery on as her symptoms were not clearly Chiari related and she had a non-obstructive CSF flow pattern on CINE. She had posterior fossa decompression on 12/13/18. She had severe headache and neck pain afterwards. She says the surgery helped her disabling headaches, she doesn't have headache any more when straining or bending over but she remains with the other headaches documented below. She has multiple questions about the surgery itself.     She reports she is still wobbly. She still has depth perception issues.     New symptoms - lump she feels on the the posterior parietal scalp, this area has never hurt before. Neck feels weak. Has to lay against something for support. Arms feel more weak then before. Sensation in her feet is returning.     Headaches - left temple, gradual onset, photo/phonophobia/nausea. These occur 4 days per week. Last at least 1-2 hours.     Her current pain score is seven for headache, nine for neck.  With a range of 6-9.    ORIGINAL PAIN HISTORY - 3/14/18   Age at onset and course over time: 2009 and worse over time, this happened during a time when she had a long commute. Neck is her worst pain - just left to midline of middle neck. Was going to [facility] pain management receiving epidural steroids which would last only 2 weeks. She had a CMBB and was  "planning for RFA but wanted to get cervical MRI first and second opinion  Location: neck (sharp, stabbing); hands and feet (tingling, numb), hip/lower back (dull, aching). Also has intermittent left sided headaches starting occipitally radiating to temple.   Quality: as above  Severity: current 8.5; range 7 to 10  Duration: hours  Frequency: daily  Alleviated by: medication, ice  Exacerbated by: movement, light noise (head pain)  Associated with: left arm/hand gets numb/weak "a lot" no radicular pain; headaches on left which are random or triggered by straining in restroom/coughing/laughing. Headaches worst in the AM within 2 hours of waking up. Headaches do not wake her up but neck pain does. Having some depth perception issues causing her to break her foot - eyes checked and were ok. Transient visual loss? When in a lof of pain, not clear TVO with valsalva.   Sleep habits:    Current acute treatment:  -- tramadol 50mg - every other day or every third day  -- ibuprofen 2-3 days a weel   -- robaxin during the day   -- tizanidine 4mg nightly     (xanax 1mg daily PRN for PTSD, anxiety from psychiatrist)     Current prevention:  -- emgality - started 9/2019 - effective to date   -- gabapentin 600mg TID   (wellbutrin)  (prozac)    Previously tried:   -- TENS   -- tramadol - helped with overall pain   -- methocarbamol - helped with overall pain   -- relafen - did not help     Procedural history:   -- cervical GREGOR  -- lumbar GREGOR  -- chiropractor for hip /neck - helped only hip  -- PT - none for neck just foot     Review of patient's allergies indicates:   Allergen Reactions    Lamotrigine      Other reaction(s): Rash  Other reaction(s): Nausea    Sulfa (sulfonamide antibiotics) Nausea Only     Other reaction(s): Unknown     Current Outpatient Medications   Medication Sig Dispense Refill    albuterol (PROVENTIL/VENTOLIN HFA) 90 mcg/actuation inhaler Inhale 2 puffs into the lungs every 6 (six) hours as needed for " Wheezing. Rescue 18 g 2    ALPRAZolam (XANAX) 1 MG tablet Take 1 tablet (1 mg total) by mouth daily as needed for Anxiety. 30 tablet 2    azelastine (OPTIVAR) 0.05 % ophthalmic solution INSTILL 1 DROP IN BOTH EYES TWICE A DAY 6 mL 14    buPROPion (WELLBUTRIN XL) 150 MG TB24 tablet Take 1 tablet (150 mg total) by mouth once daily. 90 tablet 0    diphenoxylate-atropine 2.5-0.025 mg (LOMOTIL) 2.5-0.025 mg per tablet Take 1 tablet by mouth 4 (four) times daily as needed. 80 tablet 0    dronabinol (MARINOL) 2.5 MG capsule Take 1 capsule (2.5 mg total) by mouth 2 (two) times daily before meals. 60 capsule 3    FLUoxetine 40 MG capsule Take 1 capsule (40 mg total) by mouth once daily. 90 capsule 0    fluticasone furoate-vilanteroL (BREO ELLIPTA) 200-25 mcg/dose DsDv diskus inhaler Inhale 1 puff into the lungs once daily. Controller 3 each 0    gabapentin (NEURONTIN) 600 MG tablet Take 1 tablet (600 mg total) by mouth 3 (three) times daily. 90 tablet 11    galcanezumab-gnlm (EMGALITY PEN) 120 mg/mL PnIj Inject 120 mg into the skin every 28 days. 1 Syringe 11    ipratropium-albuterol (COMBIVENT)  mcg/actuation inhaler Inhale 1 puff into the lungs every 4 (four) hours as needed for Wheezing. Rescue 4 g 2    levOCARNitine (CARNITOR) 330 mg Tab Take 3 tablets (990 mg total) by mouth 2 (two) times daily. 180 tablet 6    methocarbamoL (ROBAXIN) 500 MG Tab TAKE ONE AND ONE-HALF TABLETS TWICE A DAY AS NEEDED FOR MUSCLE SPASMS 90 tablet 11    montelukast (SINGULAIR) 10 mg tablet Take 1 tablet (10 mg total) by mouth once daily. 90 tablet 1    SYNTHROID 200 mcg tablet Take 1 tablet (200 mcg total) by mouth before breakfast. 90 tablet 2    tiZANidine (ZANAFLEX) 4 MG tablet Take 1 tablet (4 mg total) by mouth every evening. 90 tablet 3    traMADoL (ULTRAM) 50 mg tablet Take 1 tablet (50 mg total) by mouth every 6 (six) hours as needed. 60 tablet 0    UNABLE TO FIND N-acetyl-L-cysteine 50 mg subcutaneously 3 times  a week 10 mL 6    zolpidem (AMBIEN CR) 12.5 MG CR tablet Take 1 tablet (12.5 mg total) by mouth nightly as needed for Insomnia. 30 tablet 2     Current Facility-Administered Medications   Medication Dose Route Frequency Provider Last Rate Last Dose    medroxyPROGESTERone (DEPO-PROVERA) injection 150 mg  150 mg Intramuscular Q90 Days Nikhil Franco MD   150 mg at 12/04/18 1508       Past Medical History  Past Medical History:   Diagnosis Date    Arnold-Chiari deformity     Asthma     Borderline personality disorder     Carnitine deficiency     Depression     Fibromyalgia     Headache     History of abnormal cervical Pap smear     colpo/ LEEP and CKC    Hypothyroidism     IBS (irritable bowel syndrome)     GI smith negative    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     PATRICIA (obstructive sleep apnea)     severe - doesn't use CPAP    Panic attack     PTSD (post-traumatic stress disorder)        Past Surgical History  Past Surgical History:   Procedure Laterality Date    ANKLE SURGERY Right     x2 (#1 for ligament injury and #2 for fx and ligament)    AUGMENTATION OF BREAST      BRAIN SURGERY  12/2018    decompression/craniotomy Arnold-Chiari syndrome    BREAST SURGERY  1997    B implants    CERVICAL BIOPSY  W/ LOOP ELECTRODE EXCISION      DECOMPRESSION OF CHIARI MALFORMATION BY REMOVAL OF POSTERIOR ARCH OF FIRST CERVICAL VERTEBRA N/A 12/13/2018    Procedure: DECOMPRESSION, CHIARI MALFORMATION, BY 1ST CERVICAL VERTEBRA POSTERIOR ARCH REMOVAL;  Surgeon: Sergio Busch MD;  [facility] OR [address] [place] [address];  Service: Neurosurgery;  Laterality: N/A;    EPIDURAL STEROID INJECTION INTO CERVICAL SPINE N/A 12/5/2019    Procedure: Injection-steroid-epidural-cervical-C7-T1;  Surgeon: Noa Samano MD;  Location: [facility] OR;  Service: Neurosurgery;  Laterality: N/A;    LAPAROSCOPIC GASTRIC BANDING      with subsequent removal due to abscess    LIPOSUCTION      x 2    PELVIC LAPAROSCOPY      for  "endometriosis eval with BTL    TUBAL LIGATION      wtih pelvic lap        Family History  Family History   Problem Relation Age of Onset    Diabetes Sister     Seizures Sister     Mitochondrial disorder Sister         "trent"    Mental illness Sister     Seizures Sister     Cervical cancer Sister     Cervical cancer Sister     Ovarian cancer Cousin     Ovarian cancer Paternal Aunt     Colon cancer Maternal Grandmother     Cancer Maternal Grandfather 65        throat ca    Diabetes Mother     Stroke Mother 30    Mental illness Mother         depression    Hyperlipidemia Mother     Hypertension Mother     Hyperlipidemia Father     Heart disease Father         "athletes heart"    Ovarian cancer Paternal Aunt     Cervical cancer Paternal Cousin     Cervical cancer Other     Breast cancer Neg Hx        Social History  Social History     Socioeconomic History    Marital status:      Spouse name: Not on file    Number of children: Not on file    Years of education: Not on file    Highest education level: Not on file   Occupational History    Occupation: foresenic     Social Needs    Financial resource strain: Not on file    Food insecurity     Worry: Not on file     Inability: Not on file    Transportation needs     Medical: Yes     Non-medical: Yes   Tobacco Use    Smoking status: Never Smoker    Smokeless tobacco: Never Used   Substance and Sexual Activity    Alcohol use: Yes     Frequency: Monthly or less     Drinks per session: 1 or 2     Binge frequency: Never     Comment: occasional "maybe monthly"    Drug use: No    Sexual activity: Yes     Partners: Male     Birth control/protection: Injection     Comment: depo since age 18   Lifestyle    Physical activity     Days per week: 0 days     Minutes per session: 0 min    Stress: Very much   Relationships    Social connections     Talks on phone: More than three times a week     Gets together: Never     Attends " Christianity service: Not on file     Active member of club or organization: No     Attends meetings of clubs or organizations: Never     Relationship status:    Other Topics Concern    Not on file   Social History Narrative    Not on disability, not currently working.        Review of Systems  14-point review of systems as follows:   No check mariam indicates NEGATIVE response   Constitutional: [] weight loss, [] change to appetite   Eyes: [] change in vision, [] double vision   Ears, nose, mouth, throat: [] frequent nose bleeds, [] ringing in the ears   Respiratory: [] cough, [] wheezing   Cardiovascular: [] chest pain, [] palpitations   Gastrointestinal: [] jaundice, [] nausea/vomiting   Genitourinary: [] incontinence, [] burning with urination   Hematologic/lymphatic: [] easy bruising/bleeding, [] night sweats   Neurological: [] numbness, [] weakness   Endocrine: [] fatigue, [] heat/cold intolerance   Allergy/Immunologic: [] fevers, [] chills   Musculoskeletal: [x] muscle pain, [x] joint pain   Psychiatric: [] thoughts of harming self/others, [] depression   Integumentary: [] rashes, [] sores that do not heal       Objective:        There were no vitals filed for this visit.  There is no height or weight on file to calculate BMI.     PREVIOUS:   Reflexes full throughout  Confrontational strength 5/5 in all cervical dermatomes  Sensation to temperature intact all cervical dermatomes    CERVICAL SPINE:  INSPECTION:  Posterior decompression scar, tender  ROM:  Limited rotation  MUSCLE SPASM:  Diffuse  FACET LOADING:  Bilateral  SPURLING:  Negative      2/12/19  General: Well developed, well nourished.  No acute distress.  HEENT: Atraumatic, normocephalic. Bilateral ptosis (chronic).  Optic discs sharp bilaterally. The lumps she showed me are bony prominences of the calvarium  Neck: Trachea midline. Well-healed midline posterior surgical scar.  Multiple areas of muscle tenderness around neck  Cardiovascular:  Vitals reviewed. Normal peripheral perfusion.   Pulmonary: No increased work of breathing.  Abdomen/GI: No guarding  Musculoskeletal: No obvious joint deformities, moves all extremities symmetrically and well.  No drift, no orbit, symmetric finger tapping.     Neurological exam:  Mental status: Awake and alert. Oriented to situation.  Speech fluent and appropriate. Recent and remote memory appear to be intact.  Fund of knowledge normal.  Cranial nerves: Pupils equal round, extraocular movements intact, facial strength intact bilaterally, reports hearing is asymmetric  Sensation:  Normal to temperature  Reflexes: 2+ throughout, negative Vu  Coordination:  Normal finger-to-nose and heel-to-shin  Gait: Normal         3/14/18   Constitutional: appears in no acute distress, well-developed, well-nourished     Eyes: normal conjunctiva, PERRLA, bilateral right worse than left ptosis (baseline). Optic discs sharp bilaterally.     Ears, nose, mouth, throat: external appearance of ears and nose normal, hearing reduced on left     Cardiovascular: regular rate and rhythm, no murmurs appreciated    Respiratory: unlabored respirations, breath sounds normal bilaterally    Gastrointestinal: no visible abdominal masses, no guarding, no visible hernia    Musculoskeletal: normal tone in all four extremities. No atrophy. No abnormal movements. Strength in all muscles groups of the upper and lower extremities is 5/5. Normal gait and station. Digits and nails normal.      Spine:  CERVICAL SPINE:  INSPECTION: no scars   ROM: slightly limited   MUSCLE SPASM: bilateral   FACET LOADING: left  SPURLING: neg  JEZ tender: bilateral     Psychiatric: normal judgment and insight. Oriented to person, place, and time.     Neurologic:   Cortical functions: recent and remote memory intact, normal attention span and concentration, speech fluent, adequate fund of knowledge   Cranial nerves: visual fields full, PERRLA, EOMI, facial sensation intact  in V1-V3, symmetric facial strength, hearing intact to finger rub, palate elevates symmetrically, shoulder shrug 5/5, tongue protrudes midline   Reflexes: 2+ in the upper and lower extremities, no Vu, no babinski   Sensation: reduced to temperature in a length dependent manner in the hands and to knees, vibration reduced toes, ankles, knees  Coordination: normal    Data Review:     I have personally reviewed the referring provider's notes, labs, & imaging made available to me today.     RADIOLOGY STUDIES:  I have personally reviewed the pertinent images performed.     MRI cervical spine 2/27/18  1. The cerebellar tonsils extend below the foramen magnum approximately 8-9 mm, consistent with Chiari 1 malformation.  The cord is normal without evidence of syrinx, signal abnormality or abnormal enhancement.  2. There is mild degenerative change.  There is, however, no significant spinal canal or foraminal stenosis.  There is no cord compression.  There is no suspected nerve root compression.  Please see above discussion.    Results for orders placed or performed during the hospital encounter of 02/05/18   MRI Brain W WO Contrast    Narrative    Routine Multiplanar imaging through this 42-year-old females brain was obtained with utilization of 10 cc of gadolinium contrast    Hyperostosis frontalis appears to be present.     A Chiari one malformation appears to be present with the cerebellar tonsils extending 9 mm inferior to the foramen magnum with so-called pegging of the cerebellar tonsils.    An empty sella configuration of the pituitary gland is noted.    No diffusion positivity is identified to suggest recent infarction.    No gradient  susceptibility was noted just just presence of acute blood products.    No worrisome enhancing lesions are noted intracranially    No diffusion positivity is identified to suggest recent infarction.    Impression    1. The cerebellar tonsils appear to extend inferior to the  foramen magnum suggestive of a Chiari one malformation. No obvious cord signal abnormalities noted within the visualized cord, given the patient's history further evaluation of the cord may be of use to exclude a syrinx        Electronically signed by: Ismael Richmond MD  Date:     02/05/18  Time:    12:04    Results for orders placed or performed during the hospital encounter of 09/25/15   CT Head Without Contrast    Narrative    Comparison: None    Technique: 5 mm noncontrast axial images through the brain were obtained.    Findings:     The brain is normally formed with no indication of acute/recent major vascular distribution cerebral infarction, intraparenchymal hemorrhage, or intra-axial space occupying lesion. There is preserved gray-white matter junction differentiation.     The ventricular system is normal in appearance for age. No hydrocephalus. No effacement of the skull-base cisterns. No abnormal extra-axial fluid collections or blood products.     The paranasal sinuses and mastoid air cells are unremarkable.  There is incidentally observed hyperostosis frontalis.    Impression         No acute  intracranial abnormalities are appreciated.      Electronically signed by: Toyin Shin MD  Date:     09/25/15  Time:    13:41        Lab Results   Component Value Date     06/19/2020    K 4.4 06/19/2020    MG 2.3 12/14/2018     06/19/2020    CO2 21 (L) 06/19/2020    BUN 9 06/19/2020    CREATININE 1.0 06/19/2020    GLU 95 06/19/2020    HGBA1C 5.3 06/19/2020    AST 35 06/19/2020    ALT 42 06/19/2020    ALBUMIN 4.1 06/19/2020    PROT 7.2 06/19/2020    BILITOT 0.4 06/19/2020    CHOL 226 (H) 06/19/2020    HDL 50 06/19/2020    LDLCALC 150.2 06/19/2020    TRIG 129 06/19/2020       Lab Results   Component Value Date    WBC 5.80 06/19/2020    HGB 12.7 06/19/2020    HCT 39.0 06/19/2020    MCV 96 06/19/2020     06/19/2020       Lab Results   Component Value Date    TSH 0.874 06/19/2020       Assessment  & Plan:       Problem List Items Addressed This Visit        Neuro    Chronic migraine without aura, intractable, without status migrainosus    Overview     The patient has chronic migraines (G43.719) and suffers from headaches more than 15 days a month lasting more than 4 hours a day with no relief of symptoms despite trying multiple medications including but not limited to anti-epileptics (GABAPENTIN, LAMOTRIGINE), and antidepressants (FLUOXETINE).     Doing very well on Emgality; continue     Has almost daily early morning headaches, which resolved in 30 min.  Explain these are most likely related to her untreated obstructive sleep apnea.  I strongly encouraged that she use her CPAP.  She reported she will make arrangements for this.         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 - Primary    Overview     Mild cervical facet arthropathy  DDD C4-5, C5-6  12/5/2019 no relief from cervical GREGOR C7-T1    Return to surgery center on Thursday 8/6/2020 for diagnostic bilateral cervical medial branch blocks at nerve levels C4-5-6          Relevant Orders    Case Request Operating Room: Block-nerve-medial branch-cervical  Bilateral   C4-5-6 (Completed)       Other    Repeated falls    Relevant Orders    MRI Brain Without Contrast    PATRICIA (obstructive sleep apnea)    Overview     Does not use CPAP                   Please call our clinic at [phone] or send a message to Dr. Samano on the [facility] portal if there are any changes to the plan described below, for example,if you are not contacted for the requested tests, referral(s) within one week, if you are unable to receive the medications prescribed, or if you feel you need to change the treatment course for any reason.     TESTING:  -- schedule a brain MRI without contrast to evaluate the surgical area (posterior fossa decompression) after repeated falls     REFERRALS:  -- none     PREVENTION OF PAIN:   -- continue gabapentin 600mg three times  per day   -- continue Emgality at this time   -- return to surgery center on Thursday 8/6/2020 for diagnostic bilateral cervical medial branch blocks at nerve levels C4-5-6   -- COVID test 72 hours before (Monday)    AS-NEEDED TREATMENT OF PAIN:  -- continue tramadol as needed    -- continue ibuprofen as needed   -- continue robaxin as needed     No follow-ups on file.       Noa Samano MD      This service was not originating from a related E/M service provided within the previous 7 days nor will  to an E/M service or procedure within the next 24 hours or my soonest available appointment.  Prevailing standard of care was able to be met in this audio-only visit.

## 2020-07-23 NOTE — H&P (VIEW-ONLY)
Established Patient - Audio Only Telehealth Visit     The patient location is: home   The chief complaint leading to consultation is: headache   Visit type: Virtual visit with audio only (telephone)  Total time spent with patient: 20 min        The reason for the audio only service rather than synchronous audio and video virtual visit was related to technical difficulties or patient preference/necessity.     Each patient to whom I provide medical services by telemedicine is:  (1) informed of the relationship between the physician and patient and the respective role of any other health care provider with respect to management of the patient; and (2) notified that they may decline to receive medical services by telemedicine and may withdraw from such care at any time. Patient verbally consented to receive this service via voice-only telephone call.    Date of service: 7/23/2020  Referring provider: No ref. provider found    Subjective:      Chief complaint: Headache       Patient ID: Dinah Mitchell is a 45 y.o. depression, borderline personality disorder, PTSD, fibromyalgia, and hypothyroidism presenting for follow-up of headache and neck pain    History of Present Illness    INTERVAL HISTORY - 7/23/2020     Today she reports she is doing worse. She has been falling - stumbling over her feet. Broke her right wrist. She is not losing consciousness.     The emgality is helping. She is having headaches every other day or every other morning upon waking up. These last 30 mins. She has sleep apnea. She does not use her CPAP - it needs to be cleaned.     She is taking ibuprofen or tramadol several times per week.     Her neck pain has been much worse. It is axial and most severe in the mid-neck down to the shoulder blades. Same pain that we had discussed treating with CMBB multiple times.       INTERVAL HISTORY - 3/26/2020    The patient location is: home   The chief complaint leading to consultation is: headache and neck  pain   Visit type: Virtual visit with synchronous audio and video  Total time spent with patient: 15 min   Each patient to whom he or she provides medical services by telemedicine is:  (1) informed of the relationship between the physician and patient and the respective role of any other health care provider with respect to management of the patient; and (2) notified that he or she may decline to receive medical services by telemedicine and may withdraw from such care at any time.    Last saw patient on 11/15/2019. At that point we continued Emgality and gabapentin for her migraines and planned a cervical epidural for her neck pain as requested by the neurosurgeon. I did the epidural on 12/5/19. Initial relief on phone call was 60%. I referred her for a neurocognitive evaluation due to persistent concerns with memory.     Today patient reports the cervical epidural made her feel worse for the first few days and then the pain returned to baseline without any further relief. The neck pain continues to be daily. She is dependent on the muscle relaxers for relief. Develops widespread spasms if she misses them.    She has been having bad headaches in parallel with being sick (upper respiratory) since Christmas. The emgality is definitely helping. Due to pharmacy issues, she had been late on almost all the doses and finds that the 3 days before she is due she gets horrible headaches.       INTERVAL HISTORY - 11/15/2019    She was last seen 2 months ago at which point we started Emgality for migraine headache.  We also had her complete home physical therapy for strength and falling.  In the interim she saw her neurosurgeon Dr. Busch who recommended to trial cervical epidural injection.  The patient preferred to do this procedure with me so she did not have to travel to the [place].  She is here to discuss this procedure. She has had ESIs with either Dr Vela in the past with relief of same pain     Her last cervical  imaging was 06/09/2019 which showed -   1. Loss of the normal cervical lordosis suggesting paraspinous muscle spasm.  2. Postoperative changes about the skull base.  3. Cervical degenerative disc disease.  Changes within the discs particularly at C4-5 and C5-C6 were present on the previous examination.    In regards to her headaches, she reports the little better.  After taking the shot the headaches are less frequent.  After about 2 and half weeks they come back the same.  The pain is in the back and head, left temple, behind the eyes. Hands go numb but so do feet. Neck pain is mainly axial, shoulder blades to neck. Current pain score is 5 with a range of 2-10.    She remains on Emgality, gabapentin, tramadol, ibuprofen, Marinol for nausea, Robaxin, tizanidine for muscle spasm.  She is not using anything else for rescue    She feels her memory is impaired, she gets confused, for the last couple of years.  For example, today she is very upset because she showed up very early for her appointment, not realizing how early she is despite her knowing what time her ointment was.  She reports she makes these mistakes often.  She is frustrated with this    INTERVAL HISTORY - 9/11/19    She was last seen approximately 7 months ago.  At that point she was having headaches 4 days per week.  We continued her regimen of gabapentin and tramadol    She is on gabapentin 600 mg 3 times a day, Robaxin, tizanidine, the tramadol has not been filled since July.    She sustained some head trauma, hit her left back of head on the cabinet, 6/9/19. Feels nauseous, worse headache.     Today she reports she is about the same.  She reports memory loss, confusion, headache, nausea, falling frequently, neck pain, numbness in the hands and feet, no depth perception.    The headaches are left temporal, back of the head, feels like a vice , neck pain. Her current pain score several much for to 10.    She feels like she is coming down with the  flu.     Headaches, nausea, and falling are her most concerning symptoms. Has not been in PT.     2 types of headaches  - temple, associated with light sensitivity, aggravated by activiity. Another like a vice . Wakes up with them.    Her dog passed away      INTERVAL HISTORY - 2/12/19     In the interim since last visit one year ago she has had bilateral occipital nerve block in cervical trigger point injection x3 with me with good relief.      She reports, that while I was on leave, she felt worse and worse. She was more nauseous and kept falling. She saw Dr. Busch for her Chiari malformation, which I previously recommended against surgery on as her symptoms were not clearly Chiari related and she had a non-obstructive CSF flow pattern on CINE. She had posterior fossa decompression on 12/13/18. She had severe headache and neck pain afterwards. She says the surgery helped her disabling headaches, she doesn't have headache any more when straining or bending over but she remains with the other headaches documented below. She has multiple questions about the surgery itself.     She reports she is still wobbly. She still has depth perception issues.     New symptoms - lump she feels on the the posterior parietal scalp, this area has never hurt before. Neck feels weak. Has to lay against something for support. Arms feel more weak then before. Sensation in her feet is returning.     Headaches - left temple, gradual onset, photo/phonophobia/nausea. These occur 4 days per week. Last at least 1-2 hours.     Her current pain score is seven for headache, nine for neck.  With a range of 6-9.    ORIGINAL PAIN HISTORY - 3/14/18   Age at onset and course over time: 2009 and worse over time, this happened during a time when she had a long commute. Neck is her worst pain - just left to midline of middle neck. Was going to [facility] pain management receiving epidural steroids which would last only 2 weeks. She had a CMBB and was  "planning for RFA but wanted to get cervical MRI first and second opinion  Location: neck (sharp, stabbing); hands and feet (tingling, numb), hip/lower back (dull, aching). Also has intermittent left sided headaches starting occipitally radiating to temple.   Quality: as above  Severity: current 8.5; range 7 to 10  Duration: hours  Frequency: daily  Alleviated by: medication, ice  Exacerbated by: movement, light noise (head pain)  Associated with: left arm/hand gets numb/weak "a lot" no radicular pain; headaches on left which are random or triggered by straining in restroom/coughing/laughing. Headaches worst in the AM within 2 hours of waking up. Headaches do not wake her up but neck pain does. Having some depth perception issues causing her to break her foot - eyes checked and were ok. Transient visual loss? When in a lof of pain, not clear TVO with valsalva.   Sleep habits:    Current acute treatment:  -- tramadol 50mg - every other day or every third day  -- ibuprofen 2-3 days a weel   -- robaxin during the day   -- tizanidine 4mg nightly     (xanax 1mg daily PRN for PTSD, anxiety from psychiatrist)     Current prevention:  -- emgality - started 9/2019 - effective to date   -- gabapentin 600mg TID   (wellbutrin)  (prozac)    Previously tried:   -- TENS   -- tramadol - helped with overall pain   -- methocarbamol - helped with overall pain   -- relafen - did not help     Procedural history:   -- cervical GREGOR  -- lumbar GREGOR  -- chiropractor for hip /neck - helped only hip  -- PT - none for neck just foot     Review of patient's allergies indicates:   Allergen Reactions    Lamotrigine      Other reaction(s): Rash  Other reaction(s): Nausea    Sulfa (sulfonamide antibiotics) Nausea Only     Other reaction(s): Unknown     Current Outpatient Medications   Medication Sig Dispense Refill    albuterol (PROVENTIL/VENTOLIN HFA) 90 mcg/actuation inhaler Inhale 2 puffs into the lungs every 6 (six) hours as needed for " Wheezing. Rescue 18 g 2    ALPRAZolam (XANAX) 1 MG tablet Take 1 tablet (1 mg total) by mouth daily as needed for Anxiety. 30 tablet 2    azelastine (OPTIVAR) 0.05 % ophthalmic solution INSTILL 1 DROP IN BOTH EYES TWICE A DAY 6 mL 14    buPROPion (WELLBUTRIN XL) 150 MG TB24 tablet Take 1 tablet (150 mg total) by mouth once daily. 90 tablet 0    diphenoxylate-atropine 2.5-0.025 mg (LOMOTIL) 2.5-0.025 mg per tablet Take 1 tablet by mouth 4 (four) times daily as needed. 80 tablet 0    dronabinol (MARINOL) 2.5 MG capsule Take 1 capsule (2.5 mg total) by mouth 2 (two) times daily before meals. 60 capsule 3    FLUoxetine 40 MG capsule Take 1 capsule (40 mg total) by mouth once daily. 90 capsule 0    fluticasone furoate-vilanteroL (BREO ELLIPTA) 200-25 mcg/dose DsDv diskus inhaler Inhale 1 puff into the lungs once daily. Controller 3 each 0    gabapentin (NEURONTIN) 600 MG tablet Take 1 tablet (600 mg total) by mouth 3 (three) times daily. 90 tablet 11    galcanezumab-gnlm (EMGALITY PEN) 120 mg/mL PnIj Inject 120 mg into the skin every 28 days. 1 Syringe 11    ipratropium-albuterol (COMBIVENT)  mcg/actuation inhaler Inhale 1 puff into the lungs every 4 (four) hours as needed for Wheezing. Rescue 4 g 2    levOCARNitine (CARNITOR) 330 mg Tab Take 3 tablets (990 mg total) by mouth 2 (two) times daily. 180 tablet 6    methocarbamoL (ROBAXIN) 500 MG Tab TAKE ONE AND ONE-HALF TABLETS TWICE A DAY AS NEEDED FOR MUSCLE SPASMS 90 tablet 11    montelukast (SINGULAIR) 10 mg tablet Take 1 tablet (10 mg total) by mouth once daily. 90 tablet 1    SYNTHROID 200 mcg tablet Take 1 tablet (200 mcg total) by mouth before breakfast. 90 tablet 2    tiZANidine (ZANAFLEX) 4 MG tablet Take 1 tablet (4 mg total) by mouth every evening. 90 tablet 3    traMADoL (ULTRAM) 50 mg tablet Take 1 tablet (50 mg total) by mouth every 6 (six) hours as needed. 60 tablet 0    UNABLE TO FIND N-acetyl-L-cysteine 50 mg subcutaneously 3 times  a week 10 mL 6    zolpidem (AMBIEN CR) 12.5 MG CR tablet Take 1 tablet (12.5 mg total) by mouth nightly as needed for Insomnia. 30 tablet 2     Current Facility-Administered Medications   Medication Dose Route Frequency Provider Last Rate Last Dose    medroxyPROGESTERone (DEPO-PROVERA) injection 150 mg  150 mg Intramuscular Q90 Days Nikhil Franco MD   150 mg at 12/04/18 1508       Past Medical History  Past Medical History:   Diagnosis Date    Arnold-Chiari deformity     Asthma     Borderline personality disorder     Carnitine deficiency     Depression     Fibromyalgia     Headache     History of abnormal cervical Pap smear     colpo/ LEEP and CKC    Hypothyroidism     IBS (irritable bowel syndrome)     GI smith negative    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     PATRICIA (obstructive sleep apnea)     severe - doesn't use CPAP    Panic attack     PTSD (post-traumatic stress disorder)        Past Surgical History  Past Surgical History:   Procedure Laterality Date    ANKLE SURGERY Right     x2 (#1 for ligament injury and #2 for fx and ligament)    AUGMENTATION OF BREAST      BRAIN SURGERY  12/2018    decompression/craniotomy Arnold-Chiari syndrome    BREAST SURGERY  1997    B implants    CERVICAL BIOPSY  W/ LOOP ELECTRODE EXCISION      DECOMPRESSION OF CHIARI MALFORMATION BY REMOVAL OF POSTERIOR ARCH OF FIRST CERVICAL VERTEBRA N/A 12/13/2018    Procedure: DECOMPRESSION, CHIARI MALFORMATION, BY 1ST CERVICAL VERTEBRA POSTERIOR ARCH REMOVAL;  Surgeon: Sergio Busch MD;  Location: [facility] OR [address] [place] [address];  Service: Neurosurgery;  Laterality: N/A;    EPIDURAL STEROID INJECTION INTO CERVICAL SPINE N/A 12/5/2019    Procedure: Injection-steroid-epidural-cervical-C7-T1;  Surgeon: Noa Samano MD;  Location: [facility] OR;  Service: Neurosurgery;  Laterality: N/A;    LAPAROSCOPIC GASTRIC BANDING      with subsequent removal due to abscess    LIPOSUCTION      x 2    PELVIC LAPAROSCOPY      for  "endometriosis eval with BTL    TUBAL LIGATION      wtih pelvic lap        Family History  Family History   Problem Relation Age of Onset    Diabetes Sister     Seizures Sister     Mitochondrial disorder Sister         "trent"    Mental illness Sister     Seizures Sister     Cervical cancer Sister     Cervical cancer Sister     Ovarian cancer Cousin     Ovarian cancer Paternal Aunt     Colon cancer Maternal Grandmother     Cancer Maternal Grandfather 65        throat ca    Diabetes Mother     Stroke Mother 30    Mental illness Mother         depression    Hyperlipidemia Mother     Hypertension Mother     Hyperlipidemia Father     Heart disease Father         "athletes heart"    Ovarian cancer Paternal Aunt     Cervical cancer Paternal Cousin     Cervical cancer Other     Breast cancer Neg Hx        Social History  Social History     Socioeconomic History    Marital status:      Spouse name: Not on file    Number of children: Not on file    Years of education: Not on file    Highest education level: Not on file   Occupational History    Occupation: foresenic     Social Needs    Financial resource strain: Not on file    Food insecurity     Worry: Not on file     Inability: Not on file    Transportation needs     Medical: Yes     Non-medical: Yes   Tobacco Use    Smoking status: Never Smoker    Smokeless tobacco: Never Used   Substance and Sexual Activity    Alcohol use: Yes     Frequency: Monthly or less     Drinks per session: 1 or 2     Binge frequency: Never     Comment: occasional "maybe monthly"    Drug use: No    Sexual activity: Yes     Partners: Male     Birth control/protection: Injection     Comment: depo since age 18   Lifestyle    Physical activity     Days per week: 0 days     Minutes per session: 0 min    Stress: Very much   Relationships    Social connections     Talks on phone: More than three times a week     Gets together: Never     Attends " Confucianism service: Not on file     Active member of club or organization: No     Attends meetings of clubs or organizations: Never     Relationship status:    Other Topics Concern    Not on file   Social History Narrative    Not on disability, not currently working.        Review of Systems  14-point review of systems as follows:   No check mariam indicates NEGATIVE response   Constitutional: [] weight loss, [] change to appetite   Eyes: [] change in vision, [] double vision   Ears, nose, mouth, throat: [] frequent nose bleeds, [] ringing in the ears   Respiratory: [] cough, [] wheezing   Cardiovascular: [] chest pain, [] palpitations   Gastrointestinal: [] jaundice, [] nausea/vomiting   Genitourinary: [] incontinence, [] burning with urination   Hematologic/lymphatic: [] easy bruising/bleeding, [] night sweats   Neurological: [] numbness, [] weakness   Endocrine: [] fatigue, [] heat/cold intolerance   Allergy/Immunologic: [] fevers, [] chills   Musculoskeletal: [x] muscle pain, [x] joint pain   Psychiatric: [] thoughts of harming self/others, [] depression   Integumentary: [] rashes, [] sores that do not heal       Objective:        There were no vitals filed for this visit.  There is no height or weight on file to calculate BMI.     PREVIOUS:   Reflexes full throughout  Confrontational strength 5/5 in all cervical dermatomes  Sensation to temperature intact all cervical dermatomes    CERVICAL SPINE:  INSPECTION:  Posterior decompression scar, tender  ROM:  Limited rotation  MUSCLE SPASM:  Diffuse  FACET LOADING:  Bilateral  SPURLING:  Negative      2/12/19  General: Well developed, well nourished.  No acute distress.  HEENT: Atraumatic, normocephalic. Bilateral ptosis (chronic).  Optic discs sharp bilaterally. The lumps she showed me are bony prominences of the calvarium  Neck: Trachea midline. Well-healed midline posterior surgical scar.  Multiple areas of muscle tenderness around neck  Cardiovascular:  Vitals reviewed. Normal peripheral perfusion.   Pulmonary: No increased work of breathing.  Abdomen/GI: No guarding  Musculoskeletal: No obvious joint deformities, moves all extremities symmetrically and well.  No drift, no orbit, symmetric finger tapping.     Neurological exam:  Mental status: Awake and alert. Oriented to situation.  Speech fluent and appropriate. Recent and remote memory appear to be intact.  Fund of knowledge normal.  Cranial nerves: Pupils equal round, extraocular movements intact, facial strength intact bilaterally, reports hearing is asymmetric  Sensation:  Normal to temperature  Reflexes: 2+ throughout, negative Vu  Coordination:  Normal finger-to-nose and heel-to-shin  Gait: Normal         3/14/18   Constitutional: appears in no acute distress, well-developed, well-nourished     Eyes: normal conjunctiva, PERRLA, bilateral right worse than left ptosis (baseline). Optic discs sharp bilaterally.     Ears, nose, mouth, throat: external appearance of ears and nose normal, hearing reduced on left     Cardiovascular: regular rate and rhythm, no murmurs appreciated    Respiratory: unlabored respirations, breath sounds normal bilaterally    Gastrointestinal: no visible abdominal masses, no guarding, no visible hernia    Musculoskeletal: normal tone in all four extremities. No atrophy. No abnormal movements. Strength in all muscles groups of the upper and lower extremities is 5/5. Normal gait and station. Digits and nails normal.      Spine:  CERVICAL SPINE:  INSPECTION: no scars   ROM: slightly limited   MUSCLE SPASM: bilateral   FACET LOADING: left  SPURLING: neg  JEZ tender: bilateral     Psychiatric: normal judgment and insight. Oriented to person, place, and time.     Neurologic:   Cortical functions: recent and remote memory intact, normal attention span and concentration, speech fluent, adequate fund of knowledge   Cranial nerves: visual fields full, PERRLA, EOMI, facial sensation intact  in V1-V3, symmetric facial strength, hearing intact to finger rub, palate elevates symmetrically, shoulder shrug 5/5, tongue protrudes midline   Reflexes: 2+ in the upper and lower extremities, no Vu, no babinski   Sensation: reduced to temperature in a length dependent manner in the hands and to knees, vibration reduced toes, ankles, knees  Coordination: normal    Data Review:     I have personally reviewed the referring provider's notes, labs, & imaging made available to me today.     RADIOLOGY STUDIES:  I have personally reviewed the pertinent images performed.     MRI cervical spine 2/27/18  1. The cerebellar tonsils extend below the foramen magnum approximately 8-9 mm, consistent with Chiari 1 malformation.  The cord is normal without evidence of syrinx, signal abnormality or abnormal enhancement.  2. There is mild degenerative change.  There is, however, no significant spinal canal or foraminal stenosis.  There is no cord compression.  There is no suspected nerve root compression.  Please see above discussion.    Results for orders placed or performed during the hospital encounter of 02/05/18   MRI Brain W WO Contrast    Narrative    Routine Multiplanar imaging through this 42-year-old females brain was obtained with utilization of 10 cc of gadolinium contrast    Hyperostosis frontalis appears to be present.     A Chiari one malformation appears to be present with the cerebellar tonsils extending 9 mm inferior to the foramen magnum with so-called pegging of the cerebellar tonsils.    An empty sella configuration of the pituitary gland is noted.    No diffusion positivity is identified to suggest recent infarction.    No gradient  susceptibility was noted just just presence of acute blood products.    No worrisome enhancing lesions are noted intracranially    No diffusion positivity is identified to suggest recent infarction.    Impression    1. The cerebellar tonsils appear to extend inferior to the  foramen magnum suggestive of a Chiari one malformation. No obvious cord signal abnormalities noted within the visualized cord, given the patient's history further evaluation of the cord may be of use to exclude a syrinx        Electronically signed by: Ismael Richmond MD  Date:     02/05/18  Time:    12:04    Results for orders placed or performed during the hospital encounter of 09/25/15   CT Head Without Contrast    Narrative    Comparison: None    Technique: 5 mm noncontrast axial images through the brain were obtained.    Findings:     The brain is normally formed with no indication of acute/recent major vascular distribution cerebral infarction, intraparenchymal hemorrhage, or intra-axial space occupying lesion. There is preserved gray-white matter junction differentiation.     The ventricular system is normal in appearance for age. No hydrocephalus. No effacement of the skull-base cisterns. No abnormal extra-axial fluid collections or blood products.     The paranasal sinuses and mastoid air cells are unremarkable.  There is incidentally observed hyperostosis frontalis.    Impression         No acute  intracranial abnormalities are appreciated.      Electronically signed by: Toyin Shin MD  Date:     09/25/15  Time:    13:41        Lab Results   Component Value Date     06/19/2020    K 4.4 06/19/2020    MG 2.3 12/14/2018     06/19/2020    CO2 21 (L) 06/19/2020    BUN 9 06/19/2020    CREATININE 1.0 06/19/2020    GLU 95 06/19/2020    HGBA1C 5.3 06/19/2020    AST 35 06/19/2020    ALT 42 06/19/2020    ALBUMIN 4.1 06/19/2020    PROT 7.2 06/19/2020    BILITOT 0.4 06/19/2020    CHOL 226 (H) 06/19/2020    HDL 50 06/19/2020    LDLCALC 150.2 06/19/2020    TRIG 129 06/19/2020       Lab Results   Component Value Date    WBC 5.80 06/19/2020    HGB 12.7 06/19/2020    HCT 39.0 06/19/2020    MCV 96 06/19/2020     06/19/2020       Lab Results   Component Value Date    TSH 0.874 06/19/2020       Assessment  & Plan:       Problem List Items Addressed This Visit        Neuro    Chronic migraine without aura, intractable, without status migrainosus    Overview     The patient has chronic migraines (G43.719) and suffers from headaches more than 15 days a month lasting more than 4 hours a day with no relief of symptoms despite trying multiple medications including but not limited to anti-epileptics (GABAPENTIN, LAMOTRIGINE), and antidepressants (FLUOXETINE).     Doing very well on Emgality; continue     Has almost daily early morning headaches, which resolved in 30 min.  Explain these are most likely related to her untreated obstructive sleep apnea.  I strongly encouraged that she use her CPAP.  She reported she will make arrangements for this.         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 - Primary    Overview     Mild cervical facet arthropathy  DDD C4-5, C5-6  12/5/2019 no relief from cervical GREGOR C7-T1    Return to surgery center on Thursday 8/6/2020 for diagnostic bilateral cervical medial branch blocks at nerve levels C4-5-6          Relevant Orders    Case Request Operating Room: Block-nerve-medial branch-cervical  Bilateral   C4-5-6 (Completed)       Other    Repeated falls    Relevant Orders    MRI Brain Without Contrast    PATRICIA (obstructive sleep apnea)    Overview     Does not use CPAP                   Please call our clinic at [phone] or send a message to Dr. Samano on the [email or web address] portal if there are any changes to the plan described below, for example,if you are not contacted for the requested tests, referral(s) within one week, if you are unable to receive the medications prescribed, or if you feel you need to change the treatment course for any reason.     TESTING:  -- schedule a brain MRI without contrast to evaluate the surgical area (posterior fossa decompression) after repeated falls     REFERRALS:  -- none     PREVENTION OF PAIN:   -- continue gabapentin 600mg three times  per day   -- continue Emgality at this time   -- return to surgery center on Thursday 8/6/2020 for diagnostic bilateral cervical medial branch blocks at nerve levels C4-5-6   -- COVID test 72 hours before (Monday)    AS-NEEDED TREATMENT OF PAIN:  -- continue tramadol as needed    -- continue ibuprofen as needed   -- continue robaxin as needed     No follow-ups on file.       Noa Samano MD      This service was not originating from a related E/M service provided within the previous 7 days nor will  to an E/M service or procedure within the next 24 hours or my soonest available appointment.  Prevailing standard of care was able to be met in this audio-only visit.

## 2020-07-23 NOTE — PATIENT INSTRUCTIONS
Please call our clinic at [phone] or send a message to Dr. Samano on the [email or web address] portal if there are any changes to the plan described below, for example,if you are not contacted for the requested tests, referral(s) within one week, if you are unable to receive the medications prescribed, or if you feel you need to change the treatment course for any reason.     TESTING:  -- schedule a brain MRI without contrast to evaluate the surgical area (posterior fossa decompression) after repeated falls     REFERRALS:  -- none     PREVENTION OF PAIN:   -- continue gabapentin 600mg three times per day   -- continue Emgality at this time   -- return to surgery center on Thursday 8/6/2020 for diagnostic bilateral cervical medial branch blocks at nerve levels C4-5-6   -- COVID test 72 hours before (Monday)    AS-NEEDED TREATMENT OF PAIN:  -- continue tramadol as needed    -- continue ibuprofen as needed   -- continue robaxin as needed

## 2020-07-23 NOTE — H&P (VIEW-ONLY)
Established Patient - Audio Only Telehealth Visit     The patient location is: home   The chief complaint leading to consultation is: headache   Visit type: Virtual visit with audio only (telephone)  Total time spent with patient: 20 min        The reason for the audio only service rather than synchronous audio and video virtual visit was related to technical difficulties or patient preference/necessity.     Each patient to whom I provide medical services by telemedicine is:  (1) informed of the relationship between the physician and patient and the respective role of any other health care provider with respect to management of the patient; and (2) notified that they may decline to receive medical services by telemedicine and may withdraw from such care at any time. Patient verbally consented to receive this service via voice-only telephone call.    Date of service: 7/23/2020  Referring provider: No ref. provider found    Subjective:      Chief complaint: Headache       Patient ID: Dinah Mitchell is a 45 y.o. depression, borderline personality disorder, PTSD, fibromyalgia, and hypothyroidism presenting for follow-up of headache and neck pain    History of Present Illness    INTERVAL HISTORY - 7/23/2020     Today she reports she is doing worse. She has been falling - stumbling over her feet. Broke her right wrist. She is not losing consciousness.     The emgality is helping. She is having headaches every other day or every other morning upon waking up. These last 30 mins. She has sleep apnea. She does not use her CPAP - it needs to be cleaned.     She is taking ibuprofen or tramadol several times per week.     Her neck pain has been much worse. It is axial and most severe in the mid-neck down to the shoulder blades. Same pain that we had discussed treating with CMBB multiple times.       INTERVAL HISTORY - 3/26/2020    The patient location is: home   The chief complaint leading to consultation is: headache and neck  pain   Visit type: Virtual visit with synchronous audio and video  Total time spent with patient: 15 min   Each patient to whom he or she provides medical services by telemedicine is:  (1) informed of the relationship between the physician and patient and the respective role of any other health care provider with respect to management of the patient; and (2) notified that he or she may decline to receive medical services by telemedicine and may withdraw from such care at any time.    Last saw patient on 11/15/2019. At that point we continued Emgality and gabapentin for her migraines and planned a cervical epidural for her neck pain as requested by the neurosurgeon. I did the epidural on 12/5/19. Initial relief on phone call was 60%. I referred her for a neurocognitive evaluation due to persistent concerns with memory.     Today patient reports the cervical epidural made her feel worse for the first few days and then the pain returned to baseline without any further relief. The neck pain continues to be daily. She is dependent on the muscle relaxers for relief. Develops widespread spasms if she misses them.    She has been having bad headaches in parallel with being sick (upper respiratory) since Christmas. The emgality is definitely helping. Due to pharmacy issues, she had been late on almost all the doses and finds that the 3 days before she is due she gets horrible headaches.       INTERVAL HISTORY - 11/15/2019    She was last seen 2 months ago at which point we started Emgality for migraine headache.  We also had her complete home physical therapy for strength and falling.  In the interim she saw her neurosurgeon Dr. Busch who recommended to trial cervical epidural injection.  The patient preferred to do this procedure with me so she did not have to travel to the [place].  She is here to discuss this procedure. She has had ESIs with either Dr Vela in the past with relief of same pain     Her last cervical  imaging was 06/09/2019 which showed -   1. Loss of the normal cervical lordosis suggesting paraspinous muscle spasm.  2. Postoperative changes about the skull base.  3. Cervical degenerative disc disease.  Changes within the discs particularly at C4-5 and C5-C6 were present on the previous examination.    In regards to her headaches, she reports the little better.  After taking the shot the headaches are less frequent.  After about 2 and half weeks they come back the same.  The pain is in the back and head, left temple, behind the eyes. Hands go numb but so do feet. Neck pain is mainly axial, shoulder blades to neck. Current pain score is 5 with a range of 2-10.    She remains on Emgality, gabapentin, tramadol, ibuprofen, Marinol for nausea, Robaxin, tizanidine for muscle spasm.  She is not using anything else for rescue    She feels her memory is impaired, she gets confused, for the last couple of years.  For example, today she is very upset because she showed up very early for her appointment, not realizing how early she is despite her knowing what time her ointment was.  She reports she makes these mistakes often.  She is frustrated with this    INTERVAL HISTORY - 9/11/19    She was last seen approximately 7 months ago.  At that point she was having headaches 4 days per week.  We continued her regimen of gabapentin and tramadol    She is on gabapentin 600 mg 3 times a day, Robaxin, tizanidine, the tramadol has not been filled since July.    She sustained some head trauma, hit her left back of head on the cabinet, 6/9/19. Feels nauseous, worse headache.     Today she reports she is about the same.  She reports memory loss, confusion, headache, nausea, falling frequently, neck pain, numbness in the hands and feet, no depth perception.    The headaches are left temporal, back of the head, feels like a vice , neck pain. Her current pain score several much for to 10.    She feels like she is coming down with the  flu.     Headaches, nausea, and falling are her most concerning symptoms. Has not been in PT.     2 types of headaches  - temple, associated with light sensitivity, aggravated by activiity. Another like a vice . Wakes up with them.    Her dog passed away      INTERVAL HISTORY - 2/12/19     In the interim since last visit one year ago she has had bilateral occipital nerve block in cervical trigger point injection x3 with me with good relief.      She reports, that while I was on leave, she felt worse and worse. She was more nauseous and kept falling. She saw Dr. Busch for her Chiari malformation, which I previously recommended against surgery on as her symptoms were not clearly Chiari related and she had a non-obstructive CSF flow pattern on CINE. She had posterior fossa decompression on 12/13/18. She had severe headache and neck pain afterwards. She says the surgery helped her disabling headaches, she doesn't have headache any more when straining or bending over but she remains with the other headaches documented below. She has multiple questions about the surgery itself.     She reports she is still wobbly. She still has depth perception issues.     New symptoms - lump she feels on the the posterior parietal scalp, this area has never hurt before. Neck feels weak. Has to lay against something for support. Arms feel more weak then before. Sensation in her feet is returning.     Headaches - left temple, gradual onset, photo/phonophobia/nausea. These occur 4 days per week. Last at least 1-2 hours.     Her current pain score is seven for headache, nine for neck.  With a range of 6-9.    ORIGINAL PAIN HISTORY - 3/14/18   Age at onset and course over time: 2009 and worse over time, this happened during a time when she had a long commute. Neck is her worst pain - just left to midline of middle neck. Was going to [facility] pain management receiving epidural steroids which would last only 2 weeks. She had a CMBB and was  "planning for RFA but wanted to get cervical MRI first and second opinion  Location: neck (sharp, stabbing); hands and feet (tingling, numb), hip/lower back (dull, aching). Also has intermittent left sided headaches starting occipitally radiating to temple.   Quality: as above  Severity: current 8.5; range 7 to 10  Duration: hours  Frequency: daily  Alleviated by: medication, ice  Exacerbated by: movement, light noise (head pain)  Associated with: left arm/hand gets numb/weak "a lot" no radicular pain; headaches on left which are random or triggered by straining in restroom/coughing/laughing. Headaches worst in the AM within 2 hours of waking up. Headaches do not wake her up but neck pain does. Having some depth perception issues causing her to break her foot - eyes checked and were ok. Transient visual loss? When in a lof of pain, not clear TVO with valsalva.   Sleep habits:    Current acute treatment:  -- tramadol 50mg - every other day or every third day  -- ibuprofen 2-3 days a weel   -- robaxin during the day   -- tizanidine 4mg nightly     (xanax 1mg daily PRN for PTSD, anxiety from psychiatrist)     Current prevention:  -- emgality - started 9/2019 - effective to date   -- gabapentin 600mg TID   (wellbutrin)  (prozac)    Previously tried:   -- TENS   -- tramadol - helped with overall pain   -- methocarbamol - helped with overall pain   -- relafen - did not help     Procedural history:   -- cervical GREGOR  -- lumbar GREGOR  -- chiropractor for hip /neck - helped only hip  -- PT - none for neck just foot     Review of patient's allergies indicates:   Allergen Reactions    Lamotrigine      Other reaction(s): Rash  Other reaction(s): Nausea    Sulfa (sulfonamide antibiotics) Nausea Only     Other reaction(s): Unknown     Current Outpatient Medications   Medication Sig Dispense Refill    albuterol (PROVENTIL/VENTOLIN HFA) 90 mcg/actuation inhaler Inhale 2 puffs into the lungs every 6 (six) hours as needed for " Wheezing. Rescue 18 g 2    ALPRAZolam (XANAX) 1 MG tablet Take 1 tablet (1 mg total) by mouth daily as needed for Anxiety. 30 tablet 2    azelastine (OPTIVAR) 0.05 % ophthalmic solution INSTILL 1 DROP IN BOTH EYES TWICE A DAY 6 mL 14    buPROPion (WELLBUTRIN XL) 150 MG TB24 tablet Take 1 tablet (150 mg total) by mouth once daily. 90 tablet 0    diphenoxylate-atropine 2.5-0.025 mg (LOMOTIL) 2.5-0.025 mg per tablet Take 1 tablet by mouth 4 (four) times daily as needed. 80 tablet 0    dronabinol (MARINOL) 2.5 MG capsule Take 1 capsule (2.5 mg total) by mouth 2 (two) times daily before meals. 60 capsule 3    FLUoxetine 40 MG capsule Take 1 capsule (40 mg total) by mouth once daily. 90 capsule 0    fluticasone furoate-vilanteroL (BREO ELLIPTA) 200-25 mcg/dose DsDv diskus inhaler Inhale 1 puff into the lungs once daily. Controller 3 each 0    gabapentin (NEURONTIN) 600 MG tablet Take 1 tablet (600 mg total) by mouth 3 (three) times daily. 90 tablet 11    galcanezumab-gnlm (EMGALITY PEN) 120 mg/mL PnIj Inject 120 mg into the skin every 28 days. 1 Syringe 11    ipratropium-albuterol (COMBIVENT)  mcg/actuation inhaler Inhale 1 puff into the lungs every 4 (four) hours as needed for Wheezing. Rescue 4 g 2    levOCARNitine (CARNITOR) 330 mg Tab Take 3 tablets (990 mg total) by mouth 2 (two) times daily. 180 tablet 6    methocarbamoL (ROBAXIN) 500 MG Tab TAKE ONE AND ONE-HALF TABLETS TWICE A DAY AS NEEDED FOR MUSCLE SPASMS 90 tablet 11    montelukast (SINGULAIR) 10 mg tablet Take 1 tablet (10 mg total) by mouth once daily. 90 tablet 1    SYNTHROID 200 mcg tablet Take 1 tablet (200 mcg total) by mouth before breakfast. 90 tablet 2    tiZANidine (ZANAFLEX) 4 MG tablet Take 1 tablet (4 mg total) by mouth every evening. 90 tablet 3    traMADoL (ULTRAM) 50 mg tablet Take 1 tablet (50 mg total) by mouth every 6 (six) hours as needed. 60 tablet 0    UNABLE TO FIND N-acetyl-L-cysteine 50 mg subcutaneously 3 times  a week 10 mL 6    zolpidem (AMBIEN CR) 12.5 MG CR tablet Take 1 tablet (12.5 mg total) by mouth nightly as needed for Insomnia. 30 tablet 2     Current Facility-Administered Medications   Medication Dose Route Frequency Provider Last Rate Last Dose    medroxyPROGESTERone (DEPO-PROVERA) injection 150 mg  150 mg Intramuscular Q90 Days Nikhil Franco MD   150 mg at 12/04/18 1508       Past Medical History  Past Medical History:   Diagnosis Date    Arnold-Chiari deformity     Asthma     Borderline personality disorder     Carnitine deficiency     Depression     Fibromyalgia     Headache     History of abnormal cervical Pap smear     colpo/ LEEP and CKC    Hypothyroidism     IBS (irritable bowel syndrome)     GI smith negative    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     PATRICIA (obstructive sleep apnea)     severe - doesn't use CPAP    Panic attack     PTSD (post-traumatic stress disorder)        Past Surgical History  Past Surgical History:   Procedure Laterality Date    ANKLE SURGERY Right     x2 (#1 for ligament injury and #2 for fx and ligament)    AUGMENTATION OF BREAST      BRAIN SURGERY  12/2018    decompression/craniotomy Arnold-Chiari syndrome    BREAST SURGERY  1997    B implants    CERVICAL BIOPSY  W/ LOOP ELECTRODE EXCISION      DECOMPRESSION OF CHIARI MALFORMATION BY REMOVAL OF POSTERIOR ARCH OF FIRST CERVICAL VERTEBRA N/A 12/13/2018    Procedure: DECOMPRESSION, CHIARI MALFORMATION, BY 1ST CERVICAL VERTEBRA POSTERIOR ARCH REMOVAL;  Surgeon: Sergio Busch MD;  Location: [facility] OR [address] [place] [address];  Service: Neurosurgery;  Laterality: N/A;    EPIDURAL STEROID INJECTION INTO CERVICAL SPINE N/A 12/5/2019    Procedure: Injection-steroid-epidural-cervical-C7-T1;  Surgeon: Noa Samano MD;  Location: [facility] OR;  Service: Neurosurgery;  Laterality: N/A;    LAPAROSCOPIC GASTRIC BANDING      with subsequent removal due to abscess    LIPOSUCTION      x 2    PELVIC LAPAROSCOPY      for  "endometriosis eval with BTL    TUBAL LIGATION      wtih pelvic lap        Family History  Family History   Problem Relation Age of Onset    Diabetes Sister     Seizures Sister     Mitochondrial disorder Sister         "trent"    Mental illness Sister     Seizures Sister     Cervical cancer Sister     Cervical cancer Sister     Ovarian cancer Cousin     Ovarian cancer Paternal Aunt     Colon cancer Maternal Grandmother     Cancer Maternal Grandfather 65        throat ca    Diabetes Mother     Stroke Mother 30    Mental illness Mother         depression    Hyperlipidemia Mother     Hypertension Mother     Hyperlipidemia Father     Heart disease Father         "athletes heart"    Ovarian cancer Paternal Aunt     Cervical cancer Paternal Cousin     Cervical cancer Other     Breast cancer Neg Hx        Social History  Social History     Socioeconomic History    Marital status:      Spouse name: Not on file    Number of children: Not on file    Years of education: Not on file    Highest education level: Not on file   Occupational History    Occupation: foresenic     Social Needs    Financial resource strain: Not on file    Food insecurity     Worry: Not on file     Inability: Not on file    Transportation needs     Medical: Yes     Non-medical: Yes   Tobacco Use    Smoking status: Never Smoker    Smokeless tobacco: Never Used   Substance and Sexual Activity    Alcohol use: Yes     Frequency: Monthly or less     Drinks per session: 1 or 2     Binge frequency: Never     Comment: occasional "maybe monthly"    Drug use: No    Sexual activity: Yes     Partners: Male     Birth control/protection: Injection     Comment: depo since age 18   Lifestyle    Physical activity     Days per week: 0 days     Minutes per session: 0 min    Stress: Very much   Relationships    Social connections     Talks on phone: More than three times a week     Gets together: Never     Attends " Sabianism service: Not on file     Active member of club or organization: No     Attends meetings of clubs or organizations: Never     Relationship status:    Other Topics Concern    Not on file   Social History Narrative    Not on disability, not currently working.        Review of Systems  14-point review of systems as follows:   No check mariam indicates NEGATIVE response   Constitutional: [] weight loss, [] change to appetite   Eyes: [] change in vision, [] double vision   Ears, nose, mouth, throat: [] frequent nose bleeds, [] ringing in the ears   Respiratory: [] cough, [] wheezing   Cardiovascular: [] chest pain, [] palpitations   Gastrointestinal: [] jaundice, [] nausea/vomiting   Genitourinary: [] incontinence, [] burning with urination   Hematologic/lymphatic: [] easy bruising/bleeding, [] night sweats   Neurological: [] numbness, [] weakness   Endocrine: [] fatigue, [] heat/cold intolerance   Allergy/Immunologic: [] fevers, [] chills   Musculoskeletal: [x] muscle pain, [x] joint pain   Psychiatric: [] thoughts of harming self/others, [] depression   Integumentary: [] rashes, [] sores that do not heal       Objective:        There were no vitals filed for this visit.  There is no height or weight on file to calculate BMI.     PREVIOUS:   Reflexes full throughout  Confrontational strength 5/5 in all cervical dermatomes  Sensation to temperature intact all cervical dermatomes    CERVICAL SPINE:  INSPECTION:  Posterior decompression scar, tender  ROM:  Limited rotation  MUSCLE SPASM:  Diffuse  FACET LOADING:  Bilateral  SPURLING:  Negative      2/12/19  General: Well developed, well nourished.  No acute distress.  HEENT: Atraumatic, normocephalic. Bilateral ptosis (chronic).  Optic discs sharp bilaterally. The lumps she showed me are bony prominences of the calvarium  Neck: Trachea midline. Well-healed midline posterior surgical scar.  Multiple areas of muscle tenderness around neck  Cardiovascular:  Vitals reviewed. Normal peripheral perfusion.   Pulmonary: No increased work of breathing.  Abdomen/GI: No guarding  Musculoskeletal: No obvious joint deformities, moves all extremities symmetrically and well.  No drift, no orbit, symmetric finger tapping.     Neurological exam:  Mental status: Awake and alert. Oriented to situation.  Speech fluent and appropriate. Recent and remote memory appear to be intact.  Fund of knowledge normal.  Cranial nerves: Pupils equal round, extraocular movements intact, facial strength intact bilaterally, reports hearing is asymmetric  Sensation:  Normal to temperature  Reflexes: 2+ throughout, negative Vu  Coordination:  Normal finger-to-nose and heel-to-shin  Gait: Normal         3/14/18   Constitutional: appears in no acute distress, well-developed, well-nourished     Eyes: normal conjunctiva, PERRLA, bilateral right worse than left ptosis (baseline). Optic discs sharp bilaterally.     Ears, nose, mouth, throat: external appearance of ears and nose normal, hearing reduced on left     Cardiovascular: regular rate and rhythm, no murmurs appreciated    Respiratory: unlabored respirations, breath sounds normal bilaterally    Gastrointestinal: no visible abdominal masses, no guarding, no visible hernia    Musculoskeletal: normal tone in all four extremities. No atrophy. No abnormal movements. Strength in all muscles groups of the upper and lower extremities is 5/5. Normal gait and station. Digits and nails normal.      Spine:  CERVICAL SPINE:  INSPECTION: no scars   ROM: slightly limited   MUSCLE SPASM: bilateral   FACET LOADING: left  SPURLING: neg  JEZ tender: bilateral     Psychiatric: normal judgment and insight. Oriented to person, place, and time.     Neurologic:   Cortical functions: recent and remote memory intact, normal attention span and concentration, speech fluent, adequate fund of knowledge   Cranial nerves: visual fields full, PERRLA, EOMI, facial sensation intact  in V1-V3, symmetric facial strength, hearing intact to finger rub, palate elevates symmetrically, shoulder shrug 5/5, tongue protrudes midline   Reflexes: 2+ in the upper and lower extremities, no Vu, no babinski   Sensation: reduced to temperature in a length dependent manner in the hands and to knees, vibration reduced toes, ankles, knees  Coordination: normal    Data Review:     I have personally reviewed the referring provider's notes, labs, & imaging made available to me today.     RADIOLOGY STUDIES:  I have personally reviewed the pertinent images performed.     MRI cervical spine 2/27/18  1. The cerebellar tonsils extend below the foramen magnum approximately 8-9 mm, consistent with Chiari 1 malformation.  The cord is normal without evidence of syrinx, signal abnormality or abnormal enhancement.  2. There is mild degenerative change.  There is, however, no significant spinal canal or foraminal stenosis.  There is no cord compression.  There is no suspected nerve root compression.  Please see above discussion.    Results for orders placed or performed during the hospital encounter of 02/05/18   MRI Brain W WO Contrast    Narrative    Routine Multiplanar imaging through this 42-year-old females brain was obtained with utilization of 10 cc of gadolinium contrast    Hyperostosis frontalis appears to be present.     A Chiari one malformation appears to be present with the cerebellar tonsils extending 9 mm inferior to the foramen magnum with so-called pegging of the cerebellar tonsils.    An empty sella configuration of the pituitary gland is noted.    No diffusion positivity is identified to suggest recent infarction.    No gradient  susceptibility was noted just just presence of acute blood products.    No worrisome enhancing lesions are noted intracranially    No diffusion positivity is identified to suggest recent infarction.    Impression    1. The cerebellar tonsils appear to extend inferior to the  foramen magnum suggestive of a Chiari one malformation. No obvious cord signal abnormalities noted within the visualized cord, given the patient's history further evaluation of the cord may be of use to exclude a syrinx        Electronically signed by: Ismael Richmond MD  Date:     02/05/18  Time:    12:04    Results for orders placed or performed during the hospital encounter of 09/25/15   CT Head Without Contrast    Narrative    Comparison: None    Technique: 5 mm noncontrast axial images through the brain were obtained.    Findings:     The brain is normally formed with no indication of acute/recent major vascular distribution cerebral infarction, intraparenchymal hemorrhage, or intra-axial space occupying lesion. There is preserved gray-white matter junction differentiation.     The ventricular system is normal in appearance for age. No hydrocephalus. No effacement of the skull-base cisterns. No abnormal extra-axial fluid collections or blood products.     The paranasal sinuses and mastoid air cells are unremarkable.  There is incidentally observed hyperostosis frontalis.    Impression         No acute  intracranial abnormalities are appreciated.      Electronically signed by: Toyin Shin MD  Date:     09/25/15  Time:    13:41        Lab Results   Component Value Date     06/19/2020    K 4.4 06/19/2020    MG 2.3 12/14/2018     06/19/2020    CO2 21 (L) 06/19/2020    BUN 9 06/19/2020    CREATININE 1.0 06/19/2020    GLU 95 06/19/2020    HGBA1C 5.3 06/19/2020    AST 35 06/19/2020    ALT 42 06/19/2020    ALBUMIN 4.1 06/19/2020    PROT 7.2 06/19/2020    BILITOT 0.4 06/19/2020    CHOL 226 (H) 06/19/2020    HDL 50 06/19/2020    LDLCALC 150.2 06/19/2020    TRIG 129 06/19/2020       Lab Results   Component Value Date    WBC 5.80 06/19/2020    HGB 12.7 06/19/2020    HCT 39.0 06/19/2020    MCV 96 06/19/2020     06/19/2020       Lab Results   Component Value Date    TSH 0.874 06/19/2020       Assessment  & Plan:       Problem List Items Addressed This Visit        Neuro    Chronic migraine without aura, intractable, without status migrainosus    Overview     The patient has chronic migraines (G43.719) and suffers from headaches more than 15 days a month lasting more than 4 hours a day with no relief of symptoms despite trying multiple medications including but not limited to anti-epileptics (GABAPENTIN, LAMOTRIGINE), and antidepressants (FLUOXETINE).     Doing very well on Emgality; continue     Has almost daily early morning headaches, which resolved in 30 min.  Explain these are most likely related to her untreated obstructive sleep apnea.  I strongly encouraged that she use her CPAP.  She reported she will make arrangements for this.         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 - Primary    Overview     Mild cervical facet arthropathy  DDD C4-5, C5-6  12/5/2019 no relief from cervical GREGOR C7-T1    Return to surgery center on Thursday 8/6/2020 for diagnostic bilateral cervical medial branch blocks at nerve levels C4-5-6          Relevant Orders    Case Request Operating Room: Block-nerve-medial branch-cervical  Bilateral   C4-5-6 (Completed)       Other    Repeated falls    Relevant Orders    MRI Brain Without Contrast    PATRICIA (obstructive sleep apnea)    Overview     Does not use CPAP                   Please call our clinic at [phone] or send a message to Dr. Samano on the [employer] portal if there are any changes to the plan described below, for example,if you are not contacted for the requested tests, referral(s) within one week, if you are unable to receive the medications prescribed, or if you feel you need to change the treatment course for any reason.     TESTING:  -- schedule a brain MRI without contrast to evaluate the surgical area (posterior fossa decompression) after repeated falls     REFERRALS:  -- none     PREVENTION OF PAIN:   -- continue gabapentin 600mg three times  per day   -- continue Emgality at this time   -- return to surgery center on Thursday 8/6/2020 for diagnostic bilateral cervical medial branch blocks at nerve levels C4-5-6   -- COVID test 72 hours before (Monday)    AS-NEEDED TREATMENT OF PAIN:  -- continue tramadol as needed    -- continue ibuprofen as needed   -- continue robaxin as needed     No follow-ups on file.       Noa Samano MD      This service was not originating from a related E/M service provided within the previous 7 days nor will  to an E/M service or procedure within the next 24 hours or my soonest available appointment.  Prevailing standard of care was able to be met in this audio-only visit.

## 2020-07-23 NOTE — TELEPHONE ENCOUNTER
Called patient and informed visit will be an audio visit not a virtual. Patient expressed understanding

## 2020-07-24 ENCOUNTER — TELEPHONE (OUTPATIENT)
Dept: NEUROLOGY | Facility: CLINIC | Age: 45
End: 2020-07-24

## 2020-07-24 DIAGNOSIS — Z01.812 PRE-PROCEDURE LAB EXAM: Primary | ICD-10-CM

## 2020-07-24 NOTE — TELEPHONE ENCOUNTER
Spoke with pt to schedule MRI of brain without contrast and COVID pre procedure testing. Scheduled both for Monday 8/3/2020.     Pt requested for me to leave Dr Samano a msg about pts concern of her neck pain and continued falls.  Dr Samano she is wondering if The MRI of the brain is all you wanted and not the neck as well. Please advise.

## 2020-07-31 DIAGNOSIS — F43.10 PTSD (POST-TRAUMATIC STRESS DISORDER): ICD-10-CM

## 2020-07-31 RX ORDER — ZOLPIDEM TARTRATE 12.5 MG/1
12.5 TABLET, FILM COATED, EXTENDED RELEASE ORAL NIGHTLY PRN
Qty: 30 TABLET | Refills: 0 | Status: SHIPPED | OUTPATIENT
Start: 2020-07-31 | End: 2020-11-03 | Stop reason: ALTCHOICE

## 2020-08-03 ENCOUNTER — LAB VISIT (OUTPATIENT)
Dept: FAMILY MEDICINE | Facility: CLINIC | Age: 45
End: 2020-08-03
Payer: MEDICARE

## 2020-08-03 ENCOUNTER — TELEPHONE (OUTPATIENT)
Dept: NEUROLOGY | Facility: CLINIC | Age: 45
End: 2020-08-03

## 2020-08-03 DIAGNOSIS — Z01.812 PRE-PROCEDURE LAB EXAM: ICD-10-CM

## 2020-08-03 LAB — SARS-COV-2 RNA RESP QL NAA+PROBE: NOT DETECTED

## 2020-08-03 PROCEDURE — U0003 INFECTIOUS AGENT DETECTION BY NUCLEIC ACID (DNA OR RNA); SEVERE ACUTE RESPIRATORY SYNDROME CORONAVIRUS 2 (SARS-COV-2) (CORONAVIRUS DISEASE [COVID-19]), AMPLIFIED PROBE TECHNIQUE, MAKING USE OF HIGH THROUGHPUT TECHNOLOGIES AS DESCRIBED BY CMS-2020-01-R: HCPCS

## 2020-08-03 NOTE — TELEPHONE ENCOUNTER
----- Message from Enrique Shepherd sent at 8/3/2020  8:00 AM CDT -----  Type: Needs Medical Advice  Who Called:  Patient    Best Call Back Number: [phone]  Additional Information: Patient states that she would like a callback regarding rescheduling her MRI.

## 2020-08-06 ENCOUNTER — HOSPITAL ENCOUNTER (OUTPATIENT)
Facility: HOSPITAL | Age: 45
Discharge: HOME | End: 2020-08-06
Attending: PSYCHIATRY & NEUROLOGY | Admitting: PSYCHIATRY & NEUROLOGY
Payer: MEDICARE

## 2020-08-06 ENCOUNTER — HOSPITAL ENCOUNTER (OUTPATIENT)
Dept: RADIOLOGY | Facility: HOSPITAL | Age: 45
Discharge: HOME | End: 2020-08-06
Attending: PSYCHIATRY & NEUROLOGY | Admitting: PSYCHIATRY & NEUROLOGY
Payer: MEDICARE

## 2020-08-06 VITALS
HEART RATE: 76 BPM | WEIGHT: 270 LBS | RESPIRATION RATE: 18 BRPM | DIASTOLIC BLOOD PRESSURE: 67 MMHG | BODY MASS INDEX: 42.38 KG/M2 | SYSTOLIC BLOOD PRESSURE: 137 MMHG | HEIGHT: 67 IN | TEMPERATURE: 97 F | OXYGEN SATURATION: 94 %

## 2020-08-06 DIAGNOSIS — M47.812 SPONDYLOSIS OF CERVICAL REGION WITHOUT MYELOPATHY OR RADICULOPATHY: Primary | ICD-10-CM

## 2020-08-06 DIAGNOSIS — M54.12 CERVICAL RADICULOPATHY: ICD-10-CM

## 2020-08-06 LAB
B-HCG UR QL: NEGATIVE
CTP QC/QA: YES

## 2020-08-06 PROCEDURE — 76000 FLUOROSCOPY <1 HR PHYS/QHP: CPT | Mod: TC,PO

## 2020-08-06 PROCEDURE — 25000003 PHARM REV CODE 250: Mod: PO | Performed by: PSYCHIATRY & NEUROLOGY

## 2020-08-06 PROCEDURE — 64490 INJ PARAVERT F JNT C/T 1 LEV: CPT | Mod: 50,PO | Performed by: PSYCHIATRY & NEUROLOGY

## 2020-08-06 PROCEDURE — 81025 URINE PREGNANCY TEST: CPT | Mod: PO | Performed by: PSYCHIATRY & NEUROLOGY

## 2020-08-06 PROCEDURE — 64491 INJ PARAVERT F JNT C/T 2 LEV: CPT | Mod: 50,ICN,, | Performed by: PSYCHIATRY & NEUROLOGY

## 2020-08-06 PROCEDURE — 99152 PR MOD CONSCIOUS SEDATION, SAME PHYS, 5+ YRS, FIRST 15 MIN: ICD-10-PCS | Mod: ICN,,, | Performed by: PSYCHIATRY & NEUROLOGY

## 2020-08-06 PROCEDURE — 64491 INJ PARAVERT F JNT C/T 2 LEV: CPT | Mod: 50,PO | Performed by: PSYCHIATRY & NEUROLOGY

## 2020-08-06 PROCEDURE — 64490 PR INJ DX/THER AGNT PARAVERT FACET JOINT,IMG GUIDE,CERV/THORAC, 1ST LEVEL: ICD-10-PCS | Mod: 50,ICN,, | Performed by: PSYCHIATRY & NEUROLOGY

## 2020-08-06 PROCEDURE — 64490 INJ PARAVERT F JNT C/T 1 LEV: CPT | Mod: 50,ICN,, | Performed by: PSYCHIATRY & NEUROLOGY

## 2020-08-06 PROCEDURE — 63600175 PHARM REV CODE 636 W HCPCS: Mod: PO | Performed by: PSYCHIATRY & NEUROLOGY

## 2020-08-06 PROCEDURE — 99152 MOD SED SAME PHYS/QHP 5/>YRS: CPT | Mod: ICN,,, | Performed by: PSYCHIATRY & NEUROLOGY

## 2020-08-06 PROCEDURE — 64491 PR INJ DX/THER AGNT PARAVERT FACET JOINT,IMG GUIDE,CERV/THORAC, 2ND LEVEL: ICD-10-PCS | Mod: 50,ICN,, | Performed by: PSYCHIATRY & NEUROLOGY

## 2020-08-06 RX ORDER — FENTANYL CITRATE 50 UG/ML
INJECTION, SOLUTION INTRAMUSCULAR; INTRAVENOUS
Status: DISCONTINUED | OUTPATIENT
Start: 2020-08-06 | End: 2020-08-06 | Stop reason: HOSPADM

## 2020-08-06 RX ORDER — SODIUM CHLORIDE, SODIUM LACTATE, POTASSIUM CHLORIDE, CALCIUM CHLORIDE 600; 310; 30; 20 MG/100ML; MG/100ML; MG/100ML; MG/100ML
INJECTION, SOLUTION INTRAVENOUS CONTINUOUS
Status: DISCONTINUED | OUTPATIENT
Start: 2020-08-06 | End: 2020-08-06 | Stop reason: HOSPADM

## 2020-08-06 RX ORDER — BUPIVACAINE HYDROCHLORIDE 2.5 MG/ML
INJECTION, SOLUTION EPIDURAL; INFILTRATION; INTRACAUDAL
Status: DISCONTINUED | OUTPATIENT
Start: 2020-08-06 | End: 2020-08-06 | Stop reason: HOSPADM

## 2020-08-06 RX ORDER — LIDOCAINE HYDROCHLORIDE 10 MG/ML
INJECTION INFILTRATION; PERINEURAL
Status: DISCONTINUED | OUTPATIENT
Start: 2020-08-06 | End: 2020-08-06 | Stop reason: HOSPADM

## 2020-08-06 RX ORDER — MIDAZOLAM HYDROCHLORIDE 1 MG/ML
INJECTION INTRAMUSCULAR; INTRAVENOUS
Status: DISCONTINUED | OUTPATIENT
Start: 2020-08-06 | End: 2020-08-06 | Stop reason: HOSPADM

## 2020-08-06 RX ADMIN — SODIUM CHLORIDE, SODIUM LACTATE, POTASSIUM CHLORIDE, AND CALCIUM CHLORIDE: .6; .31; .03; .02 INJECTION, SOLUTION INTRAVENOUS at 09:08

## 2020-08-06 NOTE — INTERVAL H&P NOTE
The patient has been examined and the H&P has been reviewed:    I concur with the findings and no changes have occurred since H&P was written. we will be blocking levels C3-4-5 bilateral     Anesthesia risks, benefits and alternative options discussed and understood by patient/family.          Active Hospital Problems    Diagnosis  POA    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 [M47.812]  Yes     Mild cervical facet arthropathy  DDD C4-5, C5-6  12/5/2019 no relief from cervical GREGOR C7-T1    Return to surgery center on Thursday 8/6/2020 for diagnostic bilateral cervical medial branch blocks at nerve levels C4-5-6         Resolved Hospital Problems   No resolved problems to display.

## 2020-08-06 NOTE — OP NOTE
PROCEDURE DATE: 8/6/2020    PROCEDURE:  Cervical medial branch block of the C3, 4, 5 medial branch nerves, bilaterally, utilizing fluoroscopy    CPT:  55192 - 50  61420 - 50    DIAGNOSIS:  Cervical Facet Arthropathy, Cervical spondylosis     PHYSICIAN: Noa Samano MD    MEDICATIONS INJECTED:  0.25% bupivacaine, 1 ml at each level.    LOCAL ANESTHETIC USED:   1% lidocaine, 1ml at each level.    ANESTHESIA: midazolam 2mg IV, fentanyl 25mcg IV     ESTIMATED BLOOD LOSS:  < 5cc     COMPLICATIONS:  None     TECHNIQUE : A time-out was taken to identify patient and procedure side prior to starting the procedure.  The patient was positioned prone with the neck in a slightly flexed position.The patient was prepped and draped in the usual sterile fashion using ChloraPrep and sterile towels.  The image intensifier was positioned to obtain an AP view of the facet pillars on the left side. The areas corresponding to the anatomic locations of the respective cervical medial branch nerves were marked and the skin overlying these areas was infiltrated with local anesthetic. Then, a straight 25 gauge 3.5 inch spinal needle was inserted superficially and further guided to rest in the midpoint of the articular pillar corresponding to the medial branch nerve being blocked using intermittent lateral fluoroscopy. Once in the target position, after confirming there was no vascular uptake, the above noted medication was then injected at each nerve level. The patient tolerated the procedure well.     The patient was monitored after the procedure. The patient will be contacted on the next business day to determine extent of relief. The patient was given post procedure and discharge instructions to follow at home. The patient was discharged in a stable condition

## 2020-08-06 NOTE — DISCHARGE SUMMARY
[facility]  Discharge Note  Short Stay    Admit Date: 8/6/2020    Discharge Date: 8/6/2020    Attending Physician: Noa Samano MD     Discharge Provider: Noa Samano    Diagnoses:  Active Hospital Problems    Diagnosis  POA    *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 [M47.812]  Yes     Mild cervical facet arthropathy  DDD C4-5, C5-6  12/5/2019 no relief from cervical GREGOR C7-T1     8/6/2020 s/p diagnostic bilateral cervical medial branch blocks at nerve levels C3-4-5        Resolved Hospital Problems   No resolved problems to display.       Discharged Condition: good    Final Diagnoses: 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 [M47.812]    Disposition: Home or Self Care    Hospital Course: no complications, uneventful    Outcome of Hospitalization, Treatment, Procedure, or Surgery:  Patient was admitted for outpatient procedure. The patient underwent procedure without complications and are discharged home    Follow up/Patient Instructions:  Follow up as scheduled/Patient has received instructions and follow up date    Medications:  Continue previous medications    Discharge Procedure Orders   Notify your health care provider if you experience any of the following:  temperature >100.4     Notify your health care provider if you experience any of the following:  persistent nausea and vomiting or diarrhea     Notify your health care provider if you experience any of the following:  severe uncontrolled pain     Notify your health care provider if you experience any of the following:  redness, tenderness, or signs of infection (pain, swelling, redness, odor or green/yellow discharge around incision site)     Notify your health care provider if you experience any of the following:  difficulty breathing or increased cough     Notify your health care provider if you experience any of the following:  severe persistent headache     Notify your health care  provider if you experience any of the following:  worsening rash     Notify your health care provider if you experience any of the following:  persistent dizziness, light-headedness, or visual disturbances     Notify your health care provider if you experience any of the following:  increased confusion or weakness     No dressing needed         Discharge Procedure Orders (must include Diet, Follow-up, Activity):   Discharge Procedure Orders (must include Diet, Follow-up, Activity)   Notify your health care provider if you experience any of the following:  temperature >100.4     Notify your health care provider if you experience any of the following:  persistent nausea and vomiting or diarrhea     Notify your health care provider if you experience any of the following:  severe uncontrolled pain     Notify your health care provider if you experience any of the following:  redness, tenderness, or signs of infection (pain, swelling, redness, odor or green/yellow discharge around incision site)     Notify your health care provider if you experience any of the following:  difficulty breathing or increased cough     Notify your health care provider if you experience any of the following:  severe persistent headache     Notify your health care provider if you experience any of the following:  worsening rash     Notify your health care provider if you experience any of the following:  persistent dizziness, light-headedness, or visual disturbances     Notify your health care provider if you experience any of the following:  increased confusion or weakness     No dressing needed

## 2020-08-06 NOTE — DISCHARGE INSTRUCTIONS
PAIN MANAGEMENT    Home care instructions   Apply ice pack to the injection site for 20 minute prior for the first 24 hours for soreness/discomfort at injection site   DO NOT USE HEAT FOR 24 HOURS   Keep site clean and dry for 24 hours, remove bandaid when desired   Do not drive until tomorrow  Take care when walking after a lumbar injection       BLOCKS  Resume regular activities today  Pain office will call in next 2 days      Resume Aspirin, Plavix, or Coumadin the day after the procedure unless other wise instructed  Resume home medication as prescribed today      CALL PHYSICIAN FOR:   Severe increase in your usual pain or appearance of new pain   Prolonged or increasing weakness or numbness in the legs or arms   Fever greater then 100 degrees F..   Drainage from the incision site, redness, active bleeding or increased swelling at the injection site   Headache that increases when your head is upright and decreases when you lie flat    FOR EMERGENCIES:   Go directly to Emergency Department for Shortness of breath, chest pain, or problems breathing

## 2020-08-06 NOTE — PLAN OF CARE
Patient tolerating oral liquids without difficulty. No apparent s&s of distress noted at this time, no complaints voiced at this time. .  Discharge instructions reviewed with patient/family/friend with good verbal feedback received. Patient ready for discharge

## 2020-08-07 ENCOUNTER — TELEPHONE (OUTPATIENT)
Dept: NEUROLOGY | Facility: CLINIC | Age: 45
End: 2020-08-07

## 2020-08-07 DIAGNOSIS — M47.812 SPONDYLOSIS OF CERVICAL REGION WITHOUT MYELOPATHY OR RADICULOPATHY: Primary | ICD-10-CM

## 2020-08-07 DIAGNOSIS — Z01.812 PRE-PROCEDURE LAB EXAM: Primary | ICD-10-CM

## 2020-08-07 NOTE — TELEPHONE ENCOUNTER
Called pt to ask if 8/20/20 would be a date she could schedule her next procedure with Dr Samano.  She confimred Thursday 8/2020 will be a good date for her.  Scheduled her pre procedure Covid-19 lab on 8/17/20 @ 1:00pm.

## 2020-08-07 NOTE — PROGRESS NOTES
Problem List Items Addressed This Visit        Neuro    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 - Primary    Overview     Mild cervical facet arthropathy  DDD C4-5, C5-6  12/5/2019 no relief from cervical GREGOR C7-T1     8/6/2020 s/p diagnostic bilateral cervical medial branch blocks at nerve levels C3-4-5 - 80% relief x 6 hours, proceed with bilateral RFA of same nerves on 8/20/2020         Relevant Orders    Case Request Operating Room: RADIOFREQUENCY THERMAL COAGULATION, NERVE, SPINAL, CERVICAL, POSTERIOR RAMUS, MEDIAL BRANCH  Bilateral, C3-4-5 (Completed)

## 2020-08-07 NOTE — TELEPHONE ENCOUNTER
----- Message from Noa Samano MD sent at 8/6/2020 11:03 AM CDT -----   Please call patient Friday/next business day to -    1. Block - determine percentage / duration relief. If greater then 50% relief have her choose a date for bilateral RFA

## 2020-08-07 NOTE — TELEPHONE ENCOUNTER
Called patient and she stated that after the procedure she was at about 80% relief then it decrease back to only about 15-20% at around 6:00pm. She placed her head up on a pillow and that aggravated the site. On pain scale she is at between a 5-6/10 at this time. As far as a date for the bilateral RFA, pt stated it does not matter but would like it ASAP.

## 2020-08-10 DIAGNOSIS — M47.812 SPONDYLOSIS OF CERVICAL REGION WITHOUT MYELOPATHY OR RADICULOPATHY: ICD-10-CM

## 2020-08-10 RX ORDER — TRAMADOL HYDROCHLORIDE 50 MG/1
50 TABLET ORAL EVERY 6 HOURS PRN
Qty: 60 TABLET | Refills: 0 | Status: SHIPPED | OUTPATIENT
Start: 2020-08-10 | End: 2020-08-31 | Stop reason: SDUPTHER

## 2020-08-13 ENCOUNTER — TELEPHONE (OUTPATIENT)
Dept: NEUROLOGY | Facility: CLINIC | Age: 45
End: 2020-08-13

## 2020-08-13 NOTE — TELEPHONE ENCOUNTER
----- Message from Noa Samano MD sent at 8/13/2020  3:40 PM CDT -----  Regarding: RE: please review codes .. is this request for mbb or RFA  - dos 08/20/2020  The codes were for RFA. I thought that since she has a Medicare plan she would only need one MBB. If she truly needs two MBB then we will change the 8/20 procedure to an MBB. Please confirm  ----- Message -----  From: Aretha Dudley  Sent: 8/13/2020   3:33 PM CDT  To: Noa Samano MD, Selwyn Latham Staff  Subject: please review codes .. is this request for m#    Please review the codes and get back with me on the codes for the 2nd MBB if the dr wants the MBB per order per [employer] NURSE she states the codes are for a RFA and the patient needs to complete 2 MBBS prior to approval of the RFA   Codes in referral are   06148, 64580    Surgeon/Provider Role  Noa Samano MD Primary     Procedure Laterality Anesthesia  RADIOFREQUENCY THERMAL COAGULATION, NERVE, SPINAL, CERVICAL, POSTERIOR RAMUS, MEDIAL BRANCH Bilateral, C3-4-5

## 2020-08-13 NOTE — TELEPHONE ENCOUNTER
----- Message from Aretha Luis Enrique sent at 8/13/2020  3:33 PM CDT -----  Regarding: please review codes .. is this request for mbb or RFA  - dos 08/20/2020  Please review the codes and get back with me on the codes for the 2nd MBB if the dr wants the MBB per order per [employer] NURSE she states the codes are for a RFA and the patient needs to complete 2 MBBS prior to approval of the RFA   Codes in referral are   67872, 76685    Surgeon/Provider Role  Noa Samano MD Primary     Procedure Laterality Anesthesia  RADIOFREQUENCY THERMAL COAGULATION, NERVE, SPINAL, CERVICAL, POSTERIOR RAMUS, MEDIAL BRANCH Bilateral, C3-4-5

## 2020-08-13 NOTE — TELEPHONE ENCOUNTER
Pt scheduled for OPP on 8/20/20 forms snet to [facility], pt has appt for pre COVID testing and pre procedures instructions put in the mail.

## 2020-08-14 ENCOUNTER — TELEPHONE (OUTPATIENT)
Dept: NEUROLOGY | Facility: CLINIC | Age: 45
End: 2020-08-14

## 2020-08-14 ENCOUNTER — DOCUMENTATION ONLY (OUTPATIENT)
Dept: NEUROLOGY | Facility: CLINIC | Age: 45
End: 2020-08-14

## 2020-08-14 DIAGNOSIS — M47.812 SPONDYLOSIS OF CERVICAL REGION WITHOUT MYELOPATHY OR RADICULOPATHY: Primary | ICD-10-CM

## 2020-08-14 NOTE — PROGRESS NOTES
Problem List Items Addressed This Visit        Neuro    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 - Primary    Overview     Mild cervical facet arthropathy  DDD C4-5, C5-6  12/5/2019 no relief from cervical GREGOR C7-T1     8/6/2020 s/p diagnostic bilateral cervical medial branch blocks at nerve levels C3-4-5 - 80% relief x 6 hours, wanted to proceed with bilateral RFA of same nerves on 8/20/2020 but her insurance required a second diagnostic block so change procedure to bilateral CMBB at C3-4-5         Relevant Orders    Case Request Operating Room: Block-nerve-medial branch-cervical  Bilateral C3-4-5 (Completed)

## 2020-08-14 NOTE — TELEPHONE ENCOUNTER
----- Message from Aretha Luis Enrique sent at 2020  3:37 PM CDT -----  Regarding: RE: please review codes .. is this request for mbb or RFA  - dos 2020  Ok .. case submitted to [facility] .. but it bubba need further review bc this case for ablation was withdrawn please call [facility] at c # [phone] option # 2 use patients name and  as the case ref # and let me know the determination . I think they just need to hear the prior case was requested by mistake for the provider ...     Aretha Luis Enrique   [phone]   ----- Message -----  From: Noa Samano MD  Sent: 2020   3:40 PM CDT  To: Selwyn David Staff  Subject: RE: please review codes .. is this request f#    The codes were for RFA. I thought that since she has a Medicare plan she would only need one MBB. If she truly needs two MBB then we will change the  procedure to an MBB. Please confirm  ----- Message -----  From: Aretha Dudley  Sent: 2020   3:33 PM CDT  To: Noa Samano MD, Selwyn Latham Staff  Subject: please review codes .. is this request for m#    Please review the codes and get back with me on the codes for the 2nd MBB if the dr wants the MBB per order per [facility] NURSE she states the codes are for a RFA and the patient needs to complete 2 MBBS prior to approval of the RFA   Codes in referral are   25086, 60994    Surgeon/Provider Role  Noa Samano MD Primary     Procedure Laterality Anesthesia  RADIOFREQUENCY THERMAL COAGULATION, NERVE, SPINAL, CERVICAL, POSTERIOR RAMUS, MEDIAL BRANCH Bilateral, C3-4-5

## 2020-08-14 NOTE — TELEPHONE ENCOUNTER
Mary BAR in surgery scheduling notified of changes, consent in [address]. Pt called and notified of procedure changes.

## 2020-08-14 NOTE — TELEPHONE ENCOUNTER
Spoke with Ms Stephen informed her of the change in procedure on 8/20/20. Her insurance company is required 2 MBB prior to the RTA.  She is now scheduled with Dr Samano for the MBB.Verbalized understanding.   
detailed exam

## 2020-08-14 NOTE — TELEPHONE ENCOUNTER
----- Message from Noa Samano MD sent at 8/14/2020 11:28 AM CDT -----  Regarding: FW: please review codes .. is this request for mbb or RFA  - dos 08/20/2020  Updated her surgical orders to repeat diagnostic block rather than RFA for 8/20    Please let patient know her insurance requires 2 diagnostic blocks    Please let surgery centrer know we are changing her cervical RFA to cervical MBB  ----- Message -----  From: Aretha Luis Enrique  Sent: 8/13/2020   3:33 PM CDT  To: Noa Samano MD, [facility] Latham Staff  Subject: please review codes .. is this request for m#    Please review the codes and get back with me on the codes for the 2nd MBB if the dr wants the MBB per order per [employer] NURSE she states the codes are for a RFA and the patient needs to complete 2 MBBS prior to approval of the RFA   Codes in referral are   60632, 54325    Surgeon/Provider Role  Noa Samano MD Primary     Procedure Laterality Anesthesia  RADIOFREQUENCY THERMAL COAGULATION, NERVE, SPINAL, CERVICAL, POSTERIOR RAMUS, MEDIAL BRANCH Bilateral, C3-4-5

## 2020-08-17 ENCOUNTER — LAB VISIT (OUTPATIENT)
Dept: FAMILY MEDICINE | Facility: CLINIC | Age: 45
End: 2020-08-17
Payer: MEDICARE

## 2020-08-17 DIAGNOSIS — Z01.812 PRE-PROCEDURE LAB EXAM: ICD-10-CM

## 2020-08-17 PROCEDURE — U0003 INFECTIOUS AGENT DETECTION BY NUCLEIC ACID (DNA OR RNA); SEVERE ACUTE RESPIRATORY SYNDROME CORONAVIRUS 2 (SARS-COV-2) (CORONAVIRUS DISEASE [COVID-19]), AMPLIFIED PROBE TECHNIQUE, MAKING USE OF HIGH THROUGHPUT TECHNOLOGIES AS DESCRIBED BY CMS-2020-01-R: HCPCS

## 2020-08-18 LAB — SARS-COV-2 RNA RESP QL NAA+PROBE: NOT DETECTED

## 2020-08-19 ENCOUNTER — TELEPHONE (OUTPATIENT)
Dept: PSYCHIATRY | Facility: CLINIC | Age: 45
End: 2020-08-19

## 2020-08-19 NOTE — TELEPHONE ENCOUNTER
I confirmed with pt that she uses tramadol PRN 2-3x/wk at max and also uses xanax PRN 2-3x/wk. Understands risks of using on same day or concomitantly.     Will approve the xanax use. Gave nursing staff verbal ok to approve with pharmacy.

## 2020-08-19 NOTE — TELEPHONE ENCOUNTER
Pharmacists called to get the ok to fill prescription Alprazolam.   States patient has been receiving prescription tramadol from another provider. Wanting to make sure you were aware. If it's ok to continue Alprazolam.    [phone] reference #[id number] Mr. Abi Florez

## 2020-08-19 NOTE — TELEPHONE ENCOUNTER
Copied from message:       Per Dr. Wells ok to give verbal to pharmacist Marli Armando to continue prescription Alprazolam.   Vikki      This nurse spoke to Cindy with pharmacy and gave verbal approval per Dr Wells.

## 2020-08-20 ENCOUNTER — HOSPITAL ENCOUNTER (OUTPATIENT)
Facility: HOSPITAL | Age: 45
Discharge: HOME | End: 2020-08-20
Attending: PSYCHIATRY & NEUROLOGY | Admitting: PSYCHIATRY & NEUROLOGY
Payer: MEDICARE

## 2020-08-20 ENCOUNTER — HOSPITAL ENCOUNTER (OUTPATIENT)
Dept: RADIOLOGY | Facility: HOSPITAL | Age: 45
Discharge: HOME | End: 2020-08-20
Attending: PSYCHIATRY & NEUROLOGY
Payer: MEDICARE

## 2020-08-20 DIAGNOSIS — M54.12 CERVICAL RADICULOPATHY: ICD-10-CM

## 2020-08-20 DIAGNOSIS — M47.812 SPONDYLOSIS OF CERVICAL REGION WITHOUT MYELOPATHY OR RADICULOPATHY: Primary | ICD-10-CM

## 2020-08-20 LAB
B-HCG UR QL: NEGATIVE
CTP QC/QA: YES

## 2020-08-20 PROCEDURE — 64491 INJ PARAVERT F JNT C/T 2 LEV: CPT | Mod: 50,,, | Performed by: PSYCHIATRY & NEUROLOGY

## 2020-08-20 PROCEDURE — 64491 INJ PARAVERT F JNT C/T 2 LEV: CPT | Mod: 50,PO | Performed by: PSYCHIATRY & NEUROLOGY

## 2020-08-20 PROCEDURE — 64491 PR INJ DX/THER AGNT PARAVERT FACET JOINT,IMG GUIDE,CERV/THORAC, 2ND LEVEL: ICD-10-PCS | Mod: 50,,, | Performed by: PSYCHIATRY & NEUROLOGY

## 2020-08-20 PROCEDURE — 76000 FLUOROSCOPY <1 HR PHYS/QHP: CPT | Mod: TC,PO

## 2020-08-20 PROCEDURE — 81025 URINE PREGNANCY TEST: CPT | Mod: PO | Performed by: PSYCHIATRY & NEUROLOGY

## 2020-08-20 PROCEDURE — 63600175 PHARM REV CODE 636 W HCPCS: Mod: PO | Performed by: PSYCHIATRY & NEUROLOGY

## 2020-08-20 PROCEDURE — 64490 INJ PARAVERT F JNT C/T 1 LEV: CPT | Mod: 50,PO | Performed by: PSYCHIATRY & NEUROLOGY

## 2020-08-20 PROCEDURE — 64490 INJ PARAVERT F JNT C/T 1 LEV: CPT | Mod: 50,,, | Performed by: PSYCHIATRY & NEUROLOGY

## 2020-08-20 PROCEDURE — 64490 PR INJ DX/THER AGNT PARAVERT FACET JOINT,IMG GUIDE,CERV/THORAC, 1ST LEVEL: ICD-10-PCS | Mod: 50,,, | Performed by: PSYCHIATRY & NEUROLOGY

## 2020-08-20 PROCEDURE — 25000003 PHARM REV CODE 250: Mod: PO | Performed by: PSYCHIATRY & NEUROLOGY

## 2020-08-20 RX ORDER — SODIUM CHLORIDE, SODIUM LACTATE, POTASSIUM CHLORIDE, CALCIUM CHLORIDE 600; 310; 30; 20 MG/100ML; MG/100ML; MG/100ML; MG/100ML
INJECTION, SOLUTION INTRAVENOUS CONTINUOUS
Status: DISCONTINUED | OUTPATIENT
Start: 2020-08-20 | End: 2020-08-20 | Stop reason: HOSPADM

## 2020-08-20 RX ORDER — MIDAZOLAM HYDROCHLORIDE 2 MG/2ML
INJECTION, SOLUTION INTRAMUSCULAR; INTRAVENOUS
Status: DISCONTINUED | OUTPATIENT
Start: 2020-08-20 | End: 2020-08-20 | Stop reason: HOSPADM

## 2020-08-20 RX ORDER — FENTANYL CITRATE 50 UG/ML
INJECTION, SOLUTION INTRAMUSCULAR; INTRAVENOUS
Status: DISCONTINUED | OUTPATIENT
Start: 2020-08-20 | End: 2020-08-20 | Stop reason: HOSPADM

## 2020-08-20 RX ORDER — LIDOCAINE HYDROCHLORIDE 10 MG/ML
INJECTION INFILTRATION; PERINEURAL
Status: DISCONTINUED | OUTPATIENT
Start: 2020-08-20 | End: 2020-08-20 | Stop reason: HOSPADM

## 2020-08-20 RX ORDER — BUPIVACAINE HYDROCHLORIDE 2.5 MG/ML
INJECTION, SOLUTION EPIDURAL; INFILTRATION; INTRACAUDAL
Status: DISCONTINUED | OUTPATIENT
Start: 2020-08-20 | End: 2020-08-20 | Stop reason: HOSPADM

## 2020-08-20 RX ADMIN — SODIUM CHLORIDE, SODIUM LACTATE, POTASSIUM CHLORIDE, AND CALCIUM CHLORIDE: .6; .31; .03; .02 INJECTION, SOLUTION INTRAVENOUS at 08:08

## 2020-08-20 NOTE — OP NOTE
PROCEDURE DATE: 8/20/2020    PROCEDURE:  Cervical medial branch block of the C3, 4, 5 medial branch nerves, bilaterally, utilizing fluoroscopy    CPT:  20134 - 50  78996 - 50    DIAGNOSIS:  Cervical Facet Arthropathy, Cervical spondylosis     PHYSICIAN: Noa Samano MD    MEDICATIONS INJECTED:  0.25% bupivacaine, 1 ml at each level.    LOCAL ANESTHETIC USED:   1% lidocaine, 1ml at each level.    ANESTHESIA: midazolam 3mg IV, fentanyl 25mcg IV     ESTIMATED BLOOD LOSS:  < 5cc     COMPLICATIONS:  None     TECHNIQUE : A time-out was taken to identify patient and procedure side prior to starting the procedure.  The patient was positioned prone with the neck in a slightly flexed position.The patient was prepped and draped in the usual sterile fashion using ChloraPrep and sterile towels.  The image intensifier was positioned to obtain an AP view of the facet pillars on the left side. The areas corresponding to the anatomic locations of the respective cervical medial branch nerves were marked and the skin overlying these areas was infiltrated with local anesthetic. Then, a straight 25 gauge 3.5 inch spinal needle was inserted superficially and further guided to rest in the midpoint of the articular pillar corresponding to the medial branch nerve being blocked using intermittent lateral fluoroscopy. Once in the target position, after confirming there was no vascular uptake, the above noted medication was then injected at each nerve level. The patient tolerated the procedure well.     The patient was monitored after the procedure. The patient will be contacted on the next business day to determine extent of relief. The patient was given post procedure and discharge instructions to follow at home. The patient was discharged in a stable condition

## 2020-08-20 NOTE — INTERVAL H&P NOTE
The patient has been examined and the H&P has been reviewed:    I concur with the findings and no changes have occurred since H&P was written.    Anesthesia/Surgery risks, benefits and alternative options discussed and understood by patient/family.          Active Hospital Problems    Diagnosis  POA    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 [M47.812]  Yes     Mild cervical facet arthropathy  DDD C4-5, C5-6  12/5/2019 no relief from cervical GREGOR C7-T1     8/6/2020 s/p diagnostic bilateral cervical medial branch blocks at nerve levels C3-4-5 - 80% relief x 6 hours, wanted to proceed with bilateral RFA of same nerves on 8/20/2020 but her insurance required a second diagnostic block so change procedure to bilateral CMBB at C3-4-5        Resolved Hospital Problems   No resolved problems to display.

## 2020-08-20 NOTE — DISCHARGE INSTRUCTIONS
PAIN MANAGEMENT    Home care instructions   Apply ice pack to the injection site for 20 minute prior for the first 24 hours for soreness/discomfort at injection site   DO NOT USE HEAT FOR 24 HOURS   Keep site clean and dry for 24 hours, remove bandaid when desired   Do not drive until tomorrow    BLOCKS  Resume regular activities today  Pain office will call in next 2 days      Resume Aspirin, Plavix, or Coumadin the day after the procedure unless other wise instructed  Resume home medication as prescribed today      CALL PHYSICIAN FOR:   Severe increase in your usual pain or appearance of new pain   Prolonged or increasing weakness or numbness in the legs or arms   Fever greater then 100 degrees F..   Drainage from the incision site, redness, active bleeding or increased swelling at the injection site   Headache that increases when your head is upright and decreases when you lie flat    FOR EMERGENCIES:   Go directly to Emergency Department for Shortness of breath, chest pain, or problems breathing      Recovery After Procedural Sedation (Adult)   You have been given medicine by vein to make you sleep during your surgery. This may have included both a pain medicine and sleeping medicine. Most of the effects have worn off. But you may still have some drowsiness for the next 6 to 8 hours.  Home care  Follow these guidelines when you get home:  · For the next 8 hours, you should be watched by a responsible adult. This person should make sure your condition is not getting worse.  · Don't drink any alcohol for the next 24 hours.  · Don't drive, operate dangerous machinery, or make important business or personal decisions during the next 24 hours.  · To prevent injury or falls, use caution when standing and walking for at least 24 hours after your procedure.  Note: Your healthcare provider may tell you not to take any medicine by mouth for pain or sleep in the next 4 hours. These medicines may react with the  medicines you were given in the hospital. This could cause a much stronger response than usual.  Follow-up care  Follow up with your healthcare provider if you are not alert and back to your usual level of activity within 12 hours.  When to seek medical advice  Call your healthcare provider right away if any of these occur:  · Drowsiness gets worse  · Weakness or dizziness gets worse  · Repeated vomiting  · You can't be awakened  · Fever  · New rash  Donnell last reviewed this educational content on 9/1/2019 © 2000-2020 [employer] [address] [place] [address], [place] [address]. All rights reserved. This information is not intended as a substitute for professional medical care. Always follow your healthcare professional's instructions.

## 2020-08-20 NOTE — PLAN OF CARE
Oral liquids tolerated, discharge instructions reviewed with patient, verbalizes understanding. Pt awake alert and oriented, answering questions appropriately, meets criteria for d/c.

## 2020-08-20 NOTE — DISCHARGE SUMMARY
[facility]  Discharge Note  Short Stay    Admit Date: 8/20/2020    Discharge Date: 8/20/2020    Attending Physician: Noa Samano MD     Discharge Provider: Noa Samano    Diagnoses:  Active Hospital Problems    Diagnosis  POA    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 [M47.812]  Yes     Mild cervical facet arthropathy  DDD C4-5, C5-6  12/5/2019 no relief from cervical GREGOR C7-T1     8/6/2020 s/p diagnostic bilateral cervical medial branch blocks at nerve levels C3-4-5 - 80% relief x 6 hours, wanted to proceed with bilateral RFA of same nerves on 8/20/2020 but her insurance required a second diagnostic block so change procedure to bilateral CMBB at C3-4-5  8/20/2020 s/p diagnostic bilateral cervical medial branch blocks at nerve levels C3-4-5 -         Resolved Hospital Problems   No resolved problems to display.       Discharged Condition: good    Final Diagnoses: 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 [M47.812]    Disposition: Home or Self Care    Hospital Course: no complications, uneventful    Outcome of Hospitalization, Treatment, Procedure, or Surgery:  Patient was admitted for outpatient procedure. The patient underwent procedure without complications and are discharged home    Follow up/Patient Instructions:  Follow up as scheduled/Patient has received instructions and follow up date    Medications:  Continue previous medications    Discharge Procedure Orders   Notify your health care provider if you experience any of the following:  temperature >100.4     Notify your health care provider if you experience any of the following:  persistent nausea and vomiting or diarrhea     Notify your health care provider if you experience any of the following:  severe uncontrolled pain     Notify your health care provider if you experience any of the following:  redness, tenderness, or signs of infection (pain, swelling, redness, odor or green/yellow  discharge around incision site)     Notify your health care provider if you experience any of the following:  difficulty breathing or increased cough     Notify your health care provider if you experience any of the following:  severe persistent headache     Notify your health care provider if you experience any of the following:  worsening rash     Notify your health care provider if you experience any of the following:  persistent dizziness, light-headedness, or visual disturbances     Notify your health care provider if you experience any of the following:  increased confusion or weakness     No dressing needed         Discharge Procedure Orders (must include Diet, Follow-up, Activity):   Discharge Procedure Orders (must include Diet, Follow-up, Activity)   Notify your health care provider if you experience any of the following:  temperature >100.4     Notify your health care provider if you experience any of the following:  persistent nausea and vomiting or diarrhea     Notify your health care provider if you experience any of the following:  severe uncontrolled pain     Notify your health care provider if you experience any of the following:  redness, tenderness, or signs of infection (pain, swelling, redness, odor or green/yellow discharge around incision site)     Notify your health care provider if you experience any of the following:  difficulty breathing or increased cough     Notify your health care provider if you experience any of the following:  severe persistent headache     Notify your health care provider if you experience any of the following:  worsening rash     Notify your health care provider if you experience any of the following:  persistent dizziness, light-headedness, or visual disturbances     Notify your health care provider if you experience any of the following:  increased confusion or weakness     No dressing needed

## 2020-08-21 ENCOUNTER — TELEPHONE (OUTPATIENT)
Dept: NEUROLOGY | Facility: CLINIC | Age: 45
End: 2020-08-21

## 2020-08-21 VITALS
OXYGEN SATURATION: 99 % | DIASTOLIC BLOOD PRESSURE: 68 MMHG | WEIGHT: 275 LBS | SYSTOLIC BLOOD PRESSURE: 122 MMHG | BODY MASS INDEX: 43.16 KG/M2 | HEART RATE: 76 BPM | RESPIRATION RATE: 16 BRPM | HEIGHT: 67 IN | TEMPERATURE: 97 F

## 2020-08-21 DIAGNOSIS — M47.812 CERVICAL SPONDYLOSIS: ICD-10-CM

## 2020-08-21 DIAGNOSIS — M47.812 SPONDYLOSIS OF CERVICAL REGION WITHOUT MYELOPATHY OR RADICULOPATHY: Primary | ICD-10-CM

## 2020-08-21 DIAGNOSIS — Z01.812 PRE-PROCEDURE LAB EXAM: Primary | ICD-10-CM

## 2020-08-21 NOTE — TELEPHONE ENCOUNTER
Spoke with Ms Stephen.  She reported she has relief of the pain greater than 50% since her procedure yesterday.   We discussed Dr Samano wanting to do the  RFA on 8/27/20. She stated that day would work for her.  Scheduled the pre procedure COVID testing 8/24/20 @ 1:05pm

## 2020-08-21 NOTE — TELEPHONE ENCOUNTER
----- Message from Noa Samano MD sent at 8/20/2020  9:04 AM CDT -----   Please call patient Friday/next business day to -    1. Block - determine percentage / duration relief. If 50% or greater relief schedule bilateral cervical RFA at levels C3-4-5 on 8/27 with moderate sedation

## 2020-08-21 NOTE — PROGRESS NOTES
Patient Active Problem List   Diagnosis    ADHD (attention deficit hyperactivity disorder)    Bariatric surgery status    Major depressive disorder, recurrent, severe without psychotic features    PTSD (post-traumatic stress disorder)    Panic disorder without agoraphobia    Borderline personality disorder    Morbid obesity with BMI of 40.0-44.9, adult    Ptosis, both eyelids    Ataxia    Chiari malformation type I    Cervical myofascial pain syndrome    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    Memory loss    Bilateral occipital neuralgia    Arnold-Chiari malformation    Thyroid activity decreased    Chronic migraine without aura, intractable, without status migrainosus    Cervical radiculopathy    Repeated falls    PATRICIA (obstructive sleep apnea)         Problem List Items Addressed This Visit        Neuro    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 - Primary    Overview     Mild cervical facet arthropathy  DDD C4-5, C5-6  12/5/2019 no relief from cervical GREGOR C7-T1     8/6/2020 s/p diagnostic bilateral cervical medial branch blocks at nerve levels C3-4-5 - 80% relief x 6 hours, wanted to proceed with bilateral RFA of same nerves on 8/20/2020 but her insurance required a second diagnostic block so change procedure to bilateral CMBB at C3-4-5  8/20/2020 s/p diagnostic bilateral cervical medial branch blocks at nerve levels C3-4-5 - 80% relief x 8 hours, proceed with RFA at same levels, bilateral, on 8/27/2020         Relevant Orders    Case Request Operating Room: RADIOFREQUENCY THERMAL COAGULATION, NERVE, SPINAL, CERVICAL, POSTERIOR RAMUS, MEDIAL BRANCH  C3-4-5, bilateral (Completed)      Other Visit Diagnoses     Cervical spondylosis

## 2020-08-24 ENCOUNTER — LAB VISIT (OUTPATIENT)
Dept: FAMILY MEDICINE | Facility: CLINIC | Age: 45
End: 2020-08-24
Payer: MEDICARE

## 2020-08-24 DIAGNOSIS — Z01.812 PRE-PROCEDURE LAB EXAM: ICD-10-CM

## 2020-08-24 PROCEDURE — U0003 INFECTIOUS AGENT DETECTION BY NUCLEIC ACID (DNA OR RNA); SEVERE ACUTE RESPIRATORY SYNDROME CORONAVIRUS 2 (SARS-COV-2) (CORONAVIRUS DISEASE [COVID-19]), AMPLIFIED PROBE TECHNIQUE, MAKING USE OF HIGH THROUGHPUT TECHNOLOGIES AS DESCRIBED BY CMS-2020-01-R: HCPCS

## 2020-08-25 LAB — SARS-COV-2 RNA RESP QL NAA+PROBE: NOT DETECTED

## 2020-08-27 ENCOUNTER — HOSPITAL ENCOUNTER (OUTPATIENT)
Dept: RADIOLOGY | Facility: HOSPITAL | Age: 45
Discharge: HOME | End: 2020-08-27
Attending: PSYCHIATRY & NEUROLOGY
Payer: MEDICARE

## 2020-08-27 ENCOUNTER — HOSPITAL ENCOUNTER (OUTPATIENT)
Facility: HOSPITAL | Age: 45
Discharge: HOME | End: 2020-08-27
Attending: PSYCHIATRY & NEUROLOGY | Admitting: PSYCHIATRY & NEUROLOGY
Payer: MEDICARE

## 2020-08-27 DIAGNOSIS — M47.812 SPONDYLOSIS OF CERVICAL REGION WITHOUT MYELOPATHY OR RADICULOPATHY: ICD-10-CM

## 2020-08-27 DIAGNOSIS — M47.812 CERVICAL SPONDYLOSIS: ICD-10-CM

## 2020-08-27 DIAGNOSIS — M47.812 SPONDYLOSIS OF CERVICAL REGION WITHOUT MYELOPATHY OR RADICULOPATHY: Primary | ICD-10-CM

## 2020-08-27 LAB
B-HCG UR QL: NEGATIVE
CTP QC/QA: YES

## 2020-08-27 PROCEDURE — 64633 PR DESTROY CERV/THOR FACET JNT: ICD-10-PCS | Mod: 50,,, | Performed by: PSYCHIATRY & NEUROLOGY

## 2020-08-27 PROCEDURE — 64633 DESTROY CERV/THOR FACET JNT: CPT | Mod: 50,,, | Performed by: PSYCHIATRY & NEUROLOGY

## 2020-08-27 PROCEDURE — 25000003 PHARM REV CODE 250: Mod: PO | Performed by: PSYCHIATRY & NEUROLOGY

## 2020-08-27 PROCEDURE — 64634 DESTROY C/TH FACET JNT ADDL: CPT | Mod: 50,PO | Performed by: PSYCHIATRY & NEUROLOGY

## 2020-08-27 PROCEDURE — 63600175 PHARM REV CODE 636 W HCPCS: Mod: PO | Performed by: PSYCHIATRY & NEUROLOGY

## 2020-08-27 PROCEDURE — 64634 DESTROY C/TH FACET JNT ADDL: CPT | Mod: 50,,, | Performed by: PSYCHIATRY & NEUROLOGY

## 2020-08-27 PROCEDURE — 64633 DESTROY CERV/THOR FACET JNT: CPT | Mod: 50,PO | Performed by: PSYCHIATRY & NEUROLOGY

## 2020-08-27 PROCEDURE — 64634 PR DESTROY C/TH FACET JNT ADDL: ICD-10-PCS | Mod: 50,,, | Performed by: PSYCHIATRY & NEUROLOGY

## 2020-08-27 PROCEDURE — 81025 URINE PREGNANCY TEST: CPT | Mod: PO | Performed by: PSYCHIATRY & NEUROLOGY

## 2020-08-27 PROCEDURE — 76000 FLUOROSCOPY <1 HR PHYS/QHP: CPT | Mod: TC,PO

## 2020-08-27 RX ORDER — LIDOCAINE HYDROCHLORIDE 10 MG/ML
INJECTION, SOLUTION EPIDURAL; INFILTRATION; INTRACAUDAL; PERINEURAL
Status: DISCONTINUED | OUTPATIENT
Start: 2020-08-27 | End: 2020-08-27 | Stop reason: HOSPADM

## 2020-08-27 RX ORDER — SODIUM CHLORIDE, SODIUM LACTATE, POTASSIUM CHLORIDE, CALCIUM CHLORIDE 600; 310; 30; 20 MG/100ML; MG/100ML; MG/100ML; MG/100ML
INJECTION, SOLUTION INTRAVENOUS CONTINUOUS
Status: DISCONTINUED | OUTPATIENT
Start: 2020-08-27 | End: 2020-08-27 | Stop reason: HOSPADM

## 2020-08-27 RX ORDER — FENTANYL CITRATE 50 UG/ML
INJECTION, SOLUTION INTRAMUSCULAR; INTRAVENOUS
Status: DISCONTINUED | OUTPATIENT
Start: 2020-08-27 | End: 2020-08-27 | Stop reason: HOSPADM

## 2020-08-27 RX ORDER — DEXAMETHASONE SODIUM PHOSPHATE 4 MG/ML
INJECTION, SOLUTION INTRA-ARTICULAR; INTRALESIONAL; INTRAMUSCULAR; INTRAVENOUS; SOFT TISSUE
Status: DISCONTINUED | OUTPATIENT
Start: 2020-08-27 | End: 2020-08-27 | Stop reason: HOSPADM

## 2020-08-27 RX ORDER — BUPIVACAINE HYDROCHLORIDE 2.5 MG/ML
INJECTION, SOLUTION EPIDURAL; INFILTRATION; INTRACAUDAL
Status: DISCONTINUED | OUTPATIENT
Start: 2020-08-27 | End: 2020-08-27 | Stop reason: HOSPADM

## 2020-08-27 RX ORDER — MIDAZOLAM HYDROCHLORIDE 2 MG/2ML
INJECTION, SOLUTION INTRAMUSCULAR; INTRAVENOUS
Status: DISCONTINUED | OUTPATIENT
Start: 2020-08-27 | End: 2020-08-27 | Stop reason: HOSPADM

## 2020-08-27 RX ADMIN — SODIUM CHLORIDE, SODIUM LACTATE, POTASSIUM CHLORIDE, AND CALCIUM CHLORIDE: .6; .31; .03; .02 INJECTION, SOLUTION INTRAVENOUS at 08:08

## 2020-08-27 NOTE — PLAN OF CARE
SEE EPIC DETAILS FURTHER FOR PACU MONITORING POST PROCEDURE.      DR. GOLDSTEIN TO BEDSIDE FOR SUPPORT AND TO TALK WITH PT REGARDING HER FINDINGS, PROCEDURE AND EXPECTED DISCHARGE INSTRUCTIONS

## 2020-08-27 NOTE — OP NOTE
PROCEDURE DATE: 8/27/2020    PROCEDURE:  Radiofrequency ablation of the C3,4,5 medial branch nerves and third occipital nerve, bilateral, under fluoroscopy    CPT:  55689 -50  73049 -50    DIAGNOSIS:  Cervical spondylosis    Post Op Diagnosis: Same    PHYSICIAN: Noa Samano MD    MEDICATIONS INJECTED:    Pre-RFA: 1% preservative-free lidocaine, 1cc per level     Post-RFA: From a mixture of 5ml of 0.25 % bupivicaine and 4mg of dexamethasone, 1cc of this solution was injected at each level.    LOCAL ANESTHETIC USED:   1cc of lidocaine 1% at each level    SEDATION: midazolam 5mg IV, fentanyl 75mcg IV    ESTIMATED BLOOD LOSS:  <5cc    COMPLICATIONS:  none    TECHNIQUE:   A time-out taken to identify patient and procedure side prior to starting the procedure.  The patient was positioned in the prone position. The patient was prepped and draped in the usual sterile fashion using ChloraPrep and sterile towels.  The levels were determined under fluoroscopic guidance using AP view.   Local anesthetic was infiltrated superficially at the skin.  Then 6 seperate 100mm 20g 10mm active-tip Allyson RF needle were inserted to the anatomic location of the midsection of the lateral masses. The lateral fluoroscopic view was used to guide each needle to the midsection of each respective lateral mass. After negative aspiration of heme and CSF, Omnipaque contrast was injected under live digital subtraction angiography to confirm there was no vascular uptake at any level. Impedance was less than 800 ohms at each level. Sensory stimulation up to 50Hz confirmed and motor stimulation up to 3V confirmed there was no nerve root involvement at each level. Pre-RFA medication was then injected slowly.  Ablation was done per level utilizing Allyson radiofrequency generator at 80°C for 90 seconds. Afterwards, the RFA probes were removed and post-RFA medication as listed above was injected at each level and needles were removed. The patient  tolerated the procedure well.     The patient was monitored after the procedure.  Patient was given post procedure and discharge instructions to follow at home.  The patient was discharged in a stable condition

## 2020-08-27 NOTE — H&P
Established Patient - Audio Only Telehealth Visit     The patient location is: home   The chief complaint leading to consultation is: headache   Visit type: Virtual visit with audio only (telephone)  Total time spent with patient: 20 min        The reason for the audio only service rather than synchronous audio and video virtual visit was related to technical difficulties or patient preference/necessity.     Each patient to whom I provide medical services by telemedicine is:  (1) informed of the relationship between the physician and patient and the respective role of any other health care provider with respect to management of the patient; and (2) notified that they may decline to receive medical services by telemedicine and may withdraw from such care at any time. Patient verbally consented to receive this service via voice-only telephone call.    Date of service: 8/27/2020  Referring provider: Dr. Noa Samano    Subjective:      Chief complaint: No chief complaint on file.       Patient ID: Dinah Mitchell is a 45 y.o. depression, borderline personality disorder, PTSD, fibromyalgia, and hypothyroidism presenting for follow-up of headache and neck pain    History of Present Illness    INTERVAL HISTORY - 7/23/2020     Today she reports she is doing worse. She has been falling - stumbling over her feet. Broke her right wrist. She is not losing consciousness.     The emgality is helping. She is having headaches every other day or every other morning upon waking up. These last 30 mins. She has sleep apnea. She does not use her CPAP - it needs to be cleaned.     She is taking ibuprofen or tramadol several times per week.     Her neck pain has been much worse. It is axial and most severe in the mid-neck down to the shoulder blades. Same pain that we had discussed treating with CMBB multiple times.       INTERVAL HISTORY - 3/26/2020    The patient location is: home   The chief complaint leading to consultation is:  headache and neck pain   Visit type: Virtual visit with synchronous audio and video  Total time spent with patient: 15 min   Each patient to whom he or she provides medical services by telemedicine is:  (1) informed of the relationship between the physician and patient and the respective role of any other health care provider with respect to management of the patient; and (2) notified that he or she may decline to receive medical services by telemedicine and may withdraw from such care at any time.    Last saw patient on 11/15/2019. At that point we continued Emgality and gabapentin for her migraines and planned a cervical epidural for her neck pain as requested by the neurosurgeon. I did the epidural on 12/5/19. Initial relief on phone call was 60%. I referred her for a neurocognitive evaluation due to persistent concerns with memory.     Today patient reports the cervical epidural made her feel worse for the first few days and then the pain returned to baseline without any further relief. The neck pain continues to be daily. She is dependent on the muscle relaxers for relief. Develops widespread spasms if she misses them.    She has been having bad headaches in parallel with being sick (upper respiratory) since Christmas. The emgality is definitely helping. Due to pharmacy issues, she had been late on almost all the doses and finds that the 3 days before she is due she gets horrible headaches.       INTERVAL HISTORY - 11/15/2019    She was last seen 2 months ago at which point we started Emgality for migraine headache.  We also had her complete home physical therapy for strength and falling.  In the interim she saw her neurosurgeon Dr. Busch who recommended to trial cervical epidural injection.  The patient preferred to do this procedure with me so she did not have to travel to the [place].  She is here to discuss this procedure. She has had ESIs with either Dr Vela in the past with relief of same pain     Her  last cervical imaging was 06/09/2019 which showed -   1. Loss of the normal cervical lordosis suggesting paraspinous muscle spasm.  2. Postoperative changes about the skull base.  3. Cervical degenerative disc disease.  Changes within the discs particularly at C4-5 and C5-C6 were present on the previous examination.    In regards to her headaches, she reports the little better.  After taking the shot the headaches are less frequent.  After about 2 and half weeks they come back the same.  The pain is in the back and head, left temple, behind the eyes. Hands go numb but so do feet. Neck pain is mainly axial, shoulder blades to neck. Current pain score is 5 with a range of 2-10.    She remains on Emgality, gabapentin, tramadol, ibuprofen, Marinol for nausea, Robaxin, tizanidine for muscle spasm.  She is not using anything else for rescue    She feels her memory is impaired, she gets confused, for the last couple of years.  For example, today she is very upset because she showed up very early for her appointment, not realizing how early she is despite her knowing what time her ointment was.  She reports she makes these mistakes often.  She is frustrated with this    INTERVAL HISTORY - 9/11/19    She was last seen approximately 7 months ago.  At that point she was having headaches 4 days per week.  We continued her regimen of gabapentin and tramadol    She is on gabapentin 600 mg 3 times a day, Robaxin, tizanidine, the tramadol has not been filled since July.    She sustained some head trauma, hit her left back of head on the cabinet, 6/9/19. Feels nauseous, worse headache.     Today she reports she is about the same.  She reports memory loss, confusion, headache, nausea, falling frequently, neck pain, numbness in the hands and feet, no depth perception.    The headaches are left temporal, back of the head, feels like a vice , neck pain. Her current pain score several much for to 10.    She feels like she is coming  down with the flu.     Headaches, nausea, and falling are her most concerning symptoms. Has not been in PT.     2 types of headaches  - temple, associated with light sensitivity, aggravated by activiity. Another like a vice . Wakes up with them.    Her dog passed away      INTERVAL HISTORY - 2/12/19     In the interim since last visit one year ago she has had bilateral occipital nerve block in cervical trigger point injection x3 with me with good relief.      She reports, that while I was on leave, she felt worse and worse. She was more nauseous and kept falling. She saw Dr. Busch for her Chiari malformation, which I previously recommended against surgery on as her symptoms were not clearly Chiari related and she had a non-obstructive CSF flow pattern on CINE. She had posterior fossa decompression on 12/13/18. She had severe headache and neck pain afterwards. She says the surgery helped her disabling headaches, she doesn't have headache any more when straining or bending over but she remains with the other headaches documented below. She has multiple questions about the surgery itself.     She reports she is still wobbly. She still has depth perception issues.     New symptoms - lump she feels on the the posterior parietal scalp, this area has never hurt before. Neck feels weak. Has to lay against something for support. Arms feel more weak then before. Sensation in her feet is returning.     Headaches - left temple, gradual onset, photo/phonophobia/nausea. These occur 4 days per week. Last at least 1-2 hours.     Her current pain score is seven for headache, nine for neck.  With a range of 6-9.    ORIGINAL PAIN HISTORY - 3/14/18   Age at onset and course over time: 2009 and worse over time, this happened during a time when she had a long commute. Neck is her worst pain - just left to midline of middle neck. Was going to [facility] pain management receiving epidural steroids which would last only 2 weeks. She had a  "CMBB and was planning for RFA but wanted to get cervical MRI first and second opinion  Location: neck (sharp, stabbing); hands and feet (tingling, numb), hip/lower back (dull, aching). Also has intermittent left sided headaches starting occipitally radiating to temple.   Quality: as above  Severity: current 8.5; range 7 to 10  Duration: hours  Frequency: daily  Alleviated by: medication, ice  Exacerbated by: movement, light noise (head pain)  Associated with: left arm/hand gets numb/weak "a lot" no radicular pain; headaches on left which are random or triggered by straining in restroom/coughing/laughing. Headaches worst in the AM within 2 hours of waking up. Headaches do not wake her up but neck pain does. Having some depth perception issues causing her to break her foot - eyes checked and were ok. Transient visual loss? When in a lof of pain, not clear TVO with valsalva.   Sleep habits:    Current acute treatment:  -- tramadol 50mg - every other day or every third day  -- ibuprofen 2-3 days a weel   -- robaxin during the day   -- tizanidine 4mg nightly     (xanax 1mg daily PRN for PTSD, anxiety from psychiatrist)     Current prevention:  -- emgality - started 9/2019 - effective to date   -- gabapentin 600mg TID   (wellbutrin)  (prozac)    Previously tried:   -- TENS   -- tramadol - helped with overall pain   -- methocarbamol - helped with overall pain   -- relafen - did not help     Procedural history:   -- cervical GREGOR  -- lumbar GREGOR  -- chiropractor for hip /neck - helped only hip  -- PT - none for neck just foot     Review of patient's allergies indicates:   Allergen Reactions    Lamotrigine      Other reaction(s): Rash  Other reaction(s): Nausea    Sulfa (sulfonamide antibiotics) Nausea Only     Other reaction(s): Unknown     No current facility-administered medications for this encounter.        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     PATRICIA (obstructive sleep apnea)     severe - doesn't use CPAP    Panic attack     PTSD (post-traumatic stress disorder)     Right wrist fracture        Past Surgical History  Past Surgical History:   Procedure Laterality Date    ANKLE SURGERY Right     x2 (#1 for ligament injury and #2 for fx and ligament)    AUGMENTATION OF BREAST      BILATERAL TUBAL LIGATION      BRAIN SURGERY  12/2018    decompression/craniotomy Arnold-Chiari syndrome    BREAST SURGERY  1997    B implants    CERVICAL BIOPSY  W/ LOOP ELECTRODE EXCISION      COLONOSCOPY      DECOMPRESSION OF CHIARI MALFORMATION BY REMOVAL OF POSTERIOR ARCH OF FIRST CERVICAL VERTEBRA N/A 12/13/2018    Procedure: DECOMPRESSION, CHIARI MALFORMATION, BY 1ST CERVICAL VERTEBRA POSTERIOR ARCH REMOVAL;  Surgeon: Sergio Busch MD;  Location: [facility] OR [address] [place] [address];  Service: Neurosurgery;  Laterality: N/A;    EPIDURAL STEROID INJECTION INTO CERVICAL SPINE N/A 12/5/2019    Procedure: Injection-steroid-epidural-cervical-C7-T1;  Surgeon: Noa Samano MD;  Location: [facility] OR;  Service: Neurosurgery;  Laterality: N/A;    FRACTURE SURGERY Right     Ankle    INJECTION OF ANESTHETIC AGENT AROUND MEDIAL BRANCH NERVES INNERVATING CERVICAL FACET JOINT Bilateral 8/6/2020    Procedure: Block-nerve-medial branch-cervical Bilateral  C3,4,5;  Surgeon: Noa Samano MD;  Location: [facility] OR;  Service: Anesthesiology;  Laterality: Bilateral;    INJECTION OF ANESTHETIC AGENT AROUND MEDIAL BRANCH NERVES INNERVATING CERVICAL FACET JOINT Bilateral 8/20/2020    Procedure: Block-nerve-medial branch-cervical Bilateral C3-4-5;  Surgeon: Noa Samano MD;  Location: [facility] OR;  Service:  "Anesthesiology;  Laterality: Bilateral;    LAPAROSCOPIC GASTRIC BANDING      with subsequent removal due to abscess    LIPOSUCTION      x 2    PELVIC LAPAROSCOPY      for endometriosis eval with BTL    TUBAL LIGATION      wtih pelvic lap        Family History  Family History   Problem Relation Age of Onset    Diabetes Sister     Seizures Sister     Mitochondrial disorder Sister         "trent"    Mental illness Sister     Seizures Sister     Cervical cancer Sister     Cervical cancer Sister     Ovarian cancer Cousin     Ovarian cancer Paternal Aunt     Colon cancer Maternal Grandmother     Cancer Maternal Grandfather 65        throat ca    Diabetes Mother     Stroke Mother 30    Mental illness Mother         depression    Hyperlipidemia Mother     Hypertension Mother     Hyperlipidemia Father     Heart disease Father         "athletes heart"    Ovarian cancer Paternal Aunt     Cervical cancer Paternal Cousin     Cervical cancer Other     Breast cancer Neg Hx        Social History  Social History     Socioeconomic History    Marital status:      Spouse name: Not on file    Number of children: Not on file    Years of education: Not on file    Highest education level: Not on file   Occupational History    Occupation: foresenic     Social Needs    Financial resource strain: Not on file    Food insecurity     Worry: Not on file     Inability: Not on file    Transportation needs     Medical: Yes     Non-medical: Yes   Tobacco Use    Smoking status: Never Smoker    Smokeless tobacco: Never Used   Substance and Sexual Activity    Alcohol use: Yes     Frequency: Monthly or less     Drinks per session: 1 or 2     Binge frequency: Never     Comment: occasional "maybe monthly"    Drug use: No    Sexual activity: Yes     Partners: Male     Birth control/protection: Injection     Comment: depo since age 18   Lifestyle    Physical activity     Days per week: 0 days     " Minutes per session: 0 min    Stress: Very much   Relationships    Social connections     Talks on phone: More than three times a week     Gets together: Never     Attends Mosque service: Not on file     Active member of club or organization: No     Attends meetings of clubs or organizations: Never     Relationship status:    Other Topics Concern    Not on file   Social History Narrative    Not on disability, not currently working.        Review of Systems  14-point review of systems as follows:   No check mariam indicates NEGATIVE response   Constitutional: [] weight loss, [] change to appetite   Eyes: [] change in vision, [] double vision   Ears, nose, mouth, throat: [] frequent nose bleeds, [] ringing in the ears   Respiratory: [] cough, [] wheezing   Cardiovascular: [] chest pain, [] palpitations   Gastrointestinal: [] jaundice, [] nausea/vomiting   Genitourinary: [] incontinence, [] burning with urination   Hematologic/lymphatic: [] easy bruising/bleeding, [] night sweats   Neurological: [] numbness, [] weakness   Endocrine: [] fatigue, [] heat/cold intolerance   Allergy/Immunologic: [] fevers, [] chills   Musculoskeletal: [x] muscle pain, [x] joint pain   Psychiatric: [] thoughts of harming self/others, [] depression   Integumentary: [] rashes, [] sores that do not heal       Objective:        There were no vitals filed for this visit.  There is no height or weight on file to calculate BMI.     PREVIOUS:   Reflexes full throughout  Confrontational strength 5/5 in all cervical dermatomes  Sensation to temperature intact all cervical dermatomes    CERVICAL SPINE:  INSPECTION:  Posterior decompression scar, tender  ROM:  Limited rotation  MUSCLE SPASM:  Diffuse  FACET LOADING:  Bilateral  SPURLING:  Negative      2/12/19  General: Well developed, well nourished.  No acute distress.  HEENT: Atraumatic, normocephalic. Bilateral ptosis (chronic).  Optic discs sharp bilaterally. The lumps she showed me  are bony prominences of the calvarium  Neck: Trachea midline. Well-healed midline posterior surgical scar.  Multiple areas of muscle tenderness around neck  Cardiovascular: Vitals reviewed. Normal peripheral perfusion.   Pulmonary: No increased work of breathing.  Abdomen/GI: No guarding  Musculoskeletal: No obvious joint deformities, moves all extremities symmetrically and well.  No drift, no orbit, symmetric finger tapping.     Neurological exam:  Mental status: Awake and alert. Oriented to situation.  Speech fluent and appropriate. Recent and remote memory appear to be intact.  Fund of knowledge normal.  Cranial nerves: Pupils equal round, extraocular movements intact, facial strength intact bilaterally, reports hearing is asymmetric  Sensation:  Normal to temperature  Reflexes: 2+ throughout, negative Vu  Coordination:  Normal finger-to-nose and heel-to-shin  Gait: Normal         3/14/18   Constitutional: appears in no acute distress, well-developed, well-nourished     Eyes: normal conjunctiva, PERRLA, bilateral right worse than left ptosis (baseline). Optic discs sharp bilaterally.     Ears, nose, mouth, throat: external appearance of ears and nose normal, hearing reduced on left     Cardiovascular: regular rate and rhythm, no murmurs appreciated    Respiratory: unlabored respirations, breath sounds normal bilaterally    Gastrointestinal: no visible abdominal masses, no guarding, no visible hernia    Musculoskeletal: normal tone in all four extremities. No atrophy. No abnormal movements. Strength in all muscles groups of the upper and lower extremities is 5/5. Normal gait and station. Digits and nails normal.      Spine:  CERVICAL SPINE:  INSPECTION: no scars   ROM: slightly limited   MUSCLE SPASM: bilateral   FACET LOADING: left  SPURLING: neg  JEZ tender: bilateral     Psychiatric: normal judgment and insight. Oriented to person, place, and time.     Neurologic:   Cortical functions: recent and remote  memory intact, normal attention span and concentration, speech fluent, adequate fund of knowledge   Cranial nerves: visual fields full, PERRLA, EOMI, facial sensation intact in V1-V3, symmetric facial strength, hearing intact to finger rub, palate elevates symmetrically, shoulder shrug 5/5, tongue protrudes midline   Reflexes: 2+ in the upper and lower extremities, no Vu, no babinski   Sensation: reduced to temperature in a length dependent manner in the hands and to knees, vibration reduced toes, ankles, knees  Coordination: normal    Data Review:     I have personally reviewed the referring provider's notes, labs, & imaging made available to me today.     RADIOLOGY STUDIES:  I have personally reviewed the pertinent images performed.     MRI cervical spine 2/27/18  1. The cerebellar tonsils extend below the foramen magnum approximately 8-9 mm, consistent with Chiari 1 malformation.  The cord is normal without evidence of syrinx, signal abnormality or abnormal enhancement.  2. There is mild degenerative change.  There is, however, no significant spinal canal or foraminal stenosis.  There is no cord compression.  There is no suspected nerve root compression.  Please see above discussion.    Results for orders placed or performed during the hospital encounter of 02/05/18   MRI Brain W WO Contrast    Narrative    Routine Multiplanar imaging through this 42-year-old females brain was obtained with utilization of 10 cc of gadolinium contrast    Hyperostosis frontalis appears to be present.     A Chiari one malformation appears to be present with the cerebellar tonsils extending 9 mm inferior to the foramen magnum with so-called pegging of the cerebellar tonsils.    An empty sella configuration of the pituitary gland is noted.    No diffusion positivity is identified to suggest recent infarction.    No gradient  susceptibility was noted just just presence of acute blood products.    No worrisome enhancing lesions are  noted intracranially    No diffusion positivity is identified to suggest recent infarction.    Impression    1. The cerebellar tonsils appear to extend inferior to the foramen magnum suggestive of a Chiari one malformation. No obvious cord signal abnormalities noted within the visualized cord, given the patient's history further evaluation of the cord may be of use to exclude a syrinx        Electronically signed by: Ismael Richmond MD  Date:     02/05/18  Time:    12:04    Results for orders placed or performed during the hospital encounter of 09/25/15   CT Head Without Contrast    Narrative    Comparison: None    Technique: 5 mm noncontrast axial images through the brain were obtained.    Findings:     The brain is normally formed with no indication of acute/recent major vascular distribution cerebral infarction, intraparenchymal hemorrhage, or intra-axial space occupying lesion. There is preserved gray-white matter junction differentiation.     The ventricular system is normal in appearance for age. No hydrocephalus. No effacement of the skull-base cisterns. No abnormal extra-axial fluid collections or blood products.     The paranasal sinuses and mastoid air cells are unremarkable.  There is incidentally observed hyperostosis frontalis.    Impression         No acute  intracranial abnormalities are appreciated.      Electronically signed by: Toyin Shin MD  Date:     09/25/15  Time:    13:41        Lab Results   Component Value Date     06/19/2020    K 4.4 06/19/2020    MG 2.3 12/14/2018     06/19/2020    CO2 21 (L) 06/19/2020    BUN 9 06/19/2020    CREATININE 1.0 06/19/2020    GLU 95 06/19/2020    HGBA1C 5.3 06/19/2020    AST 35 06/19/2020    ALT 42 06/19/2020    ALBUMIN 4.1 06/19/2020    PROT 7.2 06/19/2020    BILITOT 0.4 06/19/2020    CHOL 226 (H) 06/19/2020    HDL 50 06/19/2020    LDLCALC 150.2 06/19/2020    TRIG 129 06/19/2020       Lab Results   Component Value Date    WBC 5.80 06/19/2020     HGB 12.7 06/19/2020    HCT 39.0 06/19/2020    MCV 96 06/19/2020     06/19/2020       Lab Results   Component Value Date    TSH 0.874 06/19/2020       Assessment & Plan:       Problem List Items Addressed This Visit        Neuro    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    Overview     Mild cervical facet arthropathy  DDD C4-5, C5-6  12/5/2019 no relief from cervical GREGOR C7-T1     8/6/2020 s/p diagnostic bilateral cervical medial branch blocks at nerve levels C3-4-5 - 80% relief x 6 hours, wanted to proceed with bilateral RFA of same nerves on 8/20/2020 but her insurance required a second diagnostic block so change procedure to bilateral CMBB at C3-4-5  8/20/2020 s/p diagnostic bilateral cervical medial branch blocks at nerve levels C3-4-5 - 80% relief x 8 hours, proceed with RFA at same levels, bilateral, on 8/27/2020         Relevant Orders    Place in Outpatient (Completed)    Diet NPO    Notify physician     Notify physician     Notify physician (specify)    Place 18-22 gauage peripheral IV     Verify informed consent    Vital signs    Reason for no VTE Prophylaxis    Cervical spondylosis              Please call our clinic at [phone] or send a message to Dr. Samano on the [facility] portal if there are any changes to the plan described below, for example,if you are not contacted for the requested tests, referral(s) within one week, if you are unable to receive the medications prescribed, or if you feel you need to change the treatment course for any reason.     TESTING:  -- schedule a brain MRI without contrast to evaluate the surgical area (posterior fossa decompression) after repeated falls     REFERRALS:  -- none     PREVENTION OF PAIN:   -- continue gabapentin 600mg three times per day   -- continue Emgality at this time   -- proceed with bilateral C3-4-5 cervical medial branch RFA    AS-NEEDED TREATMENT OF PAIN:  -- continue tramadol as needed    -- continue ibuprofen as  needed   -- continue robaxin as needed     No follow-ups on file.       Noa Samano MD      This service was not originating from a related E/M service provided within the previous 7 days nor will  to an E/M service or procedure within the next 24 hours or my soonest available appointment.  Prevailing standard of care was able to be met in this audio-only visit.

## 2020-08-27 NOTE — DISCHARGE SUMMARY
[facility]  Discharge Note  Short Stay    Admit Date: 8/27/2020    Discharge Date: 8/27/2020    Attending Physician: Noa Samano MD     Discharge Provider: Noa Samano    Diagnoses:  Active Hospital Problems    Diagnosis  POA    *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 [M47.812]  Yes     Priority: 7      Mild cervical facet arthropathy  DDD C4-5, C5-6  12/5/2019 no relief from cervical GREGOR C7-T1     8/6/2020 s/p diagnostic bilateral cervical medial branch blocks at nerve levels C3-4-5 - 80% relief x 6 hours, wanted to proceed with bilateral RFA of same nerves on 8/20/2020 but her insurance required a second diagnostic block so change procedure to bilateral CMBB at C3-4-5  8/20/2020 s/p diagnostic bilateral cervical medial branch blocks at nerve levels C3-4-5 - 80% relief x 8 hours, proceed with RFA at same levels, bilateral, on 8/27/2020 8/27/2020 s/p bilateral cervical medial branch nerve RFA at C3-4-5        Resolved Hospital Problems   No resolved problems to display.       Discharged Condition: good    Final Diagnoses: 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 [M47.812]    Disposition: Home or Self Care    Hospital Course: no complications, uneventful    Outcome of Hospitalization, Treatment, Procedure, or Surgery:  Patient was admitted for outpatient procedure. The patient underwent procedure without complications and are discharged home    Follow up/Patient Instructions:  Follow up as scheduled/Patient has received instructions and follow up date    Medications:  Continue previous medications    Discharge Procedure Orders   Notify your health care provider if you experience any of the following:  temperature >100.4     Notify your health care provider if you experience any of the following:  persistent nausea and vomiting or diarrhea     Notify your health care provider if you experience any of the following:  severe uncontrolled pain      Notify your health care provider if you experience any of the following:  redness, tenderness, or signs of infection (pain, swelling, redness, odor or green/yellow discharge around incision site)     Notify your health care provider if you experience any of the following:  difficulty breathing or increased cough     Notify your health care provider if you experience any of the following:  severe persistent headache     Notify your health care provider if you experience any of the following:  worsening rash     Notify your health care provider if you experience any of the following:  persistent dizziness, light-headedness, or visual disturbances     Notify your health care provider if you experience any of the following:  increased confusion or weakness     No dressing needed         Discharge Procedure Orders (must include Diet, Follow-up, Activity):   Discharge Procedure Orders (must include Diet, Follow-up, Activity)   Notify your health care provider if you experience any of the following:  temperature >100.4     Notify your health care provider if you experience any of the following:  persistent nausea and vomiting or diarrhea     Notify your health care provider if you experience any of the following:  severe uncontrolled pain     Notify your health care provider if you experience any of the following:  redness, tenderness, or signs of infection (pain, swelling, redness, odor or green/yellow discharge around incision site)     Notify your health care provider if you experience any of the following:  difficulty breathing or increased cough     Notify your health care provider if you experience any of the following:  severe persistent headache     Notify your health care provider if you experience any of the following:  worsening rash     Notify your health care provider if you experience any of the following:  persistent dizziness, light-headedness, or visual disturbances     Notify your health care provider  if you experience any of the following:  increased confusion or weakness     No dressing needed

## 2020-08-27 NOTE — DISCHARGE INSTRUCTIONS
PAIN MANAGEMENT    Home care instructions   Apply ice pack to the injection site for 20 minute prior for the first 24 hours for             soreness/discomfort at injection site   DO NOT USE HEAT FOR 24 HOURS   Keep site clean and dry for 24 hours              MAY SHOWER TOMORROW   Do not drive until tomorrow  Take care when walking after YOUR CERVICAL NECK BILATERAL ABLATION/NERVE             BURNING                 RADIOFREQUENCY    May take 10-14 days for full effects  Avoid strenuous exercises for 2 days        Resume Aspirin, Plavix, or Coumadin the day after the procedure unless other wise instructed  Resume home medication as prescribed today      CALL PHYSICIAN FOR:   Severe increase in your usual pain or appearance of new pain   Prolonged or increasing weakness or numbness in the legs or arms   Fever greater then 100 degrees F..   Drainage from the incision site, redness, active bleeding or increased swelling at the injection site   Headache that increases when your head is upright and decreases when you lie flat    FOR EMERGENCIES:   Go directly to Emergency Department for Shortness of breath, chest pain, or problems breathing

## 2020-08-28 ENCOUNTER — TELEPHONE (OUTPATIENT)
Dept: NEUROLOGY | Facility: CLINIC | Age: 45
End: 2020-08-28

## 2020-08-28 ENCOUNTER — PATIENT MESSAGE (OUTPATIENT)
Dept: NEUROLOGY | Facility: CLINIC | Age: 45
End: 2020-08-28

## 2020-08-28 VITALS
TEMPERATURE: 98 F | OXYGEN SATURATION: 97 % | DIASTOLIC BLOOD PRESSURE: 85 MMHG | RESPIRATION RATE: 17 BRPM | SYSTOLIC BLOOD PRESSURE: 138 MMHG | HEART RATE: 88 BPM

## 2020-08-28 DIAGNOSIS — M47.812 SPONDYLOSIS OF CERVICAL REGION WITHOUT MYELOPATHY OR RADICULOPATHY: Primary | ICD-10-CM

## 2020-08-28 NOTE — TELEPHONE ENCOUNTER
----- Message from Andrey Hooper sent at 8/28/2020 10:22 AM CDT -----  Regarding: advice  Contact: self  Type: Needs Medical Advice  Who Called:  self  Symptoms (please be specific):    How long has patient had these symptoms:   Pharmacy name and phone #:   Best Call Back Number: [phone]  Additional Information: Patient want to know if she can sent flowers or pizza to the doctor and staff  to Thank the doctor prior to the doctor leaving.

## 2020-08-28 NOTE — TELEPHONE ENCOUNTER
Spoke with Ms Mitchell to follow up how she was doing since her procedure yesterday.  She stated her pain yesterday was a 9.  This morning about a 4, she has continued with the ice through the night.  Instructed for her to continue ice today for 20mins every 2 hrs.  Over the weekend she can start gentle heat for the muscle spasms.  Discussed her scheduling with Dr Guevara to manage her HA.  I advised her we are putting her on the wait list till we have openings in the schedule for new pts with Dr Guevara.  Verbalized understanding.

## 2020-08-28 NOTE — TELEPHONE ENCOUNTER
----- Message from Noa Samano MD sent at 8/27/2020  9:43 AM CDT -----   Please call patient Friday/next business day to -      RFA - check on how patient is doing. Determine relief if any. If still in significant post-procedural pain remind to ice for 20 minutes every 2 hours for rest of day; gentle heat can be started for muscle spasm once full 1-2 days of icing is complete. Set up new patient appt with Dr. Guevara to establish care for headaches.

## 2020-08-31 RX ORDER — TRAMADOL HYDROCHLORIDE 50 MG/1
50 TABLET ORAL EVERY 6 HOURS PRN
Qty: 60 TABLET | Refills: 0 | Status: SHIPPED | OUTPATIENT
Start: 2020-09-10 | End: 2020-10-10

## 2020-08-31 RX ORDER — TIZANIDINE 4 MG/1
4 TABLET ORAL 3 TIMES DAILY PRN
Qty: 90 TABLET | Refills: 11 | Status: SHIPPED | OUTPATIENT
Start: 2020-08-31 | End: 2021-03-19 | Stop reason: SDUPTHER

## 2020-09-01 ENCOUNTER — OFFICE VISIT (OUTPATIENT)
Dept: PSYCHIATRY | Facility: CLINIC | Age: 45
End: 2020-09-01
Payer: MEDICARE

## 2020-09-01 ENCOUNTER — PATIENT MESSAGE (OUTPATIENT)
Dept: NEUROLOGY | Facility: CLINIC | Age: 45
End: 2020-09-01

## 2020-09-01 ENCOUNTER — TELEPHONE (OUTPATIENT)
Dept: NEUROLOGY | Facility: CLINIC | Age: 45
End: 2020-09-01

## 2020-09-01 DIAGNOSIS — F90.0 ATTENTION DEFICIT HYPERACTIVITY DISORDER (ADHD), PREDOMINANTLY INATTENTIVE TYPE: ICD-10-CM

## 2020-09-01 DIAGNOSIS — G47.33 OSA (OBSTRUCTIVE SLEEP APNEA): ICD-10-CM

## 2020-09-01 DIAGNOSIS — F41.0 PANIC DISORDER WITHOUT AGORAPHOBIA: ICD-10-CM

## 2020-09-01 DIAGNOSIS — G43.719 CHRONIC MIGRAINE WITHOUT AURA, INTRACTABLE, WITHOUT STATUS MIGRAINOSUS: Primary | ICD-10-CM

## 2020-09-01 DIAGNOSIS — E66.01 MORBID OBESITY WITH BMI OF 40.0-44.9, ADULT: ICD-10-CM

## 2020-09-01 DIAGNOSIS — F43.10 PTSD (POST-TRAUMATIC STRESS DISORDER): Primary | ICD-10-CM

## 2020-09-01 DIAGNOSIS — F33.2 MAJOR DEPRESSIVE DISORDER, RECURRENT, SEVERE WITHOUT PSYCHOTIC FEATURES: ICD-10-CM

## 2020-09-01 DIAGNOSIS — F60.3 BORDERLINE PERSONALITY DISORDER: ICD-10-CM

## 2020-09-01 PROCEDURE — 99214 OFFICE O/P EST MOD 30 MIN: CPT | Mod: 95,,, | Performed by: PSYCHIATRY & NEUROLOGY

## 2020-09-01 PROCEDURE — 99214 PR OFFICE/OUTPT VISIT, EST, LEVL IV, 30-39 MIN: ICD-10-PCS | Mod: 95,,, | Performed by: PSYCHIATRY & NEUROLOGY

## 2020-09-01 RX ORDER — BUPROPION HYDROCHLORIDE 150 MG/1
150 TABLET ORAL DAILY
Qty: 90 TABLET | Refills: 0 | Status: SHIPPED | OUTPATIENT
Start: 2020-09-01 | End: 2020-11-03 | Stop reason: SDUPTHER

## 2020-09-01 RX ORDER — FLUOXETINE HYDROCHLORIDE 40 MG/1
40 CAPSULE ORAL DAILY
Qty: 90 CAPSULE | Refills: 0 | Status: SHIPPED | OUTPATIENT
Start: 2020-09-01 | End: 2020-11-03 | Stop reason: SDUPTHER

## 2020-09-01 RX ORDER — GALCANEZUMAB 120 MG/ML
120 INJECTION, SOLUTION SUBCUTANEOUS
Qty: 1 SYRINGE | Refills: 11 | Status: SHIPPED | OUTPATIENT
Start: 2020-09-01 | End: 2021-03-19 | Stop reason: SDUPTHER

## 2020-09-01 RX ORDER — ALPRAZOLAM 1 MG/1
1 TABLET ORAL DAILY PRN
Qty: 30 TABLET | Refills: 2 | Status: SHIPPED | OUTPATIENT
Start: 2020-09-01 | End: 2020-11-03 | Stop reason: SDUPTHER

## 2020-09-01 RX ORDER — GABAPENTIN 600 MG/1
600 TABLET ORAL 3 TIMES DAILY
Qty: 90 TABLET | Refills: 11 | Status: SHIPPED | OUTPATIENT
Start: 2020-09-01 | End: 2021-07-26 | Stop reason: SDUPTHER

## 2020-09-01 RX ORDER — DOXEPIN HYDROCHLORIDE 25 MG/1
25 CAPSULE ORAL NIGHTLY
Qty: 30 CAPSULE | Refills: 0 | Status: SHIPPED | OUTPATIENT
Start: 2020-09-01 | End: 2020-09-16 | Stop reason: ALTCHOICE
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
## 2020-09-01 NOTE — TELEPHONE ENCOUNTER
Called patient and scheduled new patient appointment with Dr. Guevara since they opened up her schedule for further booking. Patient expressed understanding

## 2020-09-16 RX ORDER — AMITRIPTYLINE HYDROCHLORIDE 25 MG/1
25 TABLET, FILM COATED ORAL NIGHTLY PRN
Qty: 30 TABLET | Refills: 0 | Status: SHIPPED | OUTPATIENT
Start: 2020-09-16 | End: 2020-09-21

## 2020-09-21 RX ORDER — AMITRIPTYLINE HYDROCHLORIDE 75 MG/1
75 TABLET ORAL NIGHTLY PRN
Qty: 30 TABLET | Refills: 0 | Status: SHIPPED | OUTPATIENT
Start: 2020-09-21 | End: 2020-10-09

## 2020-09-30 ENCOUNTER — TELEPHONE (OUTPATIENT)
Dept: PSYCHIATRY | Facility: CLINIC | Age: 45
End: 2020-09-30

## 2020-09-30 NOTE — TELEPHONE ENCOUNTER
For documentation purpose      Called patient to scheduled 3 months follow up for continued care with Dr. Wells. No answer left voicemail office number provided.  Last office visit 9/1/2020  Vikki

## 2020-10-14 ENCOUNTER — PATIENT MESSAGE (OUTPATIENT)
Dept: NEUROLOGY | Facility: CLINIC | Age: 45
End: 2020-10-14

## 2020-10-15 RX ORDER — TRAMADOL HYDROCHLORIDE 50 MG/1
50 TABLET ORAL EVERY 12 HOURS PRN
Qty: 60 TABLET | Refills: 3 | Status: SHIPPED | OUTPATIENT
Start: 2020-10-15 | End: 2020-10-16 | Stop reason: SDUPTHER

## 2020-10-15 RX ORDER — TRAMADOL HYDROCHLORIDE 50 MG/1
TABLET ORAL
Qty: 60 TABLET | OUTPATIENT
Start: 2020-10-15

## 2020-10-16 ENCOUNTER — PATIENT MESSAGE (OUTPATIENT)
Dept: NEUROLOGY | Facility: CLINIC | Age: 45
End: 2020-10-16

## 2020-10-16 RX ORDER — TRAMADOL HYDROCHLORIDE 50 MG/1
50 TABLET ORAL EVERY 12 HOURS PRN
Qty: 60 TABLET | Refills: 3 | Status: SHIPPED | OUTPATIENT
Start: 2020-10-16 | End: 2021-07-26

## 2020-10-16 NOTE — TELEPHONE ENCOUNTER
----- Message from Cynthia Finleyry sent at 10/15/2020  4:01 PM CDT -----  Contact: self [phone]  Pt states she has called and sent messages in ref to having her traMADoL (ULTRAM) 50 mg tablet sent to [id number] [place] [address] [address] AT [employer] [address] [phone] (Phone)  [phone] (Fax). Pt states the [facility] and she does not use them. Please call and advise.

## 2020-11-03 ENCOUNTER — OFFICE VISIT (OUTPATIENT)
Dept: PSYCHIATRY | Facility: CLINIC | Age: 45
End: 2020-11-03
Payer: MEDICARE

## 2020-11-03 DIAGNOSIS — F33.2 MAJOR DEPRESSIVE DISORDER, RECURRENT, SEVERE WITHOUT PSYCHOTIC FEATURES: Primary | ICD-10-CM

## 2020-11-03 DIAGNOSIS — F43.10 PTSD (POST-TRAUMATIC STRESS DISORDER): ICD-10-CM

## 2020-11-03 DIAGNOSIS — F41.0 PANIC DISORDER WITHOUT AGORAPHOBIA: ICD-10-CM

## 2020-11-03 PROCEDURE — 99214 PR OFFICE/OUTPT VISIT, EST, LEVL IV, 30-39 MIN: ICD-10-PCS | Mod: 95,,, | Performed by: PSYCHIATRY & NEUROLOGY

## 2020-11-03 PROCEDURE — 99214 OFFICE O/P EST MOD 30 MIN: CPT | Mod: 95,,, | Performed by: PSYCHIATRY & NEUROLOGY

## 2020-11-03 RX ORDER — BUPROPION HYDROCHLORIDE 150 MG/1
150 TABLET ORAL DAILY
Qty: 90 TABLET | Refills: 0 | Status: SHIPPED | OUTPATIENT
Start: 2020-11-03 | End: 2020-12-12

## 2020-11-03 RX ORDER — AMITRIPTYLINE HYDROCHLORIDE 75 MG/1
TABLET ORAL
Qty: 90 TABLET | Refills: 0 | Status: SHIPPED | OUTPATIENT
Start: 2020-11-03 | End: 2021-02-02 | Stop reason: SDUPTHER

## 2020-11-03 RX ORDER — FLUOXETINE HYDROCHLORIDE 40 MG/1
40 CAPSULE ORAL DAILY
Qty: 90 CAPSULE | Refills: 0 | Status: SHIPPED | OUTPATIENT
Start: 2020-11-03 | End: 2021-02-02 | Stop reason: SDUPTHER

## 2020-11-03 RX ORDER — ALPRAZOLAM 1 MG/1
1 TABLET ORAL DAILY PRN
Qty: 30 TABLET | Refills: 2 | Status: SHIPPED | OUTPATIENT
Start: 2020-11-03 | End: 2021-02-02 | Stop reason: SDUPTHER
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
## 2020-11-10 ENCOUNTER — PATIENT MESSAGE (OUTPATIENT)
Dept: NEUROLOGY | Facility: CLINIC | Age: 45
End: 2020-11-10

## 2020-11-17 ENCOUNTER — PATIENT MESSAGE (OUTPATIENT)
Dept: PSYCHIATRY | Facility: CLINIC | Age: 45
End: 2020-11-17

## 2020-12-30 ENCOUNTER — TELEPHONE (OUTPATIENT)
Dept: NEUROLOGY | Facility: CLINIC | Age: 45
End: 2020-12-30

## 2020-12-30 ENCOUNTER — PATIENT MESSAGE (OUTPATIENT)
Dept: NEUROLOGY | Facility: CLINIC | Age: 45
End: 2020-12-30

## 2020-12-30 NOTE — TELEPHONE ENCOUNTER
Called patient and rescheduled appointment due to running temp. Date/Time/Location confirmed. Verbalized understanding.

## 2020-12-30 NOTE — TELEPHONE ENCOUNTER
Called and spoke with patient and she has appointment scheduled already at this time with DR Guevara. Patient decided to keep appointment.

## 2021-01-06 ENCOUNTER — TELEPHONE (OUTPATIENT)
Dept: PSYCHIATRY | Facility: CLINIC | Age: 46
End: 2021-01-06

## 2021-01-11 ENCOUNTER — PATIENT MESSAGE (OUTPATIENT)
Dept: FAMILY MEDICINE | Facility: CLINIC | Age: 46
End: 2021-01-11

## 2021-01-12 ENCOUNTER — PATIENT MESSAGE (OUTPATIENT)
Dept: FAMILY MEDICINE | Facility: CLINIC | Age: 46
End: 2021-01-12

## 2021-01-19 ENCOUNTER — TELEPHONE (OUTPATIENT)
Dept: PSYCHIATRY | Facility: CLINIC | Age: 46
End: 2021-01-19

## 2021-01-19 ENCOUNTER — PATIENT MESSAGE (OUTPATIENT)
Dept: PSYCHIATRY | Facility: CLINIC | Age: 46
End: 2021-01-19

## 2021-01-26 ENCOUNTER — PATIENT OUTREACH (OUTPATIENT)
Dept: ADMINISTRATIVE | Facility: OTHER | Age: 46
End: 2021-01-26

## 2021-01-27 ENCOUNTER — LAB VISIT (OUTPATIENT)
Dept: FAMILY MEDICINE | Facility: CLINIC | Age: 46
End: 2021-01-27
Payer: MEDICARE

## 2021-01-27 ENCOUNTER — OFFICE VISIT (OUTPATIENT)
Dept: FAMILY MEDICINE | Facility: CLINIC | Age: 46
End: 2021-01-27
Payer: MEDICARE

## 2021-01-27 DIAGNOSIS — R05.9 COUGH: ICD-10-CM

## 2021-01-27 DIAGNOSIS — R05.9 COUGH: Primary | ICD-10-CM

## 2021-01-27 PROCEDURE — 99213 PR OFFICE/OUTPT VISIT, EST, LEVL III, 20-29 MIN: ICD-10-PCS | Mod: 95,,, | Performed by: NURSE PRACTITIONER

## 2021-01-27 PROCEDURE — U0003 INFECTIOUS AGENT DETECTION BY NUCLEIC ACID (DNA OR RNA); SEVERE ACUTE RESPIRATORY SYNDROME CORONAVIRUS 2 (SARS-COV-2) (CORONAVIRUS DISEASE [COVID-19]), AMPLIFIED PROBE TECHNIQUE, MAKING USE OF HIGH THROUGHPUT TECHNOLOGIES AS DESCRIBED BY CMS-2020-01-R: HCPCS

## 2021-01-27 PROCEDURE — 99213 OFFICE O/P EST LOW 20 MIN: CPT | Mod: 95,,, | Performed by: NURSE PRACTITIONER

## 2021-01-27 RX ORDER — ALBUTEROL SULFATE 0.83 MG/ML
2.5 SOLUTION RESPIRATORY (INHALATION) EVERY 6 HOURS PRN
Qty: 1 BOX | Refills: 0 | Status: SHIPPED | OUTPATIENT
Start: 2021-01-27 | End: 2021-03-22

## 2021-01-27 RX ORDER — PROMETHAZINE HYDROCHLORIDE AND DEXTROMETHORPHAN HYDROBROMIDE 6.25; 15 MG/5ML; MG/5ML
5 SYRUP ORAL EVERY 4 HOURS PRN
Qty: 240 ML | Refills: 0 | Status: SHIPPED | OUTPATIENT
Start: 2021-01-27 | End: 2021-02-02 | Stop reason: ALTCHOICE

## 2021-01-29 ENCOUNTER — PATIENT MESSAGE (OUTPATIENT)
Dept: FAMILY MEDICINE | Facility: CLINIC | Age: 46
End: 2021-01-29

## 2021-01-29 LAB — SARS-COV-2 RNA RESP QL NAA+PROBE: NOT DETECTED

## 2021-02-02 ENCOUNTER — OFFICE VISIT (OUTPATIENT)
Dept: PSYCHIATRY | Facility: CLINIC | Age: 46
End: 2021-02-02
Payer: MEDICARE

## 2021-02-02 VITALS
BODY MASS INDEX: 45.83 KG/M2 | SYSTOLIC BLOOD PRESSURE: 130 MMHG | HEIGHT: 67 IN | WEIGHT: 292 LBS | HEART RATE: 101 BPM | DIASTOLIC BLOOD PRESSURE: 87 MMHG

## 2021-02-02 DIAGNOSIS — F43.10 PTSD (POST-TRAUMATIC STRESS DISORDER): Primary | ICD-10-CM

## 2021-02-02 DIAGNOSIS — F90.0 ATTENTION DEFICIT HYPERACTIVITY DISORDER (ADHD), PREDOMINANTLY INATTENTIVE TYPE: ICD-10-CM

## 2021-02-02 DIAGNOSIS — F60.3 BORDERLINE PERSONALITY DISORDER: ICD-10-CM

## 2021-02-02 DIAGNOSIS — F41.0 PANIC DISORDER WITHOUT AGORAPHOBIA: ICD-10-CM

## 2021-02-02 DIAGNOSIS — E66.01 MORBID OBESITY WITH BMI OF 40.0-44.9, ADULT: ICD-10-CM

## 2021-02-02 DIAGNOSIS — G47.33 OSA (OBSTRUCTIVE SLEEP APNEA): ICD-10-CM

## 2021-02-02 DIAGNOSIS — F33.2 MAJOR DEPRESSIVE DISORDER, RECURRENT, SEVERE WITHOUT PSYCHOTIC FEATURES: ICD-10-CM

## 2021-02-02 PROCEDURE — 99214 PR OFFICE/OUTPT VISIT, EST, LEVL IV, 30-39 MIN: ICD-10-PCS | Mod: S$GLB,,, | Performed by: PSYCHIATRY & NEUROLOGY

## 2021-02-02 PROCEDURE — 1125F PR PAIN SEVERITY QUANTIFIED, PAIN PRESENT: ICD-10-PCS | Mod: S$GLB,,, | Performed by: PSYCHIATRY & NEUROLOGY

## 2021-02-02 PROCEDURE — 3008F BODY MASS INDEX DOCD: CPT | Mod: S$GLB,,, | Performed by: PSYCHIATRY & NEUROLOGY

## 2021-02-02 PROCEDURE — 3008F PR BODY MASS INDEX (BMI) DOCUMENTED: ICD-10-PCS | Mod: S$GLB,,, | Performed by: PSYCHIATRY & NEUROLOGY

## 2021-02-02 PROCEDURE — 90833 PSYTX W PT W E/M 30 MIN: CPT | Mod: S$GLB,,, | Performed by: PSYCHIATRY & NEUROLOGY

## 2021-02-02 PROCEDURE — 99214 OFFICE O/P EST MOD 30 MIN: CPT | Mod: S$GLB,,, | Performed by: PSYCHIATRY & NEUROLOGY

## 2021-02-02 PROCEDURE — 1125F AMNT PAIN NOTED PAIN PRSNT: CPT | Mod: S$GLB,,, | Performed by: PSYCHIATRY & NEUROLOGY

## 2021-02-02 PROCEDURE — 99999 PR PBB SHADOW E&M-EST. PATIENT-LVL III: CPT | Mod: PBBFAC,,, | Performed by: PSYCHIATRY & NEUROLOGY

## 2021-02-02 PROCEDURE — 99999 PR PBB SHADOW E&M-EST. PATIENT-LVL III: ICD-10-PCS | Mod: PBBFAC,,, | Performed by: PSYCHIATRY & NEUROLOGY

## 2021-02-02 PROCEDURE — 90833 PR PSYCHOTHERAPY W/PATIENT W/E&M, 30 MIN (ADD ON): ICD-10-PCS | Mod: S$GLB,,, | Performed by: PSYCHIATRY & NEUROLOGY

## 2021-02-02 RX ORDER — BUPROPION HYDROCHLORIDE 150 MG/1
150 TABLET ORAL DAILY
Qty: 90 TABLET | Refills: 0 | Status: SHIPPED | OUTPATIENT
Start: 2021-02-02 | End: 2021-06-20 | Stop reason: SDUPTHER

## 2021-02-02 RX ORDER — FLUTICASONE FUROATE AND VILANTEROL 200; 25 UG/1; UG/1
POWDER RESPIRATORY (INHALATION)
COMMUNITY
End: 2022-07-14 | Stop reason: SDUPTHER

## 2021-02-02 RX ORDER — AMITRIPTYLINE HYDROCHLORIDE 25 MG/1
25 TABLET, FILM COATED ORAL NIGHTLY PRN
Qty: 30 TABLET | Refills: 0 | Status: SHIPPED | OUTPATIENT
Start: 2021-02-02 | End: 2021-03-01

## 2021-02-02 RX ORDER — AMITRIPTYLINE HYDROCHLORIDE 75 MG/1
TABLET ORAL
Qty: 90 TABLET | Refills: 0 | Status: SHIPPED | OUTPATIENT
Start: 2021-02-02 | End: 2021-02-11

## 2021-02-02 RX ORDER — ALPRAZOLAM 1 MG/1
1 TABLET ORAL DAILY PRN
Qty: 30 TABLET | Refills: 2 | Status: SHIPPED | OUTPATIENT
Start: 2021-02-02 | End: 2021-04-12

## 2021-02-02 RX ORDER — FLUOXETINE HYDROCHLORIDE 40 MG/1
40 CAPSULE ORAL DAILY
Qty: 90 CAPSULE | Refills: 0 | Status: SHIPPED | OUTPATIENT
Start: 2021-02-02 | End: 2021-05-03

## 2021-02-10 ENCOUNTER — PATIENT MESSAGE (OUTPATIENT)
Dept: FAMILY MEDICINE | Facility: CLINIC | Age: 46
End: 2021-02-10

## 2021-02-10 RX ORDER — LEVOCARNITINE 330 MG/1
990 TABLET ORAL 2 TIMES DAILY
Qty: 180 TABLET | Refills: 1 | Status: SHIPPED | OUTPATIENT
Start: 2021-02-10 | End: 2021-04-17

## 2021-02-26 ENCOUNTER — PATIENT MESSAGE (OUTPATIENT)
Dept: FAMILY MEDICINE | Facility: CLINIC | Age: 46
End: 2021-02-26

## 2021-03-04 ENCOUNTER — PATIENT MESSAGE (OUTPATIENT)
Dept: NEUROLOGY | Facility: CLINIC | Age: 46
End: 2021-03-04

## 2021-03-05 ENCOUNTER — TELEPHONE (OUTPATIENT)
Dept: NEUROLOGY | Facility: CLINIC | Age: 46
End: 2021-03-05

## 2021-03-16 ENCOUNTER — PATIENT OUTREACH (OUTPATIENT)
Dept: ADMINISTRATIVE | Facility: OTHER | Age: 46
End: 2021-03-16

## 2021-03-19 ENCOUNTER — TELEPHONE (OUTPATIENT)
Dept: NEUROLOGY | Facility: CLINIC | Age: 46
End: 2021-03-19

## 2021-03-19 ENCOUNTER — OFFICE VISIT (OUTPATIENT)
Dept: NEUROLOGY | Facility: CLINIC | Age: 46
End: 2021-03-19
Payer: MEDICARE

## 2021-03-19 VITALS
RESPIRATION RATE: 17 BRPM | HEART RATE: 113 BPM | DIASTOLIC BLOOD PRESSURE: 97 MMHG | HEIGHT: 67 IN | BODY MASS INDEX: 44.47 KG/M2 | SYSTOLIC BLOOD PRESSURE: 149 MMHG | WEIGHT: 283.31 LBS | TEMPERATURE: 99 F

## 2021-03-19 DIAGNOSIS — I72.8 ANEURYSM OF OTHER SPECIFIED ARTERIES: ICD-10-CM

## 2021-03-19 DIAGNOSIS — G43.719 CHRONIC MIGRAINE WITHOUT AURA, INTRACTABLE, WITHOUT STATUS MIGRAINOSUS: ICD-10-CM

## 2021-03-19 DIAGNOSIS — G44.83 COUGH HEADACHE: Primary | ICD-10-CM

## 2021-03-19 DIAGNOSIS — Z86.69 HISTORY OF CHIARI MALFORMATION: ICD-10-CM

## 2021-03-19 DIAGNOSIS — M47.812 SPONDYLOSIS OF CERVICAL REGION WITHOUT MYELOPATHY OR RADICULOPATHY: ICD-10-CM

## 2021-03-19 DIAGNOSIS — G44.40 MEDICATION OVERUSE HEADACHE: ICD-10-CM

## 2021-03-19 PROCEDURE — 99999 PR PBB SHADOW E&M-EST. PATIENT-LVL V: CPT | Mod: PBBFAC,,, | Performed by: PSYCHIATRY & NEUROLOGY

## 2021-03-19 PROCEDURE — 1125F PR PAIN SEVERITY QUANTIFIED, PAIN PRESENT: ICD-10-PCS | Mod: S$GLB,,, | Performed by: PSYCHIATRY & NEUROLOGY

## 2021-03-19 PROCEDURE — 99215 OFFICE O/P EST HI 40 MIN: CPT | Mod: S$GLB,,, | Performed by: PSYCHIATRY & NEUROLOGY

## 2021-03-19 PROCEDURE — 3008F BODY MASS INDEX DOCD: CPT | Mod: S$GLB,,, | Performed by: PSYCHIATRY & NEUROLOGY

## 2021-03-19 PROCEDURE — 3008F PR BODY MASS INDEX (BMI) DOCUMENTED: ICD-10-PCS | Mod: S$GLB,,, | Performed by: PSYCHIATRY & NEUROLOGY

## 2021-03-19 PROCEDURE — 99215 PR OFFICE/OUTPT VISIT, EST, LEVL V, 40-54 MIN: ICD-10-PCS | Mod: S$GLB,,, | Performed by: PSYCHIATRY & NEUROLOGY

## 2021-03-19 PROCEDURE — 1125F AMNT PAIN NOTED PAIN PRSNT: CPT | Mod: S$GLB,,, | Performed by: PSYCHIATRY & NEUROLOGY

## 2021-03-19 PROCEDURE — 99999 PR PBB SHADOW E&M-EST. PATIENT-LVL V: ICD-10-PCS | Mod: PBBFAC,,, | Performed by: PSYCHIATRY & NEUROLOGY

## 2021-03-19 RX ORDER — GALCANEZUMAB 120 MG/ML
120 INJECTION, SOLUTION SUBCUTANEOUS
Qty: 1 SYRINGE | Refills: 11 | Status: SHIPPED | OUTPATIENT
Start: 2021-03-19 | End: 2021-04-12 | Stop reason: SDUPTHER

## 2021-03-19 RX ORDER — TIZANIDINE 4 MG/1
4 TABLET ORAL 3 TIMES DAILY PRN
Qty: 90 TABLET | Refills: 11 | Status: SHIPPED | OUTPATIENT
Start: 2021-03-19 | End: 2022-04-05

## 2021-03-19 RX ORDER — PROPRANOLOL HYDROCHLORIDE 20 MG/1
TABLET ORAL
Qty: 60 TABLET | Refills: 3 | Status: SHIPPED | OUTPATIENT
Start: 2021-03-19 | End: 2021-07-26

## 2021-03-19 RX ORDER — SUMATRIPTAN 50 MG/1
50 TABLET, FILM COATED ORAL
Qty: 12 TABLET | Refills: 3 | Status: SHIPPED | OUTPATIENT
Start: 2021-03-19 | End: 2021-05-12

## 2021-03-23 ENCOUNTER — TELEPHONE (OUTPATIENT)
Dept: PSYCHIATRY | Facility: CLINIC | Age: 46
End: 2021-03-23

## 2021-03-23 RX ORDER — ZOLPIDEM TARTRATE 5 MG/1
5 TABLET ORAL NIGHTLY PRN
Qty: 30 TABLET | Refills: 0 | Status: SHIPPED | OUTPATIENT
Start: 2021-03-23 | End: 2021-06-02 | Stop reason: ALTCHOICE

## 2021-04-07 ENCOUNTER — PATIENT MESSAGE (OUTPATIENT)
Dept: PSYCHIATRY | Facility: CLINIC | Age: 46
End: 2021-04-07

## 2021-04-10 ENCOUNTER — PATIENT MESSAGE (OUTPATIENT)
Dept: NEUROLOGY | Facility: CLINIC | Age: 46
End: 2021-04-10

## 2021-04-12 ENCOUNTER — PATIENT MESSAGE (OUTPATIENT)
Dept: PSYCHIATRY | Facility: CLINIC | Age: 46
End: 2021-04-12

## 2021-04-12 DIAGNOSIS — G43.719 CHRONIC MIGRAINE WITHOUT AURA, INTRACTABLE, WITHOUT STATUS MIGRAINOSUS: ICD-10-CM

## 2021-04-12 RX ORDER — BUTALBITAL, ACETAMINOPHEN AND CAFFEINE 50; 325; 40 MG/1; MG/1; MG/1
TABLET ORAL
Qty: 14 TABLET | Refills: 0 | Status: SHIPPED | OUTPATIENT
Start: 2021-04-12 | End: 2021-07-26

## 2021-04-13 ENCOUNTER — PATIENT MESSAGE (OUTPATIENT)
Dept: NEUROLOGY | Facility: CLINIC | Age: 46
End: 2021-04-13

## 2021-04-13 RX ORDER — GALCANEZUMAB 120 MG/ML
120 INJECTION, SOLUTION SUBCUTANEOUS
Qty: 1 SYRINGE | Refills: 11 | Status: SHIPPED | OUTPATIENT
Start: 2021-04-13 | End: 2022-05-19 | Stop reason: SDUPTHER

## 2021-04-14 ENCOUNTER — TELEPHONE (OUTPATIENT)
Dept: NEUROLOGY | Facility: CLINIC | Age: 46
End: 2021-04-14

## 2021-04-20 ENCOUNTER — PATIENT OUTREACH (OUTPATIENT)
Dept: ADMINISTRATIVE | Facility: OTHER | Age: 46
End: 2021-04-20

## 2021-04-29 ENCOUNTER — OFFICE VISIT (OUTPATIENT)
Dept: FAMILY MEDICINE | Facility: CLINIC | Age: 46
End: 2021-04-29
Payer: MEDICARE

## 2021-04-29 DIAGNOSIS — J32.9 SINUSITIS, UNSPECIFIED CHRONICITY, UNSPECIFIED LOCATION: Primary | ICD-10-CM

## 2021-04-29 DIAGNOSIS — R05.9 COUGH: ICD-10-CM

## 2021-04-29 PROCEDURE — 99214 OFFICE O/P EST MOD 30 MIN: CPT | Mod: 95,,, | Performed by: NURSE PRACTITIONER

## 2021-04-29 PROCEDURE — 99214 PR OFFICE/OUTPT VISIT, EST, LEVL IV, 30-39 MIN: ICD-10-PCS | Mod: 95,,, | Performed by: NURSE PRACTITIONER

## 2021-04-29 RX ORDER — AMOXICILLIN AND CLAVULANATE POTASSIUM 875; 125 MG/1; MG/1
1 TABLET, FILM COATED ORAL 2 TIMES DAILY
Qty: 14 TABLET | Refills: 0 | Status: SHIPPED | OUTPATIENT
Start: 2021-04-29 | End: 2021-05-06

## 2021-04-29 RX ORDER — ALBUTEROL SULFATE 90 UG/1
2 AEROSOL, METERED RESPIRATORY (INHALATION) EVERY 6 HOURS PRN
Qty: 18 G | Refills: 0 | Status: SHIPPED | OUTPATIENT
Start: 2021-04-29 | End: 2021-05-28

## 2021-05-06 ENCOUNTER — HOSPITAL ENCOUNTER (OUTPATIENT)
Dept: RADIOLOGY | Facility: HOSPITAL | Age: 46
Discharge: HOME | End: 2021-05-06
Attending: PSYCHIATRY & NEUROLOGY
Payer: MEDICARE

## 2021-05-06 DIAGNOSIS — I72.8 ANEURYSM OF OTHER SPECIFIED ARTERIES: ICD-10-CM

## 2021-05-06 DIAGNOSIS — G44.83 COUGH HEADACHE: ICD-10-CM

## 2021-05-06 PROCEDURE — 70544 MR ANGIOGRAPHY HEAD W/O DYE: CPT | Mod: 26,,, | Performed by: RADIOLOGY

## 2021-05-06 PROCEDURE — 70544 MRA BRAIN WITHOUT CONTRAST: ICD-10-PCS | Mod: 26,,, | Performed by: RADIOLOGY

## 2021-05-06 PROCEDURE — 70544 MR ANGIOGRAPHY HEAD W/O DYE: CPT | Mod: TC,PO

## 2021-05-06 RX ORDER — ALBUTEROL SULFATE 0.83 MG/ML
SOLUTION RESPIRATORY (INHALATION)
Qty: 180 ML | Refills: 2 | Status: SHIPPED | OUTPATIENT
Start: 2021-05-06 | End: 2021-06-25

## 2021-05-22 ENCOUNTER — OFFICE VISIT (OUTPATIENT)
Dept: FAMILY MEDICINE | Facility: CLINIC | Age: 46
End: 2021-05-22
Payer: MEDICARE

## 2021-05-22 DIAGNOSIS — R51.9 SINUS HEADACHE: ICD-10-CM

## 2021-05-22 DIAGNOSIS — J20.9 BRONCHITIS WITH BRONCHOSPASM: Primary | ICD-10-CM

## 2021-05-22 DIAGNOSIS — J01.00 ACUTE MAXILLARY SINUSITIS, RECURRENCE NOT SPECIFIED: ICD-10-CM

## 2021-05-22 PROCEDURE — 99213 PR OFFICE/OUTPT VISIT, EST, LEVL III, 20-29 MIN: ICD-10-PCS | Mod: 95,,, | Performed by: NURSE PRACTITIONER

## 2021-05-22 PROCEDURE — 99213 OFFICE O/P EST LOW 20 MIN: CPT | Mod: 95,,, | Performed by: NURSE PRACTITIONER

## 2021-05-22 RX ORDER — PREDNISONE 20 MG/1
TABLET ORAL
Qty: 10 TABLET | Refills: 0 | Status: SHIPPED | OUTPATIENT
Start: 2021-05-22 | End: 2021-07-26

## 2021-05-22 RX ORDER — DOXYCYCLINE HYCLATE 100 MG
100 TABLET ORAL 2 TIMES DAILY
Qty: 14 TABLET | Refills: 0 | Status: SHIPPED | OUTPATIENT
Start: 2021-05-22 | End: 2021-07-21 | Stop reason: ALTCHOICE

## 2021-05-22 RX ORDER — FLUTICASONE PROPIONATE 50 MCG
1 SPRAY, SUSPENSION (ML) NASAL DAILY
Qty: 16 G | Refills: 0 | Status: SHIPPED | OUTPATIENT
Start: 2021-05-22 | End: 2021-06-23

## 2021-06-02 ENCOUNTER — OFFICE VISIT (OUTPATIENT)
Dept: PSYCHIATRY | Facility: CLINIC | Age: 46
End: 2021-06-02
Payer: MEDICARE

## 2021-06-02 DIAGNOSIS — F41.0 PANIC DISORDER WITHOUT AGORAPHOBIA: ICD-10-CM

## 2021-06-02 DIAGNOSIS — G47.33 OSA (OBSTRUCTIVE SLEEP APNEA): ICD-10-CM

## 2021-06-02 DIAGNOSIS — E66.01 MORBID OBESITY WITH BMI OF 40.0-44.9, ADULT: ICD-10-CM

## 2021-06-02 DIAGNOSIS — F60.3 BORDERLINE PERSONALITY DISORDER: ICD-10-CM

## 2021-06-02 DIAGNOSIS — F33.2 MAJOR DEPRESSIVE DISORDER, RECURRENT, SEVERE WITHOUT PSYCHOTIC FEATURES: Primary | ICD-10-CM

## 2021-06-02 DIAGNOSIS — F90.0 ATTENTION DEFICIT HYPERACTIVITY DISORDER (ADHD), PREDOMINANTLY INATTENTIVE TYPE: ICD-10-CM

## 2021-06-02 DIAGNOSIS — F43.10 PTSD (POST-TRAUMATIC STRESS DISORDER): ICD-10-CM

## 2021-06-02 PROCEDURE — 99214 PR OFFICE/OUTPT VISIT, EST, LEVL IV, 30-39 MIN: ICD-10-PCS | Mod: 95,,, | Performed by: PSYCHIATRY & NEUROLOGY

## 2021-06-02 PROCEDURE — 99214 OFFICE O/P EST MOD 30 MIN: CPT | Mod: 95,,, | Performed by: PSYCHIATRY & NEUROLOGY

## 2021-06-02 PROCEDURE — 90833 PR PSYCHOTHERAPY W/PATIENT W/E&M, 30 MIN (ADD ON): ICD-10-PCS | Mod: 95,,, | Performed by: PSYCHIATRY & NEUROLOGY

## 2021-06-02 PROCEDURE — 90833 PSYTX W PT W E/M 30 MIN: CPT | Mod: 95,,, | Performed by: PSYCHIATRY & NEUROLOGY

## 2021-06-02 RX ORDER — FLUOXETINE HYDROCHLORIDE 40 MG/1
40 CAPSULE ORAL DAILY
Qty: 90 CAPSULE | Refills: 0 | Status: SHIPPED | OUTPATIENT
Start: 2021-06-02 | End: 2021-07-21 | Stop reason: SDUPTHER

## 2021-06-02 RX ORDER — TRAZODONE HYDROCHLORIDE 100 MG/1
100 TABLET ORAL NIGHTLY
Qty: 30 TABLET | Refills: 0 | Status: SHIPPED | OUTPATIENT
Start: 2021-06-02 | End: 2021-07-05

## 2021-06-11 ENCOUNTER — IMMUNIZATION (OUTPATIENT)
Dept: FAMILY MEDICINE | Facility: CLINIC | Age: 46
End: 2021-06-11
Payer: MEDICARE

## 2021-06-11 DIAGNOSIS — Z23 NEED FOR VACCINATION: Primary | ICD-10-CM

## 2021-06-11 PROCEDURE — 91300 COVID-19, MRNA, LNP-S, PF, 30 MCG/0.3 ML DOSE VACCINE: CPT | Mod: PBBFAC | Performed by: FAMILY MEDICINE

## 2021-06-20 RX ORDER — LEVOCARNITINE 330 MG/1
TABLET ORAL
Qty: 180 TABLET | Refills: 1 | Status: SHIPPED | OUTPATIENT
Start: 2021-06-20 | End: 2021-08-25

## 2021-06-23 RX ORDER — FLUTICASONE PROPIONATE 50 MCG
SPRAY, SUSPENSION (ML) NASAL
Qty: 16 G | Refills: 3 | Status: SHIPPED | OUTPATIENT
Start: 2021-06-23 | End: 2021-10-22

## 2021-07-03 ENCOUNTER — PATIENT MESSAGE (OUTPATIENT)
Dept: PSYCHIATRY | Facility: CLINIC | Age: 46
End: 2021-07-03

## 2021-07-05 RX ORDER — TRAZODONE HYDROCHLORIDE 100 MG/1
200 TABLET ORAL NIGHTLY
Qty: 60 TABLET | Refills: 0 | Status: SHIPPED | OUTPATIENT
Start: 2021-07-05 | End: 2021-07-07

## 2021-07-21 ENCOUNTER — OFFICE VISIT (OUTPATIENT)
Dept: PSYCHIATRY | Facility: CLINIC | Age: 46
End: 2021-07-21
Payer: MEDICARE

## 2021-07-21 ENCOUNTER — PATIENT OUTREACH (OUTPATIENT)
Dept: ADMINISTRATIVE | Facility: OTHER | Age: 46
End: 2021-07-21

## 2021-07-21 VITALS
HEIGHT: 67 IN | DIASTOLIC BLOOD PRESSURE: 91 MMHG | BODY MASS INDEX: 45.29 KG/M2 | HEART RATE: 82 BPM | WEIGHT: 288.56 LBS | SYSTOLIC BLOOD PRESSURE: 132 MMHG

## 2021-07-21 DIAGNOSIS — F90.0 ATTENTION DEFICIT HYPERACTIVITY DISORDER (ADHD), PREDOMINANTLY INATTENTIVE TYPE: ICD-10-CM

## 2021-07-21 DIAGNOSIS — E66.01 MORBID OBESITY WITH BMI OF 40.0-44.9, ADULT: ICD-10-CM

## 2021-07-21 DIAGNOSIS — F33.2 MAJOR DEPRESSIVE DISORDER, RECURRENT, SEVERE WITHOUT PSYCHOTIC FEATURES: Primary | ICD-10-CM

## 2021-07-21 DIAGNOSIS — F60.3 BORDERLINE PERSONALITY DISORDER: ICD-10-CM

## 2021-07-21 DIAGNOSIS — G47.33 OSA (OBSTRUCTIVE SLEEP APNEA): ICD-10-CM

## 2021-07-21 DIAGNOSIS — F41.0 PANIC DISORDER WITHOUT AGORAPHOBIA: ICD-10-CM

## 2021-07-21 DIAGNOSIS — F43.10 PTSD (POST-TRAUMATIC STRESS DISORDER): ICD-10-CM

## 2021-07-21 PROCEDURE — 1125F PR PAIN SEVERITY QUANTIFIED, PAIN PRESENT: ICD-10-PCS | Mod: S$GLB,,, | Performed by: PSYCHIATRY & NEUROLOGY

## 2021-07-21 PROCEDURE — 3008F BODY MASS INDEX DOCD: CPT | Mod: S$GLB,,, | Performed by: PSYCHIATRY & NEUROLOGY

## 2021-07-21 PROCEDURE — 99999 PR PBB SHADOW E&M-EST. PATIENT-LVL IV: CPT | Mod: PBBFAC,,, | Performed by: PSYCHIATRY & NEUROLOGY

## 2021-07-21 PROCEDURE — 3008F PR BODY MASS INDEX (BMI) DOCUMENTED: ICD-10-PCS | Mod: S$GLB,,, | Performed by: PSYCHIATRY & NEUROLOGY

## 2021-07-21 PROCEDURE — 1125F AMNT PAIN NOTED PAIN PRSNT: CPT | Mod: S$GLB,,, | Performed by: PSYCHIATRY & NEUROLOGY

## 2021-07-21 PROCEDURE — 90792 PSYCH DIAG EVAL W/MED SRVCS: CPT | Mod: S$GLB,,, | Performed by: PSYCHIATRY & NEUROLOGY

## 2021-07-21 PROCEDURE — 99999 PR PBB SHADOW E&M-EST. PATIENT-LVL IV: ICD-10-PCS | Mod: PBBFAC,,, | Performed by: PSYCHIATRY & NEUROLOGY

## 2021-07-21 PROCEDURE — 90792 PR PSYCHIATRIC DIAGNOSTIC EVALUATION W/MEDICAL SERVICES: ICD-10-PCS | Mod: S$GLB,,, | Performed by: PSYCHIATRY & NEUROLOGY

## 2021-07-21 RX ORDER — LISDEXAMFETAMINE DIMESYLATE 30 MG/1
30 CAPSULE ORAL EVERY MORNING
Qty: 30 CAPSULE | Refills: 0 | Status: SHIPPED | OUTPATIENT
Start: 2021-07-21 | End: 2021-08-20

## 2021-07-21 RX ORDER — FLUOXETINE HYDROCHLORIDE 40 MG/1
40 CAPSULE ORAL DAILY
Qty: 90 CAPSULE | Refills: 0 | Status: SHIPPED | OUTPATIENT
Start: 2021-07-21 | End: 2021-08-20 | Stop reason: SDUPTHER

## 2021-07-21 RX ORDER — BUPROPION HYDROCHLORIDE 150 MG/1
150 TABLET ORAL DAILY
Qty: 90 TABLET | Refills: 0 | Status: SHIPPED | OUTPATIENT
Start: 2021-07-21 | End: 2021-08-20 | Stop reason: SDUPTHER

## 2021-07-22 ENCOUNTER — PATIENT MESSAGE (OUTPATIENT)
Dept: FAMILY MEDICINE | Facility: CLINIC | Age: 46
End: 2021-07-22

## 2021-07-23 ENCOUNTER — PATIENT MESSAGE (OUTPATIENT)
Dept: PSYCHIATRY | Facility: CLINIC | Age: 46
End: 2021-07-23

## 2021-07-23 ENCOUNTER — TELEPHONE (OUTPATIENT)
Dept: PSYCHIATRY | Facility: CLINIC | Age: 46
End: 2021-07-23

## 2021-07-26 ENCOUNTER — HOSPITAL ENCOUNTER (OUTPATIENT)
Dept: RADIOLOGY | Facility: HOSPITAL | Age: 46
Discharge: HOME | End: 2021-07-26
Attending: ANESTHESIOLOGY
Payer: MEDICARE

## 2021-07-26 ENCOUNTER — OFFICE VISIT (OUTPATIENT)
Dept: PAIN MEDICINE | Facility: CLINIC | Age: 46
End: 2021-07-26
Payer: MEDICARE

## 2021-07-26 ENCOUNTER — OFFICE VISIT (OUTPATIENT)
Dept: NEUROLOGY | Facility: CLINIC | Age: 46
End: 2021-07-26
Payer: MEDICARE

## 2021-07-26 VITALS
DIASTOLIC BLOOD PRESSURE: 83 MMHG | HEART RATE: 67 BPM | OXYGEN SATURATION: 96 % | BODY MASS INDEX: 44.04 KG/M2 | HEIGHT: 67 IN | WEIGHT: 280.63 LBS | SYSTOLIC BLOOD PRESSURE: 142 MMHG

## 2021-07-26 VITALS
BODY MASS INDEX: 44.16 KG/M2 | HEART RATE: 71 BPM | TEMPERATURE: 99 F | DIASTOLIC BLOOD PRESSURE: 88 MMHG | WEIGHT: 281.94 LBS | SYSTOLIC BLOOD PRESSURE: 145 MMHG | RESPIRATION RATE: 20 BRPM

## 2021-07-26 DIAGNOSIS — G44.83 COUGH HEADACHE: Primary | ICD-10-CM

## 2021-07-26 DIAGNOSIS — Z11.9 SCREENING EXAMINATION FOR INFECTIOUS DISEASE: ICD-10-CM

## 2021-07-26 DIAGNOSIS — G44.40 MEDICATION OVERUSE HEADACHE: ICD-10-CM

## 2021-07-26 DIAGNOSIS — M47.812 SPONDYLOSIS OF CERVICAL REGION WITHOUT MYELOPATHY OR RADICULOPATHY: Primary | ICD-10-CM

## 2021-07-26 DIAGNOSIS — M54.9 DORSALGIA, UNSPECIFIED: ICD-10-CM

## 2021-07-26 DIAGNOSIS — G43.719 CHRONIC MIGRAINE WITHOUT AURA, INTRACTABLE, WITHOUT STATUS MIGRAINOSUS: ICD-10-CM

## 2021-07-26 PROCEDURE — 3008F PR BODY MASS INDEX (BMI) DOCUMENTED: ICD-10-PCS | Mod: S$GLB,,, | Performed by: PSYCHIATRY & NEUROLOGY

## 2021-07-26 PROCEDURE — 72110 XR LUMBAR SPINE 5 VIEW WITH FLEX AND EXT: ICD-10-PCS | Mod: 26,,, | Performed by: RADIOLOGY

## 2021-07-26 PROCEDURE — 1160F RVW MEDS BY RX/DR IN RCRD: CPT | Mod: S$GLB,,, | Performed by: PSYCHIATRY & NEUROLOGY

## 2021-07-26 PROCEDURE — 1125F AMNT PAIN NOTED PAIN PRSNT: CPT | Mod: S$GLB,,, | Performed by: PSYCHIATRY & NEUROLOGY

## 2021-07-26 PROCEDURE — 3008F PR BODY MASS INDEX (BMI) DOCUMENTED: ICD-10-PCS | Mod: S$GLB,,, | Performed by: ANESTHESIOLOGY

## 2021-07-26 PROCEDURE — 1125F PR PAIN SEVERITY QUANTIFIED, PAIN PRESENT: ICD-10-PCS | Mod: S$GLB,,, | Performed by: ANESTHESIOLOGY

## 2021-07-26 PROCEDURE — 3008F BODY MASS INDEX DOCD: CPT | Mod: S$GLB,,, | Performed by: ANESTHESIOLOGY

## 2021-07-26 PROCEDURE — 1125F PR PAIN SEVERITY QUANTIFIED, PAIN PRESENT: ICD-10-PCS | Mod: S$GLB,,, | Performed by: PSYCHIATRY & NEUROLOGY

## 2021-07-26 PROCEDURE — 99204 OFFICE O/P NEW MOD 45 MIN: CPT | Mod: S$GLB,,, | Performed by: ANESTHESIOLOGY

## 2021-07-26 PROCEDURE — 1159F PR MEDICATION LIST DOCUMENTED IN MEDICAL RECORD: ICD-10-PCS | Mod: S$GLB,,, | Performed by: ANESTHESIOLOGY

## 2021-07-26 PROCEDURE — 1160F PR REVIEW ALL MEDS BY PRESCRIBER/CLIN PHARMACIST DOCUMENTED: ICD-10-PCS | Mod: S$GLB,,, | Performed by: ANESTHESIOLOGY

## 2021-07-26 PROCEDURE — 1160F RVW MEDS BY RX/DR IN RCRD: CPT | Mod: S$GLB,,, | Performed by: ANESTHESIOLOGY

## 2021-07-26 PROCEDURE — 3008F BODY MASS INDEX DOCD: CPT | Mod: S$GLB,,, | Performed by: PSYCHIATRY & NEUROLOGY

## 2021-07-26 PROCEDURE — 99999 PR PBB SHADOW E&M-EST. PATIENT-LVL III: ICD-10-PCS | Mod: PBBFAC,,, | Performed by: ANESTHESIOLOGY

## 2021-07-26 PROCEDURE — 99214 PR OFFICE/OUTPT VISIT, EST, LEVL IV, 30-39 MIN: ICD-10-PCS | Mod: S$GLB,,, | Performed by: PSYCHIATRY & NEUROLOGY

## 2021-07-26 PROCEDURE — 99999 PR PBB SHADOW E&M-EST. PATIENT-LVL III: CPT | Mod: PBBFAC,,, | Performed by: ANESTHESIOLOGY

## 2021-07-26 PROCEDURE — 99999 PR PBB SHADOW E&M-EST. PATIENT-LVL V: CPT | Mod: PBBFAC,,, | Performed by: PSYCHIATRY & NEUROLOGY

## 2021-07-26 PROCEDURE — 1159F MED LIST DOCD IN RCRD: CPT | Mod: S$GLB,,, | Performed by: PSYCHIATRY & NEUROLOGY

## 2021-07-26 PROCEDURE — 1160F PR REVIEW ALL MEDS BY PRESCRIBER/CLIN PHARMACIST DOCUMENTED: ICD-10-PCS | Mod: S$GLB,,, | Performed by: PSYCHIATRY & NEUROLOGY

## 2021-07-26 PROCEDURE — 72110 X-RAY EXAM L-2 SPINE 4/>VWS: CPT | Mod: TC,PO

## 2021-07-26 PROCEDURE — 1125F AMNT PAIN NOTED PAIN PRSNT: CPT | Mod: S$GLB,,, | Performed by: ANESTHESIOLOGY

## 2021-07-26 PROCEDURE — 99204 PR OFFICE/OUTPT VISIT, NEW, LEVL IV, 45-59 MIN: ICD-10-PCS | Mod: S$GLB,,, | Performed by: ANESTHESIOLOGY

## 2021-07-26 PROCEDURE — 99999 PR PBB SHADOW E&M-EST. PATIENT-LVL V: ICD-10-PCS | Mod: PBBFAC,,, | Performed by: PSYCHIATRY & NEUROLOGY

## 2021-07-26 PROCEDURE — 1159F MED LIST DOCD IN RCRD: CPT | Mod: S$GLB,,, | Performed by: ANESTHESIOLOGY

## 2021-07-26 PROCEDURE — 99214 OFFICE O/P EST MOD 30 MIN: CPT | Mod: S$GLB,,, | Performed by: PSYCHIATRY & NEUROLOGY

## 2021-07-26 PROCEDURE — 72110 X-RAY EXAM L-2 SPINE 4/>VWS: CPT | Mod: 26,,, | Performed by: RADIOLOGY

## 2021-07-26 PROCEDURE — 1159F PR MEDICATION LIST DOCUMENTED IN MEDICAL RECORD: ICD-10-PCS | Mod: S$GLB,,, | Performed by: PSYCHIATRY & NEUROLOGY

## 2021-07-26 RX ORDER — GABAPENTIN 600 MG/1
TABLET ORAL
Qty: 90 TABLET | Refills: 11 | Status: SHIPPED | OUTPATIENT
Start: 2021-07-26 | End: 2022-05-19 | Stop reason: SDUPTHER

## 2021-07-26 RX ORDER — SUMATRIPTAN SUCCINATE 100 MG/1
100 TABLET ORAL
Qty: 12 TABLET | Refills: 3 | Status: SHIPPED | OUTPATIENT
Start: 2021-07-26 | End: 2022-01-28 | Stop reason: SDUPTHER

## 2021-07-26 RX ORDER — SODIUM CHLORIDE, SODIUM LACTATE, POTASSIUM CHLORIDE, CALCIUM CHLORIDE 600; 310; 30; 20 MG/100ML; MG/100ML; MG/100ML; MG/100ML
INJECTION, SOLUTION INTRAVENOUS CONTINUOUS
Status: CANCELLED | OUTPATIENT
Start: 2021-08-12

## 2021-07-29 ENCOUNTER — OFFICE VISIT (OUTPATIENT)
Dept: FAMILY MEDICINE | Facility: CLINIC | Age: 46
End: 2021-07-29
Payer: MEDICARE

## 2021-07-29 VITALS
HEART RATE: 75 BPM | TEMPERATURE: 98 F | DIASTOLIC BLOOD PRESSURE: 62 MMHG | OXYGEN SATURATION: 99 % | WEIGHT: 284.38 LBS | HEIGHT: 67 IN | BODY MASS INDEX: 44.63 KG/M2 | SYSTOLIC BLOOD PRESSURE: 96 MMHG | RESPIRATION RATE: 16 BRPM

## 2021-07-29 DIAGNOSIS — R31.9 HEMATURIA, UNSPECIFIED TYPE: Primary | ICD-10-CM

## 2021-07-29 DIAGNOSIS — Z12.39 ENCOUNTER FOR SCREENING FOR MALIGNANT NEOPLASM OF BREAST, UNSPECIFIED SCREENING MODALITY: ICD-10-CM

## 2021-07-29 DIAGNOSIS — Z12.31 ENCOUNTER FOR SCREENING MAMMOGRAM FOR MALIGNANT NEOPLASM OF BREAST: ICD-10-CM

## 2021-07-29 LAB
BILIRUB SERPL-MCNC: NEGATIVE MG/DL
BLOOD URINE, POC: NEGATIVE
CLARITY, POC UA: CLEAR
COLOR, POC UA: YELLOW
GLUCOSE UR QL STRIP: NORMAL
KETONES UR QL STRIP: NEGATIVE
LEUKOCYTE ESTERASE URINE, POC: NEGATIVE
NITRITE, POC UA: NEGATIVE
PH, POC UA: 5
PROTEIN, POC: NEGATIVE
SPECIFIC GRAVITY, POC UA: 1.02
UROBILINOGEN, POC UA: NORMAL

## 2021-07-29 PROCEDURE — 99213 PR OFFICE/OUTPT VISIT, EST, LEVL III, 20-29 MIN: ICD-10-PCS | Mod: S$GLB,,, | Performed by: FAMILY MEDICINE

## 2021-07-29 PROCEDURE — 3008F PR BODY MASS INDEX (BMI) DOCUMENTED: ICD-10-PCS | Mod: S$GLB,,, | Performed by: FAMILY MEDICINE

## 2021-07-29 PROCEDURE — 99213 OFFICE O/P EST LOW 20 MIN: CPT | Mod: S$GLB,,, | Performed by: FAMILY MEDICINE

## 2021-07-29 PROCEDURE — 81002 POCT URINE DIPSTICK WITHOUT MICROSCOPE: ICD-10-PCS | Mod: S$GLB,,, | Performed by: FAMILY MEDICINE

## 2021-07-29 PROCEDURE — 1125F AMNT PAIN NOTED PAIN PRSNT: CPT | Mod: S$GLB,,, | Performed by: FAMILY MEDICINE

## 2021-07-29 PROCEDURE — 3078F DIAST BP <80 MM HG: CPT | Mod: S$GLB,,, | Performed by: FAMILY MEDICINE

## 2021-07-29 PROCEDURE — 81002 URINALYSIS NONAUTO W/O SCOPE: CPT | Mod: S$GLB,,, | Performed by: FAMILY MEDICINE

## 2021-07-29 PROCEDURE — 3078F PR MOST RECENT DIASTOLIC BLOOD PRESSURE < 80 MM HG: ICD-10-PCS | Mod: S$GLB,,, | Performed by: FAMILY MEDICINE

## 2021-07-29 PROCEDURE — 3008F BODY MASS INDEX DOCD: CPT | Mod: S$GLB,,, | Performed by: FAMILY MEDICINE

## 2021-07-29 PROCEDURE — 3074F SYST BP LT 130 MM HG: CPT | Mod: S$GLB,,, | Performed by: FAMILY MEDICINE

## 2021-07-29 PROCEDURE — 3074F PR MOST RECENT SYSTOLIC BLOOD PRESSURE < 130 MM HG: ICD-10-PCS | Mod: S$GLB,,, | Performed by: FAMILY MEDICINE

## 2021-07-29 PROCEDURE — 1125F PR PAIN SEVERITY QUANTIFIED, PAIN PRESENT: ICD-10-PCS | Mod: S$GLB,,, | Performed by: FAMILY MEDICINE

## 2021-07-30 ENCOUNTER — HOSPITAL ENCOUNTER (OUTPATIENT)
Dept: RADIOLOGY | Facility: HOSPITAL | Age: 46
Discharge: HOME | End: 2021-07-30
Attending: FAMILY MEDICINE
Payer: MEDICARE

## 2021-07-30 DIAGNOSIS — R31.9 HEMATURIA, UNSPECIFIED TYPE: ICD-10-CM

## 2021-07-30 PROCEDURE — 76770 US EXAM ABDO BACK WALL COMP: CPT | Mod: TC,PO

## 2021-07-30 PROCEDURE — 76770 US RETROPERITONEAL COMPLETE: ICD-10-PCS | Mod: 26,,, | Performed by: RADIOLOGY

## 2021-07-30 PROCEDURE — 76770 US EXAM ABDO BACK WALL COMP: CPT | Mod: 26,,, | Performed by: RADIOLOGY

## 2021-08-02 ENCOUNTER — PATIENT MESSAGE (OUTPATIENT)
Dept: SURGERY | Facility: HOSPITAL | Age: 46
End: 2021-08-02

## 2021-08-06 ENCOUNTER — PATIENT MESSAGE (OUTPATIENT)
Dept: SURGERY | Facility: HOSPITAL | Age: 46
End: 2021-08-06

## 2021-08-08 ENCOUNTER — PATIENT MESSAGE (OUTPATIENT)
Dept: FAMILY MEDICINE | Facility: CLINIC | Age: 46
End: 2021-08-08

## 2021-08-08 ENCOUNTER — PATIENT MESSAGE (OUTPATIENT)
Dept: PSYCHIATRY | Facility: CLINIC | Age: 46
End: 2021-08-08

## 2021-08-08 DIAGNOSIS — R31.9 HEMATURIA OF UNKNOWN CAUSE: ICD-10-CM

## 2021-08-09 ENCOUNTER — TELEPHONE (OUTPATIENT)
Dept: PAIN MEDICINE | Facility: CLINIC | Age: 46
End: 2021-08-09

## 2021-08-13 ENCOUNTER — HOSPITAL ENCOUNTER (OUTPATIENT)
Dept: RADIOLOGY | Facility: HOSPITAL | Age: 46
Discharge: HOME | End: 2021-08-13
Attending: FAMILY MEDICINE
Payer: MEDICARE

## 2021-08-13 DIAGNOSIS — R31.9 HEMATURIA OF UNKNOWN CAUSE: ICD-10-CM

## 2021-08-13 PROCEDURE — 74176 CT ABD & PELVIS W/O CONTRAST: CPT | Mod: TC,PO

## 2021-08-13 PROCEDURE — 74176 CT RENAL STONE STUDY ABD PELVIS WO: ICD-10-PCS | Mod: 26,,, | Performed by: RADIOLOGY

## 2021-08-13 PROCEDURE — 74176 CT ABD & PELVIS W/O CONTRAST: CPT | Mod: 26,,, | Performed by: RADIOLOGY

## 2021-08-13 RX ORDER — ACETAMINOPHEN 500 MG
5000 TABLET ORAL DAILY
COMMUNITY
End: 2021-09-24 | Stop reason: ALTCHOICE

## 2021-08-14 ENCOUNTER — PATIENT MESSAGE (OUTPATIENT)
Dept: PSYCHIATRY | Facility: CLINIC | Age: 46
End: 2021-08-14

## 2021-08-14 ENCOUNTER — PATIENT MESSAGE (OUTPATIENT)
Dept: NEUROLOGY | Facility: CLINIC | Age: 46
End: 2021-08-14

## 2021-08-14 ENCOUNTER — PATIENT MESSAGE (OUTPATIENT)
Dept: FAMILY MEDICINE | Facility: CLINIC | Age: 46
End: 2021-08-14

## 2021-08-14 DIAGNOSIS — R31.9 HEMATURIA, UNSPECIFIED TYPE: Primary | ICD-10-CM

## 2021-08-15 PROBLEM — Q07.00 ARNOLD-CHIARI MALFORMATION: Status: RESOLVED | Noted: 2018-12-13 | Resolved: 2021-08-15

## 2021-08-16 ENCOUNTER — HOSPITAL ENCOUNTER (OUTPATIENT)
Facility: HOSPITAL | Age: 46
Discharge: HOME | End: 2021-08-16
Attending: ANESTHESIOLOGY | Admitting: ANESTHESIOLOGY
Payer: MEDICARE

## 2021-08-16 ENCOUNTER — HOSPITAL ENCOUNTER (OUTPATIENT)
Dept: RADIOLOGY | Facility: HOSPITAL | Age: 46
Discharge: HOME | End: 2021-08-16
Attending: ANESTHESIOLOGY | Admitting: ANESTHESIOLOGY
Payer: MEDICARE

## 2021-08-16 VITALS
DIASTOLIC BLOOD PRESSURE: 75 MMHG | HEART RATE: 70 BPM | WEIGHT: 283 LBS | TEMPERATURE: 98 F | OXYGEN SATURATION: 98 % | RESPIRATION RATE: 18 BRPM | SYSTOLIC BLOOD PRESSURE: 131 MMHG | BODY MASS INDEX: 44.42 KG/M2 | HEIGHT: 67 IN

## 2021-08-16 DIAGNOSIS — M47.812 CERVICAL SPONDYLOSIS: Primary | ICD-10-CM

## 2021-08-16 DIAGNOSIS — M47.812 SPONDYLOSIS OF CERVICAL REGION WITHOUT MYELOPATHY OR RADICULOPATHY: ICD-10-CM

## 2021-08-16 LAB
B-HCG UR QL: NEGATIVE
CTP QC/QA: YES

## 2021-08-16 PROCEDURE — 64633 PR DESTROY CERV/THOR FACET JNT: ICD-10-PCS | Mod: 50,,, | Performed by: ANESTHESIOLOGY

## 2021-08-16 PROCEDURE — 64634 DESTROY C/TH FACET JNT ADDL: CPT | Mod: 50,PO | Performed by: ANESTHESIOLOGY

## 2021-08-16 PROCEDURE — 64633 DESTROY CERV/THOR FACET JNT: CPT | Mod: 50,PO | Performed by: ANESTHESIOLOGY

## 2021-08-16 PROCEDURE — 81025 URINE PREGNANCY TEST: CPT | Mod: PO | Performed by: ANESTHESIOLOGY

## 2021-08-16 PROCEDURE — 63600175 PHARM REV CODE 636 W HCPCS: Mod: PO | Performed by: ANESTHESIOLOGY

## 2021-08-16 PROCEDURE — 25000003 PHARM REV CODE 250: Mod: PO | Performed by: ANESTHESIOLOGY

## 2021-08-16 PROCEDURE — 99152 PR MOD CONSCIOUS SEDATION, SAME PHYS, 5+ YRS, FIRST 15 MIN: ICD-10-PCS | Mod: ,,, | Performed by: ANESTHESIOLOGY

## 2021-08-16 PROCEDURE — 99152 MOD SED SAME PHYS/QHP 5/>YRS: CPT | Mod: ,,, | Performed by: ANESTHESIOLOGY

## 2021-08-16 PROCEDURE — 64634 DESTROY C/TH FACET JNT ADDL: CPT | Mod: RT,,, | Performed by: ANESTHESIOLOGY

## 2021-08-16 PROCEDURE — 64634 PR DESTROY C/TH FACET JNT ADDL: ICD-10-PCS | Mod: LT,,, | Performed by: ANESTHESIOLOGY

## 2021-08-16 PROCEDURE — 76000 FLUOROSCOPY <1 HR PHYS/QHP: CPT | Mod: TC,PO

## 2021-08-16 PROCEDURE — 64633 DESTROY CERV/THOR FACET JNT: CPT | Mod: 50,,, | Performed by: ANESTHESIOLOGY

## 2021-08-16 RX ORDER — MIDAZOLAM HYDROCHLORIDE 2 MG/2ML
INJECTION, SOLUTION INTRAMUSCULAR; INTRAVENOUS
Status: DISCONTINUED | OUTPATIENT
Start: 2021-08-16 | End: 2021-08-16 | Stop reason: HOSPADM

## 2021-08-16 RX ORDER — SODIUM CHLORIDE, SODIUM LACTATE, POTASSIUM CHLORIDE, CALCIUM CHLORIDE 600; 310; 30; 20 MG/100ML; MG/100ML; MG/100ML; MG/100ML
INJECTION, SOLUTION INTRAVENOUS CONTINUOUS
Status: DISCONTINUED | OUTPATIENT
Start: 2021-08-16 | End: 2021-08-16 | Stop reason: HOSPADM

## 2021-08-16 RX ORDER — LIDOCAINE HYDROCHLORIDE 20 MG/ML
INJECTION, SOLUTION EPIDURAL; INFILTRATION; INTRACAUDAL; PERINEURAL
Status: DISCONTINUED | OUTPATIENT
Start: 2021-08-16 | End: 2021-08-16 | Stop reason: HOSPADM

## 2021-08-16 RX ORDER — BUPIVACAINE HYDROCHLORIDE 2.5 MG/ML
INJECTION, SOLUTION EPIDURAL; INFILTRATION; INTRACAUDAL
Status: DISCONTINUED | OUTPATIENT
Start: 2021-08-16 | End: 2021-08-16 | Stop reason: HOSPADM

## 2021-08-16 RX ORDER — FENTANYL CITRATE 50 UG/ML
INJECTION, SOLUTION INTRAMUSCULAR; INTRAVENOUS
Status: DISCONTINUED | OUTPATIENT
Start: 2021-08-16 | End: 2021-08-16 | Stop reason: HOSPADM

## 2021-08-16 RX ORDER — TRIAMCINOLONE ACETONIDE 40 MG/ML
INJECTION, SUSPENSION INTRA-ARTICULAR; INTRAMUSCULAR
Status: DISCONTINUED | OUTPATIENT
Start: 2021-08-16 | End: 2021-08-16 | Stop reason: HOSPADM

## 2021-08-16 RX ORDER — LIDOCAINE HYDROCHLORIDE 10 MG/ML
INJECTION INFILTRATION; PERINEURAL
Status: DISCONTINUED | OUTPATIENT
Start: 2021-08-16 | End: 2021-08-16 | Stop reason: HOSPADM

## 2021-08-16 RX ADMIN — SODIUM CHLORIDE, SODIUM LACTATE, POTASSIUM CHLORIDE, AND CALCIUM CHLORIDE: .6; .31; .03; .02 INJECTION, SOLUTION INTRAVENOUS at 07:08

## 2021-08-20 ENCOUNTER — OFFICE VISIT (OUTPATIENT)
Dept: PSYCHIATRY | Facility: CLINIC | Age: 46
End: 2021-08-20
Payer: MEDICARE

## 2021-08-20 DIAGNOSIS — F90.0 ATTENTION DEFICIT HYPERACTIVITY DISORDER (ADHD), PREDOMINANTLY INATTENTIVE TYPE: Primary | ICD-10-CM

## 2021-08-20 DIAGNOSIS — F33.2 MAJOR DEPRESSIVE DISORDER, RECURRENT, SEVERE WITHOUT PSYCHOTIC FEATURES: ICD-10-CM

## 2021-08-20 PROCEDURE — 99214 PR OFFICE/OUTPT VISIT, EST, LEVL IV, 30-39 MIN: ICD-10-PCS | Mod: 95,,, | Performed by: PSYCHIATRY & NEUROLOGY

## 2021-08-20 PROCEDURE — 1159F PR MEDICATION LIST DOCUMENTED IN MEDICAL RECORD: ICD-10-PCS | Mod: ,,, | Performed by: PSYCHIATRY & NEUROLOGY

## 2021-08-20 PROCEDURE — 1160F RVW MEDS BY RX/DR IN RCRD: CPT | Mod: ,,, | Performed by: PSYCHIATRY & NEUROLOGY

## 2021-08-20 PROCEDURE — 90833 PR PSYCHOTHERAPY W/PATIENT W/E&M, 30 MIN (ADD ON): ICD-10-PCS | Mod: 95,,, | Performed by: PSYCHIATRY & NEUROLOGY

## 2021-08-20 PROCEDURE — 1160F PR REVIEW ALL MEDS BY PRESCRIBER/CLIN PHARMACIST DOCUMENTED: ICD-10-PCS | Mod: ,,, | Performed by: PSYCHIATRY & NEUROLOGY

## 2021-08-20 PROCEDURE — 99214 OFFICE O/P EST MOD 30 MIN: CPT | Mod: 95,,, | Performed by: PSYCHIATRY & NEUROLOGY

## 2021-08-20 PROCEDURE — 1159F MED LIST DOCD IN RCRD: CPT | Mod: ,,, | Performed by: PSYCHIATRY & NEUROLOGY

## 2021-08-20 PROCEDURE — 90833 PSYTX W PT W E/M 30 MIN: CPT | Mod: 95,,, | Performed by: PSYCHIATRY & NEUROLOGY

## 2021-08-20 RX ORDER — BUPROPION HYDROCHLORIDE 150 MG/1
150 TABLET ORAL DAILY
Qty: 90 TABLET | Refills: 0 | Status: SHIPPED | OUTPATIENT
Start: 2021-08-20 | End: 2021-12-09 | Stop reason: SDUPTHER

## 2021-08-20 RX ORDER — FLUOXETINE HYDROCHLORIDE 40 MG/1
40 CAPSULE ORAL DAILY
Qty: 90 CAPSULE | Refills: 0 | Status: SHIPPED | OUTPATIENT
Start: 2021-08-20 | End: 2021-12-09 | Stop reason: SDUPTHER

## 2021-08-20 RX ORDER — LISDEXAMFETAMINE DIMESYLATE 40 MG/1
40 CAPSULE ORAL EVERY MORNING
Qty: 40 CAPSULE | Refills: 0 | Status: SHIPPED | OUTPATIENT
Start: 2021-08-20 | End: 2021-11-26 | Stop reason: SDUPTHER

## 2021-09-04 ENCOUNTER — PATIENT MESSAGE (OUTPATIENT)
Dept: NEUROLOGY | Facility: CLINIC | Age: 46
End: 2021-09-04

## 2021-09-04 DIAGNOSIS — G43.719 INTRACTABLE CHRONIC MIGRAINE WITHOUT AURA AND WITHOUT STATUS MIGRAINOSUS: Primary | ICD-10-CM

## 2021-09-07 ENCOUNTER — PATIENT MESSAGE (OUTPATIENT)
Dept: NEUROLOGY | Facility: CLINIC | Age: 46
End: 2021-09-07

## 2021-09-07 ENCOUNTER — TELEPHONE (OUTPATIENT)
Dept: NEUROLOGY | Facility: CLINIC | Age: 46
End: 2021-09-07

## 2021-09-07 DIAGNOSIS — G43.019 INTRACTABLE MIGRAINE WITHOUT AURA AND WITHOUT STATUS MIGRAINOSUS: Primary | ICD-10-CM

## 2021-09-07 RX ORDER — ZONISAMIDE 50 MG/1
CAPSULE ORAL
Qty: 90 CAPSULE | Refills: 6 | Status: SHIPPED | OUTPATIENT
Start: 2021-09-07 | End: 2022-05-19

## 2021-09-08 RX ORDER — GALCANEZUMAB 120 MG/ML
120 INJECTION, SOLUTION SUBCUTANEOUS
Qty: 1 ML | Refills: 11 | Status: SHIPPED | OUTPATIENT
Start: 2021-09-08 | End: 2022-05-19

## 2021-09-09 ENCOUNTER — PATIENT MESSAGE (OUTPATIENT)
Dept: NEUROLOGY | Facility: CLINIC | Age: 46
End: 2021-09-09

## 2021-09-09 ENCOUNTER — OFFICE VISIT (OUTPATIENT)
Dept: FAMILY MEDICINE | Facility: CLINIC | Age: 46
End: 2021-09-09
Payer: MEDICARE

## 2021-09-09 DIAGNOSIS — J45.909 REACTIVE AIRWAY DISEASE WITHOUT COMPLICATION, UNSPECIFIED ASTHMA SEVERITY, UNSPECIFIED WHETHER PERSISTENT: Primary | ICD-10-CM

## 2021-09-09 DIAGNOSIS — R05.9 COUGH: ICD-10-CM

## 2021-09-09 PROCEDURE — 99214 OFFICE O/P EST MOD 30 MIN: CPT | Mod: 95,,, | Performed by: NURSE PRACTITIONER

## 2021-09-09 PROCEDURE — 99214 PR OFFICE/OUTPT VISIT, EST, LEVL IV, 30-39 MIN: ICD-10-PCS | Mod: 95,,, | Performed by: NURSE PRACTITIONER

## 2021-09-09 RX ORDER — PREDNISONE 20 MG/1
TABLET ORAL
Qty: 18 TABLET | Refills: 0 | Status: SHIPPED | OUTPATIENT
Start: 2021-09-09 | End: 2021-09-24

## 2021-09-17 ENCOUNTER — PATIENT MESSAGE (OUTPATIENT)
Dept: FAMILY MEDICINE | Facility: CLINIC | Age: 46
End: 2021-09-17

## 2021-09-19 DIAGNOSIS — J45.909 ASTHMA, UNSPECIFIED ASTHMA SEVERITY, UNSPECIFIED WHETHER COMPLICATED, UNSPECIFIED WHETHER PERSISTENT: Primary | ICD-10-CM

## 2021-09-19 RX ORDER — ALBUTEROL SULFATE 0.83 MG/ML
SOLUTION RESPIRATORY (INHALATION)
Qty: 180 ML | Refills: 2 | Status: SHIPPED | OUTPATIENT
Start: 2021-09-19 | End: 2022-07-14 | Stop reason: SDUPTHER

## 2021-09-24 ENCOUNTER — OFFICE VISIT (OUTPATIENT)
Dept: FAMILY MEDICINE | Facility: CLINIC | Age: 46
End: 2021-09-24
Payer: MEDICARE

## 2021-09-24 DIAGNOSIS — J45.41 MODERATE PERSISTENT REACTIVE AIRWAY DISEASE WITH ACUTE EXACERBATION: Primary | ICD-10-CM

## 2021-09-24 DIAGNOSIS — J01.00 ACUTE MAXILLARY SINUSITIS, RECURRENCE NOT SPECIFIED: ICD-10-CM

## 2021-09-24 DIAGNOSIS — R05.9 COUGH: ICD-10-CM

## 2021-09-24 PROCEDURE — 99213 OFFICE O/P EST LOW 20 MIN: CPT | Mod: 95,,, | Performed by: NURSE PRACTITIONER

## 2021-09-24 PROCEDURE — 1160F RVW MEDS BY RX/DR IN RCRD: CPT | Mod: 95,,, | Performed by: NURSE PRACTITIONER

## 2021-09-24 PROCEDURE — 99213 PR OFFICE/OUTPT VISIT, EST, LEVL III, 20-29 MIN: ICD-10-PCS | Mod: 95,,, | Performed by: NURSE PRACTITIONER

## 2021-09-24 PROCEDURE — 1160F PR REVIEW ALL MEDS BY PRESCRIBER/CLIN PHARMACIST DOCUMENTED: ICD-10-PCS | Mod: 95,,, | Performed by: NURSE PRACTITIONER

## 2021-09-24 PROCEDURE — 1159F MED LIST DOCD IN RCRD: CPT | Mod: 95,,, | Performed by: NURSE PRACTITIONER

## 2021-09-24 PROCEDURE — 1159F PR MEDICATION LIST DOCUMENTED IN MEDICAL RECORD: ICD-10-PCS | Mod: 95,,, | Performed by: NURSE PRACTITIONER

## 2021-09-24 RX ORDER — LEVOFLOXACIN 500 MG/1
500 TABLET, FILM COATED ORAL DAILY
Qty: 10 TABLET | Refills: 0 | Status: SHIPPED | OUTPATIENT
Start: 2021-09-24 | End: 2021-11-08

## 2021-09-24 RX ORDER — PROMETHAZINE HYDROCHLORIDE AND DEXTROMETHORPHAN HYDROBROMIDE 6.25; 15 MG/5ML; MG/5ML
5 SYRUP ORAL EVERY 4 HOURS PRN
Qty: 240 ML | Refills: 0 | Status: SHIPPED | OUTPATIENT
Start: 2021-09-24 | End: 2021-10-04

## 2021-09-24 RX ORDER — PREDNISONE 20 MG/1
TABLET ORAL
Qty: 10 TABLET | Refills: 0 | Status: SHIPPED | OUTPATIENT
Start: 2021-09-24 | End: 2021-11-08

## 2021-10-06 ENCOUNTER — PATIENT MESSAGE (OUTPATIENT)
Dept: FAMILY MEDICINE | Facility: CLINIC | Age: 46
End: 2021-10-06

## 2021-10-06 DIAGNOSIS — E03.4 HYPOTHYROIDISM DUE TO ACQUIRED ATROPHY OF THYROID: Primary | ICD-10-CM

## 2021-10-06 DIAGNOSIS — R79.9 ABNORMAL FINDING OF BLOOD CHEMISTRY, UNSPECIFIED: ICD-10-CM

## 2021-10-08 RX ORDER — LEVOTHYROXINE SODIUM 200 UG/1
200 TABLET ORAL
Qty: 30 TABLET | Refills: 0 | Status: SHIPPED | OUTPATIENT
Start: 2021-10-08 | End: 2021-11-06

## 2021-10-19 ENCOUNTER — PATIENT MESSAGE (OUTPATIENT)
Dept: FAMILY MEDICINE | Facility: CLINIC | Age: 46
End: 2021-10-19

## 2021-10-19 DIAGNOSIS — J45.41 MODERATE PERSISTENT REACTIVE AIRWAY DISEASE WITH ACUTE EXACERBATION: Primary | ICD-10-CM

## 2021-10-21 ENCOUNTER — PATIENT MESSAGE (OUTPATIENT)
Dept: FAMILY MEDICINE | Facility: CLINIC | Age: 46
End: 2021-10-21
Payer: MEDICARE

## 2021-10-22 RX ORDER — FLUTICASONE PROPIONATE 50 MCG
SPRAY, SUSPENSION (ML) NASAL
Qty: 16 G | Refills: 3 | Status: SHIPPED | OUTPATIENT
Start: 2021-10-22 | End: 2022-03-07

## 2021-10-26 ENCOUNTER — TELEPHONE (OUTPATIENT)
Dept: OBSTETRICS AND GYNECOLOGY | Facility: CLINIC | Age: 46
End: 2021-10-26
Payer: MEDICARE

## 2021-10-26 DIAGNOSIS — Z12.31 VISIT FOR SCREENING MAMMOGRAM: Primary | ICD-10-CM

## 2021-11-08 ENCOUNTER — OFFICE VISIT (OUTPATIENT)
Dept: OBSTETRICS AND GYNECOLOGY | Facility: CLINIC | Age: 46
End: 2021-11-08
Payer: MEDICARE

## 2021-11-08 ENCOUNTER — HOSPITAL ENCOUNTER (OUTPATIENT)
Dept: RADIOLOGY | Facility: HOSPITAL | Age: 46
Discharge: HOME | End: 2021-11-08
Attending: SPECIALIST
Payer: MEDICARE

## 2021-11-08 VITALS
BODY MASS INDEX: 42.14 KG/M2 | WEIGHT: 268.5 LBS | HEIGHT: 67 IN | DIASTOLIC BLOOD PRESSURE: 78 MMHG | SYSTOLIC BLOOD PRESSURE: 114 MMHG

## 2021-11-08 DIAGNOSIS — N95.1 PERI-MENOPAUSAL: ICD-10-CM

## 2021-11-08 DIAGNOSIS — N95.1 MENOPAUSAL AND FEMALE CLIMACTERIC STATES: ICD-10-CM

## 2021-11-08 DIAGNOSIS — Z12.31 VISIT FOR SCREENING MAMMOGRAM: ICD-10-CM

## 2021-11-08 DIAGNOSIS — Z01.419 ENCOUNTER FOR ROUTINE GYNECOLOGICAL EXAMINATION WITH PAPANICOLAOU SMEAR OF CERVIX: Primary | ICD-10-CM

## 2021-11-08 PROCEDURE — 1159F PR MEDICATION LIST DOCUMENTED IN MEDICAL RECORD: ICD-10-PCS | Mod: S$GLB,,, | Performed by: SPECIALIST

## 2021-11-08 PROCEDURE — 88141 CYTOPATH C/V INTERPRET: CPT | Mod: ,,, | Performed by: PATHOLOGY

## 2021-11-08 PROCEDURE — 3074F PR MOST RECENT SYSTOLIC BLOOD PRESSURE < 130 MM HG: ICD-10-PCS | Mod: S$GLB,,, | Performed by: SPECIALIST

## 2021-11-08 PROCEDURE — 88141 PR  CYTOPATH CERV/VAG INTERPRET: ICD-10-PCS | Mod: ,,, | Performed by: PATHOLOGY

## 2021-11-08 PROCEDURE — 3078F PR MOST RECENT DIASTOLIC BLOOD PRESSURE < 80 MM HG: ICD-10-PCS | Mod: S$GLB,,, | Performed by: SPECIALIST

## 2021-11-08 PROCEDURE — G0101 PR CA SCREEN;PELVIC/BREAST EXAM: ICD-10-PCS | Mod: S$GLB,,, | Performed by: SPECIALIST

## 2021-11-08 PROCEDURE — 99999 PR PBB SHADOW E&M-EST. PATIENT-LVL V: ICD-10-PCS | Mod: PBBFAC,,, | Performed by: SPECIALIST

## 2021-11-08 PROCEDURE — 3008F BODY MASS INDEX DOCD: CPT | Mod: S$GLB,,, | Performed by: SPECIALIST

## 2021-11-08 PROCEDURE — 3078F DIAST BP <80 MM HG: CPT | Mod: S$GLB,,, | Performed by: SPECIALIST

## 2021-11-08 PROCEDURE — 3008F PR BODY MASS INDEX (BMI) DOCUMENTED: ICD-10-PCS | Mod: S$GLB,,, | Performed by: SPECIALIST

## 2021-11-08 PROCEDURE — 88175 CYTOPATH C/V AUTO FLUID REDO: CPT | Performed by: PATHOLOGY

## 2021-11-08 PROCEDURE — 87624 HPV HI-RISK TYP POOLED RSLT: CPT | Performed by: SPECIALIST

## 2021-11-08 PROCEDURE — 3074F SYST BP LT 130 MM HG: CPT | Mod: S$GLB,,, | Performed by: SPECIALIST

## 2021-11-08 PROCEDURE — 99999 PR PBB SHADOW E&M-EST. PATIENT-LVL V: CPT | Mod: PBBFAC,,, | Performed by: SPECIALIST

## 2021-11-08 PROCEDURE — 1159F MED LIST DOCD IN RCRD: CPT | Mod: S$GLB,,, | Performed by: SPECIALIST

## 2021-11-08 PROCEDURE — G0101 CA SCREEN;PELVIC/BREAST EXAM: HCPCS | Mod: S$GLB,,, | Performed by: SPECIALIST

## 2021-11-09 ENCOUNTER — TELEPHONE (OUTPATIENT)
Dept: FAMILY MEDICINE | Facility: CLINIC | Age: 46
End: 2021-11-09
Payer: MEDICARE

## 2021-11-10 ENCOUNTER — TELEPHONE (OUTPATIENT)
Dept: FAMILY MEDICINE | Facility: CLINIC | Age: 46
End: 2021-11-10
Payer: MEDICARE

## 2021-11-12 ENCOUNTER — PATIENT MESSAGE (OUTPATIENT)
Dept: FAMILY MEDICINE | Facility: CLINIC | Age: 46
End: 2021-11-12
Payer: MEDICARE

## 2021-11-15 ENCOUNTER — OFFICE VISIT (OUTPATIENT)
Dept: FAMILY MEDICINE | Facility: CLINIC | Age: 46
End: 2021-11-15
Payer: MEDICARE

## 2021-11-15 ENCOUNTER — PATIENT MESSAGE (OUTPATIENT)
Dept: OBSTETRICS AND GYNECOLOGY | Facility: CLINIC | Age: 46
End: 2021-11-15
Payer: MEDICARE

## 2021-11-15 VITALS
SYSTOLIC BLOOD PRESSURE: 146 MMHG | OXYGEN SATURATION: 99 % | BODY MASS INDEX: 42.04 KG/M2 | HEIGHT: 67 IN | RESPIRATION RATE: 20 BRPM | TEMPERATURE: 99 F | HEART RATE: 92 BPM | WEIGHT: 267.88 LBS | DIASTOLIC BLOOD PRESSURE: 86 MMHG

## 2021-11-15 DIAGNOSIS — J45.909 ASTHMA, UNSPECIFIED ASTHMA SEVERITY, UNSPECIFIED WHETHER COMPLICATED, UNSPECIFIED WHETHER PERSISTENT: ICD-10-CM

## 2021-11-15 DIAGNOSIS — Z30.9 ENCOUNTER FOR CONTRACEPTIVE MANAGEMENT, UNSPECIFIED TYPE: Primary | ICD-10-CM

## 2021-11-15 DIAGNOSIS — R31.9 HEMATURIA, UNSPECIFIED TYPE: ICD-10-CM

## 2021-11-15 LAB
B-HCG UR QL: NEGATIVE
CTP QC/QA: YES
FINAL PATHOLOGIC DIAGNOSIS: ABNORMAL
Lab: ABNORMAL

## 2021-11-15 PROCEDURE — G0009 FLU VACCINE (QUAD) GREATER THAN OR EQUAL TO 3YO PRESERVATIVE FREE IM: ICD-10-PCS | Mod: S$GLB,,, | Performed by: FAMILY MEDICINE

## 2021-11-15 PROCEDURE — 81025 POCT URINE PREGNANCY: ICD-10-PCS | Mod: S$GLB,,, | Performed by: FAMILY MEDICINE

## 2021-11-15 PROCEDURE — 96372 PR INJECTION,THERAP/PROPH/DIAG2ST, IM OR SUBCUT: ICD-10-PCS | Mod: 59,S$GLB,, | Performed by: FAMILY MEDICINE

## 2021-11-15 PROCEDURE — 3008F PR BODY MASS INDEX (BMI) DOCUMENTED: ICD-10-PCS | Mod: S$GLB,,, | Performed by: FAMILY MEDICINE

## 2021-11-15 PROCEDURE — 1159F PR MEDICATION LIST DOCUMENTED IN MEDICAL RECORD: ICD-10-PCS | Mod: S$GLB,,, | Performed by: FAMILY MEDICINE

## 2021-11-15 PROCEDURE — 99214 PR OFFICE/OUTPT VISIT, EST, LEVL IV, 30-39 MIN: ICD-10-PCS | Mod: 25,S$GLB,, | Performed by: FAMILY MEDICINE

## 2021-11-15 PROCEDURE — G0008 PNEUMOCOCCAL POLYSACCHARIDE VACCINE 23-VALENT =>2YO SQ IM: ICD-10-PCS | Mod: S$GLB,,, | Performed by: FAMILY MEDICINE

## 2021-11-15 PROCEDURE — 90686 IIV4 VACC NO PRSV 0.5 ML IM: CPT | Mod: S$GLB,,, | Performed by: FAMILY MEDICINE

## 2021-11-15 PROCEDURE — 3077F SYST BP >= 140 MM HG: CPT | Mod: S$GLB,,, | Performed by: FAMILY MEDICINE

## 2021-11-15 PROCEDURE — 3079F DIAST BP 80-89 MM HG: CPT | Mod: S$GLB,,, | Performed by: FAMILY MEDICINE

## 2021-11-15 PROCEDURE — 1160F PR REVIEW ALL MEDS BY PRESCRIBER/CLIN PHARMACIST DOCUMENTED: ICD-10-PCS | Mod: S$GLB,,, | Performed by: FAMILY MEDICINE

## 2021-11-15 PROCEDURE — 3044F HG A1C LEVEL LT 7.0%: CPT | Mod: S$GLB,,, | Performed by: FAMILY MEDICINE

## 2021-11-15 PROCEDURE — 90732 PNEUMOCOCCAL POLYSACCHARIDE VACCINE 23-VALENT =>2YO SQ IM: ICD-10-PCS | Mod: S$GLB,,, | Performed by: FAMILY MEDICINE

## 2021-11-15 PROCEDURE — 1160F RVW MEDS BY RX/DR IN RCRD: CPT | Mod: S$GLB,,, | Performed by: FAMILY MEDICINE

## 2021-11-15 PROCEDURE — 1159F MED LIST DOCD IN RCRD: CPT | Mod: S$GLB,,, | Performed by: FAMILY MEDICINE

## 2021-11-15 PROCEDURE — 3079F PR MOST RECENT DIASTOLIC BLOOD PRESSURE 80-89 MM HG: ICD-10-PCS | Mod: S$GLB,,, | Performed by: FAMILY MEDICINE

## 2021-11-15 PROCEDURE — 3077F PR MOST RECENT SYSTOLIC BLOOD PRESSURE >= 140 MM HG: ICD-10-PCS | Mod: S$GLB,,, | Performed by: FAMILY MEDICINE

## 2021-11-15 PROCEDURE — 3008F BODY MASS INDEX DOCD: CPT | Mod: S$GLB,,, | Performed by: FAMILY MEDICINE

## 2021-11-15 PROCEDURE — 99214 OFFICE O/P EST MOD 30 MIN: CPT | Mod: 25,S$GLB,, | Performed by: FAMILY MEDICINE

## 2021-11-15 PROCEDURE — G0009 ADMIN PNEUMOCOCCAL VACCINE: HCPCS | Mod: S$GLB,,, | Performed by: FAMILY MEDICINE

## 2021-11-15 PROCEDURE — 81025 URINE PREGNANCY TEST: CPT | Mod: S$GLB,,, | Performed by: FAMILY MEDICINE

## 2021-11-15 PROCEDURE — G0008 ADMIN INFLUENZA VIRUS VAC: HCPCS | Mod: S$GLB,,, | Performed by: FAMILY MEDICINE

## 2021-11-15 PROCEDURE — 90686 FLU VACCINE (QUAD) GREATER THAN OR EQUAL TO 3YO PRESERVATIVE FREE IM: ICD-10-PCS | Mod: S$GLB,,, | Performed by: FAMILY MEDICINE

## 2021-11-15 PROCEDURE — 3044F PR MOST RECENT HEMOGLOBIN A1C LEVEL <7.0%: ICD-10-PCS | Mod: S$GLB,,, | Performed by: FAMILY MEDICINE

## 2021-11-15 PROCEDURE — 90732 PPSV23 VACC 2 YRS+ SUBQ/IM: CPT | Mod: S$GLB,,, | Performed by: FAMILY MEDICINE

## 2021-11-15 PROCEDURE — 96372 THER/PROPH/DIAG INJ SC/IM: CPT | Mod: 59,S$GLB,, | Performed by: FAMILY MEDICINE

## 2021-11-15 RX ORDER — METHYLPREDNISOLONE 4 MG/1
TABLET ORAL
COMMUNITY
Start: 2021-11-09 | End: 2022-02-09

## 2021-11-15 RX ORDER — MEDROXYPROGESTERONE ACETATE 150 MG/ML
150 INJECTION, SUSPENSION INTRAMUSCULAR
Status: SHIPPED | OUTPATIENT
Start: 2021-11-15 | End: 2022-08-12

## 2021-11-15 RX ADMIN — MEDROXYPROGESTERONE ACETATE 150 MG: 150 INJECTION, SUSPENSION INTRAMUSCULAR at 02:11

## 2021-11-19 LAB
HPV HR 12 DNA SPEC QL NAA+PROBE: POSITIVE
HPV16 AG SPEC QL: NEGATIVE
HPV18 DNA SPEC QL NAA+PROBE: NEGATIVE

## 2021-11-26 ENCOUNTER — PATIENT MESSAGE (OUTPATIENT)
Dept: PSYCHIATRY | Facility: CLINIC | Age: 46
End: 2021-11-26
Payer: MEDICARE

## 2021-11-26 DIAGNOSIS — F90.0 ATTENTION DEFICIT HYPERACTIVITY DISORDER (ADHD), PREDOMINANTLY INATTENTIVE TYPE: Primary | ICD-10-CM

## 2021-11-26 RX ORDER — LISDEXAMFETAMINE DIMESYLATE 40 MG/1
40 CAPSULE ORAL EVERY MORNING
Qty: 30 CAPSULE | Refills: 0 | Status: SHIPPED | OUTPATIENT
Start: 2021-11-26 | End: 2021-12-09 | Stop reason: SDUPTHER

## 2021-11-29 ENCOUNTER — PATIENT OUTREACH (OUTPATIENT)
Dept: ADMINISTRATIVE | Facility: OTHER | Age: 46
End: 2021-11-29
Payer: MEDICARE

## 2021-12-07 ENCOUNTER — TELEPHONE (OUTPATIENT)
Dept: OBSTETRICS AND GYNECOLOGY | Facility: CLINIC | Age: 46
End: 2021-12-07
Payer: MEDICARE

## 2021-12-09 ENCOUNTER — OFFICE VISIT (OUTPATIENT)
Dept: PSYCHIATRY | Facility: CLINIC | Age: 46
End: 2021-12-09
Payer: MEDICARE

## 2021-12-09 VITALS
SYSTOLIC BLOOD PRESSURE: 130 MMHG | WEIGHT: 271.81 LBS | HEIGHT: 67 IN | DIASTOLIC BLOOD PRESSURE: 74 MMHG | BODY MASS INDEX: 42.66 KG/M2 | HEART RATE: 82 BPM

## 2021-12-09 DIAGNOSIS — F41.0 PANIC DISORDER WITHOUT AGORAPHOBIA: ICD-10-CM

## 2021-12-09 DIAGNOSIS — F90.0 ATTENTION DEFICIT HYPERACTIVITY DISORDER (ADHD), PREDOMINANTLY INATTENTIVE TYPE: ICD-10-CM

## 2021-12-09 DIAGNOSIS — F43.10 PTSD (POST-TRAUMATIC STRESS DISORDER): Primary | ICD-10-CM

## 2021-12-09 DIAGNOSIS — F33.2 MAJOR DEPRESSIVE DISORDER, RECURRENT, SEVERE WITHOUT PSYCHOTIC FEATURES: ICD-10-CM

## 2021-12-09 DIAGNOSIS — G47.33 OSA (OBSTRUCTIVE SLEEP APNEA): ICD-10-CM

## 2021-12-09 DIAGNOSIS — E66.01 MORBID OBESITY WITH BMI OF 40.0-44.9, ADULT: ICD-10-CM

## 2021-12-09 DIAGNOSIS — F60.3 BORDERLINE PERSONALITY DISORDER: ICD-10-CM

## 2021-12-09 PROCEDURE — 99999 PR PBB SHADOW E&M-EST. PATIENT-LVL III: ICD-10-PCS | Mod: PBBFAC,,, | Performed by: PSYCHIATRY & NEUROLOGY

## 2021-12-09 PROCEDURE — 99214 OFFICE O/P EST MOD 30 MIN: CPT | Mod: S$GLB,,, | Performed by: PSYCHIATRY & NEUROLOGY

## 2021-12-09 PROCEDURE — 99999 PR PBB SHADOW E&M-EST. PATIENT-LVL III: CPT | Mod: PBBFAC,,, | Performed by: PSYCHIATRY & NEUROLOGY

## 2021-12-09 PROCEDURE — 90833 PR PSYCHOTHERAPY W/PATIENT W/E&M, 30 MIN (ADD ON): ICD-10-PCS | Mod: S$GLB,,, | Performed by: PSYCHIATRY & NEUROLOGY

## 2021-12-09 PROCEDURE — 99214 PR OFFICE/OUTPT VISIT, EST, LEVL IV, 30-39 MIN: ICD-10-PCS | Mod: S$GLB,,, | Performed by: PSYCHIATRY & NEUROLOGY

## 2021-12-09 PROCEDURE — 90833 PSYTX W PT W E/M 30 MIN: CPT | Mod: S$GLB,,, | Performed by: PSYCHIATRY & NEUROLOGY

## 2021-12-09 RX ORDER — TRAZODONE HYDROCHLORIDE 100 MG/1
TABLET ORAL
Qty: 180 TABLET | Refills: 0 | Status: SHIPPED | OUTPATIENT
Start: 2021-12-09 | End: 2022-04-11

## 2021-12-09 RX ORDER — MELOXICAM 15 MG/1
15 TABLET ORAL DAILY
COMMUNITY
Start: 2021-12-07 | End: 2022-05-19

## 2021-12-09 RX ORDER — PROPRANOLOL HYDROCHLORIDE 20 MG/1
TABLET ORAL
COMMUNITY
Start: 2021-09-04 | End: 2022-05-19

## 2021-12-09 RX ORDER — LISDEXAMFETAMINE DIMESYLATE 40 MG/1
40 CAPSULE ORAL EVERY MORNING
Qty: 30 CAPSULE | Refills: 0 | Status: SHIPPED | OUTPATIENT
Start: 2021-12-09 | End: 2022-01-28 | Stop reason: SDUPTHER

## 2021-12-09 RX ORDER — ALPRAZOLAM 1 MG/1
TABLET ORAL
Qty: 30 TABLET | Refills: 2 | Status: SHIPPED | OUTPATIENT
Start: 2021-12-09 | End: 2022-04-11

## 2021-12-09 RX ORDER — BUPROPION HYDROCHLORIDE 150 MG/1
150 TABLET ORAL DAILY
Qty: 90 TABLET | Refills: 0 | Status: SHIPPED | OUTPATIENT
Start: 2021-12-09 | End: 2022-05-11

## 2021-12-09 RX ORDER — FLUOXETINE HYDROCHLORIDE 40 MG/1
40 CAPSULE ORAL DAILY
Qty: 90 CAPSULE | Refills: 0 | Status: SHIPPED | OUTPATIENT
Start: 2021-12-09 | End: 2022-03-14

## 2021-12-17 ENCOUNTER — TELEPHONE (OUTPATIENT)
Dept: OBSTETRICS AND GYNECOLOGY | Facility: CLINIC | Age: 46
End: 2021-12-17
Payer: MEDICARE

## 2022-01-03 ENCOUNTER — HOSPITAL ENCOUNTER (OUTPATIENT)
Dept: RADIOLOGY | Facility: HOSPITAL | Age: 47
Discharge: HOME | End: 2022-01-03
Attending: INTERNAL MEDICINE
Payer: MEDICARE

## 2022-01-03 DIAGNOSIS — J44.9 CHRONIC OBSTRUCTIVE PULMONARY DISEASE, UNSPECIFIED COPD TYPE: ICD-10-CM

## 2022-01-03 PROCEDURE — 71046 XR CHEST PA AND LATERAL: ICD-10-PCS | Mod: 26,,, | Performed by: RADIOLOGY

## 2022-01-03 PROCEDURE — 71046 X-RAY EXAM CHEST 2 VIEWS: CPT | Mod: TC,FY,PO

## 2022-01-03 PROCEDURE — 71046 X-RAY EXAM CHEST 2 VIEWS: CPT | Mod: 26,,, | Performed by: RADIOLOGY

## 2022-01-06 ENCOUNTER — TELEPHONE (OUTPATIENT)
Dept: PSYCHIATRY | Facility: CLINIC | Age: 47
End: 2022-01-06
Payer: MEDICARE

## 2022-01-06 NOTE — TELEPHONE ENCOUNTER
PA completed and approved for Alprazolam 1 mg tablets by mouth #30/ 30 days. Effective 112/7/2021-1/6/2023.

## 2022-01-10 ENCOUNTER — TELEPHONE (OUTPATIENT)
Dept: OBSTETRICS AND GYNECOLOGY | Facility: CLINIC | Age: 47
End: 2022-01-10
Payer: MEDICARE

## 2022-01-10 NOTE — TELEPHONE ENCOUNTER
----- Message from Gia Sevilla sent at 1/10/2022  3:44 PM CST -----  Type: Needs Medical Advice  Who Called:  Patient   Symptoms (please be specific):    How long has patient had these symptoms:    Pharmacy name and phone #:    Best Call Back Number:   Additional Information: Patient is requesting a call back from the nurse to reschedule a procedure that was canceled.

## 2022-01-19 ENCOUNTER — PATIENT OUTREACH (OUTPATIENT)
Dept: ADMINISTRATIVE | Facility: OTHER | Age: 47
End: 2022-01-19
Payer: MEDICARE

## 2022-01-19 NOTE — PROGRESS NOTES
Health Maintenance Due   Topic Date Due    Mammogram  12/04/2019    COVID-19 Vaccine (2 - Pfizer 3-dose series) 07/02/2021     Updates were requested from care everywhere.  Chart was reviewed for overdue Proactive [facility] Encounters (JOSE ALEJANDRO) topics (CRS, Breast Cancer Screening, Eye exam)  Health Maintenance has been updated.  LINKS immunization registry triggered.  Immunizations were reconciled.

## 2022-01-24 ENCOUNTER — OFFICE VISIT (OUTPATIENT)
Dept: OBSTETRICS AND GYNECOLOGY | Facility: CLINIC | Age: 47
End: 2022-01-24
Payer: MEDICARE

## 2022-01-24 VITALS
DIASTOLIC BLOOD PRESSURE: 66 MMHG | HEIGHT: 67 IN | BODY MASS INDEX: 41.03 KG/M2 | WEIGHT: 261.44 LBS | SYSTOLIC BLOOD PRESSURE: 118 MMHG

## 2022-01-24 DIAGNOSIS — R87.619 ABNORMAL CERVICAL PAPANICOLAOU SMEAR, UNSPECIFIED ABNORMAL PAP FINDING: ICD-10-CM

## 2022-01-24 DIAGNOSIS — Z01.812 PRE-PROCEDURE LAB EXAM: Primary | ICD-10-CM

## 2022-01-24 LAB
B-HCG UR QL: NEGATIVE
CTP QC/QA: YES

## 2022-01-24 PROCEDURE — 3074F SYST BP LT 130 MM HG: CPT | Mod: CPTII,S$GLB,, | Performed by: SPECIALIST

## 2022-01-24 PROCEDURE — 1159F PR MEDICATION LIST DOCUMENTED IN MEDICAL RECORD: ICD-10-PCS | Mod: CPTII,S$GLB,, | Performed by: SPECIALIST

## 2022-01-24 PROCEDURE — 88305 TISSUE EXAM BY PATHOLOGIST: CPT | Mod: 59 | Performed by: PATHOLOGY

## 2022-01-24 PROCEDURE — 57454 BX/CURETT OF CERVIX W/SCOPE: CPT | Mod: S$GLB,,, | Performed by: SPECIALIST

## 2022-01-24 PROCEDURE — 81025 POCT URINE PREGNANCY: ICD-10-PCS | Mod: S$GLB,,, | Performed by: SPECIALIST

## 2022-01-24 PROCEDURE — 81025 URINE PREGNANCY TEST: CPT | Mod: S$GLB,,, | Performed by: SPECIALIST

## 2022-01-24 PROCEDURE — 57454 PR COLPOSC,CERVIX W/ADJ VAG,W/BX & CURRETAG: ICD-10-PCS | Mod: S$GLB,,, | Performed by: SPECIALIST

## 2022-01-24 PROCEDURE — 99999 PR PBB SHADOW E&M-EST. PATIENT-LVL IV: ICD-10-PCS | Mod: PBBFAC,,, | Performed by: SPECIALIST

## 2022-01-24 PROCEDURE — 99214 OFFICE O/P EST MOD 30 MIN: CPT | Mod: 25,S$GLB,, | Performed by: SPECIALIST

## 2022-01-24 PROCEDURE — 99999 PR PBB SHADOW E&M-EST. PATIENT-LVL IV: CPT | Mod: PBBFAC,,, | Performed by: SPECIALIST

## 2022-01-24 PROCEDURE — 1159F MED LIST DOCD IN RCRD: CPT | Mod: CPTII,S$GLB,, | Performed by: SPECIALIST

## 2022-01-24 PROCEDURE — 3078F PR MOST RECENT DIASTOLIC BLOOD PRESSURE < 80 MM HG: ICD-10-PCS | Mod: CPTII,S$GLB,, | Performed by: SPECIALIST

## 2022-01-24 PROCEDURE — 3078F DIAST BP <80 MM HG: CPT | Mod: CPTII,S$GLB,, | Performed by: SPECIALIST

## 2022-01-24 PROCEDURE — 3008F BODY MASS INDEX DOCD: CPT | Mod: CPTII,S$GLB,, | Performed by: SPECIALIST

## 2022-01-24 PROCEDURE — 88305 TISSUE EXAM BY PATHOLOGIST: ICD-10-PCS | Mod: 26,,, | Performed by: PATHOLOGY

## 2022-01-24 PROCEDURE — 3074F PR MOST RECENT SYSTOLIC BLOOD PRESSURE < 130 MM HG: ICD-10-PCS | Mod: CPTII,S$GLB,, | Performed by: SPECIALIST

## 2022-01-24 PROCEDURE — 99214 PR OFFICE/OUTPT VISIT, EST, LEVL IV, 30-39 MIN: ICD-10-PCS | Mod: 25,S$GLB,, | Performed by: SPECIALIST

## 2022-01-24 PROCEDURE — 88305 TISSUE EXAM BY PATHOLOGIST: CPT | Mod: 26,,, | Performed by: PATHOLOGY

## 2022-01-24 PROCEDURE — 3008F PR BODY MASS INDEX (BMI) DOCUMENTED: ICD-10-PCS | Mod: CPTII,S$GLB,, | Performed by: SPECIALIST

## 2022-01-24 NOTE — PROGRESS NOTES
45 yo WF, previous abnormal pap history presents for recommended colposcopic exam following ASCUS with positive HPV  Past Medical History:   Diagnosis Date    Arnold-Chiari deformity     Asthma     Borderline personality disorder     Carnitine deficiency     Depression     Fatty liver     noted on 8/21 CT    Fibromyalgia     Headache     History of abnormal cervical Pap smear     colpo/ LEEP and CKC    History of nephrolithiasis     kidney stones    Hypothyroidism     IBS (irritable bowel syndrome)     GI smith negative    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     PATRICIA (obstructive sleep apnea)     severe - doesn't use CPAP    Panic attack     PTSD (post-traumatic stress disorder)        Past Surgical History:   Procedure Laterality Date    ANKLE SURGERY Right     x2 (#1 for ligament injury and #2 for fx and ligament)    AUGMENTATION OF BREAST      BILATERAL TUBAL LIGATION      BRAIN SURGERY  12/2018    decompression/craniotomy Arnold-Chiari syndrome    BREAST SURGERY  1997    B implants    CERVICAL BIOPSY  W/ LOOP ELECTRODE EXCISION      COLONOSCOPY      DECOMPRESSION OF CHIARI MALFORMATION BY REMOVAL OF POSTERIOR ARCH OF FIRST CERVICAL VERTEBRA N/A 12/13/2018    Procedure: DECOMPRESSION, CHIARI MALFORMATION, BY 1ST CERVICAL VERTEBRA POSTERIOR ARCH REMOVAL;  Surgeon: Sergio Busch MD;  Location: [address];  Service: Neurosurgery;  Laterality: N/A;    EPIDURAL STEROID INJECTION INTO CERVICAL SPINE N/A 12/5/2019    Procedure: Injection-steroid-epidural-cervical-C7-T1;  Surgeon: Noa Samano MD;  Location: [facility] OR;  Service: Neurosurgery;  Laterality: N/A;    FRACTURE SURGERY Right     Ankle    INJECTION OF ANESTHETIC AGENT AROUND MEDIAL BRANCH NERVES INNERVATING CERVICAL FACET JOINT Bilateral 8/6/2020    Procedure: Block-nerve-medial branch-cervical Bilateral  C3,4,5;  Surgeon: Noa Samano MD;  Location: [facility] OR;  Service: Anesthesiology;  Laterality: Bilateral;     "INJECTION OF ANESTHETIC AGENT AROUND MEDIAL BRANCH NERVES INNERVATING CERVICAL FACET JOINT Bilateral 8/20/2020    Procedure: Block-nerve-medial branch-cervical Bilateral C3-4-5;  Surgeon: Noa Samano MD;  Location: [facility] OR;  Service: Anesthesiology;  Laterality: Bilateral;    LAPAROSCOPIC GASTRIC BANDING      with subsequent removal due to abscess    LIPOSUCTION      x 2    PELVIC LAPAROSCOPY      for endometriosis eval with BTL    RADIOFREQUENCY ABLATION Bilateral 8/16/2021    Procedure: Radiofrequency Ablation Cervical C3-C5;  Surgeon: Robert Munoz MD;  Location: [facility] OR;  Service: Pain Management;  Laterality: Bilateral;    RADIOFREQUENCY THERMAL COAGULATION OF MEDIAL BRANCH OF POSTERIOR RAMUS OF CERVICAL SPINAL NERVE Bilateral 8/27/2020    Procedure: RADIOFREQUENCY THERMAL COAGULATION, NERVE, SPINAL, CERVICAL, POSTERIOR RAMUS, MEDIAL BRANCH C3-4-5, bilateral;  Surgeon: Noa Samano MD;  Location: [facility] OR;  Service: Anesthesiology;  Laterality: Bilateral;    TUBAL LIGATION      wtih pelvic lap        Family History   Problem Relation Age of Onset    Diabetes Sister     Seizures Sister     Mitochondrial disorder Sister         "trent"    Mental illness Sister     Seizures Sister     Cervical cancer Sister     Cervical cancer Sister     Ovarian cancer Cousin     Ovarian cancer Paternal Aunt     Colon cancer Maternal Grandmother     Cancer Maternal Grandfather 65        throat ca    Diabetes Mother     Stroke Mother 30    Mental illness Mother         depression    Hyperlipidemia Mother     Hypertension Mother     Hyperlipidemia Father     Heart disease Father         "athletes heart"    Ovarian cancer Paternal Aunt     Cervical cancer Paternal Cousin     Cervical cancer Other     Breast cancer Neg Hx        Social History     Socioeconomic History    Marital status:    Occupational History    Occupation: [employer]    Tobacco Use    Smoking status: Never Smoker " "   Smokeless tobacco: Never Used   Substance and Sexual Activity    Alcohol use: Yes     Comment: occasional "maybe monthly"    Drug use: No    Sexual activity: Yes     Partners: Male     Birth control/protection: See Surgical Hx     Comment: depo since age 18   Social History Narrative    Not on disability, not currently working.     Social Determinants of Health     Financial Resource Strain: Low Risk     Difficulty of Paying Living Expenses: Not very hard   Food Insecurity: Food Insecurity Present    Worried About Running Out of Food in the Last Year: Sometimes true    Ran Out of Food in the Last Year: Sometimes true   Transportation Needs: Unmet Transportation Needs    Lack of Transportation (Medical): Yes    Lack of Transportation (Non-Medical): Yes   Physical Activity: Inactive    Days of Exercise per Week: 0 days    Minutes of Exercise per Session: 0 min   Stress: Stress Concern Present    Feeling of Stress : Rather much   Social Connections: Unknown    Frequency of Communication with Friends and Family: More than three times a week    Frequency of Social Gatherings with Friends and Family: Patient refused    Active Member of Clubs or Organizations: No    Attends Club or Organization Meetings: Never    Marital Status:    Housing Stability: Low Risk     Unable to Pay for Housing in the Last Year: No    Number of Places Lived in the Last Year: 1    Unstable Housing in the Last Year: No       Current Outpatient Medications   Medication Sig Dispense Refill    acetylcysteine (N-ACETYL-L-CYSTEINE MISC)       albuterol (PROVENTIL) 2.5 mg /3 mL (0.083 %) nebulizer solution USE 1 VIAL VIA NEBULIZER EVERY 6 HOURS AS NEEDED FOR WHEEZING. RESCUE. 180 mL 2    albuterol (PROVENTIL/VENTOLIN HFA) 90 mcg/actuation inhaler INHALE 2 PUFFS BY MOUTH EVERY 6 HOURS AS NEEDED FOR WHEEZING 8.5 g 0    ALPRAZolam (XANAX) 1 MG tablet TAKE 1 TABLET(1 MG) BY MOUTH DAILY AS NEEDED FOR ANXIETY 30 tablet 2    " buPROPion (WELLBUTRIN XL) 150 MG TB24 tablet Take 1 tablet (150 mg total) by mouth once daily. 90 tablet 0    diphenoxylate-atropine 2.5-0.025 mg (LOMOTIL) 2.5-0.025 mg per tablet Take 1 tablet by mouth 4 (four) times daily as needed. 80 tablet 0    dronabinol (MARINOL) 2.5 MG capsule Take 1 capsule (2.5 mg total) by mouth 2 (two) times daily before meals. (Patient taking differently: Take 2.5 mg by mouth 2 (two) times daily as needed. Before meals) 60 capsule 3    FLUoxetine 40 MG capsule Take 1 capsule (40 mg total) by mouth once daily. 90 capsule 0    fluticasone furoate-vilanteroL (BREO) 200-25 mcg/dose DsDv diskus inhaler Breo Ellipta 200 mcg-25 mcg/dose powder for inhalation      fluticasone propionate (FLONASE) 50 mcg/actuation nasal spray SHAKE LIQUID AND USE 1 SPRAY(50 MCG) IN EACH NOSTRIL EVERY DAY 16 g 3    gabapentin (NEURONTIN) 600 MG tablet 1 tablet po 2-3 times a day for chronic pain 90 tablet 11    galcanezumab-gnlm (EMGALITY PEN) 120 mg/mL PnIj Inject 120 mg into the skin every 28 days. 1 Syringe 11    galcanezumab-gnlm (EMGALITY PEN) 120 mg/mL PnIj Inject 120 mg into the skin every 28 days. 1 mL 11    levOCARNitine (CARNITOR) 330 mg Tab TAKE 3 TABLETS(990 MG) BY MOUTH TWICE DAILY 180 tablet 2    meloxicam (MOBIC) 15 MG tablet Take 15 mg by mouth once daily.      montelukast (SINGULAIR) 10 mg tablet Take 1 tablet (10 mg total) by mouth once daily. 90 tablet 1    NON FORMULARY MEDICATION Inject 50 mcg as directed once a week. B 12      propranoloL (INDERAL) 20 MG tablet Take by mouth.      sumatriptan (IMITREX) 100 MG tablet Take 1 tablet (100 mg total) by mouth as needed for Migraine (can repeat after 2 hours. max 2/day.). 12 tablet 3    SYNTHROID 200 mcg tablet TAKE 1 TABLET(200 MCG) BY MOUTH BEFORE BREAKFAST 30 tablet 5    tiZANidine (ZANAFLEX) 4 MG tablet Take 1 tablet (4 mg total) by mouth 3 (three) times daily as needed (muscle spasm). 90 tablet 11    traZODone (DESYREL) 100 MG  tablet TAKE 2 TABLETS(200 MG) BY MOUTH EVERY EVENING 180 tablet 0    zonisamide (ZONEGRAN) 50 MG Cap 1 capsule po qhs x 1 week, then 2 capsules qhs x 1 week, then 3 capsules qhs 90 capsule 6    azelastine (OPTIVAR) 0.05 % ophthalmic solution INSTILL 1 DROP IN BOTH EYES TWICE A DAY 6 mL 14    lisdexamfetamine (VYVANSE) 40 MG Cap Take 1 capsule (40 mg total) by mouth every morning. 30 capsule 0    MEDROL, RAYSHAWN, 4 mg tablet Take by mouth.       Current Facility-Administered Medications   Medication Dose Route Frequency Provider Last Rate Last Admin    medroxyPROGESTERone (DEPO-PROVERA) syringe 150 mg  150 mg Intramuscular Q90 Days Amaya Aiken MD   150 mg at 11/15/21 0780       Review of patient's allergies indicates:   Allergen Reactions    Lamotrigine      Other reaction(s): Rash  Other reaction(s): Nausea    Sulfa (sulfonamide antibiotics) Nausea Only     Other reaction(s): Unknown    Adhesive Rash       Review of System:   General: no chills, fever, night sweats, weight gain or weight loss  Psychological: no depression or suicidal ideation  Breasts: no new or changing breast lumps, nipple discharge or masses.  Respiratory: no cough, shortness of breath, or wheezing  Cardiovascular: no chest pain or dyspnea on exertion  Gastrointestinal: no abdominal pain, change in bowel habits, or black or bloody stools  Genito-Urinary: no incontinence, urinary frequency/urgency or vulvar/vaginal symptoms, pelvic pain or abnormal vaginal bleeding.  Musculoskeletal: no gait disturbance or muscular weakness    UPT neg    PRE-COLPOSCOPY PROCEDURE COUNSELING:  Discussed the abnormal pap test findings, HPV, need for colposcopy and possible biopsies to determine a diagnosis and plan of care, treatments available, the minimal risks of bleeding and infection with a colposcopy, alternatives to colposcopy and she agrees to proceed.    Transformation zone----visualized  cervical lesion present------none  aceto  white-------none  punctation-------none  cobblestone-----none    Biopsy position-----12 and 6 oclock  ECC--------submitted    Specimens placed in formalin and sent to pathology. Monsel's solution was applied at biopsy sites to stop bleeding. The speculum was removed.    POST COLPOSCOPY COUNSELING:   Manage post colposcopy cramping with NSAIDs, Tylenol or Rx per [facility].  Avoid anything in vagina (intercourse, douching, tampons) one week after the procedure.  Expect a clumpy blackish vaginal discharge (Monsel's solution) for several days; Report bleeding heavier than a period, worsening pain, fever > 101.0 F, or foul-smelling vaginal discharge; Stressed importance of follow-up.    Counseling lasted approximately 15 minutes and all her questions were answered.  Will follow

## 2022-01-24 NOTE — PROCEDURES
Procedures     PRE-COLPOSCOPY PROCEDURE COUNSELING:  Discussed the abnormal pap test findings, HPV, need for colposcopy and possible biopsies to determine a diagnosis and plan of care, treatments available, the minimal risks of bleeding and infection with a colposcopy, alternatives to colposcopy and she agrees to proceed.    Transformation zone----visualized  cervical lesion present------none  aceto white-------none  punctation-------none  cobblestone-----none    Biopsy position-----12 and 6 oclock  ECC--------submitted    Specimens placed in formalin and sent to pathology. Monsel's solution was applied at biopsy sites to stop bleeding. The speculum was removed.    POST COLPOSCOPY COUNSELING:   Manage post colposcopy cramping with NSAIDs, Tylenol or Rx per [facility].  Avoid anything in vagina (intercourse, douching, tampons) one week after the procedure.  Expect a clumpy blackish vaginal discharge (Monsel's solution) for several days; Report bleeding heavier than a period, worsening pain, fever > 101.0 F, or foul-smelling vaginal discharge; Stressed importance of follow-up.    Counseling lasted approximately 15 minutes and all her questions were answered.  Will follow

## 2022-01-28 ENCOUNTER — TELEPHONE (OUTPATIENT)
Dept: FAMILY MEDICINE | Facility: CLINIC | Age: 47
End: 2022-01-28
Payer: MEDICARE

## 2022-01-28 LAB
FINAL PATHOLOGIC DIAGNOSIS: NORMAL
Lab: NORMAL

## 2022-01-28 NOTE — TELEPHONE ENCOUNTER
----- Message from Wei DIETZ Richard sent at 1/28/2022  2:44 PM CST -----  Regarding: Nurse Visit/Depo Injection Appointment  Contact: patient  Type: Needs Medical Advice  Who Called:  patient  Symptoms (please be specific):  n/a  How long has patient had these symptoms:  n/a  Pharmacy name and phone #:  n/a  Best Call Back Number: [phone]  Additional Information: patient called in and stated her depo injection is due 2/13/22 and would like to schedule an appointment.

## 2022-02-01 ENCOUNTER — TELEPHONE (OUTPATIENT)
Dept: PSYCHIATRY | Facility: CLINIC | Age: 47
End: 2022-02-01
Payer: MEDICARE

## 2022-02-01 ENCOUNTER — PATIENT MESSAGE (OUTPATIENT)
Dept: OBSTETRICS AND GYNECOLOGY | Facility: CLINIC | Age: 47
End: 2022-02-01
Payer: MEDICARE

## 2022-02-01 NOTE — TELEPHONE ENCOUNTER
"MA reported patient stated Vyvanse needed a PA. Nurse did not receive any indication via fax, Cover My Meds or phone call. Proceeded to complete PA. PA completed for Vuyvanse 40 mg capsules. Plan sent error stating "An active PA is already on file with expiration date of 07/23/2022." Too soon to renew  PA. YNES Florez was notified.   "

## 2022-02-02 ENCOUNTER — TELEPHONE (OUTPATIENT)
Dept: PSYCHIATRY | Facility: CLINIC | Age: 47
End: 2022-02-02
Payer: MEDICARE

## 2022-02-09 ENCOUNTER — OFFICE VISIT (OUTPATIENT)
Dept: PSYCHIATRY | Facility: CLINIC | Age: 47
End: 2022-02-09
Payer: MEDICARE

## 2022-02-09 ENCOUNTER — PATIENT MESSAGE (OUTPATIENT)
Dept: PSYCHIATRY | Facility: CLINIC | Age: 47
End: 2022-02-09
Payer: MEDICARE

## 2022-02-09 DIAGNOSIS — F60.3 BORDERLINE PERSONALITY DISORDER: ICD-10-CM

## 2022-02-09 DIAGNOSIS — F43.10 PTSD (POST-TRAUMATIC STRESS DISORDER): Primary | ICD-10-CM

## 2022-02-09 DIAGNOSIS — F33.2 MAJOR DEPRESSIVE DISORDER, RECURRENT, SEVERE WITHOUT PSYCHOTIC FEATURES: ICD-10-CM

## 2022-02-09 DIAGNOSIS — F90.2 ATTENTION DEFICIT HYPERACTIVITY DISORDER (ADHD), COMBINED TYPE: ICD-10-CM

## 2022-02-09 DIAGNOSIS — E66.01 MORBID OBESITY WITH BMI OF 40.0-44.9, ADULT: ICD-10-CM

## 2022-02-09 PROCEDURE — 90833 PR PSYCHOTHERAPY W/PATIENT W/E&M, 30 MIN (ADD ON): ICD-10-PCS | Mod: 95,,, | Performed by: PSYCHIATRY & NEUROLOGY

## 2022-02-09 PROCEDURE — 99214 OFFICE O/P EST MOD 30 MIN: CPT | Mod: 95,,, | Performed by: PSYCHIATRY & NEUROLOGY

## 2022-02-09 PROCEDURE — 1160F PR REVIEW ALL MEDS BY PRESCRIBER/CLIN PHARMACIST DOCUMENTED: ICD-10-PCS | Mod: CPTII,95,, | Performed by: PSYCHIATRY & NEUROLOGY

## 2022-02-09 PROCEDURE — 90833 PSYTX W PT W E/M 30 MIN: CPT | Mod: 95,,, | Performed by: PSYCHIATRY & NEUROLOGY

## 2022-02-09 PROCEDURE — 1159F PR MEDICATION LIST DOCUMENTED IN MEDICAL RECORD: ICD-10-PCS | Mod: CPTII,95,, | Performed by: PSYCHIATRY & NEUROLOGY

## 2022-02-09 PROCEDURE — 1160F RVW MEDS BY RX/DR IN RCRD: CPT | Mod: CPTII,95,, | Performed by: PSYCHIATRY & NEUROLOGY

## 2022-02-09 PROCEDURE — 1159F MED LIST DOCD IN RCRD: CPT | Mod: CPTII,95,, | Performed by: PSYCHIATRY & NEUROLOGY

## 2022-02-09 PROCEDURE — 99214 PR OFFICE/OUTPT VISIT, EST, LEVL IV, 30-39 MIN: ICD-10-PCS | Mod: 95,,, | Performed by: PSYCHIATRY & NEUROLOGY
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
## 2022-02-14 ENCOUNTER — IMMUNIZATION (OUTPATIENT)
Dept: FAMILY MEDICINE | Facility: CLINIC | Age: 47
End: 2022-02-14
Payer: MEDICARE

## 2022-02-14 ENCOUNTER — OFFICE VISIT (OUTPATIENT)
Dept: OBSTETRICS AND GYNECOLOGY | Facility: CLINIC | Age: 47
End: 2022-02-14
Payer: MEDICARE

## 2022-02-14 VITALS
HEIGHT: 67 IN | WEIGHT: 267.19 LBS | SYSTOLIC BLOOD PRESSURE: 124 MMHG | BODY MASS INDEX: 41.94 KG/M2 | DIASTOLIC BLOOD PRESSURE: 74 MMHG

## 2022-02-14 DIAGNOSIS — Z23 NEED FOR VACCINATION: Primary | ICD-10-CM

## 2022-02-14 DIAGNOSIS — R87.612 LGSIL ON PAP SMEAR OF CERVIX: ICD-10-CM

## 2022-02-14 DIAGNOSIS — R87.619 ABNORMAL CERVICAL PAPANICOLAOU SMEAR, UNSPECIFIED ABNORMAL PAP FINDING: Primary | ICD-10-CM

## 2022-02-14 PROCEDURE — 99213 OFFICE O/P EST LOW 20 MIN: CPT | Mod: 25,S$GLB,, | Performed by: SPECIALIST

## 2022-02-14 PROCEDURE — 0002A COVID-19, MRNA, LNP-S, PF, 30 MCG/0.3 ML DOSE VACCINE: CPT | Mod: PBBFAC | Performed by: FAMILY MEDICINE

## 2022-02-14 PROCEDURE — 96372 PR INJECTION,THERAP/PROPH/DIAG2ST, IM OR SUBCUT: ICD-10-PCS | Mod: S$GLB,,, | Performed by: SPECIALIST

## 2022-02-14 PROCEDURE — 3078F PR MOST RECENT DIASTOLIC BLOOD PRESSURE < 80 MM HG: ICD-10-PCS | Mod: CPTII,S$GLB,, | Performed by: SPECIALIST

## 2022-02-14 PROCEDURE — 1159F MED LIST DOCD IN RCRD: CPT | Mod: CPTII,S$GLB,, | Performed by: SPECIALIST

## 2022-02-14 PROCEDURE — 99213 PR OFFICE/OUTPT VISIT, EST, LEVL III, 20-29 MIN: ICD-10-PCS | Mod: 25,S$GLB,, | Performed by: SPECIALIST

## 2022-02-14 PROCEDURE — 3008F PR BODY MASS INDEX (BMI) DOCUMENTED: ICD-10-PCS | Mod: CPTII,S$GLB,, | Performed by: SPECIALIST

## 2022-02-14 PROCEDURE — 96372 THER/PROPH/DIAG INJ SC/IM: CPT | Mod: S$GLB,,, | Performed by: SPECIALIST

## 2022-02-14 PROCEDURE — 1159F PR MEDICATION LIST DOCUMENTED IN MEDICAL RECORD: ICD-10-PCS | Mod: CPTII,S$GLB,, | Performed by: SPECIALIST

## 2022-02-14 PROCEDURE — 99999 PR PBB SHADOW E&M-EST. PATIENT-LVL IV: CPT | Mod: PBBFAC,,, | Performed by: SPECIALIST

## 2022-02-14 PROCEDURE — 3074F SYST BP LT 130 MM HG: CPT | Mod: CPTII,S$GLB,, | Performed by: SPECIALIST

## 2022-02-14 PROCEDURE — 3008F BODY MASS INDEX DOCD: CPT | Mod: CPTII,S$GLB,, | Performed by: SPECIALIST

## 2022-02-14 PROCEDURE — 99999 PR PBB SHADOW E&M-EST. PATIENT-LVL IV: ICD-10-PCS | Mod: PBBFAC,,, | Performed by: SPECIALIST

## 2022-02-14 PROCEDURE — 3078F DIAST BP <80 MM HG: CPT | Mod: CPTII,S$GLB,, | Performed by: SPECIALIST

## 2022-02-14 PROCEDURE — 3074F PR MOST RECENT SYSTOLIC BLOOD PRESSURE < 130 MM HG: ICD-10-PCS | Mod: CPTII,S$GLB,, | Performed by: SPECIALIST

## 2022-02-14 RX ADMIN — MEDROXYPROGESTERONE ACETATE 150 MG: 150 INJECTION, SUSPENSION INTRAMUSCULAR at 08:02

## 2022-02-14 NOTE — PROGRESS NOTES
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  I discussed dysplasia and HPV and discussed treatment options  I discussed CKC risks and benefits as well as definative TLH BS  Past Medical History:   Diagnosis Date    Arnold-Chiari deformity     Asthma     Borderline personality disorder     Carnitine deficiency     Depression     Fatty liver     noted on 8/21 CT    Fibromyalgia     Headache     History of abnormal cervical Pap smear     colpo/ LEEP and CKC    History of nephrolithiasis     kidney stones    Hypothyroidism     IBS (irritable bowel syndrome)     GI smith negative    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     PATRICIA (obstructive sleep apnea)     severe - doesn't use CPAP    Panic attack     PTSD (post-traumatic stress disorder)        Past Surgical History:   Procedure Laterality Date    ANKLE SURGERY Right     x2 (#1 for ligament injury and #2 for fx and ligament)    AUGMENTATION OF BREAST      BILATERAL TUBAL LIGATION      BRAIN SURGERY  12/2018    decompression/craniotomy Arnold-Chiari syndrome    BREAST SURGERY  1997    B implants    CERVICAL BIOPSY  W/ LOOP ELECTRODE EXCISION      COLONOSCOPY      DECOMPRESSION OF CHIARI MALFORMATION BY REMOVAL OF POSTERIOR ARCH OF FIRST CERVICAL VERTEBRA N/A 12/13/2018    Procedure: DECOMPRESSION, CHIARI MALFORMATION, BY 1ST CERVICAL VERTEBRA POSTERIOR ARCH REMOVAL;  Surgeon: Sergio Busch MD;  Location: [facility] OR [address] [place] [address];  Service: Neurosurgery;  Laterality: N/A;    EPIDURAL STEROID INJECTION INTO CERVICAL SPINE N/A 12/5/2019    Procedure: Injection-steroid-epidural-cervical-C7-T1;  Surgeon: Noa Samano MD;  Location: [facility] OR;  Service: Neurosurgery;  Laterality: N/A;    FRACTURE SURGERY Right     Ankle    INJECTION OF ANESTHETIC AGENT AROUND MEDIAL BRANCH NERVES INNERVATING CERVICAL FACET JOINT Bilateral 8/6/2020    Procedure: Block-nerve-medial branch-cervical Bilateral  C3,4,5;  Surgeon: Noa TOSCANO  "MD [place];  Location: [facility] OR;  Service: Anesthesiology;  Laterality: Bilateral;    INJECTION OF ANESTHETIC AGENT AROUND MEDIAL BRANCH NERVES INNERVATING CERVICAL FACET JOINT Bilateral 8/20/2020    Procedure: Block-nerve-medial branch-cervical Bilateral C3-4-5;  Surgeon: Noa Samano MD;  Location: [facility] OR;  Service: Anesthesiology;  Laterality: Bilateral;    LAPAROSCOPIC GASTRIC BANDING      with subsequent removal due to abscess    LIPOSUCTION      x 2    PELVIC LAPAROSCOPY      for endometriosis eval with BTL    RADIOFREQUENCY ABLATION Bilateral 8/16/2021    Procedure: Radiofrequency Ablation Cervical C3-C5;  Surgeon: Robert Munoz MD;  Location: [facility] OR;  Service: Pain Management;  Laterality: Bilateral;    RADIOFREQUENCY THERMAL COAGULATION OF MEDIAL BRANCH OF POSTERIOR RAMUS OF CERVICAL SPINAL NERVE Bilateral 8/27/2020    Procedure: RADIOFREQUENCY THERMAL COAGULATION, NERVE, SPINAL, CERVICAL, POSTERIOR RAMUS, MEDIAL BRANCH C3-4-5, bilateral;  Surgeon: Noa Samano MD;  Location: [facility] OR;  Service: Anesthesiology;  Laterality: Bilateral;    TUBAL LIGATION      wtih pelvic lap        Family History   Problem Relation Age of Onset    Diabetes Sister     Seizures Sister     Mitochondrial disorder Sister         "trent"    Mental illness Sister     Seizures Sister     Cervical cancer Sister     Cervical cancer Sister     Ovarian cancer Cousin     Ovarian cancer Paternal Aunt     Colon cancer Maternal Grandmother     Cancer Maternal Grandfather 65        throat ca    Diabetes Mother     Stroke Mother 30    Mental illness Mother         depression    Hyperlipidemia Mother     Hypertension Mother     Hyperlipidemia Father     Heart disease Father         "athletes heart"    Ovarian cancer Paternal Aunt     Cervical cancer Paternal Cousin     Cervical cancer Other     Breast cancer Neg Hx        Social History     Socioeconomic History    Marital status:    Occupational History " "   Occupation: [employer]     Tobacco Use    Smoking status: Never Smoker    Smokeless tobacco: Never Used   Substance and Sexual Activity    Alcohol use: Yes     Comment: occasional "maybe monthly"    Drug use: No    Sexual activity: Yes     Partners: Male     Birth control/protection: See Surgical Hx, Injection     Comment: depo since age 18   Social History Narrative    Not on disability, not currently working.     Social Determinants of Health     Financial Resource Strain: Low Risk     Difficulty of Paying Living Expenses: Not very hard   Food Insecurity: Food Insecurity Present    Worried About Running Out of Food in the Last Year: Sometimes true    Ran Out of Food in the Last Year: Sometimes true   Transportation Needs: Unmet Transportation Needs    Lack of Transportation (Medical): Yes    Lack of Transportation (Non-Medical): Yes   Physical Activity: Inactive    Days of Exercise per Week: 0 days    Minutes of Exercise per Session: 0 min   Stress: Stress Concern Present    Feeling of Stress : Rather much   Social Connections: Unknown    Frequency of Communication with Friends and Family: More than three times a week    Frequency of Social Gatherings with Friends and Family: Patient refused    Active Member of Clubs or Organizations: No    Attends Club or Organization Meetings: Never    Marital Status:    Housing Stability: Low Risk     Unable to Pay for Housing in the Last Year: No    Number of Places Lived in the Last Year: 1    Unstable Housing in the Last Year: No       Current Outpatient Medications   Medication Sig Dispense Refill    acetylcysteine (N-ACETYL-L-CYSTEINE MISC)       albuterol (PROVENTIL) 2.5 mg /3 mL (0.083 %) nebulizer solution USE 1 VIAL VIA NEBULIZER EVERY 6 HOURS AS NEEDED FOR WHEEZING. RESCUE. 180 mL 2    albuterol (PROVENTIL/VENTOLIN HFA) 90 mcg/actuation inhaler INHALE 2 PUFFS BY MOUTH EVERY 6 HOURS AS NEEDED FOR WHEEZING 8.5 g 0    " ALPRAZolam (XANAX) 1 MG tablet TAKE 1 TABLET(1 MG) BY MOUTH DAILY AS NEEDED FOR ANXIETY 30 tablet 2    buPROPion (WELLBUTRIN XL) 150 MG TB24 tablet Take 1 tablet (150 mg total) by mouth once daily. 90 tablet 0    diphenoxylate-atropine 2.5-0.025 mg (LOMOTIL) 2.5-0.025 mg per tablet Take 1 tablet by mouth 4 (four) times daily as needed. 80 tablet 0    dronabinol (MARINOL) 2.5 MG capsule Take 1 capsule (2.5 mg total) by mouth 2 (two) times daily before meals. (Patient taking differently: Take 2.5 mg by mouth 2 (two) times daily as needed. Before meals) 60 capsule 3    FLUoxetine 40 MG capsule Take 1 capsule (40 mg total) by mouth once daily. 90 capsule 0    fluticasone furoate-vilanteroL (BREO) 200-25 mcg/dose DsDv diskus inhaler Breo Ellipta 200 mcg-25 mcg/dose powder for inhalation      fluticasone propionate (FLONASE) 50 mcg/actuation nasal spray SHAKE LIQUID AND USE 1 SPRAY(50 MCG) IN EACH NOSTRIL EVERY DAY 16 g 3    gabapentin (NEURONTIN) 600 MG tablet 1 tablet po 2-3 times a day for chronic pain 90 tablet 11    galcanezumab-gnlm (EMGALITY PEN) 120 mg/mL PnIj Inject 120 mg into the skin every 28 days. 1 Syringe 11    galcanezumab-gnlm (EMGALITY PEN) 120 mg/mL PnIj Inject 120 mg into the skin every 28 days. 1 mL 11    levOCARNitine (CARNITOR) 330 mg Tab TAKE 3 TABLETS(990 MG) BY MOUTH TWICE DAILY 180 tablet 2    lisdexamfetamine (VYVANSE) 40 MG Cap Take 1 capsule (40 mg total) by mouth every morning. 30 capsule 0    meloxicam (MOBIC) 15 MG tablet Take 15 mg by mouth once daily.      montelukast (SINGULAIR) 10 mg tablet Take 1 tablet (10 mg total) by mouth once daily. 90 tablet 1    NON FORMULARY MEDICATION Inject 50 mcg as directed once a week. B 12      propranoloL (INDERAL) 20 MG tablet Take by mouth.      sumatriptan (IMITREX) 100 MG tablet Take 1 tablet (100 mg total) by mouth as needed for Migraine (can repeat after 2 hours. max 2/day.). 12 tablet 3    SYNTHROID 200 mcg tablet TAKE 1  TABLET(200 MCG) BY MOUTH BEFORE BREAKFAST 30 tablet 5    tiZANidine (ZANAFLEX) 4 MG tablet Take 1 tablet (4 mg total) by mouth 3 (three) times daily as needed (muscle spasm). 90 tablet 11    traZODone (DESYREL) 100 MG tablet TAKE 2 TABLETS(200 MG) BY MOUTH EVERY EVENING 180 tablet 0    zonisamide (ZONEGRAN) 50 MG Cap 1 capsule po qhs x 1 week, then 2 capsules qhs x 1 week, then 3 capsules qhs 90 capsule 6     Current Facility-Administered Medications   Medication Dose Route Frequency Provider Last Rate Last Admin    medroxyPROGESTERone (DEPO-PROVERA) syringe 150 mg  150 mg Intramuscular Q90 Days Amaya Aiken MD   150 mg at 02/14/22 0858       Review of patient's allergies indicates:   Allergen Reactions    Lamotrigine      Other reaction(s): Rash  Other reaction(s): Nausea    Sulfa (sulfonamide antibiotics) Nausea Only     Other reaction(s): Unknown    Adhesive Rash       Review of System:   General: no chills, fever, night sweats, weight gain or weight loss  Psychological: no depression or suicidal ideation  Breasts: no new or changing breast lumps, nipple discharge or masses.  Respiratory: no cough, shortness of breath, or wheezing  Cardiovascular: no chest pain or dyspnea on exertion  Gastrointestinal: no abdominal pain, change in bowel habits, or black or bloody stools  Genito-Urinary: no incontinence, urinary frequency/urgency or vulvar/vaginal symptoms, pelvic pain or abnormal vaginal bleeding.  Musculoskeletal: no gait disturbance or muscular weakness    After discussion, pt desires to proceed with CKC  Will schedule and follow  Furhert discussion and care plan pending path results

## 2022-02-15 ENCOUNTER — PATIENT MESSAGE (OUTPATIENT)
Dept: OBSTETRICS AND GYNECOLOGY | Facility: CLINIC | Age: 47
End: 2022-02-15
Payer: MEDICARE

## 2022-02-18 ENCOUNTER — PATIENT MESSAGE (OUTPATIENT)
Dept: OBSTETRICS AND GYNECOLOGY | Facility: CLINIC | Age: 47
End: 2022-02-18
Payer: MEDICARE

## 2022-02-21 ENCOUNTER — TELEPHONE (OUTPATIENT)
Dept: OBSTETRICS AND GYNECOLOGY | Facility: CLINIC | Age: 47
End: 2022-02-21
Payer: MEDICARE

## 2022-02-21 ENCOUNTER — PATIENT MESSAGE (OUTPATIENT)
Dept: OBSTETRICS AND GYNECOLOGY | Facility: CLINIC | Age: 47
End: 2022-02-21
Payer: MEDICARE

## 2022-02-21 DIAGNOSIS — Z01.818 PRE-OP TESTING: ICD-10-CM

## 2022-02-21 DIAGNOSIS — R87.612 PAP SMEAR ABNORMALITY OF CERVIX WITH LGSIL: Primary | ICD-10-CM

## 2022-02-21 NOTE — TELEPHONE ENCOUNTER
----- Message from Kenyon Sebastian sent at 2/19/2022 12:38 PM CST -----  Name Of Caller: Dinah        Provider Name: Nikhil Franco        Does patient feel the need to be seen today? no        Relationship to the Pt?: patient        Contact Preference?: [phone]        What is the nature of the call?: Patient states that she needs to speak with someone in the office regarding the date of her surgery.

## 2022-02-21 NOTE — TELEPHONE ENCOUNTER
----- Message from Ellen Grover sent at 2/21/2022  3:40 PM CST -----  Contact: pt  Type: Needs Medical Advice  Who Called:  pt   Best Call Back Number: [phone]    Additional Information: please call pt regarding sx scheduled for 3/17

## 2022-02-21 NOTE — TELEPHONE ENCOUNTER
Patient notified scheduled for first available, will contact patient if sooner date becomes available

## 2022-02-28 ENCOUNTER — TELEPHONE (OUTPATIENT)
Dept: HEMATOLOGY/ONCOLOGY | Facility: CLINIC | Age: 47
End: 2022-02-28
Payer: MEDICARE

## 2022-02-28 NOTE — TELEPHONE ENCOUNTER
----- Message from Sofia Garcia sent at 2/28/2022 10:58 AM CST -----  Type:Needs Medical Advice    Who Called:PT  Best call back number:[phone]  Additional Info: Requesting a call back regarding#PT has a referral and is ready to schedule  Please Advise- Thank you

## 2022-03-07 RX ORDER — FLUTICASONE PROPIONATE 50 MCG
SPRAY, SUSPENSION (ML) NASAL
Qty: 16 G | Refills: 3 | Status: SHIPPED | OUTPATIENT
Start: 2022-03-07 | End: 2022-07-10

## 2022-03-09 ENCOUNTER — PATIENT MESSAGE (OUTPATIENT)
Dept: PSYCHIATRY | Facility: CLINIC | Age: 47
End: 2022-03-09
Payer: MEDICARE

## 2022-03-10 ENCOUNTER — PATIENT OUTREACH (OUTPATIENT)
Dept: ADMINISTRATIVE | Facility: OTHER | Age: 47
End: 2022-03-10
Payer: MEDICARE

## 2022-03-14 ENCOUNTER — OFFICE VISIT (OUTPATIENT)
Dept: OBSTETRICS AND GYNECOLOGY | Facility: CLINIC | Age: 47
End: 2022-03-14
Payer: MEDICARE

## 2022-03-14 VITALS
WEIGHT: 259.5 LBS | DIASTOLIC BLOOD PRESSURE: 70 MMHG | HEIGHT: 67 IN | BODY MASS INDEX: 40.73 KG/M2 | SYSTOLIC BLOOD PRESSURE: 118 MMHG

## 2022-03-14 DIAGNOSIS — N87.9 CERVICAL DYSPLASIA: ICD-10-CM

## 2022-03-14 DIAGNOSIS — R97.8 OTHER ABNORMAL TUMOR MARKERS: ICD-10-CM

## 2022-03-14 DIAGNOSIS — Z01.818 PREOP EXAMINATION: Primary | ICD-10-CM

## 2022-03-14 PROCEDURE — 3008F PR BODY MASS INDEX (BMI) DOCUMENTED: ICD-10-PCS | Mod: CPTII,S$GLB,, | Performed by: SPECIALIST

## 2022-03-14 PROCEDURE — 3074F PR MOST RECENT SYSTOLIC BLOOD PRESSURE < 130 MM HG: ICD-10-PCS | Mod: CPTII,S$GLB,, | Performed by: SPECIALIST

## 2022-03-14 PROCEDURE — 99999 PR PBB SHADOW E&M-EST. PATIENT-LVL IV: ICD-10-PCS | Mod: PBBFAC,,, | Performed by: SPECIALIST

## 2022-03-14 PROCEDURE — 99213 PR OFFICE/OUTPT VISIT, EST, LEVL III, 20-29 MIN: ICD-10-PCS | Mod: S$GLB,,, | Performed by: SPECIALIST

## 2022-03-14 PROCEDURE — 3008F BODY MASS INDEX DOCD: CPT | Mod: CPTII,S$GLB,, | Performed by: SPECIALIST

## 2022-03-14 PROCEDURE — 1159F PR MEDICATION LIST DOCUMENTED IN MEDICAL RECORD: ICD-10-PCS | Mod: CPTII,S$GLB,, | Performed by: SPECIALIST

## 2022-03-14 PROCEDURE — 1159F MED LIST DOCD IN RCRD: CPT | Mod: CPTII,S$GLB,, | Performed by: SPECIALIST

## 2022-03-14 PROCEDURE — 99999 PR PBB SHADOW E&M-EST. PATIENT-LVL IV: CPT | Mod: PBBFAC,,, | Performed by: SPECIALIST

## 2022-03-14 PROCEDURE — 3074F SYST BP LT 130 MM HG: CPT | Mod: CPTII,S$GLB,, | Performed by: SPECIALIST

## 2022-03-14 PROCEDURE — 3078F DIAST BP <80 MM HG: CPT | Mod: CPTII,S$GLB,, | Performed by: SPECIALIST

## 2022-03-14 PROCEDURE — 3078F PR MOST RECENT DIASTOLIC BLOOD PRESSURE < 80 MM HG: ICD-10-PCS | Mod: CPTII,S$GLB,, | Performed by: SPECIALIST

## 2022-03-14 PROCEDURE — 99213 OFFICE O/P EST LOW 20 MIN: CPT | Mod: S$GLB,,, | Performed by: SPECIALIST

## 2022-03-14 RX ORDER — MUPIROCIN 20 MG/G
OINTMENT TOPICAL
Status: CANCELLED | OUTPATIENT
Start: 2022-03-14

## 2022-03-14 RX ORDER — SODIUM CHLORIDE 9 MG/ML
INJECTION, SOLUTION INTRAVENOUS CONTINUOUS
Status: CANCELLED | OUTPATIENT
Start: 2022-03-14

## 2022-03-14 NOTE — PROGRESS NOTES
46 yo WF with cervical dysplasia and HR HPV confirmed with colpo  Pt is scheduled to undergo CKC  I discussed indications for procedure as well as risks and benefits  Consents and orders obtained  Past Medical History:   Diagnosis Date    Abnormal Pap smear of cervix     Arnold-Chiari deformity     Asthma     Borderline personality disorder     Carnitine deficiency     Depression     Fatty liver     noted on 8/21 CT    Fibromyalgia     Headache     History of abnormal cervical Pap smear     colpo/ LEEP and CKC    History of nephrolithiasis     kidney stones    Hypothyroidism     IBS (irritable bowel syndrome)     GI smith negative    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     PATRICIA (obstructive sleep apnea)     severe - doesn't use CPAP    Panic attack     PTSD (post-traumatic stress disorder)        Past Surgical History:   Procedure Laterality Date    ANKLE SURGERY Right     x2 (#1 for ligament injury and #2 for fx and ligament)    AUGMENTATION OF BREAST      BILATERAL TUBAL LIGATION      BRAIN SURGERY  12/2018    decompression/craniotomy Arnold-Chiari syndrome    BREAST SURGERY  1997    B implants    CERVICAL BIOPSY  W/ LOOP ELECTRODE EXCISION      COLONOSCOPY      DECOMPRESSION OF CHIARI MALFORMATION BY REMOVAL OF POSTERIOR ARCH OF FIRST CERVICAL VERTEBRA N/A 12/13/2018    Procedure: DECOMPRESSION, CHIARI MALFORMATION, BY 1ST CERVICAL VERTEBRA POSTERIOR ARCH REMOVAL;  Surgeon: Sergio Busch MD;  Location: [address];  Service: Neurosurgery;  Laterality: N/A;    EPIDURAL STEROID INJECTION INTO CERVICAL SPINE N/A 12/5/2019    Procedure: Injection-steroid-epidural-cervical-C7-T1;  Surgeon: Noa Samano MD;  Location: [facility] OR;  Service: Neurosurgery;  Laterality: N/A;    FRACTURE SURGERY Right     Ankle    INJECTION OF ANESTHETIC AGENT AROUND MEDIAL BRANCH NERVES INNERVATING CERVICAL FACET JOINT Bilateral 8/6/2020    Procedure: Block-nerve-medial branch-cervical Bilateral   "C3,4,5;  Surgeon: Noa Samano MD;  Location: [facility] OR;  Service: Anesthesiology;  Laterality: Bilateral;    INJECTION OF ANESTHETIC AGENT AROUND MEDIAL BRANCH NERVES INNERVATING CERVICAL FACET JOINT Bilateral 8/20/2020    Procedure: Block-nerve-medial branch-cervical Bilateral C3-4-5;  Surgeon: Noa Samano MD;  Location: [facility] OR;  Service: Anesthesiology;  Laterality: Bilateral;    LAPAROSCOPIC GASTRIC BANDING      with subsequent removal due to abscess    LIPOSUCTION      x 2    PELVIC LAPAROSCOPY      for endometriosis eval with BTL    RADIOFREQUENCY ABLATION Bilateral 8/16/2021    Procedure: Radiofrequency Ablation Cervical C3-C5;  Surgeon: Robert Munzo MD;  Location: [facility] OR;  Service: Pain Management;  Laterality: Bilateral;    RADIOFREQUENCY THERMAL COAGULATION OF MEDIAL BRANCH OF POSTERIOR RAMUS OF CERVICAL SPINAL NERVE Bilateral 8/27/2020    Procedure: RADIOFREQUENCY THERMAL COAGULATION, NERVE, SPINAL, CERVICAL, POSTERIOR RAMUS, MEDIAL BRANCH C3-4-5, bilateral;  Surgeon: Noa Samano MD;  Location: [facility] OR;  Service: Anesthesiology;  Laterality: Bilateral;    TUBAL LIGATION      wtih pelvic lap        Family History   Problem Relation Age of Onset    Diabetes Sister     Seizures Sister     Mitochondrial disorder Sister         "trent"    Mental illness Sister     Seizures Sister     Cervical cancer Sister     Cervical cancer Sister     Ovarian cancer Cousin     Ovarian cancer Paternal Aunt     Colon cancer Maternal Grandmother     Cancer Maternal Grandfather 65        throat ca    Diabetes Mother     Stroke Mother 30    Mental illness Mother         depression    Hyperlipidemia Mother     Hypertension Mother     Hyperlipidemia Father     Heart disease Father         "athletes heart"    Ovarian cancer Paternal Aunt     Cervical cancer Paternal Cousin     Cervical cancer Other     Breast cancer Neg Hx        Social History     Socioeconomic History    Marital status: " "   Occupational History    Occupation: [employer]     Tobacco Use    Smoking status: Never Smoker    Smokeless tobacco: Never Used   Substance and Sexual Activity    Alcohol use: Yes     Comment: occasional "maybe monthly"    Drug use: No    Sexual activity: Yes     Partners: Male     Birth control/protection: See Surgical Hx, Injection     Comment: depo since age 18   Social History Narrative    Not on disability, not currently working.     Social Determinants of Health     Financial Resource Strain: Low Risk     Difficulty of Paying Living Expenses: Not very hard   Food Insecurity: Food Insecurity Present    Worried About Running Out of Food in the Last Year: Sometimes true    Ran Out of Food in the Last Year: Sometimes true   Transportation Needs: Unmet Transportation Needs    Lack of Transportation (Medical): Yes    Lack of Transportation (Non-Medical): Yes   Physical Activity: Inactive    Days of Exercise per Week: 0 days    Minutes of Exercise per Session: 0 min   Stress: Stress Concern Present    Feeling of Stress : Rather much   Social Connections: Unknown    Frequency of Communication with Friends and Family: More than three times a week    Frequency of Social Gatherings with Friends and Family: Patient refused    Active Member of Clubs or Organizations: No    Attends Club or Organization Meetings: Never    Marital Status:    Housing Stability: Low Risk     Unable to Pay for Housing in the Last Year: No    Number of Places Lived in the Last Year: 1    Unstable Housing in the Last Year: No       Current Outpatient Medications   Medication Sig Dispense Refill    acetylcysteine (N-ACETYL-L-CYSTEINE MISC)       albuterol (PROVENTIL) 2.5 mg /3 mL (0.083 %) nebulizer solution USE 1 VIAL VIA NEBULIZER EVERY 6 HOURS AS NEEDED FOR WHEEZING. RESCUE. 180 mL 2    albuterol (PROVENTIL/VENTOLIN HFA) 90 mcg/actuation inhaler INHALE 2 PUFFS BY MOUTH EVERY 6 HOURS AS NEEDED " FOR WHEEZING 8.5 g 0    ALPRAZolam (XANAX) 1 MG tablet TAKE 1 TABLET(1 MG) BY MOUTH DAILY AS NEEDED FOR ANXIETY 30 tablet 2    buPROPion (WELLBUTRIN XL) 150 MG TB24 tablet Take 1 tablet (150 mg total) by mouth once daily. 90 tablet 0    diphenoxylate-atropine 2.5-0.025 mg (LOMOTIL) 2.5-0.025 mg per tablet Take 1 tablet by mouth 4 (four) times daily as needed. 80 tablet 0    dronabinol (MARINOL) 2.5 MG capsule Take 1 capsule (2.5 mg total) by mouth 2 (two) times daily before meals. (Patient taking differently: Take 2.5 mg by mouth 2 (two) times daily as needed. Before meals) 60 capsule 3    FLUoxetine 40 MG capsule TAKE 1 CAPSULE(40 MG) BY MOUTH EVERY DAY 90 capsule 0    fluticasone furoate-vilanteroL (BREO) 200-25 mcg/dose DsDv diskus inhaler Breo Ellipta 200 mcg-25 mcg/dose powder for inhalation      fluticasone propionate (FLONASE) 50 mcg/actuation nasal spray SHAKE LIQUID AND USE 1 SPRAY(50 MCG) IN EACH NOSTRIL EVERY DAY 16 g 3    gabapentin (NEURONTIN) 600 MG tablet 1 tablet po 2-3 times a day for chronic pain 90 tablet 11    galcanezumab-gnlm (EMGALITY PEN) 120 mg/mL PnIj Inject 120 mg into the skin every 28 days. 1 Syringe 11    galcanezumab-gnlm (EMGALITY PEN) 120 mg/mL PnIj Inject 120 mg into the skin every 28 days. 1 mL 11    levOCARNitine (CARNITOR) 330 mg Tab TAKE 3 TABLETS(990 MG) BY MOUTH TWICE DAILY 180 tablet 2    meloxicam (MOBIC) 15 MG tablet Take 15 mg by mouth once daily.      montelukast (SINGULAIR) 10 mg tablet Take 1 tablet (10 mg total) by mouth once daily. 90 tablet 1    NON FORMULARY MEDICATION Inject 50 mcg as directed once a week. B 12      propranoloL (INDERAL) 20 MG tablet Take by mouth.      sumatriptan (IMITREX) 100 MG tablet Take 1 tablet (100 mg total) by mouth as needed for Migraine (can repeat after 2 hours. max 2/day.). 12 tablet 3    SYNTHROID 200 mcg tablet TAKE 1 TABLET(200 MCG) BY MOUTH BEFORE BREAKFAST 30 tablet 5    tiZANidine (ZANAFLEX) 4 MG tablet Take 1  tablet (4 mg total) by mouth 3 (three) times daily as needed (muscle spasm). 90 tablet 11    traZODone (DESYREL) 100 MG tablet TAKE 2 TABLETS(200 MG) BY MOUTH EVERY EVENING 180 tablet 0    zonisamide (ZONEGRAN) 50 MG Cap 1 capsule po qhs x 1 week, then 2 capsules qhs x 1 week, then 3 capsules qhs 90 capsule 6    lisdexamfetamine (VYVANSE) 40 MG Cap Take 1 capsule (40 mg total) by mouth every morning. 30 capsule 0     Current Facility-Administered Medications   Medication Dose Route Frequency Provider Last Rate Last Admin    medroxyPROGESTERone (DEPO-PROVERA) syringe 150 mg  150 mg Intramuscular Q90 Days Amaya Aiken MD   150 mg at 02/14/22 0858       Review of patient's allergies indicates:   Allergen Reactions    Lamotrigine      Other reaction(s): Rash  Other reaction(s): Nausea    Sulfa (sulfonamide antibiotics) Nausea Only     Other reaction(s): Unknown    Adhesive Rash       Review of System:   General: no chills, fever, night sweats, weight gain or weight loss  Psychological: no depression or suicidal ideation  Breasts: no new or changing breast lumps, nipple discharge or masses.  Respiratory: no cough, shortness of breath, or wheezing  Cardiovascular: no chest pain or dyspnea on exertion  Gastrointestinal: no abdominal pain, change in bowel habits, or black or bloody stools  Genito-Urinary: no incontinence, urinary frequency/urgency or vulvar/vaginal symptoms, pelvic pain or abnormal vaginal bleeding.  Musculoskeletal: no gait disturbance or muscular weakness    PE  See Epic

## 2022-03-16 ENCOUNTER — OFFICE VISIT (OUTPATIENT)
Dept: UROLOGY | Facility: CLINIC | Age: 47
End: 2022-03-16
Payer: MEDICARE

## 2022-03-16 VITALS — BODY MASS INDEX: 41.73 KG/M2 | WEIGHT: 259.69 LBS | HEIGHT: 66 IN

## 2022-03-16 DIAGNOSIS — R31.0 GROSS HEMATURIA: Primary | ICD-10-CM

## 2022-03-16 LAB
BILIRUB SERPL-MCNC: ABNORMAL MG/DL
BLOOD URINE, POC: ABNORMAL
CLARITY, POC UA: CLEAR
COLOR, POC UA: YELLOW
GLUCOSE UR QL STRIP: ABNORMAL
KETONES UR QL STRIP: ABNORMAL
LEUKOCYTE ESTERASE URINE, POC: ABNORMAL
NITRITE, POC UA: ABNORMAL
PH, POC UA: 6
PROTEIN, POC: ABNORMAL
SPECIFIC GRAVITY, POC UA: 1.03
UROBILINOGEN, POC UA: 0.2

## 2022-03-16 PROCEDURE — 3044F HG A1C LEVEL LT 7.0%: CPT | Mod: CPTII,S$GLB,, | Performed by: UROLOGY

## 2022-03-16 PROCEDURE — 99203 OFFICE O/P NEW LOW 30 MIN: CPT | Mod: S$GLB,,, | Performed by: UROLOGY

## 2022-03-16 PROCEDURE — 1160F RVW MEDS BY RX/DR IN RCRD: CPT | Mod: CPTII,S$GLB,, | Performed by: UROLOGY

## 2022-03-16 PROCEDURE — 3008F PR BODY MASS INDEX (BMI) DOCUMENTED: ICD-10-PCS | Mod: CPTII,S$GLB,, | Performed by: UROLOGY

## 2022-03-16 PROCEDURE — 1160F PR REVIEW ALL MEDS BY PRESCRIBER/CLIN PHARMACIST DOCUMENTED: ICD-10-PCS | Mod: CPTII,S$GLB,, | Performed by: UROLOGY

## 2022-03-16 PROCEDURE — 81002 POCT URINE DIPSTICK WITHOUT MICROSCOPE: ICD-10-PCS | Mod: S$GLB,,, | Performed by: UROLOGY

## 2022-03-16 PROCEDURE — 3008F BODY MASS INDEX DOCD: CPT | Mod: CPTII,S$GLB,, | Performed by: UROLOGY

## 2022-03-16 PROCEDURE — 3044F PR MOST RECENT HEMOGLOBIN A1C LEVEL <7.0%: ICD-10-PCS | Mod: CPTII,S$GLB,, | Performed by: UROLOGY

## 2022-03-16 PROCEDURE — 1159F PR MEDICATION LIST DOCUMENTED IN MEDICAL RECORD: ICD-10-PCS | Mod: CPTII,S$GLB,, | Performed by: UROLOGY

## 2022-03-16 PROCEDURE — 99999 PR PBB SHADOW E&M-EST. PATIENT-LVL V: ICD-10-PCS | Mod: PBBFAC,,, | Performed by: UROLOGY

## 2022-03-16 PROCEDURE — 99999 PR PBB SHADOW E&M-EST. PATIENT-LVL V: CPT | Mod: PBBFAC,,, | Performed by: UROLOGY

## 2022-03-16 PROCEDURE — 81002 URINALYSIS NONAUTO W/O SCOPE: CPT | Mod: S$GLB,,, | Performed by: UROLOGY

## 2022-03-16 PROCEDURE — 1159F MED LIST DOCD IN RCRD: CPT | Mod: CPTII,S$GLB,, | Performed by: UROLOGY

## 2022-03-16 PROCEDURE — 99203 PR OFFICE/OUTPT VISIT, NEW, LEVL III, 30-44 MIN: ICD-10-PCS | Mod: S$GLB,,, | Performed by: UROLOGY

## 2022-03-16 NOTE — PROGRESS NOTES
"Subjective:       Patient ID: Dinah Mitchell is a 47 y.o. female.    Chief Complaint: Other (Bladder dropped and getting "bladder cone" tomorrow? Wants to discuss bladder sling because OB is wanting to do as Hysterectomy)    HPI     47-year-old with several episodes of gross hematuria in the last few months.  Was also having bilateral lower abdominal pain at the time.  She does have a history of stones.  She had a CT scan 6 months ago which I reviewed and there was no stone seen at that time.  Today she is pain-free.  Does have abnormal cervical biopsy and was recommended that she undergo hysterectomy.  She is concerned about bladder prolapse and possibly needing a sling procedure.  She does have stress incontinence.  She is not wearing pads.    Urine dipstick shows positive for +blood.  Micro exam: 3-5 RBC's per HPF.    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     PATRICIA (obstructive sleep apnea)     severe - doesn't use CPAP    Panic attack     PTSD (post-traumatic stress disorder)     Renal disorder     kidney stones, blood in urine on occasion     Past Surgical History:   Procedure Laterality Date    ANKLE SURGERY Right     x2 (#1 for ligament injury and #2 for fx and ligament)    AUGMENTATION OF BREAST      BILATERAL TUBAL LIGATION      BRAIN SURGERY  12/2018    decompression/craniotomy Arnold-Chiari syndrome    BREAST SURGERY  1997    B implants    CERVICAL BIOPSY  W/ LOOP ELECTRODE EXCISION      COLONOSCOPY      DECOMPRESSION OF " CHIARI MALFORMATION BY REMOVAL OF POSTERIOR ARCH OF FIRST CERVICAL VERTEBRA N/A 12/13/2018    Procedure: DECOMPRESSION, CHIARI MALFORMATION, BY 1ST CERVICAL VERTEBRA POSTERIOR ARCH REMOVAL;  Surgeon: Sergio Busch MD;  Location: [address];  Service: Neurosurgery;  Laterality: N/A;    EPIDURAL STEROID INJECTION INTO CERVICAL SPINE N/A 12/5/2019    Procedure: Injection-steroid-epidural-cervical-C7-T1;  Surgeon: Noa Samano MD;  Location: [facility] OR;  Service: Neurosurgery;  Laterality: N/A;    FRACTURE SURGERY Right     Ankle    INJECTION OF ANESTHETIC AGENT AROUND MEDIAL BRANCH NERVES INNERVATING CERVICAL FACET JOINT Bilateral 8/6/2020    Procedure: Block-nerve-medial branch-cervical Bilateral  C3,4,5;  Surgeon: Noa Samano MD;  Location: [facility] OR;  Service: Anesthesiology;  Laterality: Bilateral;    INJECTION OF ANESTHETIC AGENT AROUND MEDIAL BRANCH NERVES INNERVATING CERVICAL FACET JOINT Bilateral 8/20/2020    Procedure: Block-nerve-medial branch-cervical Bilateral C3-4-5;  Surgeon: Noa Samano MD;  Location: [facility] OR;  Service: Anesthesiology;  Laterality: Bilateral;    LAPAROSCOPIC GASTRIC BANDING      with subsequent removal due to abscess    LIPOSUCTION      x 2    PELVIC LAPAROSCOPY      for endometriosis eval with BTL    RADIOFREQUENCY ABLATION Bilateral 8/16/2021    Procedure: Radiofrequency Ablation Cervical C3-C5;  Surgeon: Robert Munoz MD;  Location: [facility] OR;  Service: Pain Management;  Laterality: Bilateral;    RADIOFREQUENCY THERMAL COAGULATION OF MEDIAL BRANCH OF POSTERIOR RAMUS OF CERVICAL SPINAL NERVE Bilateral 8/27/2020    Procedure: RADIOFREQUENCY THERMAL COAGULATION, NERVE, SPINAL, CERVICAL, POSTERIOR RAMUS, MEDIAL BRANCH C3-4-5, bilateral;  Surgeon: Noa Samano MD;  Location: [facility] OR;  Service: Anesthesiology;  Laterality: Bilateral;    TUBAL LIGATION      wtih pelvic lap        Current Outpatient Medications:     albuterol (PROVENTIL) 2.5 mg /3 mL (0.083 %) nebulizer  solution, USE 1 VIAL VIA NEBULIZER EVERY 6 HOURS AS NEEDED FOR WHEEZING. RESCUE., Disp: 180 mL, Rfl: 2    albuterol (PROVENTIL/VENTOLIN HFA) 90 mcg/actuation inhaler, INHALE 2 PUFFS BY MOUTH EVERY 6 HOURS AS NEEDED FOR WHEEZING, Disp: 8.5 g, Rfl: 0    ALPRAZolam (XANAX) 1 MG tablet, TAKE 1 TABLET(1 MG) BY MOUTH DAILY AS NEEDED FOR ANXIETY, Disp: 30 tablet, Rfl: 2    buPROPion (WELLBUTRIN XL) 150 MG TB24 tablet, Take 1 tablet (150 mg total) by mouth once daily. (Patient taking differently: Take 150 mg by mouth every morning.), Disp: 90 tablet, Rfl: 0    diphenoxylate-atropine 2.5-0.025 mg (LOMOTIL) 2.5-0.025 mg per tablet, Take 1 tablet by mouth 4 (four) times daily as needed., Disp: 80 tablet, Rfl: 0    dronabinol (MARINOL) 2.5 MG capsule, Take 1 capsule (2.5 mg total) by mouth 2 (two) times daily before meals. (Patient taking differently: Take 2.5 mg by mouth 2 (two) times daily as needed. Before meals), Disp: 60 capsule, Rfl: 3    FLUoxetine 40 MG capsule, TAKE 1 CAPSULE(40 MG) BY MOUTH EVERY DAY (Patient taking differently: Take 40 mg by mouth every morning.), Disp: 90 capsule, Rfl: 0    fluticasone furoate-vilanteroL (BREO) 200-25 mcg/dose DsDv diskus inhaler, Breo Ellipta 200 mcg-25 mcg/dose powder for inhalation, Disp: , Rfl:     gabapentin (NEURONTIN) 600 MG tablet, 1 tablet po 2-3 times a day for chronic pain, Disp: 90 tablet, Rfl: 11    galcanezumab-gnlm (EMGALITY PEN) 120 mg/mL PnIj, Inject 120 mg into the skin every 28 days., Disp: 1 Syringe, Rfl: 11    galcanezumab-gnlm (EMGALITY PEN) 120 mg/mL PnIj, Inject 120 mg into the skin every 28 days., Disp: 1 mL, Rfl: 11    levOCARNitine (CARNITOR) 330 mg Tab, TAKE 3 TABLETS(990 MG) BY MOUTH TWICE DAILY, Disp: 180 tablet, Rfl: 2    montelukast (SINGULAIR) 10 mg tablet, Take 1 tablet (10 mg total) by mouth once daily., Disp: 90 tablet, Rfl: 1    prazosin (MINIPRESS) 2 MG Cap, Take 2 mg by mouth every evening., Disp: , Rfl:     SYNTHROID 200 mcg  tablet, TAKE 1 TABLET(200 MCG) BY MOUTH BEFORE BREAKFAST, Disp: 30 tablet, Rfl: 5    tiZANidine (ZANAFLEX) 4 MG tablet, Take 1 tablet (4 mg total) by mouth 3 (three) times daily as needed (muscle spasm)., Disp: 90 tablet, Rfl: 11    traZODone (DESYREL) 100 MG tablet, TAKE 2 TABLETS(200 MG) BY MOUTH EVERY EVENING, Disp: 180 tablet, Rfl: 0    acetylcysteine (N-ACETYL-L-CYSTEINE MISC), , Disp: , Rfl:     fluticasone propionate (FLONASE) 50 mcg/actuation nasal spray, SHAKE LIQUID AND USE 1 SPRAY(50 MCG) IN EACH NOSTRIL EVERY DAY (Patient not taking: Reported on 3/16/2022), Disp: 16 g, Rfl: 3    lisdexamfetamine (VYVANSE) 40 MG Cap, Take 1 capsule (40 mg total) by mouth every morning., Disp: 30 capsule, Rfl: 0    meloxicam (MOBIC) 15 MG tablet, Take 15 mg by mouth once daily., Disp: , Rfl:     NON FORMULARY MEDICATION, Inject 50 mcg as directed once a week. B 12, Disp: , Rfl:     propranoloL (INDERAL) 20 MG tablet, Take by mouth., Disp: , Rfl:     sumatriptan (IMITREX) 100 MG tablet, TAKE 1 TABLET BY MOUTH AS NEEDED FOR MIGRAINE(CAN REPEAT AFTER 2 HOURS, MAX 2/DAY), Disp: 12 tablet, Rfl: 3    zonisamide (ZONEGRAN) 50 MG Cap, 1 capsule po qhs x 1 week, then 2 capsules qhs x 1 week, then 3 capsules qhs (Patient not taking: Reported on 3/16/2022), Disp: 90 capsule, Rfl: 6    Current Facility-Administered Medications:     medroxyPROGESTERone (DEPO-PROVERA) syringe 150 mg, 150 mg, Intramuscular, Q90 Days, Amaya Aiken MD, 150 mg at 02/14/22 0858      Review of Systems   Constitutional: Negative for fever.   Eyes: Negative for visual disturbance.   Respiratory: Negative for shortness of breath.    Cardiovascular: Negative for chest pain.   Gastrointestinal: Negative for nausea.   Genitourinary: Positive for hematuria. Negative for dysuria and flank pain.   Musculoskeletal: Negative for gait problem.   Skin: Negative for rash.   Neurological: Negative for seizures.   Psychiatric/Behavioral: Negative for  confusion.       Objective:      Physical Exam  Vitals reviewed.   Constitutional:       Appearance: She is well-developed.   HENT:      Head: Normocephalic.   Eyes:      Conjunctiva/sclera: Conjunctivae normal.   Cardiovascular:      Rate and Rhythm: Normal rate.   Pulmonary:      Effort: Pulmonary effort is normal.   Abdominal:      General: There is no distension.      Palpations: Abdomen is soft. There is no mass.      Tenderness: There is no abdominal tenderness. There is no right CVA tenderness or left CVA tenderness.   Genitourinary:     Comments: Bladder non-tender and nondistended  No CVA tenderness  Musculoskeletal:      Cervical back: Normal range of motion.   Skin:     General: Skin is warm and dry.      Findings: No erythema or rash.   Neurological:      Mental Status: She is alert and oriented to person, place, and time.   Psychiatric:         Behavior: Behavior normal.         Assessment:       1. Gross hematuria        Plan:       Gross hematuria  -     POCT URINE DIPSTICK WITHOUT MICROSCOPE  -     Ambulatory referral/consult to Urology  -     US Retroperitoneal Complete; Future; Expected date: 03/16/2022  -     Cystoscopy; Future      I recommend she proceed with hysterectomy as recommended by her gynecologist.  Will evaluate hematuria after she recovers with cystoscopy and kidney ultrasound.

## 2022-03-17 PROBLEM — N87.9 CERVICAL DYSPLASIA: Status: ACTIVE | Noted: 2022-03-17

## 2022-03-18 ENCOUNTER — NURSE TRIAGE (OUTPATIENT)
Dept: ADMINISTRATIVE | Facility: CLINIC | Age: 47
End: 2022-03-18
Payer: MEDICARE

## 2022-03-18 ENCOUNTER — TELEPHONE (OUTPATIENT)
Dept: OBSTETRICS AND GYNECOLOGY | Facility: CLINIC | Age: 47
End: 2022-03-18
Payer: MEDICARE

## 2022-03-18 NOTE — TELEPHONE ENCOUNTER
----- Message from Saleem May sent at 3/18/2022  7:11 AM CDT -----  Contact: pt at [phone]  Type: Needs Medical Advice  Who Called:  pt  Best Call Back Number: [phone]  Additional Information: pt is calling the office to let them know she is having sever pain and nausea after surgery she had from yesterday. Please call back and advise.

## 2022-03-18 NOTE — TELEPHONE ENCOUNTER
I rec'd a call from Mrs. Mitchell, she had a cervical biopsy with Dr. Franco yesterday.  she is calling with profound nausea that is not responding to dronabinol, and states that around 2 am, she began having severe lower abdominal pain that is constant, and not responding to the Percocet 5 that she was prescribed.  I sent a [place] message to Dr. Franco, he responded: Send her to the Er.   I called Mrs. Mitchell back and advised her on Dr. Franco's recommendation.  She verbalized understanding.      Reason for Disposition   [1] SEVERE post-op pain (e.g., excruciating, pain scale 8-10) AND [2] not controlled with pain medications    Additional Information   Negative: Sounds like a life-threatening emergency to the triager    Protocols used: POST-OP SYMPTOMS AND [id number]

## 2022-03-22 ENCOUNTER — TELEPHONE (OUTPATIENT)
Dept: OBSTETRICS AND GYNECOLOGY | Facility: CLINIC | Age: 47
End: 2022-03-22
Payer: MEDICARE

## 2022-04-07 ENCOUNTER — TELEPHONE (OUTPATIENT)
Dept: FAMILY MEDICINE | Facility: CLINIC | Age: 47
End: 2022-04-07
Payer: MEDICARE

## 2022-04-07 NOTE — TELEPHONE ENCOUNTER
----- Message from Jessica Lozada sent at 4/7/2022 10:17 AM CDT -----  Contact: pt    Type: Sooner Appt Request    Caller is requesting a sooner appt.  Caller declined first available listed below.  Caller will not accept being placed on the wait list and are requesting a message be sent to the doctor.    Name of Caller:Pt     When is the 1st available appt:     Symptoms: ER follow up canceled appt today is to sick to come in     Best Call Back #:[phone]    Additional Info-Please advise-Thank you~         Malina Esquivel (sister) via telephone

## 2022-04-08 NOTE — TELEPHONE ENCOUNTER
Given the upcoming holiday and my vacation, I don't have an appt until 4/21.  It that doesn't work, roni appt sooner with available provider

## 2022-04-13 ENCOUNTER — OFFICE VISIT (OUTPATIENT)
Dept: OBSTETRICS AND GYNECOLOGY | Facility: CLINIC | Age: 47
End: 2022-04-13
Payer: MEDICARE

## 2022-04-13 VITALS
HEIGHT: 67 IN | DIASTOLIC BLOOD PRESSURE: 70 MMHG | WEIGHT: 260.81 LBS | BODY MASS INDEX: 40.93 KG/M2 | SYSTOLIC BLOOD PRESSURE: 108 MMHG

## 2022-04-13 DIAGNOSIS — R10.2 PELVIC PAIN: ICD-10-CM

## 2022-04-13 DIAGNOSIS — N23 RENAL PAIN: Primary | ICD-10-CM

## 2022-04-13 PROCEDURE — 99213 PR OFFICE/OUTPT VISIT, EST, LEVL III, 20-29 MIN: ICD-10-PCS | Mod: 24,S$GLB,, | Performed by: SPECIALIST

## 2022-04-13 PROCEDURE — 3074F SYST BP LT 130 MM HG: CPT | Mod: CPTII,S$GLB,, | Performed by: SPECIALIST

## 2022-04-13 PROCEDURE — 3044F PR MOST RECENT HEMOGLOBIN A1C LEVEL <7.0%: ICD-10-PCS | Mod: CPTII,S$GLB,, | Performed by: SPECIALIST

## 2022-04-13 PROCEDURE — 3008F BODY MASS INDEX DOCD: CPT | Mod: CPTII,S$GLB,, | Performed by: SPECIALIST

## 2022-04-13 PROCEDURE — 99999 PR PBB SHADOW E&M-EST. PATIENT-LVL III: CPT | Mod: PBBFAC,,, | Performed by: SPECIALIST

## 2022-04-13 PROCEDURE — 3008F PR BODY MASS INDEX (BMI) DOCUMENTED: ICD-10-PCS | Mod: CPTII,S$GLB,, | Performed by: SPECIALIST

## 2022-04-13 PROCEDURE — 3074F PR MOST RECENT SYSTOLIC BLOOD PRESSURE < 130 MM HG: ICD-10-PCS | Mod: CPTII,S$GLB,, | Performed by: SPECIALIST

## 2022-04-13 PROCEDURE — 3078F PR MOST RECENT DIASTOLIC BLOOD PRESSURE < 80 MM HG: ICD-10-PCS | Mod: CPTII,S$GLB,, | Performed by: SPECIALIST

## 2022-04-13 PROCEDURE — 3078F DIAST BP <80 MM HG: CPT | Mod: CPTII,S$GLB,, | Performed by: SPECIALIST

## 2022-04-13 PROCEDURE — 99213 OFFICE O/P EST LOW 20 MIN: CPT | Mod: 24,S$GLB,, | Performed by: SPECIALIST

## 2022-04-13 PROCEDURE — 99999 PR PBB SHADOW E&M-EST. PATIENT-LVL III: ICD-10-PCS | Mod: PBBFAC,,, | Performed by: SPECIALIST

## 2022-04-13 PROCEDURE — 3044F HG A1C LEVEL LT 7.0%: CPT | Mod: CPTII,S$GLB,, | Performed by: SPECIALIST

## 2022-04-13 RX ORDER — TRAMADOL HYDROCHLORIDE 50 MG/1
50 TABLET ORAL EVERY 6 HOURS PRN
Qty: 20 TABLET | Refills: 0 | Status: SHIPPED | OUTPATIENT
Start: 2022-04-13 | End: 2022-10-20

## 2022-04-13 RX ORDER — NITROFURANTOIN 25; 75 MG/1; MG/1
100 CAPSULE ORAL 2 TIMES DAILY
Qty: 14 CAPSULE | Refills: 0 | Status: SHIPPED | OUTPATIENT
Start: 2022-04-13 | End: 2023-01-27

## 2022-04-13 NOTE — PROGRESS NOTES
46 yo WF s/p CKC for dysplasia pos HPV  Pathology reveals ...   UTERUS, ENDOCERVIX, CURETTAGE:   --RARE MINUTE FRAGMENT OF SQUAMOUS EPITHELIUM WITH LOW-GRADE SQUAMOUS    INTRAEPITHELIAL LESION          (LGSIL).   --SCANT FRAGMENTS OF BENIGN ENDOCERVICAL GLANDULAR EPITHELIUM, ADMIXED    WITH MUCUS AND BLOOD.              2.  UTERUS, CERVIX, COLD KNIFE CONIZATION BIOPSY:   --LOW-GRADE SQUAMOUS INTRAEPITHELIAL LESION (CERVICAL INTRAEPITHELIAL    NEOPLASIA GRADE 1 [MARIA FERNANDA 1]).        --MARGINS ARE NEGATIVE FOR SQUAMOUS INTRAEPITHELIAL LESION.     Pt recently eval ER for RLQ pain Pelvic u/s unremarkable. Of note is u/s revealing 2+ blood Pt denies overt flank pain, n/v, c/d, f/c, dysuria. Still complains rightsided PP.  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     PATRICIA (obstructive sleep apnea)     severe - doesn't use CPAP    Panic attack     PTSD (post-traumatic stress disorder)     Renal disorder     kidney stones, blood in urine on occasion       Past Surgical History:   Procedure Laterality Date    ANKLE SURGERY Right     x2 (#1 for ligament injury and #2 for fx and ligament)    AUGMENTATION OF BREAST      BILATERAL TUBAL LIGATION      BRAIN SURGERY  12/2018    decompression/craniotomy Arnold-Chiari syndrome    BREAST SURGERY  1997    B implants    CERVICAL BIOPSY  W/ LOOP ELECTRODE EXCISION      COLD KNIFE CONIZATION OF CERVIX N/A 3/17/2022    Procedure: CONE BIOPSY, CERVIX, USING COLD KNIFE;  Surgeon: Nikhil Franco MD;  Location:  [facility] [place];  Service: OB/GYN;  Laterality: N/A;    COLONOSCOPY      DECOMPRESSION OF CHIARI MALFORMATION BY REMOVAL OF POSTERIOR ARCH OF FIRST CERVICAL VERTEBRA N/A 12/13/2018    Procedure: DECOMPRESSION, CHIARI MALFORMATION, BY 1ST CERVICAL VERTEBRA POSTERIOR ARCH REMOVAL;  Surgeon: Sergio Busch MD;  Location: [facility] OR [address] [place] [address];  Service: Neurosurgery;  Laterality: N/A;    EPIDURAL STEROID INJECTION INTO CERVICAL SPINE N/A 12/5/2019    Procedure: Injection-steroid-epidural-cervical-C7-T1;  Surgeon: Noa Samano MD;  Location: [facility] OR;  Service: Neurosurgery;  Laterality: N/A;    FRACTURE SURGERY Right     Ankle    INJECTION OF ANESTHETIC AGENT AROUND MEDIAL BRANCH NERVES INNERVATING CERVICAL FACET JOINT Bilateral 8/6/2020    Procedure: Block-nerve-medial branch-cervical Bilateral  C3,4,5;  Surgeon: Noa Samano MD;  Location: [facility] OR;  Service: Anesthesiology;  Laterality: Bilateral;    INJECTION OF ANESTHETIC AGENT AROUND MEDIAL BRANCH NERVES INNERVATING CERVICAL FACET JOINT Bilateral 8/20/2020    Procedure: Block-nerve-medial branch-cervical Bilateral C3-4-5;  Surgeon: Noa Samano MD;  Location: [facility] OR;  Service: Anesthesiology;  Laterality: Bilateral;    LAPAROSCOPIC GASTRIC BANDING      with subsequent removal due to abscess    LIPOSUCTION      x 2    PELVIC LAPAROSCOPY      for endometriosis eval with BTL    RADIOFREQUENCY ABLATION Bilateral 8/16/2021    Procedure: Radiofrequency Ablation Cervical C3-C5;  Surgeon: Robert Munoz MD;  Location: [facility] OR;  Service: Pain Management;  Laterality: Bilateral;    RADIOFREQUENCY THERMAL COAGULATION OF MEDIAL BRANCH OF POSTERIOR RAMUS OF CERVICAL SPINAL NERVE Bilateral 8/27/2020    Procedure: RADIOFREQUENCY THERMAL COAGULATION, NERVE, SPINAL, CERVICAL, POSTERIOR RAMUS, MEDIAL BRANCH C3-4-5, bilateral;  Surgeon: Noa Samano MD;  Location: [facility] OR;  Service: Anesthesiology;  Laterality: Bilateral;    TUBAL LIGATION      wtih pelvic lap        Family  "History   Problem Relation Age of Onset    Diabetes Sister     Seizures Sister     Mitochondrial disorder Sister         "trent"    Mental illness Sister     Seizures Sister     Cervical cancer Sister     Cervical cancer Sister     Ovarian cancer Cousin     Ovarian cancer Paternal Aunt     Colon cancer Maternal Grandmother     Cancer Maternal Grandfather 65        throat ca    Diabetes Mother     Stroke Mother 30    Mental illness Mother         depression    Hyperlipidemia Mother     Hypertension Mother     Hyperlipidemia Father     Heart disease Father         "athletes heart"    Ovarian cancer Paternal Aunt     Cervical cancer Paternal Cousin     Cervical cancer Other     Breast cancer Neg Hx        Social History     Socioeconomic History    Marital status:    Occupational History    Occupation: [employer]     Tobacco Use    Smoking status: Never Smoker    Smokeless tobacco: Never Used   Substance and Sexual Activity    Alcohol use: Yes     Comment: occasional "maybe monthly"    Drug use: No    Sexual activity: Yes     Partners: Male     Birth control/protection: See Surgical Hx, Injection     Comment: depo since age 18   Social History Narrative    Not on disability, not currently working.     Social Determinants of Health     Financial Resource Strain: Low Risk     Difficulty of Paying Living Expenses: Not very hard   Food Insecurity: Food Insecurity Present    Worried About Running Out of Food in the Last Year: Sometimes true    Ran Out of Food in the Last Year: Sometimes true   Transportation Needs: Unmet Transportation Needs    Lack of Transportation (Medical): Yes    Lack of Transportation (Non-Medical): Yes   Physical Activity: Inactive    Days of Exercise per Week: 0 days    Minutes of Exercise per Session: 0 min   Stress: Stress Concern Present    Feeling of Stress : Rather much   Social Connections: Unknown    Frequency of Communication with Friends " and Family: More than three times a week    Frequency of Social Gatherings with Friends and Family: Patient refused    Active Member of Clubs or Organizations: No    Attends Club or Organization Meetings: Never    Marital Status:    Housing Stability: Low Risk     Unable to Pay for Housing in the Last Year: No    Number of Places Lived in the Last Year: 1    Unstable Housing in the Last Year: No       Current Outpatient Medications   Medication Sig Dispense Refill    albuterol (PROVENTIL) 2.5 mg /3 mL (0.083 %) nebulizer solution USE 1 VIAL VIA NEBULIZER EVERY 6 HOURS AS NEEDED FOR WHEEZING. RESCUE. 180 mL 2    albuterol (PROVENTIL/VENTOLIN HFA) 90 mcg/actuation inhaler INHALE 2 PUFFS BY MOUTH EVERY 6 HOURS AS NEEDED FOR WHEEZING 8.5 g 0    ALPRAZolam (XANAX) 1 MG tablet TAKE 1 TABLET BY MOUTH EVERY DAY AS NEEDED FOR ANXIETY 30 tablet 2    fluticasone furoate-vilanteroL (BREO) 200-25 mcg/dose DsDv diskus inhaler Breo Ellipta 200 mcg-25 mcg/dose powder for inhalation      fluticasone propionate (FLONASE) 50 mcg/actuation nasal spray SHAKE LIQUID AND USE 1 SPRAY(50 MCG) IN EACH NOSTRIL EVERY DAY 16 g 3    gabapentin (NEURONTIN) 600 MG tablet 1 tablet po 2-3 times a day for chronic pain 90 tablet 11    galcanezumab-gnlm (EMGALITY PEN) 120 mg/mL PnIj Inject 120 mg into the skin every 28 days. 1 mL 11    levOCARNitine (CARNITOR) 330 mg Tab TAKE 3 TABLETS(990 MG) BY MOUTH TWICE DAILY 180 tablet 2    lisdexamfetamine (VYVANSE) 40 MG Cap Take 1 capsule (40 mg total) by mouth every morning. 30 capsule 0    montelukast (SINGULAIR) 10 mg tablet Take 1 tablet (10 mg total) by mouth once daily. 90 tablet 1    oxyCODONE-acetaminophen (PERCOCET) 5-325 mg per tablet Take 1 tablet by mouth every 4 (four) hours as needed for Pain. 20 tablet 0    prazosin (MINIPRESS) 2 MG Cap Take 2 mg by mouth every evening.      sumatriptan (IMITREX) 100 MG tablet TAKE 1 TABLET BY MOUTH AS NEEDED FOR MIGRAINE(CAN REPEAT  AFTER 2 HOURS, MAX 2/DAY) 12 tablet 3    SYNTHROID 200 mcg tablet TAKE 1 TABLET(200 MCG) BY MOUTH BEFORE BREAKFAST 30 tablet 5    tiZANidine (ZANAFLEX) 4 MG tablet TAKE 1 TABLET(4 MG) BY MOUTH THREE TIMES DAILY AS NEEDED FOR MUSCLE SPASM 90 tablet 0    traZODone (DESYREL) 100 MG tablet TAKE 2 TABLETS(200 MG) BY MOUTH EVERY EVENING 60 tablet 2    acetylcysteine (N-ACETYL-L-CYSTEINE MISC)       buPROPion (WELLBUTRIN XL) 150 MG TB24 tablet Take 1 tablet (150 mg total) by mouth once daily. (Patient taking differently: Take 150 mg by mouth every morning.) 90 tablet 0    diphenoxylate-atropine 2.5-0.025 mg (LOMOTIL) 2.5-0.025 mg per tablet Take 1 tablet by mouth 4 (four) times daily as needed. 80 tablet 0    dronabinol (MARINOL) 2.5 MG capsule Take 1 capsule (2.5 mg total) by mouth 2 (two) times daily before meals. (Patient taking differently: Take 2.5 mg by mouth 2 (two) times daily as needed. Before meals) 60 capsule 3    FLUoxetine 40 MG capsule TAKE 1 CAPSULE(40 MG) BY MOUTH EVERY DAY (Patient taking differently: Take 40 mg by mouth every morning.) 90 capsule 0    galcanezumab-gnlm (EMGALITY PEN) 120 mg/mL PnIj Inject 120 mg into the skin every 28 days. 1 Syringe 11    meloxicam (MOBIC) 15 MG tablet Take 15 mg by mouth once daily.      NON FORMULARY MEDICATION Inject 50 mcg as directed once a week. B 12      propranoloL (INDERAL) 20 MG tablet Take by mouth.      zonisamide (ZONEGRAN) 50 MG Cap 1 capsule po qhs x 1 week, then 2 capsules qhs x 1 week, then 3 capsules qhs 90 capsule 6     Current Facility-Administered Medications   Medication Dose Route Frequency Provider Last Rate Last Admin    medroxyPROGESTERone (DEPO-PROVERA) syringe 150 mg  150 mg Intramuscular Q90 Days Amaya Aiken MD   150 mg at 02/14/22 0858     Facility-Administered Medications Ordered in Other Visits   Medication Dose Route Frequency Provider Last Rate Last Admin    albuterol nebulizer solution 2.5 mg  2.5 mg Nebulization Q15  Min PRN Hilton Garner MD        albuterol-ipratropium 2.5 mg-0.5 mg/3 mL nebulizer solution 3 mL  3 mL Nebulization PRN Hilton Garner MD        diphenhydrAMINE injection 25 mg  25 mg Intravenous Q6H PRANGELA Garner MD        HYDROmorphone injection 0.5 mg  0.5 mg Intravenous Q5 Min PRN Hilton Garner MD   0.5 mg at 03/17/22 1325    lorazepam injection 0.25 mg  0.25 mg Intravenous Q15 Min PRN Hilton Garner MD        ondansetron injection 4 mg  4 mg Intravenous Q15 Min PRN Hilton Garner MD        sodium chloride 0.9% flush 3 mL  3 mL Intravenous Q8H Hilton Garner MD        sodium chloride 0.9% flush 3 mL  3 mL Intravenous PRN Hilton Garner MD           Review of patient's allergies indicates:   Allergen Reactions    Lamotrigine      Other reaction(s): Rash  Other reaction(s): Nausea    Sulfa (sulfonamide antibiotics) Nausea Only     Other reaction(s): Unknown    Adhesive Rash       Review of System:   General: no chills, fever, night sweats, weight gain or weight loss  Psychological: no depression or suicidal ideation  Breasts: no new or changing breast lumps, nipple discharge or masses.  Respiratory: no cough, shortness of breath, or wheezing  Cardiovascular: no chest pain or dyspnea on exertion  Gastrointestinal: no abdominal pain, change in bowel habits, or black or bloody stools  Genito-Urinary: as above  Musculoskeletal: no gait disturbance or muscular weakness                                              General Appearance    A and O x 4, Cooperative, no distress   Breasts    Abdomen   Deferred  Soft, non-tender, bowel sounds active all four quadrants,  no masses, no organomegaly    Genitourinary:   External rectal exam shows no thrombosed external hemorrhoids.   Pelvic exam was performed with patient supine.  No labial fusion.  There is no rash, lesion or injury on the right labia.  There is no rash, lesion or injury on the left labia.  No bleeding and no signs of injury  around the vaginal introitus, urethra is without lesions and well supported. The cervix is visualized with no discharge, lesions or friability.Sutures in place and granulation noted  No vaginal discharge found.  No significant Cystocele, Enterocele or rectocele, and uterus well supported.  Bimanual exam:  The urethra is normal to palpation and there are no palpable vaginal wall masses.  Uterus is not deviated, not enlarged, not fixed, normal shape and not tender.  Cervix exhibits no motion tenderness.   Right adnexum displays no mass and no tenderness.  Left adnexum displays no mass and no tenderness.   Extremities: Extremities normal, atraumatic, no cyanosis or edema                     NOTE  NURSING PERSONAL PRESENT FOR ENTIRE PHYSICAL EXAM      I discussed unrelated current pain and feel possible stone present  I will obtain renal u/s and empirically place on ABX  Will refer to Urology for further eval.  I discussed and rec repeat PAP q 4 months x 2 years  Answered all questions and will follow

## 2022-04-25 ENCOUNTER — TELEPHONE (OUTPATIENT)
Dept: HEMATOLOGY/ONCOLOGY | Facility: CLINIC | Age: 47
End: 2022-04-25
Payer: MEDICARE

## 2022-04-25 NOTE — TELEPHONE ENCOUNTER
----- Message from Cathleen Lynne, Patient Care Assistant sent at 4/25/2022  8:25 AM CDT -----  Contact: shante  Type: Needs Medical Advice  Who Called:  Shante  Best Call Back Number: [phone]    Additional Information: Shante states she needs to reschedule today's appointment , please call to set back up thank you .

## 2022-05-11 ENCOUNTER — PATIENT MESSAGE (OUTPATIENT)
Dept: PSYCHIATRY | Facility: CLINIC | Age: 47
End: 2022-05-11
Payer: MEDICARE

## 2022-05-11 ENCOUNTER — PATIENT MESSAGE (OUTPATIENT)
Dept: NEUROLOGY | Facility: CLINIC | Age: 47
End: 2022-05-11
Payer: MEDICARE

## 2022-05-18 ENCOUNTER — PATIENT OUTREACH (OUTPATIENT)
Dept: ADMINISTRATIVE | Facility: OTHER | Age: 47
End: 2022-05-18
Payer: MEDICARE

## 2022-05-18 NOTE — PROGRESS NOTES
Health Maintenance Due   Topic Date Due    Mammogram  12/04/2019    Colorectal Cancer Screening  02/13/2022     Updates were requested from care everywhere.  Chart was reviewed for overdue Proactive [facility] Encounters (JOSE ALEJANDRO) topics (CRS, Breast Cancer Screening, Eye exam)  Health Maintenance has been updated.  LINKS immunization registry triggered.  Immunizations were reconciled.

## 2022-05-19 ENCOUNTER — OFFICE VISIT (OUTPATIENT)
Dept: NEUROLOGY | Facility: CLINIC | Age: 47
End: 2022-05-19
Payer: MEDICARE

## 2022-05-19 VITALS
HEIGHT: 67 IN | TEMPERATURE: 98 F | SYSTOLIC BLOOD PRESSURE: 156 MMHG | RESPIRATION RATE: 17 BRPM | DIASTOLIC BLOOD PRESSURE: 84 MMHG | HEART RATE: 76 BPM | BODY MASS INDEX: 40.24 KG/M2

## 2022-05-19 DIAGNOSIS — H54.7 VISION LOSS: ICD-10-CM

## 2022-05-19 DIAGNOSIS — I10 HYPERTENSION, UNSPECIFIED TYPE: ICD-10-CM

## 2022-05-19 DIAGNOSIS — G44.83 COUGH HEADACHE: ICD-10-CM

## 2022-05-19 DIAGNOSIS — G43.719 INTRACTABLE CHRONIC MIGRAINE WITHOUT AURA AND WITHOUT STATUS MIGRAINOSUS: Primary | ICD-10-CM

## 2022-05-19 DIAGNOSIS — M47.812 SPONDYLOSIS OF CERVICAL REGION WITHOUT MYELOPATHY OR RADICULOPATHY: ICD-10-CM

## 2022-05-19 DIAGNOSIS — G43.719 CHRONIC MIGRAINE WITHOUT AURA, INTRACTABLE, WITHOUT STATUS MIGRAINOSUS: ICD-10-CM

## 2022-05-19 DIAGNOSIS — H53.123 TRANSIENT VISUAL LOSS OF BOTH EYES: ICD-10-CM

## 2022-05-19 PROCEDURE — 1159F PR MEDICATION LIST DOCUMENTED IN MEDICAL RECORD: ICD-10-PCS | Mod: CPTII,S$GLB,, | Performed by: PSYCHIATRY & NEUROLOGY

## 2022-05-19 PROCEDURE — 96372 PR INJECTION,THERAP/PROPH/DIAG2ST, IM OR SUBCUT: ICD-10-PCS | Mod: S$GLB,,, | Performed by: PSYCHIATRY & NEUROLOGY

## 2022-05-19 PROCEDURE — 3077F PR MOST RECENT SYSTOLIC BLOOD PRESSURE >= 140 MM HG: ICD-10-PCS | Mod: CPTII,S$GLB,, | Performed by: PSYCHIATRY & NEUROLOGY

## 2022-05-19 PROCEDURE — 99215 PR OFFICE/OUTPT VISIT, EST, LEVL V, 40-54 MIN: ICD-10-PCS | Mod: 25,S$GLB,, | Performed by: PSYCHIATRY & NEUROLOGY

## 2022-05-19 PROCEDURE — 1160F RVW MEDS BY RX/DR IN RCRD: CPT | Mod: CPTII,S$GLB,, | Performed by: PSYCHIATRY & NEUROLOGY

## 2022-05-19 PROCEDURE — 3008F PR BODY MASS INDEX (BMI) DOCUMENTED: ICD-10-PCS | Mod: CPTII,S$GLB,, | Performed by: PSYCHIATRY & NEUROLOGY

## 2022-05-19 PROCEDURE — 99999 PR PBB SHADOW E&M-EST. PATIENT-LVL IV: ICD-10-PCS | Mod: PBBFAC,,, | Performed by: PSYCHIATRY & NEUROLOGY

## 2022-05-19 PROCEDURE — 3079F PR MOST RECENT DIASTOLIC BLOOD PRESSURE 80-89 MM HG: ICD-10-PCS | Mod: CPTII,S$GLB,, | Performed by: PSYCHIATRY & NEUROLOGY

## 2022-05-19 PROCEDURE — 3044F HG A1C LEVEL LT 7.0%: CPT | Mod: CPTII,S$GLB,, | Performed by: PSYCHIATRY & NEUROLOGY

## 2022-05-19 PROCEDURE — 96372 THER/PROPH/DIAG INJ SC/IM: CPT | Mod: S$GLB,,, | Performed by: PSYCHIATRY & NEUROLOGY

## 2022-05-19 PROCEDURE — 3008F BODY MASS INDEX DOCD: CPT | Mod: CPTII,S$GLB,, | Performed by: PSYCHIATRY & NEUROLOGY

## 2022-05-19 PROCEDURE — 99215 OFFICE O/P EST HI 40 MIN: CPT | Mod: 25,S$GLB,, | Performed by: PSYCHIATRY & NEUROLOGY

## 2022-05-19 PROCEDURE — 99999 PR PBB SHADOW E&M-EST. PATIENT-LVL IV: CPT | Mod: PBBFAC,,, | Performed by: PSYCHIATRY & NEUROLOGY

## 2022-05-19 PROCEDURE — 3077F SYST BP >= 140 MM HG: CPT | Mod: CPTII,S$GLB,, | Performed by: PSYCHIATRY & NEUROLOGY

## 2022-05-19 PROCEDURE — 1160F PR REVIEW ALL MEDS BY PRESCRIBER/CLIN PHARMACIST DOCUMENTED: ICD-10-PCS | Mod: CPTII,S$GLB,, | Performed by: PSYCHIATRY & NEUROLOGY

## 2022-05-19 PROCEDURE — 3079F DIAST BP 80-89 MM HG: CPT | Mod: CPTII,S$GLB,, | Performed by: PSYCHIATRY & NEUROLOGY

## 2022-05-19 PROCEDURE — 3044F PR MOST RECENT HEMOGLOBIN A1C LEVEL <7.0%: ICD-10-PCS | Mod: CPTII,S$GLB,, | Performed by: PSYCHIATRY & NEUROLOGY

## 2022-05-19 PROCEDURE — 1159F MED LIST DOCD IN RCRD: CPT | Mod: CPTII,S$GLB,, | Performed by: PSYCHIATRY & NEUROLOGY

## 2022-05-19 RX ORDER — KETOROLAC TROMETHAMINE 30 MG/ML
30 INJECTION, SOLUTION INTRAMUSCULAR; INTRAVENOUS
Status: COMPLETED | OUTPATIENT
Start: 2022-05-19 | End: 2022-05-19

## 2022-05-19 RX ORDER — GABAPENTIN 600 MG/1
TABLET ORAL
Qty: 90 TABLET | Refills: 11 | Status: SHIPPED | OUTPATIENT
Start: 2022-05-19 | End: 2022-08-12 | Stop reason: SDUPTHER

## 2022-05-19 RX ORDER — GALCANEZUMAB 120 MG/ML
120 INJECTION, SOLUTION SUBCUTANEOUS
Qty: 1 ML | Refills: 11 | Status: SHIPPED | OUTPATIENT
Start: 2022-05-19 | End: 2022-09-08

## 2022-05-19 RX ORDER — CANDESARTAN 8 MG/1
TABLET ORAL
Qty: 30 TABLET | Refills: 6 | Status: SHIPPED | OUTPATIENT
Start: 2022-05-19 | End: 2022-11-11 | Stop reason: SDUPTHER

## 2022-05-19 RX ORDER — TIZANIDINE 4 MG/1
TABLET ORAL
Qty: 90 TABLET | Refills: 6 | Status: SHIPPED | OUTPATIENT
Start: 2022-05-19 | End: 2022-06-13

## 2022-05-19 RX ADMIN — KETOROLAC TROMETHAMINE 30 MG: 30 INJECTION, SOLUTION INTRAMUSCULAR; INTRAVENOUS at 10:05

## 2022-05-19 NOTE — PROGRESS NOTES
[facility]    Chief complaint: chronic migraine    S:    46 Y RH F with PMHx of depression,  PTSD, borderline personality disorder, fibromyalgia,IBS, PATRICIA on CPAP, s/p posterior fossa decompression for Chiari malformation 9mm in 2018), s/p gastric bypass,  carnitine deficiency, cervical spondylosis, migraine and hypothyroidism who presents for further follow up of headache.  She mentions that she has been having headache which are mainly on left occipital area which feels like a pressure outward/numbness and significant pain, severe pain, shooting electrical superimposed pain which is from mid cervical spine and upward the occipital area which happens when she moves her head will escalate. She mentions that she is waking up with the left occipital pressure about 3-4 times a week at least. She reports that she has to lay in bed and crawl up in fetal position which is lasting 10-12 hours straight. She mentions that it can fully go ahead. She mentions that she will also get on top of that she will get migraine typically throbbing in the left temple and spread from there which are happening 1-2 times a week or so. She mentions that at times when she is dizzy when she bent down to  shampoo and will bend down and she will feel very dizzy which feels like a high pressure where she can't see and has to hold on to something to hold on. This started happening about 6 months or so.       She is having any level headache at 15-20 days a month and migraine level headache 14-16 days a month. She has been more disabled the last few months given the significant worsening. She feels that propranolol was helping some, but not enough.  She reports that zonisamide has been recalled thus she would like to restart propranolol as she is now off zonisamide now for 3 months. She mentions that zonisamide made her very nauseate and this has improved since zonisamide was taken off. The vast majority  of the severe in bed pain is the left occipital area pain she is having which she wakes up with and has to be in bed all day and even has to cancel any appointments she has. The migraine she feels she can manage better. Today she is getting a migraine the pain is increasing and getting nauseated. She does not have medication with her today. BP is 156/84 and HR 76 on today's visit.     Headache history form initial history :  Age at onset and course over time: 2011 and worse over time, this happened during a time when she had a long commute. Neck was her worst pain - just left to midline of middle neck. Was going to [facility] pain management receiving epidural steroids which would last only 2 weeks. She had a CMBB and was planning for RFA but wanted to get cervical MRI first and second opinion. She was seen by Dr. Samano and her hsitory with mutliple treatments is above on my chart     On chart review conducted today patient was followed by Dr. Samano ([facility]) for migraine and also for chronic neck pain (cervical spondylosis) sicne 3/18 at which time Dr. Samano diagnosed Chiari malformation (extending 9 mm inferior to the foramen magnum with so-called pegging of the cerebellar tonsil) , headache, cervical spondylosis w/o myelopathy and radiculopathy, and cervical myofascial pain. Placed on tizandine qhs, prn methocarbamol and tramadol q6h PRN. An Mri brain with CINE was performed that did not show CSF flow obstruction. . She had severe pain by 3/18 an ONB with steroids/anesthetic and TPIs was done by Dr. Samano multiple times. She continued seeing Dr. Fuentes for cognitive complaints and ataxia/eyelid ptosis. She had workup with EMG which did not show any signs of myopathy, myasthenia with rep stim, or peripheral neuropathy. Bloodwork looking for vitamin deficiencies done which were normal as well.  She was evaluated by  and was given in 2018 levocarnitine,  NAC injections  and marinol for nausea (helps significantly). Per notes levocarnitine helps with her energy level. She had posterior fossa decompression on 12/18 and had severe headache and neck pain after that. Per notes valsalva induced headaches resolved. She was started on Emgality on 9/19 and continued on gabapentin 600mg TID and prn tramadol. She was seen by Dr. Samano on 7/20 at which time patient had significant neck pain and was seen by Dr. Samano for diagnostic b/l cervical MBB C3-4-5 which she had 80% relief x 6 hours x 2 , then n 8/27/2020 s/p bilateral cervical medial branch nerve RFA at C3-4-5.  She mentions that this provided significant improvement to her neck pain. She mentions that she has been having significant improvement in her symptoms of neck pain.   She mentions that the headache that it's left temporal side with photophobia/phonophobia the Emgality helps a lot and reduces them. i    Valsalva triggered headaches: lifting, laughing and coughing will trigger severe headache for seconds to a minute and will keep going if she does not stay still. This has been happening for about 10 years. She mentions that this did not get better after  decompression surgery. She mentions that this never improved with surgery and continues. Frequency of these depending on activity. Since being off gabapentin felt these worsened significantly.     Migraine: typically starts left temple and will spread out from there. These are happening on average 3-4 days a week. They will last a few hours and will treat them with sleep and will try to go to sleep or will lay there still in dark, quiet, cold and rest.  She mentions that it will improve. She mentions that has some headache free times, but at times can be 2/10 in intensity. She mentions that she can having also squeezing/twisting brain. She mentions that with those she had a panic and thought she had to go to hospital. She mentions that she had to stop everything and had to rest.  "    Location: neck (sharp, stabbing); hands and feet (tingling, numb), hip/lower back (dull, aching). Also has intermittent left sided headaches starting occipitally radiating to temple.   Quality: as above  Severity: range 2-10/10   Duration: hours  Frequency: daily  Alleviated by: medication, ice  Exacerbated by: movement, light noise (head pain)  Associated with: left arm/hand gets numb/weak "a lot" no radicular pain; headaches on left which are random or triggered by straining in restroom/coughing/laughing. Headaches worst in the AM within 2 hours of waking up. Headaches do not wake her up but neck pain does. Having some depth perception issues causing her to break her foot - eyes checked and were ok. Transient visual loss? When in a lof of pain, not clear TVO with valsalva.   Sleep habits:    Acute treatment:  Current acute treatment:  -- Sumatriptan 100mg - helps some     Tried:  -- TENS   -- tramadol - helped with overall pain   -- methocarbamol - helped with overall pain   -- relafen - did not help     Preventive:  Current prevention:  -- Emgality 120 mg every 28 days, notices wearing off effect 3 days prior - helps the intensity and has about 10 days of freedom with it.   --Tizanidine 4 mg 1-2 times a day typically - feels this helps more than anything for acute occipital pain  -- gabapentin 600mg twice a day - helps the intensity of pain   (wellbutrin)  (prozac)  (prazosin)      Previously tried:  -- for behavioral health is on welbutrin xl 150mg and Fluoxetine 40mg   -- takes vyvanse   --amitriptyline 100 mg nightly mainly for insomnia, no help headache   --trazodone 200mg for insomnia- no worsening of headache   --zonisamide 150mg nightly - caused nausea/vomiting   -- propranolol 20mg tid - some benefit   -- prozac 40mg - for mod   -- wellbutrin XL 150mg- no benefit in headache     Procedural history:   -- cervical GREGOR  -- lumbar GREGOR  -- chiropractor for hip /neck - helped only hip  -- PT - none for neck " just foot     Neuroimaging and other studies:   MRA head 5/6/21:  FINDINGS:  Anterior circulation: There is normal flow related signal intensity within the petrous and cavernous segments of the internal carotid arteries.  The supraclinoid internal carotid arteries are normal as is the carotid terminus bilaterally.  There is normal branching into the anterior and middle cerebral arteries.  These vessels are patent.  There is no critical stenosis, occlusion, thrombosis, dissection, aneurysm or other vascular malformation.     Posterior circulation: The vertebral arteries are patent.  The basilar artery is normal in caliber and contour.  The basilar tip is normal.  There is normal branching into the superior cerebellar and posterior cerebral arteries.  There is no critical stenosis, occlusion, thrombosis, dissection, aneurysm.     There has been previous decompression for Chiari malformation.     Impression:     1. Normal brain MRA.  2. Previous decompression for Chiari malformation  Results for orders placed or performed in visit on 08/11/20   MRI Brain Without Contrast    Narrative    EXAMINATION:  MRI BRAIN WITHOUT CONTRAST    CLINICAL HISTORY:  Repeated falls.  History of posterior fossa decompression.; Repeated falls    TECHNIQUE:  Multiplanar MR imaging of the brain was performed without contrast.    COMPARISON:  Head CT 06/09/2019    FINDINGS:  No acute infarct, mass effect or hemorrhage.  Brain parenchyma has a normal signal.  Ventricles and sulci are proportionate.    No abnormal extra-axial fluid collections.    Flow voids are maintained in the intracranial arteries and dural venous sinuses.    Suboccipital craniotomy changes are stable.  Configuration of the cerebellar vermis appears stable compared to the prior CT given differences in modality.      Impression    1. No acute findings in the brain.  2. Stable postoperative changes of posterior decompression      Electronically signed by: Brandin Avila  MD  Date:    08/11/2020  Time:    16:43   Results for orders placed or performed during the hospital encounter of 06/09/19   CT Head Without Contrast    Narrative    EXAMINATION:  CT HEAD WITHOUT CONTRAST    CLINICAL HISTORY:  Head trauma, headache;    TECHNIQUE:  Low dose axial images were obtained through the head.  Coronal and sagittal reformations were also performed. Contrast was not administered.    COMPARISON:  12/14/2018    RADIATION DOSE:  1099 DLP    FINDINGS:  Postoperative changes of a suboccipital craniectomy for posterior fossa decompression.    Hyperostosis frontalis interna noted.    Brain parenchyma otherwise normally formed without midline shift or mass effect.  No hydrocephalus.  No acute intracranial hemorrhage.    Ventricles are normal in size and configuration.  Physiologic calcifications are present.      Impression    1. No acute intracranial abnormality identified.  Postoperative changes as discussed.  2. [place] agreement.      Electronically signed by: Anton Kenney MD  Date:    06/09/2019  Time:    11:41   Results for orders placed or performed during the hospital encounter of 02/05/18   MRI Brain W WO Contrast    Narrative    Routine Multiplanar imaging through this 42-year-old females brain was obtained with utilization of 10 cc of gadolinium contrast    Hyperostosis frontalis appears to be present.     A Chiari one malformation appears to be present with the cerebellar tonsils extending 9 mm inferior to the foramen magnum with so-called pegging of the cerebellar tonsils.    An empty sella configuration of the pituitary gland is noted.    No diffusion positivity is identified to suggest recent infarction.    No gradient  susceptibility was noted just just presence of acute blood products.    No worrisome enhancing lesions are noted intracranially    No diffusion positivity is identified to suggest recent infarction.    Impression    1. The cerebellar tonsils appear to extend  inferior to the foramen magnum suggestive of a Chiari one malformation. No obvious cord signal abnormalities noted within the visualized cord, given the patient's history further evaluation of the cord may be of use to exclude a syrinx        Electronically signed by: Ismael Richmond MD  Date:     02/05/18  Time:    12:04      ROS: The fourteen point review of system was performed and positive for HPI reports    PHYSICAL EXAMINAtION  Vitals:    05/19/22 0930   BP: (!) 156/84   Pulse: 76   Resp: 17   Temp: 97.6 °F (36.4 °C)   General: The patient is a well-developed, well-nourished looking of stated age in acute distress.  NEURO EXAM  General: This is a well-nourished and well-developed person, who is afebrile, fully ambulatory and alert.  No acute distress  Cardiovascular: Vitals as noted.  Good symmetrical pulsations of the temporal arteries with no tenderness.  TTP over Left occipitalis and cervical musculature  NEUROLOGICAL EXAMINATION  Mental Status: Oriented to person, place, and date. Speech is of normal clarity, inflection, pace and volume. No aphasia, no dysarthria   Cranial Nerves:   II, III, IV, VI: VA: VF's Intact to confrontation: TAY.  EOMI.  No nystagmus. Fundoscopy reveals flat discs with good venous pulsations, no hemorrhages or exudates.    V and VII: Facial sensation and motor function are normal.    VIII: hearing intact b/l finger rub  IX: The palate rises symmetrically on volition and reflex.    XI: Shoulder shrug normal bilaterally.  SCM power normal bilaterally.    XII: The tongue protrudes midline without atrophy or fasciculations.  Neuromuscular: No tremor.   Normal bulk and tone. Power is intact, 5/5 throughout. No pronator drift.    Reflexes and Coordination: Plantar response flexor bilaterally. No Vu's  Coordination: MERARI, FN, HKS normal.     Sensory:  ST  normal throughout.    Gait: Romberg test negative, no swaying. Able to perform tandem gait.        Impression:  Chronic migraine  without aura:  She has complex headache history which includes valsalva induced headaches , tonsillar herniation of 9mm on MRI brain which was decompressed in 2018, but headache/neck pain worsened after that. Over the years multiple medications tried. CGRP Mab Emgality has been effective at reducing from 30/30 days to 15-20 days/month headache, she has found gabapentin 600mg bid helps some of the neck pain and the occipital headache , did much worse off it, she also finds tizanidnie is what works really well wyhen used to help cervical myofascial pain and also some occipital pain. The worse pain currently is occipital headache which is an exploding out pressure she wakes up 3-4 times a week and it's completely disabling. This she does not necessarily count it as a migraine, but it has all migraine features and then has more temporal pain on left which 2 daysa  Week. She has persistent allodynia in left occipital area in are of the decompressive surgery. She is having worsening valsalva induced headache as well as vision obscurations with them and dizziness (has been for the last few months). Given the worsening symptoms will go ahead and get new neuroimaging to look for signs of increased intracranial pressure or SIH. She is in agreement. At this point with the list of medications she is on and what she has tried I am going to prescribe candesartan to avoid the extra fatigue from propranolol and apply for Botox. I recommend addition of BTX as we have significant positive medical evidence that combination of BTX and CGRP Mab create synergistic effect and there is added reduction in days of migraine. She is having moderate to severe attack today will do Toradol 30mg IM with gepant.     S/p Chiari malformation decompression surgery - avoid ONBs     Valsalva induced headaches- unclear if this is primary vs. Secondary (her Chiari malformation operated on, this should have resolved if only Chiari, no concern about SIH  from prior specialists) , MRA head was normal, no vascular malformation, possibly related to prior Chiari given the symptoms of visual and also short duration of the event. Worsened recently will get repeat MRI.    Comorbidities:  Chronic neck pain - referring to pain clinic as Dr. Samano did RFA which helped significantly.She has appointment today with Dr. Munoz after my appointment time.       Plan:   1- For preventive management: A.  Start candesartan 4mg daily x 3 days, then 8mg daily           B. Continue gabapentin 600mg 2-3 times a day           C. Continue tizanidine 4mg 1-3 times a day           D.  Emgality 120mg every 28 days a month           E. The patient has chronic migraines (G43.719). Patient suffers from headaches more than 15  days a month lasting more than 4 hours a day with no relief of symptoms despite trying multiple  medications including but not limited to anti- epileptics,  antihypertensives,  and antidepressants. Botox treatment was approved for chronic migraines in October 2010. The patient will be an ideal candidate for Botox. We are planning for 3 treatments 3 months apart and aiming for atleast 50% improvement in the symptoms. If we see no improvement after 3 treatments, we will discontinue the injections.  Muscles to be injected:  total of 155 units of botulinum toxin type A were  injected in the following muscles: Procerus 5 units,  5 units bilaterally, frontalis 20 units, temporalis 20 units bilaterally, occipitalis 15 units, upper cervical paraspinals 10 units bilaterally and trapezius 15 units bilaterally. Total of 155 units in 31 sites. Unavoidable waste of 45 units.     Data on Both CGRP Mab and Botox:              Evidence for CGRP Mab and Botox     A. FRIDA Oliva. Et al. (2019) CGRP antibodies as adjunctive prophylactic therapy for prolonging the therapeutic if of Onabotulinumtoxin A  injections among chronic migraine patients.  Sixty-first annual meeting of the American  "Headache Society, [place], [place].     ALLI Nathan Et al. (2021).  "The American Headache Society: consensus statement update on integrating new migraine treatments into clinical practice."  Headache:  The Journal of head and face pain     C. REUBEN Villatoro et al (2021). " Will world evidence of for control of chronic migraine patient is receiving CGRP monoclonal antibody therapy I did to Onabotulinumtoxin A  a retrospective chart reivew".  Pain There     D. Shira BAR et al. (2021). "Aimovig  and jen botulinum toxin a combination therapy for the prevention of intractable chronic migraine without aura:  Retrospective analysis". J Pain Palliative Care Pharmacother 35(1): 1-6.      JULISA. Andres CANDELARIO Et al. (2021). " Efficacy and tolerability of calcitonin gene related peptide targeted monoclonal antibody medications as an add on therapy to Onabotulinumtoxin A in patients with chronic migraine". Pain Med.      F. DIPIKA Capellan et al. (2021).  "Additive interaction betweenOnabotulinumtoxin A and Erenumab patients with refractory migraine."  Front Neurol 12: 484888.       2- Acute management: for the migraine (left sided headache)  sumatriptan 100mg at onset of migraine, can repeat after 2 hours. Max is 2/day. No more than 2-3 days a week or no more than 10 days a month    Nurtec ODT 75mg trial , max 1/day.     3- Toradol 30mg IM     RTC in 2 month.         Petty Goetz MD   Board Certified Neurologist  [facility] Certified Headache Medicine     I spent 60  minutes on day of this encounter preparing, treating, and managing this patient with worsening headache, chronic migraine, valsalva induced headache(cough headache), chiari malformation s/p decompression, transient visual obscurations, neck pain.           "

## 2022-05-20 ENCOUNTER — TELEPHONE (OUTPATIENT)
Dept: PHARMACY | Facility: CLINIC | Age: 47
End: 2022-05-20
Payer: MEDICARE

## 2022-05-24 ENCOUNTER — PATIENT MESSAGE (OUTPATIENT)
Dept: NEUROLOGY | Facility: CLINIC | Age: 47
End: 2022-05-24
Payer: MEDICARE

## 2022-05-24 RX ORDER — RIMEGEPANT SULFATE 75 MG/75MG
75 TABLET, ORALLY DISINTEGRATING ORAL ONCE AS NEEDED
Qty: 8 TABLET | Refills: 11 | Status: SHIPPED | OUTPATIENT
Start: 2022-05-24 | End: 2023-01-31

## 2022-05-25 ENCOUNTER — TELEPHONE (OUTPATIENT)
Dept: PHARMACY | Facility: CLINIC | Age: 47
End: 2022-05-25
Payer: MEDICARE

## 2022-05-25 ENCOUNTER — PATIENT MESSAGE (OUTPATIENT)
Dept: NEUROLOGY | Facility: CLINIC | Age: 47
End: 2022-05-25
Payer: MEDICARE

## 2022-06-01 ENCOUNTER — OFFICE VISIT (OUTPATIENT)
Dept: PSYCHIATRY | Facility: CLINIC | Age: 47
End: 2022-06-01
Payer: MEDICARE

## 2022-06-01 VITALS
HEART RATE: 94 BPM | SYSTOLIC BLOOD PRESSURE: 155 MMHG | WEIGHT: 253.06 LBS | DIASTOLIC BLOOD PRESSURE: 103 MMHG | HEIGHT: 67 IN | BODY MASS INDEX: 39.72 KG/M2

## 2022-06-01 DIAGNOSIS — F43.10 PTSD (POST-TRAUMATIC STRESS DISORDER): ICD-10-CM

## 2022-06-01 DIAGNOSIS — F41.0 PANIC DISORDER WITHOUT AGORAPHOBIA: ICD-10-CM

## 2022-06-01 DIAGNOSIS — F33.2 MAJOR DEPRESSIVE DISORDER, RECURRENT, SEVERE WITHOUT PSYCHOTIC FEATURES: Primary | ICD-10-CM

## 2022-06-01 DIAGNOSIS — F60.3 BORDERLINE PERSONALITY DISORDER: ICD-10-CM

## 2022-06-01 DIAGNOSIS — E66.01 MORBID OBESITY WITH BMI OF 40.0-44.9, ADULT: ICD-10-CM

## 2022-06-01 PROCEDURE — 4010F PR ACE/ARB THEARPY RXD/TAKEN: ICD-10-PCS | Mod: CPTII,S$GLB,, | Performed by: PSYCHIATRY & NEUROLOGY

## 2022-06-01 PROCEDURE — 4010F ACE/ARB THERAPY RXD/TAKEN: CPT | Mod: CPTII,S$GLB,, | Performed by: PSYCHIATRY & NEUROLOGY

## 2022-06-01 PROCEDURE — 1159F PR MEDICATION LIST DOCUMENTED IN MEDICAL RECORD: ICD-10-PCS | Mod: CPTII,S$GLB,, | Performed by: PSYCHIATRY & NEUROLOGY

## 2022-06-01 PROCEDURE — 3008F PR BODY MASS INDEX (BMI) DOCUMENTED: ICD-10-PCS | Mod: CPTII,S$GLB,, | Performed by: PSYCHIATRY & NEUROLOGY

## 2022-06-01 PROCEDURE — 3077F PR MOST RECENT SYSTOLIC BLOOD PRESSURE >= 140 MM HG: ICD-10-PCS | Mod: CPTII,S$GLB,, | Performed by: PSYCHIATRY & NEUROLOGY

## 2022-06-01 PROCEDURE — 3077F SYST BP >= 140 MM HG: CPT | Mod: CPTII,S$GLB,, | Performed by: PSYCHIATRY & NEUROLOGY

## 2022-06-01 PROCEDURE — 99214 OFFICE O/P EST MOD 30 MIN: CPT | Mod: S$GLB,,, | Performed by: PSYCHIATRY & NEUROLOGY

## 2022-06-01 PROCEDURE — 90833 PSYTX W PT W E/M 30 MIN: CPT | Mod: S$GLB,,, | Performed by: PSYCHIATRY & NEUROLOGY

## 2022-06-01 PROCEDURE — 99214 PR OFFICE/OUTPT VISIT, EST, LEVL IV, 30-39 MIN: ICD-10-PCS | Mod: S$GLB,,, | Performed by: PSYCHIATRY & NEUROLOGY

## 2022-06-01 PROCEDURE — 1160F PR REVIEW ALL MEDS BY PRESCRIBER/CLIN PHARMACIST DOCUMENTED: ICD-10-PCS | Mod: CPTII,S$GLB,, | Performed by: PSYCHIATRY & NEUROLOGY

## 2022-06-01 PROCEDURE — 90833 PR PSYCHOTHERAPY W/PATIENT W/E&M, 30 MIN (ADD ON): ICD-10-PCS | Mod: S$GLB,,, | Performed by: PSYCHIATRY & NEUROLOGY

## 2022-06-01 PROCEDURE — 1160F RVW MEDS BY RX/DR IN RCRD: CPT | Mod: CPTII,S$GLB,, | Performed by: PSYCHIATRY & NEUROLOGY

## 2022-06-01 PROCEDURE — 3044F PR MOST RECENT HEMOGLOBIN A1C LEVEL <7.0%: ICD-10-PCS | Mod: CPTII,S$GLB,, | Performed by: PSYCHIATRY & NEUROLOGY

## 2022-06-01 PROCEDURE — 3080F DIAST BP >= 90 MM HG: CPT | Mod: CPTII,S$GLB,, | Performed by: PSYCHIATRY & NEUROLOGY

## 2022-06-01 PROCEDURE — 3008F BODY MASS INDEX DOCD: CPT | Mod: CPTII,S$GLB,, | Performed by: PSYCHIATRY & NEUROLOGY

## 2022-06-01 PROCEDURE — 3080F PR MOST RECENT DIASTOLIC BLOOD PRESSURE >= 90 MM HG: ICD-10-PCS | Mod: CPTII,S$GLB,, | Performed by: PSYCHIATRY & NEUROLOGY

## 2022-06-01 PROCEDURE — 99999 PR PBB SHADOW E&M-EST. PATIENT-LVL IV: CPT | Mod: PBBFAC,,, | Performed by: PSYCHIATRY & NEUROLOGY

## 2022-06-01 PROCEDURE — 3044F HG A1C LEVEL LT 7.0%: CPT | Mod: CPTII,S$GLB,, | Performed by: PSYCHIATRY & NEUROLOGY

## 2022-06-01 PROCEDURE — 99999 PR PBB SHADOW E&M-EST. PATIENT-LVL IV: ICD-10-PCS | Mod: PBBFAC,,, | Performed by: PSYCHIATRY & NEUROLOGY

## 2022-06-01 PROCEDURE — 1159F MED LIST DOCD IN RCRD: CPT | Mod: CPTII,S$GLB,, | Performed by: PSYCHIATRY & NEUROLOGY
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
## 2022-06-02 ENCOUNTER — TELEPHONE (OUTPATIENT)
Dept: PHARMACY | Facility: AMBULARY SURGERY CENTER | Age: 47
End: 2022-06-02
Payer: MEDICARE

## 2022-06-08 ENCOUNTER — TELEPHONE (OUTPATIENT)
Dept: UROLOGY | Facility: CLINIC | Age: 47
End: 2022-06-08
Payer: MEDICARE

## 2022-06-08 NOTE — TELEPHONE ENCOUNTER
Called the pt to verify that she still wanted to cancel her appt for today and she said yes.  She had woke up with a headache and cannot see clearly, she is driving from [place].  She was informed that Dr Tomlin will not be at the [facility] location after June 30th and she said she wanted to stay with an [facility] doctor.    Her hematuria had gone away after taking the antibiotic so she will call back when needed.

## 2022-06-08 NOTE — TELEPHONE ENCOUNTER
----- Message from Chloe Barger sent at 6/8/2022 11:21 AM CDT -----  Regarding: reschedule appointment  Contact: ac  Type:  Sooner Appointment Request    Caller is requesting a sooner appointment.  Caller declined first available appointment listed below.  Caller will not accept being placed on the waitlist and is requesting a message be sent to doctor.    Name of Caller:  patient  When is the first available appointment?  06/08/22  Symptoms:  Renal Pain  Best Call Back Number:  [phone] (home)   Additional Information:  Patient is having extreme head pain and can not drive. She needs to reschedule appt. Please call patient to advise.Thanks!

## 2022-06-08 NOTE — TELEPHONE ENCOUNTER
Unable to lm for patient, returning patient call regarding rescheduling appointment with Dr Tomlin.

## 2022-06-09 ENCOUNTER — PATIENT MESSAGE (OUTPATIENT)
Dept: FAMILY MEDICINE | Facility: CLINIC | Age: 47
End: 2022-06-09
Payer: MEDICARE

## 2022-06-09 DIAGNOSIS — Z12.11 COLON CANCER SCREENING: Primary | ICD-10-CM

## 2022-06-10 ENCOUNTER — PROCEDURE VISIT (OUTPATIENT)
Dept: NEUROLOGY | Facility: CLINIC | Age: 47
End: 2022-06-10
Payer: MEDICARE

## 2022-06-10 ENCOUNTER — PATIENT MESSAGE (OUTPATIENT)
Dept: PSYCHIATRY | Facility: CLINIC | Age: 47
End: 2022-06-10
Payer: MEDICARE

## 2022-06-10 VITALS
HEART RATE: 70 BPM | WEIGHT: 256.75 LBS | DIASTOLIC BLOOD PRESSURE: 86 MMHG | BODY MASS INDEX: 40.3 KG/M2 | RESPIRATION RATE: 17 BRPM | SYSTOLIC BLOOD PRESSURE: 126 MMHG | HEIGHT: 67 IN

## 2022-06-10 DIAGNOSIS — G43.719 INTRACTABLE CHRONIC MIGRAINE WITHOUT AURA AND WITHOUT STATUS MIGRAINOSUS: Primary | ICD-10-CM

## 2022-06-10 PROCEDURE — 64615 PR CHEMODENERVATION OF MUSCLE FOR CHRONIC MIGRAINE: ICD-10-PCS | Mod: S$GLB,,, | Performed by: PSYCHIATRY & NEUROLOGY

## 2022-06-10 PROCEDURE — 64615 CHEMODENERV MUSC MIGRAINE: CPT | Mod: S$GLB,,, | Performed by: PSYCHIATRY & NEUROLOGY

## 2022-06-10 NOTE — PROCEDURES
Procedures     BOTOX PROCEDURE    PROCEDURE PERFORMED: Botulinum toxin injection (98999)    CLINICAL INDICATION: G43.719    Cycle #1    A time out was conducted just before the start of the procedure to verify the correct patient and procedure, procedure location, and all relevant critical information.   Signed consent obtained prior to procedure     Conventional methods of treatment but the patient has been unresponsive and refractory.The patient meets criteria for chronic headaches according to the ICHD-III, the patient has more than 15 headaches a month at least 8 of them meet migraine criteria, which last for more than 4 hours a day.     This is the first Botox injections and I am aiming for at least 50%  improvement in the patient's symptoms. Frequency of treatment is every 3 months unless no response to the treatments, at which time we will discontinue the injections.     DESCRIPTION OF PROCEDURE: After obtaining informed consent and under   aseptic technique, a total of 155 units of botulinum toxin type A were   injected in the following muscles: Procerus 5 units,  5 units   bilaterally, frontalis 20 units, temporalis 20 units bilaterally,   occipitalis 15 units, upper cervical paraspinals 10 units bilaterally and trapezius 15 units bilaterally. The patient was given a total of 155 units in 31 sites.    Special precaution taken given her posterior decompression.       The patient tolerated the procedure well. There were no complications. The patient was given a prescription for repeat treatment in 3 months.     Unavoidable waste 45 units    Petty Goetz MD   Board Certified Neurologist   [facility] Certified Headache Medicine

## 2022-06-13 ENCOUNTER — PATIENT MESSAGE (OUTPATIENT)
Dept: NEUROLOGY | Facility: CLINIC | Age: 47
End: 2022-06-13
Payer: MEDICARE

## 2022-06-14 ENCOUNTER — TELEPHONE (OUTPATIENT)
Dept: NEUROLOGY | Facility: CLINIC | Age: 47
End: 2022-06-14
Payer: MEDICARE

## 2022-06-14 NOTE — TELEPHONE ENCOUNTER
Imaging was ordered on her visit. Please provide patient with the number to schedule the imaging.    Dr. RUBIO

## 2022-06-14 NOTE — TELEPHONE ENCOUNTER
Spoke with pt and rescheduled mri to [address] due to pt request. Date/time and location verbalized. Pt understanding.

## 2022-06-17 ENCOUNTER — TELEPHONE (OUTPATIENT)
Dept: GASTROENTEROLOGY | Facility: CLINIC | Age: 47
End: 2022-06-17
Payer: MEDICARE

## 2022-06-17 ENCOUNTER — PATIENT MESSAGE (OUTPATIENT)
Dept: GASTROENTEROLOGY | Facility: CLINIC | Age: 47
End: 2022-06-17
Payer: MEDICARE

## 2022-06-17 ENCOUNTER — PATIENT MESSAGE (OUTPATIENT)
Dept: FAMILY MEDICINE | Facility: CLINIC | Age: 47
End: 2022-06-17
Payer: MEDICARE

## 2022-06-17 DIAGNOSIS — R13.10 DYSPHAGIA, UNSPECIFIED TYPE: Primary | ICD-10-CM

## 2022-06-25 ENCOUNTER — PATIENT MESSAGE (OUTPATIENT)
Dept: NEUROLOGY | Facility: CLINIC | Age: 47
End: 2022-06-25
Payer: MEDICARE

## 2022-06-25 DIAGNOSIS — R29.6 RECURRENT FALLS: ICD-10-CM

## 2022-06-25 DIAGNOSIS — M54.2 NECK PAIN: Primary | ICD-10-CM

## 2022-06-30 ENCOUNTER — TELEPHONE (OUTPATIENT)
Dept: NEUROLOGY | Facility: CLINIC | Age: 47
End: 2022-06-30
Payer: MEDICARE

## 2022-06-30 NOTE — TELEPHONE ENCOUNTER
I will add on MRI C spine as well, but on MRI brain you can see typically C1-C3 which will show her surgery site,  but if falling she can get MRI C spine. Last C spine imaging was around 2019 or so.     Dr. RUBIO

## 2022-07-06 ENCOUNTER — OFFICE VISIT (OUTPATIENT)
Dept: PSYCHIATRY | Facility: CLINIC | Age: 47
End: 2022-07-06
Payer: MEDICARE

## 2022-07-06 DIAGNOSIS — F33.2 MAJOR DEPRESSIVE DISORDER, RECURRENT, SEVERE WITHOUT PSYCHOTIC FEATURES: Primary | ICD-10-CM

## 2022-07-06 DIAGNOSIS — F90.0 ATTENTION DEFICIT HYPERACTIVITY DISORDER (ADHD), PREDOMINANTLY INATTENTIVE TYPE: ICD-10-CM

## 2022-07-06 DIAGNOSIS — F60.3 BORDERLINE PERSONALITY DISORDER: ICD-10-CM

## 2022-07-06 DIAGNOSIS — F51.04 PSYCHOPHYSIOLOGICAL INSOMNIA: ICD-10-CM

## 2022-07-06 DIAGNOSIS — F43.10 PTSD (POST-TRAUMATIC STRESS DISORDER): ICD-10-CM

## 2022-07-06 DIAGNOSIS — F41.0 PANIC DISORDER WITHOUT AGORAPHOBIA: ICD-10-CM

## 2022-07-06 DIAGNOSIS — E66.01 MORBID OBESITY WITH BMI OF 40.0-44.9, ADULT: ICD-10-CM

## 2022-07-06 PROCEDURE — 99214 OFFICE O/P EST MOD 30 MIN: CPT | Mod: 95,,, | Performed by: PSYCHIATRY & NEUROLOGY

## 2022-07-06 PROCEDURE — 4010F ACE/ARB THERAPY RXD/TAKEN: CPT | Mod: CPTII,95,, | Performed by: PSYCHIATRY & NEUROLOGY

## 2022-07-06 PROCEDURE — 1159F PR MEDICATION LIST DOCUMENTED IN MEDICAL RECORD: ICD-10-PCS | Mod: CPTII,95,, | Performed by: PSYCHIATRY & NEUROLOGY

## 2022-07-06 PROCEDURE — 3044F PR MOST RECENT HEMOGLOBIN A1C LEVEL <7.0%: ICD-10-PCS | Mod: CPTII,95,, | Performed by: PSYCHIATRY & NEUROLOGY

## 2022-07-06 PROCEDURE — 1159F MED LIST DOCD IN RCRD: CPT | Mod: CPTII,95,, | Performed by: PSYCHIATRY & NEUROLOGY

## 2022-07-06 PROCEDURE — 1160F RVW MEDS BY RX/DR IN RCRD: CPT | Mod: CPTII,95,, | Performed by: PSYCHIATRY & NEUROLOGY

## 2022-07-06 PROCEDURE — 4010F PR ACE/ARB THEARPY RXD/TAKEN: ICD-10-PCS | Mod: CPTII,95,, | Performed by: PSYCHIATRY & NEUROLOGY

## 2022-07-06 PROCEDURE — 1160F PR REVIEW ALL MEDS BY PRESCRIBER/CLIN PHARMACIST DOCUMENTED: ICD-10-PCS | Mod: CPTII,95,, | Performed by: PSYCHIATRY & NEUROLOGY

## 2022-07-06 PROCEDURE — 99214 PR OFFICE/OUTPT VISIT, EST, LEVL IV, 30-39 MIN: ICD-10-PCS | Mod: 95,,, | Performed by: PSYCHIATRY & NEUROLOGY

## 2022-07-06 PROCEDURE — 3044F HG A1C LEVEL LT 7.0%: CPT | Mod: CPTII,95,, | Performed by: PSYCHIATRY & NEUROLOGY

## 2022-07-06 RX ORDER — FLUOXETINE HYDROCHLORIDE 40 MG/1
40 CAPSULE ORAL DAILY
Qty: 90 CAPSULE | Refills: 0 | Status: SHIPPED | OUTPATIENT
Start: 2022-07-06 | End: 2022-11-29 | Stop reason: SDUPTHER

## 2022-07-06 RX ORDER — BUPROPION HYDROCHLORIDE 150 MG/1
150 TABLET ORAL DAILY
Qty: 90 TABLET | Refills: 0 | Status: SHIPPED | OUTPATIENT
Start: 2022-07-06 | End: 2022-11-03

## 2022-07-06 RX ORDER — LISDEXAMFETAMINE DIMESYLATE 40 MG/1
40 CAPSULE ORAL EVERY MORNING
Qty: 30 CAPSULE | Refills: 0 | Status: SHIPPED | OUTPATIENT
Start: 2022-07-06 | End: 2023-03-30

## 2022-07-06 RX ORDER — PRAZOSIN HYDROCHLORIDE 2 MG/1
2 CAPSULE ORAL NIGHTLY
Qty: 90 CAPSULE | Refills: 0 | Status: SHIPPED | OUTPATIENT
Start: 2022-07-06 | End: 2022-10-10

## 2022-07-06 RX ORDER — ZOLPIDEM TARTRATE 5 MG/1
5 TABLET ORAL NIGHTLY PRN
Qty: 30 TABLET | Refills: 2 | Status: SHIPPED | OUTPATIENT
Start: 2022-07-06 | End: 2022-09-28

## 2022-07-06 RX ORDER — ALPRAZOLAM 1 MG/1
TABLET ORAL
Qty: 30 TABLET | Refills: 2 | Status: SHIPPED | OUTPATIENT
Start: 2022-07-06 | End: 2022-10-14
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
## 2022-07-12 ENCOUNTER — PATIENT MESSAGE (OUTPATIENT)
Dept: ADMINISTRATIVE | Facility: HOSPITAL | Age: 47
End: 2022-07-12
Payer: MEDICARE

## 2022-07-12 ENCOUNTER — PATIENT OUTREACH (OUTPATIENT)
Dept: ADMINISTRATIVE | Facility: HOSPITAL | Age: 47
End: 2022-07-12
Payer: MEDICARE

## 2022-07-12 NOTE — PROGRESS NOTES
COLON CANCER SCREENING  Non-compliant report chart audits for COLON CANCER SCREENING for Patients     Outreach to patient in reference to a COLON CANCER SCREENING  Chart review completed - Care Everywhere and Media reports - updates requested and reviewed.      Scheduled      BREAST CANCER SCREENING  Outreach to patient in reference to SCHEDULING A MAMMOGRAM EXAM.     Chart review completed:   Care Everywhere and Media reports - updates requested and reviewed.      DIS REVIEWED  MAMMOGRAM ORDER PLACED    ACTIVE PORTAL MESSAGE SENT

## 2022-07-15 ENCOUNTER — HOSPITAL ENCOUNTER (OUTPATIENT)
Dept: RADIOLOGY | Facility: HOSPITAL | Age: 47
Discharge: HOME | End: 2022-07-15
Attending: PSYCHIATRY & NEUROLOGY
Payer: MEDICARE

## 2022-07-15 DIAGNOSIS — R29.6 RECURRENT FALLS: ICD-10-CM

## 2022-07-15 DIAGNOSIS — H54.7 VISION LOSS: ICD-10-CM

## 2022-07-15 DIAGNOSIS — M54.2 NECK PAIN: ICD-10-CM

## 2022-07-15 PROCEDURE — 72141 MRI CERVICAL SPINE WITHOUT CONTRAST: ICD-10-PCS | Mod: 26,,, | Performed by: RADIOLOGY

## 2022-07-15 PROCEDURE — 70551 MRI BRAIN STEM W/O DYE: CPT | Mod: 26,,, | Performed by: RADIOLOGY

## 2022-07-15 PROCEDURE — 70551 MRI BRAIN WITHOUT CONTRAST: ICD-10-PCS | Mod: 26,,, | Performed by: RADIOLOGY

## 2022-07-15 PROCEDURE — 70551 MRI BRAIN STEM W/O DYE: CPT | Mod: TC,PO

## 2022-07-15 PROCEDURE — 72141 MRI NECK SPINE W/O DYE: CPT | Mod: 26,,, | Performed by: RADIOLOGY

## 2022-07-15 PROCEDURE — 72141 MRI NECK SPINE W/O DYE: CPT | Mod: TC,PO

## 2022-07-17 ENCOUNTER — PATIENT MESSAGE (OUTPATIENT)
Dept: NEUROLOGY | Facility: CLINIC | Age: 47
End: 2022-07-17
Payer: MEDICARE

## 2022-07-21 ENCOUNTER — PATIENT OUTREACH (OUTPATIENT)
Dept: ADMINISTRATIVE | Facility: HOSPITAL | Age: 47
End: 2022-07-21
Payer: MEDICARE

## 2022-07-21 NOTE — PROGRESS NOTES
2022 Care Everywhere updates requested and reviewed.  Immunizations reconciled. Media reports reviewed.  Duplicate HM overrides and  orders removed.  Overdue HM topic chart audit and/or requested.  Overdue lab testing linked to upcoming lab appointments if applies.  RECENTLY OUTREACHED/REVIEWED  Uncontrolled BP REPORT  BP Readings from Last 3 Encounters:   06/10/22 126/86   22 (!) 145/75   22 (!) 156/84     Health Maintenance Due   Topic Date Due    Mammogram  2019    Colorectal Cancer Screening  2022    COVID-19 Vaccine (3 - Booster for Pfizer series) 2022

## 2022-07-22 ENCOUNTER — OFFICE VISIT (OUTPATIENT)
Dept: NEUROLOGY | Facility: CLINIC | Age: 47
End: 2022-07-22
Payer: MEDICARE

## 2022-07-22 DIAGNOSIS — G43.719 INTRACTABLE CHRONIC MIGRAINE WITHOUT AURA AND WITHOUT STATUS MIGRAINOSUS: Primary | ICD-10-CM

## 2022-07-22 DIAGNOSIS — M47.812 SPONDYLOSIS OF CERVICAL REGION WITHOUT MYELOPATHY OR RADICULOPATHY: ICD-10-CM

## 2022-07-22 DIAGNOSIS — M54.2 NECK PAIN: ICD-10-CM

## 2022-07-22 DIAGNOSIS — I10 HYPERTENSION, UNSPECIFIED TYPE: ICD-10-CM

## 2022-07-22 PROCEDURE — 1160F RVW MEDS BY RX/DR IN RCRD: CPT | Mod: CPTII,95,, | Performed by: PSYCHIATRY & NEUROLOGY

## 2022-07-22 PROCEDURE — 99214 OFFICE O/P EST MOD 30 MIN: CPT | Mod: 95,,, | Performed by: PSYCHIATRY & NEUROLOGY

## 2022-07-22 PROCEDURE — 1159F MED LIST DOCD IN RCRD: CPT | Mod: CPTII,95,, | Performed by: PSYCHIATRY & NEUROLOGY

## 2022-07-22 PROCEDURE — 1160F PR REVIEW ALL MEDS BY PRESCRIBER/CLIN PHARMACIST DOCUMENTED: ICD-10-PCS | Mod: CPTII,95,, | Performed by: PSYCHIATRY & NEUROLOGY

## 2022-07-22 PROCEDURE — 4010F ACE/ARB THERAPY RXD/TAKEN: CPT | Mod: CPTII,95,, | Performed by: PSYCHIATRY & NEUROLOGY

## 2022-07-22 PROCEDURE — 4010F PR ACE/ARB THEARPY RXD/TAKEN: ICD-10-PCS | Mod: CPTII,95,, | Performed by: PSYCHIATRY & NEUROLOGY

## 2022-07-22 PROCEDURE — 99214 PR OFFICE/OUTPT VISIT, EST, LEVL IV, 30-39 MIN: ICD-10-PCS | Mod: 95,,, | Performed by: PSYCHIATRY & NEUROLOGY

## 2022-07-22 PROCEDURE — 3044F HG A1C LEVEL LT 7.0%: CPT | Mod: CPTII,95,, | Performed by: PSYCHIATRY & NEUROLOGY

## 2022-07-22 PROCEDURE — 1159F PR MEDICATION LIST DOCUMENTED IN MEDICAL RECORD: ICD-10-PCS | Mod: CPTII,95,, | Performed by: PSYCHIATRY & NEUROLOGY

## 2022-07-22 PROCEDURE — 3044F PR MOST RECENT HEMOGLOBIN A1C LEVEL <7.0%: ICD-10-PCS | Mod: CPTII,95,, | Performed by: PSYCHIATRY & NEUROLOGY

## 2022-07-22 NOTE — PROGRESS NOTES
[facility]- [facility]    The patient location is: [place]  The chief complaint leading to consultation is: chronic migraine     Visit type:tele audiovisual     Face to Face time with patient:20 minutes     30 minutes of total time spent on the encounter, which includes face to face time and non-face to face time preparing to see the patient (eg, review of tests), Obtaining and/or reviewing separately obtained history, Documenting clinical information in the electronic or other health record, Independently interpreting results (not separately reported) and communicating results to the patient/family/caregiver, or Care coordination (not separately reported).         Each patient to whom he or she provides medical services by telemedicine is:  (1) informed of the relationship between the physician and patient and the respective role of any other health care provider with respect to management of the patient; and (2) notified that he or she may decline to receive medical services by telemedicine and may withdraw from such care at any time.    Notes:     Chief complaint: chronic migraine    S:    46 Y RH F with PMHx of depression,  PTSD, borderline personality disorder, fibromyalgia,IBS, PATRICIA on CPAP, s/p posterior fossa decompression for Chiari malformation 9mm in 2018), s/p gastric bypass,  carnitine deficiency, cervical spondylosis, migraine and hypothyroidism who presents for further follow up of headache. She was last seen on 5/22 at which time she was having daily severe attacks. She was started on BTX.      She had BTX on 6/10/22 for chronic migraien. She has also had MRI brain which did not show any new acute intracranial abnormalities and MRI C spine given the worsening neck pain which showed multi level djd and mild neurfora The migraine on left temple have decreased a lot since Botox. She mentions that curently she is having about 2-3 times a week at most and Nurte is shortening the  attacks and resolving them. She does have time without headache. Now has noted that if more stressed like when she just got a surprise bill or anything that triggers her PTSD will quickly have headache. She likes Nurtec and would like to continue. She does feel Emgality is helping as the last few days prior to next injection she will have more severe attacks.      The occipital headache is still present and does not respond to triptans or gepant.  She mentions that the occipital and neck pain is not as much improved those are where she has more blurry vision. These occur mainly in the morning when she wakes up and if she rolled over on the back she knows she will have more severe pain that day and the pain if resolves will come back. Overall she feels that current regimen has improved her quality of life and reduced her pain significantly. She still has significant neck pain, but is glad to know the spinal cord is doing ok. She will continue to see Dr. Munoz for that. She mentions that her psychiatrist Dr. Wells left and she will be establishing with a new psychologist to focus more on therapy.       Headache history form initial history :  Age at onset and course over time: 2011 and worse over time, this happened during a time when she had a long commute. Neck was her worst pain - just left to midline of middle neck. Was going to [facility] pain management receiving epidural steroids which would last only 2 weeks. She had a CMBB and was planning for RFA but wanted to get cervical MRI first and second opinion. She was seen by Dr. Samano and her hsitory with mutliple treatments is above on my chart     On chart review conducted today patient was followed by Dr. Samano ([facility] specialist) for migraine and also for chronic neck pain (cervical spondylosis) sicne 3/18 at which time Dr. Samano diagnosed Chiari malformation (extending 9 mm inferior to the foramen magnum with so-called pegging of  the cerebellar tonsil) , headache, cervical spondylosis w/o myelopathy and radiculopathy, and cervical myofascial pain. Placed on tizandine qhs, prn methocarbamol and tramadol q6h PRN. An Mri brain with CINE was performed that did not show CSF flow obstruction. . She had severe pain by 3/18 an ONB with steroids/anesthetic and TPIs was done by Dr. Samano multiple times. She continued seeing Dr. Fuentes for cognitive complaints and ataxia/eyelid ptosis. She had workup with EMG which did not show any signs of myopathy, myasthenia with rep stim, or peripheral neuropathy. Bloodwork looking for vitamin deficiencies done which were normal as well.  She was evaluated by  and was given in 2018 levocarnitine,  NAC injections and marinol for nausea (helps significantly). Per notes levocarnitine helps with her energy level. She had posterior fossa decompression on 12/18 and had severe headache and neck pain after that. Per notes valsalva induced headaches resolved. She was started on Emgality on 9/19 and continued on gabapentin 600mg TID and prn tramadol. She was seen by Dr. Samano on 7/20 at which time patient had significant neck pain and was seen by Dr. Samano for diagnostic b/l cervical MBB C3-4-5 which she had 80% relief x 6 hours x 2 , then n 8/27/2020 s/p bilateral cervical medial branch nerve RFA at C3-4-5.  She mentions that this provided significant improvement to her neck pain. She mentions that she has been having significant improvement in her symptoms of neck pain.   She mentions that the headache that it's left temporal side with photophobia/phonophobia the Emgality helps a lot and reduces them. i    Valsalva triggered headaches: lifting, laughing and coughing will trigger severe headache for seconds to a minute and will keep going if she does not stay still. This has been happening for about 10 years. She mentions that this did not get better after  decompression surgery. She mentions that this never  "improved with surgery and continues. Frequency of these depending on activity. Since being off gabapentin felt these worsened significantly.     Migraine: typically starts left temple and will spread out from there. These are happening on average 3-4 days a week. They will last a few hours and will treat them with sleep and will try to go to sleep or will lay there still in dark, quiet, cold and rest.  She mentions that it will improve. She mentions that has some headache free times, but at times can be 2/10 in intensity. She mentions that she can having also squeezing/twisting brain. She mentions that with those she had a panic and thought she had to go to hospital. She mentions that she had to stop everything and had to rest.     Location: neck (sharp, stabbing); hands and feet (tingling, numb), hip/lower back (dull, aching). Also has intermittent left sided headaches starting occipitally radiating to temple.   Quality: as above  Severity: range 2-10/10   Duration: hours  Frequency: daily  Alleviated by: medication, ice  Exacerbated by: movement, light noise (head pain)  Associated with: left arm/hand gets numb/weak "a lot" no radicular pain; headaches on left which are random or triggered by straining in restroom/coughing/laughing. Headaches worst in the AM within 2 hours of waking up. Headaches do not wake her up but neck pain does. Having some depth perception issues causing her to break her foot - eyes checked and were ok. Transient visual loss? When in a lof of pain, not clear TVO with valsalva.   Sleep habits:    Acute treatment:  Current acute treatment:  -- Sumatriptan 100mg - helps some   --Nurtec 75mg- significant benefit, will reduce/resolve attacks that are more migraine.    Tried:  -- TENS   -- tramadol - helped with overall pain   -- methocarbamol - helped with overall pain   -- relafen - did not help     Preventive:  Current prevention:  -- Emgality 120 mg every 28 days, notices wearing off effect 3 " days prior - helps the intensity and has about 10 days of freedom with it.   --Tizanidine 4 mg 1-2 times a day typically - feels this helps more than anything for acute occipital pain  -- gabapentin 600mg twice a day - helps the intensity of pain   --candesartan 8mg - migraine has improved, and her Bps are doing much better.   --botox 155 units (started on 6/22- present): over 50% improvement     (wellbutrin)  (prozac)  (prazosin)      Previously tried:  -- for behavioral health is on welbutrin xl 150mg and Fluoxetine 40mg   -- takes vyvanse   --amitriptyline 100 mg nightly mainly for insomnia, no help headache   --trazodone 200mg for insomnia- no worsening of headache   --zonisamide 150mg nightly - caused nausea/vomiting   -- propranolol 20mg tid - some benefit   -- prozac 40mg - for mod   -- wellbutrin XL 150mg- no benefit in headache     Procedural history:   -- cervical GREGOR  -- lumbar GREGOR  -- chiropractor for hip /neck - helped only hip  -- PT - none for neck just foot     Neuroimaging and other studies:   MRI C spine 7/15/22:  FINDINGS:  Alignment: Slight reversal of the normal cervical lordosis.  Anterolisthesis of C4 on C5, 2 mm.     Vertebral Column: Vertebral body heights are maintained.  No evidence of an acute fracture or aggressive marrow replacement process. Multilevel disc degeneration with disc desiccation at C2-C6, most pronounced at C5-6 and C6-7 with mild intervertebral disc space narrowing, degenerative endplate change and anterior marginal osteophyte formation.     Cord: Normal signal and morphology.     Skull base and craniocervical junction: Stable postoperative changes of suboccipital craniectomy for Chiari 1 malformation decompression.     Degenerative findings:     C2-C3: Mild posterior disc osteophyte complex.  Mild bilateral facet arthropathy.  There is no neural foraminal stenosis.  There is no spinal canal stenosis.     C3-C4: The disc is normal in configuration.  Mild bilateral facet  arthropathy.  There is no neural foraminal stenosis.  There is no spinal canal stenosis.     C4-C5: Anterolisthesis with uncovering of the disc.  Small central disc protrusion.  Mild bilateral facet arthropathy.  No neural foraminal stenosis.  There is no spinal canal stenosis.     C5-C6: Right asymmetric posterior disc osteophyte complex with right lateral disc extrusion, descending to the C6 infrapedicular level, which abuts the right ventral cord surface.  Mild bilateral facet arthropathy.  Mild right uncovertebral joint spurring.  Mild right neural foraminal stenosis.  No significant overall spinal canal stenosis.     C6-C7: Right asymmetric posterior disc osteophyte complex.  Mild bilateral facet arthropathy.  Mild bilateral uncovertebral joint spurring.  Mild left neural foraminal stenosis.  There is no spinal canal stenosis.     C7-T1: The disc is normal in configuration.  Mild bilateral facet arthropathy.  There is no neural foraminal stenosis.  There is no spinal canal stenosis.     Paraspinal muscles & soft tissues: No significant abnormality.     Normal signal voids are present in the vertebral arteries.     Impression:     1. Multilevel degenerative change of the cervical spine, as described above, mildly progressed in comparison to the prior study from 2019.  Findings contribute to mild neural foraminal stenosis on the right at C5-6 and on the left at C6-7.  No significant spinal canal stenosis.  2. Right asymmetric posterior disc osteophyte complex with right lateral disc extrusion at C5-6, which abuts the right ventral cord surface.  No cord compression or focal cord signal abnormality.  3. Stable postoperative changes of suboccipital craniectomy for Chiari 1 malformation decompression.      MRI brain 7/15/22:  Impression:     1. No evidence of an acute intracranial abnormality or significant interval detrimental change as compared to the prior study.  2. Stable postoperative changes of suboccipital  craniectomy for Chiari I malformation decompression.        MRA head 5/6/21:  FINDINGS:  Anterior circulation: There is normal flow related signal intensity within the petrous and cavernous segments of the internal carotid arteries.  The supraclinoid internal carotid arteries are normal as is the carotid terminus bilaterally.  There is normal branching into the anterior and middle cerebral arteries.  These vessels are patent.  There is no critical stenosis, occlusion, thrombosis, dissection, aneurysm or other vascular malformation.     Posterior circulation: The vertebral arteries are patent.  The basilar artery is normal in caliber and contour.  The basilar tip is normal.  There is normal branching into the superior cerebellar and posterior cerebral arteries.  There is no critical stenosis, occlusion, thrombosis, dissection, aneurysm.     There has been previous decompression for Chiari malformation.     Impression:     1. Normal brain MRA.  2. Previous decompression for Chiari malformation  Results for orders placed or performed in visit on 08/11/20   MRI Brain Without Contrast    Narrative    EXAMINATION:  MRI BRAIN WITHOUT CONTRAST    CLINICAL HISTORY:  Repeated falls.  History of posterior fossa decompression.; Repeated falls    TECHNIQUE:  Multiplanar MR imaging of the brain was performed without contrast.    COMPARISON:  Head CT 06/09/2019    FINDINGS:  No acute infarct, mass effect or hemorrhage.  Brain parenchyma has a normal signal.  Ventricles and sulci are proportionate.    No abnormal extra-axial fluid collections.    Flow voids are maintained in the intracranial arteries and dural venous sinuses.    Suboccipital craniotomy changes are stable.  Configuration of the cerebellar vermis appears stable compared to the prior CT given differences in modality.      Impression    1. No acute findings in the brain.  2. Stable postoperative changes of posterior decompression      Electronically signed by: Brandin  MD Austin  Date:    08/11/2020  Time:    16:43   Results for orders placed or performed during the hospital encounter of 06/09/19   CT Head Without Contrast    Narrative    EXAMINATION:  CT HEAD WITHOUT CONTRAST    CLINICAL HISTORY:  Head trauma, headache;    TECHNIQUE:  Low dose axial images were obtained through the head.  Coronal and sagittal reformations were also performed. Contrast was not administered.    COMPARISON:  12/14/2018    RADIATION DOSE:  1099 DLP    FINDINGS:  Postoperative changes of a suboccipital craniectomy for posterior fossa decompression.    Hyperostosis frontalis interna noted.    Brain parenchyma otherwise normally formed without midline shift or mass effect.  No hydrocephalus.  No acute intracranial hemorrhage.    Ventricles are normal in size and configuration.  Physiologic calcifications are present.      Impression    1. No acute intracranial abnormality identified.  Postoperative changes as discussed.  2. [place] agreement.      Electronically signed by: Anton Kenney MD  Date:    06/09/2019  Time:    11:41   Results for orders placed or performed during the hospital encounter of 02/05/18   MRI Brain W WO Contrast    Narrative    Routine Multiplanar imaging through this 42-year-old females brain was obtained with utilization of 10 cc of gadolinium contrast    Hyperostosis frontalis appears to be present.     A Chiari one malformation appears to be present with the cerebellar tonsils extending 9 mm inferior to the foramen magnum with so-called pegging of the cerebellar tonsils.    An empty sella configuration of the pituitary gland is noted.    No diffusion positivity is identified to suggest recent infarction.    No gradient  susceptibility was noted just just presence of acute blood products.    No worrisome enhancing lesions are noted intracranially    No diffusion positivity is identified to suggest recent infarction.    Impression    1. The cerebellar tonsils appear to  extend inferior to the foramen magnum suggestive of a Chiari one malformation. No obvious cord signal abnormalities noted within the visualized cord, given the patient's history further evaluation of the cord may be of use to exclude a syrinx        Electronically signed by: Ismael Richmond MD  Date:     02/05/18  Time:    12:04      ROS: The fourteen point review of system was performed and positive for HPI reports    PHYSICAL EXAMINAtION  There were no vitals filed for this visit.   General: The patient is a well-developed, well-nourished looking of stated age in acute distress.  NEURO EXAM  General: This is a well-nourished and well-developed person, who is afebrile, fully ambulatory and alert.  No acute distress  Mental Status: Oriented to person, place, and date. Speech is of normal clarity, inflection, pace and volume. No aphasia, no dysarthria   Cranial Nerves: no facial weakness, EOMI,         Impression:  Chronic migraine without aura:  She has complex headache history which includes valsalva induced headaches , tonsillar herniation of 9mm on MRI brain which was decompressed in 2018, but headache/neck pain worsened after that. Over the years multiple medications tried. CGRP Mab Emgality has been effective at reducing from 30/30 days to 15-20 days/month headache, she has found gabapentin 600mg bid helps some of the neck pain and the occipital headache , did much worse off it, she also finds tizanidnie is what works really well wyhen used to help cervical myofascial pain and also some occipital pain. The worse pain currently is occipital headache which is an exploding out pressure she wakes up 3-4 times a week and it's completely disabling. This she does not necessarily count it as a migraine, but it has all migraine features and then has more temporal pain on left which 2 days a  Week. She has persistent allodynia in left occipital area in are of the decompressive surgery. She is having worsening valsalva  induced headache as well as vision obscurations with them and dizziness (has been for the last few months).  Neuroimaging did not reveal any abnormalities . She has noted significant benefit with start of BTX on 6/22 already. We will continue current regimen     S/p Chiari malformation decompression surgery - avoid ONBs     Valsalva induced headaches- unclear if this is primary vs. Secondary (her Chiari malformation operated on, this should have resolved if only Chiari, no concern about SIH from prior specialists) , MRA head was normal, no vascular malformation, possibly related to prior Chiari given the symptoms of visual and also short duration of the event, no signs of SIH on recent MRI brain.   Comorbidities:  Chronic neck pain/cervical spondylosis- referring to pain clinic as Dr. Samano did RFA which helped significantly. Mri C spine that was performed for the worsening neck pain showed multilevel degenerative changes without any spinal cord compression or stenosis,  DJD change mild foraminal stenosis on Rt C5-6 and left C6-7. There is no cord signal abnormalities, no syrinx. Discussed with patient the findings. She is having significant neck pain and will continue care with Dr. Munoz in pain management.     HTN- doing better on candesartan     Plan:   1- For preventive management: A.  Continue candesartan   8mg daily           B.  Emgality 120mg every 28 days a month           C. Continue Botox every 12 weeks       2- Acute management: for the migraine (left sided headache)  sumatriptan 100mg at onset of migraine, can repeat after 2 hours. Max is 2/day. No more than 2-3 days a week or no more than 10 days a month    Or     Nurtec ODT 75mg trial , max 1/day.     3- For cervical myofascial/cervical spondylosis/chronic cerviaclgia : A. Continue gabapentin 600mg 2-3 times a day                B. Continue tizanidine 4mg 1-3 times a day   4-continue care with Dr. Munoz pain management     RTC in 6 weeks for Botox.        Petty Goetz MD   Board Certified Neurologist  [facility] Certified Headache Medicine

## 2022-07-27 ENCOUNTER — TELEPHONE (OUTPATIENT)
Dept: FAMILY MEDICINE | Facility: CLINIC | Age: 47
End: 2022-07-27
Payer: MEDICARE

## 2022-07-27 ENCOUNTER — OFFICE VISIT (OUTPATIENT)
Dept: FAMILY MEDICINE | Facility: CLINIC | Age: 47
End: 2022-07-27
Payer: MEDICARE

## 2022-07-27 DIAGNOSIS — J01.40 ACUTE PANSINUSITIS, RECURRENCE NOT SPECIFIED: Primary | ICD-10-CM

## 2022-07-27 PROCEDURE — 1159F MED LIST DOCD IN RCRD: CPT | Mod: CPTII,95,, | Performed by: PHYSICIAN ASSISTANT

## 2022-07-27 PROCEDURE — 3044F PR MOST RECENT HEMOGLOBIN A1C LEVEL <7.0%: ICD-10-PCS | Mod: CPTII,95,, | Performed by: PHYSICIAN ASSISTANT

## 2022-07-27 PROCEDURE — 4010F PR ACE/ARB THEARPY RXD/TAKEN: ICD-10-PCS | Mod: CPTII,95,, | Performed by: PHYSICIAN ASSISTANT

## 2022-07-27 PROCEDURE — 1159F PR MEDICATION LIST DOCUMENTED IN MEDICAL RECORD: ICD-10-PCS | Mod: CPTII,95,, | Performed by: PHYSICIAN ASSISTANT

## 2022-07-27 PROCEDURE — 4010F ACE/ARB THERAPY RXD/TAKEN: CPT | Mod: CPTII,95,, | Performed by: PHYSICIAN ASSISTANT

## 2022-07-27 PROCEDURE — 99213 OFFICE O/P EST LOW 20 MIN: CPT | Mod: 95,,, | Performed by: PHYSICIAN ASSISTANT

## 2022-07-27 PROCEDURE — 99213 PR OFFICE/OUTPT VISIT, EST, LEVL III, 20-29 MIN: ICD-10-PCS | Mod: 95,,, | Performed by: PHYSICIAN ASSISTANT

## 2022-07-27 PROCEDURE — 1160F PR REVIEW ALL MEDS BY PRESCRIBER/CLIN PHARMACIST DOCUMENTED: ICD-10-PCS | Mod: CPTII,95,, | Performed by: PHYSICIAN ASSISTANT

## 2022-07-27 PROCEDURE — 1160F RVW MEDS BY RX/DR IN RCRD: CPT | Mod: CPTII,95,, | Performed by: PHYSICIAN ASSISTANT

## 2022-07-27 PROCEDURE — 3044F HG A1C LEVEL LT 7.0%: CPT | Mod: CPTII,95,, | Performed by: PHYSICIAN ASSISTANT

## 2022-07-27 RX ORDER — AMOXICILLIN AND CLAVULANATE POTASSIUM 875; 125 MG/1; MG/1
1 TABLET, FILM COATED ORAL EVERY 12 HOURS
Qty: 14 TABLET | Refills: 0 | Status: SHIPPED | OUTPATIENT
Start: 2022-07-27 | End: 2022-08-03

## 2022-07-27 NOTE — PROGRESS NOTES
Subjective:      Patient ID: Dinah Mitchell is a 47 y.o. female.    Chief Complaint: Fever    Patient is new to me.    HPI   Patient has PMH of ADHD, chiari malformation type 1, depression, bariatric surgery, and PATRICIA.    Patient reports fever (100Fmax), congestion, ear pain, sore throat, cough, diarrhea, nausea, and headache for 10 days.  Worsened symptoms on Friday.  No sick contacts.  Taking Juliane-seltzer Cold and Flu without resolution of symptoms.  Never had Covid before, but vaccinated against Covid.  Patient denies shortness of breath or chest pain.    Review of Systems   Constitutional: Positive for chills and fever.   HENT: Positive for congestion, ear pain, sinus pressure, sinus pain and sore throat.    Respiratory: Positive for cough. Negative for shortness of breath and wheezing.    Cardiovascular: Negative for chest pain.   Gastrointestinal: Positive for diarrhea (improving) and nausea. Negative for abdominal pain and vomiting.   Genitourinary: Negative for dysuria.   Skin: Negative for rash.   Neurological: Positive for headaches.       Objective:   There were no vitals taken for this visit.     Physical Exam  Constitutional:       Appearance: Normal appearance.   HENT:      Head: Normocephalic and atraumatic.      Right Ear: Hearing and external ear normal.      Left Ear: Hearing and external ear normal.      Nose:      Right Sinus: Maxillary sinus tenderness and frontal sinus tenderness present.      Left Sinus: Maxillary sinus tenderness and frontal sinus tenderness present.      Mouth/Throat:      Lips: Pink.   Eyes:      General: Lids are normal.      Conjunctiva/sclera: Conjunctivae normal.   Neck:      Trachea: Phonation normal.   Pulmonary:      Effort: No respiratory distress.      Comments: Able to talk without labored breathing.  Skin:     Coloration: Skin is not jaundiced or pale.   Neurological:      General: No focal deficit present.      Mental Status: She is alert and oriented to person,  place, and time.   Psychiatric:         Attention and Perception: Attention normal.         Mood and Affect: Mood normal.         Speech: Speech normal.         Behavior: Behavior normal.         Cognition and Memory: Cognition normal.         Judgment: Judgment normal.        Assessment:      1. Acute pansinusitis, recurrence not specified       Plan:   1. Acute pansinusitis, recurrence not specified  - amoxicillin-clavulanate 875-125mg (AUGMENTIN) 875-125 mg per tablet; Take 1 tablet by mouth every 12 (twelve) hours. for 7 days  Dispense: 14 tablet; Refill: 0    Follow up as needed.  Patient agreed with plan and expressed understanding.  The patient location is:  Patient Home   The chief complaint leading to consultation is: fever  Visit type: Virtual visit with synchronous audio and video  Total time spent with patient: 10 minutes  Each patient to whom he or she provides medical services by telemedicine is:  (1) informed of the relationship between the physician and patient and the respective role of any other health care provider with respect to management of the patient; and (2) notified that he or she may decline to receive medical services by telemedicine and may withdraw from such care at any time.

## 2022-07-27 NOTE — TELEPHONE ENCOUNTER
----- Message from Aubree Walter sent at 7/26/2022  3:44 PM CDT -----  Contact: Patient  Type: Patient Call Back         Who called: Patient         What is the request in detail: calling to get a Rx sent over today; states she is congested/sinus issues; ear/head pain; low grade fever; (would like to be mailed through [facility] if possible) please advise          Best call back number: [phone]         Additional Information:     [facility]  [id number] [address]  [place] [address]  Phone: [phone] Fax: [phone]           Thank You

## 2022-08-04 ENCOUNTER — PATIENT MESSAGE (OUTPATIENT)
Dept: FAMILY MEDICINE | Facility: CLINIC | Age: 47
End: 2022-08-04
Payer: MEDICARE

## 2022-08-04 NOTE — TELEPHONE ENCOUNTER
----- Message from Alida Barrios sent at 8/4/2022 11:39 AM CDT -----  Contact: pt  Caller is requesting a same day appointment.  Caller declined first available appointment listed below.      Name of Caller:  Pt   When is the first available appointment?  08/04  Symptoms: pain in side and blood in urine  Best Call Back Number:  [phone]     Additional Information:   Pt is calling the office to get 08/04 appt changed from in office to virtual today. Pt adv she cannot come into the office. please call and adv

## 2022-08-04 NOTE — TELEPHONE ENCOUNTER
----- Message from Jimi Sethi sent at 8/4/2022  1:07 PM CDT -----  Type: Needs Medical Advice  Who Called:  pt  Symptoms (please be specific):  pt said she want to know if she can make her appt for tomorrow a virtual appt--please advise  Best Call Back Number: [phone] (home)     Additional Information: please advise--thank you

## 2022-08-05 ENCOUNTER — OFFICE VISIT (OUTPATIENT)
Dept: FAMILY MEDICINE | Facility: CLINIC | Age: 47
End: 2022-08-05
Payer: MEDICARE

## 2022-08-05 DIAGNOSIS — J32.9 SINUSITIS, UNSPECIFIED CHRONICITY, UNSPECIFIED LOCATION: Primary | ICD-10-CM

## 2022-08-05 DIAGNOSIS — R13.10 DYSPHAGIA, UNSPECIFIED TYPE: ICD-10-CM

## 2022-08-05 PROCEDURE — 99213 PR OFFICE/OUTPT VISIT, EST, LEVL III, 20-29 MIN: ICD-10-PCS | Mod: 95,,, | Performed by: FAMILY MEDICINE

## 2022-08-05 PROCEDURE — 99213 OFFICE O/P EST LOW 20 MIN: CPT | Mod: 95,,, | Performed by: FAMILY MEDICINE

## 2022-08-05 PROCEDURE — 3044F PR MOST RECENT HEMOGLOBIN A1C LEVEL <7.0%: ICD-10-PCS | Mod: CPTII,95,, | Performed by: FAMILY MEDICINE

## 2022-08-05 PROCEDURE — 4010F PR ACE/ARB THEARPY RXD/TAKEN: ICD-10-PCS | Mod: CPTII,95,, | Performed by: FAMILY MEDICINE

## 2022-08-05 PROCEDURE — 1159F PR MEDICATION LIST DOCUMENTED IN MEDICAL RECORD: ICD-10-PCS | Mod: CPTII,95,, | Performed by: FAMILY MEDICINE

## 2022-08-05 PROCEDURE — 1159F MED LIST DOCD IN RCRD: CPT | Mod: CPTII,95,, | Performed by: FAMILY MEDICINE

## 2022-08-05 PROCEDURE — 4010F ACE/ARB THERAPY RXD/TAKEN: CPT | Mod: CPTII,95,, | Performed by: FAMILY MEDICINE

## 2022-08-05 PROCEDURE — 1160F PR REVIEW ALL MEDS BY PRESCRIBER/CLIN PHARMACIST DOCUMENTED: ICD-10-PCS | Mod: CPTII,95,, | Performed by: FAMILY MEDICINE

## 2022-08-05 PROCEDURE — 3044F HG A1C LEVEL LT 7.0%: CPT | Mod: CPTII,95,, | Performed by: FAMILY MEDICINE

## 2022-08-05 PROCEDURE — 1160F RVW MEDS BY RX/DR IN RCRD: CPT | Mod: CPTII,95,, | Performed by: FAMILY MEDICINE

## 2022-08-05 RX ORDER — CLARITHROMYCIN 500 MG/1
500 TABLET, FILM COATED ORAL 2 TIMES DAILY
Qty: 20 TABLET | Refills: 0 | Status: SHIPPED | OUTPATIENT
Start: 2022-08-05 | End: 2022-08-15

## 2022-08-11 ENCOUNTER — PATIENT MESSAGE (OUTPATIENT)
Dept: FAMILY MEDICINE | Facility: CLINIC | Age: 47
End: 2022-08-11
Payer: MEDICARE

## 2022-08-11 ENCOUNTER — NURSE TRIAGE (OUTPATIENT)
Dept: ADMINISTRATIVE | Facility: CLINIC | Age: 47
End: 2022-08-11
Payer: MEDICARE

## 2022-08-11 DIAGNOSIS — Z12.31 ENCOUNTER FOR SCREENING MAMMOGRAM FOR MALIGNANT NEOPLASM OF BREAST: ICD-10-CM

## 2022-08-11 DIAGNOSIS — Z12.39 ENCOUNTER FOR SCREENING FOR MALIGNANT NEOPLASM OF BREAST, UNSPECIFIED SCREENING MODALITY: ICD-10-CM

## 2022-08-11 DIAGNOSIS — J32.9 SINUSITIS, UNSPECIFIED CHRONICITY, UNSPECIFIED LOCATION: Primary | ICD-10-CM

## 2022-08-12 NOTE — TELEPHONE ENCOUNTER
Pt called and she said that she has been seen and on antibiotics for several weeks and she is still running fever and sinus issues and she said that she is still   isnt feeling well. Pt said that she has been treated for this and is feeling worse. Pt told to take advill tonight and triaged and care advice is to see Md within 24 hours. Pt will wait to see MD tomorrow as she would like to see her PCP. Pt told to call back if any SOB, Cp or worsening condition and offered OAC or UC tonight      Reason for Disposition   Fever present > 3 days (72 hours)    Additional Information   Negative: Shock suspected (e.g., cold/pale/clammy skin, too weak to stand, low BP, rapid pulse)   Negative: Difficult to awaken or acting confused (e.g., disoriented, slurred speech)   Negative: [1] Difficulty breathing AND [2] bluish lips, tongue or face   Negative: New-onset rash with multiple purple (or blood-colored) spots or dots   Negative: Sounds like a life-threatening emergency to the triager   Negative: [1] Drinking very little AND [2] dehydration suspected (e.g., no urine > 12 hours, very dry mouth, very lightheaded)   Negative: Patient sounds very sick or weak to the triager  (Exception: mild weakness and hasn't taken fever medicine)   Negative: Fever > 104 F (40 C)   Negative: [1] Fever > 101 F (38.3 C) AND [2] age > 60 years   Negative: [1] Fever > 100.0 F (37.8 C) AND [2] bedridden (e.g., nursing home patient, CVA, chronic illness, recovering from surgery)   Negative: [1] Fever > 100.0 F (37.8 C) AND [2] indwelling urinary catheter (e.g., Aguirre, Coude)   Negative: [1] Fever > 100.0 F (37.8 C) AND [2] has port (portacath), central line, or PICC line   Negative: [1] Fever > 100.0 F (37.8 C) AND [2] diabetes mellitus or weak immune system (e.g., HIV positive, cancer chemo, splenectomy, organ transplant, chronic steroids)   Negative: [1] Fever > 100.0 F (37.8 C) AND [2] surgery in the last month   Negative:  Transplant patient (e.g., kidney, liver, lung, heart)    Protocols used: FEVER-A-AH

## 2022-08-13 NOTE — PROGRESS NOTES
Subjective:       Patient ID: Dinah Mitchell is a 47 y.o. female.    Chief Complaint: Sinus Problem    HPI       The patient is a 47-year-old who is here virtually today because she is not feeling well.  Her symptoms have been present for over 2 weeks.  On July 27th, she was seeing by another provider for the symptoms, diagnosed with sinusitis and treated with Augmentin.  She has not seen significant improvement with the Augmentin.  Her current symptoms include sinus congestion, ear pain, swollen glands in her neck, sore throat, and subjective fevers and chills.  She did develop a blister on her lip that was full of fluid like a blister one would get on your hand but that has since improved.  She has not had any nausea vomiting or diarrhea.  She has tested negative for COVID.    She does note that for 5 weeks ago she had a rash that looked like shingles.  The rash itched and when she scratched it it would feel like it was burning.  The rash did dry up and move to other areas.  She is not currently bothered by rash as part of her current symptoms    She also notes that she continues to have trouble swallowing since her neck surgery.  She has had an MRI of her cervical spine and the swallowing study.    Review of Systems   Constitutional: Positive for chills, fatigue and fever. Negative for appetite change, diaphoresis and unexpected weight change.   HENT: Positive for congestion, ear pain, sore throat and trouble swallowing. Negative for drooling, ear discharge, postnasal drip, rhinorrhea, sinus pressure and sneezing.    Eyes: Negative for pain, discharge and visual disturbance.   Respiratory: Positive for shortness of breath. Negative for cough, chest tightness, wheezing and stridor.    Cardiovascular: Negative for chest pain, palpitations and leg swelling.   Gastrointestinal: Positive for diarrhea. Negative for abdominal distention, abdominal pain, blood in stool, constipation, nausea and vomiting.    Musculoskeletal: Negative for neck pain.   Skin: Negative for rash.   Neurological: Positive for headaches.       Objective:      Physical Exam  Constitutional:       General: She is not in acute distress.     Appearance: Normal appearance.   Neurological:      Mental Status: She is alert.   Psychiatric:         Attention and Perception: Attention and perception normal.         Mood and Affect: Mood and affect normal.         Speech: Speech normal.         Behavior: Behavior normal. Behavior is cooperative.         Thought Content: Thought content normal.         Cognition and Memory: Cognition and memory normal.         Judgment: Judgment normal.       There were no vitals taken for this visit.There is no height or weight on file to calculate BMI.          A/P:  1) sinusitis.  Persistent.  I am going to treat her with a course of Biaxin.  If her symptoms do not improve or if she continues to have persistent fevers, she will let me know   2)  Dysphagia.  Persistent.  We will refer her to GI for further evaluation      I will see her back in 4 weeks and 8 weeks for follow-up or sooner if needed      The patient location is: home  The chief complaint leading to consultation is Sinus Problem     Visit type: Virtual visit with synchronous audio and video    Each patient to whom he or she provides medical services by telemedicine is:  (1) informed of the relationship between the physician and patient and the respective role of any other health care provider with respect to management of the patient; and (2) notified that he or she may decline to receive medical services by telemedicine and may withdraw from such care at any time.    I spent 21 minutes on this encounter.  This time includes face-to-face time and non-face to face time including previsit chart review, obtaining and/or reviewing separately obtained history, documenting clinical information in the electronic or other health record, independently  interpreting results and communicating results to the patient/family/caregiver, or care coordinator.

## 2022-08-14 NOTE — TELEPHONE ENCOUNTER
Pls see email instructions and orders  Also, she's overdue for her mammo so let's try to get that done Monday too while she's there

## 2022-08-14 NOTE — TELEPHONE ENCOUNTER
History  Chief Complaint   Patient presents with    Fall     Tripped and fell, landed on b/l knees and hands  Denies hitting head, no LOC  No thinners  Did bite inside of right lip when fell to ground  Complained of neck pain while in ambulance, c-collar applied  Denies neck or back pain at this time  Abrasions to b/l knees     69 yr female this am accidentally tripped over parking lot barrier and fell onto bilateral knees  Did hit mouth -- no loc- pt remembers entire event- no other comps or injuries       History provided by:  Patient and spouse   used: No    Fall      Prior to Admission Medications   Prescriptions Last Dose Informant Patient Reported? Taking?    Multiple Vitamin (MULTI-VITAMIN DAILY) TABS  Self Yes No   Sig: Take by mouth   calcium carbonate (OS-FELICITA) 600 MG tablet   Yes No   Sig: Take 600 mg by mouth 2 (two) times a day with meals   cholecalciferol (VITAMIN D3) 1,000 units tablet   Yes No   Sig: Take 1,000 Units by mouth daily   fexofenadine (ALLEGRA) 180 MG tablet   Yes No   Sig: Take 180 mg by mouth as needed   glucose blood test strip   Yes No   Sig: Use 1 each daily as needed Use as instructed   polyethylene glycol (GLYCOLAX) 17 GM/SCOOP powder   No No   Sig: Take 17 g by mouth daily   senna-docusate sodium (SENOKOT-S) 8 6-50 mg per tablet   Yes No   Sig: Take 2 tablets by mouth 2 (two) times a day as needed      Facility-Administered Medications: None       Past Medical History:   Diagnosis Date    Allergic rhinitis 12/2/2021    Bilateral hearing loss 4/29/2022    Bilateral impacted cerumen 4/29/2022    BMI 32 0-32 9,adult 4/29/2022    BMI 33 0-33 9,adult 12/2/2021    Cataract, bilateral     Constipation     Constipation 12/2/2021    Elevated glucose level     Fatigue     History of anemia     Hypertriglyceridemia     Hypertriglyceridemia 12/2/2021    Medicare annual wellness visit, subsequent 12/2/2021    Obesity     Osteopenia     Osteopenia of multiple Pt's message has been faxed to Dr. Pace's office successfully for advice.    sites 2022    Pain of left heel 2021    Post-menopausal     Rash     Vitamin D deficiency     Vitamin D deficiency 2021       Past Surgical History:   Procedure Laterality Date    CATARACT EXTRACTION, BILATERAL  2018     SECTION      COLONOSCOPY  2014    Advise f/u colonoscopy; see by [facility] 2020 at office; she is supposed to have a colonoscopy, but due to Covid 19, she wants to hold for now     DXA PROCEDURE (HISTORICAL)  2020    MAMMO (HISTORICAL)  2020       Family History   Problem Relation Age of Onset    Diabetes Mother     Other Mother         Memory impairment, at old age   Arabella Kaur No Known Problems Father     Diabetes Sister     Diabetes Brother     No Known Problems Daughter     No Known Problems Son     No Known Problems Maternal Grandmother     No Known Problems Maternal Grandfather     No Known Problems Paternal Grandmother     No Known Problems Paternal Grandfather     Diabetes Sister     No Known Problems Sister     No Known Problems Sister     Diabetes Brother     No Known Problems Brother     No Known Problems Brother     No Known Problems Daughter     No Known Problems Daughter     No Known Problems Daughter     No Known Problems Daughter     No Known Problems Maternal Aunt     No Known Problems Paternal Aunt      I have reviewed and agree with the history as documented  E-Cigarette/Vaping    E-Cigarette Use Never User      E-Cigarette/Vaping Substances     Social History     Tobacco Use    Smoking status: Never Smoker    Smokeless tobacco: Never Used   Vaping Use    Vaping Use: Never used   Substance Use Topics    Alcohol use: Yes     Alcohol/week: 1 0 standard drink     Types: 1 Cans of beer per week     Comment: occasional    Drug use: No       Review of Systems   Constitutional: Negative  HENT: Negative  Eyes: Negative  Respiratory: Negative  Cardiovascular: Negative  Gastrointestinal: Negative  Endocrine: Negative  Genitourinary: Negative  Musculoskeletal: Negative  Bilateral knee contusions   Skin: Positive for wound  Negative for color change, pallor and rash  Lower lip intra-oral injury - bilateral k nee abrasions   Allergic/Immunologic: Negative  Neurological: Negative  Hematological: Negative  Psychiatric/Behavioral: Negative  Physical Exam  Physical Exam  Vitals and nursing note reviewed  Constitutional:       General: She is not in acute distress  Appearance: Normal appearance  She is not ill-appearing, toxic-appearing or diaphoretic  Comments: avss-- htnsive-- pulse ox 99 % on ra- interpretation is normal- no intervention    HENT:      Head: Normocephalic  Comments: No scalp tenderness/contusion/ hematoma     Right Ear: Tympanic membrane, ear canal and external ear normal  There is no impacted cerumen  Left Ear: Tympanic membrane, ear canal and external ear normal  There is no impacted cerumen  Nose: Nose normal  No congestion or rhinorrhea  Mouth/Throat:      Mouth: Mucous membranes are moist       Pharynx: Oropharynx is clear  No oropharyngeal exudate or posterior oropharyngeal erythema  Comments: No dental / mandibular injury - r intra-oral lower lip contusion- no lac   Eyes:      General: No scleral icterus  Right eye: No discharge  Left eye: No discharge  Extraocular Movements: Extraocular movements intact  Conjunctiva/sclera: Conjunctivae normal       Pupils: Pupils are equal, round, and reactive to light  Comments: Mm pink   Neck:      Vascular: No carotid bruit  Comments: No pmt c/t/l/s spine   Cardiovascular:      Rate and Rhythm: Normal rate and regular rhythm  Pulses: Normal pulses  Heart sounds: Normal heart sounds  No murmur heard  No friction rub  No gallop  Pulmonary:      Effort: Pulmonary effort is normal  No respiratory distress        Breath sounds: Normal breath sounds  No stridor  No wheezing, rhonchi or rales  Chest:      Chest wall: No tenderness  Abdominal:      General: Bowel sounds are normal  There is no distension  Palpations: Abdomen is soft  There is no mass  Tenderness: There is no abdominal tenderness  There is no right CVA tenderness, left CVA tenderness, guarding or rebound  Hernia: No hernia is present  Comments: Soft nt/nd- no hsm- no cva tenderness- no peritoneal signs    Musculoskeletal:         General: Tenderness and signs of injury present  No swelling or deformity  Normal range of motion  Cervical back: Normal range of motion and neck supple  No rigidity or tenderness  Right lower leg: No edema  Left lower leg: No edema  Comments: bilateral anterior knee superficial abrasions- with tenderness- normal flex/ext- no lig laxity- no effusions    Lymphadenopathy:      Cervical: No cervical adenopathy  Skin:     General: Skin is warm and dry  Capillary Refill: Capillary refill takes less than 2 seconds  Coloration: Skin is not jaundiced or pale  Findings: No bruising, erythema, lesion or rash  Comments: See above for bilateral knee abrasions    Neurological:      General: No focal deficit present  Mental Status: She is alert and oriented to person, place, and time  Mental status is at baseline  Cranial Nerves: No cranial nerve deficit  Sensory: No sensory deficit  Motor: No weakness  Coordination: Coordination normal       Gait: Gait normal       Comments: Normal non focal neuro exam    Psychiatric:         Mood and Affect: Mood normal          Behavior: Behavior normal          Thought Content:  Thought content normal          Judgment: Judgment normal          Vital Signs  ED Triage Vitals [08/10/22 1000]   Temperature Pulse Respirations Blood Pressure SpO2   98 °F (36 7 °C) 89 18 169/90 98 %      Temp Source Heart Rate Source Patient Position - Orthostatic VS BP Location FiO2 (%)   Oral Monitor -- Right arm --      Pain Score       5           Vitals:    08/10/22 1000   BP: 169/90   Pulse: 89         Visual Acuity  Visual Acuity    Flowsheet Row Most Recent Value   L Pupil Size (mm) 3   R Pupil Size (mm) 3          ED Medications  Medications   bacitracin topical ointment 1 small application (1 small application Topical Given 8/10/22 1055)       Diagnostic Studies  Results Reviewed     None                 XR knee 4+ views Right injury   Final Result by Ezequiel Claire MD (08/10 1154)      No acute osseous abnormality  Workstation performed: [id number]         XR knee 4+ views left injury   Final Result by Cheyanne Hathaway MD (08/10 1204)   Mild tricompartmental degenerative arthritis      No acute osseous abnormality  Workstation performed: [id number]                    Procedures  Procedures         ED Course  ED Course as of 08/14/22 1628   Wed Aug 10, 2022   1138 Bilateral knee xrays- no fx seen    1201 Er md procedure note for wound care for  bilateral anterior  knee abrasions -- areas washed with chlorohexodine wash  followed by  bacitracin dressing cling wrap                                SBIRT 20yo+    Flowsheet Row Most Recent Value   SBIRT (23 yo +)    In order to provide better care to our patients, we are screening all of our patients for alcohol and drug use  Would it be okay to ask you these screening questions?  No Filed at: 08/10/2022 1016                    MDM    Disposition  Final diagnoses:   Fall, initial encounter   Contusion of lip, initial encounter   Contusion of right upper arm, initial encounter   Contusion of right knee, initial encounter   Contusion of left knee, initial encounter   Abrasion of left knee, initial encounter   Abrasion of right knee, initial encounter     Time reflects when diagnosis was documented in both MDM as applicable and the Disposition within this note     Time User Action Codes Description Comment    8/10/2022 12:03 PM Jovan Brisker Add [G57  Michelle Arrow Fall, initial encounter     8/10/2022 12:03 PM Jovan Brisker Add [C71 924W] Contusion of lip, initial encounter     8/10/2022 12:03 PM Jovan Brisker Add [F92 [id number]] Contusion of right upper arm, initial encounter     8/10/2022 12:03 PM Jovan Brisker Add [S80 [id number]] Contusion of right knee, initial encounter     8/10/2022 12:03 PM Jovan Brisker Add [S80 02XA] Contusion of left knee, initial encounter     8/10/2022 12:04 PM Jovan Brisker Add [G87 [id number]] Abrasion of left knee, initial encounter     8/10/2022 12:04 PM Jovan Brisker Add [Q19 [id number]] Abrasion of right knee, initial encounter       ED Disposition     ED Disposition   Discharge    Condition   Stable    Date/Time   Wed Aug 10, 2022 [address] discharge to home/self care  Follow-up Information    None         Discharge Medication List as of 8/10/2022 12:07 PM      CONTINUE these medications which have NOT CHANGED    Details   calcium carbonate (OS-FELICITA) 600 MG tablet Take 600 mg by mouth 2 (two) times a day with meals, Historical Med      cholecalciferol (VITAMIN D3) 1,000 units tablet Take 1,000 Units by mouth daily, Historical Med      fexofenadine (ALLEGRA) 180 MG tablet Take 180 mg by mouth as needed, Historical Med      glucose blood test strip Use 1 each daily as needed Use as instructed, Historical Med      Multiple Vitamin (MULTI-VITAMIN DAILY) TABS Take by mouth, Historical Med      polyethylene glycol (GLYCOLAX) 17 GM/SCOOP powder Take 17 g by mouth daily, Starting Thu 12/2/2021, Normal      senna-docusate sodium (SENOKOT-S) 8 6-50 mg per tablet Take 2 tablets by mouth 2 (two) times a day as needed, Historical Med             No discharge procedures on file      PDMP Review     None          ED Provider  Electronically Signed by           Shayy Moreno MD  08/14/22 9081

## 2022-08-22 ENCOUNTER — OFFICE VISIT (OUTPATIENT)
Dept: OBSTETRICS AND GYNECOLOGY | Facility: CLINIC | Age: 47
End: 2022-08-22
Payer: MEDICARE

## 2022-08-22 VITALS
HEIGHT: 67 IN | DIASTOLIC BLOOD PRESSURE: 74 MMHG | SYSTOLIC BLOOD PRESSURE: 118 MMHG | BODY MASS INDEX: 40.76 KG/M2 | WEIGHT: 259.69 LBS

## 2022-08-22 DIAGNOSIS — R87.612 PAP SMEAR ABNORMALITY OF CERVIX WITH LGSIL: Primary | ICD-10-CM

## 2022-08-22 PROCEDURE — 99213 PR OFFICE/OUTPT VISIT, EST, LEVL III, 20-29 MIN: ICD-10-PCS | Mod: S$GLB,,, | Performed by: SPECIALIST

## 2022-08-22 PROCEDURE — 3074F PR MOST RECENT SYSTOLIC BLOOD PRESSURE < 130 MM HG: ICD-10-PCS | Mod: CPTII,S$GLB,, | Performed by: SPECIALIST

## 2022-08-22 PROCEDURE — 4010F ACE/ARB THERAPY RXD/TAKEN: CPT | Mod: CPTII,S$GLB,, | Performed by: SPECIALIST

## 2022-08-22 PROCEDURE — 1159F MED LIST DOCD IN RCRD: CPT | Mod: CPTII,S$GLB,, | Performed by: SPECIALIST

## 2022-08-22 PROCEDURE — 99999 PR PBB SHADOW E&M-EST. PATIENT-LVL IV: CPT | Mod: PBBFAC,,, | Performed by: SPECIALIST

## 2022-08-22 PROCEDURE — 3074F SYST BP LT 130 MM HG: CPT | Mod: CPTII,S$GLB,, | Performed by: SPECIALIST

## 2022-08-22 PROCEDURE — 3044F PR MOST RECENT HEMOGLOBIN A1C LEVEL <7.0%: ICD-10-PCS | Mod: CPTII,S$GLB,, | Performed by: SPECIALIST

## 2022-08-22 PROCEDURE — 3078F PR MOST RECENT DIASTOLIC BLOOD PRESSURE < 80 MM HG: ICD-10-PCS | Mod: CPTII,S$GLB,, | Performed by: SPECIALIST

## 2022-08-22 PROCEDURE — 99213 OFFICE O/P EST LOW 20 MIN: CPT | Mod: S$GLB,,, | Performed by: SPECIALIST

## 2022-08-22 PROCEDURE — 88141 CYTOPATH C/V INTERPRET: CPT | Mod: ,,, | Performed by: PATHOLOGY

## 2022-08-22 PROCEDURE — 4010F PR ACE/ARB THEARPY RXD/TAKEN: ICD-10-PCS | Mod: CPTII,S$GLB,, | Performed by: SPECIALIST

## 2022-08-22 PROCEDURE — 88141 PR  CYTOPATH CERV/VAG INTERPRET: ICD-10-PCS | Mod: ,,, | Performed by: PATHOLOGY

## 2022-08-22 PROCEDURE — 87624 HPV HI-RISK TYP POOLED RSLT: CPT | Performed by: SPECIALIST

## 2022-08-22 PROCEDURE — 88175 CYTOPATH C/V AUTO FLUID REDO: CPT | Performed by: PATHOLOGY

## 2022-08-22 PROCEDURE — 3044F HG A1C LEVEL LT 7.0%: CPT | Mod: CPTII,S$GLB,, | Performed by: SPECIALIST

## 2022-08-22 PROCEDURE — 3078F DIAST BP <80 MM HG: CPT | Mod: CPTII,S$GLB,, | Performed by: SPECIALIST

## 2022-08-22 PROCEDURE — 3008F PR BODY MASS INDEX (BMI) DOCUMENTED: ICD-10-PCS | Mod: CPTII,S$GLB,, | Performed by: SPECIALIST

## 2022-08-22 PROCEDURE — 1159F PR MEDICATION LIST DOCUMENTED IN MEDICAL RECORD: ICD-10-PCS | Mod: CPTII,S$GLB,, | Performed by: SPECIALIST

## 2022-08-22 PROCEDURE — 99999 PR PBB SHADOW E&M-EST. PATIENT-LVL IV: ICD-10-PCS | Mod: PBBFAC,,, | Performed by: SPECIALIST

## 2022-08-22 PROCEDURE — 3008F BODY MASS INDEX DOCD: CPT | Mod: CPTII,S$GLB,, | Performed by: SPECIALIST

## 2022-08-24 LAB
HPV HR 12 DNA SPEC QL NAA+PROBE: NEGATIVE
HPV16 AG SPEC QL: NEGATIVE
HPV18 DNA SPEC QL NAA+PROBE: NEGATIVE

## 2022-08-30 ENCOUNTER — PATIENT MESSAGE (OUTPATIENT)
Dept: OBSTETRICS AND GYNECOLOGY | Facility: CLINIC | Age: 47
End: 2022-08-30
Payer: MEDICARE

## 2022-08-30 LAB
COMMENT: ABNORMAL
FINAL PATHOLOGIC DIAGNOSIS: ABNORMAL
Lab: ABNORMAL

## 2022-09-01 ENCOUNTER — TELEPHONE (OUTPATIENT)
Dept: NEUROLOGY | Facility: CLINIC | Age: 47
End: 2022-09-01
Payer: MEDICARE

## 2022-09-01 ENCOUNTER — PATIENT MESSAGE (OUTPATIENT)
Dept: NEUROLOGY | Facility: CLINIC | Age: 47
End: 2022-09-01
Payer: MEDICARE

## 2022-09-01 NOTE — TELEPHONE ENCOUNTER
Called scheduled virtual appointment to discuss MRI results,  rescheduled Botox appt. Confirmed date and time.

## 2022-09-01 NOTE — TELEPHONE ENCOUNTER
----- Message from Vickie Mathis sent at 9/1/2022  3:01 PM CDT -----  Regarding: Reschedule Botox Appointment Request  Contact: patient  Type:  Reschedule Appointment Request      Name of Caller:  Patient  Best Call Back Number:  [phone] (home)   Additional Information:  Patient is requesting to reschedule her Botox appointment please contact the patient to reschedule. Thanks!

## 2022-09-02 ENCOUNTER — OFFICE VISIT (OUTPATIENT)
Dept: NEUROLOGY | Facility: CLINIC | Age: 47
End: 2022-09-02
Payer: MEDICARE

## 2022-09-02 DIAGNOSIS — M54.2 CERVICALGIA: ICD-10-CM

## 2022-09-02 DIAGNOSIS — M47.812 CERVICAL SPONDYLOSIS: Primary | ICD-10-CM

## 2022-09-02 PROCEDURE — 4010F ACE/ARB THERAPY RXD/TAKEN: CPT | Mod: CPTII,95,, | Performed by: PSYCHIATRY & NEUROLOGY

## 2022-09-02 PROCEDURE — 1160F PR REVIEW ALL MEDS BY PRESCRIBER/CLIN PHARMACIST DOCUMENTED: ICD-10-PCS | Mod: CPTII,95,, | Performed by: PSYCHIATRY & NEUROLOGY

## 2022-09-02 PROCEDURE — 3044F PR MOST RECENT HEMOGLOBIN A1C LEVEL <7.0%: ICD-10-PCS | Mod: CPTII,95,, | Performed by: PSYCHIATRY & NEUROLOGY

## 2022-09-02 PROCEDURE — 1159F PR MEDICATION LIST DOCUMENTED IN MEDICAL RECORD: ICD-10-PCS | Mod: CPTII,95,, | Performed by: PSYCHIATRY & NEUROLOGY

## 2022-09-02 PROCEDURE — 4010F PR ACE/ARB THEARPY RXD/TAKEN: ICD-10-PCS | Mod: CPTII,95,, | Performed by: PSYCHIATRY & NEUROLOGY

## 2022-09-02 PROCEDURE — 99213 OFFICE O/P EST LOW 20 MIN: CPT | Mod: 95,,, | Performed by: PSYCHIATRY & NEUROLOGY

## 2022-09-02 PROCEDURE — 1160F RVW MEDS BY RX/DR IN RCRD: CPT | Mod: CPTII,95,, | Performed by: PSYCHIATRY & NEUROLOGY

## 2022-09-02 PROCEDURE — 1159F MED LIST DOCD IN RCRD: CPT | Mod: CPTII,95,, | Performed by: PSYCHIATRY & NEUROLOGY

## 2022-09-02 PROCEDURE — 99213 PR OFFICE/OUTPT VISIT, EST, LEVL III, 20-29 MIN: ICD-10-PCS | Mod: 95,,, | Performed by: PSYCHIATRY & NEUROLOGY

## 2022-09-02 PROCEDURE — 3044F HG A1C LEVEL LT 7.0%: CPT | Mod: CPTII,95,, | Performed by: PSYCHIATRY & NEUROLOGY

## 2022-09-02 NOTE — PROGRESS NOTES
[facility]- Headache Clinic    The patient location is: [place]  The chief complaint leading to consultation is: chronic migraine     Visit type:tele audiovisual     Face to Face time with patient: 15 minutes     25 minutes of total time spent on the encounter, which includes face to face time and non-face to face time preparing to see the patient (eg, review of tests), Obtaining and/or reviewing separately obtained history, Documenting clinical information in the electronic or other health record, Independently interpreting results (not separately reported) and communicating results to the patient/family/caregiver, or Care coordination (not separately reported).         Each patient to whom he or she provides medical services by telemedicine is:  (1) informed of the relationship between the physician and patient and the respective role of any other health care provider with respect to management of the patient; and (2) notified that he or she may decline to receive medical services by telemedicine and may withdraw from such care at any time.    Notes:     Chief complaint: chronic migraine    S:    46 Y RH F with PMHx of depression,  PTSD, borderline personality disorder, fibromyalgia,IBS, PATRICIA on CPAP, s/p posterior fossa decompression for Chiari malformation (9mm in 2018), s/p gastric bypass,  carnitine deficiency, cervical spondylosis, migraine and hypothyroidism who presents for further follow up of neck pain. She reports that her migraine has been better since initial Botox on 6/22. Her main concern today is her neck pain. Reports that neck pain seems to be worsening despite migraine improving. She would like to rediscuss the MRI findings. Tells me that she is continuing to fall and is not sure why. She is not dizzy, just mentions legs give out on her. This has been ongoing for some time. She mentions that she read again on her C spine MRI results and is concerned about her cervical spinal cord  and concerned with what she is reading. She would like to discuss the results and is interested in a referral to spinal surgery/neurosurgery for evaluation and to review MRI to see if she is a surgical candidate although she does not want to just do surgery again if she can avoid it. Discussed that she has not found any of the interventions by pain management have been helpful . Her migraine has improved, but her neck pain is just worse. Most of the pain is in the midline and then will have shooting pain into left trapezius area, much worse with movements, when her neck moves she hears crunching and popping and is growing concerned that the degenerative changes were noted to be progressed from prior imaging in 2019. There isno bowel/bladder problems and no weakness in Ues, but she does feel that she was stronger prior to her chiari decompression.       Headache history form initial history :  Age at onset and course over time: 2011 and worse over time, this happened during a time when she had a long commute. Neck was her worst pain - just left to midline of middle neck. Was going to [facility] pain management receiving epidural steroids which would last only 2 weeks. She had a CMBB and was planning for RFA but wanted to get cervical MRI first and second opinion. She was seen by Dr. Samano and her hsitory with mutliple treatments is above on my chart     On chart review conducted today patient was followed by Dr. Samano ([facility] specialist) for migraine and also for chronic neck pain (cervical spondylosis) sicne 3/18 at which time Dr. Samano diagnosed Chiari malformation (extending 9 mm inferior to the foramen magnum with so-called pegging of the cerebellar tonsil) , headache, cervical spondylosis w/o myelopathy and radiculopathy, and cervical myofascial pain. Placed on tizandine qhs, prn methocarbamol and tramadol q6h PRN. An Mri brain with CINE was performed that did not show CSF flow  obstruction. . She had severe pain by 3/18 an ONB with steroids/anesthetic and TPIs was done by Dr. Samano multiple times. She continued seeing Dr. Fuentes for cognitive complaints and ataxia/eyelid ptosis. She had workup with EMG which did not show any signs of myopathy, myasthenia with rep stim, or peripheral neuropathy. Bloodwork looking for vitamin deficiencies done which were normal as well.  She was evaluated by  and was given in 2018 levocarnitine,  NAC injections and marinol for nausea (helps significantly). Per notes levocarnitine helps with her energy level. She had posterior fossa decompression on 12/18 and had severe headache and neck pain after that. Per notes valsalva induced headaches resolved. She was started on Emgality on 9/19 and continued on gabapentin 600mg TID and prn tramadol. She was seen by Dr. Samano on 7/20 at which time patient had significant neck pain and was seen by Dr. Samano for diagnostic b/l cervical MBB C3-4-5 which she had 80% relief x 6 hours x 2 , then n 8/27/2020 s/p bilateral cervical medial branch nerve RFA at C3-4-5.  She mentions that this provided significant improvement to her neck pain. She mentions that she has been having significant improvement in her symptoms of neck pain.   She mentions that the headache that it's left temporal side with photophobia/phonophobia the Emgality helps a lot and reduces them. i    Valsalva triggered headaches: lifting, laughing and coughing will trigger severe headache for seconds to a minute and will keep going if she does not stay still. This has been happening for about 10 years. She mentions that this did not get better after  decompression surgery. She mentions that this never improved with surgery and continues. Frequency of these depending on activity. Since being off gabapentin felt these worsened significantly.     Migraine: typically starts left temple and will spread out from there. These are happening on average 3-4 days  "a week. They will last a few hours and will treat them with sleep and will try to go to sleep or will lay there still in dark, quiet, cold and rest.  She mentions that it will improve. She mentions that has some headache free times, but at times can be 2/10 in intensity. She mentions that she can having also squeezing/twisting brain. She mentions that with those she had a panic and thought she had to go to hospital. She mentions that she had to stop everything and had to rest.     Location: neck (sharp, stabbing); hands and feet (tingling, numb), hip/lower back (dull, aching). Also has intermittent left sided headaches starting occipitally radiating to temple.   Quality: as above  Severity: range 2-10/10   Duration: hours  Frequency: daily  Alleviated by: medication, ice  Exacerbated by: movement, light noise (head pain)  Associated with: left arm/hand gets numb/weak "a lot" no radicular pain; headaches on left which are random or triggered by straining in restroom/coughing/laughing. Headaches worst in the AM within 2 hours of waking up. Headaches do not wake her up but neck pain does. Having some depth perception issues causing her to break her foot - eyes checked and were ok. Transient visual loss? When in a lof of pain, not clear TVO with valsalva.   Sleep habits:    Acute treatment:  Current acute treatment:  -- Sumatriptan 100mg - helps some   --Nurtec 75mg- significant benefit, will reduce/resolve attacks that are more migraine.    Tried:  -- TENS   -- tramadol - helped with overall pain   -- methocarbamol - helped with overall pain   -- relafen - did not help     Preventive:  Current prevention:  -- Emgality 120 mg every 28 days, notices wearing off effect 3 days prior - helps the intensity and has about 10 days of freedom with it.   --Tizanidine 4 mg 1-2 times a day typically - feels this helps more than anything for acute occipital pain  -- gabapentin 600mg twice a day - helps the intensity of pain "   --candesartan 8mg - migraine has improved, and her Bps are doing much better.   --botox 155 units (started on 6/22- present): over 50% improvement     (wellbutrin)  (prozac)  (prazosin)      Previously tried:  -- for behavioral health is on welbutrin xl 150mg and Fluoxetine 40mg   -- takes vyvanse   --amitriptyline 100 mg nightly mainly for insomnia, no help headache   --trazodone 200mg for insomnia- no worsening of headache   --zonisamide 150mg nightly - caused nausea/vomiting   -- propranolol 20mg tid - some benefit   -- prozac 40mg - for mod   -- wellbutrin XL 150mg- no benefit in headache     Procedural history:   -- cervical GREGOR  -- lumbar GREGOR  -- chiropractor for hip /neck - helped only hip  -- PT - none for neck just foot     Neuroimaging and other studies:   MRI C spine 7/15/22:  FINDINGS:  Alignment: Slight reversal of the normal cervical lordosis.  Anterolisthesis of C4 on C5, 2 mm.     Vertebral Column: Vertebral body heights are maintained.  No evidence of an acute fracture or aggressive marrow replacement process. Multilevel disc degeneration with disc desiccation at C2-C6, most pronounced at C5-6 and C6-7 with mild intervertebral disc space narrowing, degenerative endplate change and anterior marginal osteophyte formation.     Cord: Normal signal and morphology.     Skull base and craniocervical junction: Stable postoperative changes of suboccipital craniectomy for Chiari 1 malformation decompression.     Degenerative findings:     C2-C3: Mild posterior disc osteophyte complex.  Mild bilateral facet arthropathy.  There is no neural foraminal stenosis.  There is no spinal canal stenosis.     C3-C4: The disc is normal in configuration.  Mild bilateral facet arthropathy.  There is no neural foraminal stenosis.  There is no spinal canal stenosis.     C4-C5: Anterolisthesis with uncovering of the disc.  Small central disc protrusion.  Mild bilateral facet arthropathy.  No neural foraminal stenosis.  There  is no spinal canal stenosis.     C5-C6: Right asymmetric posterior disc osteophyte complex with right lateral disc extrusion, descending to the C6 infrapedicular level, which abuts the right ventral cord surface.  Mild bilateral facet arthropathy.  Mild right uncovertebral joint spurring.  Mild right neural foraminal stenosis.  No significant overall spinal canal stenosis.     C6-C7: Right asymmetric posterior disc osteophyte complex.  Mild bilateral facet arthropathy.  Mild bilateral uncovertebral joint spurring.  Mild left neural foraminal stenosis.  There is no spinal canal stenosis.     C7-T1: The disc is normal in configuration.  Mild bilateral facet arthropathy.  There is no neural foraminal stenosis.  There is no spinal canal stenosis.     Paraspinal muscles & soft tissues: No significant abnormality.     Normal signal voids are present in the vertebral arteries.     Impression:     1. Multilevel degenerative change of the cervical spine, as described above, mildly progressed in comparison to the prior study from 2019.  Findings contribute to mild neural foraminal stenosis on the right at C5-6 and on the left at C6-7.  No significant spinal canal stenosis.  2. Right asymmetric posterior disc osteophyte complex with right lateral disc extrusion at C5-6, which abuts the right ventral cord surface.  No cord compression or focal cord signal abnormality.  3. Stable postoperative changes of suboccipital craniectomy for Chiari 1 malformation decompression.      On today's virtual visit I went over the imaging with patient with patient seeing my screen as we discussed imaging.   MRI brain 7/15/22:  Impression:     1. No evidence of an acute intracranial abnormality or significant interval detrimental change as compared to the prior study.  2. Stable postoperative changes of suboccipital craniectomy for Chiari I malformation decompression.        MRA head 5/6/21:  FINDINGS:  Anterior circulation: There is normal flow  related signal intensity within the petrous and cavernous segments of the internal carotid arteries.  The supraclinoid internal carotid arteries are normal as is the carotid terminus bilaterally.  There is normal branching into the anterior and middle cerebral arteries.  These vessels are patent.  There is no critical stenosis, occlusion, thrombosis, dissection, aneurysm or other vascular malformation.     Posterior circulation: The vertebral arteries are patent.  The basilar artery is normal in caliber and contour.  The basilar tip is normal.  There is normal branching into the superior cerebellar and posterior cerebral arteries.  There is no critical stenosis, occlusion, thrombosis, dissection, aneurysm.     There has been previous decompression for Chiari malformation.     Impression:     1. Normal brain MRA.  2. Previous decompression for Chiari malformation  Results for orders placed or performed in visit on 08/11/20   MRI Brain Without Contrast    Narrative    EXAMINATION:  MRI BRAIN WITHOUT CONTRAST    CLINICAL HISTORY:  Repeated falls.  History of posterior fossa decompression.; Repeated falls    TECHNIQUE:  Multiplanar MR imaging of the brain was performed without contrast.    COMPARISON:  Head CT 06/09/2019    FINDINGS:  No acute infarct, mass effect or hemorrhage.  Brain parenchyma has a normal signal.  Ventricles and sulci are proportionate.    No abnormal extra-axial fluid collections.    Flow voids are maintained in the intracranial arteries and dural venous sinuses.    Suboccipital craniotomy changes are stable.  Configuration of the cerebellar vermis appears stable compared to the prior CT given differences in modality.      Impression    1. No acute findings in the brain.  2. Stable postoperative changes of posterior decompression      Electronically signed by: Brandin Avila MD  Date:    08/11/2020  Time:    16:43   Results for orders placed or performed during the hospital encounter of 06/09/19    CT Head Without Contrast    Narrative    EXAMINATION:  CT HEAD WITHOUT CONTRAST    CLINICAL HISTORY:  Head trauma, headache;    TECHNIQUE:  Low dose axial images were obtained through the head.  Coronal and sagittal reformations were also performed. Contrast was not administered.    COMPARISON:  12/14/2018    RADIATION DOSE:  1099 DLP    FINDINGS:  Postoperative changes of a suboccipital craniectomy for posterior fossa decompression.    Hyperostosis frontalis interna noted.    Brain parenchyma otherwise normally formed without midline shift or mass effect.  No hydrocephalus.  No acute intracranial hemorrhage.    Ventricles are normal in size and configuration.  Physiologic calcifications are present.      Impression    1. No acute intracranial abnormality identified.  Postoperative changes as discussed.  2. [place] agreement.      Electronically signed by: Anton Kenney MD  Date:    06/09/2019  Time:    11:41   Results for orders placed or performed during the hospital encounter of 02/05/18   MRI Brain W WO Contrast    Narrative    Routine Multiplanar imaging through this 42-year-old females brain was obtained with utilization of 10 cc of gadolinium contrast    Hyperostosis frontalis appears to be present.     A Chiari one malformation appears to be present with the cerebellar tonsils extending 9 mm inferior to the foramen magnum with so-called pegging of the cerebellar tonsils.    An empty sella configuration of the pituitary gland is noted.    No diffusion positivity is identified to suggest recent infarction.    No gradient  susceptibility was noted just just presence of acute blood products.    No worrisome enhancing lesions are noted intracranially    No diffusion positivity is identified to suggest recent infarction.    Impression    1. The cerebellar tonsils appear to extend inferior to the foramen magnum suggestive of a Chiari one malformation. No obvious cord signal abnormalities noted within the  visualized cord, given the patient's history further evaluation of the cord may be of use to exclude a syrinx        Electronically signed by: Ismael Richmond MD  Date:     02/05/18  Time:    12:04      ROS: The fourteen point review of system was performed and positive for HPI reports    PHYSICAL EXAMINAtION  There were no vitals filed for this visit.   General: The patient is a well-developed, well-nourished looking of stated age in acute distress. She is in dark room.   NEURO EXAM  General: This is a well-nourished and well-developed person, who is afebrile, fully ambulatory and alert.  No acute distress  Mental Status: Oriented to person, place, and date. Speech is of normal clarity, inflection, pace and volume. No aphasia, no dysarthria   Cranial Nerves: no facial weakness, EOMI,         Impression:    Chronic neck pain/cervical spondylosis- referring to pain clinic as Dr. Samano did RFA which helped significantly. Mri C spine that was performed for the worsening neck pain showed multilevel degenerative changes without any spinal cord compression or stenosis,  DJD change mild foraminal stenosis on Rt C5-6 and left C6-7. There is no cord signal abnormalities, no syrinx. I had discussed these finding on our last appt 1 month ago. On today's visit she had multiple questions and concerns. We went over the actual images during the visit and I have pointed out the MRI findings from radiology report. Discussed that there is space in her spinal cord . Discussed what spinal cord stenosis vs. Neuroforaminal stenos is. Discussed the option of PT, but she would like someone that can come to her home and help her. She is interested to discuss with surgery her imaging and what management options there are.     Other conditions:  Chronic migraine without aura:  She has complex headache history which includes valsalva induced headaches , tonsillar herniation of 9mm on MRI brain which was decompressed in 2018, but headache/neck  pain worsened after that. Over the years multiple medications tried. CGRP Mab Emgality has been effective at reducing from 30/30 days to 15-20 days/month headache, she has found gabapentin 600mg bid helps some of the neck pain and the occipital headache , did much worse off it, she also finds tizanidnie is what works really well dave used to help cervical myofascial pain and also some occipital pain. The worse pain currently is occipital headache which is an exploding out pressure she wakes up 3-4 times a week and it's completely disabling. This she does not necessarily count it as a migraine, but it has all migraine features and then has more temporal pain on left which 2 days a  Week. She has persistent allodynia in left occipital area in are of the decompressive surgery. She is having worsening valsalva induced headache as well as vision obscurations with them and dizziness (has been for the last few months).  Neuroimaging did not reveal any abnormalities . She has noted significant benefit with start of BTX on 6/22 already. We will continue current regimen     S/p Chiari malformation decompression surgery - avoid ONBs     Valsalva induced headaches- unclear if this is primary vs. Secondary (her Chiari malformation operated on, this should have resolved if only Chiari, no concern about SIH from prior specialists) , MRA head was normal, no vascular malformation, possibly related to prior Chiari given the symptoms of visual and also short duration of the event, no signs of SIH on recent MRI brain.     Comorbidities:  HTN- stable     Plan:   1- For preventive management: A.  Continue candesartan  8mg daily           B.  Emgality 120mg every 28 days a month           C.  Continue Botox every 12 weeks       2- Acute management: for the migraine (left sided headache)  sumatriptan 100mg at onset of migraine, can repeat after 2 hours. Max is 2/day. No more than 2-3 days a week or no more than 10 days a month    Or      Nurtec ODT 75mg trial , max 1/day.     3- For cervical myofascial/cervical spondylosis/chronic cerviaclgia : A. Continue gabapentin 600mg 2-3 times a day                B. Continue tizanidine 4mg 1-3 times a day     4-Referral to neurosurgery for spine evaluation for cervical djd     RTC for Botox       Petty Goetz MD   Board Certified Neurologist  [facility] Certified Headache Medicine

## 2022-09-09 ENCOUNTER — PATIENT OUTREACH (OUTPATIENT)
Dept: ADMINISTRATIVE | Facility: HOSPITAL | Age: 47
End: 2022-09-09
Payer: MEDICARE

## 2022-09-09 NOTE — PROGRESS NOTES
2022 Care Everywhere updates requested and reviewed.  Immunizations reconciled. Media reports reviewed.  Duplicate HM overrides and  orders removed.  Overdue HM topic chart audit and/or requested.  Overdue lab testing linked to upcoming lab appointments if applies.    Colonoscopy and mammogram scheduled    Future Appointments   Date Time Provider Department Center   2022  1:00 PM Sofia Bob NP [facility] GASTRO [place]   2022  2:00 PM [facility] MAMMO2 [facility] MAMMO [place]   2022  2:45 PM Petty Estrella MD [facility] HEADACH [place]   2022  9:30 AM Amaya Aiken MD [facility] FAM MED [place]   2022  5:00 PM Bebeto Resendiz, PhD, [facility] PSYCH [place]   10/7/2022  8:00 AM Robert Munoz MD [facility] PAINMGT [place]   10/7/2022  9:00 AM Emma Gamino, AV [facility] ONEUSG [facility]   10/20/2022  9:30 AM Amaya Aiken MD [facility] FAM MED [place]   10/25/2022  1:00 PM Juliana Cole MD [facility] NLPUL [facility]   2022  8:00 AM Nikhil Franco MD [facility] [place]       Health Maintenance Due   Topic Date Due    Mammogram  2019    Colorectal Cancer Screening  2022    COVID-19 Vaccine (3 - Booster for Pfizer series) 2022    Influenza Vaccine (1) 2022

## 2022-09-18 ENCOUNTER — PATIENT MESSAGE (OUTPATIENT)
Dept: FAMILY MEDICINE | Facility: CLINIC | Age: 47
End: 2022-09-18
Payer: MEDICARE

## 2022-09-25 ENCOUNTER — PATIENT MESSAGE (OUTPATIENT)
Dept: ENDOSCOPY | Facility: HOSPITAL | Age: 47
End: 2022-09-25
Payer: MEDICARE

## 2022-09-26 ENCOUNTER — PATIENT OUTREACH (OUTPATIENT)
Dept: ADMINISTRATIVE | Facility: HOSPITAL | Age: 47
End: 2022-09-26
Payer: MEDICARE

## 2022-09-26 ENCOUNTER — PATIENT MESSAGE (OUTPATIENT)
Dept: ENDOSCOPY | Facility: HOSPITAL | Age: 47
End: 2022-09-26
Payer: MEDICARE

## 2022-09-26 ENCOUNTER — PATIENT MESSAGE (OUTPATIENT)
Dept: NEUROLOGY | Facility: CLINIC | Age: 47
End: 2022-09-26
Payer: MEDICARE

## 2022-09-26 ENCOUNTER — TELEPHONE (OUTPATIENT)
Dept: NEUROLOGY | Facility: CLINIC | Age: 47
End: 2022-09-26
Payer: MEDICARE

## 2022-09-26 NOTE — TELEPHONE ENCOUNTER
Spoke with pt and scheduled Botox appointment, pt also stated to cancel pain management appt and will call office to reschedule. Date/time and location verbalized, pt understanding.

## 2022-09-26 NOTE — PROGRESS NOTES
Uncontrolled BP REPORT  BP Readings from Last 3 Encounters:   08/22/22 118/74   06/10/22 126/86   05/19/22 (!) 145/75       Non-compliant report chart audits for HYPERTENSION MANAGEMENT     Outreach to patient in reference to hypertension management     scheduled  BP FOLLOW UP WITH NURSE VISIT OR CARE TEAM MEMBER

## 2022-09-27 ENCOUNTER — PATIENT MESSAGE (OUTPATIENT)
Dept: PSYCHIATRY | Facility: CLINIC | Age: 47
End: 2022-09-27

## 2022-09-27 ENCOUNTER — OFFICE VISIT (OUTPATIENT)
Dept: PSYCHIATRY | Facility: CLINIC | Age: 47
End: 2022-09-27
Payer: MEDICARE

## 2022-09-27 DIAGNOSIS — F90.2 ATTENTION DEFICIT HYPERACTIVITY DISORDER (ADHD), COMBINED TYPE: ICD-10-CM

## 2022-09-27 DIAGNOSIS — F43.10 POSTTRAUMATIC STRESS DISORDER: Primary | ICD-10-CM

## 2022-09-27 DIAGNOSIS — F41.0 PANIC DISORDER WITHOUT AGORAPHOBIA: ICD-10-CM

## 2022-09-27 DIAGNOSIS — G47.00 INSOMNIA, UNSPECIFIED TYPE: ICD-10-CM

## 2022-09-27 PROCEDURE — 1159F PR MEDICATION LIST DOCUMENTED IN MEDICAL RECORD: ICD-10-PCS | Mod: CPTII,95,, | Performed by: PSYCHOLOGIST

## 2022-09-27 PROCEDURE — 90833 PSYTX W PT W E/M 30 MIN: CPT | Mod: 95,,, | Performed by: PSYCHOLOGIST

## 2022-09-27 PROCEDURE — 4010F ACE/ARB THERAPY RXD/TAKEN: CPT | Mod: CPTII,95,, | Performed by: PSYCHOLOGIST

## 2022-09-27 PROCEDURE — 99214 PR OFFICE/OUTPT VISIT, EST, LEVL IV, 30-39 MIN: ICD-10-PCS | Mod: 95,,, | Performed by: PSYCHOLOGIST

## 2022-09-27 PROCEDURE — 1159F MED LIST DOCD IN RCRD: CPT | Mod: CPTII,95,, | Performed by: PSYCHOLOGIST

## 2022-09-27 PROCEDURE — 99214 OFFICE O/P EST MOD 30 MIN: CPT | Mod: 95,,, | Performed by: PSYCHOLOGIST

## 2022-09-27 PROCEDURE — 90833 PR PSYCHOTHERAPY W/PATIENT W/E&M, 30 MIN (ADD ON): ICD-10-PCS | Mod: 95,,, | Performed by: PSYCHOLOGIST

## 2022-09-27 PROCEDURE — 4010F PR ACE/ARB THEARPY RXD/TAKEN: ICD-10-PCS | Mod: CPTII,95,, | Performed by: PSYCHOLOGIST

## 2022-09-27 PROCEDURE — 3044F HG A1C LEVEL LT 7.0%: CPT | Mod: CPTII,95,, | Performed by: PSYCHOLOGIST

## 2022-09-27 PROCEDURE — 3044F PR MOST RECENT HEMOGLOBIN A1C LEVEL <7.0%: ICD-10-PCS | Mod: CPTII,95,, | Performed by: PSYCHOLOGIST
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
## 2022-09-28 ENCOUNTER — TELEPHONE (OUTPATIENT)
Dept: PSYCHIATRY | Facility: CLINIC | Age: 47
End: 2022-09-28
Payer: MEDICARE

## 2022-09-28 RX ORDER — SUVOREXANT 10 MG/1
10 TABLET, FILM COATED ORAL NIGHTLY PRN
Qty: 30 TABLET | Refills: 0 | Status: SHIPPED | OUTPATIENT
Start: 2022-09-28 | End: 2022-10-25 | Stop reason: SDUPTHER

## 2022-09-28 NOTE — TELEPHONE ENCOUNTER
Received pharmacy fax asking to change prescription Belsomra to one of the preferred drug due to insurance coverage.    Preferred drug,  : Zolpidem   :Doxepin  :Lunesta  :sonata   :Rozerem   Please adviceVikki

## 2022-09-28 NOTE — TELEPHONE ENCOUNTER
Pt has failed trials with all of these medications, save for sonata. This has a similar MOA as Lunesta and Ambien (failed trials). If the pt would like to trial that one first we can do that; however, I explained to the pt that Belsomra may be expensive and she was willing to try.

## 2022-09-29 ENCOUNTER — HOSPITAL ENCOUNTER (OUTPATIENT)
Facility: HOSPITAL | Age: 47
Discharge: HOME | End: 2022-09-29
Attending: INTERNAL MEDICINE | Admitting: INTERNAL MEDICINE
Payer: MEDICARE

## 2022-09-29 ENCOUNTER — TELEPHONE (OUTPATIENT)
Dept: PSYCHIATRY | Facility: CLINIC | Age: 47
End: 2022-09-29
Payer: MEDICARE

## 2022-09-29 ENCOUNTER — ANESTHESIA EVENT (OUTPATIENT)
Dept: ENDOSCOPY | Facility: HOSPITAL | Age: 47
End: 2022-09-29
Payer: MEDICARE

## 2022-09-29 ENCOUNTER — ANESTHESIA (OUTPATIENT)
Dept: ENDOSCOPY | Facility: HOSPITAL | Age: 47
End: 2022-09-29
Payer: MEDICARE

## 2022-09-29 DIAGNOSIS — Z12.11 SCREEN FOR COLON CANCER: Primary | ICD-10-CM

## 2022-09-29 LAB
B-HCG UR QL: NEGATIVE
CTP QC/QA: YES

## 2022-09-29 PROCEDURE — 25000003 PHARM REV CODE 250: Mod: PO | Performed by: NURSE ANESTHETIST, CERTIFIED REGISTERED

## 2022-09-29 PROCEDURE — 27201089 HC SNARE, DISP (ANY): Mod: PO | Performed by: INTERNAL MEDICINE

## 2022-09-29 PROCEDURE — 88305 TISSUE EXAM BY PATHOLOGIST: CPT | Performed by: PATHOLOGY

## 2022-09-29 PROCEDURE — 45385 COLONOSCOPY W/LESION REMOVAL: CPT | Mod: PT,PO | Performed by: INTERNAL MEDICINE

## 2022-09-29 PROCEDURE — D9220A PRA ANESTHESIA: Mod: PT,ANES,, | Performed by: ANESTHESIOLOGY

## 2022-09-29 PROCEDURE — D9220A PRA ANESTHESIA: ICD-10-PCS | Mod: PT,ANES,, | Performed by: ANESTHESIOLOGY

## 2022-09-29 PROCEDURE — 63600175 PHARM REV CODE 636 W HCPCS: Mod: PO | Performed by: NURSE ANESTHETIST, CERTIFIED REGISTERED

## 2022-09-29 PROCEDURE — 88305 TISSUE EXAM BY PATHOLOGIST: ICD-10-PCS | Mod: 26,,, | Performed by: PATHOLOGY

## 2022-09-29 PROCEDURE — 63600175 PHARM REV CODE 636 W HCPCS: Mod: PO | Performed by: INTERNAL MEDICINE

## 2022-09-29 PROCEDURE — D9220A PRA ANESTHESIA: ICD-10-PCS | Mod: PT,CRNA,, | Performed by: NURSE ANESTHETIST, CERTIFIED REGISTERED

## 2022-09-29 PROCEDURE — 88305 TISSUE EXAM BY PATHOLOGIST: CPT | Mod: 26,,, | Performed by: PATHOLOGY

## 2022-09-29 PROCEDURE — 45385 COLONOSCOPY W/LESION REMOVAL: CPT | Mod: PT,,, | Performed by: INTERNAL MEDICINE

## 2022-09-29 PROCEDURE — D9220A PRA ANESTHESIA: Mod: PT,CRNA,, | Performed by: NURSE ANESTHETIST, CERTIFIED REGISTERED

## 2022-09-29 PROCEDURE — 45385 PR COLONOSCOPY,REMV LESN,SNARE: ICD-10-PCS | Mod: PT,,, | Performed by: INTERNAL MEDICINE

## 2022-09-29 PROCEDURE — 37000009 HC ANESTHESIA EA ADD 15 MINS: Mod: PO | Performed by: INTERNAL MEDICINE

## 2022-09-29 PROCEDURE — 37000008 HC ANESTHESIA 1ST 15 MINUTES: Mod: PO | Performed by: INTERNAL MEDICINE

## 2022-09-29 PROCEDURE — 81025 URINE PREGNANCY TEST: CPT | Mod: PO | Performed by: INTERNAL MEDICINE

## 2022-09-29 RX ORDER — SODIUM CHLORIDE 0.9 % (FLUSH) 0.9 %
10 SYRINGE (ML) INJECTION EVERY 6 HOURS PRN
Status: DISCONTINUED | OUTPATIENT
Start: 2022-09-29 | End: 2022-09-29 | Stop reason: HOSPADM

## 2022-09-29 RX ORDER — SODIUM CHLORIDE, SODIUM LACTATE, POTASSIUM CHLORIDE, CALCIUM CHLORIDE 600; 310; 30; 20 MG/100ML; MG/100ML; MG/100ML; MG/100ML
INJECTION, SOLUTION INTRAVENOUS CONTINUOUS
Status: DISCONTINUED | OUTPATIENT
Start: 2022-09-29 | End: 2022-09-29 | Stop reason: HOSPADM

## 2022-09-29 RX ORDER — PROPOFOL 10 MG/ML
VIAL (ML) INTRAVENOUS
Status: DISCONTINUED | OUTPATIENT
Start: 2022-09-29 | End: 2022-09-29

## 2022-09-29 RX ORDER — LIDOCAINE HYDROCHLORIDE 20 MG/ML
INJECTION INTRAVENOUS
Status: DISCONTINUED | OUTPATIENT
Start: 2022-09-29 | End: 2022-09-29

## 2022-09-29 RX ADMIN — PROPOFOL 40 MG: 10 INJECTION, EMULSION INTRAVENOUS at 09:09

## 2022-09-29 RX ADMIN — SODIUM CHLORIDE, SODIUM LACTATE, POTASSIUM CHLORIDE, AND CALCIUM CHLORIDE: .6; .31; .03; .02 INJECTION, SOLUTION INTRAVENOUS at 08:09

## 2022-09-29 RX ADMIN — PROPOFOL 150 MG: 10 INJECTION, EMULSION INTRAVENOUS at 09:09

## 2022-09-29 RX ADMIN — LIDOCAINE HYDROCHLORIDE 75 MG: 20 INJECTION INTRAVENOUS at 09:09

## 2022-09-29 NOTE — PROVATION PATIENT INSTRUCTIONS
Discharge Summary/Instructions after an Endoscopic Procedure  Patient Name: Dinah Mitchell  Patient MRN: [id number]  Patient YOB: 1975 Thursday, September 29, 2022  Martín Sagastume MD  Dear patient,  As a result of recent federal legislation (The Federal Cures Act), you may   receive lab or pathology results from your procedure in your [facility]   account before your physician is able to contact you. Your physician or   their representative will relay the results to you with their   recommendations at their soonest availability.  Thank you,  RESTRICTIONS:  During your procedure today, you received medications for sedation.  These   medications may affect your judgment, balance and coordination.  Therefore,   for 24 hours, you have the following restrictions:   - DO NOT drive a car, operate machinery, make legal/financial decisions,   sign important papers or drink alcohol.    ACTIVITY:  Today: no heavy lifting, straining or running due to procedural   sedation/anesthesia.  The following day: return to full activity including work.  DIET:  Eat and drink normally unless instructed otherwise.     TREATMENT FOR COMMON SIDE EFFECTS:  - Mild abdominal pain, nausea, belching, bloating or excessive gas:  rest,   eat lightly and use a heating pad.  - Sore Throat: treat with throat lozenges and/or gargle with warm salt   water.  - Because air was used during the procedure, expelling large amounts of air   from your rectum or belching is normal.  - If a bowel prep was taken, you may not have a bowel movement for 1-3 days.    This is normal.  SYMPTOMS TO WATCH FOR AND REPORT TO YOUR PHYSICIAN:  1. Abdominal pain or bloating, other than gas cramps.  2. Chest pain.  3. Back pain.  4. Signs of infection such as: chills or fever occurring within 24 hours   after the procedure.  5. Rectal bleeding, which would show as bright red, maroon, or black stools.   (A tablespoon of blood from the rectum is not serious,  especially if   hemorrhoids are present.)  6. Vomiting.  7. Weakness or dizziness.  GO DIRECTLY TO THE NEAREST EMERGENCY ROOM IF YOU HAVE ANY OF THE FOLLOWING:      Difficulty breathing              Chills and/or fever over 101 F   Persistent vomiting and/or vomiting blood   Severe abdominal pain   Severe chest pain   Black, tarry stools   Bleeding- more than one tablespoon   Any other symptom or condition that you feel may need urgent attention  Your doctor recommends these additional instructions:  If any biopsies were taken, your doctors clinic will contact you in 1 to 2   weeks with any results.  Your physician has recommended a repeat colonoscopy in five years for   surveillance.   You are being discharged to home.   Advance your diet as tolerated.   Continue your present medications.   We are waiting for your pathology results.  For questions, problems or results please call your physician - Martín Sagastume MD at Work:  [phone].  EMERGENCY PHONE NUMBER: [phone], LAB RESULTS: [phone]  IF A COMPLICATION OR EMERGENCY SITUATION ARISES AND YOU ARE UNABLE TO REACH   YOUR PHYSICIAN - GO DIRECTLY TO THE EMERGENCY ROOM.  ___________________________________________  Nurse Signature  ___________________________________________  Patient/Designated Responsible Party Signature  Martín Sagastume MD  9/29/2022 9:42:41 AM  This report has been verified and signed electronically.  Dear patient,  As a result of recent federal legislation (The Federal Cures Act), you may   receive lab or pathology results from your procedure in your [facility]   account before your physician is able to contact you. Your physician or   their representative will relay the results to you with their   recommendations at their soonest availability.  Thank you.  PROVATION

## 2022-09-29 NOTE — TRANSFER OF CARE
"Anesthesia Transfer of Care Note    Patient: Dinah Mitchell    Procedure(s) Performed: Procedure(s) (LRB):  COLONOSCOPY (N/A)    Patient location: PACU    Anesthesia Type: general    Transport from OR: Transported from OR on room air with adequate spontaneous ventilation    Post pain: adequate analgesia    Post assessment: no apparent anesthetic complications    Post vital signs: stable    Level of consciousness: awake and sedated    Nausea/Vomiting: no nausea/vomiting    Complications: none    Transfer of care protocol was followed      Last vitals:   Visit Vitals  BP (!) 182/99 (Patient Position: Sitting)   Pulse 94   Temp 36.3 °C (97.3 °F) (Skin)   Resp 17   Ht 5' 7" (1.702 m)   Wt 117.5 kg (259 lb)   SpO2 98%   Breastfeeding No   BMI 40.57 kg/m²     "

## 2022-09-29 NOTE — TELEPHONE ENCOUNTER
Belsomra 10 mg tablets #30/30 approved. CaseId:[id number];Status:Approved;Review Type:Prior Auth;Coverage Start Date:08/30/2022;Coverage End Date:09/29/2023;  Vikki CANDELARIO MA stated she will notify patient of approval.

## 2022-09-29 NOTE — H&P
History & Physical - Short Stay  Gastroenterology      SUBJECTIVE:     Procedure: Colonoscopy    Chief Complaint/Indication for Procedure: Screening    PTA Medications   Medication Sig    albuterol (PROVENTIL) 2.5 mg /3 mL (0.083 %) nebulizer solution Take 3 mLs (2.5 mg total) by nebulization every 4 (four) hours as needed for Wheezing or Shortness of Breath (chest tightness). Rescue    albuterol (PROVENTIL/VENTOLIN HFA) 90 mcg/actuation inhaler Inhale 1-2 puffs into the lungs every 4 (four) hours as needed for Wheezing or Shortness of Breath (chest tightness). Rescue    ALPRAZolam (XANAX) 1 MG tablet TAKE 1 TABLET BY MOUTH EVERY DAY AS NEEDED FOR ANXIETY    buPROPion (WELLBUTRIN XL) 150 MG TB24 tablet Take 1 tablet (150 mg total) by mouth once daily.    candesartan (ATACAND) 8 MG tablet 0.5 tab po daily x 3 days, then 1 tab po daily    diphenoxylate-atropine 2.5-0.025 mg (LOMOTIL) 2.5-0.025 mg per tablet Take 1 tablet by mouth 4 (four) times daily as needed.    dronabinol (MARINOL) 2.5 MG capsule Take 1 capsule (2.5 mg total) by mouth 2 (two) times daily before meals. (Patient taking differently: Take 2.5 mg by mouth 2 (two) times daily as needed. Before meals)    FLUoxetine 40 MG capsule Take 1 capsule (40 mg total) by mouth once daily.    fluticasone propionate (FLONASE) 50 mcg/actuation nasal spray SHAKE LIQUID AND USE 1 SPRAY(50 MCG) IN EACH NOSTRIL EVERY DAY    fluticasone-umeclidin-vilanter (TRELEGY ELLIPTA) 100-62.5-25 mcg DsDv Inhale 1 puff into the lungs once daily.    gabapentin (NEURONTIN) 600 MG tablet TAKE 1 TABLET BY MOUTH 2 TO 3 TIMES A DAY FOR CHRONIC PAIN    galcanezumab-gnlm (EMGALITY PEN) 120 mg/mL PnIj ADMINISTER 1 ML UNDER THE SKIN EVERY 28 DAYS    levOCARNitine (CARNITOR) 330 mg Tab TAKE 3 TABLETS(990 MG) BY MOUTH TWICE DAILY    lisdexamfetamine (VYVANSE) 40 MG Cap Take 1 capsule (40 mg total) by mouth every morning.    montelukast (SINGULAIR) 10 mg tablet Take 1 tablet (10 mg total) by mouth  once daily.    prazosin (MINIPRESS) 2 MG Cap Take 1 capsule (2 mg total) by mouth every evening.    sumatriptan (IMITREX) 100 MG tablet TAKE 1 TABLET AS NEEDED FOR MIGRAINE. MAY REPEAT AFTER 2 HOURS. MAXIMUM DAILY DOSE IS 2 TABLETS    SYNTHROID 200 mcg tablet TAKE 1 TABLET(200 MCG) BY MOUTH BEFORE BREAKFAST    tiZANidine (ZANAFLEX) 4 MG tablet TAKE 1 TABLET(4 MG) BY MOUTH THREE TIMES DAILY AS NEEDED FOR MUSCLE SPASM    traMADoL (ULTRAM) 50 mg tablet Take 1 tablet (50 mg total) by mouth every 6 (six) hours as needed for Pain.    acetylcysteine (N-ACETYL-L-CYSTEINE MISC)     fluticasone furoate-vilanteroL (BREO) 200-25 mcg/dose DsDv diskus inhaler Inhale 1 puff into the lungs once daily. Controller    suvorexant (BELSOMRA) 10 mg Tab Take 10 mg by mouth nightly as needed (Insomnia).       Review of patient's allergies indicates:   Allergen Reactions    Lamotrigine      Other reaction(s): Rash  Other reaction(s): Nausea    Sulfa (sulfonamide antibiotics) Nausea Only     Other reaction(s): Unknown    Adhesive Rash    Zonisamide Nausea And Vomiting     Nausea/vomiting         Past Medical History:   Diagnosis Date    Abnormal Pap smear of cervix     Arnold-Chiari deformity     Arthritis     Asthma     Borderline personality disorder     Carnitine deficiency     Depression     Fatty liver     noted on 8/21 CT    Fibromyalgia     Headache     History of abnormal cervical Pap smear     colpo/ LEEP and CKC    History of nephrolithiasis     kidney stones    Hypothyroidism     IBS (irritable bowel syndrome)     GI smith negative    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     PATRICIA (obstructive sleep apnea)     severe - doesn't use CPAP    Panic attack     PTSD (post-traumatic stress disorder)     Renal disorder     kidney stones, blood in urine on occasion     Past Surgical History:   Procedure Laterality Date    ANKLE SURGERY Right     x2 (#1 for ligament injury and #2 for fx and ligament)    AUGMENTATION OF BREAST       BILATERAL TUBAL LIGATION      BRAIN SURGERY  12/2018    decompression/craniotomy Arnold-Chiari syndrome    BREAST SURGERY  1997    B implants    CERVICAL BIOPSY  W/ LOOP ELECTRODE EXCISION      COLD KNIFE CONIZATION OF CERVIX N/A 3/17/2022    Procedure: CONE BIOPSY, CERVIX, USING COLD KNIFE;  Surgeon: Nikhil Franco MD;  Location: [facility];  Service: OB/GYN;  Laterality: N/A;    COLONOSCOPY      DECOMPRESSION OF CHIARI MALFORMATION BY REMOVAL OF POSTERIOR ARCH OF FIRST CERVICAL VERTEBRA N/A 12/13/2018    Procedure: DECOMPRESSION, CHIARI MALFORMATION, BY 1ST CERVICAL VERTEBRA POSTERIOR ARCH REMOVAL;  Surgeon: Sergio Busch MD;  Location: [address];  Service: Neurosurgery;  Laterality: N/A;    EPIDURAL STEROID INJECTION INTO CERVICAL SPINE N/A 12/5/2019    Procedure: Injection-steroid-epidural-cervical-C7-T1;  Surgeon: Noa Samano MD;  Location: [facility];  Service: Neurosurgery;  Laterality: N/A;    FRACTURE SURGERY Right     Ankle    INJECTION OF ANESTHETIC AGENT AROUND MEDIAL BRANCH NERVES INNERVATING CERVICAL FACET JOINT Bilateral 8/6/2020    Procedure: Block-nerve-medial branch-cervical Bilateral  C3,4,5;  Surgeon: Noa Samano MD;  Location: [facility];  Service: Anesthesiology;  Laterality: Bilateral;    INJECTION OF ANESTHETIC AGENT AROUND MEDIAL BRANCH NERVES INNERVATING CERVICAL FACET JOINT Bilateral 8/20/2020    Procedure: Block-nerve-medial branch-cervical Bilateral C3-4-5;  Surgeon: Noa Samano MD;  Location: [facility] OR;  Service: Anesthesiology;  Laterality: Bilateral;    LAPAROSCOPIC GASTRIC BANDING      with subsequent removal due to abscess    LIPOSUCTION      x 2    PELVIC LAPAROSCOPY      for endometriosis eval with BTL    RADIOFREQUENCY ABLATION Bilateral 8/16/2021    Procedure: Radiofrequency Ablation Cervical C3-C5;  Surgeon: Robert Munoz MD;  Location: [facility] OR;  Service: Pain Management;  Laterality: Bilateral;    RADIOFREQUENCY THERMAL COAGULATION OF MEDIAL BRANCH OF POSTERIOR RAMUS OF  "CERVICAL SPINAL NERVE Bilateral 8/27/2020    Procedure: RADIOFREQUENCY THERMAL COAGULATION, NERVE, SPINAL, CERVICAL, POSTERIOR RAMUS, MEDIAL BRANCH C3-4-5, bilateral;  Surgeon: Noa Samano MD;  Location: [facility] OR;  Service: Anesthesiology;  Laterality: Bilateral;    TUBAL LIGATION      wtih pelvic lap      Family History   Problem Relation Age of Onset    Diabetes Sister     Seizures Sister     Mitochondrial disorder Sister         "trent"    Mental illness Sister     Seizures Sister     Cervical cancer Sister     Cervical cancer Sister     Ovarian cancer Cousin     Ovarian cancer Paternal Aunt     Colon cancer Maternal Grandmother     Cancer Maternal Grandfather 65        throat ca    Diabetes Mother     Stroke Mother 30    Mental illness Mother         depression    Hyperlipidemia Mother     Hypertension Mother     Hyperlipidemia Father     Heart disease Father         "athletes heart"    Ovarian cancer Paternal Aunt     Cervical cancer Paternal Cousin     Cervical cancer Other     Breast cancer Neg Hx      Social History     Tobacco Use    Smoking status: Never    Smokeless tobacco: Never   Substance Use Topics    Alcohol use: Yes     Comment: occasional "maybe monthly"    Drug use: No     OBJECTIVE:     Vital Signs (Most Recent)  Temp: 97.3 °F (36.3 °C) (09/29/22 0837)  Pulse: 94 (09/29/22 0837)  Resp: 17 (09/29/22 0837)  BP: (!) 182/99 (09/29/22 0837)  SpO2: 98 % (09/29/22 0837)    Physical Exam:                                                       GENERAL:  Comfortable, in no acute distress.                                 HEENT EXAM:  Nonicteric.  No adenopathy.  Oropharynx is clear.               NECK:  Supple.                                                               LUNGS:  Clear.                                                               CARDIAC:  Regular rate and rhythm.  S1, S2.  No murmur.                      ABDOMEN:  Soft, positive bowel sounds, nontender.  No hepatosplenomegaly or masses.  " No rebound or guarding.                                             EXTREMITIES:  No edema.     MENTAL STATUS:  Normal, alert and oriented.    ASSESSMENT/PLAN:     Assessment: Screening    Plan: Colonoscopy

## 2022-09-29 NOTE — ANESTHESIA PREPROCEDURE EVALUATION
09/29/2022  Dinah Mitchell is a 47 y.o., female.      Pre-op Assessment    I have reviewed the Patient Summary Reports.     I have reviewed the Nursing Notes.       Review of Systems  Anesthesia Hx:  No problems with previous Anesthesia    Pulmonary:   Asthma Sleep Apnea    Renal/:   Chronic Renal Disease    Hepatic/GI:   Liver Disease,    Musculoskeletal:   Arthritis     Neurological:   Neuromuscular Disease, Headaches    Endocrine:   Hypothyroidism    Psych:   Psychiatric History          Physical Exam  General: Well nourished        Anesthesia Plan  Type of Anesthesia, risks & benefits discussed:    Anesthesia Type: Gen Natural Airway  Intra-op Monitoring Plan: Standard ASA Monitors  Induction:  IV  Informed Consent: Informed consent signed with the Patient and all parties understand the risks and agree with anesthesia plan.  All questions answered.   ASA Score: 3    Ready For Surgery From Anesthesia Perspective.     .

## 2022-09-30 VITALS
HEIGHT: 67 IN | WEIGHT: 259 LBS | DIASTOLIC BLOOD PRESSURE: 94 MMHG | HEART RATE: 69 BPM | TEMPERATURE: 97 F | BODY MASS INDEX: 40.65 KG/M2 | OXYGEN SATURATION: 98 % | SYSTOLIC BLOOD PRESSURE: 156 MMHG | RESPIRATION RATE: 16 BRPM

## 2022-09-30 NOTE — ANESTHESIA POSTPROCEDURE EVALUATION
Anesthesia Post Evaluation    Patient: Dinah Mitchell    Procedure(s) Performed: Procedure(s) (LRB):  COLONOSCOPY (N/A)    Final Anesthesia Type: general      Patient location during evaluation: PACU  Patient participation: Yes- Able to Participate  Level of consciousness: awake and alert  Post-procedure vital signs: reviewed and stable  Pain management: adequate  Airway patency: patent    PONV status at discharge: No PONV  Anesthetic complications: no      Cardiovascular status: blood pressure returned to baseline  Respiratory status: unassisted, spontaneous ventilation and room air  Hydration status: euvolemic  Follow-up not needed.          Vitals Value Taken Time   /94 09/29/22 1000   Temp  09/30/22 0927   Pulse 69 09/29/22 1000   Resp 16 09/29/22 1000   SpO2 98 % 09/29/22 1000         Event Time   Out of Recovery 10:06:00         Pain/Jatin Score: Jatin Score: 10 (9/29/2022  9:59 AM)

## 2022-10-04 LAB
FINAL PATHOLOGIC DIAGNOSIS: NORMAL
GROSS: NORMAL
Lab: NORMAL

## 2022-10-07 ENCOUNTER — PATIENT OUTREACH (OUTPATIENT)
Dept: ADMINISTRATIVE | Facility: HOSPITAL | Age: 47
End: 2022-10-07
Payer: MEDICARE

## 2022-10-07 ENCOUNTER — OFFICE VISIT (OUTPATIENT)
Dept: FAMILY MEDICINE | Facility: CLINIC | Age: 47
End: 2022-10-07
Payer: MEDICARE

## 2022-10-07 DIAGNOSIS — L30.9 DERMATITIS: Primary | ICD-10-CM

## 2022-10-07 PROCEDURE — 99213 OFFICE O/P EST LOW 20 MIN: CPT | Mod: 95,,, | Performed by: PHYSICIAN ASSISTANT

## 2022-10-07 PROCEDURE — 1160F PR REVIEW ALL MEDS BY PRESCRIBER/CLIN PHARMACIST DOCUMENTED: ICD-10-PCS | Mod: CPTII,95,, | Performed by: PHYSICIAN ASSISTANT

## 2022-10-07 PROCEDURE — 1159F PR MEDICATION LIST DOCUMENTED IN MEDICAL RECORD: ICD-10-PCS | Mod: CPTII,95,, | Performed by: PHYSICIAN ASSISTANT

## 2022-10-07 PROCEDURE — 4010F ACE/ARB THERAPY RXD/TAKEN: CPT | Mod: CPTII,95,, | Performed by: PHYSICIAN ASSISTANT

## 2022-10-07 PROCEDURE — 3044F HG A1C LEVEL LT 7.0%: CPT | Mod: CPTII,95,, | Performed by: PHYSICIAN ASSISTANT

## 2022-10-07 PROCEDURE — 1159F MED LIST DOCD IN RCRD: CPT | Mod: CPTII,95,, | Performed by: PHYSICIAN ASSISTANT

## 2022-10-07 PROCEDURE — 1160F RVW MEDS BY RX/DR IN RCRD: CPT | Mod: CPTII,95,, | Performed by: PHYSICIAN ASSISTANT

## 2022-10-07 PROCEDURE — 4010F PR ACE/ARB THEARPY RXD/TAKEN: ICD-10-PCS | Mod: CPTII,95,, | Performed by: PHYSICIAN ASSISTANT

## 2022-10-07 PROCEDURE — 3044F PR MOST RECENT HEMOGLOBIN A1C LEVEL <7.0%: ICD-10-PCS | Mod: CPTII,95,, | Performed by: PHYSICIAN ASSISTANT

## 2022-10-07 PROCEDURE — 99213 PR OFFICE/OUTPT VISIT, EST, LEVL III, 20-29 MIN: ICD-10-PCS | Mod: 95,,, | Performed by: PHYSICIAN ASSISTANT

## 2022-10-07 RX ORDER — PREDNISONE 10 MG/1
TABLET ORAL
Qty: 18 TABLET | Refills: 0 | Status: SHIPPED | OUTPATIENT
Start: 2022-10-07 | End: 2022-10-20

## 2022-10-07 RX ORDER — CLINDAMYCIN HYDROCHLORIDE 300 MG/1
300 CAPSULE ORAL 3 TIMES DAILY
Qty: 21 CAPSULE | Refills: 0 | Status: SHIPPED | OUTPATIENT
Start: 2022-10-07 | End: 2022-10-14

## 2022-10-07 NOTE — PROGRESS NOTES
10/07/2022 Care Everywhere updates requested and reviewed.  Immunizations reconciled. Media reports reviewed.  Duplicate HM overrides and  orders removed.  Overdue HM topic chart audit and/or requested.  Overdue lab testing linked to upcoming lab appointments if applies.RECENTLY OUTREACHED/REVIEWED    Uncontrolled BP REPORT  BP Readings from Last 3 Encounters:   22 (!) 156/94   22 118/74   06/10/22 126/86     Health Maintenance Due   Topic Date Due    Mammogram  2019    COVID-19 Vaccine (3 - Booster for Pfizer series) 2022    Influenza Vaccine (1) 2022

## 2022-10-07 NOTE — PROGRESS NOTES
Subjective:       Patient ID: Dinah Mitchell is a 47 y.o. female.    Chief Complaint: Rash    The patient location is: [place]  The chief complaint leading to consultation is: rash    Visit type: audiovisual    Face to Face time with patient: 20 minutes of total time spent on the encounter, which includes face to face time and non-face to face time preparing to see the patient (eg, review of tests), Obtaining and/or reviewing separately obtained history, Documenting clinical information in the electronic or other health record, Independently interpreting results (not separately reported) and communicating results to the patient/family/caregiver, or Care coordination (not separately reported).         Each patient to whom he or she provides medical services by telemedicine is:  (1) informed of the relationship between the physician and patient and the respective role of any other health care provider with respect to management of the patient; and (2) notified that he or she may decline to receive medical services by telemedicine and may withdraw from such care at any time.    Notes:        Rash  This is a new problem. The current episode started yesterday. The problem is unchanged. The affected locations include the right arm. The rash is characterized by burning, pain, redness, swelling and itchiness. She was exposed to plant contact. Associated symptoms include congestion and fatigue. Pertinent negatives include no anorexia, cough, diarrhea, eye pain, facial edema, fever, joint pain, nail changes, rhinorrhea, shortness of breath, sore throat or vomiting. Past treatments include analgesics, anti-itch cream and antihistamine. The treatment provided mild relief. Her past medical history is significant for allergies, asthma and varicella. There is no history of eczema.   Review of Systems   Constitutional:  Positive for fatigue. Negative for activity change, appetite change and fever.   HENT:  Positive for nasal  congestion and ear pain. Negative for rhinorrhea and sore throat.    Eyes:  Negative for pain.   Respiratory:  Negative for cough and shortness of breath.    Cardiovascular:  Negative for chest pain.   Gastrointestinal:  Negative for anorexia, diarrhea and vomiting.   Musculoskeletal:  Negative for joint pain.   Integumentary:  Positive for rash. Negative for nail changes.       Objective:      Physical Exam  Constitutional:       General: She is not in acute distress.     Appearance: Normal appearance. She is not ill-appearing, toxic-appearing or diaphoretic.   Pulmonary:      Effort: Pulmonary effort is normal.   Musculoskeletal:        Arms:    Neurological:      Mental Status: She is alert.   Psychiatric:         Mood and Affect: Mood normal.       Assessment:       Problem List Items Addressed This Visit    None  Visit Diagnoses       Dermatitis    -  Primary              Plan:       Dermatitis    Other orders  -     clindamycin (CLEOCIN) 300 MG capsule; Take 1 capsule (300 mg total) by mouth 3 (three) times daily. for 7 days  Dispense: 21 capsule; Refill: 0  -     predniSONE (DELTASONE) 10 MG tablet; Take 3 daily for 3 days, then 2 daily for three days, then 1 daily for three days.  Dispense: 18 tablet; Refill: 0

## 2022-10-07 NOTE — PROGRESS NOTES
Answers submitted by the patient for this visit:  Rash Questionnaire (Submitted on 10/7/2022)  Chief Complaint: Rash  Chronicity: new  Onset: yesterday  Progression since onset: unchanged  Affected locations: right arm  Characteristics: burning, pain, redness, swelling, itchiness  Exposed to: plant contact  anorexia: No  congestion: Yes  cough: No  diarrhea: No  eye pain: No  facial edema: No  fatigue: Yes  fever: No  joint pain: No  nail changes: No  rhinorrhea: No  shortness of breath: No  sore throat: No  vomiting: No  Treatments tried: analgesics, anti-itch cream, antihistamine  Improvement on treatment: mild  asthma: Yes  allergies: Yes  eczema: No  varicella: Yes

## 2022-10-10 RX ORDER — LEVOTHYROXINE SODIUM 200 UG/1
200 TABLET ORAL
Qty: 90 TABLET | Refills: 0 | Status: SHIPPED | OUTPATIENT
Start: 2022-10-10 | End: 2023-03-30

## 2022-10-10 NOTE — TELEPHONE ENCOUNTER
No new care gaps identified.  [facility] Embedded Care Gaps. Reference number: [id number]. 10/10/2022   3:46:55 AM CDT

## 2022-10-10 NOTE — TELEPHONE ENCOUNTER
Refill Decision Note   Dinah Mitchell  is requesting a refill authorization.  Brief Assessment and Rationale for Refill:  Approve    -Medication-Related Problems Identified: Requires labs  Medication Therapy Plan:  Labs (TSH) due;  Acceptable use per OR protocols (levothyroxine and morbid obesity); risks minimal with normal thyroid labs, TSH WNL    Medication Reconciliation Completed: No   Comments:     Provider Staff:     Action is required for this patient.   Please see care gap opportunities above in Medication Therapy Plan.     Thanks!  [facility]     Appointments      Date Provider   Last Visit   8/5/2022 Amaya Aiken MD   Next Visit   10/20/2022 Amaya Aiken MD     Note composed:12:39 PM 10/10/2022           Note composed:12:39 PM 10/10/2022

## 2022-10-18 ENCOUNTER — OFFICE VISIT (OUTPATIENT)
Dept: PSYCHIATRY | Facility: CLINIC | Age: 47
End: 2022-10-18
Payer: MEDICARE

## 2022-10-18 DIAGNOSIS — F41.0 PANIC DISORDER WITHOUT AGORAPHOBIA: ICD-10-CM

## 2022-10-18 DIAGNOSIS — F43.10 POSTTRAUMATIC STRESS DISORDER: Primary | ICD-10-CM

## 2022-10-18 DIAGNOSIS — F90.2 ATTENTION DEFICIT HYPERACTIVITY DISORDER, COMBINED TYPE: ICD-10-CM

## 2022-10-18 DIAGNOSIS — G47.00 INSOMNIA, UNSPECIFIED TYPE: ICD-10-CM

## 2022-10-18 PROCEDURE — 90833 PSYTX W PT W E/M 30 MIN: CPT | Mod: 95,,, | Performed by: PSYCHOLOGIST

## 2022-10-18 PROCEDURE — 4010F ACE/ARB THERAPY RXD/TAKEN: CPT | Mod: CPTII,95,, | Performed by: PSYCHOLOGIST

## 2022-10-18 PROCEDURE — 1159F PR MEDICATION LIST DOCUMENTED IN MEDICAL RECORD: ICD-10-PCS | Mod: CPTII,95,, | Performed by: PSYCHOLOGIST

## 2022-10-18 PROCEDURE — 99999 PR PBB SHADOW E&M-EST. PATIENT-LVL II: ICD-10-PCS | Mod: PBBFAC,,, | Performed by: PSYCHOLOGIST

## 2022-10-18 PROCEDURE — 1159F MED LIST DOCD IN RCRD: CPT | Mod: CPTII,95,, | Performed by: PSYCHOLOGIST

## 2022-10-18 PROCEDURE — 3044F PR MOST RECENT HEMOGLOBIN A1C LEVEL <7.0%: ICD-10-PCS | Mod: CPTII,95,, | Performed by: PSYCHOLOGIST

## 2022-10-18 PROCEDURE — 99999 PR PBB SHADOW E&M-EST. PATIENT-LVL II: CPT | Mod: PBBFAC,,, | Performed by: PSYCHOLOGIST

## 2022-10-18 PROCEDURE — 90833 PR PSYCHOTHERAPY W/PATIENT W/E&M, 30 MIN (ADD ON): ICD-10-PCS | Mod: 95,,, | Performed by: PSYCHOLOGIST

## 2022-10-18 PROCEDURE — 99214 OFFICE O/P EST MOD 30 MIN: CPT | Mod: 95,,, | Performed by: PSYCHOLOGIST

## 2022-10-18 PROCEDURE — 3044F HG A1C LEVEL LT 7.0%: CPT | Mod: CPTII,95,, | Performed by: PSYCHOLOGIST

## 2022-10-18 PROCEDURE — 99214 PR OFFICE/OUTPT VISIT, EST, LEVL IV, 30-39 MIN: ICD-10-PCS | Mod: 95,,, | Performed by: PSYCHOLOGIST

## 2022-10-18 PROCEDURE — 4010F PR ACE/ARB THEARPY RXD/TAKEN: ICD-10-PCS | Mod: CPTII,95,, | Performed by: PSYCHOLOGIST
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
## 2022-10-20 ENCOUNTER — OFFICE VISIT (OUTPATIENT)
Dept: FAMILY MEDICINE | Facility: CLINIC | Age: 47
End: 2022-10-20
Payer: MEDICARE

## 2022-10-20 VITALS
OXYGEN SATURATION: 97 % | SYSTOLIC BLOOD PRESSURE: 132 MMHG | WEIGHT: 263.25 LBS | BODY MASS INDEX: 41.32 KG/M2 | DIASTOLIC BLOOD PRESSURE: 82 MMHG | HEART RATE: 102 BPM | RESPIRATION RATE: 20 BRPM | HEIGHT: 67 IN | TEMPERATURE: 98 F

## 2022-10-20 DIAGNOSIS — E03.4 HYPOTHYROIDISM DUE TO ACQUIRED ATROPHY OF THYROID: ICD-10-CM

## 2022-10-20 DIAGNOSIS — Z00.00 ANNUAL PHYSICAL EXAM: Primary | ICD-10-CM

## 2022-10-20 DIAGNOSIS — R79.9 ABNORMAL FINDING OF BLOOD CHEMISTRY, UNSPECIFIED: ICD-10-CM

## 2022-10-20 DIAGNOSIS — I10 HYPERTENSION, ESSENTIAL: ICD-10-CM

## 2022-10-20 DIAGNOSIS — Z23 IMMUNIZATION DUE: ICD-10-CM

## 2022-10-20 LAB
ALBUMIN SERPL BCP-MCNC: 3.9 G/DL (ref 3.5–5.2)
ALP SERPL-CCNC: 57 U/L (ref 55–135)
ALT SERPL W/O P-5'-P-CCNC: 17 U/L (ref 10–44)
ANION GAP SERPL CALC-SCNC: 7 MMOL/L (ref 8–16)
AST SERPL-CCNC: 14 U/L (ref 10–40)
BASOPHILS # BLD AUTO: 0.05 K/UL (ref 0–0.2)
BASOPHILS NFR BLD: 0.7 % (ref 0–1.9)
BILIRUB SERPL-MCNC: 0.7 MG/DL (ref 0.1–1)
BUN SERPL-MCNC: 11 MG/DL (ref 6–20)
CALCIUM SERPL-MCNC: 8.9 MG/DL (ref 8.7–10.5)
CHLORIDE SERPL-SCNC: 105 MMOL/L (ref 95–110)
CHOLEST SERPL-MCNC: 244 MG/DL (ref 120–199)
CHOLEST/HDLC SERPL: 4.1 {RATIO} (ref 2–5)
CO2 SERPL-SCNC: 26 MMOL/L (ref 23–29)
CREAT SERPL-MCNC: 0.9 MG/DL (ref 0.5–1.4)
DIFFERENTIAL METHOD: ABNORMAL
EOSINOPHIL # BLD AUTO: 0.2 K/UL (ref 0–0.5)
EOSINOPHIL NFR BLD: 2.4 % (ref 0–8)
ERYTHROCYTE [DISTWIDTH] IN BLOOD BY AUTOMATED COUNT: 12.2 % (ref 11.5–14.5)
EST. GFR  (NO RACE VARIABLE): >60 ML/MIN/1.73 M^2
ESTIMATED AVG GLUCOSE: 103 MG/DL (ref 68–131)
GLUCOSE SERPL-MCNC: 93 MG/DL (ref 70–110)
HBA1C MFR BLD: 5.2 % (ref 4–5.6)
HCT VFR BLD AUTO: 41 % (ref 37–48.5)
HDLC SERPL-MCNC: 60 MG/DL (ref 40–75)
HDLC SERPL: 24.6 % (ref 20–50)
HGB BLD-MCNC: 13.4 G/DL (ref 12–16)
IMM GRANULOCYTES # BLD AUTO: 0.07 K/UL (ref 0–0.04)
IMM GRANULOCYTES NFR BLD AUTO: 0.9 % (ref 0–0.5)
LDLC SERPL CALC-MCNC: 165 MG/DL (ref 63–159)
LYMPHOCYTES # BLD AUTO: 2.2 K/UL (ref 1–4.8)
LYMPHOCYTES NFR BLD: 28.8 % (ref 18–48)
MCH RBC QN AUTO: 32.8 PG (ref 27–31)
MCHC RBC AUTO-ENTMCNC: 32.7 G/DL (ref 32–36)
MCV RBC AUTO: 100 FL (ref 82–98)
MONOCYTES # BLD AUTO: 0.5 K/UL (ref 0.3–1)
MONOCYTES NFR BLD: 7.1 % (ref 4–15)
NEUTROPHILS # BLD AUTO: 4.6 K/UL (ref 1.8–7.7)
NEUTROPHILS NFR BLD: 60.1 % (ref 38–73)
NONHDLC SERPL-MCNC: 184 MG/DL
NRBC BLD-RTO: 0 /100 WBC
PLATELET # BLD AUTO: 303 K/UL (ref 150–450)
PMV BLD AUTO: 11 FL (ref 9.2–12.9)
POTASSIUM SERPL-SCNC: 4.2 MMOL/L (ref 3.5–5.1)
PROT SERPL-MCNC: 6.8 G/DL (ref 6–8.4)
RBC # BLD AUTO: 4.09 M/UL (ref 4–5.4)
SODIUM SERPL-SCNC: 138 MMOL/L (ref 136–145)
TRIGL SERPL-MCNC: 95 MG/DL (ref 30–150)
TSH SERPL DL<=0.005 MIU/L-ACNC: 13.86 UIU/ML (ref 0.4–4)
WBC # BLD AUTO: 7.57 K/UL (ref 3.9–12.7)

## 2022-10-20 PROCEDURE — 99396 PREV VISIT EST AGE 40-64: CPT | Mod: GZ,S$GLB,, | Performed by: FAMILY MEDICINE

## 2022-10-20 PROCEDURE — 3079F DIAST BP 80-89 MM HG: CPT | Mod: CPTII,S$GLB,, | Performed by: FAMILY MEDICINE

## 2022-10-20 PROCEDURE — 84439 ASSAY OF FREE THYROXINE: CPT | Performed by: FAMILY MEDICINE

## 2022-10-20 PROCEDURE — 3008F BODY MASS INDEX DOCD: CPT | Mod: CPTII,S$GLB,, | Performed by: FAMILY MEDICINE

## 2022-10-20 PROCEDURE — 85025 COMPLETE CBC W/AUTO DIFF WBC: CPT | Performed by: FAMILY MEDICINE

## 2022-10-20 PROCEDURE — 3079F PR MOST RECENT DIASTOLIC BLOOD PRESSURE 80-89 MM HG: ICD-10-PCS | Mod: CPTII,S$GLB,, | Performed by: FAMILY MEDICINE

## 2022-10-20 PROCEDURE — 90686 FLU VACCINE (QUAD) GREATER THAN OR EQUAL TO 3YO PRESERVATIVE FREE IM: ICD-10-PCS | Mod: S$GLB,,, | Performed by: FAMILY MEDICINE

## 2022-10-20 PROCEDURE — 4010F PR ACE/ARB THEARPY RXD/TAKEN: ICD-10-PCS | Mod: CPTII,S$GLB,, | Performed by: FAMILY MEDICINE

## 2022-10-20 PROCEDURE — 4010F ACE/ARB THERAPY RXD/TAKEN: CPT | Mod: CPTII,S$GLB,, | Performed by: FAMILY MEDICINE

## 2022-10-20 PROCEDURE — 3075F PR MOST RECENT SYSTOLIC BLOOD PRESS GE 130-139MM HG: ICD-10-PCS | Mod: CPTII,S$GLB,, | Performed by: FAMILY MEDICINE

## 2022-10-20 PROCEDURE — 80053 COMPREHEN METABOLIC PANEL: CPT | Performed by: FAMILY MEDICINE

## 2022-10-20 PROCEDURE — 84443 ASSAY THYROID STIM HORMONE: CPT | Performed by: FAMILY MEDICINE

## 2022-10-20 PROCEDURE — 1159F PR MEDICATION LIST DOCUMENTED IN MEDICAL RECORD: ICD-10-PCS | Mod: CPTII,S$GLB,, | Performed by: FAMILY MEDICINE

## 2022-10-20 PROCEDURE — 80061 LIPID PANEL: CPT | Performed by: FAMILY MEDICINE

## 2022-10-20 PROCEDURE — 3008F PR BODY MASS INDEX (BMI) DOCUMENTED: ICD-10-PCS | Mod: CPTII,S$GLB,, | Performed by: FAMILY MEDICINE

## 2022-10-20 PROCEDURE — 3044F HG A1C LEVEL LT 7.0%: CPT | Mod: CPTII,S$GLB,, | Performed by: FAMILY MEDICINE

## 2022-10-20 PROCEDURE — 1160F RVW MEDS BY RX/DR IN RCRD: CPT | Mod: CPTII,S$GLB,, | Performed by: FAMILY MEDICINE

## 2022-10-20 PROCEDURE — G0008 FLU VACCINE (QUAD) GREATER THAN OR EQUAL TO 3YO PRESERVATIVE FREE IM: ICD-10-PCS | Mod: S$GLB,,, | Performed by: FAMILY MEDICINE

## 2022-10-20 PROCEDURE — 3044F PR MOST RECENT HEMOGLOBIN A1C LEVEL <7.0%: ICD-10-PCS | Mod: CPTII,S$GLB,, | Performed by: FAMILY MEDICINE

## 2022-10-20 PROCEDURE — 1159F MED LIST DOCD IN RCRD: CPT | Mod: CPTII,S$GLB,, | Performed by: FAMILY MEDICINE

## 2022-10-20 PROCEDURE — 3075F SYST BP GE 130 - 139MM HG: CPT | Mod: CPTII,S$GLB,, | Performed by: FAMILY MEDICINE

## 2022-10-20 PROCEDURE — 99396 PR PREVENTIVE VISIT,EST,40-64: ICD-10-PCS | Mod: GZ,S$GLB,, | Performed by: FAMILY MEDICINE

## 2022-10-20 PROCEDURE — 1160F PR REVIEW ALL MEDS BY PRESCRIBER/CLIN PHARMACIST DOCUMENTED: ICD-10-PCS | Mod: CPTII,S$GLB,, | Performed by: FAMILY MEDICINE

## 2022-10-20 PROCEDURE — 83036 HEMOGLOBIN GLYCOSYLATED A1C: CPT | Performed by: FAMILY MEDICINE

## 2022-10-20 PROCEDURE — 90686 IIV4 VACC NO PRSV 0.5 ML IM: CPT | Mod: S$GLB,,, | Performed by: FAMILY MEDICINE

## 2022-10-20 PROCEDURE — G0008 ADMIN INFLUENZA VIRUS VAC: HCPCS | Mod: S$GLB,,, | Performed by: FAMILY MEDICINE

## 2022-10-20 RX ORDER — FLUTICASONE PROPIONATE AND SALMETEROL XINAFOATE 230; 21 UG/1; UG/1
2 AEROSOL, METERED RESPIRATORY (INHALATION) 2 TIMES DAILY
Qty: 12 G | Refills: 2 | Status: SHIPPED | OUTPATIENT
Start: 2022-10-20 | End: 2023-08-21 | Stop reason: SDUPTHER

## 2022-10-20 RX ORDER — NYSTATIN 100000 [USP'U]/G
POWDER TOPICAL 2 TIMES DAILY
Qty: 60 G | Refills: 2 | Status: SHIPPED | OUTPATIENT
Start: 2022-10-20 | End: 2023-03-30

## 2022-10-20 NOTE — PATIENT INSTRUCTIONS
Cool blow dryer after shower/bath or when wet     Vinegar soaks four times a day. For the vinegar soaks, use 1 cup of white distilled vinegar and 4 cups of water. Apply this mixture with a washcloth twice a day.      After the vinegar soaks use the prescribed cream       Visiting Sherice

## 2022-10-21 LAB — T4 FREE SERPL-MCNC: 0.7 NG/DL (ref 0.71–1.51)

## 2022-10-23 ENCOUNTER — PATIENT MESSAGE (OUTPATIENT)
Dept: PSYCHIATRY | Facility: CLINIC | Age: 47
End: 2022-10-23
Payer: MEDICARE

## 2022-10-24 ENCOUNTER — PATIENT MESSAGE (OUTPATIENT)
Dept: FAMILY MEDICINE | Facility: CLINIC | Age: 47
End: 2022-10-24
Payer: MEDICARE

## 2022-10-24 ENCOUNTER — OFFICE VISIT (OUTPATIENT)
Dept: PSYCHIATRY | Facility: CLINIC | Age: 47
End: 2022-10-24
Payer: MEDICARE

## 2022-10-24 DIAGNOSIS — F43.10 POSTTRAUMATIC STRESS DISORDER: Primary | ICD-10-CM

## 2022-10-24 DIAGNOSIS — F90.2 ATTENTION DEFICIT HYPERACTIVITY DISORDER, COMBINED TYPE: ICD-10-CM

## 2022-10-24 DIAGNOSIS — E03.4 HYPOTHYROIDISM DUE TO ACQUIRED ATROPHY OF THYROID: Primary | ICD-10-CM

## 2022-10-24 DIAGNOSIS — F41.0 PANIC DISORDER WITHOUT AGORAPHOBIA: ICD-10-CM

## 2022-10-24 DIAGNOSIS — G47.00 INSOMNIA, UNSPECIFIED TYPE: ICD-10-CM

## 2022-10-24 PROCEDURE — 4010F ACE/ARB THERAPY RXD/TAKEN: CPT | Mod: CPTII,95,S$GLB, | Performed by: PSYCHOLOGIST

## 2022-10-24 PROCEDURE — 1159F MED LIST DOCD IN RCRD: CPT | Mod: CPTII,95,S$GLB, | Performed by: PSYCHOLOGIST

## 2022-10-24 PROCEDURE — 99214 PR OFFICE/OUTPT VISIT, EST, LEVL IV, 30-39 MIN: ICD-10-PCS | Mod: 95,S$GLB,, | Performed by: PSYCHOLOGIST

## 2022-10-24 PROCEDURE — 1159F PR MEDICATION LIST DOCUMENTED IN MEDICAL RECORD: ICD-10-PCS | Mod: CPTII,95,S$GLB, | Performed by: PSYCHOLOGIST

## 2022-10-24 PROCEDURE — 90833 PSYTX W PT W E/M 30 MIN: CPT | Mod: 95,S$GLB,, | Performed by: PSYCHOLOGIST

## 2022-10-24 PROCEDURE — 90833 PR PSYCHOTHERAPY W/PATIENT W/E&M, 30 MIN (ADD ON): ICD-10-PCS | Mod: 95,S$GLB,, | Performed by: PSYCHOLOGIST

## 2022-10-24 PROCEDURE — 99999 PR PBB SHADOW E&M-EST. PATIENT-LVL II: CPT | Mod: PBBFAC,,, | Performed by: PSYCHOLOGIST

## 2022-10-24 PROCEDURE — 99999 PR PBB SHADOW E&M-EST. PATIENT-LVL II: ICD-10-PCS | Mod: PBBFAC,,, | Performed by: PSYCHOLOGIST

## 2022-10-24 PROCEDURE — 3044F PR MOST RECENT HEMOGLOBIN A1C LEVEL <7.0%: ICD-10-PCS | Mod: CPTII,95,S$GLB, | Performed by: PSYCHOLOGIST

## 2022-10-24 PROCEDURE — 99214 OFFICE O/P EST MOD 30 MIN: CPT | Mod: 95,S$GLB,, | Performed by: PSYCHOLOGIST

## 2022-10-24 PROCEDURE — 4010F PR ACE/ARB THEARPY RXD/TAKEN: ICD-10-PCS | Mod: CPTII,95,S$GLB, | Performed by: PSYCHOLOGIST

## 2022-10-24 PROCEDURE — 3044F HG A1C LEVEL LT 7.0%: CPT | Mod: CPTII,95,S$GLB, | Performed by: PSYCHOLOGIST
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
## 2022-10-25 ENCOUNTER — PATIENT MESSAGE (OUTPATIENT)
Dept: PSYCHIATRY | Facility: CLINIC | Age: 47
End: 2022-10-25
Payer: MEDICARE

## 2022-10-25 RX ORDER — SUVOREXANT 10 MG/1
20 TABLET, FILM COATED ORAL NIGHTLY PRN
Qty: 30 TABLET | Refills: 0 | Status: SHIPPED | OUTPATIENT
Start: 2022-10-25 | End: 2022-10-26 | Stop reason: DRUGHIGH

## 2022-10-25 RX ORDER — SUVOREXANT 10 MG/1
10 TABLET, FILM COATED ORAL NIGHTLY PRN
Qty: 30 TABLET | Refills: 0 | Status: SHIPPED | OUTPATIENT
Start: 2022-10-25 | End: 2022-10-25 | Stop reason: SDUPTHER

## 2022-10-26 RX ORDER — SUVOREXANT 20 MG/1
20 TABLET, FILM COATED ORAL DAILY
COMMUNITY
End: 2022-10-26 | Stop reason: SDUPTHER

## 2022-10-26 RX ORDER — SUVOREXANT 20 MG/1
20 TABLET, FILM COATED ORAL DAILY
Qty: 30 TABLET | Refills: 1 | Status: SHIPPED | OUTPATIENT
Start: 2022-10-26 | End: 2022-10-26

## 2022-10-26 RX ORDER — SUVOREXANT 10 MG/1
20 TABLET, FILM COATED ORAL NIGHTLY PRN
Qty: 30 TABLET | Refills: 0 | OUTPATIENT
Start: 2022-10-26

## 2022-10-26 RX ORDER — SUVOREXANT 20 MG/1
20 TABLET, FILM COATED ORAL DAILY
Qty: 30 TABLET | Refills: 1 | Status: SHIPPED | OUTPATIENT
Start: 2022-10-26 | End: 2022-11-29 | Stop reason: SDUPTHER

## 2022-10-26 RX ORDER — LEVOTHYROXINE SODIUM 50 UG/1
TABLET ORAL
Qty: 90 TABLET | OUTPATIENT
Start: 2022-10-26

## 2022-10-26 RX ORDER — LEVOTHYROXINE SODIUM 50 UG/1
50 TABLET ORAL
Qty: 30 TABLET | Refills: 1 | Status: SHIPPED | OUTPATIENT
Start: 2022-10-26 | End: 2022-12-02 | Stop reason: SDUPTHER

## 2022-10-26 NOTE — TELEPHONE ENCOUNTER
Sarah DC. Request already responded to by other means (e.g. phone or fax)   Refill Authorization Note   Dinah Mitchell  is requesting a refill authorization.  Brief Assessment and Rationale for Refill:  Quick Discontinue  Medication Therapy Plan:  Signed 10/26/22    Medication Reconciliation Completed:  No      Comments:     Note composed:4:11 PM 10/26/2022

## 2022-10-26 NOTE — TELEPHONE ENCOUNTER
No new care gaps identified.  [facility] Embedded Care Gaps. Reference number: [id number]. 10/26/2022   12:16:35 PM CDT

## 2022-10-26 NOTE — TELEPHONE ENCOUNTER
Called pharmacy regarding below message. Pharmacy is running prescription through Dr. Resendiz's [employer] number and still receiving denial states invalid provider.

## 2022-10-27 ENCOUNTER — PATIENT MESSAGE (OUTPATIENT)
Dept: PSYCHIATRY | Facility: CLINIC | Age: 47
End: 2022-10-27
Payer: MEDICARE

## 2022-11-01 ENCOUNTER — TELEPHONE (OUTPATIENT)
Dept: PSYCHIATRY | Facility: CLINIC | Age: 47
End: 2022-11-01
Payer: MEDICARE

## 2022-11-01 NOTE — TELEPHONE ENCOUNTER
Completed PA for Belsomra 20 mg tablets #30/ 30 day supply. Awaiting determination from the plan.

## 2022-11-02 ENCOUNTER — TELEPHONE (OUTPATIENT)
Dept: PSYCHIATRY | Facility: CLINIC | Age: 47
End: 2022-11-02
Payer: MEDICARE

## 2022-11-02 NOTE — TELEPHONE ENCOUNTER
Received response message from network in reference to completed PA for Belsomra 20 mg.   An active PA is already on file with expiration date of 09/29/2023. Please wait to resubmit request within 60 days of that expiration date to obtain a PA renewal.

## 2022-11-03 DIAGNOSIS — F33.2 MAJOR DEPRESSIVE DISORDER, RECURRENT, SEVERE WITHOUT PSYCHOTIC FEATURES: ICD-10-CM

## 2022-11-03 RX ORDER — BUPROPION HYDROCHLORIDE 150 MG/1
TABLET ORAL
Qty: 90 TABLET | Refills: 0 | Status: SHIPPED | OUTPATIENT
Start: 2022-11-03 | End: 2022-11-23 | Stop reason: ALTCHOICE

## 2022-11-04 NOTE — TELEPHONE ENCOUNTER
LATE ENTRY-   Spoke with the pharmacy toward the end of the day on 11/03/2022 a few minutes before 5 pm. Stated patient does not need a PA for Wellbutrin XL, they had prescriber restrictions that will hopefully resolve being that provider became MP a few months ago.

## 2022-11-06 NOTE — PROGRESS NOTES
Subjective:       Patient ID: Dinah Mitchell is a 47 y.o. female.    Chief Complaint: Follow-up    HPI  The patient is a 47-year-old who is here today for annual exam and follow-up.  She is due for fasting labs and is fasting today for those.  She is due for her mammogram and does need to get that scheduled.  Her Pap smear is up-to-date.  She would be willing to receive her flu shot and Prevnar today.  She is due for her COVID booster in would be willing to get this scheduled.    Today we discussed the followin)  Hypertension.  Today blood pressure is 132/82.  She is taking atacand which she is tolerating well.  She is due for fasting labs and would be willing to have that done today  2) asthma.  She is currently only using Singulair and p.r.n. albuterol.  She has not been able to afford the Breo or trelogy due to insurance issues.  She does have intermittent cough and wheezing  3) hypothyroidism.  She is taking her Synthroid consistently.  She is due for a TSH level would be willing to have that done today    4) rash.  She has a rash under the breast.      Review of Systems   Constitutional:  Negative for appetite change, chills, diaphoresis, fatigue, fever and unexpected weight change.   HENT:  Negative for congestion, dental problem, ear pain, hearing loss, postnasal drip, rhinorrhea, sneezing, sore throat and trouble swallowing.    Eyes:  Negative for photophobia, pain, discharge and visual disturbance.   Respiratory:  Negative for cough, chest tightness, shortness of breath and wheezing.    Cardiovascular:  Negative for chest pain, palpitations and leg swelling.   Gastrointestinal:  Negative for abdominal distention, abdominal pain, blood in stool, constipation, diarrhea, nausea and vomiting.   Endocrine: Negative for cold intolerance, heat intolerance, polydipsia and polyuria.   Genitourinary:  Negative for dysuria, flank pain, frequency, genital sores, hematuria, menstrual problem and vaginal  discharge.   Musculoskeletal:  Negative for arthralgias, joint swelling and myalgias.   Skin:  Positive for rash.   Neurological:  Negative for dizziness, syncope, light-headedness and headaches.   Hematological:  Negative for adenopathy. Does not bruise/bleed easily.   Psychiatric/Behavioral:  Negative for dysphoric mood, self-injury, sleep disturbance and suicidal ideas. The patient is not nervous/anxious.        Objective:      Physical Exam  Constitutional:       General: She is not in acute distress.     Appearance: Normal appearance. She is well-developed.   HENT:      Head: Normocephalic and atraumatic.      Right Ear: Hearing, tympanic membrane, ear canal and external ear normal.      Left Ear: Hearing, tympanic membrane, ear canal and external ear normal.      Nose: Nose normal.      Mouth/Throat:      Mouth: No oral lesions.      Pharynx: No oropharyngeal exudate or posterior oropharyngeal erythema.   Eyes:      General: Lids are normal. No scleral icterus.     Extraocular Movements: Extraocular movements intact.      Conjunctiva/sclera: Conjunctivae normal.      Pupils: Pupils are equal, round, and reactive to light.   Neck:      Thyroid: No thyroid mass or thyromegaly.      Vascular: No carotid bruit.   Cardiovascular:      Rate and Rhythm: Normal rate and regular rhythm. No extrasystoles are present.     Chest Wall: PMI is not displaced.      Heart sounds: Normal heart sounds. No murmur heard.    No gallop.   Pulmonary:      Effort: Pulmonary effort is normal. No accessory muscle usage or respiratory distress.      Breath sounds: Normal breath sounds.   Abdominal:      General: Bowel sounds are normal. There is no abdominal bruit.      Palpations: Abdomen is soft.      Tenderness: There is no abdominal tenderness. There is no rebound.   Musculoskeletal:      Cervical back: Normal range of motion and neck supple.   Lymphadenopathy:      Head:      Right side of head: No submental or submandibular  "adenopathy.      Left side of head: No submental or submandibular adenopathy.      Cervical:      Right cervical: No superficial, deep or posterior cervical adenopathy.     Left cervical: No superficial, deep or posterior cervical adenopathy.      Upper Body:      Right upper body: No supraclavicular adenopathy.      Left upper body: No supraclavicular adenopathy.   Skin:     General: Skin is warm and dry.   Neurological:      Mental Status: She is alert and oriented to person, place, and time.      Cranial Nerves: No cranial nerve deficit.      Sensory: No sensory deficit.   Psychiatric:         Speech: Speech normal.         Behavior: Behavior normal.         Thought Content: Thought content normal.     Blood pressure 132/82, pulse 102, temperature 97.7 °F (36.5 °C), resp. rate 20, height 5' 7" (1.702 m), weight 119.4 kg (263 lb 3.7 oz), last menstrual period 02/01/2022, SpO2 97 %.Body mass index is 41.23 kg/m².              A/P:  1) annual exam.  Health maintenance issues and anticipatory guidance issues were discussed.  We will check fasting labs today.  We will schedule her mammogram.  We will administer her flu shot today.  She will return next month for her Prevnar vaccine.  She will schedule for her COVID booster.  She will be as physically active and consume healthy diet has possible  2)  Hypertension.  Well controlled.  Continue with atacand  2) asthma.  Persistent.  We are going to try Advair for her maintenance inhaler.  If she has trouble getting this at the pharmacy or if she has persistent cough or wheezing after starting this, she will let me know.  She will continue with the Singulair and p.r.n. albuterol  3) hypothyroidism.  Status unknown.  We will check a TSH today.  Continue with Synthroid    4) intertrigo.  Persistent.  We did discuss vinegar soaks and Monistat powder.  If this does not improve, she will let me know    "

## 2022-11-08 ENCOUNTER — TELEPHONE (OUTPATIENT)
Dept: NEUROLOGY | Facility: CLINIC | Age: 47
End: 2022-11-08
Payer: MEDICARE

## 2022-11-08 NOTE — TELEPHONE ENCOUNTER
Patient called and Botox was scheduled with NP. Date/Time confirmed. Verbalized understanding. Referral attached.

## 2022-11-11 ENCOUNTER — TELEPHONE (OUTPATIENT)
Dept: NEUROLOGY | Facility: CLINIC | Age: 47
End: 2022-11-11

## 2022-11-11 ENCOUNTER — OFFICE VISIT (OUTPATIENT)
Dept: NEUROLOGY | Facility: CLINIC | Age: 47
End: 2022-11-11
Payer: MEDICARE

## 2022-11-11 DIAGNOSIS — G43.719 INTRACTABLE CHRONIC MIGRAINE WITHOUT AURA AND WITHOUT STATUS MIGRAINOSUS: Primary | ICD-10-CM

## 2022-11-11 DIAGNOSIS — M47.812 SPONDYLOSIS OF CERVICAL REGION WITHOUT MYELOPATHY OR RADICULOPATHY: ICD-10-CM

## 2022-11-11 DIAGNOSIS — M54.2 CERVICALGIA: ICD-10-CM

## 2022-11-11 PROCEDURE — 99214 OFFICE O/P EST MOD 30 MIN: CPT | Mod: 95,,, | Performed by: PSYCHIATRY & NEUROLOGY

## 2022-11-11 PROCEDURE — 99214 PR OFFICE/OUTPT VISIT, EST, LEVL IV, 30-39 MIN: ICD-10-PCS | Mod: 95,,, | Performed by: PSYCHIATRY & NEUROLOGY

## 2022-11-11 PROCEDURE — 1159F MED LIST DOCD IN RCRD: CPT | Mod: CPTII,95,, | Performed by: PSYCHIATRY & NEUROLOGY

## 2022-11-11 PROCEDURE — 4010F ACE/ARB THERAPY RXD/TAKEN: CPT | Mod: CPTII,95,, | Performed by: PSYCHIATRY & NEUROLOGY

## 2022-11-11 PROCEDURE — 1159F PR MEDICATION LIST DOCUMENTED IN MEDICAL RECORD: ICD-10-PCS | Mod: CPTII,95,, | Performed by: PSYCHIATRY & NEUROLOGY

## 2022-11-11 PROCEDURE — 3044F PR MOST RECENT HEMOGLOBIN A1C LEVEL <7.0%: ICD-10-PCS | Mod: CPTII,95,, | Performed by: PSYCHIATRY & NEUROLOGY

## 2022-11-11 PROCEDURE — 1160F RVW MEDS BY RX/DR IN RCRD: CPT | Mod: CPTII,95,, | Performed by: PSYCHIATRY & NEUROLOGY

## 2022-11-11 PROCEDURE — 3044F HG A1C LEVEL LT 7.0%: CPT | Mod: CPTII,95,, | Performed by: PSYCHIATRY & NEUROLOGY

## 2022-11-11 PROCEDURE — 4010F PR ACE/ARB THEARPY RXD/TAKEN: ICD-10-PCS | Mod: CPTII,95,, | Performed by: PSYCHIATRY & NEUROLOGY

## 2022-11-11 PROCEDURE — 1160F PR REVIEW ALL MEDS BY PRESCRIBER/CLIN PHARMACIST DOCUMENTED: ICD-10-PCS | Mod: CPTII,95,, | Performed by: PSYCHIATRY & NEUROLOGY

## 2022-11-11 RX ORDER — SUMATRIPTAN SUCCINATE 100 MG/1
100 TABLET ORAL
Qty: 12 TABLET | Refills: 3 | Status: SHIPPED | OUTPATIENT
Start: 2022-11-11 | End: 2023-03-30

## 2022-11-11 RX ORDER — TIZANIDINE 4 MG/1
TABLET ORAL
Qty: 90 TABLET | Refills: 6 | Status: SHIPPED | OUTPATIENT
Start: 2022-11-11 | End: 2023-04-11 | Stop reason: SDUPTHER

## 2022-11-11 RX ORDER — CANDESARTAN 8 MG/1
TABLET ORAL
Qty: 30 TABLET | Refills: 6 | Status: SHIPPED | OUTPATIENT
Start: 2022-11-11 | End: 2023-04-11

## 2022-11-11 RX ORDER — RIMEGEPANT SULFATE 75 MG/75MG
75 TABLET, ORALLY DISINTEGRATING ORAL ONCE AS NEEDED
Qty: 8 TABLET | Refills: 11 | Status: SHIPPED | OUTPATIENT
Start: 2022-11-11 | End: 2022-11-11

## 2022-11-11 NOTE — PROGRESS NOTES
[facility]- Headache Clinic    The patient location is: [place]  The chief complaint leading to consultation is: chronic migraine     Visit type:tele audiovisual     Face to Face time with patient: 18 minutes     25 minutes of total time spent on the encounter, which includes face to face time and non-face to face time preparing to see the patient (eg, review of tests), Obtaining and/or reviewing separately obtained history, Documenting clinical information in the electronic or other health record, Independently interpreting results (not separately reported) and communicating results to the patient/family/caregiver, or Care coordination (not separately reported).         Each patient to whom he or she provides medical services by telemedicine is:  (1) informed of the relationship between the physician and patient and the respective role of any other health care provider with respect to management of the patient; and (2) notified that he or she may decline to receive medical services by telemedicine and may withdraw from such care at any time.    Notes:     Chief complaint: chronic migraine    S:    46 Y RH F with PMHx of depression,  PTSD, borderline personality disorder, fibromyalgia,IBS, PATRICIA noncompliant CPAP , s/p posterior fossa decompression for Chiari malformation (9mm in 2018), s/p gastric bypass,  carnitine deficiency, cervical spondylosis, migraine and hypothyroidism who presents for further follow up of chronic migraine. She mentions that her migraine is doing poorly. She had been doing much better post first Botox on 6/22. Unfortunately she had health issues with IBS around her 9/22 appointment and missed that. She was seen early 9/22 for neck pain and referred to spine surgery. She has not had her second Botox. She feels that all the benefit she had from Botox is gone now. She is waking up with severe pain in her neck and also her Chiari decompression surgical site. She is quite  concerned as she has been having trouble when she wakes up in night chocking on saliva. She does have PATRICIA and feels she is complaint with treatment. She is ok otherwise. She had a lot of dysphagia post operatively from the chiari and had speech therapy. Pulmonary seeing her feels cough is from asthma and prescribed Advair.  She continues to have severe post surgical pain from the decompressive surgery. Anything that applies pressure back there or if she lays on her back with the pressure back there will cause her to wake up with severe occipital headache that is worse than anything else. This pain she does recall improved with Botox. She would like refill of her medications.  She mentions that she has continued to have severe neck pain and is now following with neurosurgery         Headache history form initial history :  Age at onset and course over time: 2011 and worse over time, this happened during a time when she had a long commute. Neck was her worst pain - just left to midline of middle neck. Was going to [facility] pain management receiving epidural steroids which would last only 2 weeks. She had a CMBB and was planning for RFA but wanted to get cervical MRI first and second opinion. She was seen by Dr. Samano and her hsitory with mutliple treatments is above on my chart     On chart review conducted today patient was followed by Dr. Samano ([facility] specialist) for migraine and also for chronic neck pain (cervical spondylosis) sicne 3/18 at which time Dr. Samano diagnosed Chiari malformation (extending 9 mm inferior to the foramen magnum with so-called pegging of the cerebellar tonsil) , headache, cervical spondylosis w/o myelopathy and radiculopathy, and cervical myofascial pain. Placed on tizandine qhs, prn methocarbamol and tramadol q6h PRN. An Mri brain with CINE was performed that did not show CSF flow obstruction. . She had severe pain by 3/18 an ONB with steroids/anesthetic  and TPIs was done by Dr. Samano multiple times. She continued seeing Dr. Fuentes for cognitive complaints and ataxia/eyelid ptosis. She had workup with EMG which did not show any signs of myopathy, myasthenia with rep stim, or peripheral neuropathy. Bloodwork looking for vitamin deficiencies done which were normal as well.  She was evaluated by  and was given in 2018 levocarnitine,  NAC injections and marinol for nausea (helps significantly). Per notes levocarnitine helps with her energy level. She had posterior fossa decompression on 12/18 and had severe headache and neck pain after that. Per notes valsalva induced headaches resolved. She was started on Emgality on 9/19 and continued on gabapentin 600mg TID and prn tramadol. She was seen by Dr. Samano on 7/20 at which time patient had significant neck pain and was seen by Dr. Samano for diagnostic b/l cervical MBB C3-4-5 which she had 80% relief x 6 hours x 2 , then n 8/27/2020 s/p bilateral cervical medial branch nerve RFA at C3-4-5.  She mentions that this provided significant improvement to her neck pain. She mentions that she has been having significant improvement in her symptoms of neck pain.   She mentions that the headache that it's left temporal side with photophobia/phonophobia the Emgality helps a lot and reduces them. i    Valsalva triggered headaches: lifting, laughing and coughing will trigger severe headache for seconds to a minute and will keep going if she does not stay still. This has been happening for about 10 years. She mentions that this did not get better after  decompression surgery. She mentions that this never improved with surgery and continues. Frequency of these depending on activity. Since being off gabapentin felt these worsened significantly.     Migraine: typically starts left temple and will spread out from there. These are happening on average 3-4 days a week. They will last a few hours and will treat them with sleep and will  "try to go to sleep or will lay there still in dark, quiet, cold and rest.  She mentions that it will improve. She mentions that has some headache free times, but at times can be 2/10 in intensity. She mentions that she can having also squeezing/twisting brain. She mentions that with those she had a panic and thought she had to go to hospital. She mentions that she had to stop everything and had to rest.     Location: neck (sharp, stabbing); hands and feet (tingling, numb), hip/lower back (dull, aching). Also has intermittent left sided headaches starting occipitally radiating to temple.   Quality: as above  Severity: range 2-10/10   Duration: hours  Frequency: daily  Alleviated by: medication, ice  Exacerbated by: movement, light noise (head pain)  Associated with: left arm/hand gets numb/weak "a lot" no radicular pain; headaches on left which are random or triggered by straining in restroom/coughing/laughing. Headaches worst in the AM within 2 hours of waking up. Headaches do not wake her up but neck pain does. Having some depth perception issues causing her to break her foot - eyes checked and were ok. Transient visual loss? When in a lof of pain, not clear TVO with valsalva.   Sleep habits:    Acute treatment:  Current acute treatment:  -- Sumatriptan 100mg - helps some   --Nurtec 75mg- significant benefit, will reduce/resolve attacks that are more migraine.    Tried:  -- TENS   -- tramadol - helped with overall pain   -- methocarbamol - helped with overall pain   -- relafen - did not help     Preventive:  Current prevention:  -- Emgality 120 mg every 28 days, notices wearing off effect 3 days prior - helps the intensity and has about 10 days of freedom with it.   --Tizanidine 4 mg 1-2 times a day typically - feels this helps more than anything for acute occipital pain  -- gabapentin 600mg twice a day - helps the intensity of pain   --candesartan 8mg - migraine has improved, and her Bps are doing much better. "   --botox 155 units (started on 6/22- present): over 50% improvement     (wellbutrin)  (prozac)  (prazosin)      Previously tried:  -- for behavioral health is on welbutrin xl 150mg and Fluoxetine 40mg   -- takes vyvanse   --amitriptyline 100 mg nightly mainly for insomnia, no help headache   --trazodone 200mg for insomnia- no worsening of headache   --zonisamide 150mg nightly - caused nausea/vomiting   -- propranolol 20mg tid - some benefit   -- prozac 40mg - for mod   -- wellbutrin XL 150mg- no benefit in headache     Procedural history:   -- cervical GREGOR  -- lumbar GREGOR  -- chiropractor for hip /neck - helped only hip  -- PT - none for neck just foot     Neuroimaging and other studies:   MRI C spine 7/15/22:  FINDINGS:  Alignment: Slight reversal of the normal cervical lordosis.  Anterolisthesis of C4 on C5, 2 mm.     Vertebral Column: Vertebral body heights are maintained.  No evidence of an acute fracture or aggressive marrow replacement process. Multilevel disc degeneration with disc desiccation at C2-C6, most pronounced at C5-6 and C6-7 with mild intervertebral disc space narrowing, degenerative endplate change and anterior marginal osteophyte formation.     Cord: Normal signal and morphology.     Skull base and craniocervical junction: Stable postoperative changes of suboccipital craniectomy for Chiari 1 malformation decompression.     Degenerative findings:     C2-C3: Mild posterior disc osteophyte complex.  Mild bilateral facet arthropathy.  There is no neural foraminal stenosis.  There is no spinal canal stenosis.     C3-C4: The disc is normal in configuration.  Mild bilateral facet arthropathy.  There is no neural foraminal stenosis.  There is no spinal canal stenosis.     C4-C5: Anterolisthesis with uncovering of the disc.  Small central disc protrusion.  Mild bilateral facet arthropathy.  No neural foraminal stenosis.  There is no spinal canal stenosis.     C5-C6: Right asymmetric posterior disc  osteophyte complex with right lateral disc extrusion, descending to the C6 infrapedicular level, which abuts the right ventral cord surface.  Mild bilateral facet arthropathy.  Mild right uncovertebral joint spurring.  Mild right neural foraminal stenosis.  No significant overall spinal canal stenosis.     C6-C7: Right asymmetric posterior disc osteophyte complex.  Mild bilateral facet arthropathy.  Mild bilateral uncovertebral joint spurring.  Mild left neural foraminal stenosis.  There is no spinal canal stenosis.     C7-T1: The disc is normal in configuration.  Mild bilateral facet arthropathy.  There is no neural foraminal stenosis.  There is no spinal canal stenosis.     Paraspinal muscles & soft tissues: No significant abnormality.     Normal signal voids are present in the vertebral arteries.     Impression:     1. Multilevel degenerative change of the cervical spine, as described above, mildly progressed in comparison to the prior study from 2019.  Findings contribute to mild neural foraminal stenosis on the right at C5-6 and on the left at C6-7.  No significant spinal canal stenosis.  2. Right asymmetric posterior disc osteophyte complex with right lateral disc extrusion at C5-6, which abuts the right ventral cord surface.  No cord compression or focal cord signal abnormality.  3. Stable postoperative changes of suboccipital craniectomy for Chiari 1 malformation decompression.      On today's virtual visit I went over the imaging with patient with patient seeing my screen as we discussed imaging.   MRI brain 7/15/22:  Impression:     1. No evidence of an acute intracranial abnormality or significant interval detrimental change as compared to the prior study.  2. Stable postoperative changes of suboccipital craniectomy for Chiari I malformation decompression.        MRA head 5/6/21:  FINDINGS:  Anterior circulation: There is normal flow related signal intensity within the petrous and cavernous segments of  the internal carotid arteries.  The supraclinoid internal carotid arteries are normal as is the carotid terminus bilaterally.  There is normal branching into the anterior and middle cerebral arteries.  These vessels are patent.  There is no critical stenosis, occlusion, thrombosis, dissection, aneurysm or other vascular malformation.     Posterior circulation: The vertebral arteries are patent.  The basilar artery is normal in caliber and contour.  The basilar tip is normal.  There is normal branching into the superior cerebellar and posterior cerebral arteries.  There is no critical stenosis, occlusion, thrombosis, dissection, aneurysm.     There has been previous decompression for Chiari malformation.     Impression:     1. Normal brain MRA.  2. Previous decompression for Chiari malformation  Results for orders placed or performed in visit on 08/11/20   MRI Brain Without Contrast    Narrative    EXAMINATION:  MRI BRAIN WITHOUT CONTRAST    CLINICAL HISTORY:  Repeated falls.  History of posterior fossa decompression.; Repeated falls    TECHNIQUE:  Multiplanar MR imaging of the brain was performed without contrast.    COMPARISON:  Head CT 06/09/2019    FINDINGS:  No acute infarct, mass effect or hemorrhage.  Brain parenchyma has a normal signal.  Ventricles and sulci are proportionate.    No abnormal extra-axial fluid collections.    Flow voids are maintained in the intracranial arteries and dural venous sinuses.    Suboccipital craniotomy changes are stable.  Configuration of the cerebellar vermis appears stable compared to the prior CT given differences in modality.      Impression    1. No acute findings in the brain.  2. Stable postoperative changes of posterior decompression      Electronically signed by: Brandin Avila MD  Date:    08/11/2020  Time:    16:43   Results for orders placed or performed during the hospital encounter of 06/09/19   CT Head Without Contrast    Narrative    EXAMINATION:  CT HEAD WITHOUT  CONTRAST    CLINICAL HISTORY:  Head trauma, headache;    TECHNIQUE:  Low dose axial images were obtained through the head.  Coronal and sagittal reformations were also performed. Contrast was not administered.    COMPARISON:  12/14/2018    RADIATION DOSE:  1099 DLP    FINDINGS:  Postoperative changes of a suboccipital craniectomy for posterior fossa decompression.    Hyperostosis frontalis interna noted.    Brain parenchyma otherwise normally formed without midline shift or mass effect.  No hydrocephalus.  No acute intracranial hemorrhage.    Ventricles are normal in size and configuration.  Physiologic calcifications are present.      Impression    1. No acute intracranial abnormality identified.  Postoperative changes as discussed.  2. [place] agreement.      Electronically signed by: Anton Kenney MD  Date:    06/09/2019  Time:    11:41   Results for orders placed or performed during the hospital encounter of 02/05/18   MRI Brain W WO Contrast    Narrative    Routine Multiplanar imaging through this 42-year-old females brain was obtained with utilization of 10 cc of gadolinium contrast    Hyperostosis frontalis appears to be present.     A Chiari one malformation appears to be present with the cerebellar tonsils extending 9 mm inferior to the foramen magnum with so-called pegging of the cerebellar tonsils.    An empty sella configuration of the pituitary gland is noted.    No diffusion positivity is identified to suggest recent infarction.    No gradient  susceptibility was noted just just presence of acute blood products.    No worrisome enhancing lesions are noted intracranially    No diffusion positivity is identified to suggest recent infarction.    Impression    1. The cerebellar tonsils appear to extend inferior to the foramen magnum suggestive of a Chiari one malformation. No obvious cord signal abnormalities noted within the visualized cord, given the patient's history further evaluation of the  cord may be of use to exclude a syrinx        Electronically signed by: Ismael Richmond MD  Date:     02/05/18  Time:    12:04      ROS: The fourteen point review of system was performed and positive for HPI reports    PHYSICAL EXAMINAtION  There were no vitals filed for this visit.   General: The patient is a well-developed, well-nourished looking of stated age in acute distress. She is in dark room with her dog in room.   NEURO EXAM  General: This is a well-nourished and well-developed person, who is afebrile, fully ambulatory and alert.  No acute distress  Mental Status: Oriented to person, place, and date. Speech is of normal clarity, inflection, pace and volume. No aphasia, no dysarthria   Cranial Nerves: no facial weakness, EOMI,         Impression:  Chronic migraine without aura:  She has complex headache history which includes valsalva induced headaches , tonsillar herniation of 9mm on MRI brain which was decompressed in 2018, but headache/neck pain worsened after that. Over the years multiple medications tried. CGRP Mab Emgality has been effective at reducing from 30/30 days to 15-20 days/month headache, she has found gabapentin 600mg bid helps some of the neck pain and the occipital headache , did much worse off it, she also finds tizanidnie is what works really well wyhen used to help cervical myofascial pain and also some occipital pain. The worse pain currently is occipital headache which is an exploding out pressure she wakes up 3-4 times a week and it's completely disabling. This she does not necessarily count it as a migraine, but it has all migraine features and then has more temporal pain on left which 2 days a  Week. She has persistent allodynia in left occipital area in are of the decompressive surgery. She is having worsening valsalva induced headache as well as vision obscurations with them and dizziness (has been for the last few months).  Neuroimaging did not reveal any abnormalities . She has  noted significant benefit with start of BTX on 6/22 already, unfortunately she has been unable to get second session because of missed or rescheduled appointment due to her being unable to drive or other medical conditions. Will restart Botox and get her in prior to my departure from [facility] as she is already approved. Given her suboccipital decompressive surgery special care has to be taken with her Botox and she would prefer physician doing procedure.       Chronic neck pain/cervical spondylosis - referring to pain clinic as Dr. Samano did RFA which helped significantly. Mri C spine that was performed for the worsening neck pain showed multilevel degenerative changes without any spinal cord compression or stenosis,  DJD change mild foraminal stenosis on Rt C5-6 and left C6-7. There is no cord signal abnormalities, no syrinx. She continues to have significant pain and has not responded to any of the preventive measures.      Other conditions:  S/p Chiari malformation decompression surgery - avoid ONBs , causing severe pain when she lays on it.     Valsalva induced headaches- unclear if this is primary vs. Secondary (her Chiari malformation operated on, this should have resolved if only Chiari, no concern about SIH from prior specialists) , MRA head was normal, no vascular malformation, possibly related to prior Chiari given the symptoms of visual and also short duration of the event, no signs of SIH on recent MRI brain.     Comorbidities:  HTN- stable   PTSD- ongoing follows with mental health   ADHD  Insomnia  Asthma   PATRICIA- non compliant- if despite her asthma management continues chocking , my recommendation is having her see sleep medicine as this could be secondary to PATRICIA the gasping/chocking overnight sensation     Plan:   1- For preventive management: A.  Continue candesartan  8mg daily           B.  Emgality 120mg every 28 days a month           C.  Continue Botox every 12 weeks       2- Acute management: for  the migraine (left sided headache)  sumatriptan 100mg at onset of migraine, can repeat after 2 hours. Max is 2/day. No more than 2-3 days a week or no more than 10 days a month    Or     Nurtec ODT 75mg trial , max 1/day.     3- For cervical myofascial/cervical spondylosis/chronic cerviaclgia :  A. Continue gabapentin 600 mg 2-3 times a day                B. Continue tizanidine 4 mg 1-3 times a day       RTC for Botox       Petty Goetz MD   Board Certified Neurologist  [facility] Certified Headache Medicine

## 2022-11-15 ENCOUNTER — PATIENT MESSAGE (OUTPATIENT)
Dept: PSYCHIATRY | Facility: CLINIC | Age: 47
End: 2022-11-15
Payer: MEDICARE

## 2022-11-15 DIAGNOSIS — F41.0 PANIC DISORDER WITHOUT AGORAPHOBIA: ICD-10-CM

## 2022-11-15 DIAGNOSIS — F43.10 PTSD (POST-TRAUMATIC STRESS DISORDER): ICD-10-CM

## 2022-11-15 RX ORDER — ALPRAZOLAM 1 MG/1
1 TABLET ORAL DAILY PRN
Qty: 30 TABLET | Refills: 0 | Status: SHIPPED | OUTPATIENT
Start: 2022-11-15 | End: 2022-11-29

## 2022-11-15 NOTE — TELEPHONE ENCOUNTER
Pt is requesting medication refill on alprazolam 1 mg   Last refill: 10/14/22  Last visit: 10/24/22  Follow Up: 11/29/22

## 2022-11-19 ENCOUNTER — PATIENT MESSAGE (OUTPATIENT)
Dept: FAMILY MEDICINE | Facility: CLINIC | Age: 47
End: 2022-11-19
Payer: MEDICARE

## 2022-11-21 ENCOUNTER — PROCEDURE VISIT (OUTPATIENT)
Dept: NEUROLOGY | Facility: CLINIC | Age: 47
End: 2022-11-21
Payer: MEDICARE

## 2022-11-21 VITALS
BODY MASS INDEX: 41.28 KG/M2 | WEIGHT: 263 LBS | RESPIRATION RATE: 17 BRPM | HEIGHT: 67 IN | DIASTOLIC BLOOD PRESSURE: 86 MMHG | HEART RATE: 84 BPM | SYSTOLIC BLOOD PRESSURE: 127 MMHG

## 2022-11-21 DIAGNOSIS — G43.719 INTRACTABLE CHRONIC MIGRAINE WITHOUT AURA AND WITHOUT STATUS MIGRAINOSUS: Primary | ICD-10-CM

## 2022-11-21 PROCEDURE — 96372 THER/PROPH/DIAG INJ SC/IM: CPT | Mod: 59,S$GLB,, | Performed by: PSYCHIATRY & NEUROLOGY

## 2022-11-21 PROCEDURE — 64615 PR CHEMODENERVATION OF MUSCLE FOR CHRONIC MIGRAINE: ICD-10-PCS | Mod: S$GLB,,, | Performed by: PSYCHIATRY & NEUROLOGY

## 2022-11-21 PROCEDURE — 64615 CHEMODENERV MUSC MIGRAINE: CPT | Mod: S$GLB,,, | Performed by: PSYCHIATRY & NEUROLOGY

## 2022-11-21 PROCEDURE — 96372 PR INJECTION,THERAP/PROPH/DIAG2ST, IM OR SUBCUT: ICD-10-PCS | Mod: 59,S$GLB,, | Performed by: PSYCHIATRY & NEUROLOGY

## 2022-11-21 RX ORDER — KETOROLAC TROMETHAMINE 30 MG/ML
30 INJECTION, SOLUTION INTRAMUSCULAR; INTRAVENOUS
Status: COMPLETED | OUTPATIENT
Start: 2022-11-21 | End: 2022-11-21

## 2022-11-21 RX ORDER — SYRINGE W-NEEDLE,DISPOSAB,3 ML 25GX5/8"
SYRINGE, EMPTY DISPOSABLE MISCELLANEOUS
Qty: 8 EACH | Refills: 0 | Status: SHIPPED | OUTPATIENT
Start: 2022-11-21 | End: 2023-09-22 | Stop reason: SDUPTHER

## 2022-11-21 RX ORDER — KETOROLAC TROMETHAMINE 30 MG/ML
30 INJECTION, SOLUTION INTRAMUSCULAR; INTRAVENOUS ONCE AS NEEDED
Qty: 4 ML | Refills: 1 | Status: SHIPPED | OUTPATIENT
Start: 2022-11-21 | End: 2023-01-21 | Stop reason: SDUPTHER

## 2022-11-21 RX ADMIN — KETOROLAC TROMETHAMINE 30 MG: 30 INJECTION, SOLUTION INTRAMUSCULAR; INTRAVENOUS at 01:11

## 2022-11-21 NOTE — PROCEDURES
Procedures     BOTOX PROCEDURE    PROCEDURE PERFORMED: Botulinum toxin injection (26205)    CLINICAL INDICATION: G43.719    Session #2    Shante is here with her friend. She is having a severe left frontal headache today. She is overdue by 2.5 months for Botox. She has bee unable to keep appointments due to multiple medical conditions that do not allow her to drive. If she has severe migraine she will not drive. She would like Toradol IM 30mg today. She has limited response to acute medications and feels that Toradol IM works very well for her and would like to have a few at home in case of severe migraine she can treat so that she can be functional and not continue to miss so many appointments. Toradol 30mg #4 were given. Discussed complications of NSAIDs even if IM after bypass surgery, but at this time we have limited options. She expressed she is aware and would like to proceed. Discussed low threshold to change acute management triptan. Toradol 30mg IM today for severe migraine.     A time out was conducted just before the start of the procedure to verify the correct patient and procedure, procedure location, and all relevant critical information.   Signed consent obtained prior to procedure     Conventional methods of treatment but the patient has been unresponsive and refractory.The patient meets criteria for chronic headaches according to the ICHD-III, the patient has more than 15 headaches a month at least 8 of them meet migraine criteria, which last for more than 4 hours a day.     Last Botox injections were on 6/10/22 and  she had over 50%  improvement in the patient's symptoms. Frequency of treatment is every 3 months unless no response to the treatments, at which time we will discontinue the injections.     DESCRIPTION OF PROCEDURE: After obtaining informed consent and under   aseptic technique, a total of 155 units of botulinum toxin type A were   injected in the following muscles: Procerus 5 units,   5 units   bilaterally, frontalis 20 units, temporalis 20 units bilaterally,   occipitalis 15 units, upper cervical paraspinals 10 units bilaterally and trapezius 15 units bilaterally. The patient was given a total of 155 units in 31 sites.    Special precaution taken given her posterior decompression.       The patient tolerated the procedure well. There were no complications. The patient was given a prescription for repeat treatment in 3 months.     Unavoidable waste 45 units    Petty Goetz MD   Board Certified Neurologist   [facility] Certified Headache Medicine

## 2022-11-22 ENCOUNTER — PATIENT MESSAGE (OUTPATIENT)
Dept: FAMILY MEDICINE | Facility: CLINIC | Age: 47
End: 2022-11-22
Payer: MEDICARE

## 2022-11-22 ENCOUNTER — TELEPHONE (OUTPATIENT)
Dept: PSYCHIATRY | Facility: CLINIC | Age: 47
End: 2022-11-22
Payer: MEDICARE

## 2022-11-22 ENCOUNTER — TELEPHONE (OUTPATIENT)
Dept: PHARMACY | Facility: CLINIC | Age: 47
End: 2022-11-22
Payer: MEDICARE

## 2022-11-22 ENCOUNTER — PATIENT MESSAGE (OUTPATIENT)
Dept: NEUROLOGY | Facility: CLINIC | Age: 47
End: 2022-11-22
Payer: MEDICARE

## 2022-11-22 DIAGNOSIS — F33.2 MAJOR DEPRESSIVE DISORDER, RECURRENT, SEVERE WITHOUT PSYCHOTIC FEATURES: ICD-10-CM

## 2022-11-22 NOTE — TELEPHONE ENCOUNTER
Pharmacy sent a fax stating that patient's insurance doesn't cover Bupropion  mg the preferred alternative is Bupropion HCL please change to this instead.

## 2022-11-23 ENCOUNTER — TELEPHONE (OUTPATIENT)
Dept: FAMILY MEDICINE | Facility: CLINIC | Age: 47
End: 2022-11-23
Payer: MEDICARE

## 2022-11-23 ENCOUNTER — PATIENT MESSAGE (OUTPATIENT)
Dept: FAMILY MEDICINE | Facility: CLINIC | Age: 47
End: 2022-11-23

## 2022-11-23 RX ORDER — BUPROPION HYDROCHLORIDE 150 MG/1
150 TABLET ORAL DAILY
Qty: 30 TABLET | Refills: 1 | Status: SHIPPED | OUTPATIENT
Start: 2022-11-23 | End: 2023-01-24 | Stop reason: SDUPTHER

## 2022-11-23 NOTE — TELEPHONE ENCOUNTER
----- Message from Marina Aguirre sent at 11/22/2022  2:21 PM CST -----  Contact: self  Patient has anurse visit tomorrow but wants to kelli it to sometime next week.  Please call back a [phone] (home) and thanks

## 2022-11-23 NOTE — TELEPHONE ENCOUNTER
Spoke w/ pt. She is feeling ill and would like to resched prevnar vax. Nurse visit rescheduled for 11/30/22, 140pm. Pt agreed

## 2022-11-25 NOTE — TELEPHONE ENCOUNTER
FYI      Patient states she can't take generic synthroid but picked up the prescription anyway.  She is not taking it faithfully and does not want to schedule labs until she is.

## 2022-11-29 ENCOUNTER — OFFICE VISIT (OUTPATIENT)
Dept: PSYCHIATRY | Facility: CLINIC | Age: 47
End: 2022-11-29
Payer: MEDICARE

## 2022-11-29 DIAGNOSIS — F41.0 PANIC DISORDER WITHOUT AGORAPHOBIA: ICD-10-CM

## 2022-11-29 DIAGNOSIS — F43.10 PTSD (POST-TRAUMATIC STRESS DISORDER): ICD-10-CM

## 2022-11-29 DIAGNOSIS — F90.2 ATTENTION DEFICIT HYPERACTIVITY DISORDER, COMBINED TYPE: ICD-10-CM

## 2022-11-29 DIAGNOSIS — F43.10 POSTTRAUMATIC STRESS DISORDER: Primary | ICD-10-CM

## 2022-11-29 DIAGNOSIS — G47.00 INSOMNIA, UNSPECIFIED TYPE: ICD-10-CM

## 2022-11-29 DIAGNOSIS — F41.0 PANIC DISORDER: ICD-10-CM

## 2022-11-29 PROCEDURE — 4010F PR ACE/ARB THEARPY RXD/TAKEN: ICD-10-PCS | Mod: CPTII,95,, | Performed by: PSYCHOLOGIST

## 2022-11-29 PROCEDURE — 99214 OFFICE O/P EST MOD 30 MIN: CPT | Mod: 95,,, | Performed by: PSYCHOLOGIST

## 2022-11-29 PROCEDURE — 3044F PR MOST RECENT HEMOGLOBIN A1C LEVEL <7.0%: ICD-10-PCS | Mod: CPTII,95,, | Performed by: PSYCHOLOGIST

## 2022-11-29 PROCEDURE — 1159F PR MEDICATION LIST DOCUMENTED IN MEDICAL RECORD: ICD-10-PCS | Mod: CPTII,95,, | Performed by: PSYCHOLOGIST

## 2022-11-29 PROCEDURE — 90833 PR PSYCHOTHERAPY W/PATIENT W/E&M, 30 MIN (ADD ON): ICD-10-PCS | Mod: 95,,, | Performed by: PSYCHOLOGIST

## 2022-11-29 PROCEDURE — 4010F ACE/ARB THERAPY RXD/TAKEN: CPT | Mod: CPTII,95,, | Performed by: PSYCHOLOGIST

## 2022-11-29 PROCEDURE — 99212 OFFICE O/P EST SF 10 MIN: CPT | Mod: PO | Performed by: PSYCHOLOGIST

## 2022-11-29 PROCEDURE — 90833 PSYTX W PT W E/M 30 MIN: CPT | Mod: 95,,, | Performed by: PSYCHOLOGIST

## 2022-11-29 PROCEDURE — 99214 PR OFFICE/OUTPT VISIT, EST, LEVL IV, 30-39 MIN: ICD-10-PCS | Mod: 95,,, | Performed by: PSYCHOLOGIST

## 2022-11-29 PROCEDURE — 1159F MED LIST DOCD IN RCRD: CPT | Mod: CPTII,95,, | Performed by: PSYCHOLOGIST

## 2022-11-29 PROCEDURE — 3044F HG A1C LEVEL LT 7.0%: CPT | Mod: CPTII,95,, | Performed by: PSYCHOLOGIST

## 2022-11-29 RX ORDER — ALPRAZOLAM 1 MG/1
1 TABLET ORAL DAILY PRN
Qty: 30 TABLET | Refills: 0 | Status: SHIPPED | OUTPATIENT
Start: 2022-12-13 | End: 2023-01-17

## 2022-11-29 RX ORDER — SUVOREXANT 20 MG/1
20 TABLET, FILM COATED ORAL DAILY
Qty: 30 TABLET | Refills: 1 | Status: SHIPPED | OUTPATIENT
Start: 2022-11-29 | End: 2022-12-12 | Stop reason: ALTCHOICE

## 2022-11-29 RX ORDER — FLUOXETINE HYDROCHLORIDE 40 MG/1
40 CAPSULE ORAL DAILY
Qty: 90 CAPSULE | Refills: 0 | Status: SHIPPED | OUTPATIENT
Start: 2022-11-29 | End: 2023-01-24 | Stop reason: ALTCHOICE
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
## 2022-11-30 ENCOUNTER — PATIENT MESSAGE (OUTPATIENT)
Dept: FAMILY MEDICINE | Facility: CLINIC | Age: 47
End: 2022-11-30

## 2022-12-02 RX ORDER — LEVOTHYROXINE SODIUM 50 UG/1
50 TABLET ORAL
Qty: 30 TABLET | Refills: 1 | Status: SHIPPED | OUTPATIENT
Start: 2022-12-02 | End: 2023-03-30

## 2022-12-05 ENCOUNTER — PATIENT MESSAGE (OUTPATIENT)
Dept: FAMILY MEDICINE | Facility: CLINIC | Age: 47
End: 2022-12-05
Payer: MEDICARE

## 2022-12-07 ENCOUNTER — TELEPHONE (OUTPATIENT)
Dept: FAMILY MEDICINE | Facility: CLINIC | Age: 47
End: 2022-12-07

## 2022-12-10 ENCOUNTER — PATIENT MESSAGE (OUTPATIENT)
Dept: PSYCHIATRY | Facility: CLINIC | Age: 47
End: 2022-12-10
Payer: MEDICARE

## 2022-12-10 DIAGNOSIS — G47.00 INSOMNIA, UNSPECIFIED TYPE: Primary | ICD-10-CM

## 2022-12-12 RX ORDER — ZOLPIDEM TARTRATE 10 MG/1
10 TABLET ORAL NIGHTLY PRN
Qty: 30 TABLET | Refills: 0 | Status: SHIPPED | OUTPATIENT
Start: 2022-12-12 | End: 2023-01-10

## 2022-12-12 NOTE — TELEPHONE ENCOUNTER
Bertha Nix, I will send in a prescription for Ambien. Do not take the Belsomra and Ambien together.

## 2022-12-21 ENCOUNTER — OFFICE VISIT (OUTPATIENT)
Dept: OBSTETRICS AND GYNECOLOGY | Facility: CLINIC | Age: 47
End: 2022-12-21
Payer: MEDICARE

## 2022-12-21 VITALS
DIASTOLIC BLOOD PRESSURE: 74 MMHG | WEIGHT: 268.94 LBS | SYSTOLIC BLOOD PRESSURE: 122 MMHG | BODY MASS INDEX: 42.21 KG/M2 | HEIGHT: 67 IN

## 2022-12-21 DIAGNOSIS — R87.610 ASCUS OF CERVIX WITH NEGATIVE HIGH RISK HPV: Primary | ICD-10-CM

## 2022-12-21 PROCEDURE — 88175 CYTOPATH C/V AUTO FLUID REDO: CPT | Performed by: SPECIALIST

## 2022-12-21 PROCEDURE — 3044F HG A1C LEVEL LT 7.0%: CPT | Mod: CPTII,S$GLB,, | Performed by: SPECIALIST

## 2022-12-21 PROCEDURE — 99999 PR PBB SHADOW E&M-EST. PATIENT-LVL IV: ICD-10-PCS | Mod: PBBFAC,,, | Performed by: SPECIALIST

## 2022-12-21 PROCEDURE — 87624 HPV HI-RISK TYP POOLED RSLT: CPT | Performed by: SPECIALIST

## 2022-12-21 PROCEDURE — 99213 OFFICE O/P EST LOW 20 MIN: CPT | Mod: S$GLB,,, | Performed by: SPECIALIST

## 2022-12-21 PROCEDURE — 3074F PR MOST RECENT SYSTOLIC BLOOD PRESSURE < 130 MM HG: ICD-10-PCS | Mod: CPTII,S$GLB,, | Performed by: SPECIALIST

## 2022-12-21 PROCEDURE — 4010F PR ACE/ARB THEARPY RXD/TAKEN: ICD-10-PCS | Mod: CPTII,S$GLB,, | Performed by: SPECIALIST

## 2022-12-21 PROCEDURE — 1159F MED LIST DOCD IN RCRD: CPT | Mod: CPTII,S$GLB,, | Performed by: SPECIALIST

## 2022-12-21 PROCEDURE — 3044F PR MOST RECENT HEMOGLOBIN A1C LEVEL <7.0%: ICD-10-PCS | Mod: CPTII,S$GLB,, | Performed by: SPECIALIST

## 2022-12-21 PROCEDURE — 4010F ACE/ARB THERAPY RXD/TAKEN: CPT | Mod: CPTII,S$GLB,, | Performed by: SPECIALIST

## 2022-12-21 PROCEDURE — 3008F PR BODY MASS INDEX (BMI) DOCUMENTED: ICD-10-PCS | Mod: CPTII,S$GLB,, | Performed by: SPECIALIST

## 2022-12-21 PROCEDURE — 99213 PR OFFICE/OUTPT VISIT, EST, LEVL III, 20-29 MIN: ICD-10-PCS | Mod: S$GLB,,, | Performed by: SPECIALIST

## 2022-12-21 PROCEDURE — 3078F PR MOST RECENT DIASTOLIC BLOOD PRESSURE < 80 MM HG: ICD-10-PCS | Mod: CPTII,S$GLB,, | Performed by: SPECIALIST

## 2022-12-21 PROCEDURE — 3078F DIAST BP <80 MM HG: CPT | Mod: CPTII,S$GLB,, | Performed by: SPECIALIST

## 2022-12-21 PROCEDURE — 3074F SYST BP LT 130 MM HG: CPT | Mod: CPTII,S$GLB,, | Performed by: SPECIALIST

## 2022-12-21 PROCEDURE — 99999 PR PBB SHADOW E&M-EST. PATIENT-LVL IV: CPT | Mod: PBBFAC,,, | Performed by: SPECIALIST

## 2022-12-21 PROCEDURE — 1159F PR MEDICATION LIST DOCUMENTED IN MEDICAL RECORD: ICD-10-PCS | Mod: CPTII,S$GLB,, | Performed by: SPECIALIST

## 2022-12-21 PROCEDURE — 3008F BODY MASS INDEX DOCD: CPT | Mod: CPTII,S$GLB,, | Performed by: SPECIALIST

## 2022-12-22 NOTE — PROGRESS NOTES
46 yo WF returns for repeat pap smear following ASCUS with neg HR HPV  Past Medical History:   Diagnosis Date    Abnormal Pap smear of cervix     Arnold-Chiari deformity     Arthritis     Asthma     Borderline personality disorder     Carnitine deficiency     Depression     Fatty liver     noted on 8/21 CT    Fibromyalgia     Headache     History of abnormal cervical Pap smear     colpo/ LEEP and CKC    History of nephrolithiasis     kidney stones    Hypothyroidism     IBS (irritable bowel syndrome)     GI smith negative    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     PATRICIA (obstructive sleep apnea)     severe - doesn't use CPAP    Panic attack     PTSD (post-traumatic stress disorder)     Renal disorder     kidney stones, blood in urine on occasion       Past Surgical History:   Procedure Laterality Date    ANKLE SURGERY Right     x2 (#1 for ligament injury and #2 for fx and ligament)    AUGMENTATION OF BREAST      BILATERAL TUBAL LIGATION      BRAIN SURGERY  12/2018    decompression/craniotomy Arnold-Chiari syndrome    BREAST SURGERY  1997    B implants    CERVICAL BIOPSY  W/ LOOP ELECTRODE EXCISION      COLD KNIFE CONIZATION OF CERVIX N/A 03/17/2022    Procedure: CONE BIOPSY, CERVIX, USING COLD KNIFE;  Surgeon: Nikhil Franco MD;  Location: [facility];  Service: OB/GYN;  Laterality: N/A;    COLONOSCOPY      COLONOSCOPY N/A 09/29/2022    Procedure: COLONOSCOPY;  Surgeon: Martín Sagastume MD;  Location: [facility];  Service: Endoscopy;  Laterality: N/A;    DECOMPRESSION OF CHIARI MALFORMATION BY REMOVAL OF POSTERIOR ARCH OF FIRST CERVICAL VERTEBRA N/A 12/13/2018    Procedure: DECOMPRESSION, CHIARI MALFORMATION, BY 1ST CERVICAL VERTEBRA POSTERIOR ARCH REMOVAL;  Surgeon: Sergio Busch MD;  Location: [address];  Service: Neurosurgery;  Laterality: N/A;    EPIDURAL STEROID INJECTION INTO CERVICAL SPINE N/A 12/05/2019    Procedure: Injection-steroid-epidural-cervical-C7-T1;  Surgeon: Noa Samano MD;   "Location: [facility] OR;  Service: Neurosurgery;  Laterality: N/A;    FRACTURE SURGERY Right     Ankle    INJECTION OF ANESTHETIC AGENT AROUND MEDIAL BRANCH NERVES INNERVATING CERVICAL FACET JOINT Bilateral 08/06/2020    Procedure: Block-nerve-medial branch-cervical Bilateral  C3,4,5;  Surgeon: Noa Samano MD;  Location: [facility] OR;  Service: Anesthesiology;  Laterality: Bilateral;    INJECTION OF ANESTHETIC AGENT AROUND MEDIAL BRANCH NERVES INNERVATING CERVICAL FACET JOINT Bilateral 08/20/2020    Procedure: Block-nerve-medial branch-cervical Bilateral C3-4-5;  Surgeon: Noa Samano MD;  Location: [facility] OR;  Service: Anesthesiology;  Laterality: Bilateral;    LAPAROSCOPIC GASTRIC BANDING      with subsequent removal due to abscess    LIPOSUCTION      x 2    PELVIC LAPAROSCOPY      for endometriosis eval with BTL    RADIOFREQUENCY ABLATION Bilateral 08/16/2021    Procedure: Radiofrequency Ablation Cervical C3-C5;  Surgeon: Robert Munoz MD;  Location: [facility] OR;  Service: Pain Management;  Laterality: Bilateral;    RADIOFREQUENCY THERMAL COAGULATION OF MEDIAL BRANCH OF POSTERIOR RAMUS OF CERVICAL SPINAL NERVE Bilateral 08/27/2020    Procedure: RADIOFREQUENCY THERMAL COAGULATION, NERVE, SPINAL, CERVICAL, POSTERIOR RAMUS, MEDIAL BRANCH C3-4-5, bilateral;  Surgeon: Noa Samano MD;  Location: [facility] OR;  Service: Anesthesiology;  Laterality: Bilateral;       Family History   Problem Relation Age of Onset    Diabetes Sister     Seizures Sister     Mitochondrial disorder Sister         "trent"    Mental illness Sister     Seizures Sister     Cervical cancer Sister     Cervical cancer Sister     Ovarian cancer Cousin     Ovarian cancer Paternal Aunt     Colon cancer Maternal Grandmother     Cancer Maternal Grandfather 65        throat ca    Diabetes Mother     Stroke Mother 30    Mental illness Mother         depression    Hyperlipidemia Mother     Hypertension Mother     Hyperlipidemia Father     Heart disease Father         " ""athletes heart"    Ovarian cancer Paternal Aunt     Cervical cancer Paternal Cousin     Cervical cancer Other     Breast cancer Neg Hx        Social History     Socioeconomic History    Marital status:    Occupational History    Occupation: [employer]     Tobacco Use    Smoking status: Never    Smokeless tobacco: Never   Substance and Sexual Activity    Alcohol use: Yes     Comment: occasional "maybe monthly"    Drug use: No    Sexual activity: Yes     Partners: Male     Birth control/protection: See Surgical Hx, None     Comment: depo since age 18   Social History Narrative    Not on disability, not currently working.     Social Determinants of Health     Financial Resource Strain: Medium Risk    Difficulty of Paying Living Expenses: Somewhat hard   Physical Activity: Inactive    Days of Exercise per Week: 0 days    Minutes of Exercise per Session: 0 min   Stress: Stress Concern Present    Feeling of Stress : Very much   Social Connections: Unknown    Frequency of Communication with Friends and Family: More than three times a week    Frequency of Social Gatherings with Friends and Family: Never    Active Member of Clubs or Organizations: No    Attends Club or Organization Meetings: Never    Marital Status:    Housing Stability: Low Risk     Unable to Pay for Housing in the Last Year: No    Number of Places Lived in the Last Year: 1    Unstable Housing in the Last Year: No       Current Outpatient Medications   Medication Sig Dispense Refill    albuterol (PROVENTIL) 2.5 mg /3 mL (0.083 %) nebulizer solution Take 3 mLs (2.5 mg total) by nebulization every 4 (four) hours as needed for Wheezing or Shortness of Breath (chest tightness). Rescue 180 mL 6    albuterol (PROVENTIL/VENTOLIN HFA) 90 mcg/actuation inhaler Inhale 1-2 puffs into the lungs every 4 (four) hours as needed for Wheezing or Shortness of Breath (chest tightness). Rescue 8.5 g 11    ALPRAZolam (XANAX) 1 MG tablet Take 1 tablet (1 " "mg total) by mouth daily as needed for Anxiety. 30 tablet 0    buPROPion (WELLBUTRIN XL) 150 MG TB24 tablet Take 1 tablet (150 mg total) by mouth once daily. 30 tablet 1    candesartan (ATACAND) 8 MG tablet 1 tab po daily 30 tablet 6    diphenoxylate-atropine 2.5-0.025 mg (LOMOTIL) 2.5-0.025 mg per tablet Take 1 tablet by mouth 4 (four) times daily as needed. 80 tablet 0    FLUoxetine 40 MG capsule Take 1 capsule (40 mg total) by mouth once daily. 90 capsule 0    fluticasone propionate (FLONASE) 50 mcg/actuation nasal spray SHAKE LIQUID AND USE 1 SPRAY(50 MCG) IN EACH NOSTRIL EVERY DAY 16 g 3    fluticasone-salmeterol 230-21 mcg/dose (ADVAIR HFA) 230-21 mcg/actuation HFAA inhaler Inhale 2 puffs into the lungs 2 (two) times daily. Controller 12 g 2    gabapentin (NEURONTIN) 600 MG tablet TAKE 1 TABLET BY MOUTH 2 TO 3 TIMES A DAY FOR CHRONIC PAIN 90 tablet 11    galcanezumab-gnlm (EMGALITY PEN) 120 mg/mL PnIj ADMINISTER 1 ML UNDER THE SKIN EVERY 28 DAYS 1 mL 6    levOCARNitine (CARNITOR) 330 mg Tab TAKE 3 TABLETS(990 MG) BY MOUTH TWICE DAILY 180 tablet 2    levothyroxine (SYNTHROID) 50 MCG tablet Take 1 tablet (50 mcg total) by mouth before breakfast. 30 tablet 1    prazosin (MINIPRESS) 2 MG Cap TAKE 1 CAPSULE(2 MG) BY MOUTH EVERY EVENING 90 capsule 0    sumatriptan (IMITREX) 100 MG tablet Take 1 tablet (100 mg total) by mouth as needed for Migraine (can repeat after 2 hours. max 2/day.). 12 tablet 3    SYNTHROID 200 mcg tablet Take 1 tablet (200 mcg total) by mouth before breakfast. 90 tablet 0    syringe with needle (SYRINGE 3CC/25GX1") 3 mL 25 gauge x 1" Syrg Use for Toradol IM only. 8 each 0    tiZANidine (ZANAFLEX) 4 MG tablet 1 tab po TID PRN muscle spasm 90 tablet 6    zolpidem (AMBIEN) 10 mg Tab Take 1 tablet (10 mg total) by mouth nightly as needed (for sleep). 30 tablet 0    acetylcysteine (N-ACETYL-L-CYSTEINE MISC)       dronabinol (MARINOL) 2.5 MG capsule Take 1 capsule (2.5 mg total) by mouth 2 (two) " times daily before meals. (Patient not taking: Reported on 11/21/2022) 60 capsule 3    lisdexamfetamine (VYVANSE) 40 MG Cap Take 1 capsule (40 mg total) by mouth every morning. 30 capsule 0    montelukast (SINGULAIR) 10 mg tablet Take 1 tablet (10 mg total) by mouth once daily. (Patient not taking: Reported on 11/21/2022) 90 tablet 2    nystatin (MYCOSTATIN) powder Apply topically 2 (two) times daily. (Patient not taking: Reported on 11/21/2022) 60 g 2     No current facility-administered medications for this visit.     Facility-Administered Medications Ordered in Other Visits   Medication Dose Route Frequency Provider Last Rate Last Admin    albuterol nebulizer solution 2.5 mg  2.5 mg Nebulization Q15 Min PRN Hilton Garner MD        albuterol-ipratropium 2.5 mg-0.5 mg/3 mL nebulizer solution 3 mL  3 mL Nebulization PRN Hilton Garner MD        diphenhydrAMINE injection 25 mg  25 mg Intravenous Q6H PRN Hilton Garner MD        HYDROmorphone injection 0.5 mg  0.5 mg Intravenous Q5 Min PRN Hilton Garner MD   0.5 mg at 03/17/22 1325    lorazepam injection 0.25 mg  0.25 mg Intravenous Q15 Min PRN Hilton Garner MD        ondansetron injection 4 mg  4 mg Intravenous Q15 Min PRN Hilton Garner MD        sodium chloride 0.9% flush 3 mL  3 mL Intravenous Q8H Hilton Garner MD        sodium chloride 0.9% flush 3 mL  3 mL Intravenous PRN Hilton Garner MD           Review of patient's allergies indicates:   Allergen Reactions    Lamotrigine      Other reaction(s): Rash  Other reaction(s): Nausea    Sulfa (sulfonamide antibiotics) Nausea Only     Other reaction(s): Unknown    Adhesive Rash    Zonisamide Nausea And Vomiting     Nausea/vomiting        Review of System:   General: no chills, fever, night sweats, weight gain or weight loss  Psychological: no depression or suicidal ideation  Breasts: no new or changing breast lumps, nipple discharge or masses.  Respiratory: no cough, shortness of breath, or  wheezing  Cardiovascular: no chest pain or dyspnea on exertion  Gastrointestinal: no abdominal pain, change in bowel habits, or black or bloody stools  Genito-Urinary: no incontinence, urinary frequency/urgency or vulvar/vaginal symptoms, pelvic pain or abnormal vaginal bleeding.  Musculoskeletal: no gait disturbance or muscular weakness                                               General Appearance    A and O x 4, Cooperative, no distress   Breasts    Abdomen   Deferred    Soft, non-tender, bowel sounds active all four quadrants,  no masses, no organomegaly    Genitourinary:   External rectal exam shows no thrombosed external hemorrhoids.   Pelvic exam was performed with patient supine.  No labial fusion.  There is no rash, lesion or injury on the right labia.  There is no rash, lesion or injury on the left labia.  No bleeding and no signs of injury around the vaginal introitus, urethra is without lesions and well supported. The cervix is visualized with no discharge, lesions or friability.  No vaginal discharge found.  No significant Cystocele, Enterocele or rectocele, and uterus well supported.  Bimanual exam:  The urethra is normal to palpation and there are no palpable vaginal wall masses.  Uterus is not deviated, not enlarged, not fixed, normal shape and not tender.  Cervix exhibits no motion tenderness.   Right adnexum displays no mass and no tenderness.  Left adnexum displays no mass and no tenderness.   Extremities: Extremities normal, atraumatic, no cyanosis or edema                     NOTE  NURSING PERSONAL PRESENT FOR ENTIRE PHYSICAL EXAM     PAP submitted  Answered all questions and will follow

## 2022-12-28 ENCOUNTER — TELEPHONE (OUTPATIENT)
Dept: PSYCHIATRY | Facility: CLINIC | Age: 47
End: 2022-12-28
Payer: MEDICARE

## 2022-12-30 LAB
FINAL PATHOLOGIC DIAGNOSIS: NORMAL
HPV HR 12 DNA SPEC QL NAA+PROBE: NEGATIVE
HPV16 AG SPEC QL: NEGATIVE
HPV18 DNA SPEC QL NAA+PROBE: NEGATIVE
Lab: NORMAL

## 2023-01-11 NOTE — TELEPHONE ENCOUNTER
Refill Routing Note   Medication(s) are not appropriate for processing by [facility] for the following reason(s):      - Outside of protocol  - Drug-Drug Interaction (levothyroxine, SYNTHROID)    [facility] action(s):  Route  Defer Medication-related problems identified: Therapeutic duplication        Medication reconciliation completed: No     Appointments  past 12m or future 3m with PCP    Date Provider   Last Visit   10/20/2022 Amaya Aiken MD   Next Visit   3/23/2023 Amaya Aiken MD   ED visits in past 90 days: 0        Note composed:8:58 AM 01/11/2023

## 2023-01-11 NOTE — TELEPHONE ENCOUNTER
No new care gaps identified.  [facility] Embedded Care Gaps. Reference number: [id number]. 1/11/2023   5:52:59 AM CST

## 2023-01-15 RX ORDER — LEVOTHYROXINE SODIUM 200 UG/1
TABLET ORAL
Qty: 90 TABLET | Refills: 3 | OUTPATIENT
Start: 2023-01-15

## 2023-01-15 RX ORDER — LEVOCARNITINE 330 MG/1
TABLET ORAL
Qty: 180 TABLET | Refills: 2 | Status: SHIPPED | OUTPATIENT
Start: 2023-01-15 | End: 2023-04-10

## 2023-01-17 ENCOUNTER — PATIENT MESSAGE (OUTPATIENT)
Dept: ADMINISTRATIVE | Facility: HOSPITAL | Age: 48
End: 2023-01-17
Payer: MEDICARE

## 2023-01-18 ENCOUNTER — OFFICE VISIT (OUTPATIENT)
Dept: PSYCHIATRY | Facility: CLINIC | Age: 48
End: 2023-01-18
Payer: MEDICARE

## 2023-01-18 DIAGNOSIS — G47.09 OTHER INSOMNIA: ICD-10-CM

## 2023-01-18 PROCEDURE — 99999 PR PBB SHADOW E&M-EST. PATIENT-LVL I: ICD-10-PCS | Mod: PBBFAC,,, | Performed by: PSYCHOLOGIST

## 2023-01-18 PROCEDURE — 99999 PR PBB SHADOW E&M-EST. PATIENT-LVL I: CPT | Mod: PBBFAC,,, | Performed by: PSYCHOLOGIST

## 2023-01-18 PROCEDURE — 90853 GROUP PSYCHOTHERAPY: CPT | Mod: S$GLB,,, | Performed by: PSYCHOLOGIST

## 2023-01-18 PROCEDURE — 90853 PR GROUP PSYCHOTHERAPY: ICD-10-PCS | Mod: S$GLB,,, | Performed by: PSYCHOLOGIST

## 2023-01-18 PROCEDURE — 1159F MED LIST DOCD IN RCRD: CPT | Mod: CPTII,S$GLB,, | Performed by: PSYCHOLOGIST

## 2023-01-18 PROCEDURE — 1159F PR MEDICATION LIST DOCUMENTED IN MEDICAL RECORD: ICD-10-PCS | Mod: CPTII,S$GLB,, | Performed by: PSYCHOLOGIST

## 2023-01-18 NOTE — PROGRESS NOTES
The patient location is: [place], [place]  The chief complaint leading to consultation is: insomnia  Visit type: Virtual visit with synchronous audio and video  Each patient to whom he or she provides medical services by telemedicine is:  (1) informed of the relationship between the physician and patient and the respective role of any other health care provider with respect to management of the patient; and (2) notified that he or she may decline to receive medical services by telemedicine and may withdraw from such care at any time.     Notes:       Outpatient Psychiatry Follow-Up Visit     Clinical Status of Patient: Outpatient (Ambulatory)       Chief Complaint: 47 year old female presenting today for a follow-up.       Interval History and Content of Current Session:  Interim Events/Subjective Report/Content of Current Session:  follow-up appointment.     Pt is a 47 year old female with past psychiatric hx of insomnia who presents for follow-up treatment. We completed CBT-I session 1 focused on Sleep Education and Cognitive Restructuring.      Interim hx:  Medication changes last visit:   n/a      Review of Systems     Past Medical, Family and Social History: The patient's past medical, family and social history have been reviewed and updated as appropriate within the electronic medical record. See encounter notes.     Current Psychiatric Medication:  not assessed     Risk Parameters:  Patient reports no suicidal ideation  Patient reports no homicidal ideation  Patient reports no self-injurious behavior  Patient reports no violent behavior     Exam (detailed: at least 9 elements; comprehensive: all 15 elements)   Constitutional  Vitals:  Most recent vital signs, dated less than 90 days prior to this appointment, were reviewed.        General:  unremarkable, age appropriate, casual attire, good eye contact, good rapport       Musculoskeletal  Muscle Strength/Tone:  no flaccidity, no tremor    Gait & Station:   normal      Psychiatric                       Speech:  normal tone, normal rate, rhythm, and volume   Mood & Affect:   Depressed, anxious         Thought Process:   Goal directed; Linear    Associations:   intact   Thought Content:   No SI/HI, delusions, or paranoia, no AV/VH   Insight & Judgement:   Good, adequate to circumstances   Orientation:   grossly intact; alert and oriented x 4    Memory:  intact for content of interview    Language:  grossly intact, can repeat    Attention Span  : Grossly intact for content of interview   Fund of Knowledge:   intact and appropriate to age and level of education         Assessment and Diagnosis   Status/Progress: unchanged     Impression: Pt struggling with long-term insomnia and would benefit from completing CBT-I.     Diagnosis: Insomnia Disorder     Intervention/Counseling/Treatment Plan   Medication Management:      Continue with CBT-I     2.   Call to report any worsening of symptoms or problems with the medication. Pt instructed to go to ER with thoughts of harming self, others     Psychotherapy:   Target symptoms: insomnia  Why chosen therapy is appropriate versus another modality: CBT used; relevant to diagnosis, patient responds to this modality  Outcome monitoring methods: self-report, observation  Therapeutic intervention type: Cognitive Behavioral Therapy  Topics discussed/themes: building skills sets for symptom management, symptom recognition, nutrition, exercise  The patient's response to the intervention is good  Patient's response to treatment is: good.   The patient's progress toward treatment goals: improving  60 minutes of psychotherapy spent with pt     Return to clinic: 1 week     -Cognitive-Behavioral/Supportive therapy and psychoeducation provided  -R/B/SE's of medications discussed with the pt who expresses understanding and chooses to take medications as prescribed.   -Pt instructed to call clinic, 911 or go to nearest emergency room if sxs worsen  or pt is in   crisis. The pt expresses understanding.     Bronson Casas, PhD, MP

## 2023-01-19 NOTE — TELEPHONE ENCOUNTER
Called pt, notified of message per Dr. Aiken and she verbally understood.     She stated her parents are coming in from out of town and she will not be able to do it while they are here. She stated she also wants to go in and get her covid booster at the same time as lab and has been looking on [employer] [facility] to schedule it. And will do lab that same time. She stated she is going to see if she has some extra left so when she does get the labs her numbers will be accurate. She stated if she doesn't have any she will call Dr. Aiken office and ask for like a weeks worth till she can get lab.

## 2023-01-20 ENCOUNTER — PATIENT MESSAGE (OUTPATIENT)
Dept: PSYCHIATRY | Facility: CLINIC | Age: 48
End: 2023-01-20
Payer: MEDICARE

## 2023-01-21 RX ORDER — KETOROLAC TROMETHAMINE 30 MG/ML
30 INJECTION, SOLUTION INTRAMUSCULAR; INTRAVENOUS ONCE AS NEEDED
Qty: 4 ML | Refills: 1 | Status: SHIPPED | OUTPATIENT
Start: 2023-01-21 | End: 2023-03-04

## 2023-01-24 ENCOUNTER — OFFICE VISIT (OUTPATIENT)
Dept: PSYCHIATRY | Facility: CLINIC | Age: 48
End: 2023-01-24
Payer: MEDICARE

## 2023-01-24 ENCOUNTER — TELEPHONE (OUTPATIENT)
Dept: PSYCHIATRY | Facility: CLINIC | Age: 48
End: 2023-01-24
Payer: MEDICARE

## 2023-01-24 DIAGNOSIS — G47.00 INSOMNIA, UNSPECIFIED TYPE: ICD-10-CM

## 2023-01-24 DIAGNOSIS — F90.2 ATTENTION DEFICIT HYPERACTIVITY DISORDER, COMBINED TYPE: ICD-10-CM

## 2023-01-24 DIAGNOSIS — F41.0 PANIC DISORDER: ICD-10-CM

## 2023-01-24 DIAGNOSIS — F43.10 POSTTRAUMATIC STRESS DISORDER: Primary | ICD-10-CM

## 2023-01-24 PROCEDURE — 99214 OFFICE O/P EST MOD 30 MIN: CPT | Mod: 95,,, | Performed by: PSYCHOLOGIST

## 2023-01-24 PROCEDURE — 99214 PR OFFICE/OUTPT VISIT, EST, LEVL IV, 30-39 MIN: ICD-10-PCS | Mod: 95,,, | Performed by: PSYCHOLOGIST

## 2023-01-24 PROCEDURE — 90833 PSYTX W PT W E/M 30 MIN: CPT | Mod: 95,,, | Performed by: PSYCHOLOGIST

## 2023-01-24 PROCEDURE — 90833 PR PSYCHOTHERAPY W/PATIENT W/E&M, 30 MIN (ADD ON): ICD-10-PCS | Mod: 95,,, | Performed by: PSYCHOLOGIST

## 2023-01-24 PROCEDURE — 1159F MED LIST DOCD IN RCRD: CPT | Mod: CPTII,95,, | Performed by: PSYCHOLOGIST

## 2023-01-24 PROCEDURE — 1159F PR MEDICATION LIST DOCUMENTED IN MEDICAL RECORD: ICD-10-PCS | Mod: CPTII,95,, | Performed by: PSYCHOLOGIST

## 2023-01-24 RX ORDER — BUPROPION HYDROCHLORIDE 150 MG/1
150 TABLET ORAL DAILY
Qty: 90 TABLET | Refills: 1 | Status: SHIPPED | OUTPATIENT
Start: 2023-01-24 | End: 2023-05-13 | Stop reason: SDUPTHER

## 2023-01-24 RX ORDER — ESCITALOPRAM OXALATE 10 MG/1
10 TABLET ORAL DAILY
Qty: 30 TABLET | Refills: 1 | Status: SHIPPED | OUTPATIENT
Start: 2023-01-24 | End: 2023-03-08 | Stop reason: SDUPTHER

## 2023-01-24 NOTE — PROGRESS NOTES
"The patient location is:  [place]  The patient location [place] is: [facility]  The patient phone number is: [phone]  Visit type: Virtual visit with synchronous audio and video  Each patient to whom he or she provides medical services by telemedicine is:  (1) informed of the relationship between the physician and patient and the respective role of any other health care provider with respect to management of the patient; and (2) notified that he or she may decline to receive medical services by telemedicine and may withdraw from such care at any time.    Outpatient Psychiatry Follow-Up Visit    Clinical Status of Patient: Outpatient (Ambulatory)  01/24/2023     Chief Complaint: PTSD, Insomnia      Interval History and Content of Current Session:  Interim Events/Subjective Report/Content of Current Session:  follow-up appointment.    Pt is a 46 y/o female with past psychiatric hx of PTSD, insomnia who presents for follow-up treatment. Pt reported that she started the CBT-I group. Expressed some concern about stopping all hypnotics for sleep due to discussion in group. Pt reported continued difficulty with sleep, stating that she is getting about 3 hours or less per night with the Ambien. Pt noted a history of nausea with capsule medications and requested to change fluoxetine to an alternative that is a tablet. Pt currently engaged in therapy with [facility] (cannot remember name but will contact).     Past Psychiatric hx:  prozac (can't recall), lexapro (effective, does not remember why stopped), celexa > effective, dec'd libido ( told pt he would "leave her" regarding the sex drive), depakote (ineffective), lamictal (rash), abilify (paranoid), seroquel (significant wgt gain), Lithium 300 mg po qam/600 mg po qhs (pt reports it worsened anxiety), prazosin (I had some allergic reaction- had been effective for months prior to this report), cymbalta 90mg (effective; pt self d/c'd), trazodone, ambien, " [place]    Past Medical hx:   Past Medical History:   Diagnosis Date    Abnormal Pap smear of cervix     Arnold-Chiari deformity     Arthritis     Asthma     Borderline personality disorder     Carnitine deficiency     Depression     Fatty liver     noted on 8/21 CT    Fibromyalgia     Headache     History of abnormal cervical Pap smear     colpo/ LEEP and CKC    History of nephrolithiasis     kidney stones    Hypothyroidism     IBS (irritable bowel syndrome)     GI smith negative    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     PATRICIA (obstructive sleep apnea)     severe - doesn't use CPAP    Panic attack     PTSD (post-traumatic stress disorder)     Renal disorder     kidney stones, blood in urine on occasion        Interim hx:  Medication changes last visit: D/C'd Belsomra and started trial of Ambien 10 mg  Anxiety: stable  Depression: stable     Denies suicidal/homicidal ideations.  Denies hopelessness/worthlessness.    Denies auditory/visual hallucinations      Alcohol: Pt denies  Drug: Pt denies  Caffeine: Not assessed  Tobacco: Pt denies      Review of Systems   PSYCHIATRIC: Pertinent items are noted in the narrative.        CONSTITUTIONAL: weight stable    Past Medical, Family and Social History: The patient's past medical, family and social history have been reviewed and updated as appropriate within the electronic medical record. See encounter notes.     Current Psychiatric Medication:  wellbutrin xl 150mg po qam, xanax 1mg po daily PRN anxiety (not using daily),  Prozac 40mg po qam, vyvanse 40mg po qam (not using daily - every other day), Ambien 10 mg     Compliance: yes      Side effects: Pt denied.      Risk Parameters:  Patient reports no suicidal ideation  Patient reports no homicidal ideation  Patient reports no self-injurious behavior  Patient reports no violent behavior     Exam (detailed: at least 9 elements; comprehensive: all 15 elements)   Constitutional  Vitals:  Most recent vital signs,  dated less than 90 days prior to this appointment, were reviewed.        General:  unremarkable, age appropriate, casual attire, good eye contact, good rapport       Musculoskeletal  Muscle Strength/Tone:  no flaccidity, no tremor    Gait & Station:  normal      Psychiatric                       Speech:  normal tone, normal rate, rhythm, and volume   Mood & Affect:   Depressed, anxious         Thought Process:   Goal directed; Linear    Associations:   intact   Thought Content:   No SI/HI, delusions, or paranoia, no AV/VH   Insight & Judgement:   Good, adequate to circumstances   Orientation:   grossly intact; alert and oriented x 4    Memory:  intact for content of interview    Language:  grossly intact, can repeat    Attention Span  : Grossly intact for content of interview   Fund of Knowledge:   intact and appropriate to age and level of education        Assessment and Diagnosis   Status/Progress: Based on the examination today, the patient's problem(s) is/are not adequately controlled.  New problems have been presented today. Co-morbidities are complicating management of the primary condition. There are no active rule-out diagnoses for this patient at this time.      Impression: Pt expressed willingness to focus on learning skills to improve sleep after CBT techniques were practiced in session. Discussed a plan to cross taper fluoxetine to escitalopram given past success and concern about reaction to capsules. Will continue with the other medications as they are and monitor moving forward.     Diagnosis:   Posttraumatic Stress Disorder  Panic Disorder w/o agoraphobia  Attention Deficit Hyperactivity Disorder, Combined Type   Insomnia  Borderline personality disorder traits  Intervention/Counseling/Treatment Plan   Medication Management:      1. Cont wellbutrin xl 150mg po qam    2. Cont xanax 1mg po daily PRN anxiety (approximately 2-3x/wk and often using half tab)    3. Cross taper Prozac 40mg po qam to  escitalopram 10 mg    4. Cont vyvanse 40mg po qam (not using daily - every other day)    5. Cont Ambien 10 mg     6. Call to report any worsening of symptoms or problems with the medication. Pt instructed to go to ER with thoughts of harming self, others     7. Continue in therapy    Psychotherapy: 20 minutes  Target symptoms: insomnia, anxiety  Why chosen therapy is appropriate versus another modality: CBT used; relevant to diagnosis, patient responds to this modality  Outcome monitoring methods: self-report, observation  Therapeutic intervention type: Cognitive Behavioral Therapy  Topics discussed/themes: building skills sets for symptom management, symptom recognition, nutrition, exercise  The patient's response to the intervention is accepting  Patient's response to treatment is: good.   The patient's progress toward treatment goals: limited     Return to clinic: 1 month    -Cognitive-Behavioral/Supportive therapy and psychoeducation provided  -R/B/SE's of medications discussed with the pt who expresses understanding and chooses to take medications as prescribed.   -Pt instructed to call clinic, 911 or go to nearest emergency room if sxs worsen or pt is in   crisis. The pt expresses understanding.    Bebeto Resendiz, PhD, MP     Antidepressant/Antianxiety Medication Initiation:  Patient informed of risks, benefits, and potential side effects of medication and accepts informed consent.  Common side effects include nausea, fatigue, headache, insomnia., Specifically discussed the possibility of new or worsening suicidal thoughts/depression.  Patient instructed to stop the medication immediately and seek urgent treatment if this occurs. Patient instructed not to abruptly discontinue medication without physician guidance except in cases of sudden onset or worsening of SI.       Stimulant Medication Initiation:  Patient advised of risks, benefits, and side effects of medication and accepts informed consent.  Common  side effects include insomnia, irritability, jittery feeling, dry mouth, and agitation/hostility., Patient advised of potential addictive nature of medication and controlled substance classification.  Instructed to safeguard medication as no early refills can be given for lost or stolen medications.       Benzodiazepine Initiation:  Patient advised of the risks, benefits, and common side effects of medication and has accepted informed consent.  Common side effects include drowsiness, impaired coordination, possible memory loss., Patient advised NOT to operate a vehicle or machinery untiil they are sure how the medication will affec them.  Client also advised of danger of mixing this medication with alcohol., Patient advised of potential addictive nature of medication and need to safeguard medication as no early refills for lost or stolen medications can be authorized.       Pregnancy Warning:  Patient denies current pregnancy possibility.  Patient made aware that medications have not been proven safe in pregnancy and that she must maintain adequate birth control.  Patient instructed to alert us immediately if she becomes pregnant.       Sleep Aid Initiation:  Patient advised of potential side effects of medication including sleep walking or other complex behaviors.  Patient advised not to mix medication with alcohol, to go to bed immediately after taking, and to stop at first sign of any unusual behaviors.

## 2023-01-24 NOTE — TELEPHONE ENCOUNTER
For documentation purposes       Prior authorization approved for Alprazolam 1 mg from December 21, 2022 until January 23rd, 2024.  Patient and pharmacy notified.  Vikki

## 2023-01-25 ENCOUNTER — OFFICE VISIT (OUTPATIENT)
Dept: PSYCHIATRY | Facility: CLINIC | Age: 48
End: 2023-01-25
Payer: MEDICARE

## 2023-01-25 ENCOUNTER — PATIENT MESSAGE (OUTPATIENT)
Dept: PSYCHIATRY | Facility: CLINIC | Age: 48
End: 2023-01-25
Payer: MEDICARE

## 2023-01-25 DIAGNOSIS — G47.09 OTHER INSOMNIA: Primary | ICD-10-CM

## 2023-01-25 PROCEDURE — 99999 PR PBB SHADOW E&M-EST. PATIENT-LVL I: ICD-10-PCS | Mod: PBBFAC,,, | Performed by: PSYCHOLOGIST

## 2023-01-25 PROCEDURE — 1159F MED LIST DOCD IN RCRD: CPT | Mod: CPTII,S$GLB,, | Performed by: PSYCHOLOGIST

## 2023-01-25 PROCEDURE — 90837 PR PSYCHOTHERAPY W/PATIENT, 60 MIN: ICD-10-PCS | Mod: S$GLB,,, | Performed by: PSYCHOLOGIST

## 2023-01-25 PROCEDURE — 90837 PSYTX W PT 60 MINUTES: CPT | Mod: S$GLB,,, | Performed by: PSYCHOLOGIST

## 2023-01-25 PROCEDURE — 99999 PR PBB SHADOW E&M-EST. PATIENT-LVL I: CPT | Mod: PBBFAC,,, | Performed by: PSYCHOLOGIST

## 2023-01-25 PROCEDURE — 1159F PR MEDICATION LIST DOCUMENTED IN MEDICAL RECORD: ICD-10-PCS | Mod: CPTII,S$GLB,, | Performed by: PSYCHOLOGIST

## 2023-01-26 ENCOUNTER — NURSE TRIAGE (OUTPATIENT)
Dept: ADMINISTRATIVE | Facility: CLINIC | Age: 48
End: 2023-01-26
Payer: MEDICARE

## 2023-01-26 ENCOUNTER — PATIENT MESSAGE (OUTPATIENT)
Dept: OBSTETRICS AND GYNECOLOGY | Facility: CLINIC | Age: 48
End: 2023-01-26
Payer: MEDICARE

## 2023-01-26 NOTE — PROGRESS NOTES
The patient location is: [place], [place]  The chief complaint leading to consultation is: insomnia  Visit type: Virtual visit with synchronous audio and video  Each patient to whom he or she provides medical services by telemedicine is:  (1) informed of the relationship between the physician and patient and the respective role of any other health care provider with respect to management of the patient; and (2) notified that he or she may decline to receive medical services by telemedicine and may withdraw from such care at any time.     Notes:       Outpatient Psychiatry Follow-Up Visit     Clinical Status of Patient: Outpatient (Ambulatory)       Chief Complaint: 47 year old female presenting today for a follow-up.       Interval History and Content of Current Session:  Interim Events/Subjective Report/Content of Current Session:  follow-up appointment.     Pt is a 47 year old female with past psychiatric hx of insomnia who presents for follow-up treatment. We completed CBT-I session 2 focused on Sleep Medication and Sleep Scheduling Techniques.      Interim hx:  Medication changes last visit:   n/a      Review of Systems     Past Medical, Family and Social History: The patient's past medical, family and social history have been reviewed and updated as appropriate within the electronic medical record. See encounter notes.     Current Psychiatric Medication:  not assessed     Risk Parameters:  Patient reports no suicidal ideation  Patient reports no homicidal ideation  Patient reports no self-injurious behavior  Patient reports no violent behavior     Exam (detailed: at least 9 elements; comprehensive: all 15 elements)   Constitutional  Vitals:  Most recent vital signs, dated less than 90 days prior to this appointment, were reviewed.        General:  unremarkable, age appropriate, casual attire, good eye contact, good rapport       Musculoskeletal  Muscle Strength/Tone:  no flaccidity, no tremor    Gait & Station:   normal      Psychiatric                       Speech:  normal tone, normal rate, rhythm, and volume   Mood & Affect:   Depressed, anxious         Thought Process:   Goal directed; Linear    Associations:   intact   Thought Content:   No SI/HI, delusions, or paranoia, no AV/VH   Insight & Judgement:   Good, adequate to circumstances   Orientation:   grossly intact; alert and oriented x 4    Memory:  intact for content of interview    Language:  grossly intact, can repeat    Attention Span  : Grossly intact for content of interview   Fund of Knowledge:   intact and appropriate to age and level of education         Assessment and Diagnosis   Status/Progress: unchanged     Impression: Pt struggling with long-term insomnia and would benefit from completing CBT-I.     Diagnosis: Insomnia Disorder     Intervention/Counseling/Treatment Plan   Medication Management:      Continue with CBT-I     2.   Call to report any worsening of symptoms or problems with the medication. Pt instructed to go to ER with thoughts of harming self, others     Psychotherapy:   Target symptoms: insomnia  Why chosen therapy is appropriate versus another modality: CBT used; relevant to diagnosis, patient responds to this modality  Outcome monitoring methods: self-report, observation  Therapeutic intervention type: Cognitive Behavioral Therapy  Topics discussed/themes: building skills sets for symptom management, symptom recognition, nutrition, exercise  The patient's response to the intervention is good  Patient's response to treatment is: good.   The patient's progress toward treatment goals: improving  60 minutes of psychotherapy spent with pt     Return to clinic: 1 week     -Cognitive-Behavioral/Supportive therapy and psychoeducation provided  -R/B/SE's of medications discussed with the pt who expresses understanding and chooses to take medications as prescribed.   -Pt instructed to call clinic, 911 or go to nearest emergency room if sxs worsen  or pt is in   crisis. The pt expresses understanding.     Bronson Casas, PhD, MP

## 2023-01-27 NOTE — TELEPHONE ENCOUNTER
Pt called stating the pharmacy did not receive the Macrobid electronic order that ws ordered per Dr. Franco today per note in chart at 5:05pm.  Vicky-Pharmacist at [facility] was called and confirmed that they did not receive.  A verbally was called in as verified per Dr. Huerta note to order Macrbid BID for 7 days, #14.  Pt made aware and will  the prescription tonight as they are filling for her now.    Pt advised to call back for any further concerns or worsening symptoms.      Reason for Disposition   [1] Prescription prescribed recently is not at pharmacy AND [2] triager has access to patient's EMR AND [3] prescription is recorded in the EMR    Protocols used: Medication Refill and Renewal Call-A-

## 2023-02-06 ENCOUNTER — OFFICE VISIT (OUTPATIENT)
Dept: PSYCHIATRY | Facility: CLINIC | Age: 48
End: 2023-02-06
Payer: MEDICARE

## 2023-02-06 DIAGNOSIS — F43.10 PTSD (POST-TRAUMATIC STRESS DISORDER): ICD-10-CM

## 2023-02-06 DIAGNOSIS — G47.09 OTHER INSOMNIA: Primary | ICD-10-CM

## 2023-02-06 PROCEDURE — 1159F MED LIST DOCD IN RCRD: CPT | Mod: CPTII,95,, | Performed by: PSYCHOLOGIST

## 2023-02-06 PROCEDURE — 1159F PR MEDICATION LIST DOCUMENTED IN MEDICAL RECORD: ICD-10-PCS | Mod: CPTII,95,, | Performed by: PSYCHOLOGIST

## 2023-02-06 PROCEDURE — 90834 PSYTX W PT 45 MINUTES: CPT | Mod: 95,,, | Performed by: PSYCHOLOGIST

## 2023-02-06 PROCEDURE — 90834 PR PSYCHOTHERAPY W/PATIENT, 45 MIN: ICD-10-PCS | Mod: 95,,, | Performed by: PSYCHOLOGIST

## 2023-02-06 NOTE — PROGRESS NOTES
The patient location is: [place], [place]  The chief complaint leading to consultation is: insomnia  Visit type: Virtual visit with synchronous audio and video  Each patient to whom he or she provides medical services by telemedicine is:  (1) informed of the relationship between the physician and patient and the respective role of any other health care provider with respect to management of the patient; and (2) notified that he or she may decline to receive medical services by telemedicine and may withdraw from such care at any time.   Face-to-face time spent with patient: 45 minutes    Notes:       Outpatient Psychiatry Follow-Up Visit     Clinical Status of Patient: Outpatient (Ambulatory)       Chief Complaint: 47 year old female presenting today for a follow-up.       Interval History and Content of Current Session:  Interim Events/Subjective Report/Content of Current Session:  follow-up appointment.     Pt is a 47 year old female with past psychiatric hx of insomnia who presents for follow-up treatment. We completed CBT-I session 3 focused on Stimulus Control Techniques and the Relaxation Response.      Interim hx:  Medication changes last visit:   n/a      Review of Systems     Past Medical, Family and Social History: The patient's past medical, family and social history have been reviewed and updated as appropriate within the electronic medical record. See encounter notes.     Current Psychiatric Medication:  not assessed     Risk Parameters:  Patient reports no suicidal ideation  Patient reports no homicidal ideation  Patient reports no self-injurious behavior  Patient reports no violent behavior     Exam (detailed: at least 9 elements; comprehensive: all 15 elements)   Constitutional  Vitals:  Most recent vital signs, dated less than 90 days prior to this appointment, were reviewed.        General:  unremarkable, age appropriate, casual attire, good eye contact, good rapport       Musculoskeletal  Muscle  Strength/Tone:  no flaccidity, no tremor    Gait & Station:  normal      Psychiatric                       Speech:  normal tone, normal rate, rhythm, and volume   Mood & Affect:   Depressed, anxious         Thought Process:   Goal directed; Linear    Associations:   intact   Thought Content:   No SI/HI, delusions, or paranoia, no AV/VH   Insight & Judgement:   Good, adequate to circumstances   Orientation:   grossly intact; alert and oriented x 4    Memory:  intact for content of interview    Language:  grossly intact, can repeat    Attention Span  : Grossly intact for content of interview   Fund of Knowledge:   intact and appropriate to age and level of education         Assessment and Diagnosis   Status/Progress: unchanged     Impression: Pt struggling with long-term insomnia and would benefit from completing CBT-I.     Diagnosis: Insomnia Disorder     Intervention/Counseling/Treatment Plan   Medication Management:      Continue with CBT-I     2.   Call to report any worsening of symptoms or problems with the medication. Pt instructed to go to ER with thoughts of harming self, others     Psychotherapy:   Target symptoms: insomnia  Why chosen therapy is appropriate versus another modality: CBT used; relevant to diagnosis, patient responds to this modality  Outcome monitoring methods: self-report, observation  Therapeutic intervention type: Cognitive Behavioral Therapy  Topics discussed/themes: building skills sets for symptom management, symptom recognition, nutrition, exercise  The patient's response to the intervention is good  Patient's response to treatment is: good.   The patient's progress toward treatment goals: improving  45 minutes of psychotherapy spent with pt     Return to clinic: 2 week     -Cognitive-Behavioral/Supportive therapy and psychoeducation provided  -R/B/SE's of medications discussed with the pt who expresses understanding and chooses to take medications as prescribed.   -Pt instructed to  call clinic, 911 or go to nearest emergency room if sxs worsen or pt is in   crisis. The pt expresses understanding.     Bronson Casas, PhD, MP

## 2023-02-14 ENCOUNTER — PATIENT MESSAGE (OUTPATIENT)
Dept: FAMILY MEDICINE | Facility: CLINIC | Age: 48
End: 2023-02-14
Payer: MEDICARE

## 2023-02-14 NOTE — TELEPHONE ENCOUNTER
Refill Routing Note   Medication(s) are not appropriate for processing by [facility] for the following reason(s):         New or recently adjusted medication  Required labs abnormal    [facility] action(s):  Defer              Medication Therapy Plan: Med is active on the list    Appointments  past 12m or future 3m with PCP    Date Provider   Last Visit   10/20/2022 Amaya Aiken MD   Next Visit   3/23/2023 Amaya Aiken MD   ED visits in past 90 days: 0        Note composed:8:18 AM 02/14/2023

## 2023-02-14 NOTE — TELEPHONE ENCOUNTER
No new care gaps identified.  [facility] Embedded Care Gaps. Reference number: [id number]. 2/13/2023   10:21:52 PM CST

## 2023-02-15 ENCOUNTER — OFFICE VISIT (OUTPATIENT)
Dept: PSYCHIATRY | Facility: CLINIC | Age: 48
End: 2023-02-15
Payer: MEDICARE

## 2023-02-15 DIAGNOSIS — G47.09 OTHER INSOMNIA: Primary | ICD-10-CM

## 2023-02-15 PROCEDURE — 90853 PR GROUP PSYCHOTHERAPY: ICD-10-PCS | Mod: S$GLB,,, | Performed by: PSYCHOLOGIST

## 2023-02-15 PROCEDURE — 4010F ACE/ARB THERAPY RXD/TAKEN: CPT | Mod: CPTII,S$GLB,, | Performed by: PSYCHOLOGIST

## 2023-02-15 PROCEDURE — 99999 PR PBB SHADOW E&M-EST. PATIENT-LVL I: ICD-10-PCS | Mod: PBBFAC,,, | Performed by: PSYCHOLOGIST

## 2023-02-15 PROCEDURE — 1159F MED LIST DOCD IN RCRD: CPT | Mod: CPTII,S$GLB,, | Performed by: PSYCHOLOGIST

## 2023-02-15 PROCEDURE — 4010F PR ACE/ARB THEARPY RXD/TAKEN: ICD-10-PCS | Mod: CPTII,S$GLB,, | Performed by: PSYCHOLOGIST

## 2023-02-15 PROCEDURE — 90853 GROUP PSYCHOTHERAPY: CPT | Mod: S$GLB,,, | Performed by: PSYCHOLOGIST

## 2023-02-15 PROCEDURE — 1159F PR MEDICATION LIST DOCUMENTED IN MEDICAL RECORD: ICD-10-PCS | Mod: CPTII,S$GLB,, | Performed by: PSYCHOLOGIST

## 2023-02-15 PROCEDURE — 99999 PR PBB SHADOW E&M-EST. PATIENT-LVL I: CPT | Mod: PBBFAC,,, | Performed by: PSYCHOLOGIST

## 2023-02-15 RX ORDER — LEVOTHYROXINE SODIUM 50 UG/1
TABLET ORAL
Qty: 30 TABLET | Refills: 1 | OUTPATIENT
Start: 2023-02-15

## 2023-02-15 RX ORDER — LEVOTHYROXINE SODIUM 125 UG/1
250 TABLET ORAL
Qty: 60 TABLET | Refills: 1 | Status: SHIPPED | OUTPATIENT
Start: 2023-02-15 | End: 2023-05-09

## 2023-02-15 NOTE — TELEPHONE ENCOUNTER
Pt has a virtual today.  States she can't go to the lab until Friday.  Scheduled her for Friday but she advised that she just started meds back after being off for two weeks.  States she has been having kidney stone pain.  That was the soonest patient would go and she advised she will not run out.

## 2023-02-15 NOTE — PROGRESS NOTES
The patient location is: [place], [place]  The chief complaint leading to consultation is: insomnia  Visit type: Virtual visit with synchronous audio and video  Each patient to whom he or she provides medical services by telemedicine is:  (1) informed of the relationship between the physician and patient and the respective role of any other health care provider with respect to management of the patient; and (2) notified that he or she may decline to receive medical services by telemedicine and may withdraw from such care at any time.   Face-to-face time spent with patient: 45 minutes    Notes:       Outpatient Psychiatry Follow-Up Visit     Clinical Status of Patient: Outpatient (Ambulatory)       Chief Complaint: 47 year old female presenting today for a follow-up.       Interval History and Content of Current Session:  Interim Events/Subjective Report/Content of Current Session:  follow-up appointment.     Pt is a 48 year old female with past psychiatric hx of insomnia who presents for follow-up treatment. We completed CBT-I session 4 focused on the Relaxation Response.      Interim hx:  Medication changes last visit:   n/a      Review of Systems     Past Medical, Family and Social History: The patient's past medical, family and social history have been reviewed and updated as appropriate within the electronic medical record. See encounter notes.     Current Psychiatric Medication:  not assessed     Risk Parameters:  Patient reports no suicidal ideation  Patient reports no homicidal ideation  Patient reports no self-injurious behavior  Patient reports no violent behavior     Exam (detailed: at least 9 elements; comprehensive: all 15 elements)   Constitutional  Vitals:  Most recent vital signs, dated less than 90 days prior to this appointment, were reviewed.        General:  unremarkable, age appropriate, casual attire, good eye contact, good rapport       Musculoskeletal  Muscle Strength/Tone:  no flaccidity, no  tremor    Gait & Station:  normal      Psychiatric                       Speech:  normal tone, normal rate, rhythm, and volume   Mood & Affect:   Depressed, anxious         Thought Process:   Goal directed; Linear    Associations:   intact   Thought Content:   No SI/HI, delusions, or paranoia, no AV/VH   Insight & Judgement:   Good, adequate to circumstances   Orientation:   grossly intact; alert and oriented x 4    Memory:  intact for content of interview    Language:  grossly intact, can repeat    Attention Span  : Grossly intact for content of interview   Fund of Knowledge:   intact and appropriate to age and level of education         Assessment and Diagnosis   Status/Progress: unchanged     Impression: Pt struggling with long-term insomnia and would benefit from completing CBT-I.     Diagnosis: Insomnia Disorder     Intervention/Counseling/Treatment Plan   Medication Management:      Continue with CBT-I     2.   Call to report any worsening of symptoms or problems with the medication. Pt instructed to go to ER with thoughts of harming self, others     Psychotherapy:   Target symptoms: insomnia  Why chosen therapy is appropriate versus another modality: CBT used; relevant to diagnosis, patient responds to this modality  Outcome monitoring methods: self-report, observation  Therapeutic intervention type: Cognitive Behavioral Therapy  Topics discussed/themes: building skills sets for symptom management, symptom recognition, nutrition, exercise  The patient's response to the intervention is good  Patient's response to treatment is: good.   The patient's progress toward treatment goals: improving  45 minutes of psychotherapy spent with pt     Return to clinic: 2 week     -Cognitive-Behavioral/Supportive therapy and psychoeducation provided  -R/B/SE's of medications discussed with the pt who expresses understanding and chooses to take medications as prescribed.   -Pt instructed to call clinic, 911 or go to [facility]  emergency room if sxs worsen or pt is in   crisis. The pt expresses understanding.     Bronson Casas, PhD, MP

## 2023-02-17 ENCOUNTER — OFFICE VISIT (OUTPATIENT)
Dept: GASTROENTEROLOGY | Facility: CLINIC | Age: 48
End: 2023-02-17
Payer: MEDICARE

## 2023-02-17 ENCOUNTER — LAB VISIT (OUTPATIENT)
Dept: LAB | Facility: HOSPITAL | Age: 48
End: 2023-02-17
Attending: FAMILY MEDICINE
Payer: MEDICARE

## 2023-02-17 ENCOUNTER — PROCEDURE VISIT (OUTPATIENT)
Dept: NEUROLOGY | Facility: CLINIC | Age: 48
End: 2023-02-17
Payer: MEDICARE

## 2023-02-17 VITALS
BODY MASS INDEX: 42.8 KG/M2 | HEIGHT: 67 IN | SYSTOLIC BLOOD PRESSURE: 127 MMHG | BODY MASS INDEX: 42.73 KG/M2 | WEIGHT: 272.25 LBS | DIASTOLIC BLOOD PRESSURE: 89 MMHG | HEIGHT: 67 IN | HEART RATE: 76 BPM | WEIGHT: 272.69 LBS

## 2023-02-17 DIAGNOSIS — K59.00 CONSTIPATION, UNSPECIFIED CONSTIPATION TYPE: ICD-10-CM

## 2023-02-17 DIAGNOSIS — G43.719 INTRACTABLE CHRONIC MIGRAINE WITHOUT AURA AND WITHOUT STATUS MIGRAINOSUS: Primary | ICD-10-CM

## 2023-02-17 DIAGNOSIS — Z98.890 HISTORY OF BRAIN SURGERY: ICD-10-CM

## 2023-02-17 DIAGNOSIS — K44.9 HIATAL HERNIA: ICD-10-CM

## 2023-02-17 DIAGNOSIS — R19.8 IRREGULAR BOWEL HABITS: ICD-10-CM

## 2023-02-17 DIAGNOSIS — R13.14 PHARYNGOESOPHAGEAL DYSPHAGIA: Primary | ICD-10-CM

## 2023-02-17 DIAGNOSIS — R79.89 ABNORMAL TSH: ICD-10-CM

## 2023-02-17 DIAGNOSIS — E03.4 HYPOTHYROIDISM DUE TO ACQUIRED ATROPHY OF THYROID: ICD-10-CM

## 2023-02-17 DIAGNOSIS — R19.7 INTERMITTENT DIARRHEA: ICD-10-CM

## 2023-02-17 DIAGNOSIS — Z87.19 HISTORY OF IBS: ICD-10-CM

## 2023-02-17 DIAGNOSIS — Z87.19 HISTORY OF GASTROESOPHAGEAL REFLUX (GERD): ICD-10-CM

## 2023-02-17 DIAGNOSIS — R93.3 ABNORMAL ESOPHAGRAM: ICD-10-CM

## 2023-02-17 PROCEDURE — 99999 PR PBB SHADOW E&M-EST. PATIENT-LVL III: ICD-10-PCS | Mod: PBBFAC,,, | Performed by: NURSE PRACTITIONER

## 2023-02-17 PROCEDURE — 1159F PR MEDICATION LIST DOCUMENTED IN MEDICAL RECORD: ICD-10-PCS | Mod: CPTII,S$GLB,, | Performed by: NURSE PRACTITIONER

## 2023-02-17 PROCEDURE — 84443 ASSAY THYROID STIM HORMONE: CPT | Performed by: FAMILY MEDICINE

## 2023-02-17 PROCEDURE — 3008F BODY MASS INDEX DOCD: CPT | Mod: CPTII,S$GLB,, | Performed by: NURSE PRACTITIONER

## 2023-02-17 PROCEDURE — 3008F PR BODY MASS INDEX (BMI) DOCUMENTED: ICD-10-PCS | Mod: CPTII,S$GLB,, | Performed by: NURSE PRACTITIONER

## 2023-02-17 PROCEDURE — 99999 PR PBB SHADOW E&M-EST. PATIENT-LVL III: CPT | Mod: PBBFAC,,, | Performed by: NURSE PRACTITIONER

## 2023-02-17 PROCEDURE — 36415 COLL VENOUS BLD VENIPUNCTURE: CPT | Mod: PO | Performed by: FAMILY MEDICINE

## 2023-02-17 PROCEDURE — 1160F RVW MEDS BY RX/DR IN RCRD: CPT | Mod: CPTII,S$GLB,, | Performed by: NURSE PRACTITIONER

## 2023-02-17 PROCEDURE — 64615 CHEMODENERV MUSC MIGRAINE: CPT | Mod: S$GLB,,, | Performed by: PSYCHIATRY & NEUROLOGY

## 2023-02-17 PROCEDURE — 99214 PR OFFICE/OUTPT VISIT, EST, LEVL IV, 30-39 MIN: ICD-10-PCS | Mod: S$GLB,,, | Performed by: NURSE PRACTITIONER

## 2023-02-17 PROCEDURE — 64615 PR CHEMODENERVATION OF MUSCLE FOR CHRONIC MIGRAINE: ICD-10-PCS | Mod: S$GLB,,, | Performed by: PSYCHIATRY & NEUROLOGY

## 2023-02-17 PROCEDURE — 4010F ACE/ARB THERAPY RXD/TAKEN: CPT | Mod: CPTII,S$GLB,, | Performed by: NURSE PRACTITIONER

## 2023-02-17 PROCEDURE — 1160F PR REVIEW ALL MEDS BY PRESCRIBER/CLIN PHARMACIST DOCUMENTED: ICD-10-PCS | Mod: CPTII,S$GLB,, | Performed by: NURSE PRACTITIONER

## 2023-02-17 PROCEDURE — 4010F PR ACE/ARB THEARPY RXD/TAKEN: ICD-10-PCS | Mod: CPTII,S$GLB,, | Performed by: NURSE PRACTITIONER

## 2023-02-17 PROCEDURE — 1159F MED LIST DOCD IN RCRD: CPT | Mod: CPTII,S$GLB,, | Performed by: NURSE PRACTITIONER

## 2023-02-17 PROCEDURE — 99214 OFFICE O/P EST MOD 30 MIN: CPT | Mod: S$GLB,,, | Performed by: NURSE PRACTITIONER

## 2023-02-17 NOTE — LETTER
February 17, 2023        Juliana Cole MD  [address]  [facility] [address]             [facility]  [address] [place] [address]  Phone: [phone]   Patient: Dinah Mitchell   MR Number: [id number]   YOB: 1975   Date of Visit: 2/17/2023       Dear Dr. Cole:    Thank you for referring Dinah Mitchell to me for evaluation. Below are the relevant portions of my assessment and plan of care.            If you have questions, please do not hesitate to call me. I look forward to following Dinah along with you.    Sincerely,      Mercedez Garcia, MEMA           CC  No Recipients

## 2023-02-17 NOTE — PROCEDURES
Procedures     BOTOX PROCEDURE    PROCEDURE PERFORMED: Botulinum toxin injection (18796)    CLINICAL INDICATION: G43.719    Session #3  The Botox injections have achieved well over 50%  improvement in the patient's symptoms. Migraines have been reduced at least 7 days per month and the number of cumulative hours suffering with headaches has been reduced at least 100 total hours per month. Today she does have a headache indicating that the Botox has worn off. Frequency of treatment is every 3 months unless no response to the treatments, at which time we will discontinue the injections.      A time out was conducted just before the start of the procedure to verify the correct patient and procedure, procedure location, and all relevant critical information.   Signed consent obtained prior to procedure     Conventional methods of treatment but the patient has been unresponsive and refractory.The patient meets criteria for chronic headaches according to the ICHD-III, the patient has more than 15 headaches a month at least 8 of them meet migraine criteria, which last for more than 4 hours a day.     Last Botox injections were on 6/10/22 and  she had over 50%  improvement in the patient's symptoms. Frequency of treatment is every 3 months unless no response to the treatments, at which time we will discontinue the injections.     DESCRIPTION OF PROCEDURE: After obtaining informed consent and under aseptic technique, a total of 155 units of botulinum toxin type A were injected in the following muscles: Procerus 5 units,  5 units bilaterally, frontalis 20 units, temporalis 20 units bilaterally,   occipitalis 15 units, upper cervical paraspinals 10 units bilaterally and trapezius 15 units ilaterally. The patient was given a total of 155 units in 31 sites.    Special precaution taken given her posterior decompression.       The patient tolerated the procedure well. There were no complications. The patient was given a  prescription for repeat treatment in 3 months.     Unavoidable waste 45 units    Saeid Guevara M.D  Medical Director, Headache and Facial Pain  [facility]

## 2023-02-17 NOTE — PATIENT INSTRUCTIONS
Discharge Instructions: Eating a Soft Diet  You have been prescribed a soft diet (also called gastrointestinal soft diet or bland diet). This reduces the amount of work your digestive tract has to do. It also reduces the chance that your digestive tract will be irritated by the food you eat. A soft diet is prescribed for people with digestive problems. The diet consists of foods that are tender, mildly seasoned, and easy to digest. While on this diet, you should not eat fried or spicy foods, or raw fruits and vegetables. Also avoid alcoholic beverages.  General guidelines  Eat in a calm, relaxed atmosphere. How you eat may be as important as what you eat. Dont rush while eating. Chew your food slowly and thoroughly, and swallow slowly.  Eat small frequent meals throughout the day, but dont eat within 2 hours of bedtime.  Avoid any foods that cause discomfort.  Dont use NSAIDs (nonsteroidal anti-inflammatory drugs), such as aspirin, and ibuprofen. Also avoid medicine that contain aspirin. NSAIDs can cause ulcers and delay or prevent ulcer healing.  Use antacids as needed, but keep in mind that magnesium-containing antacids may cause diarrhea.  Foods to eat  Cream of wheat and cream of rice  Cooked white rice  Mashed potatoes, and boiled potatoes without skin  Plain pasta and noodles  Plain white crackers (such as no-salt soda crackers)  White bread  Applesauce  Cooked fruits without skins or seeds  Mild juices, such as apple and grape  Bananas  Cooked or mashed vegetables without stems and seeds  Carrots  Summer squash (zucchini, yellow squash)  Winter squash (acorn, butternut, spaghetti squash)  Cottage cheese  Mild hard or soft cheeses  Custard  Yogurt without seeds or nuts  Milk (you may need lactose-free milk)  Ice cream without seeds or nuts  Smooth peanut butter  Eggs  Fish, turkey, chicken, or other meat that is not tough or stringy  Tofu  Foods to avoid  Nuts and seeds  Snack foods, such as the  following:  Chocolate-containing snacks, candy, pastries, or cakes.  Potato chips (plain, barbecued, or other flavors)  Taco chips or nachos  Corn chips  Popcorn, popcorn cakes, or rice cakes  Crackers with nuts, seeds, or spicy seasonings  French fries  Fried or greasy foods  Whole-grain breads, rolls, and crackers  Breads and rolls with nuts, seeds, or bran  Bran and granola cereals  Berries with seeds, such as strawberries, raspberries, and blackberries  Acidic fruits, such as oranges, grapefruits, nirmal, limes, and pineapples  Raw vegetables  Mild or hot peppers  Sauerkraut and pickled vegetables  Tomatoes or tomato products, such as tomato paste, tomato sauce, and tomato juice  Barbecue sauce  Spicy or flavored cheeses, such as jalapeño and black pepper cheese  Crunchy peanut butter  Dried cooked beans, such as murrell, kidney, or navy beans  The following meats:  Fried or greasy meats  Processed, spicy meats, such as sausage, novoa, ham, and lunch meats  Ribs and other meats with barbecue sauce  Tough or stringy meats, such as corned beef or beef jerky  Fluids to avoid  Alcoholic beverages  Coffee and regular teas  Cristóbal and other drinks with caffeine  Cranberry, orange, pineapple, and grapefruit juice  Lemonade  Vegetable juice  Whole milk, if you are lactose intolerant  Follow-up  Make a follow-up appointment with a dietitian as directed by our staff.  Date Last Reviewed: 6/21/2015 © 2000-2017 [employer]. [address] [place] [id number]. All rights reserved. This information is not intended as a substitute for professional medical care. Always follow your healthcare professional's instructions.         What Is a Hiatal Hernia?    Hiatal hernia is when the area where the stomach and esophagus meet bulges up through the diaphragm into the chest cavity. In some cases, part of the stomach may bulge above the diaphragm. Stomach acid may move up into the esophagus and cause symptoms. The  symptoms are often blamed on gastroesophageal reflux disease (GERD). You may only know about the hernia when it shows up on an X-ray taken for other reasons.   What you may feel  The hiatus is a normal hole in the diaphragm. The esophagus passes through this hole and leads to the stomach. In some cases, part of the stomach may bulge above the diaphragm. This bulge is called a hernia. Stomach acid may move up into the esophagus and cause symptoms.  When you eat, the muscle at the hiatus relaxes to allow food to pass into the stomach. It tightens again to keep food and digestive acids in the stomach.  Many people with hiatal hernias have mild symptoms. You may notice the following GERD symptoms:  Heartburn or other chest discomfort  A feeling of chest fullness after a meal  Frequent burping  Acid taste in the mouth  Trouble swallowing  Treating symptoms  If you have been diagnosed with hiatal hernia, these suggestions may help improve symptoms:  Lose excess weight. Extra weight puts pressure on the stomach and esophagus.  Dont lie down after eating. Sit up for at least an hour after eating. Lying down after eating can increase symptoms.  Avoid certain foods and drinks. These include fatty foods, chocolate, coffee, mint, and other foods that cause symptoms for you.  Dont smoke or drink alcohol. These can worsen symptoms.  Look at your medicines. Discuss your medicines with your healthcare provider. Many medicines can cause symptoms.  Consider an antacid medicine. Ask your healthcare provider about over-the-counter and prescription medicines that may help.  Ask about surgery, if needed. Surgery is a treatment choice for some people. Your healthcare provider can determine if surgery is an option for you.    Date Last Reviewed: 10/1/2016  © 3222-3929 [employer]. [address] [place] [address], [place] [address]. All rights reserved. This information is not intended as a substitute for professional medical care.  Always follow your healthcare professional's instructions.

## 2023-02-17 NOTE — PROGRESS NOTES
Subjective:       Patient ID: Dinah Mitchell is a 48 y.o. female Body mass index is 42.64 kg/m².    Chief Complaint: Results    This patient is new to me.  Referring Provider: Dr. Amaya Aiken  & Dr. Cole for dysphagia.  Established patient of Dr. Sagastume.     Patient had an esophagram on 1/18/2022 as ordered by Dr. Cole that she would like to review today.    GI Problem  The primary symptoms include nausea (occasional) and diarrhea (chronic intermittent, history of IBS; takes lomotil PRN; denies currently). Primary symptoms do not include fever, weight loss, fatigue, abdominal pain, vomiting, melena, hematemesis, hematochezia or dysuria.   The illness is also significant for dysphagia (started ~2016, occurs with larger pills; and sometimes when she lies down she chokes on her saliva; saw speech therapist after brain surgery in the past and told her to take pills with applesauce) and constipation (bowel movements are once every 4 days of small stools of pellet-like stools). The illness does not include chills or odynophagia. Significant associated medical issues include GERD (in the past; had lap band which GERD resolved after it; had it removed due to abscess; no reflux recently) and irritable bowel syndrome (lomotil PRN).     Review of Systems   Constitutional:  Negative for appetite change, chills, fatigue, fever and weight loss.   HENT:  Positive for trouble swallowing. Negative for sore throat.    Respiratory:  Negative for cough, choking and shortness of breath.    Cardiovascular:  Negative for chest pain.   Gastrointestinal:  Positive for constipation (bowel movements are once every 4 days of small stools of pellet-like stools), diarrhea (chronic intermittent, history of IBS; takes lomotil PRN; denies currently), dysphagia (started ~2016, occurs with larger pills; and sometimes when she lies down she chokes on her saliva; saw speech therapist after brain surgery in the past and told her to take  pills with applesauce) and nausea (occasional). Negative for abdominal pain, anal bleeding, blood in stool, hematemesis, hematochezia, melena, rectal pain and vomiting.   Genitourinary:  Negative for difficulty urinating, dysuria and flank pain.   Musculoskeletal:         Reports history of bone spur in her spine   Neurological:  Negative for weakness.       Patient's last menstrual period was 12/16/2022.  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     PATRICIA (obstructive sleep apnea)     severe - doesn't use CPAP    Panic attack     PTSD (post-traumatic stress disorder)     Renal disorder     kidney stones, blood in urine on occasion     Past Surgical History:   Procedure Laterality Date    ANKLE SURGERY Right     x2 (#1 for ligament injury and #2 for fx and ligament)    AUGMENTATION OF BREAST      BILATERAL TUBAL LIGATION      BRAIN SURGERY  12/2018    decompression/craniotomy Arnold-Chiari syndrome    BREAST SURGERY  1997    B implants    CERVICAL BIOPSY  W/ LOOP ELECTRODE EXCISION      COLD KNIFE CONIZATION OF CERVIX N/A 03/17/2022    Procedure: CONE BIOPSY, CERVIX, USING COLD KNIFE;  Surgeon: Nikhil Franco MD;  Location: [facility];  Service: OB/GYN;  Laterality: N/A;    COLONOSCOPY N/A 09/29/2022    Procedure: COLONOSCOPY;  Surgeon: Martín Sagastume MD;  Location: [facility];  Service: Endoscopy;  Laterality: N/A; Repeat colonoscopy in 5 years for surveillance    DECOMPRESSION OF CHIARI MALFORMATION BY REMOVAL OF POSTERIOR ARCH OF FIRST  CERVICAL VERTEBRA N/A 12/13/2018    Procedure: DECOMPRESSION, CHIARI MALFORMATION, BY 1ST CERVICAL VERTEBRA POSTERIOR ARCH REMOVAL;  Surgeon: Sergio Busch MD;  Location: [address];  Service: Neurosurgery;  Laterality: N/A;    EPIDURAL STEROID INJECTION INTO CERVICAL SPINE N/A 12/05/2019    Procedure: Injection-steroid-epidural-cervical-C7-T1;  Surgeon: Noa Samano MD;  Location: [facility];  Service: Neurosurgery;  Laterality: N/A;    FRACTURE SURGERY Right     Ankle    INJECTION OF ANESTHETIC AGENT AROUND MEDIAL BRANCH NERVES INNERVATING CERVICAL FACET JOINT Bilateral 08/06/2020    Procedure: Block-nerve-medial branch-cervical Bilateral  C3,4,5;  Surgeon: Noa Samano MD;  Location: [facility];  Service: Anesthesiology;  Laterality: Bilateral;    INJECTION OF ANESTHETIC AGENT AROUND MEDIAL BRANCH NERVES INNERVATING CERVICAL FACET JOINT Bilateral 08/20/2020    Procedure: Block-nerve-medial branch-cervical Bilateral C3-4-5;  Surgeon: Noa Samano MD;  Location: [facility];  Service: Anesthesiology;  Laterality: Bilateral;    LAPAROSCOPIC GASTRIC BANDING      with subsequent removal due to abscess    LIPOSUCTION      x 2    PELVIC LAPAROSCOPY      for endometriosis eval with BTL    RADIOFREQUENCY ABLATION Bilateral 08/16/2021    Procedure: Radiofrequency Ablation Cervical C3-C5;  Surgeon: Robert Munoz MD;  Location: [facility];  Service: Pain Management;  Laterality: Bilateral;    RADIOFREQUENCY THERMAL COAGULATION OF MEDIAL BRANCH OF POSTERIOR RAMUS OF CERVICAL SPINAL NERVE Bilateral 08/27/2020    Procedure: RADIOFREQUENCY THERMAL COAGULATION, NERVE, SPINAL, CERVICAL, POSTERIOR RAMUS, MEDIAL BRANCH C3-4-5, bilateral;  Surgeon: Noa Samano MD;  Location: [facility];  Service: Anesthesiology;  Laterality: Bilateral;     Family History   Problem Relation Age of Onset    Diabetes Mother     Stroke Mother 30    Mental illness Mother         depression    Hyperlipidemia Mother     Hypertension Mother     Hyperlipidemia  "Father     Heart disease Father         "athletes heart"    Diabetes Sister     Seizures Sister     Mitochondrial disorder Sister         "trent"    Mental illness Sister     Seizures Sister     Cervical cancer Sister     Cervical cancer Sister     Ovarian cancer Paternal Aunt     Ovarian cancer Paternal Aunt     Colon cancer Maternal Grandmother     Esophageal cancer Maternal Grandfather     Cancer Maternal Grandfather 65        throat ca    Ovarian cancer Cousin     Cervical cancer Paternal Cousin     Cervical cancer Other     Breast cancer Neg Hx     Crohn's disease Neg Hx     Ulcerative colitis Neg Hx     Stomach cancer Neg Hx     Celiac disease Neg Hx      Social History     Tobacco Use    Smoking status: Never    Smokeless tobacco: Never   Substance Use Topics    Alcohol use: Yes     Comment: occasional "maybe monthly"    Drug use: No     Wt Readings from Last 10 Encounters:   02/17/23 123.5 kg (272 lb 4.3 oz)   02/17/23 123.7 kg (272 lb 11.3 oz)   12/21/22 122 kg (268 lb 15.4 oz)   11/21/22 119.3 kg (263 lb)   10/20/22 119.4 kg (263 lb 3.7 oz)   09/26/22 117.5 kg (259 lb)   08/22/22 117.8 kg (259 lb 11.2 oz)   06/10/22 116.4 kg (256 lb 11.6 oz)   05/19/22 116.6 kg (256 lb 15.1 oz)   04/13/22 118.3 kg (260 lb 12.9 oz)     Lab Results   Component Value Date    WBC 7.57 10/20/2022    HGB 13.4 10/20/2022    HCT 41.0 10/20/2022     (H) 10/20/2022     10/20/2022     CMP  Sodium   Date Value Ref Range Status   10/20/2022 138 136 - 145 mmol/L Final     Potassium   Date Value Ref Range Status   10/20/2022 4.2 3.5 - 5.1 mmol/L Final     Chloride   Date Value Ref Range Status   10/20/2022 105 95 - 110 mmol/L Final     CO2   Date Value Ref Range Status   10/20/2022 26 23 - 29 mmol/L Final     Glucose   Date Value Ref Range Status   10/20/2022 93 70 - 110 mg/dL Final     BUN   Date Value Ref Range Status   10/20/2022 11 6 - 20 mg/dL Final     Creatinine   Date Value Ref Range Status   10/20/2022 0.9 0.5 - " 1.4 mg/dL Final     Calcium   Date Value Ref Range Status   10/20/2022 8.9 8.7 - 10.5 mg/dL Final     Total Protein   Date Value Ref Range Status   10/20/2022 6.8 6.0 - 8.4 g/dL Final     Albumin   Date Value Ref Range Status   10/20/2022 3.9 3.5 - 5.2 g/dL Final     Total Bilirubin   Date Value Ref Range Status   10/20/2022 0.7 0.1 - 1.0 mg/dL Final     Comment:     For infants and newborns, interpretation of results should be based  on gestational age, weight and in agreement with clinical  observations.    Premature Infant recommended reference ranges:  Up to 24 hours.............<8.0 mg/dL  Up to 48 hours............<12.0 mg/dL  3-5 days..................<15.0 mg/dL  6-29 days.................<15.0 mg/dL       Alkaline Phosphatase   Date Value Ref Range Status   10/20/2022 57 55 - 135 U/L Final     AST   Date Value Ref Range Status   10/20/2022 14 10 - 40 U/L Final     ALT   Date Value Ref Range Status   10/20/2022 17 10 - 44 U/L Final     Anion Gap   Date Value Ref Range Status   10/20/2022 7 (L) 8 - 16 mmol/L Final     eGFR if    Date Value Ref Range Status   03/18/2022 >60 >60 mL/min/1.73 m^2 Final     eGFR if non    Date Value Ref Range Status   03/18/2022 >60 >60 mL/min/1.73 m^2 Final     Comment:     Calculation used to obtain the estimated glomerular filtration  rate (eGFR) is the CKD-EPI equation.        Lab Results   Component Value Date    LIPASERES 52 08/24/2018     Lab Results   Component Value Date    TSH 3.353 02/17/2023     Reviewed prior medical records including radiology report of 1/18/2022 esophagram and CT chest; 8/13/2021 CT renal stone abdomen pelvis; & endoscopy history (see surgical history/procedures).    Objective:      Physical Exam  Vitals and nursing note reviewed.   Constitutional:       General: She is not in acute distress.     Appearance: Normal appearance. She is well-developed. She is not diaphoretic.   HENT:      Mouth/Throat:      Lips: Pink.  No lesions.      Mouth: Mucous membranes are moist. No oral lesions.      Tongue: No lesions.      Pharynx: Oropharynx is clear. No pharyngeal swelling or posterior oropharyngeal erythema.   Eyes:      General: No scleral icterus.     Conjunctiva/sclera: Conjunctivae normal.   Pulmonary:      Effort: Pulmonary effort is normal. No respiratory distress.      Breath sounds: Normal breath sounds. No wheezing.   Abdominal:      General: Bowel sounds are normal. There is no distension or abdominal bruit.      Palpations: Abdomen is soft. Abdomen is not rigid. There is no mass.      Tenderness: There is no abdominal tenderness. There is no guarding or rebound. Negative signs include Gaines's sign and McBurney's sign.   Skin:     General: Skin is warm and dry.      Coloration: Skin is not jaundiced or pale.      Findings: No erythema or rash.   Neurological:      Mental Status: She is alert and oriented to person, place, and time.   Psychiatric:         Behavior: Behavior normal.         Thought Content: Thought content normal.         Judgment: Judgment normal.       Assessment:       1. Pharyngoesophageal dysphagia    2. History of brain surgery    3. Abnormal esophagram    4. Hiatal hernia    5. History of gastroesophageal reflux (GERD)    6. Abnormal TSH    7. Irregular bowel habits    8. History of IBS    9. Constipation, unspecified constipation type    10. Intermittent diarrhea          Plan:       Pharyngoesophageal dysphagia  - schedule EGD, discussed procedure with patient and possible esophageal dilation may be performed during procedure if indicated, patient verbalized understanding  - educated patient to eat smaller more frequent meals and to eat slowly and advised to eat a soft diet.  - possible esophageal manometry if symptoms persist  -     Fl Modified Barium Swallow Speech; Future; Expected date: 02/17/2023  -     SLP video swallow; Future; Expected date: 02/17/2023    History of brain surgery  -     Fl  Modified Barium Swallow Speech; Future; Expected date: 02/17/2023  -     SLP video swallow; Future; Expected date: 02/17/2023  - Recommend follow-up with Primary Care Provider & neurology for continued evaluation and management.    Abnormal esophagram & Hiatal hernia  -     Fl Modified Barium Swallow Speech; Future; Expected date: 02/17/2023  -     SLP video swallow; Future; Expected date: 02/17/2023  - schedule EGD, discussed procedure with patient, including risks and benefits, patient verbalized understanding    History of gastroesophageal reflux (GERD)  -     Fl Modified Barium Swallow Speech; Future; Expected date: 02/17/2023  - schedule EGD, discussed procedure with patient, including risks and benefits, patient verbalized understanding  - recommend lifestyle modifications to help control/reduce reflux/abdominal pain including: avoid large meals, avoid eating within 2-3 hours of bedtime (avoid late night eating & lying down soon after eating), elevate head of bed if nocturnal symptoms are present, smoking cessation (if current smoker), & weight loss (if overweight).   - avoid known foods which trigger reflux symptoms & to minimize/avoid high-fat foods, chocolate, caffeine, citrus, alcohol, & tomato products.  - avoid/limit use of NSAID's, since they can cause GI upset, bleeding, and/or ulcers. If needed, take with food.     Abnormal TSH  Recommend follow-up with Primary Care Provider/endocrinology for continued evaluation and management.    Irregular bowel habits, History of IBS, Constipation, unspecified constipation type, & Intermittent diarrhea  Recommend daily exercise as tolerated, adequate water intake (six 8-oz glasses of water daily), and high fiber diet. OTC fiber supplements are recommended if diet does not reach daily fiber goal (20-30 grams daily), such as Metamucil, Citrucel, or FiberCon (take as directed, separate from other oral medications by >2 hours).  -Recommend taking an OTC stool softener  such as Colace as directed to avoid hard stools and straining with bowel movements PRN constipation  - informed patient can take lomotil as directed PRN diarrhea but advised to take only sparingly, patient verbalized understanding  -If still no improvement with these measures, call/follow-up    Follow up in about 1 month (around 3/17/2023), or if symptoms worsen or fail to improve.      If no improvement in symptoms or symptoms worsen, call/follow-up at clinic or go to ER.        42 minutes of total time spent on the encounter, which includes face to face time and non-face to face time preparing to see the patient (e.g., review of tests), Obtaining and/or reviewing separately obtained history, Documenting clinical information in the electronic or other health record, Independently interpreting results (not separately reported) and communicating results to the patient/family/caregiver, or Care coordination (not separately reported).

## 2023-02-18 ENCOUNTER — PATIENT MESSAGE (OUTPATIENT)
Dept: FAMILY MEDICINE | Facility: CLINIC | Age: 48
End: 2023-02-18
Payer: MEDICARE

## 2023-02-18 LAB — TSH SERPL DL<=0.005 MIU/L-ACNC: 3.35 UIU/ML (ref 0.4–4)

## 2023-02-18 NOTE — TELEPHONE ENCOUNTER
I sent a portal message giving instructions of the new dose.  She already had the labs done before the appt could be canceled.

## 2023-02-28 ENCOUNTER — OFFICE VISIT (OUTPATIENT)
Dept: PSYCHIATRY | Facility: CLINIC | Age: 48
End: 2023-02-28
Payer: MEDICARE

## 2023-02-28 DIAGNOSIS — F90.2 ATTENTION DEFICIT HYPERACTIVITY DISORDER, COMBINED TYPE: ICD-10-CM

## 2023-02-28 DIAGNOSIS — G47.00 INSOMNIA, UNSPECIFIED TYPE: ICD-10-CM

## 2023-02-28 DIAGNOSIS — F43.10 POSTTRAUMATIC STRESS DISORDER: Primary | ICD-10-CM

## 2023-02-28 DIAGNOSIS — F41.0 PANIC DISORDER: ICD-10-CM

## 2023-02-28 PROCEDURE — 99214 PR OFFICE/OUTPT VISIT, EST, LEVL IV, 30-39 MIN: ICD-10-PCS | Mod: 95,,, | Performed by: PSYCHOLOGIST

## 2023-02-28 PROCEDURE — 1159F MED LIST DOCD IN RCRD: CPT | Mod: CPTII,95,, | Performed by: PSYCHOLOGIST

## 2023-02-28 PROCEDURE — 4010F ACE/ARB THERAPY RXD/TAKEN: CPT | Mod: CPTII,95,, | Performed by: PSYCHOLOGIST

## 2023-02-28 PROCEDURE — 90833 PSYTX W PT W E/M 30 MIN: CPT | Mod: 95,,, | Performed by: PSYCHOLOGIST

## 2023-02-28 PROCEDURE — 90833 PR PSYCHOTHERAPY W/PATIENT W/E&M, 30 MIN (ADD ON): ICD-10-PCS | Mod: 95,,, | Performed by: PSYCHOLOGIST

## 2023-02-28 PROCEDURE — 1159F PR MEDICATION LIST DOCUMENTED IN MEDICAL RECORD: ICD-10-PCS | Mod: CPTII,95,, | Performed by: PSYCHOLOGIST

## 2023-02-28 PROCEDURE — 4010F PR ACE/ARB THEARPY RXD/TAKEN: ICD-10-PCS | Mod: CPTII,95,, | Performed by: PSYCHOLOGIST

## 2023-02-28 PROCEDURE — 99214 OFFICE O/P EST MOD 30 MIN: CPT | Mod: 95,,, | Performed by: PSYCHOLOGIST

## 2023-02-28 NOTE — PROGRESS NOTES
"The patient location is:  [place]  The patient location [place] is: [facility]  The patient phone number is: [phone]  Visit type: Virtual visit with synchronous audio and video  Each patient to whom he or she provides medical services by telemedicine is:  (1) informed of the relationship between the physician and patient and the respective role of any other health care provider with respect to management of the patient; and (2) notified that he or she may decline to receive medical services by telemedicine and may withdraw from such care at any time.    Outpatient Psychiatry Follow-Up Visit    Clinical Status of Patient: Outpatient (Ambulatory)  02/28/2023     Chief Complaint: PTSD, Insomnia      Interval History and Content of Current Session:  Interim Events/Subjective Report/Content of Current Session:  follow-up appointment.    Pt is a 46 y/o female with past psychiatric hx of PTSD, insomnia who presents for follow-up treatment. Pt reported that she has been trying to follow the CBT-I recommendations. Noted that it is helping some. Pt met with new neurologist (Dr. Guevara). Pt requested switching from Vyvanse to Adderall XR due to lack of results. Pt noted positive results with the escitalopram. Stated that it has been more effective than the fluoxetine was.     Past Psychiatric hx:  prozac (can't recall), lexapro (effective, does not remember why stopped), celexa > effective, dec'd libido ( told pt he would "leave her" regarding the sex drive), depakote (ineffective), lamictal (rash), abilify (paranoid), seroquel (significant wgt gain), Lithium 300 mg po qam/600 mg po qhs (pt reports it worsened anxiety), prazosin (I had some allergic reaction- had been effective for months prior to this report), cymbalta 90mg (effective; pt self d/c'd), trazodone, ambien, Belsomra, Vyvanse    Past Medical hx:   Past Medical History:   Diagnosis Date    Abnormal Pap smear of cervix     Arnold-Chiari deformity  "    Arthritis     Asthma     Borderline personality disorder     Carnitine deficiency     Colon polyp     Depression     Fatty liver     noted on 8/21 CT    Fibromyalgia     Headache     History of abnormal cervical Pap smear     colpo/ LEEP and CKC    History of nephrolithiasis     kidney stones    Hypothyroidism     IBS (irritable bowel syndrome)     GI smith negative    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  Denies hopelessness/worthlessness.    Denies auditory/visual hallucinations      Alcohol: Pt denies  Drug: Pt denies  Caffeine: Not assessed  Tobacco: Pt denies      Review of Systems   PSYCHIATRIC: Pertinent items are noted in the narrative.        CONSTITUTIONAL: weight stable    Past Medical, Family and Social History: The patient's past medical, family and social history have been reviewed and updated as appropriate within the electronic medical record. See encounter notes.     Current Psychiatric Medication:  wellbutrin xl 150mg po qam, xanax 1mg po daily PRN anxiety (not using daily),  escitalopram 10 mg, vyvanse 40mg po qam (not using daily - every other day), Ambien 10 mg     Compliance: yes      Side effects: Pt denied.      Risk Parameters:  Patient reports no suicidal ideation  Patient reports no homicidal ideation  Patient reports no self-injurious behavior  Patient reports no violent behavior     Exam (detailed: at least 9 elements; comprehensive: all 15 elements)   Constitutional  Vitals:  Most recent vital signs, dated less than 90 days prior to this appointment, were reviewed. BP: ()/()   Arterial Line BP: ()/()        General:  unremarkable, age appropriate, casual attire, good eye contact, good rapport       Musculoskeletal  Muscle Strength/Tone:  no flaccidity, no tremor    Gait & Station:  normal      Psychiatric                       Speech:  normal tone, normal rate, rhythm, and volume   Mood & Affect:   Depressed, anxious         Thought Process:   Goal directed; Linear    Associations:   intact   Thought Content:   No SI/HI, delusions, or paranoia, no AV/VH   Insight & Judgement:   Good, adequate to circumstances   Orientation:   grossly intact; alert and oriented x 4    Memory:  intact for content of interview    Language:  grossly intact, can repeat    Attention Span  : Grossly intact for content of interview   Fund of Knowledge:   intact and appropriate to age and level of education        Assessment and Diagnosis   Status/Progress: Based on the examination today, the patient's problem(s) is/are not adequately controlled.  New problems have been presented today. Co-morbidities are complicating management of the primary condition. There are no active rule-out diagnoses for this patient at this time.      Impression: Pt appears t be benefiting from the CBT-I group and practicing skills learned. Escitalopram appears to be working well. Will transition from Vyvanse to Adderall XR give past results. Will continue with the other medications as they are and monitor moving forward.     Diagnosis:   Posttraumatic Stress Disorder  Panic Disorder w/o agoraphobia  Attention Deficit Hyperactivity Disorder, Combined Type   Insomnia  Borderline personality disorder traits  Intervention/Counseling/Treatment Plan   Medication Management:      1. wellbutrin xl 150mg po qam    2. alprazolam 1mg po daily PRN anxiety (approximately 2-3x/wk and often using half tab)    3.  escitalopram 10 mg    4. Switch vyvanse 40mg po qam to Adderall XR 20 mg    5. Cont Ambien 10 mg     6. Call to report any worsening of symptoms or problems with the  medication. Pt instructed to go to ER with thoughts of harming self, others     7. Continue in therapy    Psychotherapy: 20 minutes  Target symptoms: insomnia, anxiety  Why chosen therapy is appropriate versus another modality: CBT used; relevant to diagnosis, patient responds to this modality  Outcome monitoring methods: self-report, observation  Therapeutic intervention type: Cognitive Behavioral Therapy  Topics discussed/themes: building skills sets for symptom management, symptom recognition, nutrition, exercise  The patient's response to the intervention is accepting  Patient's response to treatment is: good.   The patient's progress toward treatment goals: limited     Return to clinic: 1 month    -Cognitive-Behavioral/Supportive therapy and psychoeducation provided  -R/B/SE's of medications discussed with the pt who expresses understanding and chooses to take medications as prescribed.   -Pt instructed to call clinic, 911 or go to nearest emergency room if sxs worsen or pt is in   crisis. The pt expresses understanding.    Bebeto Resendiz, PhD, MP     Antidepressant/Antianxiety Medication Initiation:  Patient informed of risks, benefits, and potential side effects of medication and accepts informed consent.  Common side effects include nausea, fatigue, headache, insomnia., Specifically discussed the possibility of new or worsening suicidal thoughts/depression.  Patient instructed to stop the medication immediately and seek urgent treatment if this occurs. Patient instructed not to abruptly discontinue medication without physician guidance except in cases of sudden onset or worsening of SI.       Stimulant Medication Initiation:  Patient advised of risks, benefits, and side effects of medication and accepts informed consent.  Common side effects include insomnia, irritability, jittery feeling, dry mouth, and agitation/hostility., Patient advised of potential addictive nature of medication and controlled  substance classification.  Instructed to safeguard medication as no early refills can be given for lost or stolen medications.       Benzodiazepine Initiation:  Patient advised of the risks, benefits, and common side effects of medication and has accepted informed consent.  Common side effects include drowsiness, impaired coordination, possible memory loss., Patient advised NOT to operate a vehicle or machinery untiil they are sure how the medication will affec them.  Client also advised of danger of mixing this medication with alcohol., Patient advised of potential addictive nature of medication and need to safeguard medication as no early refills for lost or stolen medications can be authorized.       Pregnancy Warning:  Patient denies current pregnancy possibility.  Patient made aware that medications have not been proven safe in pregnancy and that she must maintain adequate birth control.  Patient instructed to alert us immediately if she becomes pregnant.       Sleep Aid Initiation:  Patient advised of potential side effects of medication including sleep walking or other complex behaviors.  Patient advised not to mix medication with alcohol, to go to bed immediately after taking, and to stop at first sign of any unusual behaviors.

## 2023-03-01 ENCOUNTER — OFFICE VISIT (OUTPATIENT)
Dept: PSYCHIATRY | Facility: CLINIC | Age: 48
End: 2023-03-01
Payer: MEDICARE

## 2023-03-01 DIAGNOSIS — G47.09 OTHER INSOMNIA: Primary | ICD-10-CM

## 2023-03-01 PROCEDURE — 1159F MED LIST DOCD IN RCRD: CPT | Mod: CPTII,S$GLB,, | Performed by: PSYCHOLOGIST

## 2023-03-01 PROCEDURE — 4010F PR ACE/ARB THEARPY RXD/TAKEN: ICD-10-PCS | Mod: CPTII,S$GLB,, | Performed by: PSYCHOLOGIST

## 2023-03-01 PROCEDURE — 90834 PSYTX W PT 45 MINUTES: CPT | Mod: S$GLB,,, | Performed by: PSYCHOLOGIST

## 2023-03-01 PROCEDURE — 90834 PR PSYCHOTHERAPY W/PATIENT, 45 MIN: ICD-10-PCS | Mod: S$GLB,,, | Performed by: PSYCHOLOGIST

## 2023-03-01 PROCEDURE — 99999 PR PBB SHADOW E&M-EST. PATIENT-LVL I: ICD-10-PCS | Mod: PBBFAC,,, | Performed by: PSYCHOLOGIST

## 2023-03-01 PROCEDURE — 1159F PR MEDICATION LIST DOCUMENTED IN MEDICAL RECORD: ICD-10-PCS | Mod: CPTII,S$GLB,, | Performed by: PSYCHOLOGIST

## 2023-03-01 PROCEDURE — 4010F ACE/ARB THERAPY RXD/TAKEN: CPT | Mod: CPTII,S$GLB,, | Performed by: PSYCHOLOGIST

## 2023-03-01 PROCEDURE — 99999 PR PBB SHADOW E&M-EST. PATIENT-LVL I: CPT | Mod: PBBFAC,,, | Performed by: PSYCHOLOGIST

## 2023-03-08 ENCOUNTER — PATIENT MESSAGE (OUTPATIENT)
Dept: PSYCHIATRY | Facility: CLINIC | Age: 48
End: 2023-03-08
Payer: MEDICARE

## 2023-03-08 RX ORDER — DEXTROAMPHETAMINE SACCHARATE, AMPHETAMINE ASPARTATE MONOHYDRATE, DEXTROAMPHETAMINE SULFATE AND AMPHETAMINE SULFATE 5; 5; 5; 5 MG/1; MG/1; MG/1; MG/1
20 CAPSULE, EXTENDED RELEASE ORAL EVERY MORNING
Qty: 30 CAPSULE | Refills: 0 | Status: SHIPPED | OUTPATIENT
Start: 2023-03-08 | End: 2023-07-11 | Stop reason: SDUPTHER

## 2023-03-23 ENCOUNTER — TELEPHONE (OUTPATIENT)
Dept: FAMILY MEDICINE | Facility: CLINIC | Age: 48
End: 2023-03-23

## 2023-03-23 NOTE — TELEPHONE ENCOUNTER
----- Message from Sebas Beaver sent at 3/23/2023  9:48 AM CDT -----  Who Called: patient          What is the reqeust in detail: Requesting call back to discuss pt getting a home health nurse to go in and sit with her during the day due to her ongoing issues. Please call pt so she can explain her issues         Can the clinic reply by [facility]? no         Best Call Back Number: [phone]           Additional Information:

## 2023-03-23 NOTE — TELEPHONE ENCOUNTER
Ms. Nix states that she has not been out to the store or anywhere in over a month. She has someone who comes to clean the house and pays her to go get the groceries and  medications. But, she may not be able to have her do this much.

## 2023-03-23 NOTE — TELEPHONE ENCOUNTER
will not provide a full time aide as this is not a covered benefit.    Are you getting out to the store and other places?

## 2023-03-23 NOTE — TELEPHONE ENCOUNTER
Ms. Nix is requesting to have H H come in an sit with her.  Since the craniectomy, she is having falls more often, worse headaches with nausea and blurred vision.  She would like to have someone to help her with showering and assistance with medications.  She is having trouble giving her shots and general care.  She is scared to  bathe or shower, afraid of falling.

## 2023-03-24 NOTE — TELEPHONE ENCOUNTER
"Pt states that she had another mri and the new neurologist said the reason she is having headaches and neck pain is because her spinal fluid is " cut off"on one side of her neck.  She is requesting a home health nurse to help with her medications if nothing else and in home PT for the neck pain.  Please advise.   "

## 2023-03-24 NOTE — TELEPHONE ENCOUNTER
This is not normal to be experiencing these sx    Have you discussed this with your neurology team?    We can get HH but I think you need neurology eval first

## 2023-03-28 NOTE — TELEPHONE ENCOUNTER
Pt reports that the MRIs from may and June of 2022 are the most recent.  She states that Padma Guevara is her new neuro.

## 2023-03-29 NOTE — TELEPHONE ENCOUNTER
Keep tomorrow's appt and we can discus HH at that time  HH requires a recent visit with me before I order HH

## 2023-03-30 ENCOUNTER — OFFICE VISIT (OUTPATIENT)
Dept: FAMILY MEDICINE | Facility: CLINIC | Age: 48
End: 2023-03-30
Payer: MEDICARE

## 2023-03-30 VITALS
RESPIRATION RATE: 20 BRPM | WEIGHT: 275.81 LBS | DIASTOLIC BLOOD PRESSURE: 88 MMHG | OXYGEN SATURATION: 98 % | BODY MASS INDEX: 43.29 KG/M2 | HEIGHT: 67 IN | TEMPERATURE: 98 F | SYSTOLIC BLOOD PRESSURE: 128 MMHG | HEART RATE: 82 BPM

## 2023-03-30 DIAGNOSIS — R31.9 HEMATURIA, UNSPECIFIED TYPE: ICD-10-CM

## 2023-03-30 DIAGNOSIS — G89.29 CHRONIC NONINTRACTABLE HEADACHE, UNSPECIFIED HEADACHE TYPE: Primary | ICD-10-CM

## 2023-03-30 DIAGNOSIS — H53.9 VISION CHANGES: ICD-10-CM

## 2023-03-30 DIAGNOSIS — R51.9 CHRONIC NONINTRACTABLE HEADACHE, UNSPECIFIED HEADACHE TYPE: Primary | ICD-10-CM

## 2023-03-30 PROCEDURE — 1159F PR MEDICATION LIST DOCUMENTED IN MEDICAL RECORD: ICD-10-PCS | Mod: CPTII,S$GLB,, | Performed by: FAMILY MEDICINE

## 2023-03-30 PROCEDURE — 90677 PCV20 VACCINE IM: CPT | Mod: S$GLB,,, | Performed by: FAMILY MEDICINE

## 2023-03-30 PROCEDURE — 1159F MED LIST DOCD IN RCRD: CPT | Mod: CPTII,S$GLB,, | Performed by: FAMILY MEDICINE

## 2023-03-30 PROCEDURE — 3008F BODY MASS INDEX DOCD: CPT | Mod: CPTII,S$GLB,, | Performed by: FAMILY MEDICINE

## 2023-03-30 PROCEDURE — 87086 URINE CULTURE/COLONY COUNT: CPT | Performed by: FAMILY MEDICINE

## 2023-03-30 PROCEDURE — 3074F SYST BP LT 130 MM HG: CPT | Mod: CPTII,S$GLB,, | Performed by: FAMILY MEDICINE

## 2023-03-30 PROCEDURE — 87088 URINE BACTERIA CULTURE: CPT | Performed by: FAMILY MEDICINE

## 2023-03-30 PROCEDURE — 90677 PNEUMOCOCCAL CONJUGATE VACCINE 20-VALENT: ICD-10-PCS | Mod: S$GLB,,, | Performed by: FAMILY MEDICINE

## 2023-03-30 PROCEDURE — 1160F RVW MEDS BY RX/DR IN RCRD: CPT | Mod: CPTII,S$GLB,, | Performed by: FAMILY MEDICINE

## 2023-03-30 PROCEDURE — 99214 OFFICE O/P EST MOD 30 MIN: CPT | Mod: S$GLB,,, | Performed by: FAMILY MEDICINE

## 2023-03-30 PROCEDURE — 3074F PR MOST RECENT SYSTOLIC BLOOD PRESSURE < 130 MM HG: ICD-10-PCS | Mod: CPTII,S$GLB,, | Performed by: FAMILY MEDICINE

## 2023-03-30 PROCEDURE — G0009 ADMIN PNEUMOCOCCAL VACCINE: HCPCS | Mod: S$GLB,,, | Performed by: FAMILY MEDICINE

## 2023-03-30 PROCEDURE — 3079F DIAST BP 80-89 MM HG: CPT | Mod: CPTII,S$GLB,, | Performed by: FAMILY MEDICINE

## 2023-03-30 PROCEDURE — 3008F PR BODY MASS INDEX (BMI) DOCUMENTED: ICD-10-PCS | Mod: CPTII,S$GLB,, | Performed by: FAMILY MEDICINE

## 2023-03-30 PROCEDURE — 99214 PR OFFICE/OUTPT VISIT, EST, LEVL IV, 30-39 MIN: ICD-10-PCS | Mod: S$GLB,,, | Performed by: FAMILY MEDICINE

## 2023-03-30 PROCEDURE — 4010F PR ACE/ARB THEARPY RXD/TAKEN: ICD-10-PCS | Mod: CPTII,S$GLB,, | Performed by: FAMILY MEDICINE

## 2023-03-30 PROCEDURE — G0009 PNEUMOCOCCAL CONJUGATE VACCINE 20-VALENT: ICD-10-PCS | Mod: S$GLB,,, | Performed by: FAMILY MEDICINE

## 2023-03-30 PROCEDURE — 1160F PR REVIEW ALL MEDS BY PRESCRIBER/CLIN PHARMACIST DOCUMENTED: ICD-10-PCS | Mod: CPTII,S$GLB,, | Performed by: FAMILY MEDICINE

## 2023-03-30 PROCEDURE — 4010F ACE/ARB THERAPY RXD/TAKEN: CPT | Mod: CPTII,S$GLB,, | Performed by: FAMILY MEDICINE

## 2023-03-30 PROCEDURE — 3079F PR MOST RECENT DIASTOLIC BLOOD PRESSURE 80-89 MM HG: ICD-10-PCS | Mod: CPTII,S$GLB,, | Performed by: FAMILY MEDICINE

## 2023-03-30 PROCEDURE — 87147 CULTURE TYPE IMMUNOLOGIC: CPT | Performed by: FAMILY MEDICINE

## 2023-03-30 RX ORDER — RIMEGEPANT SULFATE 75 MG/75MG
TABLET, ORALLY DISINTEGRATING ORAL
COMMUNITY
End: 2023-04-11 | Stop reason: SDUPTHER

## 2023-03-30 RX ORDER — KETOROLAC TROMETHAMINE 30 MG/ML
INJECTION, SOLUTION INTRAMUSCULAR; INTRAVENOUS
COMMUNITY
End: 2023-04-11 | Stop reason: SDUPTHER

## 2023-03-31 ENCOUNTER — PATIENT MESSAGE (OUTPATIENT)
Dept: FAMILY MEDICINE | Facility: CLINIC | Age: 48
End: 2023-03-31
Payer: MEDICARE

## 2023-03-31 LAB — BACTERIA UR CULT: ABNORMAL

## 2023-04-01 ENCOUNTER — PATIENT MESSAGE (OUTPATIENT)
Dept: FAMILY MEDICINE | Facility: CLINIC | Age: 48
End: 2023-04-01
Payer: MEDICARE

## 2023-04-01 RX ORDER — AMOXICILLIN 875 MG/1
875 TABLET, FILM COATED ORAL 2 TIMES DAILY
Qty: 14 TABLET | Refills: 0 | Status: SHIPPED | OUTPATIENT
Start: 2023-04-01 | End: 2023-04-01 | Stop reason: SDUPTHER

## 2023-04-01 RX ORDER — AMOXICILLIN 875 MG/1
875 TABLET, FILM COATED ORAL 2 TIMES DAILY
Qty: 14 TABLET | Refills: 0 | Status: SHIPPED | OUTPATIENT
Start: 2023-04-01 | End: 2023-04-10

## 2023-04-02 NOTE — PROGRESS NOTES
Subjective:       Patient ID: Dinah Mitchell is a 48 y.o. female.    Chief Complaint: Follow-up    HPI  The patient is a 48-year-old who is here today to discuss her headaches and ask for home health. She continues to have significant issues with headaches.  Because her headaches are so bad, she does not get out of the house as she does not feel safe leaving the house because of her headaches.  She goes days without showering because she does not feel safe showering because of her headaches.  She pays someone to come in and clean the house and get her groceries and  her medication but does not feel like that is something she will be able to continue much longer.  She would like a referral for home health so they can help her as much as possible at home.  She has several types of headaches.  Her most troublesome type of headache is the headache she attributes to her spinal fluid issues that she attributes to the surgery for Arnold Chiari malformation.  If she coughs, sneezes, laughs, bends over, lifts more than 5 lb, has a bowel movement, or sleeps on her back, she will not be able to see for 30 seconds and then she has the most excruciating pain that is the worst pain she has had in her life develop in her head which last for 5 or 10 minutes.  She also has a headache that she describes as a migraine headache.  Her migraine headache starts typically in her left temple and then will move to the center of her head and then move all over.  With this headache, she has nausea and light and sound sensitivity.  She has another headache that she describes as a knife-like stabbing pain above her left eye that is very localized.  She has another type of headache where she feels the the top of her head is going to pop off because of the pressure she is experiencing.  With this headache type, she does get nausea but no vomiting.  She does use and emgality, nurrtec and Toradol for her headaches.  She is receiving Botox  injections.  She has had an MRI and was told that there was fluid problems in her brain on the MRI but I do not see that in the radiologist's interpretation.  Her prior neurologist has left the area and she is with a new neurologist now    She also notes that she has been having intermittent issues with hematuria that she attributes to the urinary tract infection from contaminated well water with coliform bacteria    Review of Systems   Constitutional:  Negative for appetite change, chills, diaphoresis, fatigue, fever and unexpected weight change.   HENT:  Negative for congestion, ear pain, postnasal drip, rhinorrhea, sinus pressure, sneezing, sore throat and trouble swallowing.    Eyes:  Positive for visual disturbance (per HPI). Negative for pain and discharge.   Respiratory:  Negative for cough, chest tightness, shortness of breath and wheezing.    Cardiovascular:  Negative for chest pain, palpitations and leg swelling.   Gastrointestinal:  Negative for abdominal distention, abdominal pain, blood in stool, constipation, diarrhea, nausea and vomiting.   Skin:  Negative for rash.   Neurological:  Positive for headaches.       Objective:      Physical Exam  Constitutional:       General: She is not in acute distress.     Appearance: Normal appearance. She is well-developed.   HENT:      Head: Normocephalic and atraumatic.      Right Ear: Hearing, tympanic membrane, ear canal and external ear normal.      Left Ear: Hearing, tympanic membrane, ear canal and external ear normal.      Nose: Nose normal.      Mouth/Throat:      Mouth: No oral lesions.      Pharynx: No oropharyngeal exudate or posterior oropharyngeal erythema.   Eyes:      General: Lids are normal. No scleral icterus.     Extraocular Movements: Extraocular movements intact.      Conjunctiva/sclera: Conjunctivae normal.      Pupils: Pupils are equal, round, and reactive to light.   Neck:      Thyroid: No thyroid mass or thyromegaly.      Vascular: No  "carotid bruit.   Cardiovascular:      Rate and Rhythm: Normal rate and regular rhythm. No extrasystoles are present.     Chest Wall: PMI is not displaced.      Heart sounds: Normal heart sounds. No murmur heard.    No gallop.   Pulmonary:      Effort: Pulmonary effort is normal. No accessory muscle usage or respiratory distress.      Breath sounds: Normal breath sounds.   Abdominal:      General: Bowel sounds are normal. There is no abdominal bruit.      Palpations: Abdomen is soft.      Tenderness: There is no abdominal tenderness. There is no rebound.   Musculoskeletal:      Cervical back: Normal range of motion and neck supple.   Lymphadenopathy:      Head:      Right side of head: No submental or submandibular adenopathy.      Left side of head: No submental or submandibular adenopathy.      Cervical:      Right cervical: No superficial, deep or posterior cervical adenopathy.     Left cervical: No superficial, deep or posterior cervical adenopathy.      Upper Body:      Right upper body: No supraclavicular adenopathy.      Left upper body: No supraclavicular adenopathy.   Skin:     General: Skin is warm and dry.   Neurological:      Mental Status: She is alert and oriented to person, place, and time.   Psychiatric:         Attention and Perception: Attention and perception normal.         Mood and Affect: Mood is anxious and depressed. Affect is tearful.         Speech: Speech is rapid and pressured.         Behavior: Behavior is agitated. Behavior is cooperative.         Thought Content: Thought content normal.         Cognition and Memory: Cognition and memory normal.         Judgment: Judgment normal.     Blood pressure 128/88, pulse 82, temperature 97.7 °F (36.5 °C), resp. rate 20, height 5' 7" (1.702 m), weight 125.1 kg (275 lb 12.7 oz), SpO2 98 %.Body mass index is 43.2 kg/m².            A/P:  1) headaches.  Persistent.  She will follow-up with the neurologist as planned.  We will order home health to see " what services they can help her with.  2)  vision changes associated with headaches.  Persistent.  We will refer her to the optometrist for further evaluation   3)  Intermittent hematuria.  New.  We will check a UA and urine culture.

## 2023-04-03 ENCOUNTER — TELEPHONE (OUTPATIENT)
Dept: NEUROLOGY | Facility: CLINIC | Age: 48
End: 2023-04-03
Payer: MEDICARE

## 2023-04-03 PROCEDURE — G0180 PR HOME HEALTH MD CERTIFICATION: ICD-10-PCS | Mod: ,,, | Performed by: FAMILY MEDICINE

## 2023-04-03 PROCEDURE — G0180 MD CERTIFICATION HHA PATIENT: HCPCS | Mod: ,,, | Performed by: FAMILY MEDICINE

## 2023-04-03 NOTE — TELEPHONE ENCOUNTER
----- Message from Padma Guevara MD sent at 4/2/2023 12:13 PM CDT -----  Regarding: adjust appointment for more time  Hello, this patient is very complicated and new to me. Her PCP sent me a note over the weekend. I need to see her for at least 40 minutes. The patient before her appointment has symptoms more appropriate for general neurology or her PCP, non headache related. Please schedule her with appropriate provider. Thank you

## 2023-04-05 ENCOUNTER — PATIENT MESSAGE (OUTPATIENT)
Dept: PSYCHIATRY | Facility: CLINIC | Age: 48
End: 2023-04-05
Payer: MEDICARE

## 2023-04-06 ENCOUNTER — PES CALL (OUTPATIENT)
Dept: ADMINISTRATIVE | Facility: CLINIC | Age: 48
End: 2023-04-06
Payer: MEDICARE

## 2023-04-06 ENCOUNTER — TELEPHONE (OUTPATIENT)
Dept: FAMILY MEDICINE | Facility: CLINIC | Age: 48
End: 2023-04-06
Payer: MEDICARE

## 2023-04-06 NOTE — TELEPHONE ENCOUNTER
----- Message from Beti Morgan sent at 4/6/2023 12:56 PM CDT -----  Contact: eliseo  [phone]  Type: Needs Medical Advice  Who Called:  pt    Best Call Back Number: eliseo at  [phone]  Additional Information: eliseo from [facility] wanted to let office know pt has had a fall with no injuries . Please call back and advise .

## 2023-04-11 ENCOUNTER — OFFICE VISIT (OUTPATIENT)
Dept: NEUROLOGY | Facility: CLINIC | Age: 48
End: 2023-04-11
Payer: MEDICARE

## 2023-04-11 VITALS
SYSTOLIC BLOOD PRESSURE: 137 MMHG | DIASTOLIC BLOOD PRESSURE: 87 MMHG | BODY MASS INDEX: 43.26 KG/M2 | RESPIRATION RATE: 20 BRPM | HEART RATE: 94 BPM | WEIGHT: 276.25 LBS

## 2023-04-11 DIAGNOSIS — M54.2 CERVICALGIA: ICD-10-CM

## 2023-04-11 DIAGNOSIS — G43.719 INTRACTABLE CHRONIC MIGRAINE WITHOUT AURA AND WITHOUT STATUS MIGRAINOSUS: Primary | ICD-10-CM

## 2023-04-11 DIAGNOSIS — E66.01 CLASS 3 SEVERE OBESITY WITH BODY MASS INDEX (BMI) OF 40.0 TO 44.9 IN ADULT, UNSPECIFIED OBESITY TYPE, UNSPECIFIED WHETHER SERIOUS COMORBIDITY PRESENT: ICD-10-CM

## 2023-04-11 DIAGNOSIS — G93.5 CHIARI MALFORMATION TYPE I: ICD-10-CM

## 2023-04-11 DIAGNOSIS — G43.719 CHRONIC MIGRAINE WITHOUT AURA, INTRACTABLE, WITHOUT STATUS MIGRAINOSUS: ICD-10-CM

## 2023-04-11 DIAGNOSIS — Z86.69 HISTORY OF CHIARI MALFORMATION: Primary | ICD-10-CM

## 2023-04-11 DIAGNOSIS — M47.812 CERVICAL SPONDYLOSIS: ICD-10-CM

## 2023-04-11 PROCEDURE — 3008F PR BODY MASS INDEX (BMI) DOCUMENTED: ICD-10-PCS | Mod: CPTII,S$GLB,, | Performed by: PSYCHIATRY & NEUROLOGY

## 2023-04-11 PROCEDURE — 99214 OFFICE O/P EST MOD 30 MIN: CPT | Mod: S$GLB,,, | Performed by: PSYCHIATRY & NEUROLOGY

## 2023-04-11 PROCEDURE — 99999 PR PBB SHADOW E&M-EST. PATIENT-LVL V: CPT | Mod: PBBFAC,,, | Performed by: PSYCHIATRY & NEUROLOGY

## 2023-04-11 PROCEDURE — 3075F PR MOST RECENT SYSTOLIC BLOOD PRESS GE 130-139MM HG: ICD-10-PCS | Mod: CPTII,S$GLB,, | Performed by: PSYCHIATRY & NEUROLOGY

## 2023-04-11 PROCEDURE — 4010F PR ACE/ARB THEARPY RXD/TAKEN: ICD-10-PCS | Mod: CPTII,S$GLB,, | Performed by: PSYCHIATRY & NEUROLOGY

## 2023-04-11 PROCEDURE — 99214 PR OFFICE/OUTPT VISIT, EST, LEVL IV, 30-39 MIN: ICD-10-PCS | Mod: S$GLB,,, | Performed by: PSYCHIATRY & NEUROLOGY

## 2023-04-11 PROCEDURE — 3079F DIAST BP 80-89 MM HG: CPT | Mod: CPTII,S$GLB,, | Performed by: PSYCHIATRY & NEUROLOGY

## 2023-04-11 PROCEDURE — 99999 PR PBB SHADOW E&M-EST. PATIENT-LVL V: ICD-10-PCS | Mod: PBBFAC,,, | Performed by: PSYCHIATRY & NEUROLOGY

## 2023-04-11 PROCEDURE — 1159F MED LIST DOCD IN RCRD: CPT | Mod: CPTII,S$GLB,, | Performed by: PSYCHIATRY & NEUROLOGY

## 2023-04-11 PROCEDURE — 4010F ACE/ARB THERAPY RXD/TAKEN: CPT | Mod: CPTII,S$GLB,, | Performed by: PSYCHIATRY & NEUROLOGY

## 2023-04-11 PROCEDURE — 1159F PR MEDICATION LIST DOCUMENTED IN MEDICAL RECORD: ICD-10-PCS | Mod: CPTII,S$GLB,, | Performed by: PSYCHIATRY & NEUROLOGY

## 2023-04-11 PROCEDURE — 3079F PR MOST RECENT DIASTOLIC BLOOD PRESSURE 80-89 MM HG: ICD-10-PCS | Mod: CPTII,S$GLB,, | Performed by: PSYCHIATRY & NEUROLOGY

## 2023-04-11 PROCEDURE — 3075F SYST BP GE 130 - 139MM HG: CPT | Mod: CPTII,S$GLB,, | Performed by: PSYCHIATRY & NEUROLOGY

## 2023-04-11 PROCEDURE — 3008F BODY MASS INDEX DOCD: CPT | Mod: CPTII,S$GLB,, | Performed by: PSYCHIATRY & NEUROLOGY

## 2023-04-11 NOTE — PROGRESS NOTES
Subjective:       Patient ID: Dinah Mitchell is a 48 y.o. female.    Chief Complaint: No chief complaint on file.    Ms. Mitchell is a new patient for Dr. Guevara; previous patient of Dr. Samano and then Dr. Vo and was last seen by her as virtual on 11/2022. She received her Botox #3 by Dr. Guevara on 2/17/2023.     This is a 47 y/o F w w complicated past medical history that includes Valsava induced migraines, Chiari Malformation s/p posterior fossa decompression 12/2018, chronic neck pain (cervical spondylosis w/o myelopathy and radiculopathy, and cervical myofascial pain, Depression, PTSD, borderline personality disorder, fibromyalgia, IBS, PATRICIA noncompliant CPAP, s/p gastric bypass, carnitine deficiency and hypothyroidism (secondary to toxic exposure while working on [employer]) presenting as follow up and to establish care.     She refers that she has difficulty keeping her medications straight, specially the ones at night for which she has a hard time remembering   She refers that her migraines have improved significantly since botox and emgality combination. When she has breakthrough she takes nurtec and it reliefs her headaches. These are happening once week now; she has used 3 nurtec since last botox by Dr. Guevara.   Valsalva headaches are still happening and if she does toradol shot; Triggers: congestion, crying, bending over, straining, rolling on her back. This can last from 20 seconds to hours. If its less than an a minute She has done 4-5 this last month.     PREVIOUS NOTES FROM Dr. Vo  S:    46 Y RH F with PMHx of depression,  PTSD, borderline personality disorder, fibromyalgia,IBS, PATRICIA noncompliant CPAP , s/p posterior fossa decompression for Chiari malformation (9mm in 2018), s/p gastric bypass,  carnitine deficiency, cervical spondylosis, migraine and hypothyroidism who presents for further follow up of chronic migraine. She mentions that her migraine is doing poorly. She had been doing much better  post first Botox on 6/22. Unfortunately she had health issues with IBS around her 9/22 appointment and missed that. She was seen early 9/22 for neck pain and referred to spine surgery. She has not had her second Botox. She feels that all the benefit she had from Botox is gone now. She is waking up with severe pain in her neck and also her Chiari decompression surgical site. She is quite concerned as she has been having trouble when she wakes up in night chocking on saliva. She does have PATRICIA and feels she is complaint with treatment. She is ok otherwise. She had a lot of dysphagia post operatively from the chiari and had speech therapy. Pulmonary seeing her feels cough is from asthma and prescribed Advair.  She continues to have severe post surgical pain from the decompressive surgery. Anything that applies pressure back there or if she lays on her back with the pressure back there will cause her to wake up with severe occipital headache that is worse than anything else. This pain she does recall improved with Botox. She would like refill of her medications.  She mentions that she has continued to have severe neck pain and is now following with neurosurgery         Headache history form initial history :  Age at onset and course over time: 2011 and worse over time, this happened during a time when she had a long commute. Neck was her worst pain - just left to midline of middle neck. Was going to [facility] pain management receiving epidural steroids which would last only 2 weeks. She had a CMBB and was planning for RFA but wanted to get cervical MRI first and second opinion. She was seen by Dr. Samano and her hsitory with mutliple treatments is above on my chart     On chart review conducted today patient was followed by Dr. Samano ([facility]) for migraine and also for chronic neck pain (cervical spondylosis) sicne 3/18 at which time Dr. Samano diagnosed Chiari malformation  (extending 9 mm inferior to the foramen magnum with so-called pegging of the cerebellar tonsil) , headache, cervical spondylosis w/o myelopathy and radiculopathy, and cervical myofascial pain. Placed on tizandine qhs, prn methocarbamol and tramadol q6h PRN. An Mri brain with CINE was performed that did not show CSF flow obstruction. . She had severe pain by 3/18 an ONB with steroids/anesthetic and TPIs was done by Dr. Samano multiple times. She continued seeing Dr. Fuentes for cognitive complaints and ataxia/eyelid ptosis. She had workup with EMG which did not show any signs of myopathy, myasthenia with rep stim, or peripheral neuropathy. Bloodwork looking for vitamin deficiencies done which were normal as well.  She was evaluated by  and was given in 2018 levocarnitine,  NAC injections and marinol for nausea (helps significantly). Per notes levocarnitine helps with her energy level. She had posterior fossa decompression on 12/18 and had severe headache and neck pain after that. Per notes valsalva induced headaches resolved. She was started on Emgality on 9/19 and continued on gabapentin 600mg TID and prn tramadol. She was seen by Dr. Samano on 7/20 at which time patient had significant neck pain and was seen by Dr. Samano for diagnostic b/l cervical MBB C3-4-5 which she had 80% relief x 6 hours x 2 , then n 8/27/2020 s/p bilateral cervical medial branch nerve RFA at C3-4-5.  She mentions that this provided significant improvement to her neck pain. She mentions that she has been having significant improvement in her symptoms of neck pain.   She mentions that the headache that it's left temporal side with photophobia/phonophobia the Emgality helps a lot and reduces them. i    Valsalva triggered headaches: lifting, laughing and coughing will trigger severe headache for seconds to a minute and will keep going if she does not stay still. This has been happening for about 10 years. She mentions that this did not get  "better after  decompression surgery. She mentions that this never improved with surgery and continues. Frequency of these depending on activity. Since being off gabapentin felt these worsened significantly.     Migraine: typically starts left temple and will spread out from there. These are happening on average 3-4 days a week. They will last a few hours and will treat them with sleep and will try to go to sleep or will lay there still in dark, quiet, cold and rest.  She mentions that it will improve. She mentions that has some headache free times, but at times can be 2/10 in intensity. She mentions that she can having also squeezing/twisting brain. She mentions that with those she had a panic and thought she had to go to hospital. She mentions that she had to stop everything and had to rest.     Location: neck (sharp, stabbing); hands and feet (tingling, numb), hip/lower back (dull, aching). Also has intermittent left sided headaches starting occipitally radiating to temple.   Quality: as above  Severity: range 2-10/10   Duration: hours  Frequency: daily  Alleviated by: medication, ice  Exacerbated by: movement, light noise (head pain)  Associated with: left arm/hand gets numb/weak "a lot" no radicular pain; headaches on left which are random or triggered by straining in restroom/coughing/laughing. Headaches worst in the AM within 2 hours of waking up. Headaches do not wake her up but neck pain does. Having some depth perception issues causing her to break her foot - eyes checked and were ok. Transient visual loss? When in a lof of pain, not clear TVO with valsalva.   Sleep habits:    Acute treatment:  Current acute treatment:  -- Sumatriptan 100mg - helps some   --Nurtec 75mg- significant benefit, will reduce/resolve attacks that are more migraine.    Tried:  -- TENS   -- tramadol - helped with overall pain   -- methocarbamol - helped with overall pain   -- relafen - did not help     Preventive:  Current " prevention:  -- Emgality 120 mg every 28 days, notices wearing off effect 3 days prior - helps the intensity and has about 10 days of freedom with it.   --Tizanidine 4 mg 1-2 times a day typically - feels this helps more than anything for acute occipital pain  -- gabapentin 600mg twice a day - helps the intensity of pain   --candesartan 8mg - migraine has improved, and her Bps are doing much better.   --botox 155 units (started on 6/22- present): over 50% improvement     (wellbutrin)  (prozac)  (prazosin)    Previously tried:  -- for behavioral health is on welbutrin xl 150mg and Fluoxetine 40mg   -- takes vyvanse   --amitriptyline 100 mg nightly mainly for insomnia, no help headache   --trazodone 200mg for insomnia- no worsening of headache   --zonisamide 150mg nightly - caused nausea/vomiting   -- propranolol 20mg tid - some benefit   -- prozac 40mg - for mod   -- wellbutrin XL 150mg- no benefit in headache     Procedural history:   -- cervical GREGOR  -- lumbar GREGOR  -- chiropractor for hip /neck - helped only hip  -- PT - none for neck just foot               HEADACHE HISTORY    Ms. Mitchell reports the following information about her headaches:          Review of Systems   Constitutional:  Positive for activity change. Negative for appetite change, chills and fever.   HENT:  Negative for congestion, sore throat, trouble swallowing and voice change.    Eyes: Negative.    Respiratory:  Negative for cough and shortness of breath.    Cardiovascular:  Negative for chest pain and leg swelling.   Gastrointestinal:  Negative for abdominal distention, abdominal pain, constipation, nausea and vomiting.   Endocrine: Negative.    Genitourinary: Negative.    Musculoskeletal:  Negative for back pain and gait problem.   Skin: Negative.    Neurological:  Positive for headaches. Negative for weakness.   Psychiatric/Behavioral:  Positive for sleep disturbance. The patient is nervous/anxious.              Past Medical History:    Diagnosis Date    Abnormal Pap smear of cervix     Arnold-Chiari deformity     Arthritis     Asthma     Borderline personality disorder     Carnitine deficiency     Colon polyp     Depression     Fatty liver     noted on 8/21 CT    Fibromyalgia     Headache     History of abnormal cervical Pap smear     colpo/ LEEP and CKC    History of nephrolithiasis     kidney stones    Hypothyroidism     IBS (irritable bowel syndrome)     GI smith negative    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     PATRICIA (obstructive sleep apnea)     severe - doesn't use CPAP    Panic attack     PTSD (post-traumatic stress disorder)      Past Surgical History:   Procedure Laterality Date    ANKLE SURGERY Right     x2 (#1 for ligament injury and #2 for fx and ligament)    AUGMENTATION OF BREAST      BILATERAL TUBAL LIGATION      BRAIN SURGERY  12/2018    decompression/craniotomy Arnold-Chiari syndrome    BREAST SURGERY  1997    B implants    CERVICAL BIOPSY  W/ LOOP ELECTRODE EXCISION      COLD KNIFE CONIZATION OF CERVIX N/A 03/17/2022    Procedure: CONE BIOPSY, CERVIX, USING COLD KNIFE;  Surgeon: Nikhil Franco MD;  Location: [facility];  Service: OB/GYN;  Laterality: N/A;    COLONOSCOPY N/A 09/29/2022    Procedure: COLONOSCOPY;  Surgeon: Martín Sagastume MD;  Location: [facility];  Service: Endoscopy;  Laterality: N/A; Repeat colonoscopy in 5 years for surveillance    DECOMPRESSION OF CHIARI MALFORMATION BY REMOVAL OF POSTERIOR ARCH OF FIRST CERVICAL VERTEBRA N/A 12/13/2018    Procedure: DECOMPRESSION, CHIARI MALFORMATION, BY 1ST CERVICAL VERTEBRA POSTERIOR ARCH REMOVAL;  Surgeon: Sergio Busch MD;  Location: [address];  Service: Neurosurgery;  Laterality: N/A;    EPIDURAL STEROID INJECTION INTO CERVICAL SPINE N/A 12/05/2019    Procedure: Injection-steroid-epidural-cervical-C7-T1;  Surgeon: Noa Samano MD;  Location: [facility];  Service: Neurosurgery;  Laterality: N/A;    FRACTURE SURGERY Right     Ankle    INJECTION OF  "ANESTHETIC AGENT AROUND MEDIAL BRANCH NERVES INNERVATING CERVICAL FACET JOINT Bilateral 08/06/2020    Procedure: Block-nerve-medial branch-cervical Bilateral  C3,4,5;  Surgeon: Noa Samano MD;  Location: [facility];  Service: Anesthesiology;  Laterality: Bilateral;    INJECTION OF ANESTHETIC AGENT AROUND MEDIAL BRANCH NERVES INNERVATING CERVICAL FACET JOINT Bilateral 08/20/2020    Procedure: Block-nerve-medial branch-cervical Bilateral C3-4-5;  Surgeon: Noa Samano MD;  Location: [facility];  Service: Anesthesiology;  Laterality: Bilateral;    LAPAROSCOPIC GASTRIC BANDING      with subsequent removal due to abscess    LIPOSUCTION      x 2    PELVIC LAPAROSCOPY      for endometriosis eval with BTL    RADIOFREQUENCY ABLATION Bilateral 08/16/2021    Procedure: Radiofrequency Ablation Cervical C3-C5;  Surgeon: Robert Munoz MD;  Location: [facility];  Service: Pain Management;  Laterality: Bilateral;    RADIOFREQUENCY THERMAL COAGULATION OF MEDIAL BRANCH OF POSTERIOR RAMUS OF CERVICAL SPINAL NERVE Bilateral 08/27/2020    Procedure: RADIOFREQUENCY THERMAL COAGULATION, NERVE, SPINAL, CERVICAL, POSTERIOR RAMUS, MEDIAL BRANCH C3-4-5, bilateral;  Surgeon: Noa Samano MD;  Location: [facility];  Service: Anesthesiology;  Laterality: Bilateral;     Family History   Problem Relation Age of Onset    Diabetes Mother     Stroke Mother 30    Mental illness Mother         depression    Hyperlipidemia Mother     Hypertension Mother     Hyperlipidemia Father     Heart disease Father         "athletes heart"    Diabetes Sister     Seizures Sister     Mitochondrial disorder Sister         "trent"    Mental illness Sister     Seizures Sister     Cervical cancer Sister     Cervical cancer Sister     Ovarian cancer Paternal Aunt     Ovarian cancer Paternal Aunt     Colon cancer Maternal Grandmother     Esophageal cancer Maternal Grandfather     Cancer Maternal Grandfather 65        throat ca    Ovarian cancer Cousin     Cervical cancer " "Paternal Cousin     Cervical cancer Other     Breast cancer Neg Hx     Crohn's disease Neg Hx     Ulcerative colitis Neg Hx     Stomach cancer Neg Hx     Celiac disease Neg Hx      Social History     Socioeconomic History    Marital status:    Occupational History    Occupation: [employer]     Tobacco Use    Smoking status: Never    Smokeless tobacco: Never   Substance and Sexual Activity    Alcohol use: Yes     Comment: occasional "maybe monthly"    Drug use: No    Sexual activity: Yes     Partners: Male     Birth control/protection: See Surgical Hx, None     Comment: depo since age 18   Social History Narrative    Not on disability, not currently working.     Social Determinants of Health     Financial Resource Strain: Unknown    Difficulty of Paying Living Expenses: Patient refused   Food Insecurity: Unknown    Worried About Running Out of Food in the Last Year: Patient refused    Ran Out of Food in the Last Year: Patient refused   Transportation Needs: Unmet Transportation Needs    Lack of Transportation (Medical): Yes    Lack of Transportation (Non-Medical): Yes   Physical Activity: Inactive    Days of Exercise per Week: 0 days    Minutes of Exercise per Session: 0 min   Stress: Stress Concern Present    Feeling of Stress : Very much   Social Connections: Unknown    Frequency of Communication with Friends and Family: More than three times a week    Frequency of Social Gatherings with Friends and Family: Never    Active Member of Clubs or Organizations: No    Attends Club or Organization Meetings: Never    Marital Status:    Housing Stability: Low Risk     Unable to Pay for Housing in the Last Year: No    Number of Places Lived in the Last Year: 1    Unstable Housing in the Last Year: No     Review of patient's allergies indicates:   Allergen Reactions    Lamotrigine      Other reaction(s): Rash  Other reaction(s): Nausea    Sulfa (sulfonamide antibiotics) Nausea Only     Other " reaction(s): Unknown    Adhesive Rash    Zonisamide Nausea And Vomiting     Nausea/vomiting        Current Outpatient Medications:     albuterol (PROVENTIL) 2.5 mg /3 mL (0.083 %) nebulizer solution, Take 3 mLs (2.5 mg total) by nebulization every 4 (four) hours as needed for Wheezing or Shortness of Breath (chest tightness). Rescue, Disp: 180 mL, Rfl: 6    albuterol (PROVENTIL/VENTOLIN HFA) 90 mcg/actuation inhaler, Inhale 1-2 puffs into the lungs every 4 (four) hours as needed for Wheezing or Shortness of Breath (chest tightness). Rescue, Disp: 8.5 g, Rfl: 11    ALPRAZolam (XANAX) 1 MG tablet, Take 1 tablet (1 mg total) by mouth daily as needed for Anxiety., Disp: 30 tablet, Rfl: 0    buPROPion (WELLBUTRIN XL) 150 MG TB24 tablet, Take 1 tablet (150 mg total) by mouth once daily., Disp: 90 tablet, Rfl: 1    dextroamphetamine-amphetamine (ADDERALL XR) 20 MG 24 hr capsule, Take 1 capsule (20 mg total) by mouth every morning., Disp: 30 capsule, Rfl: 0    diphenoxylate-atropine 2.5-0.025 mg (LOMOTIL) 2.5-0.025 mg per tablet, Take 1 tablet by mouth 4 (four) times daily as needed., Disp: 80 tablet, Rfl: 0    dronabinol (MARINOL) 2.5 MG capsule, Take 1 capsule (2.5 mg total) by mouth 2 (two) times daily before meals., Disp: 60 capsule, Rfl: 3    EMGALITY  mg/mL PnIj, ADMINISTER 1 ML UNDER THE SKIN EVERY 28 DAYS, Disp: 1 mL, Rfl: 6    EScitalopram oxalate (LEXAPRO) 10 MG tablet, Take 1 tablet (10 mg total) by mouth once daily., Disp: 30 tablet, Rfl: 1    fluticasone propionate (FLONASE) 50 mcg/actuation nasal spray, SHAKE LIQUID AND USE 1 SPRAY(50 MCG) IN EACH NOSTRIL EVERY DAY, Disp: 16 g, Rfl: 3    fluticasone-salmeterol 230-21 mcg/dose (ADVAIR HFA) 230-21 mcg/actuation HFAA inhaler, Inhale 2 puffs into the lungs 2 (two) times daily. Controller, Disp: 12 g, Rfl: 2    gabapentin (NEURONTIN) 600 MG tablet, TAKE 1 TABLET BY MOUTH 2 TO 3 TIMES A DAY FOR CHRONIC PAIN, Disp: 90 tablet, Rfl: 11    levOCARNitine  "(CARNITOR) 330 mg Tab, TAKE 3 TABLETS(990 MG) BY MOUTH TWICE DAILY, Disp: 180 tablet, Rfl: 2    montelukast (SINGULAIR) 10 mg tablet, Take 1 tablet (10 mg total) by mouth once daily., Disp: 90 tablet, Rfl: 2    prazosin (MINIPRESS) 2 MG Cap, TAKE 1 CAPSULE(2 MG) BY MOUTH EVERY EVENING, Disp: 90 capsule, Rfl: 0    rimegepant (NURTEC) 75 mg odt, , Disp: , Rfl:     SYNTHROID 125 mcg tablet, Take 2 tablets (250 mcg total) by mouth before breakfast., Disp: 60 tablet, Rfl: 1    syringe with needle (SYRINGE 3CC/25GX1") 3 mL 25 gauge x 1" Syrg, Use for Toradol IM only., Disp: 8 each, Rfl: 0    tiZANidine (ZANAFLEX) 4 MG tablet, 1 tab po TID PRN muscle spasm, Disp: 90 tablet, Rfl: 6    zolpidem (AMBIEN) 10 mg Tab, Take 1 tablet (10 mg total) by mouth nightly as needed (insomnia)., Disp: 30 tablet, Rfl: 0    acetylcysteine (N-ACETYL-L-CYSTEINE MISC), , Disp: , Rfl:     ketorolac (TORADOL) 30 mg/mL injection, , Disp: , Rfl:     Current Facility-Administered Medications:     onabotulinumtoxina injection 200 Units, 200 Units, Intramuscular, Q90 Days, Padma Guevara MD, 200 Units at 02/17/23 1304      Objective:      Vitals:    04/11/23 1335   BP: 137/87   Pulse: 94   Resp: 20     Body mass index is 43.26 kg/m².    Physical Exam  Neurological Exam:  Mental status: Awake, alert, and oriented to person, place, and time. Recent and remote memory appear to be intact.   Speech/Language: No dysarthria or aphasia on conversation.   Cranial nerves: Pupils equal round and reactive to light, extraocular movements intact, facial strength and sensation intact bilaterally, palate and tongue midline, hearing grossly intact bilaterally.  Motor: Moving all extremities against gravity. Normal bulk and tone.   Sensation: Intact to light touch and temperature bilaterally.  Coordination: No tremor, no ataxia.   Gait: Using a walkre     Review of Data:  Neuroimaging and other studies:   MRI C spine 7/15/22:  FINDINGS:  Alignment: Slight " reversal of the normal cervical lordosis.  Anterolisthesis of C4 on C5, 2 mm.     Vertebral Column: Vertebral body heights are maintained.  No evidence of an acute fracture or aggressive marrow replacement process. Multilevel disc degeneration with disc desiccation at C2-C6, most pronounced at C5-6 and C6-7 with mild intervertebral disc space narrowing, degenerative endplate change and anterior marginal osteophyte formation.     Cord: Normal signal and morphology.     Skull base and craniocervical junction: Stable postoperative changes of suboccipital craniectomy for Chiari 1 malformation decompression.     Degenerative findings:     C2-C3: Mild posterior disc osteophyte complex.  Mild bilateral facet arthropathy.  There is no neural foraminal stenosis.  There is no spinal canal stenosis.     C3-C4: The disc is normal in configuration.  Mild bilateral facet arthropathy.  There is no neural foraminal stenosis.  There is no spinal canal stenosis.     C4-C5: Anterolisthesis with uncovering of the disc.  Small central disc protrusion.  Mild bilateral facet arthropathy.  No neural foraminal stenosis.  There is no spinal canal stenosis.     C5-C6: Right asymmetric posterior disc osteophyte complex with right lateral disc extrusion, descending to the C6 infrapedicular level, which abuts the right ventral cord surface.  Mild bilateral facet arthropathy.  Mild right uncovertebral joint spurring.  Mild right neural foraminal stenosis.  No significant overall spinal canal stenosis.     C6-C7: Right asymmetric posterior disc osteophyte complex.  Mild bilateral facet arthropathy.  Mild bilateral uncovertebral joint spurring.  Mild left neural foraminal stenosis.  There is no spinal canal stenosis.     C7-T1: The disc is normal in configuration.  Mild bilateral facet arthropathy.  There is no neural foraminal stenosis.  There is no spinal canal stenosis.     Paraspinal muscles & soft tissues: No significant abnormality.      Normal signal voids are present in the vertebral arteries.     Impression:     1. Multilevel degenerative change of the cervical spine, as described above, mildly progressed in comparison to the prior study from 2019.  Findings contribute to mild neural foraminal stenosis on the right at C5-6 and on the left at C6-7.  No significant spinal canal stenosis.  2. Right asymmetric posterior disc osteophyte complex with right lateral disc extrusion at C5-6, which abuts the right ventral cord surface.  No cord compression or focal cord signal abnormality.  3. Stable postoperative changes of suboccipital craniectomy for Chiari 1 malformation decompression.    MRI brain 7/15/22:  Impression:     1. No evidence of an acute intracranial abnormality or significant interval detrimental change as compared to the prior study.  2. Stable postoperative changes of suboccipital craniectomy for Chiari I malformation decompression.        MRA head 5/6/21:  FINDINGS:  Anterior circulation: There is normal flow related signal intensity within the petrous and cavernous segments of the internal carotid arteries.  The supraclinoid internal carotid arteries are normal as is the carotid terminus bilaterally.  There is normal branching into the anterior and middle cerebral arteries.  These vessels are patent.  There is no critical stenosis, occlusion, thrombosis, dissection, aneurysm or other vascular malformation.     Posterior circulation: The vertebral arteries are patent.  The basilar artery is normal in caliber and contour.  The basilar tip is normal.  There is normal branching into the superior cerebellar and posterior cerebral arteries.  There is no critical stenosis, occlusion, thrombosis, dissection, aneurysm.     There has been previous decompression for Chiari malformation.     Impression:     1. Normal brain MRA.  2. Previous decompression for Chiari malformation  Results for orders placed or performed in visit on 08/11/20   MRI  Brain Without Contrast    Narrative    EXAMINATION:  MRI BRAIN WITHOUT CONTRAST    CLINICAL HISTORY:  Repeated falls.  History of posterior fossa decompression.; Repeated falls    TECHNIQUE:  Multiplanar MR imaging of the brain was performed without contrast.    COMPARISON:  Head CT 06/09/2019    FINDINGS:  No acute infarct, mass effect or hemorrhage.  Brain parenchyma has a normal signal.  Ventricles and sulci are proportionate.    No abnormal extra-axial fluid collections.    Flow voids are maintained in the intracranial arteries and dural venous sinuses.    Suboccipital craniotomy changes are stable.  Configuration of the cerebellar vermis appears stable compared to the prior CT given differences in modality.      Impression    1. No acute findings in the brain.  2. Stable postoperative changes of posterior decompression      Electronically signed by: Brandin Avila MD  Date:    08/11/2020  Time:    16:43   Results for orders placed or performed during the hospital encounter of 06/09/19   CT Head Without Contrast    Narrative    EXAMINATION:  CT HEAD WITHOUT CONTRAST    CLINICAL HISTORY:  Head trauma, headache;    TECHNIQUE:  Low dose axial images were obtained through the head.  Coronal and sagittal reformations were also performed. Contrast was not administered.    COMPARISON:  12/14/2018    RADIATION DOSE:  1099 DLP    FINDINGS:  Postoperative changes of a suboccipital craniectomy for posterior fossa decompression.    Hyperostosis frontalis interna noted.    Brain parenchyma otherwise normally formed without midline shift or mass effect.  No hydrocephalus.  No acute intracranial hemorrhage.    Ventricles are normal in size and configuration.  Physiologic calcifications are present.      Impression    1. No acute intracranial abnormality identified.  Postoperative changes as discussed.  2. [employer] agreement.      Electronically signed by: Anton Kenney MD  Date:    06/09/2019  Time:    11:41   Results  for orders placed or performed during the hospital encounter of 02/05/18   MRI Brain W WO Contrast    Narrative    Routine Multiplanar imaging through this 42-year-old females brain was obtained with utilization of 10 cc of gadolinium contrast    Hyperostosis frontalis appears to be present.     A Chiari one malformation appears to be present with the cerebellar tonsils extending 9 mm inferior to the foramen magnum with so-called pegging of the cerebellar tonsils.    An empty sella configuration of the pituitary gland is noted.    No diffusion positivity is identified to suggest recent infarction.    No gradient  susceptibility was noted just just presence of acute blood products.    No worrisome enhancing lesions are noted intracranially    No diffusion positivity is identified to suggest recent infarction.    Impression    1. The cerebellar tonsils appear to extend inferior to the foramen magnum suggestive of a Chiari one malformation. No obvious cord signal abnormalities noted within the visualized cord, given the patient's history further evaluation of the cord may be of use to exclude a syrinx        Electronically signed by: Ismael Richmond MD  Date:     02/05/18  Time:    12:04              Assessment and Plan   Intractable chronic migraine without aura and without status migrainosus    Cervicalgia    Cervical spondylosis    Chronic migraine without aura, intractable, without status migrainosus    Class 3 severe obesity with body mass index (BMI) of 40.0 to 44.9 in adult, unspecified obesity type, unspecified whether serious comorbidity present    Chiari malformation type I      Impression:  Chronic migraine without aura:  She has complex headache history which includes valsalva induced headaches, tonsillar herniation of 9mm on MRI brain which was decompressed in 2018, but headache/neck pain worsened after that. Over the years multiple medications tried. CGRP Mab Emgality has been effective at reducing from  30/30 days to 15-20 days/month headache, she has found gabapentin 600mg bid helps some of the neck pain and the occipital headache , did much worse off it, she also finds tizanidnie is what works really well when used to help cervical myofascial pain and also some occipital pain. The worse pain currently is occipital headache which is an exploding out pressure she wakes up 3-4 times a week and it's completely disabling. This she does not necessarily count it as a migraine, but it has all migraine features and then has more temporal pain on left which 2 days a  Week. She has persistent allodynia in left occipital area in are of the decompressive surgery. She is having worsening valsalva induced headache as well as vision obscurations with them and dizziness (has been for the last few months).  Neuroimaging did not reveal any abnormalities . She has noted significant benefit with start of BTX on 6/22 already. Given her suboccipital decompressive surgery special care has to be taken with her Botox and she would prefer physician doing procedure.     Patient is doing better since starting Botox along with the emgality; migraines have decreased >half in amount      Chronic neck pain/cervical spondylosis - MRI C spine that was performed for the worsening neck pain showed multilevel degenerative changes without any spinal cord compression or stenosis,  DJD change mild foraminal stenosis on Rt C5-6 and left C6-7. There is no cord signal abnormalities, no syrinx. She continues to have significant pain and has not responded to any of the preventive measures.      Other conditions:  S/p Chiari malformation decompression surgery - avoid ONBs , causing severe pain when she lays on it.     Valsalva induced headaches- unclear if this is primary vs. Secondary (her Chiari malformation operated on, this should have resolved if only Chiari, no concern about SIH from prior specialists) , MRA head was normal, no vascular malformation,  possibly related to prior Chiari given the symptoms of visual and also short duration of the event, no signs of SIH on recent MRI brain.     Responsive to toradol     Comorbidities:  HTN- stable   PTSD- ongoing follows with mental health   ADHD  Insomnia  Asthma   PATRICIA- non compliant- if despite her asthma management continues chocking , my recommendation is having her see sleep medicine as this could be secondary to PATRICIA the gasping/chocking overnight sensation     Plan:   1- For preventive management: A. Emgality 120mg every 28 days a month           B. Continue Botox every 12 weeks     -- Discontinue candesartan       2- Acute management: for the migraine (left sided headache): Nurtec ODT 75mg trial , max 1/day.     3- For cervical myofascial/cervical spondylosis/chronic cerviaclgia :  A. Continue gabapentin 600 mg 2-3 times a day (taking BID)               B. Continue tizanidine 4 mg 1-3 times a day    -- Will refer to Pain management   4- Valsalva headache: Toradol PRN; max 4 a month    -- Will order MRI CSF flow to evaluation for obstruction  5- Carnitine deficiency    -- Discussed need for compliance of levocarnitine as it will help with weight loss.   6- Obesity    -- Will  refer to Endocrinologist for weight loss as well as complicated thyroid history     RTC for Botox     Aileen Jasso MD   Neurology Resident, PGY3  [facility]       I have personally seen and evaluated this patient. I have confirmed key portions of the history and examination. I concurred with the residents findings and documentation        Saeid Guevara M.D  Medical Director, Headache and Facial Pain  [facility]

## 2023-04-21 ENCOUNTER — TELEPHONE (OUTPATIENT)
Dept: OPHTHALMOLOGY | Facility: CLINIC | Age: 48
End: 2023-04-21
Payer: MEDICARE

## 2023-04-21 ENCOUNTER — TELEPHONE (OUTPATIENT)
Dept: OBSTETRICS AND GYNECOLOGY | Facility: CLINIC | Age: 48
End: 2023-04-21

## 2023-04-21 ENCOUNTER — OFFICE VISIT (OUTPATIENT)
Dept: OBSTETRICS AND GYNECOLOGY | Facility: CLINIC | Age: 48
End: 2023-04-21
Payer: MEDICARE

## 2023-04-21 ENCOUNTER — OFFICE VISIT (OUTPATIENT)
Dept: OPTOMETRY | Facility: CLINIC | Age: 48
End: 2023-04-21
Payer: MEDICARE

## 2023-04-21 VITALS
BODY MASS INDEX: 43.96 KG/M2 | WEIGHT: 280.63 LBS | DIASTOLIC BLOOD PRESSURE: 82 MMHG | SYSTOLIC BLOOD PRESSURE: 122 MMHG

## 2023-04-21 DIAGNOSIS — H53.9 TRANSIENT VISUAL DISTURBANCE: ICD-10-CM

## 2023-04-21 DIAGNOSIS — R10.32 LEFT LOWER QUADRANT ABDOMINAL PAIN: ICD-10-CM

## 2023-04-21 DIAGNOSIS — H52.13 MYOPIA WITH ASTIGMATISM, BILATERAL: ICD-10-CM

## 2023-04-21 DIAGNOSIS — H47.293 OTHER OPTIC ATROPHY, BILATERAL: ICD-10-CM

## 2023-04-21 DIAGNOSIS — Z98.890 HISTORY OF LASER REFRACTIVE SURGERY: ICD-10-CM

## 2023-04-21 DIAGNOSIS — G93.5 CHIARI MALFORMATION TYPE I: Primary | ICD-10-CM

## 2023-04-21 DIAGNOSIS — R31.9 HEMATURIA, UNSPECIFIED TYPE: Primary | ICD-10-CM

## 2023-04-21 DIAGNOSIS — H21.562 AFFERENT PUPILLARY DEFECT OF LEFT EYE: ICD-10-CM

## 2023-04-21 DIAGNOSIS — H53.9 VISION CHANGES: ICD-10-CM

## 2023-04-21 DIAGNOSIS — H52.203 MYOPIA WITH ASTIGMATISM, BILATERAL: ICD-10-CM

## 2023-04-21 LAB
BILIRUBIN, UA POC OHS: NEGATIVE
BLOOD, UA POC OHS: ABNORMAL
CLARITY, UA POC OHS: ABNORMAL
COLOR, UA POC OHS: YELLOW
GLUCOSE, UA POC OHS: NEGATIVE
KETONES, UA POC OHS: NEGATIVE
LEUKOCYTES, UA POC OHS: NEGATIVE
NITRITE, UA POC OHS: NEGATIVE
PH, UA POC OHS: 5.5
PROTEIN, UA POC OHS: NEGATIVE
SPECIFIC GRAVITY, UA POC OHS: 1.02
UROBILINOGEN, UA POC OHS: 0.2

## 2023-04-21 PROCEDURE — 92133 PR COMPUTERIZED OPHTHALMIC IMAGING OPTIC NERVE: ICD-10-PCS | Mod: S$GLB,,,

## 2023-04-21 PROCEDURE — 4010F PR ACE/ARB THEARPY RXD/TAKEN: ICD-10-PCS | Mod: CPTII,S$GLB,, | Performed by: SPECIALIST

## 2023-04-21 PROCEDURE — 99999 PR PBB SHADOW E&M-EST. PATIENT-LVL IV: ICD-10-PCS | Mod: PBBFAC,,,

## 2023-04-21 PROCEDURE — 1159F MED LIST DOCD IN RCRD: CPT | Mod: CPTII,S$GLB,, | Performed by: SPECIALIST

## 2023-04-21 PROCEDURE — 4010F ACE/ARB THERAPY RXD/TAKEN: CPT | Mod: CPTII,S$GLB,,

## 2023-04-21 PROCEDURE — 1159F MED LIST DOCD IN RCRD: CPT | Mod: CPTII,S$GLB,,

## 2023-04-21 PROCEDURE — 1159F PR MEDICATION LIST DOCUMENTED IN MEDICAL RECORD: ICD-10-PCS | Mod: CPTII,S$GLB,,

## 2023-04-21 PROCEDURE — 81003 URINALYSIS AUTO W/O SCOPE: CPT | Mod: QW,S$GLB,, | Performed by: SPECIALIST

## 2023-04-21 PROCEDURE — 99999 PR PBB SHADOW E&M-EST. PATIENT-LVL IV: CPT | Mod: PBBFAC,,,

## 2023-04-21 PROCEDURE — 3008F PR BODY MASS INDEX (BMI) DOCUMENTED: ICD-10-PCS | Mod: CPTII,S$GLB,, | Performed by: SPECIALIST

## 2023-04-21 PROCEDURE — 3079F PR MOST RECENT DIASTOLIC BLOOD PRESSURE 80-89 MM HG: ICD-10-PCS | Mod: CPTII,S$GLB,, | Performed by: SPECIALIST

## 2023-04-21 PROCEDURE — 92133 CPTRZD OPH DX IMG PST SGM ON: CPT | Mod: S$GLB,,,

## 2023-04-21 PROCEDURE — 92004 PR EYE EXAM, NEW PATIENT,COMPREHESV: ICD-10-PCS | Mod: S$GLB,,,

## 2023-04-21 PROCEDURE — 3074F PR MOST RECENT SYSTOLIC BLOOD PRESSURE < 130 MM HG: ICD-10-PCS | Mod: CPTII,S$GLB,, | Performed by: SPECIALIST

## 2023-04-21 PROCEDURE — 3008F BODY MASS INDEX DOCD: CPT | Mod: CPTII,S$GLB,, | Performed by: SPECIALIST

## 2023-04-21 PROCEDURE — 92015 PR REFRACTION: ICD-10-PCS | Mod: S$GLB,,,

## 2023-04-21 PROCEDURE — 92015 DETERMINE REFRACTIVE STATE: CPT | Mod: S$GLB,,,

## 2023-04-21 PROCEDURE — 99213 PR OFFICE/OUTPT VISIT, EST, LEVL III, 20-29 MIN: ICD-10-PCS | Mod: S$GLB,,, | Performed by: SPECIALIST

## 2023-04-21 PROCEDURE — 99999 PR PBB SHADOW E&M-EST. PATIENT-LVL V: CPT | Mod: PBBFAC,,, | Performed by: SPECIALIST

## 2023-04-21 PROCEDURE — 1159F PR MEDICATION LIST DOCUMENTED IN MEDICAL RECORD: ICD-10-PCS | Mod: CPTII,S$GLB,, | Performed by: SPECIALIST

## 2023-04-21 PROCEDURE — 4010F PR ACE/ARB THEARPY RXD/TAKEN: ICD-10-PCS | Mod: CPTII,S$GLB,,

## 2023-04-21 PROCEDURE — 3079F DIAST BP 80-89 MM HG: CPT | Mod: CPTII,S$GLB,, | Performed by: SPECIALIST

## 2023-04-21 PROCEDURE — 99999 PR PBB SHADOW E&M-EST. PATIENT-LVL V: ICD-10-PCS | Mod: PBBFAC,,, | Performed by: SPECIALIST

## 2023-04-21 PROCEDURE — 99213 OFFICE O/P EST LOW 20 MIN: CPT | Mod: S$GLB,,, | Performed by: SPECIALIST

## 2023-04-21 PROCEDURE — 87086 URINE CULTURE/COLONY COUNT: CPT | Performed by: SPECIALIST

## 2023-04-21 PROCEDURE — 4010F ACE/ARB THERAPY RXD/TAKEN: CPT | Mod: CPTII,S$GLB,, | Performed by: SPECIALIST

## 2023-04-21 PROCEDURE — 81003 POCT URINALYSIS(INSTRUMENT): ICD-10-PCS | Mod: QW,S$GLB,, | Performed by: SPECIALIST

## 2023-04-21 PROCEDURE — 3074F SYST BP LT 130 MM HG: CPT | Mod: CPTII,S$GLB,, | Performed by: SPECIALIST

## 2023-04-21 PROCEDURE — 92004 COMPRE OPH EXAM NEW PT 1/>: CPT | Mod: S$GLB,,,

## 2023-04-21 NOTE — TELEPHONE ENCOUNTER
Appt for f/u pap scheduled for 8am 1/22/24   [Use of Plain Language] : use of plain language [Adequate] : adequate [None] : none

## 2023-04-21 NOTE — PROGRESS NOTES
HPI    New pt here for eye exam. Last exam - 5 years.     Pt has had problems with eyes since brain sx in December 2018. Pt sts when   she rolls on her back vision goes out for about 20 mins. Pt denies   flashes/floaters/pain. Pt denies use of gtt.   Last edited by Caro Romero, OD on 4/21/2023  9:48 AM.            Assessment /Plan     For exam results, see Encounter Report.    Chiari malformation type I  -     Ambulatory referral/consult to Ophthalmology; Future; Expected date: 04/28/2023    Other optic atrophy, bilateral  -     Posterior Segment OCT Optic Nerve- Both eyes    Transient visual disturbance    Afferent pupillary defect of left eye    Vision changes  -     Ambulatory referral/consult to Optometry    History of laser refractive surgery    Myopia with astigmatism, bilateral      1-4. Chiari Malformation s/p posterior fossa decompression 12/2018. Pt reports that every time she lays her head back, her vision blacks out. Pt states that it's worse on the left side and often takes several minutes before her vision returns to normal. Black outs are also accompanied by headaches. Pt is followed closely by neurology and has a repeat MRI in the coming weeks. Pt had a trace APD of the left eye upon examination today. ONH showed no signs of edema or pallor, but RNFL OCT shows thinning of the RNFL superior and nasal OD and ST OS. Ed pt on all findings and made referral to neuro-ophthalmology for further evaluation.     5-7. Pt had LASIK done in the past and is interested in pursuing again. Updated pt's spec rx and released final copy. Pt correctable 20/25 OD and OS. Ed pt that if she proceeds with LASIK again, she will need readers at all times for near work - pt ok with this. Will hold off on LASIK referral until pt evaluated by neuro-ophthalmology.     RTC: to neuro-ophthalmology

## 2023-04-21 NOTE — TELEPHONE ENCOUNTER
Attempt to call pt to schedule appt but phone went voicemail,- unable to left message, voicemail is not set-up.

## 2023-04-21 NOTE — TELEPHONE ENCOUNTER
----- Message from Aubree Vallejo sent at 4/21/2023 11:42 AM CDT -----  Please call pt to schedule. Extensive brain problems, please see notes.

## 2023-04-21 NOTE — PROGRESS NOTES
47 yo WF witth neg pap and HPV as of 12/22 presents with complaint hematuria noted x 3 days  Pt is having mild vaginal bleeding today. She states she has expereinced some mild LLQ discomfort as well   Denies dysuria or flank pain Neg stone history  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     PATRICIA (obstructive sleep apnea)     severe - doesn't use CPAP    Panic attack     PTSD (post-traumatic stress disorder)        Past Surgical History:   Procedure Laterality Date    ANKLE SURGERY Right     x2 (#1 for ligament injury and #2 for fx and ligament)    AUGMENTATION OF BREAST      BILATERAL TUBAL LIGATION      BRAIN SURGERY  12/2018    decompression/craniotomy Arnold-Chiari syndrome    BREAST SURGERY  1997    B implants    CERVICAL BIOPSY  W/ LOOP ELECTRODE EXCISION      COLD KNIFE CONIZATION OF CERVIX N/A 03/17/2022    Procedure: CONE BIOPSY, CERVIX, USING COLD KNIFE;  Surgeon: Nikhil Franco MD;  Location: [facility];  Service: OB/GYN;  Laterality: N/A;    COLONOSCOPY N/A 09/29/2022    Procedure: COLONOSCOPY;  Surgeon: Martín Sagastume MD;  Location: [facility];  Service: Endoscopy;  Laterality: N/A; Repeat colonoscopy in 5 years for surveillance    DECOMPRESSION OF CHIARI MALFORMATION BY REMOVAL OF POSTERIOR ARCH OF FIRST CERVICAL VERTEBRA N/A 12/13/2018    Procedure: DECOMPRESSION, CHIARI MALFORMATION, BY 1ST CERVICAL VERTEBRA POSTERIOR ARCH REMOVAL;  Surgeon: Sergio Busch MD;  Location: [facility] OR [address] [place] [address];  Service: Neurosurgery;  Laterality:  "N/A;    EPIDURAL STEROID INJECTION INTO CERVICAL SPINE N/A 12/05/2019    Procedure: Injection-steroid-epidural-cervical-C7-T1;  Surgeon: Noa Samano MD;  Location: [facility] OR;  Service: Neurosurgery;  Laterality: N/A;    FRACTURE SURGERY Right     Ankle    INJECTION OF ANESTHETIC AGENT AROUND MEDIAL BRANCH NERVES INNERVATING CERVICAL FACET JOINT Bilateral 08/06/2020    Procedure: Block-nerve-medial branch-cervical Bilateral  C3,4,5;  Surgeon: Noa Samano MD;  Location: [facility] OR;  Service: Anesthesiology;  Laterality: Bilateral;    INJECTION OF ANESTHETIC AGENT AROUND MEDIAL BRANCH NERVES INNERVATING CERVICAL FACET JOINT Bilateral 08/20/2020    Procedure: Block-nerve-medial branch-cervical Bilateral C3-4-5;  Surgeon: Noa Samano MD;  Location: [facility] OR;  Service: Anesthesiology;  Laterality: Bilateral;    LAPAROSCOPIC GASTRIC BANDING      with subsequent removal due to abscess    LIPOSUCTION      x 2    PELVIC LAPAROSCOPY      for endometriosis eval with BTL    RADIOFREQUENCY ABLATION Bilateral 08/16/2021    Procedure: Radiofrequency Ablation Cervical C3-C5;  Surgeon: Robert Munoz MD;  Location: [facility] OR;  Service: Pain Management;  Laterality: Bilateral;    RADIOFREQUENCY THERMAL COAGULATION OF MEDIAL BRANCH OF POSTERIOR RAMUS OF CERVICAL SPINAL NERVE Bilateral 08/27/2020    Procedure: RADIOFREQUENCY THERMAL COAGULATION, NERVE, SPINAL, CERVICAL, POSTERIOR RAMUS, MEDIAL BRANCH C3-4-5, bilateral;  Surgeon: Noa Samano MD;  Location: [facility] OR;  Service: Anesthesiology;  Laterality: Bilateral;       Family History   Problem Relation Age of Onset    Diabetes Mother     Stroke Mother 30    Mental illness Mother         depression    Hyperlipidemia Mother     Hypertension Mother     Hyperlipidemia Father     Heart disease Father         "athletes heart"    Diabetes Sister     Seizures Sister     Mitochondrial disorder Sister         "trent"    Mental illness Sister     Seizures Sister     Cervical cancer Sister     " "Cervical cancer Sister     Ovarian cancer Paternal Aunt     Ovarian cancer Paternal Aunt     Colon cancer Maternal Grandmother     Esophageal cancer Maternal Grandfather     Cancer Maternal Grandfather 65        throat ca    Ovarian cancer Cousin     Cervical cancer Paternal Cousin     Cervical cancer Other     Breast cancer Neg Hx     Crohn's disease Neg Hx     Ulcerative colitis Neg Hx     Stomach cancer Neg Hx     Celiac disease Neg Hx        Social History     Socioeconomic History    Marital status:    Occupational History    Occupation: [employer]     Tobacco Use    Smoking status: Never    Smokeless tobacco: Never   Substance and Sexual Activity    Alcohol use: Yes     Comment: occasional "maybe monthly"    Drug use: No    Sexual activity: Yes     Partners: Male     Birth control/protection: See Surgical Hx, None     Comment: depo since age 18   Social History Narrative    Not on disability, not currently working.     Social Determinants of Health     Financial Resource Strain: Unknown    Difficulty of Paying Living Expenses: Patient refused   Food Insecurity: Unknown    Worried About Running Out of Food in the Last Year: Patient refused    Ran Out of Food in the Last Year: Patient refused   Transportation Needs: Unmet Transportation Needs    Lack of Transportation (Medical): Yes    Lack of Transportation (Non-Medical): Yes   Physical Activity: Inactive    Days of Exercise per Week: 0 days    Minutes of Exercise per Session: 0 min   Stress: Stress Concern Present    Feeling of Stress : Very much   Social Connections: Unknown    Frequency of Communication with Friends and Family: More than three times a week    Frequency of Social Gatherings with Friends and Family: Never    Active Member of Clubs or Organizations: No    Attends Club or Organization Meetings: Never    Marital Status:    Housing Stability: Low Risk     Unable to Pay for Housing in the Last Year: No    Number of Places " Lived in the Last Year: 1    Unstable Housing in the Last Year: No       Current Outpatient Medications   Medication Sig Dispense Refill    acetylcysteine (N-ACETYL-L-CYSTEINE MISC)       albuterol (PROVENTIL) 2.5 mg /3 mL (0.083 %) nebulizer solution Take 3 mLs (2.5 mg total) by nebulization every 4 (four) hours as needed for Wheezing or Shortness of Breath (chest tightness). Rescue 180 mL 6    albuterol (PROVENTIL/VENTOLIN HFA) 90 mcg/actuation inhaler Inhale 1-2 puffs into the lungs every 4 (four) hours as needed for Wheezing or Shortness of Breath (chest tightness). Rescue 8.5 g 11    ALPRAZolam (XANAX) 1 MG tablet Take 1 tablet (1 mg total) by mouth daily as needed for Anxiety. 30 tablet 0    buPROPion (WELLBUTRIN XL) 150 MG TB24 tablet Take 1 tablet (150 mg total) by mouth once daily. 90 tablet 1    dextroamphetamine-amphetamine (ADDERALL XR) 20 MG 24 hr capsule Take 1 capsule (20 mg total) by mouth every morning. 30 capsule 0    diphenoxylate-atropine 2.5-0.025 mg (LOMOTIL) 2.5-0.025 mg per tablet Take 1 tablet by mouth 4 (four) times daily as needed. 80 tablet 0    dronabinol (MARINOL) 2.5 MG capsule Take 1 capsule (2.5 mg total) by mouth 2 (two) times daily before meals. 60 capsule 3    EScitalopram oxalate (LEXAPRO) 10 MG tablet Take 1 tablet (10 mg total) by mouth once daily. 30 tablet 1    fluticasone propionate (FLONASE) 50 mcg/actuation nasal spray SHAKE LIQUID AND USE 1 SPRAY(50 MCG) IN EACH NOSTRIL EVERY DAY 16 g 3    fluticasone-salmeterol 230-21 mcg/dose (ADVAIR HFA) 230-21 mcg/actuation HFAA inhaler Inhale 2 puffs into the lungs 2 (two) times daily. Controller 12 g 2    gabapentin (NEURONTIN) 600 MG tablet TAKE 1 TABLET BY MOUTH 2 TO 3 TIMES A DAY FOR CHRONIC PAIN 90 tablet 11    galcanezumab-gnlm (EMGALITY PEN) 120 mg/mL PnIj ADMINISTER 1 ML UNDER THE SKIN EVERY 28 DAYS 1 mL 6    levOCARNitine (CARNITOR) 330 mg Tab TAKE 3 TABLETS(990 MG) BY MOUTH TWICE DAILY 180 tablet 2    montelukast  "(SINGULAIR) 10 mg tablet Take 1 tablet (10 mg total) by mouth once daily. 90 tablet 2    prazosin (MINIPRESS) 2 MG Cap TAKE 1 CAPSULE(2 MG) BY MOUTH EVERY EVENING 90 capsule 0    SYNTHROID 125 mcg tablet Take 2 tablets (250 mcg total) by mouth before breakfast. 60 tablet 1    syringe with needle (SYRINGE 3CC/25GX1") 3 mL 25 gauge x 1" Syrg Use for Toradol IM only. 8 each 0    tiZANidine (ZANAFLEX) 4 MG tablet Take 1 tablet by mouth three times a day as needed for  muscle spasm 90 tablet 6    zolpidem (AMBIEN) 10 mg Tab TAKE 1 TABLET(10 MG) BY MOUTH EVERY NIGHT AS NEEDED FOR INSOMNIA 30 tablet 0     Current Facility-Administered Medications   Medication Dose Route Frequency Provider Last Rate Last Admin    onabotulinumtoxina injection 200 Units  200 Units Intramuscular Q90 Days Padma Guevara MD   200 Units at 02/17/23 1304       Review of patient's allergies indicates:   Allergen Reactions    Lamotrigine      Other reaction(s): Rash  Other reaction(s): Nausea    Sulfa (sulfonamide antibiotics) Nausea Only     Other reaction(s): Unknown    Adhesive Rash    Zonisamide Nausea And Vomiting     Nausea/vomiting        Review of System:   General: no chills, fever, night sweats, weight gain or weight loss  Psychological: no depression or suicidal ideation  Breasts: no new or changing breast lumps, nipple discharge or masses.  Respiratory: no cough, shortness of breath, or wheezing  Cardiovascular: no chest pain or dyspnea on exertion  Gastrointestinal: no abdominal pain, change in bowel habits, or black or bloody stools  Genito-Urinary: as above  Musculoskeletal: no gait disturbance or muscular weakness                                               General Appearance    A and O x 4, Cooperative, no distress   Breasts    Abdomen   deferred  Soft, non-tender, bowel sounds active all four quadrants,  no masses, no organomegaly    Genitourinary:   External rectal exam shows no thrombosed external hemorrhoids.   Pelvic " exam was performed with patient supine.  No labial fusion.  There is no rash, lesion or injury on the right labia.  There is no rash, lesion or injury on the left labia.  No bleeding and no signs of injury around the vaginal introitus, urethra is without lesions and well supported. The cervix is visualized with no discharge, lesions or friability.  No vaginal discharge found.  No significant Cystocele, Enterocele or rectocele, and uterus well supported.  Bimanual exam:  The urethra is normal to palpation and there are no palpable vaginal wall masses.  Uterus is not deviated, not enlarged, not fixed, normal shape and not tender.  Cervix exhibits no motion tenderness.   Right adnexum displays no mass and no tenderness.  Left adnexum displays no mass and no tenderness.   Extremities: Extremities normal, atraumatic, no cyanosis or edema                     NOTE  NURSING PERSONAL PRESENT FOR ENTIRE PHYSICAL EXAM     I will submit U/A C and S and discussed possible vaginal contaminant  Discussed possible stone vs adnexal origin for discomfort and thus rec renal and pelvic u/s  Does not require pap  x 1 year    Pending the above results, possible urology referrel but I suspect vaginal contamination    I spent a total of 30 minutes on the day of the visit. This includes face to face time and non-face to face time preparing to see the patient (eg, review of tests), obtaining and/or reviewing separately obtained history, documenting clinical information in the electronic or other health record, independently interpreting results and communicating results to the patient/family/caregiver, or care coordinator.

## 2023-04-21 NOTE — TELEPHONE ENCOUNTER
----- Message from Jose Escudero sent at 4/21/2023  3:29 PM CDT -----  Contact: [phone]  Pt is returning your call to schedule an appt. Please call back to further assist.

## 2023-04-21 NOTE — TELEPHONE ENCOUNTER
----- Message from Cori Gonzalez sent at 4/21/2023  2:05 PM CDT -----  Contact: patient  Type: Needs Medical Advice  Who Called:  patient  Best Call Back Number: [phone] (home)   Additional Information: patient needs an appt at 8am.

## 2023-04-23 LAB
BACTERIA UR CULT: NORMAL
BACTERIA UR CULT: NORMAL

## 2023-04-24 ENCOUNTER — TELEPHONE (OUTPATIENT)
Dept: FAMILY MEDICINE | Facility: CLINIC | Age: 48
End: 2023-04-24
Payer: MEDICARE

## 2023-04-24 ENCOUNTER — OFFICE VISIT (OUTPATIENT)
Dept: ENDOCRINOLOGY | Facility: CLINIC | Age: 48
End: 2023-04-24
Payer: MEDICARE

## 2023-04-24 VITALS
DIASTOLIC BLOOD PRESSURE: 62 MMHG | SYSTOLIC BLOOD PRESSURE: 98 MMHG | WEIGHT: 272.38 LBS | BODY MASS INDEX: 42.75 KG/M2 | HEIGHT: 67 IN

## 2023-04-24 DIAGNOSIS — E66.01 MORBID OBESITY: ICD-10-CM

## 2023-04-24 DIAGNOSIS — E03.9 HYPOTHYROIDISM, UNSPECIFIED TYPE: Primary | ICD-10-CM

## 2023-04-24 PROCEDURE — 4010F PR ACE/ARB THEARPY RXD/TAKEN: ICD-10-PCS | Mod: CPTII,S$GLB,, | Performed by: INTERNAL MEDICINE

## 2023-04-24 PROCEDURE — 1159F PR MEDICATION LIST DOCUMENTED IN MEDICAL RECORD: ICD-10-PCS | Mod: CPTII,S$GLB,, | Performed by: INTERNAL MEDICINE

## 2023-04-24 PROCEDURE — 99999 PR PBB SHADOW E&M-EST. PATIENT-LVL IV: CPT | Mod: PBBFAC,,, | Performed by: INTERNAL MEDICINE

## 2023-04-24 PROCEDURE — 1160F PR REVIEW ALL MEDS BY PRESCRIBER/CLIN PHARMACIST DOCUMENTED: ICD-10-PCS | Mod: CPTII,S$GLB,, | Performed by: INTERNAL MEDICINE

## 2023-04-24 PROCEDURE — 1159F MED LIST DOCD IN RCRD: CPT | Mod: CPTII,S$GLB,, | Performed by: INTERNAL MEDICINE

## 2023-04-24 PROCEDURE — 99204 OFFICE O/P NEW MOD 45 MIN: CPT | Mod: S$GLB,,, | Performed by: INTERNAL MEDICINE

## 2023-04-24 PROCEDURE — 1160F RVW MEDS BY RX/DR IN RCRD: CPT | Mod: CPTII,S$GLB,, | Performed by: INTERNAL MEDICINE

## 2023-04-24 PROCEDURE — 3008F BODY MASS INDEX DOCD: CPT | Mod: CPTII,S$GLB,, | Performed by: INTERNAL MEDICINE

## 2023-04-24 PROCEDURE — 99999 PR PBB SHADOW E&M-EST. PATIENT-LVL IV: ICD-10-PCS | Mod: PBBFAC,,, | Performed by: INTERNAL MEDICINE

## 2023-04-24 PROCEDURE — 3074F SYST BP LT 130 MM HG: CPT | Mod: CPTII,S$GLB,, | Performed by: INTERNAL MEDICINE

## 2023-04-24 PROCEDURE — 3078F PR MOST RECENT DIASTOLIC BLOOD PRESSURE < 80 MM HG: ICD-10-PCS | Mod: CPTII,S$GLB,, | Performed by: INTERNAL MEDICINE

## 2023-04-24 PROCEDURE — 3078F DIAST BP <80 MM HG: CPT | Mod: CPTII,S$GLB,, | Performed by: INTERNAL MEDICINE

## 2023-04-24 PROCEDURE — 3074F PR MOST RECENT SYSTOLIC BLOOD PRESSURE < 130 MM HG: ICD-10-PCS | Mod: CPTII,S$GLB,, | Performed by: INTERNAL MEDICINE

## 2023-04-24 PROCEDURE — 99204 PR OFFICE/OUTPT VISIT, NEW, LEVL IV, 45-59 MIN: ICD-10-PCS | Mod: S$GLB,,, | Performed by: INTERNAL MEDICINE

## 2023-04-24 PROCEDURE — 4010F ACE/ARB THERAPY RXD/TAKEN: CPT | Mod: CPTII,S$GLB,, | Performed by: INTERNAL MEDICINE

## 2023-04-24 PROCEDURE — 3008F PR BODY MASS INDEX (BMI) DOCUMENTED: ICD-10-PCS | Mod: CPTII,S$GLB,, | Performed by: INTERNAL MEDICINE

## 2023-04-24 NOTE — TELEPHONE ENCOUNTER
----- Message from Greg Matute sent at 4/24/2023  9:16 AM CDT -----  Regarding: question  Type: Needs Medical Advice    Who Called:  Dinah    Rohit Call Back Number: [phone]    Additional Information: pt needs to talk to dr or nurse about another provider. Does not want info in msg. Wants to discuss with provider if possible. Please call.

## 2023-04-24 NOTE — PROGRESS NOTES
CHIEF COMPLAINT:  Hypothyroid  48 y.o. old being seen as a new patient.  Currently on Synthroid 125 mcg.  In October of 2022 she had a significantly elevated TSH. Taking Lt 4 by itself. Occasionally misses doses. In a week may miss 1-2 doses. Had a lap band, and states that gained weight. Exercise is limited. No easy bruising. No history of DVT or PE.       PAST MEDICAL HISTORY/PAST SURGICAL HISTORY:  Reviewed in [facility]    SOCIAL HISTORY: Reviewed in [facility]    FAMILY HISTORY:  + hypothyroidism. + Dm 2.     MEDICATIONS/ALLERGIES: The patient's MedCard has been updated and reviewed.            PE:    GENERAL: Well developed, well nourished.  NECK: Supple, trachea midline, no palpable thyroid nodules  CHEST: Resp even and unlabored, CTA bilateral.  CARDIAC: RRR, S1, S2 heard, no murmurs, rubs, S3, or S4  SKIN: no acanthosis nigracans.    LABS/Radiology   Latest Reference Range & Units 11/08/21 08:42 10/20/22 10:30 02/17/23 14:17   TSH 0.400 - 4.000 uIU/mL 0.765 13.858 (H) 3.353   Free T4 0.71 - 1.51 ng/dL  0.70 (L)    (H): Data is abnormally high  (L): Data is abnormally low    ASSESSMENT/PLAN:  Hypothyroidism-currently on L T4 therapy.  He does take it by itself.  However, she does occasionally miss doses.  Advise that if she does miss a dose she can take the missed dose the next day.  Currently TSH within normal limits.  Please re-consult if continues to be uncontrolled    2. Morbid obesity-she has had bariatric surgery.  Appears that she gained weight after her surgery per the patient.  Exercise limited due to pain.  Diet somewhat limited as well.  On a SSRI which can cause weight gain in some patients.  Will do 24 hour urine cortisol as seen below.  She may benefit from seeing bariatric medicine for medical management of weight loss.      FOLLOWUP  24 hr urine cortisol

## 2023-04-24 NOTE — TELEPHONE ENCOUNTER
" Pt neurologist referred her to see Dr Sellers. Pt saw Dr Sellers today . Pt was not satisfied with the visit. Pt wants Dr Aiken to handle to her thyroid issues . Pt says that it was recommended that she have consultation with bariatric medicine . Pt states that she is not interested in that . Pt wants to make sure that Dr Aiekn is aware she does not want to see Dr Sellers , she feels like "it was a waste of her time "--lp  "

## 2023-04-25 ENCOUNTER — TELEPHONE (OUTPATIENT)
Dept: GASTROENTEROLOGY | Facility: CLINIC | Age: 48
End: 2023-04-25
Payer: MEDICARE

## 2023-04-25 NOTE — TELEPHONE ENCOUNTER
"Please call to inform & review the results with the patient- radiology report of the modified barium swallow study showed "Penetration but no aspiration on thin liquid attempts.  There were signs of possible esophageal dysmotility, which was noted on prior esophagram. Continue with previous recommendations, including EGD as scheduled. May need to further testing, such as but not limited to esophageal manometry, pending results of testing and if symptoms persist.    Here is the results from the speech pathologist report "IMPRESSIONS: Dinah Mitchell oral/pharyngeal swallow is within normal limits. There was flash penetration of thin liquids. Pt did have intermittent cricopharyngeal "bulging" which could result in instances of globus sensation, however, it did not result in any pharyngeal residuals. Esophageal sweep did reveal suspected dysmotility."    If no improvement in symptoms or symptoms worsen, call/follow-up at clinic or go to ER.    Thanks,      "

## 2023-04-26 ENCOUNTER — PATIENT MESSAGE (OUTPATIENT)
Dept: NEUROLOGY | Facility: CLINIC | Age: 48
End: 2023-04-26
Payer: MEDICARE

## 2023-04-26 ENCOUNTER — TELEPHONE (OUTPATIENT)
Dept: GASTROENTEROLOGY | Facility: CLINIC | Age: 48
End: 2023-04-26
Payer: MEDICARE

## 2023-04-26 NOTE — TELEPHONE ENCOUNTER
----- Message from Deja Khan sent at 4/26/2023 10:23 AM CDT -----  Type: Needs Medical Advice  Who Called:  Cony Bee Call Back Number: [phone]  Additional Information: calling to confirm the new appt time stated that time fine with the pt! Please advise if needed, thanks!

## 2023-04-27 ENCOUNTER — ANESTHESIA EVENT (OUTPATIENT)
Dept: ENDOSCOPY | Facility: HOSPITAL | Age: 48
End: 2023-04-27
Payer: MEDICARE

## 2023-04-27 ENCOUNTER — TELEPHONE (OUTPATIENT)
Dept: FAMILY MEDICINE | Facility: CLINIC | Age: 48
End: 2023-04-27
Payer: MEDICARE

## 2023-04-27 ENCOUNTER — TELEPHONE (OUTPATIENT)
Dept: GASTROENTEROLOGY | Facility: CLINIC | Age: 48
End: 2023-04-27
Payer: MEDICARE

## 2023-04-27 ENCOUNTER — ANESTHESIA (OUTPATIENT)
Dept: ENDOSCOPY | Facility: HOSPITAL | Age: 48
End: 2023-04-27
Payer: MEDICARE

## 2023-04-27 NOTE — TELEPHONE ENCOUNTER
----- Message from Renita Wei sent at 4/27/2023  8:11 AM CDT -----  Contact: Patient  Type:  Appointment Request    Caller is requesting a sooner appointment.  Caller declined first available appointment listed below.  Caller will not accept being placed on the waitlist and is requesting a message be sent to doctor.    Name of Caller:  Patient    When is the first available appointment?  N/A    Would the patient rather a call back or a response via [facility]?   Call back  Best Call Back Number:  [phone]      Additional Information:   States she has to reschedule her procedure for this morning to another day - please call to advise - thank you

## 2023-04-27 NOTE — TELEPHONE ENCOUNTER
Spoke with pt. Rescheduled procedure due to pt's transportation backing out on her. Pt verbalized understanding to new date.

## 2023-05-03 ENCOUNTER — PES CALL (OUTPATIENT)
Dept: ADMINISTRATIVE | Facility: CLINIC | Age: 48
End: 2023-05-03
Payer: MEDICARE

## 2023-05-05 ENCOUNTER — PATIENT MESSAGE (OUTPATIENT)
Dept: PSYCHIATRY | Facility: CLINIC | Age: 48
End: 2023-05-05
Payer: MEDICARE

## 2023-05-09 ENCOUNTER — OFFICE VISIT (OUTPATIENT)
Dept: PSYCHIATRY | Facility: CLINIC | Age: 48
End: 2023-05-09
Payer: MEDICARE

## 2023-05-09 ENCOUNTER — PATIENT MESSAGE (OUTPATIENT)
Dept: FAMILY MEDICINE | Facility: CLINIC | Age: 48
End: 2023-05-09
Payer: MEDICARE

## 2023-05-09 DIAGNOSIS — G47.00 INSOMNIA, UNSPECIFIED TYPE: ICD-10-CM

## 2023-05-09 DIAGNOSIS — F90.2 ATTENTION DEFICIT HYPERACTIVITY DISORDER (ADHD), COMBINED TYPE: ICD-10-CM

## 2023-05-09 DIAGNOSIS — F41.0 PANIC DISORDER: Primary | ICD-10-CM

## 2023-05-09 DIAGNOSIS — F43.10 PTSD (POST-TRAUMATIC STRESS DISORDER): ICD-10-CM

## 2023-05-09 PROCEDURE — 99214 OFFICE O/P EST MOD 30 MIN: CPT | Mod: 95,,, | Performed by: PSYCHOLOGIST

## 2023-05-09 PROCEDURE — 90833 PSYTX W PT W E/M 30 MIN: CPT | Mod: 95,,, | Performed by: PSYCHOLOGIST

## 2023-05-09 PROCEDURE — 4010F PR ACE/ARB THEARPY RXD/TAKEN: ICD-10-PCS | Mod: CPTII,95,, | Performed by: PSYCHOLOGIST

## 2023-05-09 PROCEDURE — 99214 PR OFFICE/OUTPT VISIT, EST, LEVL IV, 30-39 MIN: ICD-10-PCS | Mod: 95,,, | Performed by: PSYCHOLOGIST

## 2023-05-09 PROCEDURE — 90833 PR PSYCHOTHERAPY W/PATIENT W/E&M, 30 MIN (ADD ON): ICD-10-PCS | Mod: 95,,, | Performed by: PSYCHOLOGIST

## 2023-05-09 PROCEDURE — 4010F ACE/ARB THERAPY RXD/TAKEN: CPT | Mod: CPTII,95,, | Performed by: PSYCHOLOGIST

## 2023-05-09 RX ORDER — TRAZODONE HYDROCHLORIDE 50 MG/1
50 TABLET ORAL NIGHTLY
Qty: 90 TABLET | Refills: 1 | Status: SHIPPED | OUTPATIENT
Start: 2023-05-09 | End: 2023-07-11 | Stop reason: SDUPTHER

## 2023-05-09 RX ORDER — ESCITALOPRAM OXALATE 10 MG/1
10 TABLET ORAL DAILY
Qty: 90 TABLET | Refills: 1 | Status: SHIPPED | OUTPATIENT
Start: 2023-05-09 | End: 2023-07-11 | Stop reason: SDUPTHER

## 2023-05-09 RX ORDER — PRAZOSIN HYDROCHLORIDE 2 MG/1
2 CAPSULE ORAL NIGHTLY
Qty: 90 CAPSULE | Refills: 0 | Status: SHIPPED | OUTPATIENT
Start: 2023-05-09 | End: 2023-08-10 | Stop reason: SDUPTHER

## 2023-05-09 RX ORDER — LEVOTHYROXINE SODIUM 125 UG/1
TABLET ORAL
Qty: 180 TABLET | Refills: 3 | Status: SHIPPED | OUTPATIENT
Start: 2023-05-09 | End: 2024-01-07

## 2023-05-09 NOTE — PROGRESS NOTES
"The patient location is:  [place]  The patient location [place] is: [facility]  The patient phone number is: [phone]  Visit type: Virtual visit with synchronous audio and video  Each patient to whom he or she provides medical services by telemedicine is:  (1) informed of the relationship between the physician and patient and the respective role of any other health care provider with respect to management of the patient; and (2) notified that he or she may decline to receive medical services by telemedicine and may withdraw from such care at any time.    Outpatient Psychiatry Follow-Up Visit    Clinical Status of Patient: Outpatient (Ambulatory)  05/09/2023     Chief Complaint: PTSD, Insomnia      Interval History and Content of Current Session:  Interim Events/Subjective Report/Content of Current Session:  follow-up appointment.    Pt is a 48 y/o female with past psychiatric hx of PTSD, insomnia who presents for follow-up treatment. Pt reported that it has been a difficult couple of weeks. Pt stated that her mother had 90% blockage of the carotid artery and required emergency surgery. Pt has been worried about mother's health making it difficulty to fall asleep. Has completed CBT-I and was practicing interventions with positive results before current concerns about mother. Pt noted that she was using [facility] for therapy but can no longer afford it. Also noted a disagreement with the therapist. Pt has received a list of therapists and plans to contact to schedule an appointment. Will have an endoscopy this Friday and Botox for migraines on Monday. Pt noted that escitalopram continues to be effective for mood and anxiety.     Past Psychiatric hx:  prozac (can't recall), lexapro (effective, does not remember why stopped), celexa > effective, dec'd libido ( told pt he would "leave her" regarding the sex drive), depakote (ineffective), lamictal (rash), abilify (paranoid), seroquel (significant wgt " gain), Lithium 300 mg po qam/600 mg po qhs (pt reports it worsened anxiety), prazosin (I had some allergic reaction- had been effective for months prior to this report), cymbalta 90mg (effective; pt self d/c'd), trazodone, ambien, Belsomra, Vyvanse    Past Medical hx:   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     PATRICIA (obstructive sleep apnea)     severe - doesn't use CPAP    Panic attack     PTSD (post-traumatic stress disorder)         Interim hx:  Medication changes last visit: Switch vyvanse 40mg po qam to Adderall XR 20 mg  Anxiety: stable  Depression: stable     Denies suicidal/homicidal ideations.  Denies hopelessness/worthlessness.    Denies auditory/visual hallucinations      Alcohol: Pt denies  Drug: Pt denies  Caffeine: Not assessed  Tobacco: Pt denies      Review of Systems   PSYCHIATRIC: Pertinent items are noted in the narrative.        CONSTITUTIONAL: weight stable    Past Medical, Family and Social History: The patient's past medical, family and social history have been reviewed and updated as appropriate within the electronic medical record. See encounter notes.     Current Psychiatric Medication:  wellbutrin xl 150mg po qam, xanax 1mg po daily PRN anxiety (not using daily),  escitalopram 10 mg, Adderall XR 20 mg (not using daily), Ambien 10 mg     Compliance: yes      Side effects: Pt denied.      Risk Parameters:  Patient reports no suicidal ideation  Patient reports no homicidal ideation  Patient reports no self-injurious  behavior  Patient reports no violent behavior     Exam (detailed: at least 9 elements; comprehensive: all 15 elements)   Constitutional  Vitals:  Most recent vital signs, dated less than 90 days prior to this appointment, were reviewed.        General:  unremarkable, age appropriate, casual attire, good eye contact, good rapport       Musculoskeletal  Muscle Strength/Tone:  no flaccidity, no tremor    Gait & Station:  normal      Psychiatric                       Speech:  normal tone, normal rate, rhythm, and volume   Mood & Affect:   Depressed, anxious         Thought Process:   Goal directed; Linear    Associations:   intact   Thought Content:   No SI/HI, delusions, or paranoia, no AV/VH   Insight & Judgement:   Good, adequate to circumstances   Orientation:   grossly intact; alert and oriented x 4    Memory:  intact for content of interview    Language:  grossly intact, can repeat    Attention Span  : Grossly intact for content of interview   Fund of Knowledge:   intact and appropriate to age and level of education        Assessment and Diagnosis   Status/Progress: Based on the examination today, the patient's problem(s) is/are not adequately controlled.  New problems have been presented today. Co-morbidities are complicating management of the primary condition. There are no active rule-out diagnoses for this patient at this time.      Impression: Pt appears to have benefited from the CBT-I group and continues to practice the skills learned. Discussed transitioning back to trazodone from Ambien and pt was agreeable. Pt reports that the trial of escitalopram continues to be working well. Will continue with the other medications as they are and monitor moving forward.     Diagnosis:   Posttraumatic Stress Disorder  Panic Disorder w/o agoraphobia  Attention Deficit Hyperactivity Disorder, Combined Type   Insomnia  Borderline personality disorder traits  Intervention/Counseling/Treatment Plan   Medication Management:       1. wellbutrin xl 150mg po qam    2. alprazolam 1mg po daily PRN anxiety (approximately 2-3x/wk and often using half tab)    3.  escitalopram 10 mg    4. Adderall XR 20 mg    5. D/C Ambien 10 mg and start trazodone 50 mg      6. Call to report any worsening of symptoms or problems with the medication. Pt instructed to go to ER with thoughts of harming self, others     7. Continue in therapy    Psychotherapy: 20 minutes  Target symptoms: insomnia, anxiety  Why chosen therapy is appropriate versus another modality: CBT used; relevant to diagnosis, patient responds to this modality  Outcome monitoring methods: self-report, observation  Therapeutic intervention type: Cognitive Behavioral Therapy  Topics discussed/themes: building skills sets for symptom management, symptom recognition, nutrition, exercise  The patient's response to the intervention is accepting  Patient's response to treatment is: good.   The patient's progress toward treatment goals: limited     Return to clinic: 2 months     -Cognitive-Behavioral/Supportive therapy and psychoeducation provided  -R/B/SE's of medications discussed with the pt who expresses understanding and chooses to take medications as prescribed.   -Pt instructed to call clinic, 911 or go to nearest emergency room if sxs worsen or pt is in   crisis. The pt expresses understanding.    Bebeto Resendiz, PhD, MP     Antidepressant/Antianxiety Medication Initiation:  Patient informed of risks, benefits, and potential side effects of medication and accepts informed consent.  Common side effects include nausea, fatigue, headache, insomnia., Specifically discussed the possibility of new or worsening suicidal thoughts/depression.  Patient instructed to stop the medication immediately and seek urgent treatment if this occurs. Patient instructed not to abruptly discontinue medication without physician guidance except in cases of sudden onset or worsening of SI.       Stimulant Medication  Initiation:  Patient advised of risks, benefits, and side effects of medication and accepts informed consent.  Common side effects include insomnia, irritability, jittery feeling, dry mouth, and agitation/hostility., Patient advised of potential addictive nature of medication and controlled substance classification.  Instructed to safeguard medication as no early refills can be given for lost or stolen medications.       Benzodiazepine Initiation:  Patient advised of the risks, benefits, and common side effects of medication and has accepted informed consent.  Common side effects include drowsiness, impaired coordination, possible memory loss., Patient advised NOT to operate a vehicle or machinery untiil they are sure how the medication will affec them.  Client also advised of danger of mixing this medication with alcohol., Patient advised of potential addictive nature of medication and need to safeguard medication as no early refills for lost or stolen medications can be authorized.       Pregnancy Warning:  Patient denies current pregnancy possibility.  Patient made aware that medications have not been proven safe in pregnancy and that she must maintain adequate birth control.  Patient instructed to alert us immediately if she becomes pregnant.

## 2023-05-09 NOTE — TELEPHONE ENCOUNTER
Refill Routing Note   Medication(s) are not appropriate for processing by [facility] for the following reason(s):      New or recently adjusted medication    [facility] action(s):  Defer None identified          Appointments  past 12m or future 3m with PCP    Date Provider   Last Visit   3/30/2023 Amaya Aiken MD   Next Visit   5/22/2023 Amaya Aiken MD   ED visits in past 90 days: 0        Note composed:11:37 AM 05/09/2023

## 2023-05-09 NOTE — TELEPHONE ENCOUNTER
No care due was identified.  [facility] Embedded Care Due Messages. Reference number: [id number].   5/08/2023 8:17:23 PM CDT

## 2023-05-10 ENCOUNTER — PATIENT MESSAGE (OUTPATIENT)
Dept: ENDOSCOPY | Facility: HOSPITAL | Age: 48
End: 2023-05-10
Payer: MEDICARE

## 2023-05-10 ENCOUNTER — TELEPHONE (OUTPATIENT)
Dept: GASTROENTEROLOGY | Facility: CLINIC | Age: 48
End: 2023-05-10
Payer: MEDICARE

## 2023-05-10 NOTE — TELEPHONE ENCOUNTER
----- Message from Lucrecia Parker sent at 5/10/2023 11:58 AM CDT -----  Contact: PT  Type:  Needs Medical Advice    Who Called: PT  Would the patient rather a call back or a response via [facility]? CALL   Best Call Back Number: [phone] (home)     Additional Information: PT NEEDS THE PROCEDURE ARRIVAL TIME SO THAT SHE CAN SET UP TRANSPORTATION.   PT STATED THAT IF SHE DOESN'T RECEIVE A CALL BY THE END OF TODAY SHE WILL NEED TO RESCHEDULE HER PROCEDURE  THANK YOU

## 2023-05-12 ENCOUNTER — EXTERNAL HOME HEALTH (OUTPATIENT)
Dept: HOME HEALTH SERVICES | Facility: HOSPITAL | Age: 48
End: 2023-05-12
Payer: MEDICARE

## 2023-05-12 ENCOUNTER — HOSPITAL ENCOUNTER (OUTPATIENT)
Facility: HOSPITAL | Age: 48
Discharge: HOME | End: 2023-05-12
Attending: INTERNAL MEDICINE | Admitting: INTERNAL MEDICINE
Payer: MEDICARE

## 2023-05-12 VITALS
WEIGHT: 277 LBS | HEIGHT: 67 IN | HEART RATE: 73 BPM | DIASTOLIC BLOOD PRESSURE: 76 MMHG | OXYGEN SATURATION: 96 % | SYSTOLIC BLOOD PRESSURE: 148 MMHG | TEMPERATURE: 98 F | BODY MASS INDEX: 43.47 KG/M2 | RESPIRATION RATE: 14 BRPM

## 2023-05-12 DIAGNOSIS — R13.10 DYSPHAGIA: Primary | ICD-10-CM

## 2023-05-12 LAB
B-HCG UR QL: NEGATIVE
CTP QC/QA: YES

## 2023-05-12 PROCEDURE — 27201012 HC FORCEPS, HOT/COLD, DISP: Mod: PO | Performed by: INTERNAL MEDICINE

## 2023-05-12 PROCEDURE — 63600175 PHARM REV CODE 636 W HCPCS: Mod: PO | Performed by: NURSE ANESTHETIST, CERTIFIED REGISTERED

## 2023-05-12 PROCEDURE — 88305 TISSUE EXAM BY PATHOLOGIST: CPT | Mod: PO | Performed by: STUDENT IN AN ORGANIZED HEALTH CARE EDUCATION/TRAINING PROGRAM

## 2023-05-12 PROCEDURE — 63600175 PHARM REV CODE 636 W HCPCS: Mod: PO | Performed by: ANESTHESIOLOGY

## 2023-05-12 PROCEDURE — 43248 EGD GUIDE WIRE INSERTION: CPT | Mod: ,,, | Performed by: INTERNAL MEDICINE

## 2023-05-12 PROCEDURE — 88305 TISSUE EXAM BY PATHOLOGIST: CPT | Mod: 26,,, | Performed by: STUDENT IN AN ORGANIZED HEALTH CARE EDUCATION/TRAINING PROGRAM

## 2023-05-12 PROCEDURE — D9220A PRA ANESTHESIA: ICD-10-PCS | Mod: ANES,,, | Performed by: ANESTHESIOLOGY

## 2023-05-12 PROCEDURE — 88305 TISSUE EXAM BY PATHOLOGIST: ICD-10-PCS | Mod: 26,,, | Performed by: STUDENT IN AN ORGANIZED HEALTH CARE EDUCATION/TRAINING PROGRAM

## 2023-05-12 PROCEDURE — 43239 PR EGD, FLEX, W/BIOPSY, SGL/MULTI: ICD-10-PCS | Mod: 59,,, | Performed by: INTERNAL MEDICINE

## 2023-05-12 PROCEDURE — 43248 EGD GUIDE WIRE INSERTION: CPT | Mod: PO | Performed by: INTERNAL MEDICINE

## 2023-05-12 PROCEDURE — 25000003 PHARM REV CODE 250: Mod: PO | Performed by: NURSE ANESTHETIST, CERTIFIED REGISTERED

## 2023-05-12 PROCEDURE — 63600175 PHARM REV CODE 636 W HCPCS: Mod: PO | Performed by: INTERNAL MEDICINE

## 2023-05-12 PROCEDURE — D9220A PRA ANESTHESIA: ICD-10-PCS | Mod: CRNA,,, | Performed by: NURSE ANESTHETIST, CERTIFIED REGISTERED

## 2023-05-12 PROCEDURE — C1769 GUIDE WIRE: HCPCS | Mod: PO | Performed by: INTERNAL MEDICINE

## 2023-05-12 PROCEDURE — 37000008 HC ANESTHESIA 1ST 15 MINUTES: Mod: PO | Performed by: INTERNAL MEDICINE

## 2023-05-12 PROCEDURE — D9220A PRA ANESTHESIA: Mod: CRNA,,, | Performed by: NURSE ANESTHETIST, CERTIFIED REGISTERED

## 2023-05-12 PROCEDURE — 37000009 HC ANESTHESIA EA ADD 15 MINS: Mod: PO | Performed by: INTERNAL MEDICINE

## 2023-05-12 PROCEDURE — D9220A PRA ANESTHESIA: Mod: ANES,,, | Performed by: ANESTHESIOLOGY

## 2023-05-12 PROCEDURE — 81025 URINE PREGNANCY TEST: CPT | Mod: PO | Performed by: INTERNAL MEDICINE

## 2023-05-12 PROCEDURE — 43239 EGD BIOPSY SINGLE/MULTIPLE: CPT | Mod: 59,,, | Performed by: INTERNAL MEDICINE

## 2023-05-12 PROCEDURE — 43239 EGD BIOPSY SINGLE/MULTIPLE: CPT | Mod: 59,PO | Performed by: INTERNAL MEDICINE

## 2023-05-12 PROCEDURE — 43248 PR EGD, FLEX, W/DILATION OVER GUIDEWIRE: ICD-10-PCS | Mod: ,,, | Performed by: INTERNAL MEDICINE

## 2023-05-12 RX ORDER — LIDOCAINE HYDROCHLORIDE 20 MG/ML
INJECTION INTRAVENOUS
Status: DISCONTINUED | OUTPATIENT
Start: 2023-05-12 | End: 2023-05-12

## 2023-05-12 RX ORDER — SODIUM CHLORIDE 0.9 % (FLUSH) 0.9 %
10 SYRINGE (ML) INJECTION EVERY 6 HOURS PRN
Status: DISCONTINUED | OUTPATIENT
Start: 2023-05-12 | End: 2023-05-12 | Stop reason: HOSPADM

## 2023-05-12 RX ORDER — SODIUM CHLORIDE 0.9 % (FLUSH) 0.9 %
10 SYRINGE (ML) INJECTION
Status: DISCONTINUED | OUTPATIENT
Start: 2023-05-12 | End: 2024-02-26

## 2023-05-12 RX ORDER — SODIUM CHLORIDE, SODIUM LACTATE, POTASSIUM CHLORIDE, CALCIUM CHLORIDE 600; 310; 30; 20 MG/100ML; MG/100ML; MG/100ML; MG/100ML
INJECTION, SOLUTION INTRAVENOUS CONTINUOUS
Status: DISCONTINUED | OUTPATIENT
Start: 2023-05-12 | End: 2023-05-12 | Stop reason: HOSPADM

## 2023-05-12 RX ORDER — PROPOFOL 10 MG/ML
VIAL (ML) INTRAVENOUS
Status: DISCONTINUED | OUTPATIENT
Start: 2023-05-12 | End: 2023-05-12

## 2023-05-12 RX ORDER — ONDANSETRON 2 MG/ML
4 INJECTION INTRAMUSCULAR; INTRAVENOUS ONCE
Status: COMPLETED | OUTPATIENT
Start: 2023-05-12 | End: 2023-05-12

## 2023-05-12 RX ORDER — SODIUM CHLORIDE, SODIUM LACTATE, POTASSIUM CHLORIDE, CALCIUM CHLORIDE 600; 310; 30; 20 MG/100ML; MG/100ML; MG/100ML; MG/100ML
INJECTION, SOLUTION INTRAVENOUS CONTINUOUS
Status: DISCONTINUED | OUTPATIENT
Start: 2023-05-12 | End: 2024-02-26

## 2023-05-12 RX ORDER — ONDANSETRON 4 MG/1
4 TABLET, ORALLY DISINTEGRATING ORAL EVERY 6 HOURS PRN
Qty: 30 TABLET | Refills: 0 | Status: SHIPPED | OUTPATIENT
Start: 2023-05-12 | End: 2023-05-22

## 2023-05-12 RX ADMIN — PROPOFOL 150 MG: 10 INJECTION, EMULSION INTRAVENOUS at 01:05

## 2023-05-12 RX ADMIN — PROPOFOL 25 MG: 10 INJECTION, EMULSION INTRAVENOUS at 01:05

## 2023-05-12 RX ADMIN — SODIUM CHLORIDE, POTASSIUM CHLORIDE, SODIUM LACTATE AND CALCIUM CHLORIDE: 600; 310; 30; 20 INJECTION, SOLUTION INTRAVENOUS at 12:05

## 2023-05-12 RX ADMIN — PROPOFOL 50 MG: 10 INJECTION, EMULSION INTRAVENOUS at 01:05

## 2023-05-12 RX ADMIN — ONDANSETRON 4 MG: 2 INJECTION INTRAMUSCULAR; INTRAVENOUS at 01:05

## 2023-05-12 RX ADMIN — LIDOCAINE HYDROCHLORIDE 100 MG: 20 INJECTION INTRAVENOUS at 01:05

## 2023-05-12 NOTE — PROVATION PATIENT INSTRUCTIONS
Discharge Summary/Instructions after an Endoscopic Procedure  Patient Name: Dinah Mitchell  Patient MRN: [id number]  Patient YOB: 1975  Friday, May 12, 2023  Martín Sagastume MD  Dear patient,  As a result of recent federal legislation (The Federal Cures Act), you may   receive lab or pathology results from your procedure in your [facility]   account before your physician is able to contact you. Your physician or   their representative will relay the results to you with their   recommendations at their soonest availability.  Thank you,  RESTRICTIONS:  During your procedure today, you received medications for sedation.  These   medications may affect your judgment, balance and coordination.  Therefore,   for 24 hours, you have the following restrictions:   - DO NOT drive a car, operate machinery, make legal/financial decisions,   sign important papers or drink alcohol.    ACTIVITY:  Today: no heavy lifting, straining or running due to procedural   sedation/anesthesia.  The following day: return to full activity including work.  DIET:  Eat and drink normally unless instructed otherwise.     TREATMENT FOR COMMON SIDE EFFECTS:  - Mild abdominal pain, nausea, belching, bloating or excessive gas:  rest,   eat lightly and use a heating pad.  - Sore Throat: treat with throat lozenges and/or gargle with warm salt   water.  - Because air was used during the procedure, expelling large amounts of air   from your rectum or belching is normal.  - If a bowel prep was taken, you may not have a bowel movement for 1-3 days.    This is normal.  SYMPTOMS TO WATCH FOR AND REPORT TO YOUR PHYSICIAN:  1. Abdominal pain or bloating, other than gas cramps.  2. Chest pain.  3. Back pain.  4. Signs of infection such as: chills or fever occurring within 24 hours   after the procedure.  5. Rectal bleeding, which would show as bright red, maroon, or black stools.   (A tablespoon of blood from the rectum is not serious, especially if    hemorrhoids are present.)  6. Vomiting.  7. Weakness or dizziness.  GO DIRECTLY TO THE NEAREST EMERGENCY ROOM IF YOU HAVE ANY OF THE FOLLOWING:      Difficulty breathing              Chills and/or fever over 101 F   Persistent vomiting and/or vomiting blood   Severe abdominal pain   Severe chest pain   Black, tarry stools   Bleeding- more than one tablespoon   Any other symptom or condition that you feel may need urgent attention  Your doctor recommends these additional instructions:  If any biopsies were taken, your doctors clinic will contact you in 1 to 2   weeks with any results.  You are being discharged to home.   Advance your diet as tolerated.   Continue your present medications.   We are waiting for your pathology results.  For questions, problems or results please call your physician - Martín Sagastume MD at Work:  [phone].  EMERGENCY PHONE NUMBER: [phone], LAB RESULTS: [phone]  IF A COMPLICATION OR EMERGENCY SITUATION ARISES AND YOU ARE UNABLE TO REACH   YOUR PHYSICIAN - GO DIRECTLY TO THE EMERGENCY ROOM.  ___________________________________________  Nurse Signature  ___________________________________________  Patient/Designated Responsible Party Signature  Martín Sagastume MD  5/12/2023 1:19:11 PM  This report has been verified and signed electronically.  Dear patient,  As a result of recent federal legislation (The Federal Cures Act), you may   receive lab or pathology results from your procedure in your [facility]   account before your physician is able to contact you. Your physician or   their representative will relay the results to you with their   recommendations at their soonest availability.  Thank you.  PROVATION

## 2023-05-12 NOTE — PLAN OF CARE
Patient tolerating oral liquids without difficulty. No apparent s&s of distress noted at this time, no complaints voiced at this time. Nausea subsided with zofran. Discharge instructions reviewed with patient/family/friend with good verbal feedback received. Patient ready for discharge

## 2023-05-12 NOTE — H&P
History & Physical - Short Stay  Gastroenterology      SUBJECTIVE:     Procedure: EGD    Chief Complaint/Indication for Procedure: Dysphagia    Facility-Administered Medications Prior to Admission   Medication    onabotulinumtoxina injection 200 Units     PTA Medications   Medication Sig    albuterol (PROVENTIL) 2.5 mg /3 mL (0.083 %) nebulizer solution Take 3 mLs (2.5 mg total) by nebulization every 4 (four) hours as needed for Wheezing or Shortness of Breath (chest tightness). Rescue    albuterol (PROVENTIL/VENTOLIN HFA) 90 mcg/actuation inhaler Inhale 1-2 puffs into the lungs every 4 (four) hours as needed for Wheezing or Shortness of Breath (chest tightness). Rescue    ALPRAZolam (XANAX) 1 MG tablet TAKE 1 TABLET(1 MG) BY MOUTH DAILY AS NEEDED FOR ANXIETY    buPROPion (WELLBUTRIN XL) 150 MG TB24 tablet Take 1 tablet (150 mg total) by mouth once daily.    dextroamphetamine-amphetamine (ADDERALL XR) 20 MG 24 hr capsule Take 1 capsule (20 mg total) by mouth every morning.    EScitalopram oxalate (LEXAPRO) 10 MG tablet Take 1 tablet (10 mg total) by mouth once daily.    fluticasone propionate (FLONASE) 50 mcg/actuation nasal spray SHAKE LIQUID AND USE 1 SPRAY(50 MCG) IN EACH NOSTRIL EVERY DAY    fluticasone-salmeterol 230-21 mcg/dose (ADVAIR HFA) 230-21 mcg/actuation HFAA inhaler Inhale 2 puffs into the lungs 2 (two) times daily. Controller    gabapentin (NEURONTIN) 600 MG tablet TAKE 1 TABLET BY MOUTH 2 TO 3 TIMES A DAY FOR CHRONIC PAIN    galcanezumab-gnlm (EMGALITY PEN) 120 mg/mL PnIj ADMINISTER 1 ML UNDER THE SKIN EVERY 28 DAYS    levOCARNitine (CARNITOR) 330 mg Tab TAKE 3 TABLETS(990 MG) BY MOUTH TWICE DAILY    montelukast (SINGULAIR) 10 mg tablet TAKE 1 TABLET(10 MG) BY MOUTH EVERY DAY    prazosin (MINIPRESS) 2 MG Cap Take 1 capsule (2 mg total) by mouth every evening.    SYNTHROID 125 mcg tablet TAKE 2 TABLETS(250 MCG) BY MOUTH BEFORE BREAKFAST    tiZANidine (ZANAFLEX) 4 MG tablet Take 1 tablet by mouth three  "times a day as needed for  muscle spasm    traZODone (DESYREL) 50 MG tablet Take 1 tablet (50 mg total) by mouth every evening.    acetylcysteine (N-ACETYL-L-CYSTEINE MISC)     diphenoxylate-atropine 2.5-0.025 mg (LOMOTIL) 2.5-0.025 mg per tablet Take 1 tablet by mouth 4 (four) times daily as needed.    dronabinol (MARINOL) 2.5 MG capsule Take 1 capsule (2.5 mg total) by mouth 2 (two) times daily before meals.    syringe with needle (SYRINGE 3CC/25GX1") 3 mL 25 gauge x 1" Syrg Use for Toradol IM only.       Review of patient's allergies indicates:   Allergen Reactions    Lamotrigine      Other reaction(s): Rash  Other reaction(s): Nausea    Sulfa (sulfonamide antibiotics) Nausea Only     Other reaction(s): Unknown    Adhesive Rash    Zonisamide Nausea And Vomiting     Nausea/vomiting         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     PATRICIA (obstructive sleep apnea)     severe - doesn't use CPAP    Panic attack     PTSD (post-traumatic stress disorder)      Past Surgical History:   Procedure Laterality Date    ANKLE SURGERY Right     x2 (#1 for ligament injury and #2 for fx and ligament)    AUGMENTATION OF BREAST      BILATERAL TUBAL LIGATION      BRAIN SURGERY  12/2018    decompression/craniotomy Arnold-Chiari syndrome    BREAST SURGERY  1997    B implants    CERVICAL BIOPSY  W/ LOOP ELECTRODE EXCISION      COLD KNIFE CONIZATION OF CERVIX N/A 03/17/2022    Procedure: CONE BIOPSY, CERVIX, USING COLD KNIFE;  Surgeon: Nikhil Franco MD;  " Location: [facility];  Service: OB/GYN;  Laterality: N/A;    COLONOSCOPY N/A 09/29/2022    Procedure: COLONOSCOPY;  Surgeon: Martín Sagastume MD;  Location: [facility];  Service: Endoscopy;  Laterality: N/A; Repeat colonoscopy in 5 years for surveillance    DECOMPRESSION OF CHIARI MALFORMATION BY REMOVAL OF POSTERIOR ARCH OF FIRST CERVICAL VERTEBRA N/A 12/13/2018    Procedure: DECOMPRESSION, CHIARI MALFORMATION, BY 1ST CERVICAL VERTEBRA POSTERIOR ARCH REMOVAL;  Surgeon: Sergio Busch MD;  Location: [address];  Service: Neurosurgery;  Laterality: N/A;    EPIDURAL STEROID INJECTION INTO CERVICAL SPINE N/A 12/05/2019    Procedure: Injection-steroid-epidural-cervical-C7-T1;  Surgeon: Noa Samano MD;  Location: [facility];  Service: Neurosurgery;  Laterality: N/A;    FRACTURE SURGERY Right     Ankle    INJECTION OF ANESTHETIC AGENT AROUND MEDIAL BRANCH NERVES INNERVATING CERVICAL FACET JOINT Bilateral 08/06/2020    Procedure: Block-nerve-medial branch-cervical Bilateral  C3,4,5;  Surgeon: Noa Samano MD;  Location: [facility] OR;  Service: Anesthesiology;  Laterality: Bilateral;    INJECTION OF ANESTHETIC AGENT AROUND MEDIAL BRANCH NERVES INNERVATING CERVICAL FACET JOINT Bilateral 08/20/2020    Procedure: Block-nerve-medial branch-cervical Bilateral C3-4-5;  Surgeon: Noa Samano MD;  Location: [facility] OR;  Service: Anesthesiology;  Laterality: Bilateral;    LAPAROSCOPIC GASTRIC BANDING      with subsequent removal due to abscess    LASIK Bilateral     2009    LIPOSUCTION      x 2    PELVIC LAPAROSCOPY      for endometriosis eval with BTL    RADIOFREQUENCY ABLATION Bilateral 08/16/2021    Procedure: Radiofrequency Ablation Cervical C3-C5;  Surgeon: Robert Munoz MD;  Location: [facility] OR;  Service: Pain Management;  Laterality: Bilateral;    RADIOFREQUENCY THERMAL COAGULATION OF MEDIAL BRANCH OF POSTERIOR RAMUS OF CERVICAL SPINAL NERVE Bilateral 08/27/2020    Procedure: RADIOFREQUENCY THERMAL COAGULATION, NERVE, SPINAL,  "CERVICAL, POSTERIOR RAMUS, MEDIAL BRANCH C3-4-5, bilateral;  Surgeon: Noa Samano MD;  Location: [facility] OR;  Service: Anesthesiology;  Laterality: Bilateral;     Family History   Problem Relation Age of Onset    Glaucoma Mother     Diabetes Mother     Stroke Mother 30    Mental illness Mother         depression    Hyperlipidemia Mother     Hypertension Mother     Hyperlipidemia Father     Heart disease Father         "athletes heart"    Glaucoma Sister     Diabetes Sister     Seizures Sister     Mitochondrial disorder Sister         "trent"    Mental illness Sister     Seizures Sister     Cervical cancer Sister     Cervical cancer Sister     Ovarian cancer Paternal Aunt     Ovarian cancer Paternal Aunt     Retinal detachment Maternal Grandmother     Colon cancer Maternal Grandmother     Esophageal cancer Maternal Grandfather     Cancer Maternal Grandfather 65        throat ca    Ovarian cancer Cousin     Cervical cancer Paternal Cousin     Cervical cancer Other     Breast cancer Neg Hx     Crohn's disease Neg Hx     Ulcerative colitis Neg Hx     Stomach cancer Neg Hx     Celiac disease Neg Hx     Macular degeneration Neg Hx      Social History     Tobacco Use    Smoking status: Never    Smokeless tobacco: Never   Substance Use Topics    Alcohol use: Yes     Comment: occasional "maybe monthly"    Drug use: No     OBJECTIVE:     Physical Exam:                                                       GENERAL:  Comfortable, in no acute distress.                                 HEENT EXAM:  Nonicteric.  No adenopathy.  Oropharynx is clear.               NECK:  Supple.                                                               LUNGS:  Clear.                                                               CARDIAC:  Regular rate and rhythm.  S1, S2.  No murmur.                      ABDOMEN:  Soft, positive bowel sounds, nontender.  No hepatosplenomegaly or masses.  No rebound or guarding.                                       "       EXTREMITIES:  No edema.     MENTAL STATUS:  Normal, alert and oriented.      ASSESSMENT/PLAN:     Assessment: Dysphagia    Plan: EGD

## 2023-05-12 NOTE — ANESTHESIA PREPROCEDURE EVALUATION
05/12/2023  Dinah Mitchell is a 48 y.o., female.      Pre-op Assessment    I have reviewed the Patient Summary Reports.     I have reviewed the Nursing Notes. I have reviewed the NPO Status.      Review of Systems  Anesthesia Hx:  No problems with previous Anesthesia    Cardiovascular:   Exercise tolerance: good    Pulmonary:   Asthma Sleep Apnea    Renal/:   Chronic Renal Disease    Hepatic/GI:   Liver Disease,    Musculoskeletal:   Arthritis     Neurological:   Neuromuscular Disease, Headaches    Endocrine:   Hypothyroidism    Psych:   Psychiatric History          Physical Exam  General: Well nourished        Anesthesia Plan  Type of Anesthesia, risks & benefits discussed:    Anesthesia Type: Gen Natural Airway  Intra-op Monitoring Plan: Standard ASA Monitors  Induction:  IV  Informed Consent: Informed consent signed with the Patient and all parties understand the risks and agree with anesthesia plan.  All questions answered. Patient consented to blood products? No  ASA Score: 3    Ready For Surgery From Anesthesia Perspective.     .         Pediatric Well Child Exam: 7 Years of age    Chief Complaint   Patient presents with   • Well Child     7 yr old   • Rash   • Well Child   • Imm/Inj     Flu vaccine       SUBJECTIVE:  Marcella Gaffney is a 7 year old female who presents for a well child exam.  Patient presents  with Father and Mother.    CONCERNS RAISED TODAY: rash and trouble sleep giving living habit change    SLEEP PATTERN:  Hours / Night: 11.    NAPS: Hours / Day: none.    NUTRITION/GI:  Appetite: Good.  Milk: 1 Percent 3 CUPS/DAY.  Food:   · Eats fruits/vegetables: [x]  YES     []  NO   · Eats meat: [x]  YES     []  NO   Water Supply at Home: city.  Stools: 1 / day.    /H[facility]ECARE:   [facility]     FAMILY/HOME ENVIRONMENT:  Changes in home/work environment: Moved in with cousin  Parents working outside the home:   Toxic Exposure:  Tobacco Use: Passive           VISION SCREENING:        Visual Acuity Screening    Right eye Left eye Both eyes   Without correction: 20/40 20/40 20/30-1   With correction:          DEVELOPMENT:  [x] YES [] NO [] UNKNOWN  Walks up/down stairs well?  [x] YES  [] NO [] UNKNOWN  Skips, walks on tip toes, stand on 1 foot?   [x] YES [] NO [] UNKNOWN  Copies triangles?  [x] YES [] NO [] UNKNOWN  Can you toilet on own?  [x] YES [] NO [] UNKNOWN  Uses toilet on own?  [x] YES [] NO [] UNKNOWN  Cuts with blunt scissors?  [x] YES [] NO [] UNKNOWN  Writes first name?  [x] YES [x] NO [] UNKNOWN  Able to tie knot?  [x] YES [] NO [] UNKNOWN  Dresses self except shoes?  [x] YES [] NO [] UNKNOWN  Counts objects up to 10?  [] YES [x] NO [] UNKNOWN  Knows address/phone number?  [x] YES [] NO [] UNKNOWN  Draws person (6+ body parts)?  [x] YES [] NO [] UNKNOWN  Beginning to read?  [x] YES [] NO [] UNKNOWN  Responds to \"why\" questions?  [x] YES [] NO [] UNKNOWN  Retells stories/clear beginning, middle, end?  [x] YES [] NO [] UNKNOWN  Has friends?  [x] YES [] NO [] UNKNOWN  Learning appropriate manners?  [x] YES [] NO []  UNKNOWN  Helps with chores?  [x] YES [] NO [] UNKNOWN  Knows gender?    OBJECTIVE:  PAST HISTORIES:  Allergies, Medications, Medical history, Surgical history, Family history reviewed and updated.  IMMUNIZATION STATUS: Up to date per review of the electronic health records.    IMMUNIZATION REACTIONS: None  VARICELLA STATUS: confirmed by vaccine administration    RECENT HEALTH EVENTS:  Illnesses: None.  Hospitalizations: None.  Injuries or Accidents: None.    REVIEW OF SYSTEMS:    All systems reviewed and negative except as documented in \"Concerns raised\".    PHYSICAL EXAM:      VITAL SIGNS:   Visit Vitals  /50   Pulse 100   Temp 97.6 °F (36.4 °C) (Oral)   Resp 20   Ht 3' 5.75\" (1.06 m)   Wt 18.2 kg   BMI 16.21 kg/m²    46 %ile (Z= -0.11) based on CDC (Girls, 2-20 Years) weight-for-age data using vitals from 11/12/2019.  GENERAL:  Well appearing female child.  Alert and active.  SKIN:  Warm, normal turgor.  No cyanosis.  No rash.  HEAD:  Normocephalic, atraumatic.    EYES:  Conjunctivae appear normal, noninjected, nonicteric, positive red reflex.  NOSE:  Appears normal without drainage.  EARS:  Normal external auditory canals. Tympanic membranes are transparent with normal landmarks.  THROAT:  Moist mucous membranes without lesions.  NECK:  Supple, no lymphadenopathy or masses.  HEART:  Regular rate and rythm.  Normal S1, S2.  No murmurs, rubs, gallops.   LUNGS:  Clear to auscultation.  No wheezes, rales, rhonchi.  Normal work effort with breathing.  ABDOMEN:  Bowel sounds present. Soft, nontender.  No hepatomegaly.  No splenomegaly.  No masses.  EXTREMITIES: Normal bilateral range of motion of upper and lower extremities. Peripheral pulses. Equal pedal pulses.  BACK: Spine straight.   NEUROLOGIC:  Normal muscle tone and symmetrical strength.  Normal deep tendon reflexes.  Symmetrical facial motor function.   She does have some inflammatory papules fairly well gets better body.  These are pruritic.  Apparently  everyone in the family has this.    Assessment:  7 year old female well child.    Plan:  1. All parental concerns and questions discussed.  2. Anticipatory guidance provided, handout/s given anticipatory guidance was discussed.  3. Immunizations per orders.  Influenza vaccine given.  4. Suspect scabies.  Recommend permethrin.  Everyone in the family should be treated with this.    Follow up: Return in about 1 year (around 11/12/2020) for Well Child Visit.

## 2023-05-12 NOTE — TRANSFER OF CARE
"Anesthesia Transfer of Care Note    Patient: Dinah Mitchell    Procedure(s) Performed: Procedure(s) (LRB):  EGD (ESOPHAGOGASTRODUODENOSCOPY) (N/A)    Patient location: PACU    Anesthesia Type: general    Transport from OR: Transported from OR on room air with adequate spontaneous ventilation    Post pain: adequate analgesia    Post assessment: no apparent anesthetic complications and tolerated procedure well    Post vital signs: stable    Level of consciousness: sedated and awake    Nausea/Vomiting: no nausea/vomiting    Complications: none    Transfer of care protocol was followed      Last vitals:   Visit Vitals  BP (!) 140/87 (BP Location: Right arm, Patient Position: Lying)   Pulse 71   Temp 36.7 °C (98 °F) (Skin)   Resp 20   Ht 5' 7" (1.702 m)   Wt 125.6 kg (277 lb)   LMP 04/22/2023 (Exact Date)   SpO2 95%   Breastfeeding No   BMI 43.38 kg/m²     "

## 2023-05-13 RX ORDER — BUPROPION HYDROCHLORIDE 150 MG/1
150 TABLET ORAL DAILY
Qty: 90 TABLET | Refills: 1 | Status: SHIPPED | OUTPATIENT
Start: 2023-05-13 | End: 2023-12-12 | Stop reason: SDUPTHER

## 2023-05-15 NOTE — ANESTHESIA POSTPROCEDURE EVALUATION
Anesthesia Post Evaluation    Patient: Dinah Mitchell    Procedure(s) Performed: Procedure(s) (LRB):  EGD (ESOPHAGOGASTRODUODENOSCOPY) (N/A)    Final Anesthesia Type: general      Patient location during evaluation: PACU  Patient participation: Yes- Able to Participate  Level of consciousness: awake and alert and oriented  Post-procedure vital signs: reviewed and stable  Pain management: adequate  Airway patency: patent    PONV status at discharge: No PONV  Anesthetic complications: no      Cardiovascular status: blood pressure returned to baseline  Respiratory status: unassisted, spontaneous ventilation and room air  Hydration status: euvolemic  Follow-up not needed.          Vitals Value Taken Time   /76 05/12/23 1345   Temp 36.8 °C (98.2 °F) 05/12/23 1316   Pulse 73 05/12/23 1345   Resp 14 05/12/23 1345   SpO2 96 % 05/12/23 1345         Event Time   Out of Recovery 14:01:39         Pain/Jatin Score: No data recorded

## 2023-05-17 ENCOUNTER — TELEPHONE (OUTPATIENT)
Dept: FAMILY MEDICINE | Facility: CLINIC | Age: 48
End: 2023-05-17
Payer: MEDICARE

## 2023-05-17 ENCOUNTER — TELEPHONE (OUTPATIENT)
Dept: NEUROLOGY | Facility: CLINIC | Age: 48
End: 2023-05-17
Payer: MEDICARE

## 2023-05-17 LAB
FINAL PATHOLOGIC DIAGNOSIS: NORMAL
GROSS: NORMAL
Lab: NORMAL

## 2023-05-17 NOTE — TELEPHONE ENCOUNTER
----- Message from Enrique Shepherd sent at 5/16/2023 12:50 PM CDT -----  Type: Needs Medical Advice  Who Called:  Jessica/ [facility]  Symptoms (please be specific):  Weakness -- right wrist  How long has patient had these symptoms:      Best Call Back Number: [phone]  Additional Information: Caller states that she would like a callback regarding needing orders for OT and a home health aide for the patient

## 2023-05-17 NOTE — TELEPHONE ENCOUNTER
----- Message from Greg Matute sent at 5/17/2023  1:38 PM CDT -----  Regarding: reschedule botox  Contact: goran at [phone]  Type:  Reschedule Botox    Caller is requesting a BOTOX appointment.      Name of Caller:  goran    When is the first available appointment?  Dept Book    Procedure:  Botox    Best Call Back Number:  [phone]    Additional Information:

## 2023-05-18 NOTE — TELEPHONE ENCOUNTER
Spoke w/ Jessica , [facility] , she needs orders for aide to assist w/ ADL's 1x week and OT eval also . Pt is having right wrist weakness . --lp

## 2023-05-22 ENCOUNTER — OFFICE VISIT (OUTPATIENT)
Dept: FAMILY MEDICINE | Facility: CLINIC | Age: 48
End: 2023-05-22
Payer: MEDICARE

## 2023-05-22 DIAGNOSIS — R31.9 HEMATURIA, UNSPECIFIED TYPE: ICD-10-CM

## 2023-05-22 DIAGNOSIS — E03.4 HYPOTHYROIDISM DUE TO ACQUIRED ATROPHY OF THYROID: ICD-10-CM

## 2023-05-22 DIAGNOSIS — R51.9 FREQUENT HEADACHES: Primary | ICD-10-CM

## 2023-05-22 PROCEDURE — 4010F ACE/ARB THERAPY RXD/TAKEN: CPT | Mod: CPTII,95,, | Performed by: FAMILY MEDICINE

## 2023-05-22 PROCEDURE — 1160F RVW MEDS BY RX/DR IN RCRD: CPT | Mod: CPTII,95,, | Performed by: FAMILY MEDICINE

## 2023-05-22 PROCEDURE — 4010F PR ACE/ARB THEARPY RXD/TAKEN: ICD-10-PCS | Mod: CPTII,95,, | Performed by: FAMILY MEDICINE

## 2023-05-22 PROCEDURE — 1159F PR MEDICATION LIST DOCUMENTED IN MEDICAL RECORD: ICD-10-PCS | Mod: CPTII,95,, | Performed by: FAMILY MEDICINE

## 2023-05-22 PROCEDURE — 1160F PR REVIEW ALL MEDS BY PRESCRIBER/CLIN PHARMACIST DOCUMENTED: ICD-10-PCS | Mod: CPTII,95,, | Performed by: FAMILY MEDICINE

## 2023-05-22 PROCEDURE — 1159F MED LIST DOCD IN RCRD: CPT | Mod: CPTII,95,, | Performed by: FAMILY MEDICINE

## 2023-05-22 PROCEDURE — 99213 OFFICE O/P EST LOW 20 MIN: CPT | Mod: 95,,, | Performed by: FAMILY MEDICINE

## 2023-05-22 PROCEDURE — 99213 PR OFFICE/OUTPT VISIT, EST, LEVL III, 20-29 MIN: ICD-10-PCS | Mod: 95,,, | Performed by: FAMILY MEDICINE

## 2023-05-24 ENCOUNTER — PROCEDURE VISIT (OUTPATIENT)
Dept: NEUROLOGY | Facility: CLINIC | Age: 48
End: 2023-05-24
Payer: MEDICARE

## 2023-05-24 VITALS
SYSTOLIC BLOOD PRESSURE: 137 MMHG | DIASTOLIC BLOOD PRESSURE: 85 MMHG | BODY MASS INDEX: 43.38 KG/M2 | HEART RATE: 76 BPM | HEIGHT: 67 IN | RESPIRATION RATE: 16 BRPM

## 2023-05-24 DIAGNOSIS — G43.719 INTRACTABLE CHRONIC MIGRAINE WITHOUT AURA AND WITHOUT STATUS MIGRAINOSUS: Primary | ICD-10-CM

## 2023-05-24 PROCEDURE — 64615 CHEMODENERV MUSC MIGRAINE: CPT | Mod: S$GLB,,, | Performed by: PSYCHIATRY & NEUROLOGY

## 2023-05-24 PROCEDURE — 64615 PR CHEMODENERVATION OF MUSCLE FOR CHRONIC MIGRAINE: ICD-10-PCS | Mod: S$GLB,,, | Performed by: PSYCHIATRY & NEUROLOGY

## 2023-05-24 NOTE — PROCEDURES
Procedures     BOTOX PROCEDURE    PROCEDURE PERFORMED: Botulinum toxin injection (84381)    CLINICAL INDICATION: G43.719    Session #4  The Botox injections have achieved well over 50%  improvement in the patient's symptoms. Migraines have been reduced at least 7 days per month and the number of cumulative hours suffering with headaches has been reduced at least 100 total hours per month. Today she does have a headache indicating that the Botox has worn off. Frequency of treatment is every 3 months unless no response to the treatments, at which time we will discontinue the injections.      A time out was conducted just before the start of the procedure to verify the correct patient and procedure, procedure location, and all relevant critical information.   Signed consent obtained prior to procedure     Conventional methods of treatment but the patient has been unresponsive and refractory.The patient meets criteria for chronic headaches according to the ICHD-III, the patient has more than 15 headaches a month at least 8 of them meet migraine criteria, which last for more than 4 hours a day.     Last Botox injections resulted in over 50%  improvement in the patient's symptoms. Frequency of treatment is every 3 months unless no response to the treatments, at which time we will discontinue the injections.     DESCRIPTION OF PROCEDURE: After obtaining informed consent and under aseptic technique, a total of 155 units of botulinum toxin type A were injected in the following muscles: Procerus 5 units,  5 units bilaterally, frontalis 20 units, temporalis 20 units bilaterally,   occipitalis 15 units, upper cervical paraspinals 10 units bilaterally and trapezius 15 units ilaterally. The patient was given a total of 155 units in 31 sites.    Special precaution taken given her posterior decompression.       The patient tolerated the procedure well. There were no complications. The patient was given a prescription for  repeat treatment in 3 months.     Unavoidable waste 45 units    Saeid Guevara M.D  Medical Director, Headache and Facial Pain  [facility]

## 2023-05-25 ENCOUNTER — DOCUMENT SCAN (OUTPATIENT)
Dept: HOME HEALTH SERVICES | Facility: HOSPITAL | Age: 48
End: 2023-05-25
Payer: MEDICARE

## 2023-05-25 DIAGNOSIS — F41.0 PANIC DISORDER WITHOUT AGORAPHOBIA: ICD-10-CM

## 2023-05-25 DIAGNOSIS — F43.10 PTSD (POST-TRAUMATIC STRESS DISORDER): ICD-10-CM

## 2023-05-26 ENCOUNTER — PATIENT MESSAGE (OUTPATIENT)
Dept: NEUROLOGY | Facility: CLINIC | Age: 48
End: 2023-05-26
Payer: MEDICARE

## 2023-05-26 DIAGNOSIS — F43.10 PTSD (POST-TRAUMATIC STRESS DISORDER): ICD-10-CM

## 2023-05-26 DIAGNOSIS — F41.0 PANIC DISORDER WITHOUT AGORAPHOBIA: ICD-10-CM

## 2023-05-26 RX ORDER — ALPRAZOLAM 1 MG/1
TABLET ORAL
Qty: 30 TABLET | OUTPATIENT
Start: 2023-05-26

## 2023-05-27 ENCOUNTER — PATIENT MESSAGE (OUTPATIENT)
Dept: PSYCHIATRY | Facility: CLINIC | Age: 48
End: 2023-05-27
Payer: MEDICARE

## 2023-05-28 RX ORDER — ALPRAZOLAM 1 MG/1
1 TABLET ORAL DAILY PRN
Qty: 30 TABLET | Refills: 0 | Status: SHIPPED | OUTPATIENT
Start: 2023-05-28 | End: 2023-07-03

## 2023-05-30 ENCOUNTER — OFFICE VISIT (OUTPATIENT)
Dept: PSYCHIATRY | Facility: CLINIC | Age: 48
End: 2023-05-30
Payer: MEDICARE

## 2023-05-30 DIAGNOSIS — F90.2 ATTENTION DEFICIT HYPERACTIVITY DISORDER (ADHD), COMBINED TYPE: ICD-10-CM

## 2023-05-30 DIAGNOSIS — G89.4 CHRONIC PAIN SYNDROME: ICD-10-CM

## 2023-05-30 DIAGNOSIS — F43.10 PTSD (POST-TRAUMATIC STRESS DISORDER): Primary | ICD-10-CM

## 2023-05-30 DIAGNOSIS — F41.0 PANIC DISORDER: ICD-10-CM

## 2023-05-30 PROCEDURE — 99999 PR PBB SHADOW E&M-EST. PATIENT-LVL I: ICD-10-PCS | Mod: PBBFAC,,, | Performed by: PSYCHOLOGIST

## 2023-05-30 PROCEDURE — 1160F RVW MEDS BY RX/DR IN RCRD: CPT | Mod: CPTII,S$GLB,, | Performed by: PSYCHOLOGIST

## 2023-05-30 PROCEDURE — 1159F PR MEDICATION LIST DOCUMENTED IN MEDICAL RECORD: ICD-10-PCS | Mod: CPTII,S$GLB,, | Performed by: PSYCHOLOGIST

## 2023-05-30 PROCEDURE — 90791 PR PSYCHIATRIC DIAGNOSTIC EVALUATION: ICD-10-PCS | Mod: S$GLB,,, | Performed by: PSYCHOLOGIST

## 2023-05-30 PROCEDURE — 4010F ACE/ARB THERAPY RXD/TAKEN: CPT | Mod: CPTII,S$GLB,, | Performed by: PSYCHOLOGIST

## 2023-05-30 PROCEDURE — 4010F PR ACE/ARB THEARPY RXD/TAKEN: ICD-10-PCS | Mod: CPTII,S$GLB,, | Performed by: PSYCHOLOGIST

## 2023-05-30 PROCEDURE — 1160F PR REVIEW ALL MEDS BY PRESCRIBER/CLIN PHARMACIST DOCUMENTED: ICD-10-PCS | Mod: CPTII,S$GLB,, | Performed by: PSYCHOLOGIST

## 2023-05-30 PROCEDURE — 1159F MED LIST DOCD IN RCRD: CPT | Mod: CPTII,S$GLB,, | Performed by: PSYCHOLOGIST

## 2023-05-30 PROCEDURE — 99999 PR PBB SHADOW E&M-EST. PATIENT-LVL I: CPT | Mod: PBBFAC,,, | Performed by: PSYCHOLOGIST

## 2023-05-30 PROCEDURE — 90791 PSYCH DIAGNOSTIC EVALUATION: CPT | Mod: S$GLB,,, | Performed by: PSYCHOLOGIST
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
## 2023-06-03 NOTE — PROGRESS NOTES
Subjective:       Patient ID: Dinah Mitchell is a 48 y.o. female.    Chief Complaint: Follow-up    HPI    The patient is a 48-year-old who is seen virtually today for follow-up.  She is very thankful that she was able to get home health.  She feels that the home health services have really been impactful and made a difference for her.  She has been working with home health physical therapy and feels that this has helped a lot as she is feeling stronger.  She used to fall every day but with home health services and PT, she has not fallen in over 2 weeks.  She is continuing to work with her neurologist regarding her headaches.  She is scheduled for an MRI to look at CSF flow on Friday.  She is continuing to receive Botox injections and has another 1 scheduled for Wednesday.    Regarding the prior issue she was having with blood in her urine, that has improved.  She is not seen blood in her urine in quite some time.  She was scheduled for a CT scan but has decided not to pursue that since the blood in her urine is no longer present    Regarding her mental health issues, she is working closely with her psychiatry team.  She is consistent with her medications and is doing counseling    Review of Systems   Constitutional:  Negative for activity change, appetite change, chills, diaphoresis, fatigue, fever and unexpected weight change.   HENT:  Positive for trouble swallowing. Negative for congestion, ear pain, hearing loss, postnasal drip, rhinorrhea, sinus pressure, sneezing and sore throat.    Eyes:  Positive for visual disturbance. Negative for pain and discharge.   Respiratory:  Positive for wheezing. Negative for cough, chest tightness and shortness of breath.    Cardiovascular:  Negative for chest pain, palpitations and leg swelling.   Gastrointestinal:  Negative for abdominal distention, abdominal pain, blood in stool, constipation, diarrhea, nausea and vomiting.   Genitourinary:  Negative for difficulty  urinating and hematuria.   Musculoskeletal:  Positive for neck pain.   Skin:  Negative for rash.   Neurological:  Positive for headaches.   Psychiatric/Behavioral:  Negative for dysphoric mood.        Objective:      Physical Exam  Constitutional:       General: She is not in acute distress.     Appearance: Normal appearance.   Neurological:      Mental Status: She is alert.   Psychiatric:         Attention and Perception: Attention and perception normal.         Mood and Affect: Mood and affect normal.         Speech: Speech normal.         Behavior: Behavior normal. Behavior is cooperative.         Thought Content: Thought content normal.         Cognition and Memory: Cognition and memory normal.         Judgment: Judgment normal.     There were no vitals taken for this visit.There is no height or weight on file to calculate BMI.              A/P:  1) chronic headaches with history of Arnold-Chiari malformation status post surgical decompression.  Follow-up with Neurology for further evaluation and treatment  2) history of hematuria.  Currently asymptomatic.  We will check a UA to make sure there is no evidence of microscopic hematuria  3) PTSD.  Persistent.  Follow up with Psychiatry and continue with medication and counseling under their direction     4)  Hypothyroidism.  Well controlled.  Continue with Synthroid.    5) health maintenance issues.  We will schedule her mammogram.  We will schedule fasting labs in the fall.      As long as she does well, I will see her back in 3 months or sooner if needed        The patient location is: home  The chief complaint leading to consultation is Follow-up     Visit type: Virtual visit with synchronous audio and video    Each patient to whom he or she provides medical services by telemedicine is:  (1) informed of the relationship between the physician and patient and the respective role of any other health care provider with respect to management of the patient; and (2)  notified that he or she may decline to receive medical services by telemedicine and may withdraw from such care at any time.    I spent 21 minutes on this encounter.  This time includes face-to-face time and non-face to face time including previsit chart review, obtaining and/or reviewing separately obtained history, documenting clinical information in the electronic or other health record, independently interpreting results and communicating results to the patient/family/caregiver, or care coordinator.

## 2023-06-07 ENCOUNTER — TELEPHONE (OUTPATIENT)
Dept: PHARMACY | Facility: CLINIC | Age: 48
End: 2023-06-07
Payer: MEDICARE

## 2023-06-07 ENCOUNTER — OFFICE VISIT (OUTPATIENT)
Dept: PSYCHIATRY | Facility: CLINIC | Age: 48
End: 2023-06-07
Payer: MEDICARE

## 2023-06-07 DIAGNOSIS — F43.10 PTSD (POST-TRAUMATIC STRESS DISORDER): Primary | ICD-10-CM

## 2023-06-07 PROCEDURE — 1159F PR MEDICATION LIST DOCUMENTED IN MEDICAL RECORD: ICD-10-PCS | Mod: CPTII,95,, | Performed by: PSYCHOLOGIST

## 2023-06-07 PROCEDURE — 4010F PR ACE/ARB THEARPY RXD/TAKEN: ICD-10-PCS | Mod: CPTII,95,, | Performed by: PSYCHOLOGIST

## 2023-06-07 PROCEDURE — 1159F MED LIST DOCD IN RCRD: CPT | Mod: CPTII,95,, | Performed by: PSYCHOLOGIST

## 2023-06-07 PROCEDURE — 4010F ACE/ARB THERAPY RXD/TAKEN: CPT | Mod: CPTII,95,, | Performed by: PSYCHOLOGIST

## 2023-06-07 PROCEDURE — 90837 PR PSYCHOTHERAPY W/PATIENT, 60 MIN: ICD-10-PCS | Mod: 95,,, | Performed by: PSYCHOLOGIST

## 2023-06-07 PROCEDURE — 1160F RVW MEDS BY RX/DR IN RCRD: CPT | Mod: CPTII,95,, | Performed by: PSYCHOLOGIST

## 2023-06-07 PROCEDURE — 1160F PR REVIEW ALL MEDS BY PRESCRIBER/CLIN PHARMACIST DOCUMENTED: ICD-10-PCS | Mod: CPTII,95,, | Performed by: PSYCHOLOGIST

## 2023-06-07 PROCEDURE — 90837 PSYTX W PT 60 MINUTES: CPT | Mod: 95,,, | Performed by: PSYCHOLOGIST
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
## 2023-06-08 ENCOUNTER — DOCUMENT SCAN (OUTPATIENT)
Dept: HOME HEALTH SERVICES | Facility: HOSPITAL | Age: 48
End: 2023-06-08
Payer: MEDICARE

## 2023-06-14 ENCOUNTER — TELEPHONE (OUTPATIENT)
Dept: FAMILY MEDICINE | Facility: CLINIC | Age: 48
End: 2023-06-14
Payer: MEDICARE

## 2023-06-14 NOTE — TELEPHONE ENCOUNTER
Fax received advising that insurance does not cover Levocarnitine.  Please advise.  If pursuing a Prior auth, are there any alternative treatments that she has tried.

## 2023-06-15 ENCOUNTER — TELEPHONE (OUTPATIENT)
Dept: PSYCHIATRY | Facility: CLINIC | Age: 48
End: 2023-06-15
Payer: MEDICARE

## 2023-06-15 NOTE — TELEPHONE ENCOUNTER
Rec'd VM from pt at 1206. She states she had to cancel virtual visit today at 1:00 because she rolled on the back of her head and is in excruciating pain. I advised pt to contact her PCP or neuro about the pain. Pt verbalized understanding. Next appointment with Dr. Llanos on 6/22.

## 2023-06-15 NOTE — TELEPHONE ENCOUNTER
Attempted to reach pt to see if she has tried any other meds? Voicemail not set up on cell#, no answer at home #     Spoke w/ Lesvia at [employer] , insurance requires a certain  . [facility] has already taken care of this , medication has been ordered and is ready for  . We can disregard the PA message .--lp

## 2023-06-22 ENCOUNTER — OFFICE VISIT (OUTPATIENT)
Dept: PSYCHIATRY | Facility: CLINIC | Age: 48
End: 2023-06-22
Payer: MEDICARE

## 2023-06-22 DIAGNOSIS — F43.10 PTSD (POST-TRAUMATIC STRESS DISORDER): Primary | ICD-10-CM

## 2023-06-22 PROCEDURE — 90834 PSYTX W PT 45 MINUTES: CPT | Mod: 95,,, | Performed by: PSYCHOLOGIST

## 2023-06-22 PROCEDURE — 1159F MED LIST DOCD IN RCRD: CPT | Mod: CPTII,95,, | Performed by: PSYCHOLOGIST

## 2023-06-22 PROCEDURE — 4010F ACE/ARB THERAPY RXD/TAKEN: CPT | Mod: CPTII,95,, | Performed by: PSYCHOLOGIST

## 2023-06-22 PROCEDURE — 1160F PR REVIEW ALL MEDS BY PRESCRIBER/CLIN PHARMACIST DOCUMENTED: ICD-10-PCS | Mod: CPTII,95,, | Performed by: PSYCHOLOGIST

## 2023-06-22 PROCEDURE — 1160F RVW MEDS BY RX/DR IN RCRD: CPT | Mod: CPTII,95,, | Performed by: PSYCHOLOGIST

## 2023-06-22 PROCEDURE — 1159F PR MEDICATION LIST DOCUMENTED IN MEDICAL RECORD: ICD-10-PCS | Mod: CPTII,95,, | Performed by: PSYCHOLOGIST

## 2023-06-22 PROCEDURE — 90834 PR PSYCHOTHERAPY W/PATIENT, 45 MIN: ICD-10-PCS | Mod: 95,,, | Performed by: PSYCHOLOGIST

## 2023-06-22 PROCEDURE — 4010F PR ACE/ARB THEARPY RXD/TAKEN: ICD-10-PCS | Mod: CPTII,95,, | Performed by: PSYCHOLOGIST
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
## 2023-06-28 ENCOUNTER — TELEPHONE (OUTPATIENT)
Dept: FAMILY MEDICINE | Facility: CLINIC | Age: 48
End: 2023-06-28
Payer: MEDICARE

## 2023-06-28 ENCOUNTER — PATIENT MESSAGE (OUTPATIENT)
Dept: NEUROLOGY | Facility: CLINIC | Age: 48
End: 2023-06-28
Payer: MEDICARE

## 2023-06-28 DIAGNOSIS — Z86.69 HISTORY OF CHIARI MALFORMATION: Primary | ICD-10-CM

## 2023-06-28 NOTE — TELEPHONE ENCOUNTER
----- Message from Amaya Aiken MD sent at 6/3/2023  6:51 PM CDT -----  Can you schedule her for a UA and her mammogram sometime in the next month?  Ty!

## 2023-07-02 DIAGNOSIS — F41.0 PANIC DISORDER WITHOUT AGORAPHOBIA: ICD-10-CM

## 2023-07-02 DIAGNOSIS — F43.10 PTSD (POST-TRAUMATIC STRESS DISORDER): ICD-10-CM

## 2023-07-03 RX ORDER — LEVOCARNITINE 330 MG/1
990 TABLET ORAL 2 TIMES DAILY
Qty: 180 TABLET | Refills: 2 | Status: SHIPPED | OUTPATIENT
Start: 2023-07-03 | End: 2024-02-23 | Stop reason: SDUPTHER

## 2023-07-03 RX ORDER — ALPRAZOLAM 1 MG/1
TABLET ORAL
Qty: 30 TABLET | Refills: 0 | Status: SHIPPED | OUTPATIENT
Start: 2023-07-03 | End: 2023-08-21 | Stop reason: SDUPTHER

## 2023-07-11 ENCOUNTER — OFFICE VISIT (OUTPATIENT)
Dept: PSYCHIATRY | Facility: CLINIC | Age: 48
End: 2023-07-11
Payer: MEDICARE

## 2023-07-11 DIAGNOSIS — F90.2 ATTENTION DEFICIT HYPERACTIVITY DISORDER (ADHD), COMBINED TYPE: ICD-10-CM

## 2023-07-11 DIAGNOSIS — F41.0 PANIC DISORDER: ICD-10-CM

## 2023-07-11 DIAGNOSIS — F43.10 POSTTRAUMATIC STRESS DISORDER: Primary | ICD-10-CM

## 2023-07-11 DIAGNOSIS — G47.00 INSOMNIA, UNSPECIFIED TYPE: ICD-10-CM

## 2023-07-11 PROCEDURE — 90833 PR PSYCHOTHERAPY W/PATIENT W/E&M, 30 MIN (ADD ON): ICD-10-PCS | Mod: 95,,, | Performed by: PSYCHOLOGIST

## 2023-07-11 PROCEDURE — 4010F PR ACE/ARB THEARPY RXD/TAKEN: ICD-10-PCS | Mod: CPTII,95,, | Performed by: PSYCHOLOGIST

## 2023-07-11 PROCEDURE — 99214 OFFICE O/P EST MOD 30 MIN: CPT | Mod: 95,,, | Performed by: PSYCHOLOGIST

## 2023-07-11 PROCEDURE — 1159F MED LIST DOCD IN RCRD: CPT | Mod: CPTII,95,, | Performed by: PSYCHOLOGIST

## 2023-07-11 PROCEDURE — 4010F ACE/ARB THERAPY RXD/TAKEN: CPT | Mod: CPTII,95,, | Performed by: PSYCHOLOGIST

## 2023-07-11 PROCEDURE — 99214 PR OFFICE/OUTPT VISIT, EST, LEVL IV, 30-39 MIN: ICD-10-PCS | Mod: 95,,, | Performed by: PSYCHOLOGIST

## 2023-07-11 PROCEDURE — 1159F PR MEDICATION LIST DOCUMENTED IN MEDICAL RECORD: ICD-10-PCS | Mod: CPTII,95,, | Performed by: PSYCHOLOGIST

## 2023-07-11 PROCEDURE — 90833 PSYTX W PT W E/M 30 MIN: CPT | Mod: 95,,, | Performed by: PSYCHOLOGIST

## 2023-07-11 RX ORDER — TRAZODONE HYDROCHLORIDE 100 MG/1
100 TABLET ORAL NIGHTLY
Qty: 30 TABLET | Refills: 3 | Status: SHIPPED | OUTPATIENT
Start: 2023-07-11 | End: 2023-11-13 | Stop reason: SDUPTHER

## 2023-07-11 RX ORDER — DEXTROAMPHETAMINE SACCHARATE, AMPHETAMINE ASPARTATE MONOHYDRATE, DEXTROAMPHETAMINE SULFATE AND AMPHETAMINE SULFATE 5; 5; 5; 5 MG/1; MG/1; MG/1; MG/1
20 CAPSULE, EXTENDED RELEASE ORAL EVERY MORNING
Qty: 30 CAPSULE | Refills: 0 | Status: SHIPPED | OUTPATIENT
Start: 2023-07-11 | End: 2023-09-15 | Stop reason: SDUPTHER

## 2023-07-11 RX ORDER — ESCITALOPRAM OXALATE 10 MG/1
15 TABLET ORAL DAILY
Qty: 30 TABLET | Refills: 3 | Status: SHIPPED | OUTPATIENT
Start: 2023-07-11 | End: 2023-09-12

## 2023-07-11 NOTE — PROGRESS NOTES
"The patient location is:  [place]  The patient location [place] is: [facility]  The patient phone number is: [phone]  Visit type: Virtual visit with synchronous audio and video  Each patient to whom he or she provides medical services by telemedicine is:  (1) informed of the relationship between the physician and patient and the respective role of any other health care provider with respect to management of the patient; and (2) notified that he or she may decline to receive medical services by telemedicine and may withdraw from such care at any time.    Outpatient Psychiatry Follow-Up Visit    Clinical Status of Patient: Outpatient (Ambulatory)  07/11/2023     Chief Complaint: PTSD, Insomnia      Interval History and Content of Current Session:  Interim Events/Subjective Report/Content of Current Session:  follow-up appointment.    Pt is a 48 y/o female with past psychiatric hx of PTSD, insomnia who presents for follow-up treatment. Pt reported that she has been having increased frequency of nightmares. Has not been taking the prazocin consistently but will start doing so. Was having difficulty with sleep after transitioning from zolpidem to trazodone. Increased dose to 100 mg with slight improvement. Has been meeting with Dr. Llanos for trauma with positive results. Will switch to pain management after trauma work. Stated that she has not using the CPAP machine due to having a dirty room. Plans to clean it and start using CPAP consistently. Was able to drive [place] to see her mother post-surgery. Hopeful that this success can translate to increased engagement outside of house. Noted some increase in loneliness and sadness. Discussed negative interactions and feelings of abandonment by father and sisters.     Past Psychiatric hx:  prozac (can't recall), lexapro (effective, does not remember why stopped), celexa > effective, dec'd libido ( told pt he would "leave her" regarding the sex drive), " depakote (ineffective), lamictal (rash), abilify (paranoid), seroquel (significant wgt gain), Lithium 300 mg po qam/600 mg po qhs (pt reports it worsened anxiety), prazosin (I had some allergic reaction- had been effective for months prior to this report), cymbalta 90mg (effective; pt self d/c'd), trazodone, ambien, Belsomra, Vyvanse    Past Medical hx:   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     PATRICIA (obstructive sleep apnea)     severe - doesn't use CPAP    Panic attack     PTSD (post-traumatic stress disorder)         Interim hx:  Medication changes last visit: None  Anxiety: stable  Depression: slight increase     Denies suicidal/homicidal ideations.  Denies hopelessness/worthlessness.    Denies auditory/visual hallucinations      Alcohol: Pt denies  Drug: Pt denies  Caffeine: Not assessed  Tobacco: Pt denies      Review of Systems   PSYCHIATRIC: Pertinent items are noted in the narrative.        CONSTITUTIONAL: weight stable    Past Medical, Family and Social History: The patient's past medical, family and social history have been reviewed and updated as appropriate within the electronic medical record. See encounter notes.     Current Psychiatric Medication:  wellbutrin xl 150mg po qam, xanax 1mg po daily PRN anxiety (not using daily),  escitalopram 10 mg, Adderall XR 20 mg (not using daily), trazodone 50 mg     Compliance: yes      Side effects: Pt denied.      Risk Parameters:  Patient reports no suicidal ideation  Patient reports no  homicidal ideation  Patient reports no self-injurious behavior  Patient reports no violent behavior     Exam (detailed: at least 9 elements; comprehensive: all 15 elements)   Constitutional  Vitals:  Most recent vital signs, dated less than 90 days prior to this appointment, were reviewed.        General:  unremarkable, age appropriate, casual attire, good eye contact, good rapport       Musculoskeletal  Muscle Strength/Tone:  no flaccidity, no tremor    Gait & Station:  normal      Psychiatric                       Speech:  normal tone, normal rate, rhythm, and volume   Mood & Affect:   Mildly Depressed, anxious         Thought Process:   Goal directed; Linear    Associations:   intact   Thought Content:   No SI/HI, delusions, or paranoia, no AV/VH   Insight & Judgement:   Good, adequate to circumstances   Orientation:   grossly intact; alert and oriented x 4    Memory:  intact for content of interview    Language:  grossly intact, can repeat    Attention Span  : Grossly intact for content of interview   Fund of Knowledge:   intact and appropriate to age and level of education        Assessment and Diagnosis   Status/Progress: Based on the examination today, the patient's problem(s) is/are not adequately controlled.  New problems have been presented today. Co-morbidities are complicating management of the primary condition. There are no active rule-out diagnoses for this patient at this time.      Impression: Pt appears to have increased symptoms of depression. Will increase escitalopram to 15 mg. Pt does appear to be benefiting from trauma-focused therapy, likely culprit of increased nightmares.  Will continue with the other medications as they are and monitor moving forward.     Diagnosis:   Posttraumatic Stress Disorder  Panic Disorder w/o agoraphobia  Attention Deficit Hyperactivity Disorder, Combined Type   Insomnia  Borderline personality disorder traits  Intervention/Counseling/Treatment Plan   Medication  Management:      1. wellbutrin xl 150mg po qam    2. alprazolam 1mg po daily PRN anxiety (approximately 2-3x/wk and often using half tab)    3. Increase escitalopram to 15 mg    4. Adderall XR 20 mg    5. Increase trazodone to 100 mg      6. Call to report any worsening of symptoms or problems with the medication. Pt instructed to go to ER with thoughts of harming self, others     7. Continue in therapy with Dr. Llanos    Psychotherapy: 20 minutes  Target symptoms: insomnia, anxiety  Why chosen therapy is appropriate versus another modality: CBT used; relevant to diagnosis, patient responds to this modality  Outcome monitoring methods: self-report, observation  Therapeutic intervention type: Cognitive Behavioral Therapy  Topics discussed/themes: building skills sets for symptom management, symptom recognition, nutrition, exercise  The patient's response to the intervention is accepting  Patient's response to treatment is: good.   The patient's progress toward treatment goals: limited     Return to clinic: 2 months     -Cognitive-Behavioral/Supportive therapy and psychoeducation provided  -R/B/SE's of medications discussed with the pt who expresses understanding and chooses to take medications as prescribed.   -Pt instructed to call clinic, 911 or go to nearest emergency room if sxs worsen or pt is in   crisis. The pt expresses understanding.    Bebeto Resendiz, PhD, MP     Antidepressant/Antianxiety Medication Initiation:  Patient informed of risks, benefits, and potential side effects of medication and accepts informed consent.  Common side effects include nausea, fatigue, headache, insomnia., Specifically discussed the possibility of new or worsening suicidal thoughts/depression.  Patient instructed to stop the medication immediately and seek urgent treatment if this occurs. Patient instructed not to abruptly discontinue medication without physician guidance except in cases of sudden onset or worsening of SI.        Stimulant Medication Initiation:  Patient advised of risks, benefits, and side effects of medication and accepts informed consent.  Common side effects include insomnia, irritability, jittery feeling, dry mouth, and agitation/hostility., Patient advised of potential addictive nature of medication and controlled substance classification.  Instructed to safeguard medication as no early refills can be given for lost or stolen medications.       Benzodiazepine Initiation:  Patient advised of the risks, benefits, and common side effects of medication and has accepted informed consent.  Common side effects include drowsiness, impaired coordination, possible memory loss., Patient advised NOT to operate a vehicle or machinery untiil they are sure how the medication will affec them.  Client also advised of danger of mixing this medication with alcohol., Patient advised of potential addictive nature of medication and need to safeguard medication as no early refills for lost or stolen medications can be authorized.       Pregnancy Warning:  Patient denies current pregnancy possibility.  Patient made aware that medications have not been proven safe in pregnancy and that she must maintain adequate birth control.  Patient instructed to alert us immediately if she becomes pregnant.

## 2023-07-14 ENCOUNTER — TELEPHONE (OUTPATIENT)
Dept: PHARMACY | Facility: CLINIC | Age: 48
End: 2023-07-14
Payer: MEDICARE

## 2023-07-19 ENCOUNTER — OFFICE VISIT (OUTPATIENT)
Dept: PSYCHIATRY | Facility: CLINIC | Age: 48
End: 2023-07-19
Payer: MEDICARE

## 2023-07-19 DIAGNOSIS — F43.10 POSTTRAUMATIC STRESS DISORDER: Primary | ICD-10-CM

## 2023-07-19 PROCEDURE — 4010F ACE/ARB THERAPY RXD/TAKEN: CPT | Mod: CPTII,95,, | Performed by: PSYCHOLOGIST

## 2023-07-19 PROCEDURE — 90837 PR PSYCHOTHERAPY W/PATIENT, 60 MIN: ICD-10-PCS | Mod: 95,,, | Performed by: PSYCHOLOGIST

## 2023-07-19 PROCEDURE — 1160F PR REVIEW ALL MEDS BY PRESCRIBER/CLIN PHARMACIST DOCUMENTED: ICD-10-PCS | Mod: CPTII,95,, | Performed by: PSYCHOLOGIST

## 2023-07-19 PROCEDURE — 4010F PR ACE/ARB THEARPY RXD/TAKEN: ICD-10-PCS | Mod: CPTII,95,, | Performed by: PSYCHOLOGIST

## 2023-07-19 PROCEDURE — 1159F MED LIST DOCD IN RCRD: CPT | Mod: CPTII,95,, | Performed by: PSYCHOLOGIST

## 2023-07-19 PROCEDURE — 90837 PSYTX W PT 60 MINUTES: CPT | Mod: 95,,, | Performed by: PSYCHOLOGIST

## 2023-07-19 PROCEDURE — 1159F PR MEDICATION LIST DOCUMENTED IN MEDICAL RECORD: ICD-10-PCS | Mod: CPTII,95,, | Performed by: PSYCHOLOGIST

## 2023-07-19 PROCEDURE — 1160F RVW MEDS BY RX/DR IN RCRD: CPT | Mod: CPTII,95,, | Performed by: PSYCHOLOGIST
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
## 2023-07-20 ENCOUNTER — OFFICE VISIT (OUTPATIENT)
Dept: FAMILY MEDICINE | Facility: CLINIC | Age: 48
End: 2023-07-20
Payer: MEDICARE

## 2023-07-20 DIAGNOSIS — J01.90 ACUTE BACTERIAL SINUSITIS: Primary | ICD-10-CM

## 2023-07-20 DIAGNOSIS — B96.89 ACUTE BACTERIAL SINUSITIS: Primary | ICD-10-CM

## 2023-07-20 DIAGNOSIS — J45.901 ASTHMA WITH ACUTE EXACERBATION, UNSPECIFIED ASTHMA SEVERITY, UNSPECIFIED WHETHER PERSISTENT: ICD-10-CM

## 2023-07-20 PROCEDURE — 1160F RVW MEDS BY RX/DR IN RCRD: CPT | Mod: CPTII,95,, | Performed by: NURSE PRACTITIONER

## 2023-07-20 PROCEDURE — 99213 OFFICE O/P EST LOW 20 MIN: CPT | Mod: 95,,, | Performed by: NURSE PRACTITIONER

## 2023-07-20 PROCEDURE — 1159F MED LIST DOCD IN RCRD: CPT | Mod: CPTII,95,, | Performed by: NURSE PRACTITIONER

## 2023-07-20 PROCEDURE — 99213 PR OFFICE/OUTPT VISIT, EST, LEVL III, 20-29 MIN: ICD-10-PCS | Mod: 95,,, | Performed by: NURSE PRACTITIONER

## 2023-07-20 PROCEDURE — 4010F PR ACE/ARB THEARPY RXD/TAKEN: ICD-10-PCS | Mod: CPTII,95,, | Performed by: NURSE PRACTITIONER

## 2023-07-20 PROCEDURE — 1160F PR REVIEW ALL MEDS BY PRESCRIBER/CLIN PHARMACIST DOCUMENTED: ICD-10-PCS | Mod: CPTII,95,, | Performed by: NURSE PRACTITIONER

## 2023-07-20 PROCEDURE — 4010F ACE/ARB THERAPY RXD/TAKEN: CPT | Mod: CPTII,95,, | Performed by: NURSE PRACTITIONER

## 2023-07-20 PROCEDURE — 1159F PR MEDICATION LIST DOCUMENTED IN MEDICAL RECORD: ICD-10-PCS | Mod: CPTII,95,, | Performed by: NURSE PRACTITIONER

## 2023-07-20 RX ORDER — PROMETHAZINE HYDROCHLORIDE AND DEXTROMETHORPHAN HYDROBROMIDE 6.25; 15 MG/5ML; MG/5ML
5 SYRUP ORAL 3 TIMES DAILY PRN
Qty: 240 ML | Refills: 0 | Status: SHIPPED | OUTPATIENT
Start: 2023-07-20 | End: 2023-08-08

## 2023-07-20 RX ORDER — AMOXICILLIN AND CLAVULANATE POTASSIUM 875; 125 MG/1; MG/1
1 TABLET, FILM COATED ORAL EVERY 12 HOURS
Qty: 14 TABLET | Refills: 0 | Status: SHIPPED | OUTPATIENT
Start: 2023-07-20 | End: 2023-07-27

## 2023-07-20 RX ORDER — PREDNISONE 20 MG/1
20 TABLET ORAL DAILY
Qty: 5 TABLET | Refills: 0 | Status: SHIPPED | OUTPATIENT
Start: 2023-07-20 | End: 2023-07-25

## 2023-07-20 NOTE — PROGRESS NOTES
Answers submitted by the patient for this visit:  Sore Throat Questionnaire (Submitted on 7/19/2023)  Chief Complaint: Sore throat  Chronicity: new  Onset: in the past 7 days  Progression since onset: gradually worsening  Pain worse on: right  Fever: 101 - 101.9 F  Fever duration: 1 to 2 days  Pain - numeric: 9/10  abdominal pain: No  congestion: Yes  cough: Yes  diarrhea: No  drooling: No  ear discharge: No  ear pain: No  headaches: Yes  hoarse voice: No  neck pain: No  plugged ear sensation: No  shortness of breath: Yes  stridor: No  swollen glands: Yes  trouble swallowing: Yes  vomiting: No  strep: No  mono: No  Treatments tried: acetaminophen, cool liquids, gargles  Improvement on treatment: no relief  Pain severity: severe

## 2023-07-20 NOTE — PROGRESS NOTES
Subjective     Patient ID: Dinah Mitchell is a 48 y.o. female.    Chief Complaint: No chief complaint on file.    The patient location is: [place], [place]  The chief complaint leading to consultation is: sinus infection and asthma    Visit type: audiovisual    Face to Face time with patient: 12  15 minutes of total time spent on the encounter, which includes face to face time and non-face to face time preparing to see the patient (eg, review of tests), Obtaining and/or reviewing separately obtained history, Documenting clinical information in the electronic or other health record, Independently interpreting results (not separately reported) and communicating results to the patient/family/caregiver, or Care coordination (not separately reported).         Each patient to whom he or she provides medical services by telemedicine is:  (1) informed of the relationship between the physician and patient and the respective role of any other health care provider with respect to management of the patient; and (2) notified that he or she may decline to receive medical services by telemedicine and may withdraw from such care at any time.    Notes:    X 1 week, worsening, taking OTc cold and cough medications.    Sore Throat   This is a new problem. The current episode started in the past 7 days. The problem has been gradually worsening. The pain is worse on the right side. The maximum temperature recorded prior to her arrival was 101 - 101.9 F. The fever has been present for 1 to 2 days. The pain is at a severity of 9/10. The pain is severe. Associated symptoms include congestion, coughing, headaches, swollen glands and trouble swallowing. Pertinent negatives include no abdominal pain, diarrhea, drooling, ear discharge, ear pain, hoarse voice, plugged ear sensation, neck pain, shortness of breath, stridor or vomiting. She has had no exposure to strep or mono. She has tried acetaminophen, cool liquids and gargles for the  symptoms. The treatment provided no relief.   Review of Systems   Constitutional:  Positive for fatigue. Negative for chills and fever.   HENT:  Positive for nasal congestion, sore throat and trouble swallowing. Negative for drooling, ear discharge, ear pain and hoarse voice.    Respiratory:  Positive for cough and wheezing. Negative for shortness of breath and stridor.    Gastrointestinal:  Negative for abdominal pain, diarrhea and vomiting.   Musculoskeletal:  Negative for neck pain.   Neurological:  Positive for headaches.        Objective     Physical Exam  Constitutional:       General: She is not in acute distress.     Appearance: She is ill-appearing. She is not toxic-appearing.   HENT:      Nose: Congestion present.   Pulmonary:      Effort: Pulmonary effort is normal. No respiratory distress.   Neurological:      Mental Status: She is oriented to person, place, and time.   Psychiatric:         Mood and Affect: Mood normal.         Behavior: Behavior normal.          Assessment and Plan     1. Acute bacterial sinusitis  -     amoxicillin-clavulanate 875-125mg (AUGMENTIN) 875-125 mg per tablet; Take 1 tablet by mouth every 12 (twelve) hours for 7 days  Dispense: 14 tablet; Refill: 0    2. Asthma with acute exacerbation, unspecified asthma severity, unspecified whether persistent  -     promethazine-dextromethorphan (PROMETHAZINE-DM) 6.25-15 mg/5 mL Syrp; Take 5 mLs by mouth 3 (three) times daily as needed.  Dispense: 240 mL; Refill: 0  -     predniSONE (DELTASONE) 20 MG tablet; Take 1 tablet (20 mg total) by mouth once daily for 5 days  Dispense: 5 tablet; Refill: 0        Follow up with PCP if symptoms are not resolving in 48-72 hours, follow up immediately for new or worsening symptoms, ED precautions discussed.           No follow-ups on file.

## 2023-07-24 ENCOUNTER — PATIENT MESSAGE (OUTPATIENT)
Dept: NEUROLOGY | Facility: CLINIC | Age: 48
End: 2023-07-24
Payer: MEDICARE

## 2023-08-03 ENCOUNTER — TELEPHONE (OUTPATIENT)
Dept: OPHTHALMOLOGY | Facility: CLINIC | Age: 48
End: 2023-08-03
Payer: MEDICARE

## 2023-08-03 NOTE — TELEPHONE ENCOUNTER
----- Message from Jose Escudero sent at 8/3/2023  3:24 PM CDT -----  Contact: [phone]  Pt is calling because she has a transportation issue and may not be able to make the appt on the 8th. She was trying to see if she can reschedule for another day soon. Please call back to further assist.

## 2023-08-03 NOTE — TELEPHONE ENCOUNTER
Spoke with pt to strongly keep appt with Dr. Bernabe due to going on maternity leave. She will call back to keep or r/s appt due to transportation.

## 2023-08-07 NOTE — PROGRESS NOTES
"Date:  8/8/2023    ?  Referring Provider:   Caro Romero, OD    Copies of Letters to the Following:   Caro Romero, GINA    Chief Complaint:  I saw Dinah Mitchell at the [facility] for neuro-ophthalmic evaluation.   She is a 48 y.o. female with a history of chronic migraines, Chiari malformation s/po suboccipital decompression in 2018, obesity, mood disorder, bilateral eyelid ptosis, PATRICIA, who presents for evaluation of optic discs.    History:     HPI    DLS: 4/21/23    S/p LASIK OU   No eyedrops    PFamhX:  (+)glauc-mother and sister  (+)RD-mother and sister    Pt states VA OD is cloudy like looking out of a dirty window. Pt states   she has a pain in the back of her head on the left side. Pt denies eye   pain OU.  Pt states she gets headaches.  Pt denies diplopia.  Pt states   she had a fall recently after losing consciousness and had some face   swelling and bruising.   Last edited by Valerie Marquez MA on 8/8/2023  2:05 PM.          4/21/2023 Heather, OD:  "New pt here for eye exam. Last exam - 5 years.      Pt has had problems with eyes since brain sx in December 2018. Pt sts when   she rolls on her back vision goes out for about 20 mins. Pt denies   flashes/floaters/pain. Pt denies use of gtt.   For exam results, see Encounter Report.     Chiari malformation type I  -     Ambulatory referral/consult to Ophthalmology; Future; Expected date: 04/28/2023     Other optic atrophy, bilateral  -     Posterior Segment OCT Optic Nerve- Both eyes     Transient visual disturbance     Afferent pupillary defect of left eye     Vision changes  -     Ambulatory referral/consult to Optometry     History of laser refractive surgery     Myopia with astigmatism, bilateral        1-4. Chiari Malformation s/p posterior fossa decompression 12/2018. Pt reports that every time she lays her head back, her vision blacks out. Pt states that it's worse on the left side and often takes several minutes before her " "vision returns to normal. Black outs are also accompanied by headaches. Pt is followed closely by neurology and has a repeat MRI in the coming weeks. Pt had a trace APD of the left eye upon examination today. ONH showed no signs of edema or pallor, but RNFL OCT shows thinning of the RNFL superior and nasal OD and ST OS. Ed pt on all findings and made referral to neuro-ophthalmology for further evaluation.      5-7. Pt had LASIK done in the past and is interested in pursuing again. Updated pt's spec rx and released final copy. Pt correctable 20/25 OD and OS. Ed pt that if she proceeds with LASIK again, she will need readers at all times for near work - pt ok with this. Will hold off on LASIK referral until pt evaluated by neuro-ophthalmology.      RTC: to neuro-ophthalmology      "  ?  Current Outpatient Medications   Medication Sig Dispense Refill    acetylcysteine (N-ACETYL-L-CYSTEINE MISC)       albuterol (PROVENTIL) 2.5 mg /3 mL (0.083 %) nebulizer solution Take 3 mLs (2.5 mg total) by nebulization every 4 (four) hours as needed for Wheezing or Shortness of Breath (chest tightness). Rescue 180 mL 6    albuterol (PROVENTIL/VENTOLIN HFA) 90 mcg/actuation inhaler Inhale 1-2 puffs into the lungs every 4 (four) hours as needed for Wheezing or Shortness of Breath (chest tightness). Rescue 8.5 g 11    ALPRAZolam (XANAX) 1 MG tablet TAKE 1 TABLET(1 MG) BY MOUTH DAILY AS NEEDED FOR ANXIETY 30 tablet 0    buPROPion (WELLBUTRIN XL) 150 MG TB24 tablet Take 1 tablet (150 mg total) by mouth once daily. 90 tablet 1    dextroamphetamine-amphetamine (ADDERALL XR) 20 MG 24 hr capsule Take 1 capsule (20 mg total) by mouth every morning. 30 capsule 0    diphenoxylate-atropine 2.5-0.025 mg (LOMOTIL) 2.5-0.025 mg per tablet Take 1 tablet by mouth 4 (four) times daily as needed. 80 tablet 0    dronabinol (MARINOL) 2.5 MG capsule Take 1 capsule (2.5 mg total) by mouth 2 (two) times daily before meals. 60 capsule 3    EScitalopram " "oxalate (LEXAPRO) 10 MG tablet Take 1.5 tablets (15 mg total) by mouth once daily. 30 tablet 3    fluticasone propionate (FLONASE) 50 mcg/actuation nasal spray SHAKE LIQUID AND USE 1 SPRAY(50 MCG) IN EACH NOSTRIL EVERY DAY 16 g 3    fluticasone-salmeterol 230-21 mcg/dose (ADVAIR HFA) 230-21 mcg/actuation HFAA inhaler Inhale 2 puffs into the lungs 2 (two) times daily. Controller 12 g 2    gabapentin (NEURONTIN) 600 MG tablet TAKE 1 TABLET BY MOUTH 2 TO 3 TIMES A DAY FOR CHRONIC PAIN 90 tablet 11    galcanezumab-gnlm (EMGALITY PEN) 120 mg/mL PnIj ADMINISTER 1 ML UNDER THE SKIN EVERY 28 DAYS 1 mL 6    levOCARNitine (CARNITOR) 330 mg Tab Take 3 tablets (990 mg total) by mouth 2 (two) times daily. 180 tablet 2    montelukast (SINGULAIR) 10 mg tablet TAKE 1 TABLET(10 MG) BY MOUTH EVERY DAY 90 tablet 2    prazosin (MINIPRESS) 2 MG Cap Take 1 capsule (2 mg total) by mouth every evening. 90 capsule 0    promethazine-dextromethorphan (PROMETHAZINE-DM) 6.25-15 mg/5 mL Syrp Take 5 mLs by mouth 3 (three) times daily as needed. 240 mL 0    SYNTHROID 125 mcg tablet TAKE 2 TABLETS(250 MCG) BY MOUTH BEFORE BREAKFAST 180 tablet 3    syringe with needle (SYRINGE 3CC/25GX1") 3 mL 25 gauge x 1" Syrg Use for Toradol IM only. 8 each 0    tiZANidine (ZANAFLEX) 4 MG tablet Take 1 tablet by mouth three times a day as needed for  muscle spasm 90 tablet 6    traZODone (DESYREL) 100 MG tablet Take 1 tablet (100 mg total) by mouth every evening. 30 tablet 3     Current Facility-Administered Medications   Medication Dose Route Frequency Provider Last Rate Last Admin    onabotulinumtoxina injection 200 Units  200 Units Intramuscular Q90 Days Padma Guevara MD   200 Units at 02/17/23 1304    onabotulinumtoxina injection 200 Units  200 Units Intramuscular Q90 Days Padma Guevara MD   200 Units at 05/24/23 1133     Facility-Administered Medications Ordered in Other Visits   Medication Dose Route Frequency Provider Last Rate Last Admin "    lactated ringers infusion   Intravenous Continuous Yang Mueller MD        sodium chloride 0.9% flush 10 mL  10 mL Intravenous PRN Yang Mueller MD         Review of patient's allergies indicates:   Allergen Reactions    Lamotrigine      Other reaction(s): Rash  Other reaction(s): Nausea    Sulfa (sulfonamide antibiotics) Nausea Only     Other reaction(s): Unknown    Adhesive Rash    Zonisamide Nausea And Vomiting     Nausea/vomiting      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     PATRICIA (obstructive sleep apnea)     severe - doesn't use CPAP    Panic attack     PTSD (post-traumatic stress disorder)      Past Surgical History:   Procedure Laterality Date    ANKLE SURGERY Right     x2 (#1 for ligament injury and #2 for fx and ligament)    AUGMENTATION OF BREAST      BILATERAL TUBAL LIGATION      BRAIN SURGERY  12/2018    decompression/craniotomy Arnold-Chiari syndrome    BREAST SURGERY  1997    B implants    CERVICAL BIOPSY  W/ LOOP ELECTRODE EXCISION      COLD KNIFE CONIZATION OF CERVIX N/A 03/17/2022    Procedure: CONE BIOPSY, CERVIX, USING COLD KNIFE;  Surgeon: Nikhil Franco MD;  Location: [facility];  Service: OB/GYN;  Laterality: N/A;    COLONOSCOPY N/A 09/29/2022    Procedure: COLONOSCOPY;  Surgeon: Martín Sagastume MD;  Location: [facility] ENDO;  Service: Endoscopy;  Laterality: N/A; Repeat colonoscopy in 5 years for surveillance    DECOMPRESSION OF CHIARI MALFORMATION BY REMOVAL OF POSTERIOR ARCH OF FIRST CERVICAL VERTEBRA  N/A 12/13/2018    Procedure: DECOMPRESSION, CHIARI MALFORMATION, BY 1ST CERVICAL VERTEBRA POSTERIOR ARCH REMOVAL;  Surgeon: Sergio Busch MD;  [facility] [address] [place] [address];  Service: Neurosurgery;  Laterality: N/A;    EPIDURAL STEROID INJECTION INTO CERVICAL SPINE N/A 12/05/2019    Procedure: Injection-steroid-epidural-cervical-C7-T1;  Surgeon: Noa Samano MD;  Location: [facility] OR;  Service: Neurosurgery;  Laterality: N/A;    ESOPHAGOGASTRODUODENOSCOPY N/A 5/12/2023    Procedure: EGD (ESOPHAGOGASTRODUODENOSCOPY);  Surgeon: Martín Sagastume MD;  Location: [facility] ENDO;  Service: Endoscopy;  Laterality: N/A;    FRACTURE SURGERY Right     Ankle    INJECTION OF ANESTHETIC AGENT AROUND MEDIAL BRANCH NERVES INNERVATING CERVICAL FACET JOINT Bilateral 08/06/2020    Procedure: Block-nerve-medial branch-cervical Bilateral  C3,4,5;  Surgeon: Noa Samano MD;  Location: [facility] OR;  Service: Anesthesiology;  Laterality: Bilateral;    INJECTION OF ANESTHETIC AGENT AROUND MEDIAL BRANCH NERVES INNERVATING CERVICAL FACET JOINT Bilateral 08/20/2020    Procedure: Block-nerve-medial branch-cervical Bilateral C3-4-5;  Surgeon: Noa Samano MD;  Location: [facility] OR;  Service: Anesthesiology;  Laterality: Bilateral;    LAPAROSCOPIC GASTRIC BANDING      with subsequent removal due to abscess    LASIK Bilateral     2009    LIPOSUCTION      x 2    PELVIC LAPAROSCOPY      for endometriosis eval with BTL    RADIOFREQUENCY ABLATION Bilateral 08/16/2021    Procedure: Radiofrequency Ablation Cervical C3-C5;  Surgeon: Robert Munoz MD;  Location: [facility] OR;  Service: Pain Management;  Laterality: Bilateral;    RADIOFREQUENCY THERMAL COAGULATION OF MEDIAL BRANCH OF POSTERIOR RAMUS OF CERVICAL SPINAL NERVE Bilateral 08/27/2020    Procedure: RADIOFREQUENCY THERMAL COAGULATION, NERVE, SPINAL, CERVICAL, POSTERIOR RAMUS, MEDIAL BRANCH C3-4-5, bilateral;  Surgeon: Noa Samano MD;  Location: [facility] OR;  Service: Anesthesiology;  Laterality: Bilateral;  "    Family History   Problem Relation Age of Onset    Retinal detachment Mother     Glaucoma Mother     Diabetes Mother     Stroke Mother 30    Mental illness Mother         depression    Hyperlipidemia Mother     Hypertension Mother     Hyperlipidemia Father     Heart disease Father         "athletes heart"    Glaucoma Sister     Diabetes Sister     Seizures Sister     Mitochondrial disorder Sister         "trent"    Mental illness Sister     Retinal detachment Sister     Seizures Sister     Cervical cancer Sister     Cervical cancer Sister     Ovarian cancer Paternal Aunt     Ovarian cancer Paternal Aunt     Retinal detachment Maternal Grandmother     Colon cancer Maternal Grandmother     Esophageal cancer Maternal Grandfather     Cancer Maternal Grandfather 65        throat ca    Ovarian cancer Cousin     Cervical cancer Paternal Cousin     Cervical cancer Other     Breast cancer Neg Hx     Crohn's disease Neg Hx     Ulcerative colitis Neg Hx     Stomach cancer Neg Hx     Celiac disease Neg Hx     Macular degeneration Neg Hx      Social History     Socioeconomic History    Marital status:    Occupational History    Occupation: [employer]     Tobacco Use    Smoking status: Never    Smokeless tobacco: Never   Substance and Sexual Activity    Alcohol use: Yes     Comment: occasional "maybe monthly"    Drug use: No    Sexual activity: Yes     Partners: Male     Birth control/protection: See Surgical Hx, None     Comment: depo since age 18   Social History Narrative    Not on disability, not currently working.     Social Determinants of Health     Financial Resource Strain: Low Risk  (7/19/2023)    Overall Financial Resource Strain (CARDIA)     Difficulty of Paying Living Expenses: Not very hard   Recent Concern: Financial Resource Strain - Medium Risk (7/18/2023)    Overall Financial Resource Strain (CARDIA)     Difficulty of Paying Living Expenses: Somewhat hard   Food Insecurity: Food Insecurity " Present (8/2/2023)    Hunger Vital Sign     Worried About Running Out of Food in the Last Year: Sometimes true     Ran Out of Food in the Last Year: Never true   Transportation Needs: Unmet Transportation Needs (8/2/2023)    PRAPARE - Transportation     Lack of Transportation (Medical): Yes     Lack of Transportation (Non-Medical): Yes   Physical Activity: Inactive (8/2/2023)    Exercise Vital Sign     Days of Exercise per Week: 0 days     Minutes of Exercise per Session: 0 min   Stress: Stress Concern Present (8/2/2023)    [employer] - Occupational Stress Questionnaire     Feeling of Stress : Very much   Social Connections: Unknown (8/2/2023)    Social Connection and Isolation Panel [NHANES]     Frequency of Communication with Friends and Family: More than three times a week     Frequency of Social Gatherings with Friends and Family: Never     Active Member of Clubs or Organizations: No     Attends Club or Organization Meetings: Never     Marital Status:    Housing Stability: Low Risk  (8/2/2023)    Housing Stability Vital Sign     Unable to Pay for Housing in the Last Year: No     Number of Places Lived in the Last Year: 1     Unstable Housing in the Last Year: No       Examination:  She was well-appearing. She was alert and oriented. Attention span and concentration were normal. Speech, language, memory, and general knowledge were intact.      Her distance visual acuity without correction was 20/400  (PH 20/60-2) in the right eye and 20/25  (PH 20/20-3) in the left eye. Her near visual acuity without correction was J1 in the right eye and J7 in the left eye.     She perceived 8/8 OD and 8/8 OS Ishihara color plates correctly. Pupils were brisk to light without an afferent defect. Ocular ductions were full. Orthophoric in primary, right, and left gaze by cross cover. There was no nystagmus. Saccades and pursuits were normal. Lids were symmetric.     Optic discs appeared normal  without swelling or pallor. Pupillary dilation was not necessary for visualization of the optic disc today.     Laboratories Reviewed:     N/a  ?  Neuroimaging Reviewed:     7/15/2022 MRI brain wo contrast  Postoperative changes of suboccipital craniectomy for Chiari I malformation decompression again demonstrated.     Ventricles and sulci are normal in size for age without evidence of hydrocephalus.     The brain parenchyma appears within normal limits for the patient's age.   Diffusion-weighted images demonstrate no evidence of an acute infarct.   Susceptibility weighted images demonstrate no evidence of acute or chronic hemorrhage. No mass effect or midline shift.     Normal vascular flow voids are preserved.     Hyperostosis frontalis interna incidentally noted.  The paranasal sinuses are normal.  The visualized portions of the mastoids are unremarkable.  Orbits are unremarkable.     Impression:     1. No evidence of an acute intracranial abnormality or significant interval detrimental change as compared to the prior study.  2. Stable postoperative changes of suboccipital craniectomy for Chiari I malformation decompression.     5/2021 MRA brain wo contrast  Impression:     1. Normal brain MRA.  2. Previous decompression for Chiari malformation  ?  Ocular Imaging, Photos, Records Reviewed:     OCT RNFL Today 8/8/2023:   Right Eye - Average RNFL 85 all segments normal   Left Eye - Average RNFL 86 all segments normal     Normal macular architecture OU    Visual Field Test 24-2 OU Today 8/8/2023: Right Eye - fixation losses 1/11, false positives 0%, false negatives 21%, MD -4.34dB, Impression OD: scattered mild to moderate mostly peripheral points, nonspecific. Left Eye - fixation losses 0/13, false positives 0%, false negatives 36%, MD -9.21dB, Impression OS: scattered defects, likely some rim artifact, nonspecific.  ?  Impression:  Dinah Mitchell has history of chronic migraines, Chiari malformation s/po  suboccipital decompression in 2018, obesity, mood disorder, bilateral eyelid ptosis, PATRICIA, who presents for evaluation of optic discs. They report right eye blurry vision which fluctuates to a certain degree, positional occipital pain which can also be associated with vision changes. Neuro-ophthalmologic examination was notable for good corrected visual acuities (anisometropia), full color vision, normal ocular motility and alignment. OCT with normal and symmetric RNFL thicknesses with no evidence of optic disc edema. Formal visual fields were unreliable with high false negatives (also not performed with appropriate corrective prescription).     I will obtain MRI brain w/wo contrast to ensure no change in position of cerebellar tonsils and MRA brain to ensure no vascular compression.   ?  Plan:  1. MRI brain w/wo contrast and MRA brain wo contrast  2. Glasses +/- LASIK  3. Follow up Dr. Romero and Dr Guevara as planned    Follow-up:  I will see her in follow-up based on results of above  ?  ?  Visit Checklist (as applicable):  1. Status of new and prior symptoms discussed? yes  2. Neuroimaging reviewed/ ordered as appropriate? yes  3. Ocular imaging and photos reviewed/ ordered as appropriate? yes  4. Plan for work-up and treatment discussed with patient? yes  5. Potential medication side-effects and monitoring plan discussed? N/a  6. Review of outside medical records was performed and pertinent details are summarized in the HPI above? N/a    Time spent on this encounter: 45 minutes. This includes face to face time and non-face to face time preparing to see the patient (eg, review of tests), obtaining and/or reviewing separately obtained history, documenting clinical information in the electronic or other health record, independently interpreting results and communicating results to the patient/family/caregiver, or care coordinator.      ROGERIO Jacobs  Neuro-Ophthalmology Consultant

## 2023-08-08 ENCOUNTER — CLINICAL SUPPORT (OUTPATIENT)
Dept: OPHTHALMOLOGY | Facility: CLINIC | Age: 48
End: 2023-08-08
Payer: MEDICARE

## 2023-08-08 ENCOUNTER — OFFICE VISIT (OUTPATIENT)
Dept: OPHTHALMOLOGY | Facility: CLINIC | Age: 48
End: 2023-08-08
Payer: MEDICARE

## 2023-08-08 DIAGNOSIS — H53.15 VISUAL DISTORTIONS OF SHAPE AND SIZE: ICD-10-CM

## 2023-08-08 DIAGNOSIS — H54.7 UNSPECIFIED VISUAL LOSS: ICD-10-CM

## 2023-08-08 DIAGNOSIS — R42 DIZZINESS AND GIDDINESS: ICD-10-CM

## 2023-08-08 DIAGNOSIS — G93.5 CHIARI MALFORMATION TYPE I: Primary | ICD-10-CM

## 2023-08-08 DIAGNOSIS — G93.5 CHIARI MALFORMATION TYPE I: ICD-10-CM

## 2023-08-08 PROCEDURE — 4010F ACE/ARB THERAPY RXD/TAKEN: CPT | Mod: CPTII,S$GLB,, | Performed by: STUDENT IN AN ORGANIZED HEALTH CARE EDUCATION/TRAINING PROGRAM

## 2023-08-08 PROCEDURE — 99999 PR PBB SHADOW E&M-EST. PATIENT-LVL IV: ICD-10-PCS | Mod: PBBFAC,,, | Performed by: STUDENT IN AN ORGANIZED HEALTH CARE EDUCATION/TRAINING PROGRAM

## 2023-08-08 PROCEDURE — 92133 POSTERIOR SEGMENT OCT OPTIC NERVE(OCULAR COHERENCE TOMOGRAPHY) - OU - BOTH EYES: ICD-10-PCS | Mod: S$GLB,,, | Performed by: STUDENT IN AN ORGANIZED HEALTH CARE EDUCATION/TRAINING PROGRAM

## 2023-08-08 PROCEDURE — 99215 PR OFFICE/OUTPT VISIT, EST, LEVL V, 40-54 MIN: ICD-10-PCS | Mod: S$GLB,,, | Performed by: STUDENT IN AN ORGANIZED HEALTH CARE EDUCATION/TRAINING PROGRAM

## 2023-08-08 PROCEDURE — 92133 CPTRZD OPH DX IMG PST SGM ON: CPT | Mod: PBBFAC | Performed by: STUDENT IN AN ORGANIZED HEALTH CARE EDUCATION/TRAINING PROGRAM

## 2023-08-08 PROCEDURE — 92083 HUMPHREY VISUAL FIELD - OU - BOTH EYES: ICD-10-PCS | Mod: S$GLB,,, | Performed by: STUDENT IN AN ORGANIZED HEALTH CARE EDUCATION/TRAINING PROGRAM

## 2023-08-08 PROCEDURE — 99215 OFFICE O/P EST HI 40 MIN: CPT | Mod: S$GLB,,, | Performed by: STUDENT IN AN ORGANIZED HEALTH CARE EDUCATION/TRAINING PROGRAM

## 2023-08-08 PROCEDURE — 99999 PR PBB SHADOW E&M-EST. PATIENT-LVL IV: CPT | Mod: PBBFAC,,, | Performed by: STUDENT IN AN ORGANIZED HEALTH CARE EDUCATION/TRAINING PROGRAM

## 2023-08-08 PROCEDURE — 92083 EXTENDED VISUAL FIELD XM: CPT | Mod: PBBFAC | Performed by: STUDENT IN AN ORGANIZED HEALTH CARE EDUCATION/TRAINING PROGRAM

## 2023-08-08 PROCEDURE — 99214 OFFICE O/P EST MOD 30 MIN: CPT | Mod: PBBFAC | Performed by: STUDENT IN AN ORGANIZED HEALTH CARE EDUCATION/TRAINING PROGRAM

## 2023-08-08 PROCEDURE — 4010F PR ACE/ARB THEARPY RXD/TAKEN: ICD-10-PCS | Mod: CPTII,S$GLB,, | Performed by: STUDENT IN AN ORGANIZED HEALTH CARE EDUCATION/TRAINING PROGRAM

## 2023-08-08 NOTE — PROGRESS NOTES
Oct done ou     24-2  sf done ou     Rel & Fix =   good ou     Coop  =     good     Patient has allergies to adhesives     Used pirate patch for test     No mrx    Age correction lens

## 2023-08-08 NOTE — LETTER
[facility]  [address]  [facility] [address]  Phone: [phone]  Fax: [phone]   August 8, 2023    Nileshcori Romero, OD  [address] [place] [address]    Patient: Dinah Mitchell   MR Number: [id number]   YOB: 1975   Date of Visit: 8/8/2023       Dear Dr. Romero :    Thank you for referring Dinah Mitchell to me for evaluation. Here is my assessment and plan of care:    Impression:  Dinah Mitchell has history of chronic migraines, Chiari malformation s/po suboccipital decompression in 2018, obesity, mood disorder, bilateral eyelid ptosis, PATRICIA, who presents for evaluation of optic discs. They report right eye blurry vision which fluctuates to a certain degree, positional occipital pain which can also be associated with vision changes. Neuro-ophthalmologic examination was notable for good corrected visual acuities (anisometropia), full color vision, normal ocular motility and alignment. OCT with normal and symmetric RNFL thicknesses with no evidence of optic disc edema. Formal visual fields were unreliable with high false negatives (also not performed with appropriate corrective prescription).     I will obtain MRI brain w/wo contrast to ensure no change in position of cerebellar tonsils and MRA brain to ensure no vascular compression.      Plan:  1. MRI brain w/wo contrast and MRA brain wo contrast  2. Glasses +/- LASIK  3. Follow up Dr. Romero and Dr Guevara as planned    Follow-up:  I will see her in follow-up based on results of above    If you have questions, please do not hesitate to call me. I look forward to following Ms. Dinah Mitchell along with you.    Sincerely,        Jinny Bernabe MD       CC  Padma Guevara MD

## 2023-08-10 DIAGNOSIS — F43.10 PTSD (POST-TRAUMATIC STRESS DISORDER): ICD-10-CM

## 2023-08-10 RX ORDER — PRAZOSIN HYDROCHLORIDE 2 MG/1
2 CAPSULE ORAL NIGHTLY
Qty: 30 CAPSULE | Refills: 0 | Status: SHIPPED | OUTPATIENT
Start: 2023-08-10 | End: 2023-12-12 | Stop reason: SDUPTHER

## 2023-08-11 ENCOUNTER — OFFICE VISIT (OUTPATIENT)
Dept: FAMILY MEDICINE | Facility: CLINIC | Age: 48
End: 2023-08-11
Payer: MEDICARE

## 2023-08-11 DIAGNOSIS — R11.0 NAUSEA: ICD-10-CM

## 2023-08-11 DIAGNOSIS — J32.9 SINUSITIS, UNSPECIFIED CHRONICITY, UNSPECIFIED LOCATION: Primary | ICD-10-CM

## 2023-08-11 PROCEDURE — 4010F ACE/ARB THERAPY RXD/TAKEN: CPT | Mod: CPTII,95,, | Performed by: NURSE PRACTITIONER

## 2023-08-11 PROCEDURE — 99214 OFFICE O/P EST MOD 30 MIN: CPT | Mod: 95,,, | Performed by: NURSE PRACTITIONER

## 2023-08-11 PROCEDURE — 4010F PR ACE/ARB THEARPY RXD/TAKEN: ICD-10-PCS | Mod: CPTII,95,, | Performed by: NURSE PRACTITIONER

## 2023-08-11 PROCEDURE — 99214 PR OFFICE/OUTPT VISIT, EST, LEVL IV, 30-39 MIN: ICD-10-PCS | Mod: 95,,, | Performed by: NURSE PRACTITIONER

## 2023-08-11 RX ORDER — LEVOFLOXACIN 500 MG/1
500 TABLET, FILM COATED ORAL DAILY
Qty: 7 TABLET | Refills: 0 | Status: SHIPPED | OUTPATIENT
Start: 2023-08-11 | End: 2023-08-18

## 2023-08-11 RX ORDER — FLUTICASONE PROPIONATE 50 MCG
1 SPRAY, SUSPENSION (ML) NASAL DAILY
Qty: 16 G | Refills: 2 | Status: SHIPPED | OUTPATIENT
Start: 2023-08-11 | End: 2024-02-23 | Stop reason: SDUPTHER

## 2023-08-11 RX ORDER — ONDANSETRON 4 MG/1
4 TABLET, ORALLY DISINTEGRATING ORAL EVERY 6 HOURS PRN
Qty: 30 TABLET | Refills: 0 | Status: SHIPPED | OUTPATIENT
Start: 2023-08-11

## 2023-08-11 NOTE — PROGRESS NOTES
Subjective:       Patient ID: Dinah Mitchell is a 48 y.o. female.    The patient location is: [place]  The chief complaint leading to consultation is: sinus problem    Visit type: audiovisual    Face to Face time with patient: 10min  20 minutes of total time spent on the encounter, which includes face to face time and non-face to face time preparing to see the patient (eg, review of tests), Obtaining and/or reviewing separately obtained history, Documenting clinical information in the electronic or other health record, Independently interpreting results (not separately reported) and communicating results to the patient/family/caregiver, or Care coordination (not separately reported).     Each patient to whom he or she provides medical services by telemedicine is:  (1) informed of the relationship between the physician and patient and the respective role of any other health care provider with respect to management of the patient; and (2) notified that he or she may decline to receive medical services by telemedicine and may withdraw from such care at any time.    Cough  This is a new problem. The current episode started 1 to 4 weeks ago. The problem has been rapidly worsening. The problem occurs every few minutes. The cough is Productive of sputum. Associated symptoms include chills, ear pain, headaches, nasal congestion, postnasal drip, sweats and wheezing. Pertinent negatives include no ear congestion, fever, heartburn, hemoptysis, myalgias, rash, rhinorrhea, sore throat or weight loss. The symptoms are aggravated by lying down. Risk factors for lung disease include animal exposure and occupational exposure. She has tried OTC cough suppressant, a beta-agonist inhaler, body position changes, cool air, leukotriene antagonists and oral steroids for the symptoms. Her past medical history is significant for asthma, bronchitis, environmental allergies and pneumonia. There is no history of bronchiectasis or emphysema.     New  patient to me presents for persistent sinus congestion, rodger maxillary pain/pressure, PND, rodger ear pressure     Seen virtually by my colleague 7/20/23, treated for sinusitis and asthma exacerbation with prednisone and Augmentin  She reports feeling slightly better x 2 days then symptoms returned    Denies fever  Using nebs prn   Noncompliant with controlled (Advair)  Associated nausea 2/2 PND      Review of Systems   Constitutional:  Positive for chills. Negative for fever and weight loss.   HENT:  Positive for nasal congestion, ear pain, postnasal drip and sinus pressure/congestion. Negative for rhinorrhea and sore throat.    Respiratory:  Positive for cough and wheezing. Negative for hemoptysis.    Gastrointestinal:  Negative for heartburn.   Musculoskeletal:  Negative for myalgias.   Integumentary:  Negative for rash.   Allergic/Immunologic: Positive for environmental allergies.   Neurological:  Positive for headaches.         Objective:      Physical Exam  Constitutional:       General: She is not in acute distress.     Appearance: She is well-developed. She is not ill-appearing, toxic-appearing or diaphoretic.   HENT:      Right Ear: Hearing normal.      Left Ear: Hearing normal.      Nose:      Comments: Audible nasal congestion   Pulmonary:      Effort: No tachypnea or respiratory distress.   Skin:     Coloration: Skin is not pale.   Neurological:      Mental Status: She is alert and oriented to person, place, and time.   Psychiatric:         Speech: Speech normal.         Behavior: Behavior normal.         Thought Content: Thought content normal.         Judgment: Judgment normal.         Assessment:       1. Sinusitis, unspecified chronicity, unspecified location    2. Nausea        Plan:       Sinusitis, unspecified chronicity, unspecified location  -     levoFLOXacin (LEVAQUIN) 500 MG tablet; Take 1 tablet (500 mg total) by mouth once daily for 7 days  Dispense: 7 tablet; Refill: 0  -     fluticasone  propionate (FLONASE) 50 mcg/actuation nasal spray; Spray 1 spray (50 mcg total) in each nostril once daily.  Dispense: 16 g; Refill: 2    Nausea  -     ondansetron (ZOFRAN-ODT) 4 MG TbDL; Take 1 tablet (4 mg total) by mouth every 6 (six) hours as needed (nausea).  Dispense: 30 tablet; Refill: 0          education provided on supportive care, symptom monitoring and return precautions     Follow up for further evaluation if s/s worsen, fail to improve, or new symptoms arise.    Medication List with Changes/Refills   New Medications    FLUTICASONE PROPIONATE (FLONASE) 50 MCG/ACTUATION NASAL SPRAY    Spray 1 spray (50 mcg total) in each nostril once daily.    LEVOFLOXACIN (LEVAQUIN) 500 MG TABLET    Take 1 tablet (500 mg total) by mouth once daily for 7 days    ONDANSETRON (ZOFRAN-ODT) 4 MG TBDL    Take 1 tablet (4 mg total) by mouth every 6 (six) hours as needed (nausea).   Current Medications    ACETYLCYSTEINE (N-ACETYL-L-CYSTEINE MISC)        ALBUTEROL (PROVENTIL) 2.5 MG /3 ML (0.083 %) NEBULIZER SOLUTION    Take 3 mLs (2.5 mg total) by nebulization every 4 (four) hours as needed for Wheezing or Shortness of Breath (chest tightness). Rescue    ALBUTEROL (PROVENTIL/VENTOLIN HFA) 90 MCG/ACTUATION INHALER    Inhale 1-2 puffs into the lungs every 4 (four) hours as needed for Wheezing or Shortness of Breath (chest tightness). Rescue    ALPRAZOLAM (XANAX) 1 MG TABLET    TAKE 1 TABLET(1 MG) BY MOUTH DAILY AS NEEDED FOR ANXIETY    BUPROPION (WELLBUTRIN XL) 150 MG TB24 TABLET    Take 1 tablet (150 mg total) by mouth once daily.    DEXTROAMPHETAMINE-AMPHETAMINE (ADDERALL XR) 20 MG 24 HR CAPSULE    Take 1 capsule (20 mg total) by mouth every morning.    DIPHENOXYLATE-ATROPINE 2.5-0.025 MG (LOMOTIL) 2.5-0.025 MG PER TABLET    Take 1 tablet by mouth 4 (four) times daily as needed.    DRONABINOL (MARINOL) 2.5 MG CAPSULE    Take 1 capsule (2.5 mg total) by mouth 2 (two) times daily before meals.    ESCITALOPRAM OXALATE (LEXAPRO) 10  "MG TABLET    Take 1.5 tablets (15 mg total) by mouth once daily.    FLUTICASONE-SALMETEROL 230-21 MCG/DOSE (ADVAIR HFA) 230-21 MCG/ACTUATION HFAA INHALER    Inhale 2 puffs into the lungs 2 (two) times daily. Controller    GABAPENTIN (NEURONTIN) 600 MG TABLET    TAKE 1 TABLET BY MOUTH 2 TO 3 TIMES A DAY FOR CHRONIC PAIN    GALCANEZUMAB-GNLM (EMGALITY PEN) 120 MG/ML PNIJ    ADMINISTER 1 ML UNDER THE SKIN EVERY 28 DAYS    LEVOCARNITINE (CARNITOR) 330 MG TAB    Take 3 tablets (990 mg total) by mouth 2 (two) times daily.    MONTELUKAST (SINGULAIR) 10 MG TABLET    TAKE 1 TABLET(10 MG) BY MOUTH EVERY DAY    PRAZOSIN (MINIPRESS) 2 MG CAP    Take 1 capsule (2 mg total) by mouth every evening.    SYNTHROID 125 MCG TABLET    TAKE 2 TABLETS(250 MCG) BY MOUTH BEFORE BREAKFAST    SYRINGE WITH NEEDLE (SYRINGE 3CC/25GX1") 3 ML 25 GAUGE X 1" SYRG    Use for Toradol IM only.    TIZANIDINE (ZANAFLEX) 4 MG TABLET    Take 1 tablet by mouth three times a day as needed for  muscle spasm    TRAZODONE (DESYREL) 100 MG TABLET    Take 1 tablet (100 mg total) by mouth every evening.   Discontinued Medications    FLUTICASONE PROPIONATE (FLONASE) 50 MCG/ACTUATION NASAL SPRAY    SHAKE LIQUID AND USE 1 SPRAY(50 MCG) IN EACH NOSTRIL EVERY DAY     "

## 2023-08-12 RX ORDER — DIPHENOXYLATE HYDROCHLORIDE AND ATROPINE SULFATE 2.5; .025 MG/1; MG/1
1 TABLET ORAL 4 TIMES DAILY PRN
Qty: 80 TABLET | Refills: 0 | Status: CANCELLED | OUTPATIENT
Start: 2023-08-12

## 2023-08-16 ENCOUNTER — OFFICE VISIT (OUTPATIENT)
Dept: PSYCHIATRY | Facility: CLINIC | Age: 48
End: 2023-08-16
Payer: MEDICARE

## 2023-08-16 DIAGNOSIS — F43.10 POSTTRAUMATIC STRESS DISORDER: Primary | ICD-10-CM

## 2023-08-16 PROCEDURE — 4010F ACE/ARB THERAPY RXD/TAKEN: CPT | Mod: CPTII,95,, | Performed by: PSYCHOLOGIST

## 2023-08-16 PROCEDURE — 90837 PSYTX W PT 60 MINUTES: CPT | Mod: 95,,, | Performed by: PSYCHOLOGIST

## 2023-08-16 PROCEDURE — 4010F PR ACE/ARB THEARPY RXD/TAKEN: ICD-10-PCS | Mod: CPTII,95,, | Performed by: PSYCHOLOGIST

## 2023-08-16 PROCEDURE — 1159F MED LIST DOCD IN RCRD: CPT | Mod: CPTII,95,, | Performed by: PSYCHOLOGIST

## 2023-08-16 PROCEDURE — 1160F PR REVIEW ALL MEDS BY PRESCRIBER/CLIN PHARMACIST DOCUMENTED: ICD-10-PCS | Mod: CPTII,95,, | Performed by: PSYCHOLOGIST

## 2023-08-16 PROCEDURE — 90837 PR PSYCHOTHERAPY W/PATIENT, 60 MIN: ICD-10-PCS | Mod: 95,,, | Performed by: PSYCHOLOGIST

## 2023-08-16 PROCEDURE — 1159F PR MEDICATION LIST DOCUMENTED IN MEDICAL RECORD: ICD-10-PCS | Mod: CPTII,95,, | Performed by: PSYCHOLOGIST

## 2023-08-16 PROCEDURE — 1160F RVW MEDS BY RX/DR IN RCRD: CPT | Mod: CPTII,95,, | Performed by: PSYCHOLOGIST
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
## 2023-08-17 ENCOUNTER — PATIENT MESSAGE (OUTPATIENT)
Dept: NEUROLOGY | Facility: CLINIC | Age: 48
End: 2023-08-17
Payer: MEDICARE

## 2023-08-18 ENCOUNTER — TELEPHONE (OUTPATIENT)
Dept: NEUROLOGY | Facility: CLINIC | Age: 48
End: 2023-08-18
Payer: MEDICARE

## 2023-08-18 NOTE — TELEPHONE ENCOUNTER
----- Message from Josie Muñoz sent at 8/18/2023 12:37 PM CDT -----  Contact: self  Type:  Needs Medical Advice    Who Called: self  Would the patient rather a call back or a response via [facility]? call  Best Call Back Number: [phone] (home)     Additional Information: pt just wants to apologize about not making her appt. Her dog had to have emergency surgery and she needed to stay with her dog. Please advise and thank you.

## 2023-08-18 NOTE — TELEPHONE ENCOUNTER
----- Message from Mercedez Yoder sent at 8/18/2023 12:45 PM CDT -----  Contact: Self  Type:  Sooner Appointment Request    Caller is requesting a sooner appointment.  Caller declined first available appointment listed below.  Caller will not accept being placed on the waitlist and is requesting a message be sent to doctor.    Name of Caller:  Patient  When is the first available appointment?  N/A  Symptoms:  Needs to r/s her Botox appt  Best Call Back Number:  [phone]  Additional Information:  Stated she is sorry she had to cancel because of severe bronchitis and her puppy had to have emergency surgery last night. Thank You

## 2023-08-18 NOTE — TELEPHONE ENCOUNTER
Spoke with patient and rescheduled her Botox appointment.     Separately, she stated over the last month she has had symptoms of loss of consciousness, nausea, and numbness in her lower extremities from the knees down. She stated she has had these symptoms before in 2018 and Dr. Samano sent her to Dr. Busch for a craniotomy. Flowing the craniotomy and these symptoms resolved. Please advise on if you wish to see her for this or if she should see gen neuro. Thank you.

## 2023-08-21 ENCOUNTER — TELEPHONE (OUTPATIENT)
Dept: NEUROSURGERY | Facility: CLINIC | Age: 48
End: 2023-08-21
Payer: MEDICARE

## 2023-08-21 DIAGNOSIS — F41.0 PANIC DISORDER WITHOUT AGORAPHOBIA: ICD-10-CM

## 2023-08-21 DIAGNOSIS — F43.10 PTSD (POST-TRAUMATIC STRESS DISORDER): ICD-10-CM

## 2023-08-21 RX ORDER — ALPRAZOLAM 1 MG/1
1 TABLET ORAL DAILY PRN
Qty: 30 TABLET | Refills: 0 | Status: SHIPPED | OUTPATIENT
Start: 2023-08-21 | End: 2023-10-01 | Stop reason: SDUPTHER

## 2023-08-21 NOTE — TELEPHONE ENCOUNTER
Spoke with patient and instructed her to follow up with Dr. Busch. Also advised patient that if she has another instance where she looses consciousness to go to ER via ambulance. Patient verbalized understanding.

## 2023-08-21 NOTE — TELEPHONE ENCOUNTER
No care due was identified.  [facility] Embedded Care Due Messages. Reference number: [id number].   8/21/2023 11:13:07 AM CDT

## 2023-08-21 NOTE — TELEPHONE ENCOUNTER
Spoke to patient and confirmed time and date for appt with Hilary after MRI B.  Patient requested virtual bc she lives far and has limited transportation          ----- Message from Jacey Florian sent at 8/21/2023  9:10 AM CDT -----  Regarding: appt  Contact: [phone]  Pt saw this provider before. Pt has had some symptoms come back like numbness in legs and falling and memory loss. Pls call to discuss. Pt would like to get a recommendation on who she can see on the [facility] as well.

## 2023-08-22 ENCOUNTER — PATIENT MESSAGE (OUTPATIENT)
Dept: ADMINISTRATIVE | Facility: HOSPITAL | Age: 48
End: 2023-08-22
Payer: MEDICARE

## 2023-08-22 ENCOUNTER — PATIENT OUTREACH (OUTPATIENT)
Dept: ADMINISTRATIVE | Facility: HOSPITAL | Age: 48
End: 2023-08-22
Payer: MEDICARE

## 2023-08-22 RX ORDER — FLUTICASONE PROPIONATE AND SALMETEROL XINAFOATE 230; 21 UG/1; UG/1
2 AEROSOL, METERED RESPIRATORY (INHALATION) 2 TIMES DAILY
Qty: 36 G | Refills: 2 | Status: SHIPPED | OUTPATIENT
Start: 2023-08-22 | End: 2023-09-19

## 2023-08-22 NOTE — PROGRESS NOTES
[facility]- Preventative Care ScreeningPopulation Health Chart Review & Patient Outreach Details:     Reason for Outreach Encounter:     [x]  Non-Compliant Report   []  Payor Report ([employer], PHN, BCBS, MSSP, MCIP, C, etc.)   []  Pre-Visit Chart Review     Updates Requested / Reviewed:     []  Care Everywhere    []     []  External Sources ([facility], [facility], DIS, etc.)   []  Care Team Updated    Patient Outreach Method:    []  Telephone Outreach Completed   [] Successful   [] Left Voicemail   [] Unable to Contact (wrong number, no voicemail)  [x]  [facility] Outreach Sent  []  Letter Outreach Mailed  []  Fax Sent for External Records  []  External Records Upload    Health Maintenance Topics Addressed and Outreach Outcomes / Actions Taken:        [x]      Breast Cancer Screening [x]  Mammo Scheduled      []  External Records Requested     []  Added Reminder to Complete to Upcoming Primary Care Appt Notes     []  Patient Declined     []  Patient Will Call Back to Schedule     []  Patient Will Schedule with External Provider / Order Routed if Applicable             []       Cervical Cancer Screening []  Pap Scheduled      []  External Records Requested     []  Added Reminder to Complete to Upcoming Primary Care Appt Notes     []  Patient Declined     []  Patient Will Call Back to Schedule     []  Patient Will Schedule with External Provider               []          Colorectal Cancer Screening []  Colonoscopy Case Request or Referral Placed     []  External Records Requested     []  Added Reminder to Complete to Upcoming Primary Care Appt Notes     []  Patient Declined     []  Patient Will Call Back to Schedule     []  Patient Will Schedule with External Provider     []  Fit Kit Mailed (add the SmartPhrase under additional notes)     []  Reminded Patient to Complete Home Test             []      Diabetic Eye Exam []  Eye Camera Scheduled or Optometry Referral Placed     []  External Records Requested      []  Added Reminder to Complete to Upcoming Primary Care Appt Notes     []  Patient Declined     []  Patient Will Call Back to Schedule     []  Patient Will Schedule with External Provider             []      Blood Pressure Control []  Primary Care Follow Up Visit Scheduled     []  Remote Blood Pressure Reading Captured     []  Added Reminder to Complete to Upcoming Primary Care Appt Notes     []  Patient Declined     []  Patient Will Call Back / Patient Will Send Portal Message with Reading     []  Patient Will Call Back to Schedule Provider Visit             []       HbA1c & Other Labs []  Lab Appt Scheduled for Due Labs     []  Primary Care Follow Up Visit Scheduled      []  Reminded Patient to Complete Home Test     []  Added Reminder to Complete to Upcoming Primary Care Appt Notes     []  Patient Declined     []  Patient Will Call Back to Schedule     []  Patient Will Schedule with External Provider / Order Routed if Applicable           []    Schedule Primary Care Appt []  Primary Care Appt Scheduled     []  Patient Declined     []  Patient Will Call Back to Schedule     []  Pt Established with External Provider & Updated Care Team             []      Medication Adherence []  Primary Care Appointment Scheduled     []  Added Reminder to Upcoming Primary Care Appt Notes     []  Patient Reminded to  Prescription     []  Patient Declined, Provider Notified if Needed     []  Sent Provider Message to Review and/or Add Exclusion to Problem List             []      Osteoporosis Screening []  DXA Appointment Scheduled     []  External Records Requested     []  Added Reminder to Complete to Upcoming Primary Care Appt Notes     []  Patient Declined     []  Patient Will Call Back to Schedule     []  Patient Will Schedule with External Provider / Order Routed if Applicable     Additional Care Coordinator Notes:         Further Action Needed If Patient Returns Outreach:

## 2023-08-22 NOTE — TELEPHONE ENCOUNTER
Refill Decision Note   Dinah Mitchell  is requesting a refill authorization.  Brief Assessment and Rationale for Refill:  Approve     Medication Therapy Plan:         Comments:     Note composed:8:06 AM 08/22/2023

## 2023-08-25 ENCOUNTER — PATIENT MESSAGE (OUTPATIENT)
Dept: FAMILY MEDICINE | Facility: CLINIC | Age: 48
End: 2023-08-25
Payer: MEDICARE

## 2023-08-31 ENCOUNTER — HOSPITAL ENCOUNTER (OUTPATIENT)
Dept: RADIOLOGY | Facility: HOSPITAL | Age: 48
Discharge: HOME | End: 2023-08-31
Attending: STUDENT IN AN ORGANIZED HEALTH CARE EDUCATION/TRAINING PROGRAM
Payer: MEDICARE

## 2023-08-31 DIAGNOSIS — H54.7 UNSPECIFIED VISUAL LOSS: ICD-10-CM

## 2023-08-31 DIAGNOSIS — G93.5 CHIARI MALFORMATION TYPE I: ICD-10-CM

## 2023-08-31 DIAGNOSIS — R42 DIZZINESS AND GIDDINESS: ICD-10-CM

## 2023-08-31 PROCEDURE — 70553 MRI BRAIN W WO CONTRAST: ICD-10-PCS | Mod: 26,,, | Performed by: RADIOLOGY

## 2023-08-31 PROCEDURE — 70544 MR ANGIOGRAPHY HEAD W/O DYE: CPT | Mod: 59,TC,PO

## 2023-08-31 PROCEDURE — 70553 MRI BRAIN STEM W/O & W/DYE: CPT | Mod: TC,PO

## 2023-08-31 PROCEDURE — 70544 MR ANGIOGRAPHY HEAD W/O DYE: CPT | Mod: 26,59,, | Performed by: RADIOLOGY

## 2023-08-31 PROCEDURE — 70544 MRA BRAIN WITHOUT CONTRAST: ICD-10-PCS | Mod: 26,59,, | Performed by: RADIOLOGY

## 2023-08-31 PROCEDURE — 25500020 PHARM REV CODE 255: Mod: PO | Performed by: STUDENT IN AN ORGANIZED HEALTH CARE EDUCATION/TRAINING PROGRAM

## 2023-08-31 PROCEDURE — 70553 MRI BRAIN STEM W/O & W/DYE: CPT | Mod: 26,,, | Performed by: RADIOLOGY

## 2023-08-31 PROCEDURE — A9585 GADOBUTROL INJECTION: HCPCS | Mod: PO | Performed by: STUDENT IN AN ORGANIZED HEALTH CARE EDUCATION/TRAINING PROGRAM

## 2023-08-31 RX ORDER — GADOBUTROL 604.72 MG/ML
10 INJECTION INTRAVENOUS
Status: COMPLETED | OUTPATIENT
Start: 2023-08-31 | End: 2023-08-31

## 2023-08-31 RX ADMIN — GADOBUTROL 10 ML: 604.72 INJECTION INTRAVENOUS at 01:08

## 2023-09-04 ENCOUNTER — PATIENT MESSAGE (OUTPATIENT)
Dept: FAMILY MEDICINE | Facility: CLINIC | Age: 48
End: 2023-09-04
Payer: MEDICARE

## 2023-09-05 ENCOUNTER — OFFICE VISIT (OUTPATIENT)
Dept: NEUROSURGERY | Facility: CLINIC | Age: 48
End: 2023-09-05
Payer: MEDICARE

## 2023-09-05 DIAGNOSIS — M54.2 CERVICALGIA: ICD-10-CM

## 2023-09-05 DIAGNOSIS — G93.5 CHIARI MALFORMATION TYPE I: Primary | ICD-10-CM

## 2023-09-05 DIAGNOSIS — G43.719 INTRACTABLE CHRONIC MIGRAINE WITHOUT AURA AND WITHOUT STATUS MIGRAINOSUS: ICD-10-CM

## 2023-09-05 PROCEDURE — 99214 PR OFFICE/OUTPT VISIT, EST, LEVL IV, 30-39 MIN: ICD-10-PCS | Mod: 95,,, | Performed by: PHYSICIAN ASSISTANT

## 2023-09-05 PROCEDURE — 99214 OFFICE O/P EST MOD 30 MIN: CPT | Mod: 95,,, | Performed by: PHYSICIAN ASSISTANT

## 2023-09-05 PROCEDURE — 4010F ACE/ARB THERAPY RXD/TAKEN: CPT | Mod: CPTII,95,, | Performed by: PHYSICIAN ASSISTANT

## 2023-09-05 PROCEDURE — 4010F PR ACE/ARB THEARPY RXD/TAKEN: ICD-10-PCS | Mod: CPTII,95,, | Performed by: PHYSICIAN ASSISTANT

## 2023-09-05 NOTE — PROGRESS NOTES
Neurosurgery  History & Physical    SUBJECTIVE:   The patient location is: [place]    The chief complaint leading to consultation is: chiari    Visit type: audiovisual    Face to Face time with patient: 20  20 minutes of total time spent on the encounter, which includes face to face time and non-face to face time preparing to see the patient (eg, review of tests), Obtaining and/or reviewing separately obtained history, Documenting clinical information in the electronic or other health record, Independently interpreting results (not separately reported) and communicating results to the patient/family/caregiver, or Care coordination (not separately reported).   Each patient to whom he or she provides medical services by telemedicine is:  (1) informed of the relationship between the physician and patient and the respective role of any other health care provider with respect to management of the patient; and (2) notified that he or she may decline to receive medical services by telemedicine and may withdraw from such care at any time.    Chief Complaint: chiari malformation    History of Present Illness:  48-year-old female with history of suboccipital decompressive craniectomy, C1 laminectomy with duraplasty for Chiari malformation 12/13/2018. Postoperatively her exertional headaches in a posterior headaches resolved. She was last seen in clinic by Dr. Busch 10/2019 and at that time her follow-up MRI scan of the cervical spine shows excellent resolution of the Chiari.  She had no pseudomeningocele and the cerebellar tonsils were well above the foramen magnum.  She had a C5-6 disc bulge that is been little bit worse compared to prior imaging but no significant central or foraminal stenosis. She was referred to PT and C5-C6 GREGOR was ordered.     Today, she reports her HA never improved, however her UE numbness and nausea resolved. She states these symptoms returned about 1.5 months ago. She has nausea daily and numbness  in BLE below the knees as well as in her left arm - which she states were her previous pre-op symptoms. She has been having frequent falls but denies issues with dexterity. She also reports exacerbated neck pain and HA over the past month. She states her headaches are frontal and are not exacerbated with valsalva. She denies any focal weakness, recent injury or trauma.       Review of patient's allergies indicates:   Allergen Reactions    Lamotrigine      Other reaction(s): Rash  Other reaction(s): Nausea    Sulfa (sulfonamide antibiotics) Nausea Only     Other reaction(s): Unknown    Adhesive Rash    Zonisamide Nausea And Vomiting     Nausea/vomiting        Current Outpatient Medications   Medication Sig Dispense Refill    acetylcysteine (N-ACETYL-L-CYSTEINE MISC)       albuterol (PROVENTIL) 2.5 mg /3 mL (0.083 %) nebulizer solution Take 3 mLs (2.5 mg total) by nebulization every 4 (four) hours as needed for Wheezing or Shortness of Breath (chest tightness). Rescue 180 mL 6    albuterol (PROVENTIL/VENTOLIN HFA) 90 mcg/actuation inhaler Inhale 1-2 puffs into the lungs every 4 (four) hours as needed for Wheezing or Shortness of Breath (chest tightness). Rescue 8.5 g 11    ALPRAZolam (XANAX) 1 MG tablet TAKE 1 TABLET(1 MG) BY MOUTH DAILY AS NEEDED FOR ANXIETY 30 tablet 0    buPROPion (WELLBUTRIN XL) 150 MG TB24 tablet Take 1 tablet (150 mg total) by mouth once daily. 90 tablet 1    dextroamphetamine-amphetamine (ADDERALL XR) 20 MG 24 hr capsule Take 1 capsule (20 mg total) by mouth every morning. 30 capsule 0    diphenoxylate-atropine 2.5-0.025 mg (LOMOTIL) 2.5-0.025 mg per tablet Take 1 tablet by mouth 4 (four) times daily as needed. 80 tablet 0    dronabinol (MARINOL) 2.5 MG capsule Take 1 capsule (2.5 mg total) by mouth 2 (two) times daily before meals. 60 capsule 3    EScitalopram oxalate (LEXAPRO) 10 MG tablet Take 1.5 tablets (15 mg total) by mouth once daily. 30 tablet 3    fluticasone propionate (FLONASE) 50  "mcg/actuation nasal spray Spray 1 spray (50 mcg total) in each nostril once daily. 16 g 2    fluticasone-salmeterol 230-21 mcg/dose (ADVAIR HFA) 230-21 mcg/actuation HFAA inhaler Inhale 2 puffs into the lungs 2 (two) times daily. Controller 36 g 2    gabapentin (NEURONTIN) 600 MG tablet TAKE 1 TABLET BY MOUTH 2 TO 3 TIMES A DAY FOR CHRONIC PAIN 90 tablet 11    galcanezumab-gnlm (EMGALITY PEN) 120 mg/mL PnIj ADMINISTER 1 ML UNDER THE SKIN EVERY 28 DAYS 1 mL 6    levOCARNitine (CARNITOR) 330 mg Tab Take 3 tablets (990 mg total) by mouth 2 (two) times daily. 180 tablet 2    montelukast (SINGULAIR) 10 mg tablet TAKE 1 TABLET(10 MG) BY MOUTH EVERY DAY 90 tablet 2    ondansetron (ZOFRAN-ODT) 4 MG TbDL Take 1 tablet (4 mg total) by mouth every 6 (six) hours as needed (nausea). 30 tablet 0    prazosin (MINIPRESS) 2 MG Cap Take 1 capsule (2 mg total) by mouth every evening. 30 capsule 0    SYNTHROID 125 mcg tablet TAKE 2 TABLETS(250 MCG) BY MOUTH BEFORE BREAKFAST 180 tablet 3    syringe with needle (SYRINGE 3CC/25GX1") 3 mL 25 gauge x 1" Syrg Use for Toradol IM only. 8 each 0    tiZANidine (ZANAFLEX) 4 MG tablet Take 1 tablet by mouth three times a day as needed for  muscle spasm 90 tablet 6    traZODone (DESYREL) 100 MG tablet Take 1 tablet (100 mg total) by mouth every evening. 30 tablet 3     Current Facility-Administered Medications   Medication Dose Route Frequency Provider Last Rate Last Admin    onabotulinumtoxina injection 200 Units  200 Units Intramuscular Q90 Days Padma Guevara MD   200 Units at 02/17/23 1304    onabotulinumtoxina injection 200 Units  200 Units Intramuscular Q90 Days Padma Guevara MD   200 Units at 05/24/23 1133     Facility-Administered Medications Ordered in Other Visits   Medication Dose Route Frequency Provider Last Rate Last Admin    lactated ringers infusion   Intravenous Continuous Yang Mueller MD        sodium chloride 0.9% flush 10 mL  10 mL Intravenous PRN " Yang Mueller MD           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     PATRICIA (obstructive sleep apnea)     severe - doesn't use CPAP    Panic attack     PTSD (post-traumatic stress disorder)      Past Surgical History:   Procedure Laterality Date    ANKLE SURGERY Right     x2 (#1 for ligament injury and #2 for fx and ligament)    AUGMENTATION OF BREAST      BILATERAL TUBAL LIGATION      BRAIN SURGERY  12/2018    decompression/craniotomy Arnold-Chiari syndrome    BREAST SURGERY  1997    B implants    CERVICAL BIOPSY  W/ LOOP ELECTRODE EXCISION      COLD KNIFE CONIZATION OF CERVIX N/A 03/17/2022    Procedure: CONE BIOPSY, CERVIX, USING COLD KNIFE;  Surgeon: Nikhil Franco MD;  Location: [facility];  Service: OB/GYN;  Laterality: N/A;    COLONOSCOPY N/A 09/29/2022    Procedure: COLONOSCOPY;  Surgeon: Martín Sagasutme MD;  Location: [facility];  Service: Endoscopy;  Laterality: N/A; Repeat colonoscopy in 5 years for surveillance    DECOMPRESSION OF CHIARI MALFORMATION BY REMOVAL OF POSTERIOR ARCH OF FIRST CERVICAL VERTEBRA N/A 12/13/2018    Procedure: DECOMPRESSION, CHIARI MALFORMATION, BY 1ST CERVICAL VERTEBRA POSTERIOR ARCH REMOVAL;  Surgeon: Sergio Busch MD;  Location: [address];  Service: Neurosurgery;  Laterality: N/A;    EPIDURAL STEROID INJECTION INTO CERVICAL SPINE N/A 12/05/2019    Procedure: Injection-steroid-epidural-cervical-C7-T1;  Surgeon: Noa Samano MD;  Location: [facility] OR;  Service: Neurosurgery;  Laterality: N/A;     ESOPHAGOGASTRODUODENOSCOPY N/A 5/12/2023    Procedure: EGD (ESOPHAGOGASTRODUODENOSCOPY);  Surgeon: Martín Sagastume MD;  Location: [facility] ENDO;  Service: Endoscopy;  Laterality: N/A;    FRACTURE SURGERY Right     Ankle    INJECTION OF ANESTHETIC AGENT AROUND MEDIAL BRANCH NERVES INNERVATING CERVICAL FACET JOINT Bilateral 08/06/2020    Procedure: Block-nerve-medial branch-cervical Bilateral  C3,4,5;  Surgeon: Noa Samano MD;  Location: [facility] OR;  Service: Anesthesiology;  Laterality: Bilateral;    INJECTION OF ANESTHETIC AGENT AROUND MEDIAL BRANCH NERVES INNERVATING CERVICAL FACET JOINT Bilateral 08/20/2020    Procedure: Block-nerve-medial branch-cervical Bilateral C3-4-5;  Surgeon: Noa Samano MD;  Location: [facility] OR;  Service: Anesthesiology;  Laterality: Bilateral;    LAPAROSCOPIC GASTRIC BANDING      with subsequent removal due to abscess    LASIK Bilateral     2009    LIPOSUCTION      x 2    PELVIC LAPAROSCOPY      for endometriosis eval with BTL    RADIOFREQUENCY ABLATION Bilateral 08/16/2021    Procedure: Radiofrequency Ablation Cervical C3-C5;  Surgeon: Robert Munoz MD;  Location: [facility] OR;  Service: Pain Management;  Laterality: Bilateral;    RADIOFREQUENCY THERMAL COAGULATION OF MEDIAL BRANCH OF POSTERIOR RAMUS OF CERVICAL SPINAL NERVE Bilateral 08/27/2020    Procedure: RADIOFREQUENCY THERMAL COAGULATION, NERVE, SPINAL, CERVICAL, POSTERIOR RAMUS, MEDIAL BRANCH C3-4-5, bilateral;  Surgeon: Noa Samano MD;  Location: [facility] OR;  Service: Anesthesiology;  Laterality: Bilateral;     Family History       Problem Relation (Age of Onset)    Cancer Maternal Grandfather (65)    Cervical cancer Sister, Sister, Paternal Cousin, Other    Colon cancer Maternal Grandmother    Diabetes Mother, Sister    Esophageal cancer Maternal Grandfather    Glaucoma Mother, Sister    Heart disease Father    Hyperlipidemia Mother, Father    Hypertension Mother    Mental illness Mother, Sister    Mitochondrial disorder Sister     "Ovarian cancer Paternal Aunt, Paternal Aunt, Cousin    Retinal detachment Mother, Sister, Maternal Grandmother    Seizures Sister, Sister    Stroke Mother (30)          Social History     Socioeconomic History    Marital status:    Occupational History    Occupation: [employer]     Tobacco Use    Smoking status: Never    Smokeless tobacco: Never   Substance and Sexual Activity    Alcohol use: Yes     Comment: occasional "maybe monthly"    Drug use: No    Sexual activity: Yes     Partners: Male     Birth control/protection: See Surgical Hx, None     Comment: depo since age 18   Social History Narrative    Not on disability, not currently working.     Social Determinants of Health     Financial Resource Strain: Low Risk  (8/10/2023)    Overall Financial Resource Strain (CARDIA)     Difficulty of Paying Living Expenses: Not very hard   Recent Concern: Financial Resource Strain - Medium Risk (7/18/2023)    Overall Financial Resource Strain (CARDIA)     Difficulty of Paying Living Expenses: Somewhat hard   Food Insecurity: Food Insecurity Present (8/10/2023)    Hunger Vital Sign     Worried About Running Out of Food in the Last Year: Sometimes true     Ran Out of Food in the Last Year: Sometimes true   Transportation Needs: Unmet Transportation Needs (8/10/2023)    PRAPARE - Transportation     Lack of Transportation (Medical): Yes     Lack of Transportation (Non-Medical): Yes   Physical Activity: Inactive (8/10/2023)    Exercise Vital Sign     Days of Exercise per Week: 0 days     Minutes of Exercise per Session: 0 min   Stress: Stress Concern Present (8/10/2023)    [place] [employer] - Occupational Stress Questionnaire     Feeling of Stress : Very much   Social Connections: Unknown (8/10/2023)    Social Connection and Isolation Panel [NHANES]     Frequency of Communication with Friends and Family: More than three times a week     Frequency of Social Gatherings with Friends and " Family: Never     Active Member of Clubs or Organizations: No     Attends Club or Organization Meetings: Never     Marital Status:    Housing Stability: Low Risk  (8/10/2023)    Housing Stability Vital Sign     Unable to Pay for Housing in the Last Year: No     Number of Places Lived in the Last Year: 1     Unstable Housing in the Last Year: No       Review of Systems    OBJECTIVE:     Vital Signs     There is no height or weight on file to calculate BMI.      Neurosurgery Physical Exam  General: well developed, well nourished, no distress.   Head: normocephalic, atraumatic  Neurologic: Alert and oriented. Thought content appropriate.  GCS: Motor: 6/Verbal: 5/Eyes: 4 GCS Total: 15  Mental Status: Awake, Alert, Oriented x3  Cranial nerves: face symmetric, tongue midline, CN II-XII grossly intact.   Eyes: pupils equal, round, reactive to light with accomodation, EOMI.    Sensory: intact to light touch throughout  Motor Strength:Moves all extremities spontaneously with good tone.  No abnormal movements seen.     Diagnostic Results:  None new    ASSESSMENT/PLAN:     48-year-old female with history of suboccipital decompressive craniectomy, C1 laminectomy with duraplasty for Chiari malformation 12/13/2018 who presents with complaint of HA, neck pain, nausea, falls and UE numbness. She reports she had these symptoms prior to her surgery. We will obtain a MRI cervical spine and MRI CSF flow study for further evaluation. I will also refer her to Neurology for evaluation of her headaches as these do not sound typical for chiari and she reports her HA did not resolve post-op. I will see her back once her imaging is complete to review results and determine next steps.       Hilary Hendrix PA-C  Neurosurgery      Note dictated with voice recognition software, please excuse any grammatical errors.

## 2023-09-06 RX ORDER — BUDESONIDE AND FORMOTEROL FUMARATE DIHYDRATE 160; 4.5 UG/1; UG/1
2 AEROSOL RESPIRATORY (INHALATION) EVERY 12 HOURS
Qty: 10.2 G | Refills: 1 | Status: SHIPPED | OUTPATIENT
Start: 2023-09-06 | End: 2023-09-19
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
## 2023-09-12 ENCOUNTER — OFFICE VISIT (OUTPATIENT)
Dept: PSYCHIATRY | Facility: CLINIC | Age: 48
End: 2023-09-12
Payer: MEDICARE

## 2023-09-12 DIAGNOSIS — F43.10 POSTTRAUMATIC STRESS DISORDER: Primary | ICD-10-CM

## 2023-09-12 DIAGNOSIS — F90.2 ATTENTION DEFICIT HYPERACTIVITY DISORDER (ADHD), COMBINED TYPE: ICD-10-CM

## 2023-09-12 DIAGNOSIS — G47.00 INSOMNIA, UNSPECIFIED TYPE: ICD-10-CM

## 2023-09-12 DIAGNOSIS — F41.0 PANIC DISORDER: ICD-10-CM

## 2023-09-12 PROCEDURE — 4010F PR ACE/ARB THEARPY RXD/TAKEN: ICD-10-PCS | Mod: CPTII,95,, | Performed by: PSYCHOLOGIST

## 2023-09-12 PROCEDURE — 90834 PSYTX W PT 45 MINUTES: CPT | Mod: 95,,, | Performed by: PSYCHOLOGIST

## 2023-09-12 PROCEDURE — 1160F PR REVIEW ALL MEDS BY PRESCRIBER/CLIN PHARMACIST DOCUMENTED: ICD-10-PCS | Mod: CPTII,95,, | Performed by: PSYCHOLOGIST

## 2023-09-12 PROCEDURE — 99214 PR OFFICE/OUTPT VISIT, EST, LEVL IV, 30-39 MIN: ICD-10-PCS | Mod: 95,,, | Performed by: PSYCHOLOGIST

## 2023-09-12 PROCEDURE — 90833 PSYTX W PT W E/M 30 MIN: CPT | Mod: 95,,, | Performed by: PSYCHOLOGIST

## 2023-09-12 PROCEDURE — 90834 PR PSYCHOTHERAPY W/PATIENT, 45 MIN: ICD-10-PCS | Mod: 95,,, | Performed by: PSYCHOLOGIST

## 2023-09-12 PROCEDURE — 90833 PR PSYCHOTHERAPY W/PATIENT W/E&M, 30 MIN (ADD ON): ICD-10-PCS | Mod: 95,,, | Performed by: PSYCHOLOGIST

## 2023-09-12 PROCEDURE — 1159F PR MEDICATION LIST DOCUMENTED IN MEDICAL RECORD: ICD-10-PCS | Mod: CPTII,95,, | Performed by: PSYCHOLOGIST

## 2023-09-12 PROCEDURE — 99214 OFFICE O/P EST MOD 30 MIN: CPT | Mod: 95,,, | Performed by: PSYCHOLOGIST

## 2023-09-12 PROCEDURE — 4010F ACE/ARB THERAPY RXD/TAKEN: CPT | Mod: CPTII,95,, | Performed by: PSYCHOLOGIST

## 2023-09-12 PROCEDURE — 1159F MED LIST DOCD IN RCRD: CPT | Mod: CPTII,95,, | Performed by: PSYCHOLOGIST

## 2023-09-12 PROCEDURE — 1160F RVW MEDS BY RX/DR IN RCRD: CPT | Mod: CPTII,95,, | Performed by: PSYCHOLOGIST

## 2023-09-12 RX ORDER — ESCITALOPRAM OXALATE 20 MG/1
20 TABLET ORAL DAILY
Qty: 30 TABLET | Refills: 3 | Status: SHIPPED | OUTPATIENT
Start: 2023-09-12 | End: 2023-12-12 | Stop reason: SDUPTHER

## 2023-09-12 NOTE — PROGRESS NOTES
"The patient location is:  [place]  The patient location [place] is: [facility]  The patient phone number is: [phone]  Visit type: Virtual visit with synchronous audio and video  Each patient to whom he or she provides medical services by telemedicine is:  (1) informed of the relationship between the physician and patient and the respective role of any other health care provider with respect to management of the patient; and (2) notified that he or she may decline to receive medical services by telemedicine and may withdraw from such care at any time.    Outpatient Psychiatry Follow-Up Visit    Clinical Status of Patient: Outpatient (Ambulatory)  09/12/2023     Chief Complaint: PTSD, Insomnia      Interval History and Content of Current Session:  Interim Events/Subjective Report/Content of Current Session:  follow-up appointment.    Pt is a 46 y/o female with past psychiatric hx of PTSD, insomnia who presents for follow-up treatment. Pt reported that she did not increase escitalopram to 15 mg due to inability cutting tablet in half. Pt noted that her legs and arms are becoming numb from chiari malformation concerns. Pt noted that she had a syncopal episode and hit face and head on ground. Did not go to ED for work-up. Happened about 1 month ago. Will be transferring to neuro on the [facility]. Was having some passive SI due to pain but denied current SI, plan or intent.  Started pain management with Dr. Llanos and is using Headspace edison.     Past Psychiatric hx:  prozac (can't recall), lexapro (effective, does not remember why stopped), celexa > effective, dec'd libido ( told pt he would "leave her" regarding the sex drive), depakote (ineffective), lamictal (rash), abilify (paranoid), seroquel (significant wgt gain), Lithium 300 mg po qam/600 mg po qhs (pt reports it worsened anxiety), prazosin (I had some allergic reaction- had been effective for months prior to this report), cymbalta 90mg " (effective; pt self d/c'd), trazodone, ambien, Belsomra, Vyvanse    Past Medical hx:   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  Denies hopelessness/worthlessness.    Denies auditory/visual hallucinations      Alcohol: Pt denies  Drug: Pt denies  Caffeine: Not assessed  Tobacco: Pt denies      Review of Systems   PSYCHIATRIC: Pertinent items are noted in the narrative.        CONSTITUTIONAL: weight stable    Past Medical, Family and Social History: The patient's past medical, family and social history have been reviewed and updated as appropriate within the electronic medical record. See encounter notes.     Current Psychiatric Medication:  wellbutrin xl 150mg po qam, xanax 1mg po daily PRN anxiety (not using daily),  escitalopram 15 mg, Adderall XR 20 mg (not using daily), trazodone 100 mg     Compliance: yes      Side effects: Pt denied.      Risk Parameters:  Patient reports no suicidal ideation  Patient reports no homicidal ideation  Patient reports no self-injurious behavior  Patient reports no violent behavior     Exam (detailed: at least 9 elements; comprehensive: all 15 elements)   Constitutional  Vitals:  Most recent  vital signs, dated less than 90 days prior to this appointment, were reviewed.        General:  unremarkable, age appropriate, casual attire, good eye contact, good rapport       Musculoskeletal  Muscle Strength/Tone:  no flaccidity, no tremor    Gait & Station:  normal      Psychiatric                       Speech:  normal tone, normal rate, rhythm, and volume   Mood & Affect:   Mildly Depressed, anxious         Thought Process:   Goal directed; Linear    Associations:   intact   Thought Content:   No SI/HI, delusions, or paranoia, no AV/VH   Insight & Judgement:   Good, adequate to circumstances   Orientation:   grossly intact; alert and oriented x 4    Memory:  intact for content of interview    Language:  grossly intact, can repeat    Attention Span  : Grossly intact for content of interview   Fund of Knowledge:   intact and appropriate to age and level of education        Assessment and Diagnosis   Status/Progress: Based on the examination today, the patient's problem(s) is/are not adequately controlled.  New problems have been presented today. Co-morbidities are complicating management of the primary condition. There are no active rule-out diagnoses for this patient at this time.      Impression: Pt appears to have increased symptoms of depression. Will increase escitalopram to 20 mg due to difficulty cutting tablet in half. Continue to encourage pt to practice DBT techniques and continue in pain management with Dr. Llanos. Will continue with the other medications as they are and monitor moving forward.     Diagnosis:   Posttraumatic Stress Disorder  Panic Disorder w/o agoraphobia  Attention Deficit Hyperactivity Disorder, Combined Type   Insomnia  Borderline personality disorder traits  Intervention/Counseling/Treatment Plan   Medication Management:      1. wellbutrin xl 150mg po qam    2. alprazolam 1mg po daily PRN anxiety (approximately 2-3x/wk and often using half tab)    3. Increase escitalopram to 20  mg    4. Adderall XR 20 mg    5. trazodone to 100 mg      6. Call to report any worsening of symptoms or problems with the medication. Pt instructed to go to ER with thoughts of harming self, others     7. Continue in therapy with Dr. Llanos    Psychotherapy: 20 minutes  Target symptoms: insomnia, anxiety  Why chosen therapy is appropriate versus another modality: CBT used; relevant to diagnosis, patient responds to this modality  Outcome monitoring methods: self-report, observation  Therapeutic intervention type: Cognitive Behavioral Therapy  Topics discussed/themes: building skills sets for symptom management, symptom recognition, nutrition, exercise  The patient's response to the intervention is accepting  Patient's response to treatment is: good.   The patient's progress toward treatment goals: limited     Return to clinic: 2 months     -Cognitive-Behavioral/Supportive therapy and psychoeducation provided  -R/B/SE's of medications discussed with the pt who expresses understanding and chooses to take medications as prescribed.   -Pt instructed to call clinic, 911 or go to nearest emergency room if sxs worsen or pt is in   crisis. The pt expresses understanding.    Bebeto Resendiz, PhD, MP     Antidepressant/Antianxiety Medication Initiation:  Patient informed of risks, benefits, and potential side effects of medication and accepts informed consent.  Common side effects include nausea, fatigue, headache, insomnia., Specifically discussed the possibility of new or worsening suicidal thoughts/depression.  Patient instructed to stop the medication immediately and seek urgent treatment if this occurs. Patient instructed not to abruptly discontinue medication without physician guidance except in cases of sudden onset or worsening of SI.       Stimulant Medication Initiation:  Patient advised of risks, benefits, and side effects of medication and accepts informed consent.  Common side effects include insomnia,  irritability, jittery feeling, dry mouth, and agitation/hostility., Patient advised of potential addictive nature of medication and controlled substance classification.  Instructed to safeguard medication as no early refills can be given for lost or stolen medications.       Benzodiazepine Initiation:  Patient advised of the risks, benefits, and common side effects of medication and has accepted informed consent.  Common side effects include drowsiness, impaired coordination, possible memory loss., Patient advised NOT to operate a vehicle or machinery untiil they are sure how the medication will affec them.  Client also advised of danger of mixing this medication with alcohol., Patient advised of potential addictive nature of medication and need to safeguard medication as no early refills for lost or stolen medications can be authorized.       Pregnancy Warning:  Patient denies current pregnancy possibility.  Patient made aware that medications have not been proven safe in pregnancy and that she must maintain adequate birth control.  Patient instructed to alert us immediately if she becomes pregnant.

## 2023-09-13 ENCOUNTER — PATIENT MESSAGE (OUTPATIENT)
Dept: PSYCHIATRY | Facility: CLINIC | Age: 48
End: 2023-09-13
Payer: MEDICARE

## 2023-09-14 ENCOUNTER — OFFICE VISIT (OUTPATIENT)
Dept: PSYCHIATRY | Facility: CLINIC | Age: 48
End: 2023-09-14
Payer: MEDICARE

## 2023-09-14 ENCOUNTER — TELEPHONE (OUTPATIENT)
Dept: PSYCHIATRY | Facility: CLINIC | Age: 48
End: 2023-09-14
Payer: MEDICARE

## 2023-09-14 DIAGNOSIS — F60.3 BORDERLINE PERSONALITY DISORDER: ICD-10-CM

## 2023-09-14 DIAGNOSIS — F43.10 POSTTRAUMATIC STRESS DISORDER: Primary | ICD-10-CM

## 2023-09-14 DIAGNOSIS — F41.0 PANIC DISORDER: ICD-10-CM

## 2023-09-14 DIAGNOSIS — F90.2 ATTENTION DEFICIT HYPERACTIVITY DISORDER (ADHD), COMBINED TYPE: ICD-10-CM

## 2023-09-14 DIAGNOSIS — G47.00 INSOMNIA, UNSPECIFIED TYPE: ICD-10-CM

## 2023-09-14 PROCEDURE — 99214 PR OFFICE/OUTPT VISIT, EST, LEVL IV, 30-39 MIN: ICD-10-PCS | Mod: 95,,, | Performed by: PSYCHOLOGIST

## 2023-09-14 PROCEDURE — 90833 PR PSYCHOTHERAPY W/PATIENT W/E&M, 30 MIN (ADD ON): ICD-10-PCS | Mod: 95,,, | Performed by: PSYCHOLOGIST

## 2023-09-14 PROCEDURE — 90833 PSYTX W PT W E/M 30 MIN: CPT | Mod: 95,,, | Performed by: PSYCHOLOGIST

## 2023-09-14 PROCEDURE — 1159F PR MEDICATION LIST DOCUMENTED IN MEDICAL RECORD: ICD-10-PCS | Mod: CPTII,95,, | Performed by: PSYCHOLOGIST

## 2023-09-14 PROCEDURE — 99214 OFFICE O/P EST MOD 30 MIN: CPT | Mod: 95,,, | Performed by: PSYCHOLOGIST

## 2023-09-14 PROCEDURE — 4010F PR ACE/ARB THEARPY RXD/TAKEN: ICD-10-PCS | Mod: CPTII,95,, | Performed by: PSYCHOLOGIST

## 2023-09-14 PROCEDURE — 4010F ACE/ARB THERAPY RXD/TAKEN: CPT | Mod: CPTII,95,, | Performed by: PSYCHOLOGIST

## 2023-09-14 PROCEDURE — 1159F MED LIST DOCD IN RCRD: CPT | Mod: CPTII,95,, | Performed by: PSYCHOLOGIST

## 2023-09-14 NOTE — TELEPHONE ENCOUNTER
Patient called to say that she is just got word that her 10 year old dog has mass on liver and she is very depressed and worried. Ask her if she had any thoughts of harming herself and she said no she is just worried about her child.(Dog) patient states she just talked to you on yesterday and she picked up her medication today. Will take medicine now . She just wanted to know if you could call her later. I asked if she wanted me to direction a message to Dr. Llanos but she said no she only treats her PTSD.Please advise.

## 2023-09-14 NOTE — PROGRESS NOTES
"The patient location is:  [place]  The patient location [place] is: [facility]  The patient phone number is: [phone]  Visit type: Virtual visit with synchronous audio and video  Each patient to whom he or she provides medical services by telemedicine is:  (1) informed of the relationship between the physician and patient and the respective role of any other health care provider with respect to management of the patient; and (2) notified that he or she may decline to receive medical services by telemedicine and may withdraw from such care at any time.    Outpatient Psychiatry Follow-Up Visit    Clinical Status of Patient: Outpatient (Ambulatory)  09/14/2023     Chief Complaint: PTSD, Insomnia      Interval History and Content of Current Session:  Interim Events/Subjective Report/Content of Current Session:  follow-up appointment.    Pt is a 46 y/o female with past psychiatric hx of PTSD, insomnia who presents for follow-up treatment. Pt reported that her pet dog was diagnosed with cancer. Pt stated that he has a poor prognosis. Noted that her pet is more like a family member than a pet and stated that he is a main source of emotional support. Pt stated that she has reached out to other support individuals. Will take him for chest x-rays next week. Unlikely to be a candidate for surgery per vet. Was told that best case scenario is a 1 year prognosis. Pt reported that she is concerned about sleep and has an apt with sleep specialist in a couple of weeks.    Past Psychiatric hx:  prozac (can't recall), lexapro (effective, does not remember why stopped), celexa > effective, dec'd libido ( told pt he would "leave her" regarding the sex drive), depakote (ineffective), lamictal (rash), abilify (paranoid), seroquel (significant wgt gain), Lithium 300 mg po qam/600 mg po qhs (pt reports it worsened anxiety), prazosin (I had some allergic reaction- had been effective for months prior to this report), " cymbalta 90mg (effective; pt self d/c'd), trazodone, ambien, Belsomra, Vyvanse    Past Medical hx:   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  Denies hopelessness/worthlessness.    Denies auditory/visual hallucinations      Alcohol: Pt denies  Drug: Pt denies  Caffeine: Not assessed  Tobacco: Pt denies      Review of Systems   PSYCHIATRIC: Pertinent items are noted in the narrative.        CONSTITUTIONAL: weight stable    Past Medical, Family and Social History: The patient's past medical, family and social history have been reviewed and updated as appropriate within the electronic medical record. See encounter notes.     Current Psychiatric Medication:  wellbutrin xl 150mg po qam, xanax 1mg po daily PRN anxiety (not using daily),  escitalopram 15 mg, Adderall XR 20 mg (not using daily), trazodone 100 mg     Compliance: yes      Side effects: Pt denied.      Risk Parameters:  Patient reports no suicidal ideation  Patient reports no homicidal ideation  Patient reports no self-injurious behavior  Patient reports no violent behavior     Exam (detailed: at least 9 elements; comprehensive: all 15 elements)   Constitutional  Vitals:   Most recent vital signs, dated less than 90 days prior to this appointment, were reviewed.        General:  unremarkable, age appropriate, casual attire, good eye contact, good rapport       Musculoskeletal  Muscle Strength/Tone:  no flaccidity, no tremor    Gait & Station:  normal      Psychiatric                       Speech:  normal tone, normal rate, rhythm, and volume   Mood & Affect:   Mildly Depressed, anxious         Thought Process:   Goal directed; Linear    Associations:   intact   Thought Content:   No SI/HI, delusions, or paranoia, no AV/VH   Insight & Judgement:   Good, adequate to circumstances   Orientation:   grossly intact; alert and oriented x 4    Memory:  intact for content of interview    Language:  grossly intact, can repeat    Attention Span  : Grossly intact for content of interview   Fund of Knowledge:   intact and appropriate to age and level of education        Assessment and Diagnosis   Status/Progress: Based on the examination today, the patient's problem(s) is/are not adequately controlled.  New problems have been presented today. Co-morbidities are complicating management of the primary condition. There are no active rule-out diagnoses for this patient at this time.      Impression: Pt appears to have increased symptoms of depression related to anticipatory grief. Did start increased dose of escitalopram but too early to evaluate. Will continue with medication plan and discussed coping and solution-focused strategies re: per dog.        Diagnosis:   Posttraumatic Stress Disorder  Panic Disorder w/o agoraphobia  Attention Deficit Hyperactivity Disorder, Combined Type   Insomnia  Borderline personality disorder traits  Intervention/Counseling/Treatment Plan   Medication Management:      1. wellbutrin xl 150mg po qam    2. alprazolam 1mg po daily PRN anxiety (approximately 2-3x/wk and often using half tab)    3. escitalopram to 20 mg    4. Adderall XR 20 mg    5. Increase trazodone to  100 mg      6. Call to report any worsening of symptoms or problems with the medication. Pt instructed to go to ER with thoughts of harming self, others     7. Continue in therapy with Dr. Llanos    Psychotherapy: 20 minutes  Target symptoms: anticipatory grief  Why chosen therapy is appropriate versus another modality: CBT used; relevant to diagnosis, patient responds to this modality  Outcome monitoring methods: self-report, observation  Therapeutic intervention type: Cognitive Behavioral Therapy, solution-focused  Topics discussed/themes: building skills sets for symptom management, symptom recognition, nutrition, exercise  The patient's response to the intervention is accepting  Patient's response to treatment is: good.   The patient's progress toward treatment goals: limited     Return to clinic: 2 months     -Cognitive-Behavioral/Supportive therapy and psychoeducation provided  -R/B/SE's of medications discussed with the pt who expresses understanding and chooses to take medications as prescribed.   -Pt instructed to call clinic, 911 or go to nearest emergency room if sxs worsen or pt is in   crisis. The pt expresses understanding.    Bebeto Resendiz, PhD, MP     Antidepressant/Antianxiety Medication Initiation:  Patient informed of risks, benefits, and potential side effects of medication and accepts informed consent.  Common side effects include nausea, fatigue, headache, insomnia., Specifically discussed the possibility of new or worsening suicidal thoughts/depression.  Patient instructed to stop the medication immediately and seek urgent treatment if this occurs. Patient instructed not to abruptly discontinue medication without physician guidance except in cases of sudden onset or worsening of SI.       Stimulant Medication Initiation:  Patient advised of risks, benefits, and side effects of medication and accepts informed consent.  Common side effects include insomnia, irritability, jittery feeling, dry  mouth, and agitation/hostility., Patient advised of potential addictive nature of medication and controlled substance classification.  Instructed to safeguard medication as no early refills can be given for lost or stolen medications.       Benzodiazepine Initiation:  Patient advised of the risks, benefits, and common side effects of medication and has accepted informed consent.  Common side effects include drowsiness, impaired coordination, possible memory loss., Patient advised NOT to operate a vehicle or machinery untiil they are sure how the medication will affec them.  Client also advised of danger of mixing this medication with alcohol., Patient advised of potential addictive nature of medication and need to safeguard medication as no early refills for lost or stolen medications can be authorized.       Pregnancy Warning:  Patient denies current pregnancy possibility.  Patient made aware that medications have not been proven safe in pregnancy and that she must maintain adequate birth control.  Patient instructed to alert us immediately if she becomes pregnant.

## 2023-09-15 RX ORDER — DEXTROAMPHETAMINE SACCHARATE, AMPHETAMINE ASPARTATE MONOHYDRATE, DEXTROAMPHETAMINE SULFATE AND AMPHETAMINE SULFATE 5; 5; 5; 5 MG/1; MG/1; MG/1; MG/1
20 CAPSULE, EXTENDED RELEASE ORAL EVERY MORNING
Qty: 30 CAPSULE | Refills: 0 | Status: SHIPPED | OUTPATIENT
Start: 2023-09-15 | End: 2023-10-19 | Stop reason: SDUPTHER

## 2023-09-15 RX ORDER — DEXTROAMPHETAMINE SACCHARATE, AMPHETAMINE ASPARTATE MONOHYDRATE, DEXTROAMPHETAMINE SULFATE AND AMPHETAMINE SULFATE 5; 5; 5; 5 MG/1; MG/1; MG/1; MG/1
20 CAPSULE, EXTENDED RELEASE ORAL EVERY MORNING
Qty: 30 CAPSULE | Refills: 0 | Status: CANCELLED | OUTPATIENT
Start: 2023-09-15

## 2023-09-18 ENCOUNTER — PATIENT MESSAGE (OUTPATIENT)
Dept: PSYCHIATRY | Facility: CLINIC | Age: 48
End: 2023-09-18
Payer: MEDICARE

## 2023-09-22 ENCOUNTER — TELEPHONE (OUTPATIENT)
Dept: FAMILY MEDICINE | Facility: CLINIC | Age: 48
End: 2023-09-22

## 2023-09-22 ENCOUNTER — OFFICE VISIT (OUTPATIENT)
Dept: FAMILY MEDICINE | Facility: CLINIC | Age: 48
End: 2023-09-22
Payer: MEDICARE

## 2023-09-22 ENCOUNTER — TELEPHONE (OUTPATIENT)
Dept: PSYCHIATRY | Facility: CLINIC | Age: 48
End: 2023-09-22
Payer: MEDICARE

## 2023-09-22 DIAGNOSIS — Z00.00 ANNUAL PHYSICAL EXAM: Primary | ICD-10-CM

## 2023-09-22 DIAGNOSIS — J45.21 MILD INTERMITTENT REACTIVE AIRWAY DISEASE WITH ACUTE EXACERBATION: ICD-10-CM

## 2023-09-22 DIAGNOSIS — F41.9 ANXIETY DISORDER, UNSPECIFIED: ICD-10-CM

## 2023-09-22 DIAGNOSIS — Z12.31 ENCOUNTER FOR SCREENING MAMMOGRAM FOR MALIGNANT NEOPLASM OF BREAST: ICD-10-CM

## 2023-09-22 DIAGNOSIS — R79.9 ABNORMAL FINDING OF BLOOD CHEMISTRY, UNSPECIFIED: ICD-10-CM

## 2023-09-22 DIAGNOSIS — J20.8 ACUTE BACTERIAL BRONCHITIS: ICD-10-CM

## 2023-09-22 DIAGNOSIS — B96.89 ACUTE BACTERIAL BRONCHITIS: ICD-10-CM

## 2023-09-22 PROCEDURE — 99213 PR OFFICE/OUTPT VISIT, EST, LEVL III, 20-29 MIN: ICD-10-PCS | Mod: 95,,, | Performed by: FAMILY MEDICINE

## 2023-09-22 PROCEDURE — 1159F MED LIST DOCD IN RCRD: CPT | Mod: CPTII,95,, | Performed by: FAMILY MEDICINE

## 2023-09-22 PROCEDURE — 1160F RVW MEDS BY RX/DR IN RCRD: CPT | Mod: CPTII,95,, | Performed by: FAMILY MEDICINE

## 2023-09-22 PROCEDURE — 4010F ACE/ARB THERAPY RXD/TAKEN: CPT | Mod: CPTII,95,, | Performed by: FAMILY MEDICINE

## 2023-09-22 PROCEDURE — 1159F PR MEDICATION LIST DOCUMENTED IN MEDICAL RECORD: ICD-10-PCS | Mod: CPTII,95,, | Performed by: FAMILY MEDICINE

## 2023-09-22 PROCEDURE — 99213 OFFICE O/P EST LOW 20 MIN: CPT | Mod: 95,,, | Performed by: FAMILY MEDICINE

## 2023-09-22 PROCEDURE — 4010F PR ACE/ARB THEARPY RXD/TAKEN: ICD-10-PCS | Mod: CPTII,95,, | Performed by: FAMILY MEDICINE

## 2023-09-22 PROCEDURE — 1160F PR REVIEW ALL MEDS BY PRESCRIBER/CLIN PHARMACIST DOCUMENTED: ICD-10-PCS | Mod: CPTII,95,, | Performed by: FAMILY MEDICINE

## 2023-09-22 RX ORDER — PREDNISONE 20 MG/1
TABLET ORAL
Qty: 8 TABLET | Refills: 0 | Status: SHIPPED | OUTPATIENT
Start: 2023-09-22 | End: 2023-09-27

## 2023-09-22 RX ORDER — SYRINGE WITH NEEDLE, 1 ML 25GX5/8"
SYRINGE, EMPTY DISPOSABLE MISCELLANEOUS
Qty: 8 EACH | Refills: 0 | Status: CANCELLED | OUTPATIENT
Start: 2023-09-22

## 2023-09-22 RX ORDER — PROMETHAZINE HYDROCHLORIDE AND DEXTROMETHORPHAN HYDROBROMIDE 6.25; 15 MG/5ML; MG/5ML
5 SYRUP ORAL EVERY 4 HOURS PRN
Qty: 118 ML | Refills: 0 | Status: SHIPPED | OUTPATIENT
Start: 2023-09-22 | End: 2023-10-02

## 2023-09-22 RX ORDER — SYRINGE WITH NEEDLE, 1 ML 25GX5/8"
SYRINGE, EMPTY DISPOSABLE MISCELLANEOUS
Qty: 8 EACH | Refills: 0 | Status: SHIPPED | OUTPATIENT
Start: 2023-09-22

## 2023-09-22 RX ORDER — AZITHROMYCIN 250 MG/1
TABLET, FILM COATED ORAL
Qty: 6 TABLET | Refills: 0 | Status: SHIPPED | OUTPATIENT
Start: 2023-09-22 | End: 2023-09-27

## 2023-09-22 NOTE — TELEPHONE ENCOUNTER
Spoke to patient regarding rescheduling the canceled appointment with Dr. Llanos. Patient states she will go on my chart and reschedule.

## 2023-09-22 NOTE — TELEPHONE ENCOUNTER
----- Message from Amaya Aiken MD sent at 9/22/2023 11:23 AM CDT -----  Hello!  Can you reach out to neurology to see if they have experience with Arnold Chiari?  The NS team at [facility] put in referral for neurology.  Thanks,  Amaya

## 2023-09-23 NOTE — PROGRESS NOTES
Subjective:       Patient ID: Dinah Mitchell is a 48 y.o. female.    Chief Complaint: Follow-up and URI    Cough  This is a new problem. The current episode started in the past 7 days. The problem has been rapidly worsening. The problem occurs every few minutes. The cough is Productive of sputum. Associated symptoms include ear congestion, ear pain, headaches, myalgias, nasal congestion, postnasal drip, rhinorrhea, shortness of breath, sweats and wheezing. Pertinent negatives include no chest pain, chills, fever, heartburn, hemoptysis, rash, sore throat or weight loss. The symptoms are aggravated by lying down. Risk factors for lung disease include animal exposure. She has tried OTC cough suppressant, a beta-agonist inhaler, body position changes, cool air, ipratropium inhaler, leukotriene antagonists and rest for the symptoms. The treatment provided no relief. Her past medical history is significant for asthma, bronchitis, environmental allergies and pneumonia. There is no history of bronchiectasis or emphysema.     The patient is a 48-year-old with asthma who is seen virtually today for follow-up.  She is also sick.    Today we discussed followin)  URI.  She has been sick for the past 4 days.  She is feeling worsen worse.  She feels as if her head is in a vice .  She has a lot of sinus congestion, rhinorrhea, postnasal drainage and cough and wheezing.  She has been using over-the-counter cough medication which does not seem to be helping.  2) Arnold-Chiari malformation status post decompression surgery.  She recently met with the neurosurgery team and they recommended she meet with the neurologist.  She does have a referral in for Neurology but has not been contacted for an appointment.  She does see Dr. Guevara the headache specialist but that team has told her that they only deal with headaches and nothing else.  In addition to her headaches, she is dealing with numbness, nausea, neck pain and  frequent falls    Review of Systems   Constitutional:  Negative for appetite change, chills, diaphoresis, fatigue, fever, unexpected weight change and weight loss.   HENT:  Positive for ear pain, postnasal drip and rhinorrhea. Negative for congestion, sinus pressure, sneezing, sore throat and trouble swallowing.    Eyes:  Negative for pain, discharge and visual disturbance.   Respiratory:  Positive for cough, shortness of breath and wheezing. Negative for hemoptysis and chest tightness.    Cardiovascular:  Negative for chest pain, palpitations and leg swelling.   Gastrointestinal:  Negative for abdominal distention, abdominal pain, blood in stool, constipation, diarrhea, heartburn, nausea and vomiting.   Musculoskeletal:  Positive for myalgias.   Skin:  Negative for rash.   Allergic/Immunologic: Positive for environmental allergies.   Neurological:  Positive for headaches.         Objective:      Physical Exam  Constitutional:       General: She is not in acute distress.     Appearance: Normal appearance. She is ill-appearing. She is not toxic-appearing.   Neurological:      Mental Status: She is alert.   Psychiatric:         Attention and Perception: Attention and perception normal.         Mood and Affect: Mood and affect normal.         Speech: Speech normal.         Behavior: Behavior normal. Behavior is cooperative.         Thought Content: Thought content normal.         Cognition and Memory: Cognition and memory normal.         Judgment: Judgment normal.       There were no vitals taken for this visit.There is no height or weight on file to calculate BMI.            A/P:  1) sinusitis and bronchitis with RAD.  Acute.  I am going to treat with a course of Zithromax prednisone and Phenergan DM.  She will continue with her albuterol.  If her symptoms do not improve or if they worsen, she will let me know .    2) Arnold-Chiari malformation status post decompression surgery with persistent neurologic symptoms.  We  will reach out to Neurology to check on the status of her referral   3)  Chronic headaches.  Follow-up with Ismael  4) health maintenance issues.  She is due for fasting labs and her mammogram    As long as she does well, I will see her back in 6 months or sooner if needed  The patient location is: home  The chief complaint leading to consultation is Follow-up and URI     Visit type: Virtual visit with synchronous audio and video    Each patient to whom he or she provides medical services by telemedicine is:  (1) informed of the relationship between the physician and patient and the respective role of any other health care provider with respect to management of the patient; and (2) notified that he or she may decline to receive medical services by telemedicine and may withdraw from such care at any time.    I spent 23 minutes on this encounter.  This time includes face-to-face time and non-face to face time including previsit chart review, obtaining and/or reviewing separately obtained history, documenting clinical information in the electronic or other health record, independently interpreting results and communicating results to the patient/family/caregiver, or care coordinator.

## 2023-09-23 NOTE — TELEPHONE ENCOUNTER
Received a message back from neuro stating that it looks like she sees Dr. Guevara who specializes in headaches for this.

## 2023-09-23 NOTE — TELEPHONE ENCOUNTER
Dr Guevara does treat her headaches but she's having other neuro issues (balance) and Dr Guevara on 8/18 note and NS on 9/5 told her she needs a gen neurologist

## 2023-09-24 ENCOUNTER — TELEPHONE (OUTPATIENT)
Dept: FAMILY MEDICINE | Facility: CLINIC | Age: 48
End: 2023-09-24
Payer: MEDICARE

## 2023-09-24 NOTE — TELEPHONE ENCOUNTER
----- Message from Amaya Aiken MD sent at 9/23/2023  6:08 PM CDT -----  Pls roni labs and darien   Thanks!

## 2023-09-25 NOTE — TELEPHONE ENCOUNTER
Spoke with patient to schedule appointment. Patient insisting she needs a neurologist that treats chiari malformation and all the issues that go along with chiari. Advised we do not have a neurologist that treats this and Dr. Aiken wanted her evaluated for balance issues. Patient scheduled for 11/27/23 with Shana Reed to be evaluated for balance issues only.

## 2023-09-26 NOTE — TELEPHONE ENCOUNTER
Pls notify pt    The neuro team in [place] in [place] does not do chiari   I might suggest you ask your NS if they have suggestions  This team will address the balance issues

## 2023-09-30 ENCOUNTER — NURSE TRIAGE (OUTPATIENT)
Dept: ADMINISTRATIVE | Facility: CLINIC | Age: 48
End: 2023-09-30
Payer: MEDICARE

## 2023-10-01 DIAGNOSIS — F41.0 PANIC DISORDER WITHOUT AGORAPHOBIA: ICD-10-CM

## 2023-10-01 DIAGNOSIS — F43.10 PTSD (POST-TRAUMATIC STRESS DISORDER): ICD-10-CM

## 2023-10-01 NOTE — TELEPHONE ENCOUNTER
Pt reports hx of craniotomy. Reports her dog jumped on her head and she is having pain w/ confusion. Advised, per protocol. Verbalizes understanding.     Reason for Disposition   [1] ACUTE NEURO SYMPTOM AND [2] present now  (DEFINITION: difficult to awaken OR confused thinking and talking OR slurred speech OR weakness of arms OR unsteady walking)    Protocols used: Head Injury-A-AH

## 2023-10-02 RX ORDER — ALPRAZOLAM 1 MG/1
1 TABLET ORAL DAILY PRN
Qty: 30 TABLET | Refills: 0 | Status: SHIPPED | OUTPATIENT
Start: 2023-10-02 | End: 2023-11-06 | Stop reason: SDUPTHER

## 2023-10-03 ENCOUNTER — TELEPHONE (OUTPATIENT)
Dept: FAMILY MEDICINE | Facility: CLINIC | Age: 48
End: 2023-10-03
Payer: MEDICARE

## 2023-10-05 ENCOUNTER — TELEPHONE (OUTPATIENT)
Dept: NEUROLOGY | Facility: CLINIC | Age: 48
End: 2023-10-05

## 2023-10-05 ENCOUNTER — PROCEDURE VISIT (OUTPATIENT)
Dept: NEUROLOGY | Facility: CLINIC | Age: 48
End: 2023-10-05
Payer: MEDICARE

## 2023-10-05 ENCOUNTER — HOSPITAL ENCOUNTER (OUTPATIENT)
Dept: RADIOLOGY | Facility: HOSPITAL | Age: 48
Discharge: HOME | End: 2023-10-05
Attending: PHYSICIAN ASSISTANT
Payer: MEDICARE

## 2023-10-05 VITALS — SYSTOLIC BLOOD PRESSURE: 145 MMHG | DIASTOLIC BLOOD PRESSURE: 96 MMHG | HEART RATE: 93 BPM

## 2023-10-05 DIAGNOSIS — M54.2 CERVICALGIA: ICD-10-CM

## 2023-10-05 DIAGNOSIS — G43.719 INTRACTABLE CHRONIC MIGRAINE WITHOUT AURA AND WITHOUT STATUS MIGRAINOSUS: Primary | ICD-10-CM

## 2023-10-05 PROCEDURE — 72141 MRI NECK SPINE W/O DYE: CPT | Mod: TC,PO

## 2023-10-05 PROCEDURE — 64615 CHEMODENERV MUSC MIGRAINE: CPT | Mod: S$GLB,,, | Performed by: PSYCHIATRY & NEUROLOGY

## 2023-10-05 PROCEDURE — 64615 PR CHEMODENERVATION OF MUSCLE FOR CHRONIC MIGRAINE: ICD-10-PCS | Mod: S$GLB,,, | Performed by: PSYCHIATRY & NEUROLOGY

## 2023-10-05 PROCEDURE — 72141 MRI CERVICAL SPINE WITHOUT CONTRAST: ICD-10-PCS | Mod: 26,,, | Performed by: RADIOLOGY

## 2023-10-05 PROCEDURE — 72141 MRI NECK SPINE W/O DYE: CPT | Mod: 26,,, | Performed by: RADIOLOGY

## 2023-10-05 NOTE — TELEPHONE ENCOUNTER
----- Message from Whitney Vieraeduar sent at 10/5/2023 12:08 PM CDT -----  Contact: self  Patient saw the doctor a short time ago and she was going to order her a pain patch but not at the pharm so please ask her to send it and call back at [phone] to advise and thanks.    [facility] [address] [place] [address]  Phone: [phone] Fax: [phone]

## 2023-10-05 NOTE — TELEPHONE ENCOUNTER
Called patient and notified that since this was a Botox visit there was no note about what patch she was sending in for her and that I will have to send DR Guevara a message about this.

## 2023-10-05 NOTE — PROCEDURES
Procedures     BOTOX PROCEDURE    PROCEDURE PERFORMED: Botulinum toxin injection (14322)    CLINICAL INDICATION: G43.719    The Botox injections have achieved well over 50%  improvement in the patient's symptoms. Migraines have been reduced at least 7 days per month and the number of cumulative hours suffering with headaches has been reduced at least 100 total hours per month. Today she does have a headache indicating that the Botox has worn off. Frequency of treatment is every 3 months unless no response to the treatments, at which time we will discontinue the injections.      A time out was conducted just before the start of the procedure to verify the correct patient and procedure, procedure location, and all relevant critical information.   Signed consent obtained prior to procedure     Conventional methods of treatment but the patient has been unresponsive and refractory.The patient meets criteria for chronic headaches according to the ICHD-III, the patient has more than 15 headaches a month at least 8 of them meet migraine criteria, which last for more than 4 hours a day.     Last Botox injections resulted in over 50%  improvement in the patient's symptoms. Frequency of treatment is every 3 months unless no response to the treatments, at which time we will discontinue the injections.     DESCRIPTION OF PROCEDURE: After obtaining informed consent and under aseptic technique, a total of 155 units of botulinum toxin type A were injected in the following muscles: Procerus 5 units,  5 units bilaterally, frontalis 20 units, temporalis 20 units bilaterally,   occipitalis 15 units, upper cervical paraspinals 10 units bilaterally and trapezius 15 units ilaterally. The patient was given a total of 155 units in 31 sites.    Special precaution taken given her posterior decompression.       The patient tolerated the procedure well. There were no complications. The patient was given a prescription for repeat  treatment in 3 months.     Unavoidable waste 45 units    Saeid Guevara M.D  Medical Director, Headache and Facial Pain  [facility]

## 2023-10-05 NOTE — TELEPHONE ENCOUNTER
----- Message from Whitney Vieraeduar sent at 10/5/2023 12:08 PM CDT -----  Contact: self  Patient saw the doctor a short time ago and she was going to order her a pain patch but not at the pharm so please ask her to send it and call back at [phone] to advise and thanks.    [facility] [address] [place] [address]  Phone: [phone] Fax: [phone]

## 2023-10-06 RX ORDER — LIDOCAINE 50 MG/G
PATCH TOPICAL
Qty: 60 PATCH | Refills: 5 | Status: SHIPPED | OUTPATIENT
Start: 2023-10-06

## 2023-10-18 ENCOUNTER — PATIENT MESSAGE (OUTPATIENT)
Dept: FAMILY MEDICINE | Facility: CLINIC | Age: 48
End: 2023-10-18
Payer: MEDICARE

## 2023-10-19 RX ORDER — DEXTROAMPHETAMINE SACCHARATE, AMPHETAMINE ASPARTATE MONOHYDRATE, DEXTROAMPHETAMINE SULFATE AND AMPHETAMINE SULFATE 5; 5; 5; 5 MG/1; MG/1; MG/1; MG/1
20 CAPSULE, EXTENDED RELEASE ORAL EVERY MORNING
Qty: 30 CAPSULE | Refills: 0 | Status: SHIPPED | OUTPATIENT
Start: 2023-10-19 | End: 2023-11-23 | Stop reason: SDUPTHER

## 2023-10-19 NOTE — TELEPHONE ENCOUNTER
CARMELINA      Called pt.  Recommended that she go to the ER for evaluation for new onset fever and confusion.

## 2023-10-30 ENCOUNTER — TELEPHONE (OUTPATIENT)
Dept: SPINE | Facility: CLINIC | Age: 48
End: 2023-10-30
Payer: MEDICARE

## 2023-10-30 NOTE — TELEPHONE ENCOUNTER
Spoke w/ pt. Pt did not have CSF flow MRI for appt tmrw. Scheduled imaging and rescheduled appt w/ baker for 11/28. Pt was upset because her parents drove in from [place] for this appt and she rescheduled her dogs urgent surgery so that she could make this appt. I apologized for the inconvenience and stated that if she was to come for her appt tmrw we would not have the imaging needed to develop a complete plan of care. Pt understood.

## 2023-11-06 DIAGNOSIS — F41.0 PANIC DISORDER WITHOUT AGORAPHOBIA: ICD-10-CM

## 2023-11-06 DIAGNOSIS — F43.10 PTSD (POST-TRAUMATIC STRESS DISORDER): ICD-10-CM

## 2023-11-06 RX ORDER — ALPRAZOLAM 1 MG/1
1 TABLET ORAL DAILY PRN
Qty: 30 TABLET | Refills: 0 | Status: SHIPPED | OUTPATIENT
Start: 2023-11-06 | End: 2023-12-12 | Stop reason: SDUPTHER

## 2023-11-09 ENCOUNTER — TELEPHONE (OUTPATIENT)
Dept: FAMILY MEDICINE | Facility: CLINIC | Age: 48
End: 2023-11-09

## 2023-11-09 NOTE — LETTER
November 9, 2023    Dinah Mitchell  [address] [facility] [address]             [place] [address] [address]  [facility] [address]  Phone: [phone]  Fax: [phone] Dear Ms. Dinah Mitchell:    We are sorry that you missed your appointment with Dr. Aiken on 11/9/2023. Your health and follow-up medical care are important to us. Please call our office as soon as possible so that we may reschedule your appointment. If you have already rescheduled your appointment, please disregard this letter.    Sincerely,        Amaya Aiken MD Shann Fayard, LPN

## 2023-11-13 RX ORDER — TRAZODONE HYDROCHLORIDE 100 MG/1
100 TABLET ORAL NIGHTLY
Qty: 30 TABLET | Refills: 3 | Status: SHIPPED | OUTPATIENT
Start: 2023-11-13 | End: 2024-03-10 | Stop reason: SDUPTHER

## 2023-11-16 ENCOUNTER — TELEPHONE (OUTPATIENT)
Dept: FAMILY MEDICINE | Facility: CLINIC | Age: 48
End: 2023-11-16
Payer: MEDICARE

## 2023-11-16 NOTE — TELEPHONE ENCOUNTER
----- Message from Pat Diana, Patient Care Assistant sent at 11/16/2023 10:11 AM CST -----  Regarding: appointment  Contact: pt  Type: Needs Medical Advice    Who Called:  pt     Best Call Back Number: [phone] (home) [phone]    Additional Information: pt states she would like a callback regarding rescheduling his appointment on 11/9/23. Please call to advise. Thanks!

## 2023-11-17 ENCOUNTER — TELEPHONE (OUTPATIENT)
Dept: NEUROLOGY | Facility: CLINIC | Age: 48
End: 2023-11-17
Payer: MEDICARE

## 2023-11-17 NOTE — TELEPHONE ENCOUNTER
Spoke with patient regarding appointment scheduled for imbalance. Explained to patient that Shana Reed NP would like to see her after she has MRI and sees Neuro[facility]. Informed her that she will need to discuss to Neuro[facility] if imbalance is due to chairi, and if nerusx wants her to be seen by Gen. Neuro. Patient v/u and cancelled appointment.

## 2023-11-23 RX ORDER — DEXTROAMPHETAMINE SACCHARATE, AMPHETAMINE ASPARTATE MONOHYDRATE, DEXTROAMPHETAMINE SULFATE AND AMPHETAMINE SULFATE 5; 5; 5; 5 MG/1; MG/1; MG/1; MG/1
20 CAPSULE, EXTENDED RELEASE ORAL EVERY MORNING
Qty: 30 CAPSULE | Refills: 0 | Status: CANCELLED | OUTPATIENT
Start: 2023-11-23

## 2023-11-24 RX ORDER — DEXTROAMPHETAMINE SACCHARATE, AMPHETAMINE ASPARTATE MONOHYDRATE, DEXTROAMPHETAMINE SULFATE AND AMPHETAMINE SULFATE 5; 5; 5; 5 MG/1; MG/1; MG/1; MG/1
20 CAPSULE, EXTENDED RELEASE ORAL EVERY MORNING
Qty: 30 CAPSULE | Refills: 0 | Status: SHIPPED | OUTPATIENT
Start: 2023-11-24 | End: 2023-12-29 | Stop reason: SDUPTHER

## 2023-11-28 ENCOUNTER — HOSPITAL ENCOUNTER (OUTPATIENT)
Dept: RADIOLOGY | Facility: HOSPITAL | Age: 48
Discharge: HOME | End: 2023-11-28
Attending: PSYCHIATRY & NEUROLOGY
Payer: MEDICARE

## 2023-11-28 ENCOUNTER — OFFICE VISIT (OUTPATIENT)
Dept: NEUROSURGERY | Facility: CLINIC | Age: 48
End: 2023-11-28
Payer: MEDICARE

## 2023-11-28 VITALS — HEART RATE: 97 BPM | SYSTOLIC BLOOD PRESSURE: 108 MMHG | DIASTOLIC BLOOD PRESSURE: 67 MMHG

## 2023-11-28 DIAGNOSIS — Z86.69 HISTORY OF CHIARI MALFORMATION: ICD-10-CM

## 2023-11-28 DIAGNOSIS — G93.5 CHIARI MALFORMATION TYPE I: Primary | ICD-10-CM

## 2023-11-28 PROCEDURE — 99999 PR PBB SHADOW E&M-EST. PATIENT-LVL I: CPT | Mod: PBBFAC,,, | Performed by: PHYSICIAN ASSISTANT

## 2023-11-28 PROCEDURE — 3044F HG A1C LEVEL LT 7.0%: CPT | Mod: CPTII,S$GLB,, | Performed by: PHYSICIAN ASSISTANT

## 2023-11-28 PROCEDURE — 70551 MRI BRAIN STEM W/O DYE: CPT | Mod: 26,,, | Performed by: RADIOLOGY

## 2023-11-28 PROCEDURE — 70551 MRI CSF FLOW: ICD-10-PCS | Mod: 26,,, | Performed by: RADIOLOGY

## 2023-11-28 PROCEDURE — 3074F SYST BP LT 130 MM HG: CPT | Mod: CPTII,S$GLB,, | Performed by: PHYSICIAN ASSISTANT

## 2023-11-28 PROCEDURE — 3078F DIAST BP <80 MM HG: CPT | Mod: CPTII,S$GLB,, | Performed by: PHYSICIAN ASSISTANT

## 2023-11-28 PROCEDURE — 70551 MRI BRAIN STEM W/O DYE: CPT | Mod: TC

## 2023-11-28 PROCEDURE — 99214 OFFICE O/P EST MOD 30 MIN: CPT | Mod: S$GLB,,, | Performed by: PHYSICIAN ASSISTANT

## 2023-11-28 NOTE — PROGRESS NOTES
Neurosurgery  Established Patient    SUBJECTIVE:     History of Present Illness 09/05/23:  48-year-old female with history of suboccipital decompressive craniectomy, C1 laminectomy with duraplasty for Chiari malformation 12/13/2018. Postoperatively her exertional headaches in a posterior headaches resolved. She was last seen in clinic by Dr. Busch 10/2019 and at that time her follow-up MRI scan of the cervical spine shows excellent resolution of the Chiari.  She had no pseudomeningocele and the cerebellar tonsils were well above the foramen magnum.  She had a C5-6 disc bulge that is been little bit worse compared to prior imaging but no significant central or foraminal stenosis. She was referred to PT and C5-C6 GREGOR was ordered.     Today, she reports her HA never improved, however her UE numbness and nausea resolved. She states these symptoms returned about 1.5 months ago. She has nausea daily and numbness in BLE below the knees as well as in her left arm - which she states were her previous pre-op symptoms. She has been having frequent falls but denies issues with dexterity. She also reports exacerbated neck pain and HA over the past month. She states her headaches are frontal and are not exacerbated with valsalva. She denies any focal weakness, recent injury or trauma.     Interval history:  Pt returns to clinic for MRI results. Today she report pain at the base of her skull and the top of neck that is worse when she strains. She also reports transient vision loss bilaterally when she lies on the back of her head too long. She complains of memory loss since last visit as well as nondermatomal left arm numbness.     Review of patient's allergies indicates:   Allergen Reactions    Lamotrigine      Other reaction(s): Rash  Other reaction(s): Nausea    Sulfa (sulfonamide antibiotics) Nausea Only     Other reaction(s): Unknown    Adhesive Rash    Zonisamide Nausea And Vomiting     Nausea/vomiting        Current  Outpatient Medications   Medication Sig Dispense Refill    acetylcysteine (N-ACETYL-L-CYSTEINE MISC)       albuterol (PROVENTIL) 2.5 mg /3 mL (0.083 %) nebulizer solution Take 3 mLs (2.5 mg total) by nebulization every 4 (four) hours as needed for Wheezing or Shortness of Breath (chest tightness). Rescue 180 mL 6    albuterol (PROVENTIL/VENTOLIN HFA) 90 mcg/actuation inhaler Inhale 1-2 puffs into the lungs every 4 (four) hours as needed for Wheezing or Shortness of Breath (chest tightness). Rescue 8.5 g 3    ALPRAZolam (XANAX) 1 MG tablet TAKE 1 TABLET (1 MG) BY MOUTH DAILY AS NEEDED FOR ANXIETY 30 tablet 0    buPROPion (WELLBUTRIN XL) 150 MG TB24 tablet Take 1 tablet (150 mg total) by mouth once daily. 90 tablet 1    dextroamphetamine-amphetamine (ADDERALL XR) 20 MG 24 hr capsule Take 1 capsule (20 mg total) by mouth every morning. 30 capsule 0    diphenoxylate-atropine 2.5-0.025 mg (LOMOTIL) 2.5-0.025 mg per tablet Take 1 tablet by mouth 4 (four) times daily as needed. 80 tablet 0    dronabinol (MARINOL) 2.5 MG capsule Take 1 capsule (2.5 mg total) by mouth 2 (two) times daily before meals. 60 capsule 3    EScitalopram oxalate (LEXAPRO) 20 MG tablet Take 1 tablet (20 mg total) by mouth once daily. 30 tablet 3    fluticasone propionate (FLONASE) 50 mcg/actuation nasal spray Spray 1 spray (50 mcg total) in each nostril once daily. 16 g 2    fluticasone-umeclidin-vilanter (TRELEGY ELLIPTA) 100-62.5-25 mcg DsDv Inhale 1 puff into the lungs once daily. 60 each 3    gabapentin (NEURONTIN) 600 MG tablet TAKE 1 TABLET BY MOUTH 2 TO 3 TIMES A DAY FOR CHRONIC PAIN 90 tablet 11    galcanezumab-gnlm (EMGALITY PEN) 120 mg/mL PnIj ADMINISTER 1 ML UNDER THE SKIN EVERY 28 DAYS 1 mL 6    levOCARNitine (CARNITOR) 330 mg Tab Take 3 tablets (990 mg total) by mouth 2 (two) times daily. 180 tablet 2    LIDOcaine (LIDODERM) 5 % Apply to affected area every 12 hours 60 patch 5    montelukast (SINGULAIR) 10 mg tablet Take 1 tablet (10 mg  "total) by mouth once daily. 90 tablet 2    ondansetron (ZOFRAN-ODT) 4 MG TbDL Take 1 tablet (4 mg total) by mouth every 6 (six) hours as needed (nausea). 30 tablet 0    oxyCODONE-acetaminophen (PERCOCET) 5-325 mg per tablet Take 1 tablet by mouth every 6 (six) hours as needed for Pain. 12 tablet 0    oxyCODONE-acetaminophen (PERCOCET) 5-325 mg per tablet Take 1 tablet by mouth once a day as needed for pain 30 tablet 0    [START ON 12/23/2023] oxyCODONE-acetaminophen (PERCOCET) 5-325 mg per tablet Take 1 tablet by mouth once a day as needed for pain 30 tablet 0    oxyCODONE-acetaminophen (PERCOCET) 5-325 mg per tablet Take 1 tablet by mouth once a day as needed for pain 30 tablet 0    prazosin (MINIPRESS) 2 MG Cap Take 1 capsule (2 mg total) by mouth every evening. 30 capsule 0    SYNTHROID 125 mcg tablet TAKE 2 TABLETS(250 MCG) BY MOUTH BEFORE BREAKFAST 180 tablet 3    syringe with needle (BD LUER-LAURIE SYRINGE) 3 mL 25 gauge x 1" Syrg Use for Toradol IM only. 8 each 0    tiZANidine (ZANAFLEX) 4 MG tablet Take 1 tablet by mouth three times a day as needed for  muscle spasm 90 tablet 6    traZODone (DESYREL) 100 MG tablet Take 1 tablet (100 mg total) by mouth every evening. 30 tablet 3     Current Facility-Administered Medications   Medication Dose Route Frequency Provider Last Rate Last Admin    onabotulinumtoxina injection 200 Units  200 Units Intramuscular Q90 Days Padma Guevara MD   200 Units at 02/17/23 1304    onabotulinumtoxina injection 200 Units  200 Units Intramuscular Q90 Days Padma Guevara MD   200 Units at 05/24/23 1133    onabotulinumtoxina injection 200 Units  200 Units Intramuscular Q90 Days Padma Guevara MD   200 Units at 10/05/23 1120     Facility-Administered Medications Ordered in Other Visits   Medication Dose Route Frequency Provider Last Rate Last Admin    lactated ringers infusion   Intravenous Continuous Yang Mueller MD        sodium chloride 0.9% flush 10 mL  10 " mL Intravenous PRN Yang Mueller MD           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     PATRICIA (obstructive sleep apnea)     severe - doesn't use CPAP    Panic attack     PTSD (post-traumatic stress disorder)      Past Surgical History:   Procedure Laterality Date    ANKLE SURGERY Right     x2 (#1 for ligament injury and #2 for fx and ligament)    AUGMENTATION OF BREAST      BILATERAL TUBAL LIGATION      BRAIN SURGERY  12/2018    decompression/craniotomy Arnold-Chiari syndrome    BREAST SURGERY  1997    B implants    CERVICAL BIOPSY  W/ LOOP ELECTRODE EXCISION      COLD KNIFE CONIZATION OF CERVIX N/A 03/17/2022    Procedure: CONE BIOPSY, CERVIX, USING COLD KNIFE;  Surgeon: Nikhil Franco MD;  Location: [facility];  Service: OB/GYN;  Laterality: N/A;    COLONOSCOPY N/A 09/29/2022    Procedure: COLONOSCOPY;  Surgeon: Martín Sagastume MD;  Location: [facility];  Service: Endoscopy;  Laterality: N/A; Repeat colonoscopy in 5 years for surveillance    DECOMPRESSION OF CHIARI MALFORMATION BY REMOVAL OF POSTERIOR ARCH OF FIRST CERVICAL VERTEBRA N/A 12/13/2018    Procedure: DECOMPRESSION, CHIARI MALFORMATION, BY 1ST CERVICAL VERTEBRA POSTERIOR ARCH REMOVAL;  Surgeon: Sergio Busch MD;  Location: [address];  Service: Neurosurgery;  Laterality: N/A;    EPIDURAL STEROID INJECTION INTO CERVICAL SPINE N/A 12/05/2019    Procedure: Injection-steroid-epidural-cervical-C7-T1;  Surgeon: Noa Samano MD;  Location: [facility] OR;  Service: Neurosurgery;   Laterality: N/A;    ESOPHAGOGASTRODUODENOSCOPY N/A 5/12/2023    Procedure: EGD (ESOPHAGOGASTRODUODENOSCOPY);  Surgeon: Martín Sagastume MD;  Location: [facility];  Service: Endoscopy;  Laterality: N/A;    FRACTURE SURGERY Right     Ankle    INJECTION OF ANESTHETIC AGENT AROUND MEDIAL BRANCH NERVES INNERVATING CERVICAL FACET JOINT Bilateral 08/06/2020    Procedure: Block-nerve-medial branch-cervical Bilateral  C3,4,5;  Surgeon: Noa Samano MD;  Location: [facility] OR;  Service: Anesthesiology;  Laterality: Bilateral;    INJECTION OF ANESTHETIC AGENT AROUND MEDIAL BRANCH NERVES INNERVATING CERVICAL FACET JOINT Bilateral 08/20/2020    Procedure: Block-nerve-medial branch-cervical Bilateral C3-4-5;  Surgeon: Noa Samano MD;  Location: [facility] OR;  Service: Anesthesiology;  Laterality: Bilateral;    LAPAROSCOPIC GASTRIC BANDING      with subsequent removal due to abscess    LASIK Bilateral     2009    LIPOSUCTION      x 2    PELVIC LAPAROSCOPY      for endometriosis eval with BTL    RADIOFREQUENCY ABLATION Bilateral 08/16/2021    Procedure: Radiofrequency Ablation Cervical C3-C5;  Surgeon: Robert Munoz MD;  Location: [facility] OR;  Service: Pain Management;  Laterality: Bilateral;    RADIOFREQUENCY THERMAL COAGULATION OF MEDIAL BRANCH OF POSTERIOR RAMUS OF CERVICAL SPINAL NERVE Bilateral 08/27/2020    Procedure: RADIOFREQUENCY THERMAL COAGULATION, NERVE, SPINAL, CERVICAL, POSTERIOR RAMUS, MEDIAL BRANCH C3-4-5, bilateral;  Surgeon: Noa Samano MD;  Location: [facility] OR;  Service: Anesthesiology;  Laterality: Bilateral;     Family History       Problem Relation (Age of Onset)    Cancer Maternal Grandfather (65)    Cervical cancer Sister, Sister, Paternal Cousin, Other    Colon cancer Maternal Grandmother    Diabetes Mother, Sister    Esophageal cancer Maternal Grandfather    Glaucoma Mother, Sister    Heart disease Father    Hyperlipidemia Mother, Father    Hypertension Mother    Mental illness Mother, Sister    Mitochondrial  "disorder Sister    Ovarian cancer Paternal Aunt, Paternal Aunt, Cousin    Retinal detachment Mother, Sister, Maternal Grandmother    Seizures Sister, Sister    Stroke Mother (30)          Social History     Socioeconomic History    Marital status:    Occupational History    Occupation: [employer]     Tobacco Use    Smoking status: Never    Smokeless tobacco: Never   Substance and Sexual Activity    Alcohol use: Yes     Comment: occasional "maybe monthly"    Drug use: No    Sexual activity: Yes     Partners: Male     Birth control/protection: See Surgical Hx, None     Comment: depo since age 18   Social History Narrative    Not on disability, not currently working.     Social Determinants of Health     Financial Resource Strain: Low Risk  (9/21/2023)    Overall Financial Resource Strain (CARDIA)     Difficulty of Paying Living Expenses: Not very hard   Recent Concern: Financial Resource Strain - Medium Risk (7/18/2023)    Overall Financial Resource Strain (CARDIA)     Difficulty of Paying Living Expenses: Somewhat hard   Food Insecurity: Food Insecurity Present (9/21/2023)    Hunger Vital Sign     Worried About Running Out of Food in the Last Year: Never true     Ran Out of Food in the Last Year: Sometimes true   Transportation Needs: Unmet Transportation Needs (9/21/2023)    PRAPARE - Transportation     Lack of Transportation (Medical): Yes     Lack of Transportation (Non-Medical): Yes   Physical Activity: Insufficiently Active (9/21/2023)    Exercise Vital Sign     Days of Exercise per Week: 1 day     Minutes of Exercise per Session: 20 min   Stress: Stress Concern Present (9/21/2023)    [place] [employer] - Occupational Stress Questionnaire     Feeling of Stress : Very much   Social Connections: Unknown (9/21/2023)    Social Connection and Isolation Panel [NHANES]     Frequency of Communication with Friends and Family: More than three times a week     Frequency of Social " Gatherings with Friends and Family: Never     Active Member of Clubs or Organizations: No     Attends Club or Organization Meetings: Never     Marital Status:    Housing Stability: Low Risk  (9/21/2023)    Housing Stability Vital Sign     Unable to Pay for Housing in the Last Year: No     Number of Places Lived in the Last Year: 1     Unstable Housing in the Last Year: No       Review of Systems    OBJECTIVE:     Vital Signs     There is no height or weight on file to calculate BMI.      Neurosurgery Physical Exam  General: well developed, well nourished, no distress.   Head: normocephalic, atraumatic  Neurologic: Alert and oriented. Thought content appropriate.  GCS: Motor: 6/Verbal: 5/Eyes: 4 GCS Total: 15  Mental Status: Awake, Alert, Oriented x3  Cranial nerves: face symmetric, tongue midline, CN II-XII grossly intact.   Eyes: pupils equal, round, reactive to light with accomodation, EOMI.    Sensory: intact to light touch throughout  Motor Strength:Moves all extremities spontaneously and symmetrically with good tone.  No abnormal movements seen.     Diagnostic Results:  MRI cervical spine without contrast and CSF flow study reviewed. There is good decompression of the foramen magnum with suboccipital craniectomy and resection of the posterior arch of C1. There is good CSF flow both anterior and posteriorly. The cord remains normal caliber. No signifiacnt spinal stenosis, mild right NFS at C5-6 and mild left NFS at C6-7    ASSESSMENT/PLAN:     48-year-old female with history of suboccipital decompressive craniectomy, C1 laminectomy with duraplasty for Chiari malformation 12/13/2018 who presents with complaint of HA, neck pain, nausea, falls and UE numbness.  MRI cervical spine and MRI CSF flow study show adequate decompression without new acute findings to explain her symptoms. No acute Neurosurgical intervention recommended. I will also refer her to Neurology for evaluation of her headaches as these do  not sound typical for chiari as well as to Neuropsych. She may RTC with any new or worsening symptoms. Imaging reviewed and case discussed with Dr. Busch.     All symptoms and signs that require emergent or urgent treatment were reviewed. The plan was discussed with the patient. All of the the patient's questions and concerns were answered and she voiced understanding. Please feel free to call with any further questions.        Hilary Hendrix PA-C  Neurosurgery      Note dictated with voice recognition software, please excuse any grammatical errors.

## 2023-12-01 RX ORDER — KETOROLAC TROMETHAMINE 30 MG/ML
30 INJECTION, SOLUTION INTRAMUSCULAR; INTRAVENOUS ONCE AS NEEDED
Qty: 4 ML | Refills: 5 | Status: SHIPPED | OUTPATIENT
Start: 2023-12-01 | End: 2024-03-01

## 2023-12-11 NOTE — PROGRESS NOTES
"The patient location is:  [place]  The patient location [place] is: [facility]  The patient phone number is: [phone]  Visit type: Virtual visit with synchronous audio and video  Each patient to whom he or she provides medical services by telemedicine is:  (1) informed of the relationship between the physician and patient and the respective role of any other health care provider with respect to management of the patient; and (2) notified that he or she may decline to receive medical services by telemedicine and may withdraw from such care at any time.    Outpatient Psychiatry Follow-Up Visit    Clinical Status of Patient: Outpatient (Ambulatory)  12/12/2023     Chief Complaint: PTSD, Insomnia      Interval History and Content of Current Session:  Interim Events/Subjective Report/Content of Current Session:  follow-up appointment.    Pt is a 48 y/o female with past psychiatric hx of PTSD, insomnia who presents for follow-up treatment. Pt reported that her dog had the surgery and he is doing well. Has had several financial stressors recently and feels like she has been taken advantage of. Recently had another MRI and was seen by neurosurgeon. Feels like she has been given conflicting information by various neuro/neurosurgery providers. Stated that she has been more nauseas and more frequent pain in the back of her head. Has not seen Dr. Llanos in a few months but plans to reschedule. Pt reports lack of social/emotional support and would benefit from more. Stated that she has been isolating more frequently due to anxiety.     Past Psychiatric hx:  prozac (can't recall), lexapro (effective, does not remember why stopped), celexa > effective, dec'd libido ( told pt he would "leave her" regarding the sex drive), depakote (ineffective), lamictal (rash), abilify (paranoid), seroquel (significant wgt gain), Lithium 300 mg po qam/600 mg po qhs (pt reports it worsened anxiety), prazosin (I had some allergic " reaction- had been effective for months prior to this report), cymbalta 90mg (effective; pt self d/c'd), trazodone, ambien, Belsomra, Vyvanse    Past Medical hx:   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     PATRICIA (obstructive sleep apnea)     severe - doesn't use CPAP    Panic attack     PTSD (post-traumatic stress disorder)         Interim hx:  Medication changes last visit: Increase escitalopram to 20 mg, Increase trazodone to 100 mg   Anxiety: stable  Depression: slight increase     Denies suicidal/homicidal ideations.  Denies hopelessness/worthlessness.    Denies auditory/visual hallucinations      Alcohol: Pt denies  Drug: Pt denies  Caffeine: Not assessed  Tobacco: Pt denies      Review of Systems   PSYCHIATRIC: Pertinent items are noted in the narrative.        CONSTITUTIONAL: weight stable    Past Medical, Family and Social History: The patient's past medical, family and social history have been reviewed and updated as appropriate within the electronic medical record. See encounter notes.     Current Psychiatric Medication:  wellbutrin xl 150mg po qam, xanax 1mg po daily PRN anxiety (not using daily),  escitalopram 20 mg, Adderall XR 20 mg (not using daily), trazodone 100 mg     Compliance: yes      Side effects: Pt denied.      Risk Parameters:  Patient reports no suicidal ideation  Patient reports no homicidal ideation  Patient reports no self-injurious behavior  Patient reports no violent behavior     Exam (detailed: at least 9  elements; comprehensive: all 15 elements)   Constitutional  Vitals:  Most recent vital signs, dated less than 90 days prior to this appointment, were reviewed. BP: ()/()   Arterial Line BP: ()/()       General:  unremarkable, age appropriate, casual attire, good eye contact, good rapport       Musculoskeletal  Muscle Strength/Tone:  no flaccidity, no tremor    Gait & Station:  normal      Psychiatric                       Speech:  normal tone, normal rate, rhythm, and volume   Mood & Affect:   Mildly Depressed, anxious         Thought Process:   Goal directed; Linear    Associations:   intact   Thought Content:   No SI/HI, delusions, or paranoia, no AV/VH   Insight & Judgement:   Good, adequate to circumstances   Orientation:   grossly intact; alert and oriented x 4    Memory:  intact for content of interview    Language:  grossly intact, can repeat    Attention Span  : Grossly intact for content of interview   Fund of Knowledge:   intact and appropriate to age and level of education        Assessment and Diagnosis   Status/Progress: Based on the examination today, the patient's problem(s) is/are not adequately controlled.  New problems have been presented today. Co-morbidities are complicating management of the primary condition. There are no active rule-out diagnoses for this patient at this time.      Impression: Pt continues to have mood and anxiety symptoms related to health concerns and psychosocial stressors. Has been isolating and avoiding anxiety triggers, which is likely increasing her symptoms of anxiety. Encouraged pt to reschedule with Dr. Llanos. Will continue with medication plan for now and monitor symptoms moving forward.      Diagnosis:   Posttraumatic Stress Disorder  Panic Disorder w/o agoraphobia  Attention Deficit Hyperactivity Disorder, Combined Type   Insomnia  Borderline personality disorder traits  Intervention/Counseling/Treatment Plan   Medication Management:      1. wellbutrin xl 150mg  po qam    2. alprazolam 1mg po daily PRN anxiety (approximately 2-3x/wk and often using half tab)    3. Increase escitalopram to 20 mg    4. Adderall XR 20 mg    5. Increase trazodone to 100 mg      6. Call to report any worsening of symptoms or problems with the medication. Pt instructed to go to ER with thoughts of harming self, others     7. Continue in therapy with Dr. Llanos as needed    Psychotherapy: 20 minutes  Target symptoms: anxiety  Why chosen therapy is appropriate versus another modality: CBT used; relevant to diagnosis, patient responds to this modality  Outcome monitoring methods: self-report, observation  Therapeutic intervention type: Cognitive Behavioral Therapy, Exposure therapy.   Topics discussed/themes: building skills sets for symptom management, symptom recognition, nutrition, exercise  The patient's response to the intervention is accepting  Patient's response to treatment is: good.   The patient's progress toward treatment goals: limited     Return to clinic: 2 months     -Cognitive-Behavioral/Supportive therapy and psychoeducation provided  -R/B/SE's of medications discussed with the pt who expresses understanding and chooses to take medications as prescribed.   -Pt instructed to call clinic, 911 or go to nearest emergency room if sxs worsen or pt is in   crisis. The pt expresses understanding.    Bebeto Resendiz, PhD, MP     Antidepressant/Antianxiety Medication Initiation:  Patient informed of risks, benefits, and potential side effects of medication and accepts informed consent.  Common side effects include nausea, fatigue, headache, insomnia., Specifically discussed the possibility of new or worsening suicidal thoughts/depression.  Patient instructed to stop the medication immediately and seek urgent treatment if this occurs. Patient instructed not to abruptly discontinue medication without physician guidance except in cases of sudden onset or worsening of SI.       Stimulant  Medication Initiation:  Patient advised of risks, benefits, and side effects of medication and accepts informed consent.  Common side effects include insomnia, irritability, jittery feeling, dry mouth, and agitation/hostility., Patient advised of potential addictive nature of medication and controlled substance classification.  Instructed to safeguard medication as no early refills can be given for lost or stolen medications.       Benzodiazepine Initiation:  Patient advised of the risks, benefits, and common side effects of medication and has accepted informed consent.  Common side effects include drowsiness, impaired coordination, possible memory loss., Patient advised NOT to operate a vehicle or machinery untiil they are sure how the medication will affec them.  Client also advised of danger of mixing this medication with alcohol., Patient advised of potential addictive nature of medication and need to safeguard medication as no early refills for lost or stolen medications can be authorized.       Pregnancy Warning:  Patient denies current pregnancy possibility.  Patient made aware that medications have not been proven safe in pregnancy and that she must maintain adequate birth control.  Patient instructed to alert us immediately if she becomes pregnant.

## 2023-12-12 ENCOUNTER — PATIENT MESSAGE (OUTPATIENT)
Dept: NEUROSURGERY | Facility: CLINIC | Age: 48
End: 2023-12-12
Payer: MEDICARE

## 2023-12-12 ENCOUNTER — OFFICE VISIT (OUTPATIENT)
Dept: PSYCHIATRY | Facility: CLINIC | Age: 48
End: 2023-12-12
Payer: MEDICARE

## 2023-12-12 DIAGNOSIS — G47.00 INSOMNIA, UNSPECIFIED TYPE: ICD-10-CM

## 2023-12-12 DIAGNOSIS — F43.10 PTSD (POST-TRAUMATIC STRESS DISORDER): ICD-10-CM

## 2023-12-12 DIAGNOSIS — F43.10 POSTTRAUMATIC STRESS DISORDER: Primary | ICD-10-CM

## 2023-12-12 DIAGNOSIS — F90.2 ATTENTION DEFICIT HYPERACTIVITY DISORDER (ADHD), COMBINED TYPE: ICD-10-CM

## 2023-12-12 DIAGNOSIS — F41.0 PANIC DISORDER: ICD-10-CM

## 2023-12-12 DIAGNOSIS — F41.0 PANIC DISORDER WITHOUT AGORAPHOBIA: ICD-10-CM

## 2023-12-12 PROCEDURE — 1159F PR MEDICATION LIST DOCUMENTED IN MEDICAL RECORD: ICD-10-PCS | Mod: CPTII,95,, | Performed by: PSYCHOLOGIST

## 2023-12-12 PROCEDURE — 1159F MED LIST DOCD IN RCRD: CPT | Mod: CPTII,95,, | Performed by: PSYCHOLOGIST

## 2023-12-12 PROCEDURE — 4010F PR ACE/ARB THEARPY RXD/TAKEN: ICD-10-PCS | Mod: CPTII,95,, | Performed by: PSYCHOLOGIST

## 2023-12-12 PROCEDURE — 99214 OFFICE O/P EST MOD 30 MIN: CPT | Mod: 95,,, | Performed by: PSYCHOLOGIST

## 2023-12-12 PROCEDURE — 90833 PSYTX W PT W E/M 30 MIN: CPT | Mod: 95,,, | Performed by: PSYCHOLOGIST

## 2023-12-12 PROCEDURE — 4010F ACE/ARB THERAPY RXD/TAKEN: CPT | Mod: CPTII,95,, | Performed by: PSYCHOLOGIST

## 2023-12-12 PROCEDURE — 90833 PR PSYCHOTHERAPY W/PATIENT W/E&M, 30 MIN (ADD ON): ICD-10-PCS | Mod: 95,,, | Performed by: PSYCHOLOGIST

## 2023-12-12 PROCEDURE — 99214 PR OFFICE/OUTPT VISIT, EST, LEVL IV, 30-39 MIN: ICD-10-PCS | Mod: 95,,, | Performed by: PSYCHOLOGIST

## 2023-12-12 RX ORDER — ALPRAZOLAM 1 MG/1
1 TABLET ORAL DAILY PRN
Qty: 30 TABLET | Refills: 0 | Status: SHIPPED | OUTPATIENT
Start: 2023-12-12 | End: 2024-01-10 | Stop reason: SDUPTHER

## 2023-12-12 RX ORDER — ESCITALOPRAM OXALATE 20 MG/1
20 TABLET ORAL DAILY
Qty: 30 TABLET | Refills: 3 | Status: SHIPPED | OUTPATIENT
Start: 2023-12-12 | End: 2024-12-11

## 2023-12-12 RX ORDER — PRAZOSIN HYDROCHLORIDE 2 MG/1
2 CAPSULE ORAL NIGHTLY
Qty: 30 CAPSULE | Refills: 0 | Status: SHIPPED | OUTPATIENT
Start: 2023-12-12 | End: 2024-01-02

## 2023-12-12 RX ORDER — BUPROPION HYDROCHLORIDE 150 MG/1
150 TABLET ORAL DAILY
Qty: 90 TABLET | Refills: 1 | Status: SHIPPED | OUTPATIENT
Start: 2023-12-12 | End: 2024-12-11

## 2023-12-16 ENCOUNTER — ON-DEMAND VIRTUAL (OUTPATIENT)
Dept: URGENT CARE | Facility: CLINIC | Age: 48
End: 2023-12-16
Payer: MEDICARE

## 2023-12-16 DIAGNOSIS — J06.9 VIRAL URI WITH COUGH: Primary | ICD-10-CM

## 2023-12-16 PROCEDURE — 99213 OFFICE O/P EST LOW 20 MIN: CPT | Mod: 95,,, | Performed by: NURSE PRACTITIONER

## 2023-12-16 PROCEDURE — 99213 PR OFFICE/OUTPT VISIT, EST, LEVL III, 20-29 MIN: ICD-10-PCS | Mod: 95,,, | Performed by: NURSE PRACTITIONER

## 2023-12-16 RX ORDER — BENZONATATE 200 MG/1
200 CAPSULE ORAL 3 TIMES DAILY PRN
Qty: 30 CAPSULE | Refills: 0 | Status: SHIPPED | OUTPATIENT
Start: 2023-12-16 | End: 2023-12-26

## 2023-12-16 RX ORDER — PREDNISONE 20 MG/1
60 TABLET ORAL DAILY
Qty: 9 TABLET | Refills: 0 | Status: SHIPPED | OUTPATIENT
Start: 2023-12-16 | End: 2023-12-22

## 2023-12-16 NOTE — PROGRESS NOTES
Subjective:      Patient ID: Dinah Mitchell is a 48 y.o. female.    Vitals:  vitals were not taken for this visit.     Chief Complaint: URI    Visit Type: TELE AUDIOVISUAL    Present with the patient at the time of consultation: TELEMED PRESENT WITH PATIENT: None    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     PATRICIA (obstructive sleep apnea)     severe - doesn't use CPAP    Panic attack     PTSD (post-traumatic stress disorder)      Past Surgical History:   Procedure Laterality Date    ANKLE SURGERY Right     x2 (#1 for ligament injury and #2 for fx and ligament)    AUGMENTATION OF BREAST      BILATERAL TUBAL LIGATION      BRAIN SURGERY  12/2018    decompression/craniotomy Arnold-Chiari syndrome    BREAST SURGERY  1997    B implants    CERVICAL BIOPSY  W/ LOOP ELECTRODE EXCISION      COLD KNIFE CONIZATION OF CERVIX N/A 03/17/2022    Procedure: CONE BIOPSY, CERVIX, USING COLD KNIFE;  Surgeon: Nikhil Franco MD;  Location: [facility];  Service: OB/GYN;  Laterality: N/A;    COLONOSCOPY N/A 09/29/2022    Procedure: COLONOSCOPY;  Surgeon: Martín Sagastume MD;  Location: [facility];  Service: Endoscopy;  Laterality: N/A; Repeat colonoscopy in 5 years for surveillance    DECOMPRESSION OF CHIARI MALFORMATION BY REMOVAL OF POSTERIOR ARCH OF FIRST CERVICAL VERTEBRA N/A 12/13/2018    Procedure: DECOMPRESSION, CHIARI MALFORMATION, BY 1ST CERVICAL VERTEBRA POSTERIOR ARCH REMOVAL;  Surgeon: Sergio Busch MD;  Location: [facility] OR [address] [place] [address];  Service:  Neurosurgery;  Laterality: N/A;    EPIDURAL STEROID INJECTION INTO CERVICAL SPINE N/A 12/05/2019    Procedure: Injection-steroid-epidural-cervical-C7-T1;  Surgeon: Noa Samano MD;  Location: [facility] OR;  Service: Neurosurgery;  Laterality: N/A;    ESOPHAGOGASTRODUODENOSCOPY N/A 5/12/2023    Procedure: EGD (ESOPHAGOGASTRODUODENOSCOPY);  Surgeon: Martín Sagastume MD;  Location: [facility];  Service: Endoscopy;  Laterality: N/A;    FRACTURE SURGERY Right     Ankle    INJECTION OF ANESTHETIC AGENT AROUND MEDIAL BRANCH NERVES INNERVATING CERVICAL FACET JOINT Bilateral 08/06/2020    Procedure: Block-nerve-medial branch-cervical Bilateral  C3,4,5;  Surgeon: Noa Samano MD;  Location: [facility] OR;  Service: Anesthesiology;  Laterality: Bilateral;    INJECTION OF ANESTHETIC AGENT AROUND MEDIAL BRANCH NERVES INNERVATING CERVICAL FACET JOINT Bilateral 08/20/2020    Procedure: Block-nerve-medial branch-cervical Bilateral C3-4-5;  Surgeon: Noa Samano MD;  Location: [facility] OR;  Service: Anesthesiology;  Laterality: Bilateral;    LAPAROSCOPIC GASTRIC BANDING      with subsequent removal due to abscess    LASIK Bilateral     2009    LIPOSUCTION      x 2    PELVIC LAPAROSCOPY      for endometriosis eval with BTL    RADIOFREQUENCY ABLATION Bilateral 08/16/2021    Procedure: Radiofrequency Ablation Cervical C3-C5;  Surgeon: Robert Munoz MD;  Location: [facility] OR;  Service: Pain Management;  Laterality: Bilateral;    RADIOFREQUENCY THERMAL COAGULATION OF MEDIAL BRANCH OF POSTERIOR RAMUS OF CERVICAL SPINAL NERVE Bilateral 08/27/2020    Procedure: RADIOFREQUENCY THERMAL COAGULATION, NERVE, SPINAL, CERVICAL, POSTERIOR RAMUS, MEDIAL BRANCH C3-4-5, bilateral;  Surgeon: Noa Samano MD;  Location: [facility] OR;  Service: Anesthesiology;  Laterality: Bilateral;     Review of patient's allergies indicates:   Allergen Reactions    Lamotrigine      Other reaction(s): Rash  Other reaction(s): Nausea    Sulfa (sulfonamide antibiotics) Nausea Only      Other reaction(s): Unknown    Adhesive Rash    Zonisamide Nausea And Vomiting     Nausea/vomiting      Current Outpatient Medications on File Prior to Visit   Medication Sig Dispense Refill    acetylcysteine (N-ACETYL-L-CYSTEINE MISC)       albuterol (PROVENTIL) 2.5 mg /3 mL (0.083 %) nebulizer solution Take 3 mLs (2.5 mg total) by nebulization every 4 (four) hours as needed for Wheezing or Shortness of Breath (chest tightness). Rescue 180 mL 6    albuterol (PROVENTIL/VENTOLIN HFA) 90 mcg/actuation inhaler Inhale 1-2 puffs into the lungs every 4 (four) hours as needed for Wheezing or Shortness of Breath (chest tightness). Rescue 8.5 g 3    ALPRAZolam (XANAX) 1 MG tablet TAKE 1 TABLET (1 MG) BY MOUTH DAILY AS NEEDED FOR ANXIETY 30 tablet 0    buPROPion (WELLBUTRIN XL) 150 MG TB24 tablet Take 1 tablet (150 mg total) by mouth once daily. 90 tablet 1    dextroamphetamine-amphetamine (ADDERALL XR) 20 MG 24 hr capsule Take 1 capsule (20 mg total) by mouth every morning. 30 capsule 0    diphenoxylate-atropine 2.5-0.025 mg (LOMOTIL) 2.5-0.025 mg per tablet Take 1 tablet by mouth 4 (four) times daily as needed. 80 tablet 0    dronabinol (MARINOL) 2.5 MG capsule Take 1 capsule (2.5 mg total) by mouth 2 (two) times daily before meals. 60 capsule 3    EScitalopram oxalate (LEXAPRO) 20 MG tablet Take 1 tablet (20 mg total) by mouth once daily. 30 tablet 3    fluticasone propionate (FLONASE) 50 mcg/actuation nasal spray Spray 1 spray (50 mcg total) in each nostril once daily. 16 g 2    fluticasone-umeclidin-vilanter (TRELEGY ELLIPTA) 100-62.5-25 mcg DsDv Inhale 1 puff into the lungs once daily. 60 each 3    gabapentin (NEURONTIN) 600 MG tablet TAKE 1 TABLET BY MOUTH 2 TO 3 TIMES A DAY FOR CHRONIC PAIN 90 tablet 11    galcanezumab-gnlm (EMGALITY PEN) 120 mg/mL PnIj ADMINISTER 1 ML UNDER THE SKIN EVERY 28 DAYS 1 mL 6    levOCARNitine (CARNITOR) 330 mg Tab Take 3 tablets (990 mg total) by mouth 2 (two) times daily. 180 tablet 2     "LIDOcaine (LIDODERM) 5 % Apply one patch to affected area every 12 hours. Keep on for 12 hours, remove for 12 hours. 60 patch 5    montelukast (SINGULAIR) 10 mg tablet Take 1 tablet (10 mg total) by mouth once daily. 90 tablet 2    ondansetron (ZOFRAN-ODT) 4 MG TbDL Take 1 tablet (4 mg total) by mouth every 6 (six) hours as needed (nausea). 30 tablet 0    oxyCODONE-acetaminophen (PERCOCET) 5-325 mg per tablet Take 1 tablet by mouth every 6 (six) hours as needed for Pain. 12 tablet 0    oxyCODONE-acetaminophen (PERCOCET) 5-325 mg per tablet Take 1 tablet by mouth once a day as needed for pain 30 tablet 0    [START ON 12/23/2023] oxyCODONE-acetaminophen (PERCOCET) 5-325 mg per tablet Take 1 tablet by mouth once a day as needed for pain 30 tablet 0    oxyCODONE-acetaminophen (PERCOCET) 5-325 mg per tablet Take 1 tablet by mouth once a day as needed for pain 30 tablet 0    prazosin (MINIPRESS) 2 MG Cap Take 1 capsule (2 mg total) by mouth every evening. 30 capsule 0    rimegepant (NURTEC) 75 mg odt Take 1 tablet (75 mg total) by mouth once as needed for Migraine. 8 tablet 11    SYNTHROID 125 mcg tablet TAKE 2 TABLETS(250 MCG) BY MOUTH BEFORE BREAKFAST 180 tablet 3    syringe with needle (BD LUER-LAURIE SYRINGE) 3 mL 25 gauge x 1" Syrg Use for Toradol IM only. 8 each 0    tiZANidine (ZANAFLEX) 4 MG tablet Take 1 tablet by mouth three times a day as needed for  muscle spasm 90 tablet 6    traZODone (DESYREL) 100 MG tablet Take 1 tablet (100 mg total) by mouth every evening. 30 tablet 3     Current Facility-Administered Medications on File Prior to Visit   Medication Dose Route Frequency Provider Last Rate Last Admin    lactated ringers infusion   Intravenous Continuous Yang Mueller MD        onabotulinumtoxina injection 200 Units  200 Units Intramuscular Q90 Days Padma Guevara MD   200 Units at 02/17/23 1304    onabotulinumtoxina injection 200 Units  200 Units Intramuscular Q90 Days Padma Guevara MD   " "200 Units at 05/24/23 1133    onabotulinumtoxina injection 200 Units  200 Units Intramuscular Q90 Days Padma Guevara MD   200 Units at 10/05/23 1120    sodium chloride 0.9% flush 10 mL  10 mL Intravenous PRN Yang Mueller MD         Family History   Problem Relation Age of Onset    Retinal detachment Mother     Glaucoma Mother     Diabetes Mother     Stroke Mother 30    Mental illness Mother         depression    Hyperlipidemia Mother     Hypertension Mother     Hyperlipidemia Father     Heart disease Father         "athletes heart"    Glaucoma Sister     Diabetes Sister     Seizures Sister     Mitochondrial disorder Sister         "trent"    Mental illness Sister     Retinal detachment Sister     Seizures Sister     Cervical cancer Sister     Cervical cancer Sister     Ovarian cancer Paternal Aunt     Ovarian cancer Paternal Aunt     Retinal detachment Maternal Grandmother     Colon cancer Maternal Grandmother     Esophageal cancer Maternal Grandfather     Cancer Maternal Grandfather 65        throat ca    Ovarian cancer Cousin     Cervical cancer Paternal Cousin     Cervical cancer Other     Breast cancer Neg Hx     Crohn's disease Neg Hx     Ulcerative colitis Neg Hx     Stomach cancer Neg Hx     Celiac disease Neg Hx     Macular degeneration Neg Hx      Medications Ordered                [facility] [address], [place] [address]    Telephone: [phone]   Fax: [phone]   Hours: Mon-Fri, 8a-5:30p                         Internal Pharmacy (2 of 2)              benzonatate (TESSALON) 200 MG capsule    Sig: Take 1 capsule (200 mg total) by mouth 3 (three) times daily as needed for Cough.       Start: 12/16/23     Quantity: 30 capsule Refills: 0                         predniSONE (DELTASONE) 20 MG tablet    Sig: Take 3 tablets (60 mg total) by mouth once daily. for 3 days       Start: 12/16/23     Quantity: 9 tablet Refills: 0                         Ohs Peq Odvv Intake "    12/16/2023 11:43 AM CST - Filed by Patient   Describe your reason for todays visit Coughing, wheezing, sinuses, shortness of breath   What is your current physical address in the event of a medical emergency? [address] [place] [address]   Are you able to take your vital signs? No   Please attach any relevant images or files        47 yo female with asthma presents via telemedicine video for cold symptoms.    Sinus Problem  This is a new problem. The current episode started in the past 7 days (2-3 days). The problem has been gradually worsening since onset. There has been no fever. Associated symptoms include congestion, coughing, headaches, sinus pressure, sneezing and a sore throat (raw from coughing). Pertinent negatives include no chills or ear pain. Treatments tried: alkaseltzer cold and flu, robitussin DM, zyrtec. The treatment provided mild relief.     ROS:  Constitution: Negative for chills and fever.   HENT:  Positive for congestion, postnasal drip, sinus pressure and sore throat (raw from coughing). Negative for ear pain and sinus pain.    Respiratory:  Positive for cough.    Allergic/Immunologic: Positive for sneezing.   Neurological:  Positive for headaches.      Objective:   The physical exam was conducted virtually.  Physical Exam   Constitutional: She is oriented to person, place, and time.   HENT:   Head: Normocephalic and atraumatic.   Neurological: She is alert and oriented to person, place, and time.   Psychiatric: She experiences Normal attention. Her speech is normal. Mood normal.     Assessment:     1. Viral URI with cough      Plan:   Viral URI with cough  Prednisone and tessalon sent to  pharmacy  Discussed diagnosis and treatment of URI.   Suggested symptomatic OTC remedies.   Increase your water intake to 64-80 oz daily to help thin mucus  Nasal Saline spray (Over the counter [place] spray or [place])  2 sprays each nostril 2-3 times a day for nasal congestion  Tylenol 500 mg 2  tablets or Ibuprofen 200 mg 2 tablets every 4-6 hours as needed for fever, headaches, sore throat, ear pain, bodyaches, and/or nasal/sinus inflammation  Warm salt water gargles with 1/2 cup water and 1 tablespoon salt every 4 hours  Warm tea with honey and lemon  Follow up if symptoms worsen or fail to improve.

## 2023-12-29 ENCOUNTER — PROCEDURE VISIT (OUTPATIENT)
Dept: NEUROLOGY | Facility: CLINIC | Age: 48
End: 2023-12-29
Payer: MEDICARE

## 2023-12-29 VITALS
SYSTOLIC BLOOD PRESSURE: 163 MMHG | WEIGHT: 280 LBS | RESPIRATION RATE: 17 BRPM | TEMPERATURE: 98 F | HEART RATE: 89 BPM | BODY MASS INDEX: 43.95 KG/M2 | DIASTOLIC BLOOD PRESSURE: 103 MMHG | HEIGHT: 67 IN

## 2023-12-29 DIAGNOSIS — G43.719 INTRACTABLE CHRONIC MIGRAINE WITHOUT AURA AND WITHOUT STATUS MIGRAINOSUS: ICD-10-CM

## 2023-12-29 DIAGNOSIS — G93.5 CHIARI MALFORMATION TYPE I: ICD-10-CM

## 2023-12-29 DIAGNOSIS — R55 SYNCOPE, UNSPECIFIED SYNCOPE TYPE: Primary | ICD-10-CM

## 2023-12-29 PROCEDURE — 64615 PR CHEMODENERVATION OF MUSCLE FOR CHRONIC MIGRAINE: ICD-10-PCS | Mod: S$GLB,,, | Performed by: PSYCHIATRY & NEUROLOGY

## 2023-12-29 PROCEDURE — 64615 CHEMODENERV MUSC MIGRAINE: CPT | Mod: S$GLB,,, | Performed by: PSYCHIATRY & NEUROLOGY

## 2023-12-29 NOTE — PROCEDURES
Procedures     BOTOX PROCEDURE    PROCEDURE PERFORMED: Botulinum toxin injection (62119)    CLINICAL INDICATION: G43.719    The Botox injections have achieved well over 50%  improvement in the patient's symptoms. Migraines have been reduced at least 7 days per month and the number of cumulative hours suffering with headaches has been reduced at least 100 total hours per month. Today she does have a headache indicating that the Botox has worn off. Frequency of treatment is every 3 months unless no response to the treatments, at which time we will discontinue the injections.      A time out was conducted just before the start of the procedure to verify the correct patient and procedure, procedure location, and all relevant critical information.   Signed consent obtained prior to procedure     Conventional methods of treatment but the patient has been unresponsive and refractory.The patient meets criteria for chronic headaches according to the ICHD-III, the patient has more than 15 headaches a month at least 8 of them meet migraine criteria, which last for more than 4 hours a day.     Last Botox injections resulted in over 50%  improvement in the patient's symptoms. Frequency of treatment is every 3 months unless no response to the treatments, at which time we will discontinue the injections.     DESCRIPTION OF PROCEDURE: After obtaining informed consent and under aseptic technique, a total of 155 units of botulinum toxin type A were injected in the following muscles: Procerus 5 units,  5 units bilaterally, frontalis 20 units, temporalis 20 units bilaterally,   occipitalis 15 units, upper cervical paraspinals 10 units bilaterally and trapezius 15 units ilaterally. The patient was given a total of 155 units in 31 sites.    Special precaution taken given her posterior decompression.       The patient tolerated the procedure well. There were no complications. The patient was given a prescription for repeat  treatment in 3 months.     Unavoidable waste 45 units    Saeid Guevara M.D  Medical Director, Headache and Facial Pain  [facility]

## 2024-01-02 ENCOUNTER — OFFICE VISIT (OUTPATIENT)
Dept: NEUROLOGY | Facility: CLINIC | Age: 49
End: 2024-01-02
Payer: MEDICARE

## 2024-01-02 ENCOUNTER — LAB VISIT (OUTPATIENT)
Dept: LAB | Facility: HOSPITAL | Age: 49
End: 2024-01-02
Attending: FAMILY MEDICINE
Payer: MEDICARE

## 2024-01-02 ENCOUNTER — TELEPHONE (OUTPATIENT)
Dept: FAMILY MEDICINE | Facility: CLINIC | Age: 49
End: 2024-01-02
Payer: MEDICARE

## 2024-01-02 VITALS
DIASTOLIC BLOOD PRESSURE: 92 MMHG | TEMPERATURE: 98 F | HEART RATE: 90 BPM | SYSTOLIC BLOOD PRESSURE: 125 MMHG | BODY MASS INDEX: 43.95 KG/M2 | WEIGHT: 280 LBS | HEIGHT: 67 IN | RESPIRATION RATE: 17 BRPM

## 2024-01-02 DIAGNOSIS — M54.2 CERVICALGIA: ICD-10-CM

## 2024-01-02 DIAGNOSIS — E66.01 CLASS 3 SEVERE OBESITY WITH BODY MASS INDEX (BMI) OF 40.0 TO 44.9 IN ADULT, UNSPECIFIED OBESITY TYPE, UNSPECIFIED WHETHER SERIOUS COMORBIDITY PRESENT: ICD-10-CM

## 2024-01-02 DIAGNOSIS — G43.719 INTRACTABLE CHRONIC MIGRAINE WITHOUT AURA AND WITHOUT STATUS MIGRAINOSUS: ICD-10-CM

## 2024-01-02 DIAGNOSIS — E66.01 MORBID OBESITY: ICD-10-CM

## 2024-01-02 DIAGNOSIS — G93.5 CHIARI MALFORMATION TYPE I: ICD-10-CM

## 2024-01-02 DIAGNOSIS — H51.9 UNSPECIFIED DISORDER OF BINOCULAR MOVEMENT: ICD-10-CM

## 2024-01-02 DIAGNOSIS — G44.83 COUGH HEADACHE: ICD-10-CM

## 2024-01-02 DIAGNOSIS — I10 HYPERTENSION, UNSPECIFIED TYPE: ICD-10-CM

## 2024-01-02 DIAGNOSIS — M47.812 CERVICAL SPONDYLOSIS: ICD-10-CM

## 2024-01-02 DIAGNOSIS — H53.9 VISUAL DISTURBANCE: Primary | ICD-10-CM

## 2024-01-02 DIAGNOSIS — H53.123 TRANSIENT VISUAL LOSS OF BOTH EYES: ICD-10-CM

## 2024-01-02 DIAGNOSIS — Z00.00 ANNUAL PHYSICAL EXAM: ICD-10-CM

## 2024-01-02 DIAGNOSIS — R79.9 ABNORMAL FINDING OF BLOOD CHEMISTRY, UNSPECIFIED: ICD-10-CM

## 2024-01-02 DIAGNOSIS — F41.9 ANXIETY DISORDER, UNSPECIFIED: ICD-10-CM

## 2024-01-02 LAB
ALBUMIN SERPL BCP-MCNC: 4.4 G/DL (ref 3.5–5.2)
ALP SERPL-CCNC: 54 U/L (ref 55–135)
ALT SERPL W/O P-5'-P-CCNC: 20 U/L (ref 10–44)
ANION GAP SERPL CALC-SCNC: 8 MMOL/L (ref 8–16)
AST SERPL-CCNC: 25 U/L (ref 10–40)
BASOPHILS # BLD AUTO: 0.03 K/UL (ref 0–0.2)
BASOPHILS NFR BLD: 0.6 % (ref 0–1.9)
BILIRUB SERPL-MCNC: 0.6 MG/DL (ref 0.1–1)
BUN SERPL-MCNC: 14 MG/DL (ref 6–20)
CALCIUM SERPL-MCNC: 9.6 MG/DL (ref 8.7–10.5)
CHLORIDE SERPL-SCNC: 106 MMOL/L (ref 95–110)
CHOLEST SERPL-MCNC: 222 MG/DL (ref 120–199)
CHOLEST/HDLC SERPL: 4.6 {RATIO} (ref 2–5)
CO2 SERPL-SCNC: 26 MMOL/L (ref 23–29)
CREAT SERPL-MCNC: 1.1 MG/DL (ref 0.5–1.4)
DIFFERENTIAL METHOD BLD: ABNORMAL
EOSINOPHIL # BLD AUTO: 0.2 K/UL (ref 0–0.5)
EOSINOPHIL NFR BLD: 3 % (ref 0–8)
ERYTHROCYTE [DISTWIDTH] IN BLOOD BY AUTOMATED COUNT: 11.9 % (ref 11.5–14.5)
EST. GFR  (NO RACE VARIABLE): >60 ML/MIN/1.73 M^2
ESTIMATED AVG GLUCOSE: 100 MG/DL (ref 68–131)
GLUCOSE SERPL-MCNC: 96 MG/DL (ref 70–110)
HBA1C MFR BLD: 5.1 % (ref 4–5.6)
HCT VFR BLD AUTO: 41.4 % (ref 37–48.5)
HDLC SERPL-MCNC: 48 MG/DL (ref 40–75)
HDLC SERPL: 21.6 % (ref 20–50)
HGB BLD-MCNC: 13.6 G/DL (ref 12–16)
IMM GRANULOCYTES # BLD AUTO: 0.01 K/UL (ref 0–0.04)
IMM GRANULOCYTES NFR BLD AUTO: 0.2 % (ref 0–0.5)
LDLC SERPL CALC-MCNC: 147.4 MG/DL (ref 63–159)
LYMPHOCYTES # BLD AUTO: 1.9 K/UL (ref 1–4.8)
LYMPHOCYTES NFR BLD: 35.6 % (ref 18–48)
MCH RBC QN AUTO: 31.6 PG (ref 27–31)
MCHC RBC AUTO-ENTMCNC: 32.9 G/DL (ref 32–36)
MCV RBC AUTO: 96 FL (ref 82–98)
MONOCYTES # BLD AUTO: 0.4 K/UL (ref 0.3–1)
MONOCYTES NFR BLD: 7.4 % (ref 4–15)
NEUTROPHILS # BLD AUTO: 2.8 K/UL (ref 1.8–7.7)
NEUTROPHILS NFR BLD: 53.2 % (ref 38–73)
NONHDLC SERPL-MCNC: 174 MG/DL
NRBC BLD-RTO: 0 /100 WBC
PLATELET # BLD AUTO: 270 K/UL (ref 150–450)
PMV BLD AUTO: 11.2 FL (ref 9.2–12.9)
POTASSIUM SERPL-SCNC: 4.9 MMOL/L (ref 3.5–5.1)
PROT SERPL-MCNC: 7.7 G/DL (ref 6–8.4)
RBC # BLD AUTO: 4.31 M/UL (ref 4–5.4)
SODIUM SERPL-SCNC: 140 MMOL/L (ref 136–145)
T4 FREE SERPL-MCNC: 0.88 NG/DL (ref 0.71–1.51)
TRIGL SERPL-MCNC: 133 MG/DL (ref 30–150)
TSH SERPL DL<=0.005 MIU/L-ACNC: 4.39 UIU/ML (ref 0.4–4)
WBC # BLD AUTO: 5.25 K/UL (ref 3.9–12.7)

## 2024-01-02 PROCEDURE — 99999 PR PBB SHADOW E&M-EST. PATIENT-LVL V: CPT | Mod: PBBFAC,,, | Performed by: PSYCHIATRY & NEUROLOGY

## 2024-01-02 PROCEDURE — 99215 OFFICE O/P EST HI 40 MIN: CPT | Mod: 24,S$GLB,, | Performed by: PSYCHIATRY & NEUROLOGY

## 2024-01-02 PROCEDURE — 80053 COMPREHEN METABOLIC PANEL: CPT | Performed by: FAMILY MEDICINE

## 2024-01-02 PROCEDURE — 3080F DIAST BP >= 90 MM HG: CPT | Mod: CPTII,S$GLB,, | Performed by: PSYCHIATRY & NEUROLOGY

## 2024-01-02 PROCEDURE — 80061 LIPID PANEL: CPT | Performed by: FAMILY MEDICINE

## 2024-01-02 PROCEDURE — 3008F BODY MASS INDEX DOCD: CPT | Mod: CPTII,S$GLB,, | Performed by: PSYCHIATRY & NEUROLOGY

## 2024-01-02 PROCEDURE — 84439 ASSAY OF FREE THYROXINE: CPT | Performed by: FAMILY MEDICINE

## 2024-01-02 PROCEDURE — 85025 COMPLETE CBC W/AUTO DIFF WBC: CPT | Performed by: FAMILY MEDICINE

## 2024-01-02 PROCEDURE — 83036 HEMOGLOBIN GLYCOSYLATED A1C: CPT | Performed by: FAMILY MEDICINE

## 2024-01-02 PROCEDURE — 84443 ASSAY THYROID STIM HORMONE: CPT | Performed by: FAMILY MEDICINE

## 2024-01-02 PROCEDURE — 3074F SYST BP LT 130 MM HG: CPT | Mod: CPTII,S$GLB,, | Performed by: PSYCHIATRY & NEUROLOGY

## 2024-01-02 PROCEDURE — 3044F HG A1C LEVEL LT 7.0%: CPT | Mod: CPTII,S$GLB,, | Performed by: PSYCHIATRY & NEUROLOGY

## 2024-01-02 PROCEDURE — 36415 COLL VENOUS BLD VENIPUNCTURE: CPT | Mod: PO | Performed by: FAMILY MEDICINE

## 2024-01-02 PROCEDURE — 1159F MED LIST DOCD IN RCRD: CPT | Mod: CPTII,S$GLB,, | Performed by: PSYCHIATRY & NEUROLOGY

## 2024-01-02 RX ORDER — DEXTROAMPHETAMINE SACCHARATE, AMPHETAMINE ASPARTATE MONOHYDRATE, DEXTROAMPHETAMINE SULFATE AND AMPHETAMINE SULFATE 5; 5; 5; 5 MG/1; MG/1; MG/1; MG/1
20 CAPSULE, EXTENDED RELEASE ORAL EVERY MORNING
Qty: 30 CAPSULE | Refills: 0 | Status: SHIPPED | OUTPATIENT
Start: 2024-01-02 | End: 2024-02-06 | Stop reason: SDUPTHER

## 2024-01-02 RX ORDER — ACETAZOLAMIDE 500 MG/1
500 CAPSULE, EXTENDED RELEASE ORAL NIGHTLY
Qty: 30 CAPSULE | Refills: 11 | Status: SHIPPED | OUTPATIENT
Start: 2024-01-02 | End: 2024-02-26

## 2024-01-02 NOTE — TELEPHONE ENCOUNTER
----- Message from Madiha Cronin sent at 1/2/2024 12:31 PM CST -----  Regarding: lab order  Contact: pt  Type:  Needs Medical Advice    Who Called: pt  Would the patient rather a call back or a response via [facility]? Call back  Best Call Back Number: [phone]    Additional Information: sts she is at the lab to get the labs done and they are saying that there is a problem with the order--please advise

## 2024-01-02 NOTE — PROGRESS NOTES
Subjective:       Patient ID: Dinah Mitchell is a 48 y.o. female.    Chief Complaint: Visual disturbance, headache    INTERVAL HISTORY 1/2/2024  The patient presents for follow up. She is concerned with the results of a CSF imaging the study which reveals no compromise of the CSF flow anteriorly or posteriorly but suggests the possibility of intracranial hypotension. I ordered the test in June, she actually had in in November. She was seen by [facility], and told that she was stable. No mention of the findings reported on the November MRI. She was referred back to the [facility]. She reports waking up daily with complete loss of vision for 20 seconds followed by an extremely severe holocephalic headache. The pain component can last minutes to hours. She is afraid that one day the vision will not come back and she will go blind. She is on Botox and Emgality for prevention as well as Gabapentin and Tizanidine. For acute attacks she is on Nurtec and Toradol IM, prn for more severe headaches. As previously noted, she is status post posterior fossa decompression for Chiari Malformation on 12/2028. In addition she has significant comorbidities as noted in the problem list    INTERVAL HISTORY 4/11/2023  Ms. Mitchell is a new patient for Dr. Guevara; previous patient of Dr. Samano and then Dr. Vo and was last seen by her as virtual on 11/2022. She received her Botox #3 by Dr. Guevara on 2/17/2023.     This is a 47 y/o F w w complicated past medical history that includes Valsava induced migraines, Chiari Malformation s/p posterior fossa decompression 12/2018, chronic neck pain (cervical spondylosis w/o myelopathy and radiculopathy, and cervical myofascial pain, Depression, PTSD, borderline personality disorder, fibromyalgia, IBS, PATRICIA noncompliant CPAP, s/p gastric bypass, carnitine deficiency and hypothyroidism (secondary to toxic exposure while working on [employer]) presenting as follow up and to establish care.      She refers that she has difficulty keeping her medications straight, specially the ones at night for which she has a hard time remembering   She refers that her migraines have improved significantly since botox and emgality combination. When she has breakthrough she takes nurtec and it reliefs her headaches. These are happening once week now; she has used 3 nurtec since last botox by Dr. Guevara.   Valsalva headaches are still happening and if she does toradol shot; Triggers: congestion, crying, bending over, straining, rolling on her back. This can last from 20 seconds to hours. If its less than an a minute She has done 4-5 this last month.     PREVIOUS NOTES FROM Dr. Vo  S:    46 Y RH F with PMHx of depression,  PTSD, borderline personality disorder, fibromyalgia,IBS, PATRICIA noncompliant CPAP , s/p posterior fossa decompression for Chiari malformation (9mm in 2018), s/p gastric bypass,  carnitine deficiency, cervical spondylosis, migraine and hypothyroidism who presents for further follow up of chronic migraine. She mentions that her migraine is doing poorly. She had been doing much better post first Botox on 6/22. Unfortunately she had health issues with IBS around her 9/22 appointment and missed that. She was seen early 9/22 for neck pain and referred to spine surgery. She has not had her second Botox. She feels that all the benefit she had from Botox is gone now. She is waking up with severe pain in her neck and also her Chiari decompression surgical site. She is quite concerned as she has been having trouble when she wakes up in night chocking on saliva. She does have PATRICIA and feels she is complaint with treatment. She is ok otherwise. She had a lot of dysphagia post operatively from the chiari and had speech therapy. Pulmonary seeing her feels cough is from asthma and prescribed Advair.  She continues to have severe post surgical pain from the decompressive surgery. Anything that applies pressure back  there or if she lays on her back with the pressure back there will cause her to wake up with severe occipital headache that is worse than anything else. This pain she does recall improved with Botox. She would like refill of her medications.  She mentions that she has continued to have severe neck pain and is now following with neurosurgery         Headache history form initial history :  Age at onset and course over time: 2011 and worse over time, this happened during a time when she had a long commute. Neck was her worst pain - just left to midline of middle neck. Was going to [facility] pain management receiving epidural steroids which would last only 2 weeks. She had a CMBB and was planning for RFA but wanted to get cervical MRI first and second opinion. She was seen by Dr. Samano and her hsitory with mutliple treatments is above on my chart     On chart review conducted today patient was followed by Dr. Samano ([facility]) for migraine and also for chronic neck pain (cervical spondylosis) sicne 3/18 at which time Dr. Samano diagnosed Chiari malformation (extending 9 mm inferior to the foramen magnum with so-called pegging of the cerebellar tonsil) , headache, cervical spondylosis w/o myelopathy and radiculopathy, and cervical myofascial pain. Placed on tizandine qhs, prn methocarbamol and tramadol q6h PRN. An Mri brain with CINE was performed that did not show CSF flow obstruction. . She had severe pain by 3/18 an ONB with steroids/anesthetic and TPIs was done by Dr. Samano multiple times. She continued seeing Dr. Fuentes for cognitive complaints and ataxia/eyelid ptosis. She had workup with EMG which did not show any signs of myopathy, myasthenia with rep stim, or peripheral neuropathy. Bloodwork looking for vitamin deficiencies done which were normal as well.  She was evaluated by  and was given in 2018 levocarnitine,  NAC injections and marinol for nausea (helps  significantly). Per notes levocarnitine helps with her energy level. She had posterior fossa decompression on 12/18 and had severe headache and neck pain after that. Per notes valsalva induced headaches resolved. She was started on Emgality on 9/19 and continued on gabapentin 600mg TID and prn tramadol. She was seen by Dr. Samano on 7/20 at which time patient had significant neck pain and was seen by Dr. Samano for diagnostic b/l cervical MBB C3-4-5 which she had 80% relief x 6 hours x 2 , then n 8/27/2020 s/p bilateral cervical medial branch nerve RFA at C3-4-5.  She mentions that this provided significant improvement to her neck pain. She mentions that she has been having significant improvement in her symptoms of neck pain.   She mentions that the headache that it's left temporal side with photophobia/phonophobia the Emgality helps a lot and reduces them. i    Valsalva triggered headaches: lifting, laughing and coughing will trigger severe headache for seconds to a minute and will keep going if she does not stay still. This has been happening for about 10 years. She mentions that this did not get better after  decompression surgery. She mentions that this never improved with surgery and continues. Frequency of these depending on activity. Since being off gabapentin felt these worsened significantly.     Migraine: typically starts left temple and will spread out from there. These are happening on average 3-4 days a week. They will last a few hours and will treat them with sleep and will try to go to sleep or will lay there still in dark, quiet, cold and rest.  She mentions that it will improve. She mentions that has some headache free times, but at times can be 2/10 in intensity. She mentions that she can having also squeezing/twisting brain. She mentions that with those she had a panic and thought she had to go to hospital. She mentions that she had to stop everything and had to rest.     Location: neck (sharp,  "stabbing); hands and feet (tingling, numb), hip/lower back (dull, aching). Also has intermittent left sided headaches starting occipitally radiating to temple.   Quality: as above  Severity: range 2-10/10   Duration: hours  Frequency: daily  Alleviated by: medication, ice  Exacerbated by: movement, light noise (head pain)  Associated with: left arm/hand gets numb/weak "a lot" no radicular pain; headaches on left which are random or triggered by straining in restroom/coughing/laughing. Headaches worst in the AM within 2 hours of waking up. Headaches do not wake her up but neck pain does. Having some depth perception issues causing her to break her foot - eyes checked and were ok. Transient visual loss? When in a lof of pain, not clear TVO with valsalva.   Sleep habits:    Acute treatment:  Current acute treatment:  -- Sumatriptan 100mg - helps some   --Nurtec 75mg- significant benefit, will reduce/resolve attacks that are more migraine.    Tried:  -- TENS   -- tramadol - helped with overall pain   -- methocarbamol - helped with overall pain   -- relafen - did not help     Preventive:  Current prevention:  -- Emgality 120 mg every 28 days, notices wearing off effect 3 days prior - helps the intensity and has about 10 days of freedom with it.   --Tizanidine 4 mg 1-2 times a day typically - feels this helps more than anything for acute occipital pain  -- gabapentin 600mg twice a day - helps the intensity of pain   --candesartan 8mg - migraine has improved, and her Bps are doing much better.   --botox 155 units (started on 6/22- present): over 50% improvement     (wellbutrin)  (prozac)  (prazosin)    Previously tried:  -- for behavioral health is on welbutrin xl 150mg and Fluoxetine 40mg   -- takes vyvanse   --amitriptyline 100 mg nightly mainly for insomnia, no help headache   --trazodone 200mg for insomnia- no worsening of headache   --zonisamide 150mg nightly - caused nausea/vomiting   -- propranolol 20mg tid - some " benefit   -- prozac 40mg - for mod   -- wellbutrin XL 150mg- no benefit in headache     Procedural history:   -- cervical GREGOR  -- lumbar GREGOR  -- chiropractor for hip /neck - helped only hip  -- PT - none for neck just foot               HEADACHE HISTORY    Ms. Mitchell reports the following information about her headaches:          Review of Systems   Constitutional:  Positive for activity change. Negative for appetite change, chills and fever.   HENT:  Negative for congestion, sore throat, trouble swallowing and voice change.    Eyes: Temporary loss of vision upon awakening  Respiratory:  Negative for cough and shortness of breath.    Cardiovascular:  Negative for chest pain and leg swelling.   Gastrointestinal:  Negative for abdominal distention, abdominal pain, constipation, nausea and vomiting.   Endocrine: Negative.    Genitourinary: Negative.    Musculoskeletal:  Negative for back pain and gait problem.   Skin: Negative.    Neurological:  Positive for headaches. Negative for weakness.   Psychiatric/Behavioral:  Positive for sleep disturbance. The patient is nervous/anxious.              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     PATRICIA (obstructive sleep apnea)     severe - doesn't use CPAP    Panic attack     PTSD (post-traumatic stress disorder)      Past Surgical History:   Procedure Laterality Date    ANKLE SURGERY Right     x2 (#1 for ligament injury and #2 for fx and ligament)    AUGMENTATION OF BREAST      BILATERAL TUBAL LIGATION       BRAIN SURGERY  12/2018    decompression/craniotomy Arnold-Chiari syndrome    BREAST SURGERY  1997    B implants    CERVICAL BIOPSY  W/ LOOP ELECTRODE EXCISION      COLD KNIFE CONIZATION OF CERVIX N/A 03/17/2022    Procedure: CONE BIOPSY, CERVIX, USING COLD KNIFE;  Surgeon: Nikhil Franco MD;  Location: [facility];  Service: OB/GYN;  Laterality: N/A;    COLONOSCOPY N/A 09/29/2022    Procedure: COLONOSCOPY;  Surgeon: Martín Sagastume MD;  Location: [facility];  Service: Endoscopy;  Laterality: N/A; Repeat colonoscopy in 5 years for surveillance    DECOMPRESSION OF CHIARI MALFORMATION BY REMOVAL OF POSTERIOR ARCH OF FIRST CERVICAL VERTEBRA N/A 12/13/2018    Procedure: DECOMPRESSION, CHIARI MALFORMATION, BY 1ST CERVICAL VERTEBRA POSTERIOR ARCH REMOVAL;  Surgeon: Sergio Busch MD;  Location: [address];  Service: Neurosurgery;  Laterality: N/A;    EPIDURAL STEROID INJECTION INTO CERVICAL SPINE N/A 12/05/2019    Procedure: Injection-steroid-epidural-cervical-C7-T1;  Surgeon: Noa Samano MD;  Location: [facility];  Service: Neurosurgery;  Laterality: N/A;    FRACTURE SURGERY Right     Ankle    INJECTION OF ANESTHETIC AGENT AROUND MEDIAL BRANCH NERVES INNERVATING CERVICAL FACET JOINT Bilateral 08/06/2020    Procedure: Block-nerve-medial branch-cervical Bilateral  C3,4,5;  Surgeon: Noa Samano MD;  Location: [facility];  Service: Anesthesiology;  Laterality: Bilateral;    INJECTION OF ANESTHETIC AGENT AROUND MEDIAL BRANCH NERVES INNERVATING CERVICAL FACET JOINT Bilateral 08/20/2020    Procedure: Block-nerve-medial branch-cervical Bilateral C3-4-5;  Surgeon: Noa Samano MD;  Location: [facility] OR;  Service: Anesthesiology;  Laterality: Bilateral;    LAPAROSCOPIC GASTRIC BANDING      with subsequent removal due to abscess    LIPOSUCTION      x 2    PELVIC LAPAROSCOPY      for endometriosis eval with BTL    RADIOFREQUENCY ABLATION Bilateral 08/16/2021    Procedure: Radiofrequency Ablation Cervical C3-C5;  Surgeon: Robert IYER  "MD Tammy;  Location: [facility] OR;  Service: Pain Management;  Laterality: Bilateral;    RADIOFREQUENCY THERMAL COAGULATION OF MEDIAL BRANCH OF POSTERIOR RAMUS OF CERVICAL SPINAL NERVE Bilateral 08/27/2020    Procedure: RADIOFREQUENCY THERMAL COAGULATION, NERVE, SPINAL, CERVICAL, POSTERIOR RAMUS, MEDIAL BRANCH C3-4-5, bilateral;  Surgeon: Noa Samano MD;  Location: [facility] OR;  Service: Anesthesiology;  Laterality: Bilateral;     Family History   Problem Relation Age of Onset    Diabetes Mother     Stroke Mother 30    Mental illness Mother         depression    Hyperlipidemia Mother     Hypertension Mother     Hyperlipidemia Father     Heart disease Father         "athletes heart"    Diabetes Sister     Seizures Sister     Mitochondrial disorder Sister         "trent"    Mental illness Sister     Seizures Sister     Cervical cancer Sister     Cervical cancer Sister     Ovarian cancer Paternal Aunt     Ovarian cancer Paternal Aunt     Colon cancer Maternal Grandmother     Esophageal cancer Maternal Grandfather     Cancer Maternal Grandfather 65        throat ca    Ovarian cancer Cousin     Cervical cancer Paternal Cousin     Cervical cancer Other     Breast cancer Neg Hx     Crohn's disease Neg Hx     Ulcerative colitis Neg Hx     Stomach cancer Neg Hx     Celiac disease Neg Hx      Social History     Socioeconomic History    Marital status:    Occupational History    Occupation: [employer]     Tobacco Use    Smoking status: Never    Smokeless tobacco: Never   Substance and Sexual Activity    Alcohol use: Yes     Comment: occasional "maybe monthly"    Drug use: No    Sexual activity: Yes     Partners: Male     Birth control/protection: See Surgical Hx, None     Comment: depo since age 18   Social History Narrative    Not on disability, not currently working.     Social Determinants of Health     Financial Resource Strain: Unknown    Difficulty of Paying Living Expenses: Patient refused   Food Insecurity: " Unknown    Worried About Running Out of Food in the Last Year: Patient refused    Ran Out of Food in the Last Year: Patient refused   Transportation Needs: Unmet Transportation Needs    Lack of Transportation (Medical): Yes    Lack of Transportation (Non-Medical): Yes   Physical Activity: Inactive    Days of Exercise per Week: 0 days    Minutes of Exercise per Session: 0 min   Stress: Stress Concern Present    Feeling of Stress : Very much   Social Connections: Unknown    Frequency of Communication with Friends and Family: More than three times a week    Frequency of Social Gatherings with Friends and Family: Never    Active Member of Clubs or Organizations: No    Attends Club or Organization Meetings: Never    Marital Status:    Housing Stability: Low Risk     Unable to Pay for Housing in the Last Year: No    Number of Places Lived in the Last Year: 1    Unstable Housing in the Last Year: No     Review of patient's allergies indicates:   Allergen Reactions    Lamotrigine      Other reaction(s): Rash  Other reaction(s): Nausea    Sulfa (sulfonamide antibiotics) Nausea Only     Other reaction(s): Unknown    Adhesive Rash    Zonisamide Nausea And Vomiting     Nausea/vomiting        Current Outpatient Medications   Medication Sig    acetylcysteine (N-ACETYL-L-CYSTEINE MISC)     albuterol (PROVENTIL) 2.5 mg /3 mL (0.083 %) nebulizer solution Take 3 mLs (2.5 mg total) by nebulization every 4 (four) hours as needed for Wheezing or Shortness of Breath (chest tightness). Rescue    albuterol (PROVENTIL/VENTOLIN HFA) 90 mcg/actuation inhaler Inhale 1-2 puffs into the lungs every 4 (four) hours as needed for Wheezing or Shortness of Breath (chest tightness). Rescue    ALPRAZolam (XANAX) 1 MG tablet TAKE 1 TABLET (1 MG) BY MOUTH DAILY AS NEEDED FOR ANXIETY    buPROPion (WELLBUTRIN XL) 150 MG TB24 tablet Take 1 tablet (150 mg total) by mouth once daily.    dextroamphetamine-amphetamine (ADDERALL XR) 20 MG 24 hr capsule  "Take 1 capsule (20 mg total) by mouth every morning.    diphenoxylate-atropine 2.5-0.025 mg (LOMOTIL) 2.5-0.025 mg per tablet Take 1 tablet by mouth 4 (four) times daily as needed.    dronabinol (MARINOL) 2.5 MG capsule Take 1 capsule (2.5 mg total) by mouth 2 (two) times daily before meals.    EScitalopram oxalate (LEXAPRO) 20 MG tablet Take 1 tablet (20 mg total) by mouth once daily.    fluticasone propionate (FLONASE) 50 mcg/actuation nasal spray Spray 1 spray (50 mcg total) in each nostril once daily.    fluticasone-umeclidin-vilanter (TRELEGY ELLIPTA) 100-62.5-25 mcg DsDv Inhale 1 puff into the lungs once daily.    gabapentin (NEURONTIN) 600 MG tablet TAKE 1 TABLET BY MOUTH 2 TO 3 TIMES A DAY FOR CHRONIC PAIN    galcanezumab-gnlm (EMGALITY PEN) 120 mg/mL PnIj ADMINISTER 1 ML UNDER THE SKIN EVERY 28 DAYS    levOCARNitine (CARNITOR) 330 mg Tab Take 3 tablets (990 mg total) by mouth 2 (two) times daily.    LIDOcaine (LIDODERM) 5 % Apply one patch to affected area every 12 hours. Keep on for 12 hours, remove for 12 hours.    montelukast (SINGULAIR) 10 mg tablet Take 1 tablet (10 mg total) by mouth once daily.    ondansetron (ZOFRAN-ODT) 4 MG TbDL Take 1 tablet (4 mg total) by mouth every 6 (six) hours as needed (nausea).    oxyCODONE-acetaminophen (PERCOCET) 5-325 mg per tablet Take 1 tablet by mouth every 6 (six) hours as needed for Pain.    oxyCODONE-acetaminophen (PERCOCET) 5-325 mg per tablet Take 1 tablet by mouth once a day as needed for pain    oxyCODONE-acetaminophen (PERCOCET) 5-325 mg per tablet Take 1 tablet by mouth once a day as needed for pain    oxyCODONE-acetaminophen (PERCOCET) 5-325 mg per tablet Take 1 tablet by mouth once a day as needed for pain    SYNTHROID 125 mcg tablet TAKE 2 TABLETS(250 MCG) BY MOUTH BEFORE BREAKFAST    syringe with needle (BD LUER-LAURIE SYRINGE) 3 mL 25 gauge x 1" Syrg Use for Toradol IM only.    tiZANidine (ZANAFLEX) 4 MG tablet Take 1 tablet by mouth three times a day as " "needed for  muscle spasm    traZODone (DESYREL) 100 MG tablet Take 1 tablet (100 mg total) by mouth every evening.    acetaZOLAMIDE (DIAMOX) 500 mg CpSR Take 1 capsule (500 mg total) by mouth every evening.     Current Facility-Administered Medications   Medication    onabotulinumtoxina injection 200 Units    onabotulinumtoxina injection 200 Units    onabotulinumtoxina injection 200 Units    onabotulinumtoxina injection 200 Units     Facility-Administered Medications Ordered in Other Visits   Medication    lactated ringers infusion    sodium chloride 0.9% flush 10 mL           Objective:      Vitals:    01/02/24 1054   BP: (!) 125/92   Pulse: 90   Resp: 17   Temp: 97.8 °F (36.6 °C)     Body mass index is 43.85 kg/m².    Physical Exam  Neurological Exam:  Mental status: Awake, alert, and oriented to person, place, and time. Recent and remote memory appear to be intact.   Speech/Language: No dysarthria or aphasia on conversation.   Cranial nerves: Pupils equal round and reactive to light, extraocular movements intact, facial strength and sensation intact bilaterally, palate and tongue midline, hearing grossly intact bilaterally.  Motor: Moving all extremities against gravity. Normal bulk and tone.   Sensation: Intact to light touch and temperature bilaterally.  Coordination: No tremor, no ataxia.   Gait: slow pace     Review of Data:  Neuroimaging and other studies:     CSF flow study:  "Unobstructed bidirectional flow across the craniocervical junction with operative change from suboccipital craniectomy for posterior fossa decompression.  Partially empty sella included in the field of view with slight low lying brainstem allowing for differences in technique similar to prior cervical spine MRI and component of intracranial hypotension to be considered in differential in the appropriate clinical setting."  I have personally reviewed this test and find insufficient evidence of intracranial hypotension    MRI C spine " 7/15/22:  FINDINGS:  Alignment: Slight reversal of the normal cervical lordosis.  Anterolisthesis of C4 on C5, 2 mm.     Vertebral Column: Vertebral body heights are maintained.  No evidence of an acute fracture or aggressive marrow replacement process. Multilevel disc degeneration with disc desiccation at C2-C6, most pronounced at C5-6 and C6-7 with mild intervertebral disc space narrowing, degenerative endplate change and anterior marginal osteophyte formation.     Cord: Normal signal and morphology.     Skull base and craniocervical junction: Stable postoperative changes of suboccipital craniectomy for Chiari 1 malformation decompression.     Degenerative findings:     C2-C3: Mild posterior disc osteophyte complex.  Mild bilateral facet arthropathy.  There is no neural foraminal stenosis.  There is no spinal canal stenosis.     C3-C4: The disc is normal in configuration.  Mild bilateral facet arthropathy.  There is no neural foraminal stenosis.  There is no spinal canal stenosis.     C4-C5: Anterolisthesis with uncovering of the disc.  Small central disc protrusion.  Mild bilateral facet arthropathy.  No neural foraminal stenosis.  There is no spinal canal stenosis.     C5-C6: Right asymmetric posterior disc osteophyte complex with right lateral disc extrusion, descending to the C6 infrapedicular level, which abuts the right ventral cord surface.  Mild bilateral facet arthropathy.  Mild right uncovertebral joint spurring.  Mild right neural foraminal stenosis.  No significant overall spinal canal stenosis.     C6-C7: Right asymmetric posterior disc osteophyte complex.  Mild bilateral facet arthropathy.  Mild bilateral uncovertebral joint spurring.  Mild left neural foraminal stenosis.  There is no spinal canal stenosis.     C7-T1: The disc is normal in configuration.  Mild bilateral facet arthropathy.  There is no neural foraminal stenosis.  There is no spinal canal stenosis.     Paraspinal muscles & soft  tissues: No significant abnormality.     Normal signal voids are present in the vertebral arteries.     Impression:     1. Multilevel degenerative change of the cervical spine, as described above, mildly progressed in comparison to the prior study from 2019.  Findings contribute to mild neural foraminal stenosis on the right at C5-6 and on the left at C6-7.  No significant spinal canal stenosis.  2. Right asymmetric posterior disc osteophyte complex with right lateral disc extrusion at C5-6, which abuts the right ventral cord surface.  No cord compression or focal cord signal abnormality.  3. Stable postoperative changes of suboccipital craniectomy for Chiari 1 malformation decompression.    MRI brain 7/15/22:  Impression:     1. No evidence of an acute intracranial abnormality or significant interval detrimental change as compared to the prior study.  2. Stable postoperative changes of suboccipital craniectomy for Chiari I malformation decompression.        MRA head 5/6/21:  FINDINGS:  Anterior circulation: There is normal flow related signal intensity within the petrous and cavernous segments of the internal carotid arteries.  The supraclinoid internal carotid arteries are normal as is the carotid terminus bilaterally.  There is normal branching into the anterior and middle cerebral arteries.  These vessels are patent.  There is no critical stenosis, occlusion, thrombosis, dissection, aneurysm or other vascular malformation.     Posterior circulation: The vertebral arteries are patent.  The basilar artery is normal in caliber and contour.  The basilar tip is normal.  There is normal branching into the superior cerebellar and posterior cerebral arteries.  There is no critical stenosis, occlusion, thrombosis, dissection, aneurysm.     There has been previous decompression for Chiari malformation.     Impression:     1. Normal brain MRA.  2. Previous decompression for Chiari malformation  Results for orders placed or  performed in visit on 08/11/20   MRI Brain Without Contrast    Narrative    EXAMINATION:  MRI BRAIN WITHOUT CONTRAST    CLINICAL HISTORY:  Repeated falls.  History of posterior fossa decompression.; Repeated falls    TECHNIQUE:  Multiplanar MR imaging of the brain was performed without contrast.    COMPARISON:  Head CT 06/09/2019    FINDINGS:  No acute infarct, mass effect or hemorrhage.  Brain parenchyma has a normal signal.  Ventricles and sulci are proportionate.    No abnormal extra-axial fluid collections.    Flow voids are maintained in the intracranial arteries and dural venous sinuses.    Suboccipital craniotomy changes are stable.  Configuration of the cerebellar vermis appears stable compared to the prior CT given differences in modality.      Impression    1. No acute findings in the brain.  2. Stable postoperative changes of posterior decompression      Electronically signed by: Brandin Avila MD  Date:    08/11/2020  Time:    16:43   Results for orders placed or performed during the hospital encounter of 06/09/19   CT Head Without Contrast    Narrative    EXAMINATION:  CT HEAD WITHOUT CONTRAST    CLINICAL HISTORY:  Head trauma, headache;    TECHNIQUE:  Low dose axial images were obtained through the head.  Coronal and sagittal reformations were also performed. Contrast was not administered.    COMPARISON:  12/14/2018    RADIATION DOSE:  1099 DLP    FINDINGS:  Postoperative changes of a suboccipital craniectomy for posterior fossa decompression.    Hyperostosis frontalis interna noted.    Brain parenchyma otherwise normally formed without midline shift or mass effect.  No hydrocephalus.  No acute intracranial hemorrhage.    Ventricles are normal in size and configuration.  Physiologic calcifications are present.      Impression    1. No acute intracranial abnormality identified.  Postoperative changes as discussed.  2. [employer] agreement.      Electronically signed by: Anton Kenney  MD  Date:    06/09/2019  Time:    11:41   Results for orders placed or performed during the hospital encounter of 02/05/18   MRI Brain W WO Contrast    Narrative    Routine Multiplanar imaging through this 42-year-old females brain was obtained with utilization of 10 cc of gadolinium contrast    Hyperostosis frontalis appears to be present.     A Chiari one malformation appears to be present with the cerebellar tonsils extending 9 mm inferior to the foramen magnum with so-called pegging of the cerebellar tonsils.    An empty sella configuration of the pituitary gland is noted.    No diffusion positivity is identified to suggest recent infarction.    No gradient  susceptibility was noted just just presence of acute blood products.    No worrisome enhancing lesions are noted intracranially    No diffusion positivity is identified to suggest recent infarction.    Impression    1. The cerebellar tonsils appear to extend inferior to the foramen magnum suggestive of a Chiari one malformation. No obvious cord signal abnormalities noted within the visualized cord, given the patient's history further evaluation of the cord may be of use to exclude a syrinx        Electronically signed by: Ismael Richmond MD  Date:     02/05/18  Time:    12:04              Assessment and Plan   Intractable chronic migraine without aura and without status migrainosus    Cervicalgia    Cervical spondylosis    Chronic migraine without aura, intractable, without status migrainosus    Class 3 severe obesity with body mass index (BMI) of 40.0 to 44.9 in adult, unspecified obesity type, unspecified whether serious comorbidity present    Chiari malformation type I, status post large decompression in 2018      Impression:  Patient concerned about the possibility of intracranial hypotension based on a suggestion by radiology report. I have reviewed the MRI with the patient and personally find insufficient evidence of intracranial hypotension. I have  requested a brain brain W WO to assess the possibility of intracranial hypotension but her headache characteristics do not fit with this diagnosis, rather, they are more characteristic of increased intracranial pressure which could be positional.  I will try Diamox sequels 500 mg nightly.    Chronic migraine without aura:  She has complex headache history which includes valsalva induced headaches, tonsillar herniation of 9mm on MRI brain which was decompressed in 2018, but headache/neck pain worsened after that. Over the years multiple medications tried. CGRP Mab Emgality has been effective at reducing from 30/30 days to 15-20 days/month headache, she has found gabapentin 600mg bid helps some of the neck pain and the occipital headache , did much worse off it, she also finds tizanidnie is what works really well when used to help cervical myofascial pain and also some occipital pain. The worse pain currently is occipital headache which is an exploding out pressure she wakes up 3-4 times a week and it's completely disabling. This she does not necessarily count it as a migraine, but it has all migraine features and then has more temporal pain on left which 2 days a  Week. She has persistent allodynia in left occipital area in are of the decompressive surgery. She is having worsening valsalva induced headache as well as vision obscurations with them and dizziness (has been for the last few months).  Neuroimaging did not reveal any abnormalities . She has noted significant benefit with start of BTX on 6/22 already. Given her suboccipital decompressive surgery special care has to be taken with her Botox and she would prefer physician doing procedure.     Patient is doing better since starting Botox along with the emgality; migraines have decreased >half in amount      Chronic neck pain/cervical spondylosis - MRI C spine that was performed for the worsening neck pain showed multilevel degenerative changes without any  spinal cord compression or stenosis,  DJD change mild foraminal stenosis on Rt C5-6 and left C6-7. There is no cord signal abnormalities, no syrinx. She continues to have significant pain and has not responded to any of the preventive measures.      Other conditions:  S/p Chiari malformation decompression surgery - avoid ONBs , causing severe pain when she lays on it.     Valsalva induced headaches- unclear if this is primary vs. Secondary (her Chiari malformation operated on, this should have resolved if only Chiari, no concern about SIH from prior specialists) , MRA head was normal, no vascular malformation, possibly related to prior Chiari given the symptoms of visual and also short duration of the event, no signs of SIH on recent MRI brain.     Responsive to toradol     Comorbidities:  HTN- stable   PTSD- ongoing follows with mental health   ADHD  Insomnia  Asthma   PATRICIA- non compliant- if despite her asthma management continues chocking , my recommendation is having her see sleep medicine as this could be secondary to PATRICIA the gasping/chocking overnight sensation     Plan:   1- For preventive management: A. Emgality 120mg every 28 days a month           B. Continue Botox every 12 weeks                                                      C. Start Diamox sequels 500 mg po nightly    -- Discontinue candesartan       2- Acute management: for the migraine (left sided headache): Nurtec ODT 75mg trial , max 1/day.     3- For cervical myofascial/cervical spondylosis/chronic cerviaclgia :  A. Continue gabapentin 600 mg 2-3 times a day (taking BID)               B. Continue tizanidine 4 mg 1-3 times a day   4- Carnitine deficiency    -- Discussed need for compliance of levocarnitine as it will help with weight loss.   5- Obesity    -- Previously referred to Endocrinologist for weight loss as well as complicated thyroid history     RTC after MRI    I spent 40 minutes on the day of this encounter preparing, treating, and  managing this patient with intractable migraine headaches and multiple co-morbidities      Saeid Guevara M.D  Medical Director, Headache and Facial Pain  [facility]

## 2024-01-07 ENCOUNTER — PATIENT MESSAGE (OUTPATIENT)
Dept: FAMILY MEDICINE | Facility: CLINIC | Age: 49
End: 2024-01-07
Payer: MEDICARE

## 2024-01-07 RX ORDER — LEVOTHYROXINE SODIUM 137 UG/1
264 TABLET ORAL
Qty: 60 TABLET | Refills: 1 | Status: SHIPPED | OUTPATIENT
Start: 2024-01-07 | End: 2024-01-09 | Stop reason: SDUPTHER

## 2024-01-09 RX ORDER — LEVOTHYROXINE SODIUM 137 UG/1
264 TABLET ORAL
Qty: 60 TABLET | Refills: 1 | Status: SHIPPED | OUTPATIENT
Start: 2024-01-09 | End: 2025-01-08

## 2024-01-09 NOTE — TELEPHONE ENCOUNTER
No care due was identified.  [facility] Embedded Care Due Messages. Reference number: [id number].   1/09/2024 11:37:25 AM CST

## 2024-01-10 DIAGNOSIS — F43.10 PTSD (POST-TRAUMATIC STRESS DISORDER): ICD-10-CM

## 2024-01-10 DIAGNOSIS — F41.0 PANIC DISORDER WITHOUT AGORAPHOBIA: ICD-10-CM

## 2024-01-10 RX ORDER — ALPRAZOLAM 1 MG/1
1 TABLET ORAL DAILY PRN
Qty: 30 TABLET | Refills: 0 | Status: SHIPPED | OUTPATIENT
Start: 2024-01-10 | End: 2024-02-23 | Stop reason: SDUPTHER

## 2024-01-10 NOTE — TELEPHONE ENCOUNTER
For documentation purposes      Approved today  CaseId:[id number];Status:Approved;Review Type:Prior Auth;Coverage Start Date:12/11/2023;Coverage End Date:01/09/2025    Pharmacy and patient notified.  Vikki

## 2024-01-12 ENCOUNTER — PATIENT MESSAGE (OUTPATIENT)
Dept: RADIOLOGY | Facility: HOSPITAL | Age: 49
End: 2024-01-12
Payer: MEDICARE

## 2024-01-22 ENCOUNTER — TELEPHONE (OUTPATIENT)
Dept: NEUROLOGY | Facility: CLINIC | Age: 49
End: 2024-01-22
Payer: MEDICARE

## 2024-01-22 NOTE — TELEPHONE ENCOUNTER
----- Message from Jayesh Fuentes sent at 1/22/2024  2:23 PM CST -----  Type: Needs Medical Advice  Who Called:  pt  Best Call Back Number: [phone]  Additional Information: pt is requesting to speak with the nurse about her virtual that is scheduled on 1/30, states she has not done her MRI yet, it is scheduled for 2/6, pl call bk to advise thanks

## 2024-01-22 NOTE — TELEPHONE ENCOUNTER
----- Message from Jayesh Fuentes sent at 1/22/2024  2:23 PM CST -----  Type: Needs Medical Advice  Who Called:  pt  Best Call Back Number: [phone]  Additional Information: pt is requesting to speak with the nurse about her virtual that is scheduled on 1/30, states she has not done her MRI yet, it is scheduled for 2/6, pl call bk to advise thanks

## 2024-01-23 ENCOUNTER — OFFICE VISIT (OUTPATIENT)
Dept: PSYCHIATRY | Facility: CLINIC | Age: 49
End: 2024-01-23
Payer: MEDICARE

## 2024-01-23 DIAGNOSIS — F43.10 POSTTRAUMATIC STRESS DISORDER: Primary | ICD-10-CM

## 2024-01-23 PROCEDURE — 1159F MED LIST DOCD IN RCRD: CPT | Mod: CPTII,95,, | Performed by: PSYCHOLOGIST

## 2024-01-23 PROCEDURE — 1160F RVW MEDS BY RX/DR IN RCRD: CPT | Mod: CPTII,95,, | Performed by: PSYCHOLOGIST

## 2024-01-23 PROCEDURE — 90834 PSYTX W PT 45 MINUTES: CPT | Mod: 95,,, | Performed by: PSYCHOLOGIST

## 2024-01-23 PROCEDURE — 3044F HG A1C LEVEL LT 7.0%: CPT | Mod: CPTII,95,, | Performed by: PSYCHOLOGIST
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
## 2024-02-06 ENCOUNTER — PATIENT MESSAGE (OUTPATIENT)
Dept: NEUROLOGY | Facility: CLINIC | Age: 49
End: 2024-02-06
Payer: MEDICARE

## 2024-02-06 ENCOUNTER — HOSPITAL ENCOUNTER (OUTPATIENT)
Dept: RADIOLOGY | Facility: HOSPITAL | Age: 49
Discharge: HOME | End: 2024-02-06
Attending: PSYCHIATRY & NEUROLOGY
Payer: MEDICARE

## 2024-02-06 DIAGNOSIS — H51.9 UNSPECIFIED DISORDER OF BINOCULAR MOVEMENT: ICD-10-CM

## 2024-02-06 DIAGNOSIS — H53.9 VISUAL DISTURBANCE: ICD-10-CM

## 2024-02-06 PROCEDURE — A9585 GADOBUTROL INJECTION: HCPCS | Mod: PO | Performed by: PSYCHIATRY & NEUROLOGY

## 2024-02-06 PROCEDURE — 70553 MRI BRAIN STEM W/O & W/DYE: CPT | Mod: 26,,, | Performed by: RADIOLOGY

## 2024-02-06 PROCEDURE — 25500020 PHARM REV CODE 255: Mod: PO | Performed by: PSYCHIATRY & NEUROLOGY

## 2024-02-06 PROCEDURE — 70553 MRI BRAIN STEM W/O & W/DYE: CPT | Mod: TC,PO

## 2024-02-06 RX ORDER — GADOBUTROL 604.72 MG/ML
10 INJECTION INTRAVENOUS
Status: COMPLETED | OUTPATIENT
Start: 2024-02-06 | End: 2024-02-06

## 2024-02-06 RX ADMIN — GADOBUTROL 10 ML: 604.72 INJECTION INTRAVENOUS at 09:02

## 2024-02-07 ENCOUNTER — TELEPHONE (OUTPATIENT)
Dept: PSYCHIATRY | Facility: CLINIC | Age: 49
End: 2024-02-07
Payer: MEDICARE

## 2024-02-07 RX ORDER — DEXTROAMPHETAMINE SACCHARATE, AMPHETAMINE ASPARTATE MONOHYDRATE, DEXTROAMPHETAMINE SULFATE AND AMPHETAMINE SULFATE 5; 5; 5; 5 MG/1; MG/1; MG/1; MG/1
20 CAPSULE, EXTENDED RELEASE ORAL EVERY MORNING
Qty: 30 CAPSULE | Refills: 0 | Status: SHIPPED | OUTPATIENT
Start: 2024-02-07 | End: 2024-03-10 | Stop reason: SDUPTHER

## 2024-02-07 NOTE — TELEPHONE ENCOUNTER
Patient called @1219 AM left message on voice mail stated she is very sick and will not be able to do her virtual visit today.  She will call to reschedule when she is feeling better

## 2024-02-08 ENCOUNTER — TELEPHONE (OUTPATIENT)
Dept: NEUROLOGY | Facility: CLINIC | Age: 49
End: 2024-02-08
Payer: MEDICARE

## 2024-02-23 DIAGNOSIS — G43.719 CHRONIC MIGRAINE WITHOUT AURA, INTRACTABLE, WITHOUT STATUS MIGRAINOSUS: ICD-10-CM

## 2024-02-23 DIAGNOSIS — F41.0 PANIC DISORDER WITHOUT AGORAPHOBIA: ICD-10-CM

## 2024-02-23 DIAGNOSIS — F43.10 PTSD (POST-TRAUMATIC STRESS DISORDER): ICD-10-CM

## 2024-02-23 DIAGNOSIS — J32.9 SINUSITIS, UNSPECIFIED CHRONICITY, UNSPECIFIED LOCATION: ICD-10-CM

## 2024-02-23 DIAGNOSIS — M47.812 SPONDYLOSIS OF CERVICAL REGION WITHOUT MYELOPATHY OR RADICULOPATHY: ICD-10-CM

## 2024-02-23 RX ORDER — FLUTICASONE PROPIONATE 50 MCG
1 SPRAY, SUSPENSION (ML) NASAL DAILY
Qty: 16 G | Refills: 2 | Status: SHIPPED | OUTPATIENT
Start: 2024-02-23

## 2024-02-23 RX ORDER — FLUTICASONE PROPIONATE 50 MCG
1 SPRAY, SUSPENSION (ML) NASAL DAILY
Qty: 16 G | Refills: 2 | Status: CANCELLED | OUTPATIENT
Start: 2024-02-23

## 2024-02-23 RX ORDER — LEVOCARNITINE 330 MG/1
990 TABLET ORAL 2 TIMES DAILY
Qty: 180 TABLET | Refills: 2 | Status: SHIPPED | OUTPATIENT
Start: 2024-02-23

## 2024-02-23 NOTE — TELEPHONE ENCOUNTER
Refill Routing Note   Medication(s) are not appropriate for processing by [facility] for the following reason(s):        Outside of protocol    [facility] action(s):  Route               Appointments  past 12m or future 3m with PCP    Date Provider   Last Visit   9/22/2023 Amaya Aiken MD   Next Visit   2/23/2024 Amaya Aiken MD   ED visits in past 90 days: 0        Note composed:1:19 PM 02/23/2024

## 2024-02-23 NOTE — TELEPHONE ENCOUNTER
No care due was identified.  [facility] Embedded Care Due Messages. Reference number: [id number].   2/23/2024 12:17:36 PM CST

## 2024-02-24 RX ORDER — GALCANEZUMAB 120 MG/ML
INJECTION, SOLUTION SUBCUTANEOUS
Qty: 1 ML | Refills: 11 | Status: SHIPPED | OUTPATIENT
Start: 2024-02-24

## 2024-02-24 RX ORDER — TIZANIDINE 4 MG/1
TABLET ORAL
Qty: 90 TABLET | Refills: 11 | Status: SHIPPED | OUTPATIENT
Start: 2024-02-24

## 2024-02-24 RX ORDER — ALPRAZOLAM 1 MG/1
1 TABLET ORAL DAILY PRN
Qty: 30 TABLET | Refills: 0 | Status: SHIPPED | OUTPATIENT
Start: 2024-02-24 | End: 2024-03-25 | Stop reason: SDUPTHER

## 2024-02-26 ENCOUNTER — TELEPHONE (OUTPATIENT)
Dept: FAMILY MEDICINE | Facility: CLINIC | Age: 49
End: 2024-02-26

## 2024-02-26 ENCOUNTER — OFFICE VISIT (OUTPATIENT)
Dept: FAMILY MEDICINE | Facility: CLINIC | Age: 49
End: 2024-02-26
Payer: MEDICARE

## 2024-02-26 VITALS
HEART RATE: 75 BPM | RESPIRATION RATE: 16 BRPM | BODY MASS INDEX: 44.43 KG/M2 | TEMPERATURE: 98 F | HEIGHT: 67 IN | WEIGHT: 283.06 LBS | OXYGEN SATURATION: 99 %

## 2024-02-26 DIAGNOSIS — E03.4 HYPOTHYROIDISM DUE TO ACQUIRED ATROPHY OF THYROID: ICD-10-CM

## 2024-02-26 DIAGNOSIS — F60.3 BORDERLINE PERSONALITY DISORDER: ICD-10-CM

## 2024-02-26 DIAGNOSIS — F33.2 MAJOR DEPRESSIVE DISORDER, RECURRENT, SEVERE WITHOUT PSYCHOTIC FEATURES: ICD-10-CM

## 2024-02-26 DIAGNOSIS — Z00.00 ANNUAL PHYSICAL EXAM: Primary | ICD-10-CM

## 2024-02-26 PROCEDURE — 3044F HG A1C LEVEL LT 7.0%: CPT | Mod: CPTII,S$GLB,, | Performed by: FAMILY MEDICINE

## 2024-02-26 PROCEDURE — 1159F MED LIST DOCD IN RCRD: CPT | Mod: CPTII,S$GLB,, | Performed by: FAMILY MEDICINE

## 2024-02-26 PROCEDURE — 1160F RVW MEDS BY RX/DR IN RCRD: CPT | Mod: CPTII,S$GLB,, | Performed by: FAMILY MEDICINE

## 2024-02-26 PROCEDURE — 99396 PREV VISIT EST AGE 40-64: CPT | Mod: GZ,S$GLB,, | Performed by: FAMILY MEDICINE

## 2024-02-26 PROCEDURE — 3008F BODY MASS INDEX DOCD: CPT | Mod: CPTII,S$GLB,, | Performed by: FAMILY MEDICINE

## 2024-02-26 NOTE — PROGRESS NOTES
Subjective:       Patient ID: Dinah Mitchell is a 49 y.o. female.    Chief Complaint: Annual Exam    HPI  The patient is a 49-year-old who is here today for her annual visit.  She is here today with her friend who also is a caretaker for her.  She has been gaining weight and is concerned about her weight.  She has not been physically active due to her physical issues.  She recently had labs which reviewed today.  She also needs a mammogram.  She does have an appointment in May with her gynecologist    Today we discussed the followin)  Hypothyroidism.  She does have her Synthroid but has not been taking this consistently.  She is going to work with her caregiver to help find a way to take this on a consistent basis.  2) loss of consciousness.  Three months ago, she lost consciousness.  She was feeling nauseated and so she went to bathroom.  The next thing she knew, she was waking up from the bathroom floor with a lump on her left cheek and blood from her mouth where her teeth had gone into her lip.  Since she does not drive, she did not go to the ER because she did not want to call EMS and did not think it was a significant issue.  She did mention this to her neurologist who has referred her to a cardiologist.     Of note, she does continue to follow with her neurologist, psychiatrist and pain management team on a regular basis.  She does have a pulmonologist for her asthma that she does not see very frequently.    Review of Systems   Constitutional:  Negative for appetite change, chills, diaphoresis, fatigue, fever and unexpected weight change ((weight gain)).   HENT:  Negative for congestion, dental problem, ear pain, hearing loss, postnasal drip, rhinorrhea, sneezing, sore throat and trouble swallowing.    Eyes:  Negative for photophobia, pain, discharge and visual disturbance.   Respiratory:  Negative for cough, chest tightness, shortness of breath and wheezing.    Cardiovascular:  Negative for chest  pain, palpitations and leg swelling.   Gastrointestinal:  Negative for abdominal distention, abdominal pain, blood in stool, constipation, diarrhea, nausea and vomiting.   Endocrine: Negative for cold intolerance, heat intolerance, polydipsia and polyuria.   Genitourinary:  Negative for dysuria, flank pain, frequency, genital sores, hematuria, menstrual problem and vaginal discharge.   Musculoskeletal:  Negative for arthralgias, joint swelling and myalgias.   Skin:  Negative for rash.   Neurological:  Positive for dizziness and headaches. Negative for syncope and light-headedness.   Hematological:  Negative for adenopathy. Does not bruise/bleed easily.   Psychiatric/Behavioral:  Positive for dysphoric mood. Negative for self-injury, sleep disturbance and suicidal ideas. The patient is nervous/anxious.          Objective:      Physical Exam  Constitutional:       General: She is not in acute distress.     Appearance: Normal appearance. She is well-developed.   HENT:      Head: Normocephalic and atraumatic.      Right Ear: Hearing, tympanic membrane, ear canal and external ear normal.      Left Ear: Hearing, tympanic membrane, ear canal and external ear normal.      Nose: Nose normal.      Mouth/Throat:      Mouth: No oral lesions.      Pharynx: No oropharyngeal exudate or posterior oropharyngeal erythema.   Eyes:      General: Lids are normal. No scleral icterus.     Extraocular Movements: Extraocular movements intact.      Conjunctiva/sclera: Conjunctivae normal.      Pupils: Pupils are equal, round, and reactive to light.   Neck:      Thyroid: No thyroid mass or thyromegaly.      Vascular: No carotid bruit.   Cardiovascular:      Rate and Rhythm: Normal rate and regular rhythm. No extrasystoles are present.     Chest Wall: PMI is not displaced.      Heart sounds: Normal heart sounds. No murmur heard.     No gallop.   Pulmonary:      Effort: Pulmonary effort is normal. No accessory muscle usage or respiratory  "distress.      Breath sounds: Normal breath sounds.   Abdominal:      General: Bowel sounds are normal. There is no abdominal bruit.      Palpations: Abdomen is soft.      Tenderness: There is no abdominal tenderness. There is no rebound.   Musculoskeletal:      Cervical back: Normal range of motion and neck supple.   Lymphadenopathy:      Head:      Right side of head: No submental or submandibular adenopathy.      Left side of head: No submental or submandibular adenopathy.      Cervical:      Right cervical: No superficial, deep or posterior cervical adenopathy.     Left cervical: No superficial, deep or posterior cervical adenopathy.      Upper Body:      Right upper body: No supraclavicular adenopathy.      Left upper body: No supraclavicular adenopathy.   Skin:     General: Skin is warm and dry.   Neurological:      Mental Status: She is alert and oriented to person, place, and time.      Cranial Nerves: No cranial nerve deficit.      Sensory: No sensory deficit.   Psychiatric:         Mood and Affect: Mood normal. Affect is flat.         Speech: Speech is delayed.         Behavior: Behavior is slowed.         Thought Content: Thought content normal.       Pulse 75, temperature 98.4 °F (36.9 °C), temperature source Temporal, resp. rate 16, height 5' 7" (1.702 m), weight 128.4 kg (283 lb 1.1 oz), SpO2 99 %.Body mass index is 44.34 kg/m².          A/P:  1) annual exam.  Health maintenance issues and anticipatory guidance issues were discussed.  She will work on weight loss.  She will try to be more physically active and consume a healthy diet.    2)  Hypothyroidism.  Previously uncontrolled.  She will take her Synthroid consistently.  We will check a TSH again in 4-6 weeks   3) syncope.  New to me.  Follow up with Cardiology as planned for additional testing    4)  Depression with borderline personality disorder.  Stable.  Follow up with Psychiatry as planned and continue with medication under their " direction  5) chronic headaches.  Persistent.  Follow up with Neurology and continue with medication under their direction

## 2024-02-26 NOTE — TELEPHONE ENCOUNTER
----- Message from Amaya Aiken MD sent at 2/26/2024 12:43 PM CST -----  Can you schedule her mammo a day she's at [facility] and let her know I wanted her to get thsi?  Thanks!

## 2024-03-11 RX ORDER — DEXTROAMPHETAMINE SACCHARATE, AMPHETAMINE ASPARTATE MONOHYDRATE, DEXTROAMPHETAMINE SULFATE AND AMPHETAMINE SULFATE 5; 5; 5; 5 MG/1; MG/1; MG/1; MG/1
20 CAPSULE, EXTENDED RELEASE ORAL EVERY MORNING
Qty: 30 CAPSULE | Refills: 0 | Status: SHIPPED | OUTPATIENT
Start: 2024-03-11 | End: 2024-06-06 | Stop reason: SDUPTHER

## 2024-03-11 RX ORDER — DIPHENOXYLATE HYDROCHLORIDE AND ATROPINE SULFATE 2.5; .025 MG/1; MG/1
1 TABLET ORAL 2 TIMES DAILY PRN
Qty: 60 TABLET | Refills: 0 | Status: SHIPPED | OUTPATIENT
Start: 2024-03-11

## 2024-03-11 RX ORDER — TRAZODONE HYDROCHLORIDE 100 MG/1
100 TABLET ORAL NIGHTLY
Qty: 30 TABLET | Refills: 3 | Status: SHIPPED | OUTPATIENT
Start: 2024-03-11 | End: 2025-03-11

## 2024-03-22 ENCOUNTER — PROCEDURE VISIT (OUTPATIENT)
Dept: NEUROLOGY | Facility: CLINIC | Age: 49
End: 2024-03-22
Payer: MEDICARE

## 2024-03-22 VITALS
DIASTOLIC BLOOD PRESSURE: 94 MMHG | HEART RATE: 99 BPM | TEMPERATURE: 98 F | BODY MASS INDEX: 44.43 KG/M2 | HEIGHT: 67 IN | RESPIRATION RATE: 17 BRPM | WEIGHT: 283.06 LBS | SYSTOLIC BLOOD PRESSURE: 139 MMHG

## 2024-03-22 DIAGNOSIS — G43.719 INTRACTABLE CHRONIC MIGRAINE WITHOUT AURA AND WITHOUT STATUS MIGRAINOSUS: Primary | ICD-10-CM

## 2024-03-22 PROCEDURE — 64615 CHEMODENERV MUSC MIGRAINE: CPT | Mod: S$GLB,,, | Performed by: PSYCHIATRY & NEUROLOGY

## 2024-03-22 NOTE — PROCEDURES
Procedures     BOTOX PROCEDURE    PROCEDURE PERFORMED: Botulinum toxin injection (85962)    CLINICAL INDICATION: G43.719    The Botox injections have achieved well over 50%  improvement in the patient's symptoms. Migraines have been reduced at least 7 days per month and the number of cumulative hours suffering with headaches has been reduced at least 100 total hours per month. Today she does have a headache indicating that the Botox has worn off. Frequency of treatment is every 3 months unless no response to the treatments, at which time we will discontinue the injections.      A time out was conducted just before the start of the procedure to verify the correct patient and procedure, procedure location, and all relevant critical information.   Signed consent obtained prior to procedure     Conventional methods of treatment but the patient has been unresponsive and refractory.The patient meets criteria for chronic headaches according to the ICHD-III, the patient has more than 15 headaches a month at least 8 of them meet migraine criteria, which last for more than 4 hours a day.     Last Botox injections resulted in over 50%  improvement in the patient's symptoms. Frequency of treatment is every 3 months unless no response to the treatments, at which time we will discontinue the injections.     DESCRIPTION OF PROCEDURE: After obtaining informed consent and under aseptic technique, a total of 155 units of botulinum toxin type A were injected in the following muscles: Procerus 5 units,  5 units bilaterally, frontalis 20 units, temporalis 20 units bilaterally,   occipitalis 15 units, upper cervical paraspinals 10 units bilaterally and trapezius 15 units ilaterally. The patient was given a total of 155 units in 31 sites.    Special precaution taken given her posterior decompression.       The patient tolerated the procedure well. There were no complications. The patient was given a prescription for repeat  treatment in 3 months.     Unavoidable waste 45 units    Saeid Guevara M.D  Medical Director, Headache and Facial Pain  [facility]

## 2024-03-25 ENCOUNTER — OFFICE VISIT (OUTPATIENT)
Dept: CARDIOLOGY | Facility: CLINIC | Age: 49
End: 2024-03-25
Payer: MEDICARE

## 2024-03-25 VITALS
HEIGHT: 67 IN | BODY MASS INDEX: 43.91 KG/M2 | WEIGHT: 279.75 LBS | SYSTOLIC BLOOD PRESSURE: 145 MMHG | DIASTOLIC BLOOD PRESSURE: 102 MMHG | HEART RATE: 71 BPM

## 2024-03-25 DIAGNOSIS — R55 SYNCOPE, UNSPECIFIED SYNCOPE TYPE: ICD-10-CM

## 2024-03-25 DIAGNOSIS — F41.0 PANIC DISORDER WITHOUT AGORAPHOBIA: ICD-10-CM

## 2024-03-25 DIAGNOSIS — G47.09 OTHER INSOMNIA: ICD-10-CM

## 2024-03-25 DIAGNOSIS — G47.33 OSA (OBSTRUCTIVE SLEEP APNEA): ICD-10-CM

## 2024-03-25 DIAGNOSIS — M79.18 CERVICAL MYOFASCIAL PAIN SYNDROME: ICD-10-CM

## 2024-03-25 DIAGNOSIS — F43.10 PTSD (POST-TRAUMATIC STRESS DISORDER): ICD-10-CM

## 2024-03-25 DIAGNOSIS — R29.6 REPEATED FALLS: ICD-10-CM

## 2024-03-25 DIAGNOSIS — M54.81 BILATERAL OCCIPITAL NEURALGIA: ICD-10-CM

## 2024-03-25 DIAGNOSIS — E66.01 MORBID OBESITY WITH BMI OF 40.0-44.9, ADULT: ICD-10-CM

## 2024-03-25 DIAGNOSIS — F90.2 ATTENTION DEFICIT HYPERACTIVITY DISORDER (ADHD), COMBINED TYPE: ICD-10-CM

## 2024-03-25 DIAGNOSIS — M47.812 SPONDYLOSIS OF CERVICAL REGION WITHOUT MYELOPATHY OR RADICULOPATHY: Primary | ICD-10-CM

## 2024-03-25 PROCEDURE — 3008F BODY MASS INDEX DOCD: CPT | Mod: CPTII,S$GLB,, | Performed by: INTERNAL MEDICINE

## 2024-03-25 PROCEDURE — 3080F DIAST BP >= 90 MM HG: CPT | Mod: CPTII,S$GLB,, | Performed by: INTERNAL MEDICINE

## 2024-03-25 PROCEDURE — 1160F RVW MEDS BY RX/DR IN RCRD: CPT | Mod: CPTII,S$GLB,, | Performed by: INTERNAL MEDICINE

## 2024-03-25 PROCEDURE — 93010 ELECTROCARDIOGRAM REPORT: CPT | Mod: S$GLB,,, | Performed by: INTERNAL MEDICINE

## 2024-03-25 PROCEDURE — 99999 PR PBB SHADOW E&M-EST. PATIENT-LVL V: CPT | Mod: PBBFAC,,, | Performed by: INTERNAL MEDICINE

## 2024-03-25 PROCEDURE — 3077F SYST BP >= 140 MM HG: CPT | Mod: CPTII,S$GLB,, | Performed by: INTERNAL MEDICINE

## 2024-03-25 PROCEDURE — 99204 OFFICE O/P NEW MOD 45 MIN: CPT | Mod: S$GLB,,, | Performed by: INTERNAL MEDICINE

## 2024-03-25 PROCEDURE — 3044F HG A1C LEVEL LT 7.0%: CPT | Mod: CPTII,S$GLB,, | Performed by: INTERNAL MEDICINE

## 2024-03-25 PROCEDURE — 93005 ELECTROCARDIOGRAM TRACING: CPT | Mod: PO

## 2024-03-25 PROCEDURE — 1159F MED LIST DOCD IN RCRD: CPT | Mod: CPTII,S$GLB,, | Performed by: INTERNAL MEDICINE

## 2024-03-25 RX ORDER — ALPRAZOLAM 1 MG/1
1 TABLET ORAL DAILY PRN
Qty: 30 TABLET | Refills: 0 | Status: SHIPPED | OUTPATIENT
Start: 2024-03-25 | End: 2024-04-23 | Stop reason: SDUPTHER

## 2024-03-25 NOTE — PROGRESS NOTES
Subjective:    Patient ID:  Dinah Mitchell is a 49 y.o. female patient here for evaluation Pre Syncope      History of Present Illness:  New patient cardiac evaluation.  History syncope patient awoke from sound sleep bathroom and fainted.  Recurrent.  No prodrome symptomatology.  No prior history of known structural ischemic or arrhythmic heart disease.    Syndrome under management.  Past history Chiari malformation CNS surgery.    Polypharmacy.     No diabetes.  No tobacco use.  Hypertension, no dyslipidemia.      Review of patient's allergies indicates:   Allergen Reactions    Lamotrigine      Other reaction(s): Rash  Other reaction(s): Nausea    Sulfa (sulfonamide antibiotics) Nausea Only     Other reaction(s): Unknown    Adhesive Rash    Zonisamide Nausea And Vomiting     Nausea/vomiting        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     PATRICIA (obstructive sleep apnea)     severe - doesn't use CPAP    Panic attack     PTSD (post-traumatic stress disorder)      Past Surgical History:   Procedure Laterality Date    ANKLE SURGERY Right     x2 (#1 for ligament injury and #2 for fx and ligament)    AUGMENTATION OF BREAST      BILATERAL TUBAL LIGATION      BRAIN SURGERY  12/2018    decompression/craniotomy Arnold-Chiari syndrome    BREAST SURGERY  1997    B implants    CERVICAL BIOPSY  W/ LOOP ELECTRODE EXCISION      COLD KNIFE CONIZATION OF CERVIX N/A 03/17/2022    Procedure: CONE BIOPSY, CERVIX, USING COLD KNIFE;  Surgeon: Nikhil Franco MD;   Location: [facility];  Service: OB/GYN;  Laterality: N/A;    COLONOSCOPY N/A 09/29/2022    Procedure: COLONOSCOPY;  Surgeon: Martín Sagastume MD;  Location: [facility];  Service: Endoscopy;  Laterality: N/A; Repeat colonoscopy in 5 years for surveillance    DECOMPRESSION OF CHIARI MALFORMATION BY REMOVAL OF POSTERIOR ARCH OF FIRST CERVICAL VERTEBRA N/A 12/13/2018    Procedure: DECOMPRESSION, CHIARI MALFORMATION, BY 1ST CERVICAL VERTEBRA POSTERIOR ARCH REMOVAL;  Surgeon: Sergio Busch MD;  Location: [address];  Service: Neurosurgery;  Laterality: N/A;    EPIDURAL STEROID INJECTION INTO CERVICAL SPINE N/A 12/05/2019    Procedure: Injection-steroid-epidural-cervical-C7-T1;  Surgeon: Noa Samano MD;  Location: [facility];  Service: Neurosurgery;  Laterality: N/A;    ESOPHAGOGASTRODUODENOSCOPY N/A 5/12/2023    Procedure: EGD (ESOPHAGOGASTRODUODENOSCOPY);  Surgeon: Martín Sagastume MD;  Location: [facility];  Service: Endoscopy;  Laterality: N/A;    FRACTURE SURGERY Right     Ankle    INJECTION OF ANESTHETIC AGENT AROUND MEDIAL BRANCH NERVES INNERVATING CERVICAL FACET JOINT Bilateral 08/06/2020    Procedure: Block-nerve-medial branch-cervical Bilateral  C3,4,5;  Surgeon: Noa Samano MD;  Location: [facility];  Service: Anesthesiology;  Laterality: Bilateral;    INJECTION OF ANESTHETIC AGENT AROUND MEDIAL BRANCH NERVES INNERVATING CERVICAL FACET JOINT Bilateral 08/20/2020    Procedure: Block-nerve-medial branch-cervical Bilateral C3-4-5;  Surgeon: Noa Samano MD;  Location: [facility];  Service: Anesthesiology;  Laterality: Bilateral;    LAPAROSCOPIC GASTRIC BANDING      with subsequent removal due to abscess    LASIK Bilateral     2009    LIPOSUCTION      x 2    PELVIC LAPAROSCOPY      for endometriosis eval with BTL    RADIOFREQUENCY ABLATION Bilateral 08/16/2021    Procedure: Radiofrequency Ablation Cervical C3-C5;  Surgeon: Robert Munoz MD;  Location: [facility];  Service: Pain Management;  Laterality: Bilateral;     "RADIOFREQUENCY THERMAL COAGULATION OF MEDIAL BRANCH OF POSTERIOR RAMUS OF CERVICAL SPINAL NERVE Bilateral 08/27/2020    Procedure: RADIOFREQUENCY THERMAL COAGULATION, NERVE, SPINAL, CERVICAL, POSTERIOR RAMUS, MEDIAL BRANCH C3-4-5, bilateral;  Surgeon: Noa Samano MD;  Location: [facility];  Service: Anesthesiology;  Laterality: Bilateral;     Social History     Tobacco Use    Smoking status: Never    Smokeless tobacco: Never   Substance Use Topics    Alcohol use: Yes     Comment: occasional "maybe monthly"    Drug use: No        Review of Systems:    As noted in HPI in addition         REVIEW OF SYSTEMS  Review of Systems   Constitutional: Negative for decreased appetite, diaphoresis, night sweats, weight gain and weight loss.   HENT:  Negative for nosebleeds and odynophagia.    Eyes:  Negative for double vision and photophobia.   Cardiovascular:  Negative for chest pain, claudication, cyanosis, dyspnea on exertion, irregular heartbeat, leg swelling, near-syncope, orthopnea, palpitations, paroxysmal nocturnal dyspnea and syncope.   Respiratory:  Negative for cough, hemoptysis, shortness of breath and wheezing.    Hematologic/Lymphatic: Negative for adenopathy.   Skin:  Negative for flushing, skin cancer and suspicious lesions.   Musculoskeletal:  Negative for gout, myalgias and neck pain.   Gastrointestinal:  Negative for abdominal pain, heartburn, hematemesis and hematochezia.   Genitourinary:  Negative for bladder incontinence, hesitancy and nocturia.   Neurological:  Negative for focal weakness, headaches, light-headedness and paresthesias.   Psychiatric/Behavioral:  Negative for memory loss and substance abuse.        Objective:        Vitals:    03/25/24 1337   BP: (!) 145/102   Pulse: 71       Lab Results   Component Value Date    WBC 5.25 01/02/2024    HGB 13.6 01/02/2024    HCT 41.4 01/02/2024     01/02/2024    CHOL 222 (H) 01/02/2024    TRIG 133 01/02/2024    HDL 48 01/02/2024    ALT 20 01/02/2024    " AST 25 01/02/2024     01/02/2024    K 4.9 01/02/2024     01/02/2024    CREATININE 1.1 01/02/2024    BUN 14 01/02/2024    CO2 26 01/02/2024    TSH 4.395 (H) 01/02/2024    INR 1.0 12/04/2018    HGBA1C 5.1 01/02/2024      CARDIOGRAM RESULTS  No results found for this or any previous visit.    No results found for this or any previous visit.          CURRENT/PREVIOUS VISIT EKG  Results for orders placed or performed during the hospital encounter of 03/14/22   EKG 12-lead    Collection Time: 03/14/22  2:29 PM    Narrative    Test Reason : R87.612,    Vent. Rate : 057 BPM     Atrial Rate : 057 BPM     P-R Int : 162 ms          QRS Dur : 072 ms      QT Int : 426 ms       P-R-T Axes : 011 056 037 degrees     QTc Int : 414 ms    Sinus bradycardia with sinus arrhythmia  Otherwise normal ECG  When compared with ECG of 04-DEC-2018 14:38,  No significant change was found  Confirmed by Sharonda Glover MD ([id number]) on 3/14/2022 3:46:00 PM    Referred By: JANIE DUKES           Confirmed By:Sharonda Glover MD     No valid procedures specified.   No results found for this or any previous visit.    No valid procedures specified.        PHYSICAL EXAM  GENERAL: well built, well nourished, well-developed in no apparent distress alert and oriented.   HEENT: Normocephalic. Pupils normal and conjunctivae normal.  Mucous membranes normal, no cyanosis or icterus, trachea central,no pallor or icterus is noted..   NECK: No JVD. No bruit..   THYROID: Thyroid not enlarged. No nodules present..   CARDIAC:  Normal S1-S2.  No murmur rub click or gallop.  PMI nondisplaced.  CHEST ANATOMY: normal.   LUNGS: Clear to auscultation. No wheezing or rhonchi..   ABDOMEN: Soft no masses or organomegaly.  No abdomen pulsations or bruits.  Normal bowel sounds. No pulsations and no masses felt, No guarding or rebound.   URINARY: No morris catheter   EXTREMITIES: No cyanosis, clubbing or edema noted at this time., no calf tenderness bilaterally.   PERIPHERAL  VASCULAR SYSTEM: Good palpable distal pulses.  2+ femoral, popliteal and pedal pulses.  No bruits    CENTRAL NERVOUS SYSTEM: No focal motor or sensory deficits noted.   SKIN: Skin without lesions, moist, well perfused.   MUSCLE STRENGTH & TONE: No noteable weakness, atrophy or abnormal movement.     I HAVE REVIEWED :    The vital signs, nurses notes, and all the pertinent radiology and labs.         Current Outpatient Medications   Medication Instructions    acetylcysteine (N-ACETYL-L-CYSTEINE MISC) No dose, route, or frequency recorded.    albuterol (PROVENTIL) 2.5 mg, Nebulization, Every 4 hours PRN, Rescue    albuterol (PROVENTIL/VENTOLIN HFA) 90 mcg/actuation inhaler 1-2 puffs, Inhalation, Every 4 hours PRN, Rescue    ALPRAZolam (XANAX) 1 MG tablet TAKE 1 TABLET (1 MG) BY MOUTH DAILY AS NEEDED FOR ANXIETY    buPROPion (WELLBUTRIN XL) 150 mg, Oral, Daily    dextroamphetamine-amphetamine (ADDERALL XR) 20 MG 24 hr capsule 20 mg, Oral, Every morning    diphenoxylate-atropine 2.5-0.025 mg (LOMOTIL) 2.5-0.025 mg per tablet 1 tablet, Oral, 2 times daily PRN    EScitalopram oxalate (LEXAPRO) 20 mg, Oral, Daily    fluticasone propionate (FLONASE) 50 mcg/actuation nasal spray Spray 1 spray (50 mcg total) in each nostril once daily.    fluticasone-umeclidin-vilanter (TRELEGY ELLIPTA) 100-62.5-25 mcg DsDv 1 puff, Inhalation, Daily    gabapentin (NEURONTIN) 600 MG tablet TAKE 1 TABLET BY MOUTH 2 TO 3 TIMES A DAY FOR CHRONIC PAIN    galcanezumab-gnlm (EMGALITY PEN) 120 mg/mL PnIj ADMINISTER 1 ML UNDER THE SKIN EVERY 28 DAYS    levOCARNitine (CARNITOR) 990 mg, Oral, 2 times daily    LIDOcaine (LIDODERM) 5 % Apply one patch to affected area every 12 hours. Keep on for 12 hours, remove for 12 hours.    montelukast (SINGULAIR) 10 mg, Oral, Daily    ondansetron (ZOFRAN-ODT) 4 mg, Oral, Every 6 hours PRN    oxyCODONE-acetaminophen (PERCOCET) 5-325 mg per tablet Take 1 tablet by mouth once a day as needed for pain More than 7 days  "supply is medically necessary    SYNTHROID 274 mcg, Oral, Before breakfast    syringe with needle (BD LUER-LAURIE SYRINGE) 3 mL 25 gauge x 1" Syrg Use for Toradol IM only.    tiZANidine (ZANAFLEX) 4 MG tablet Take 1 tablet by mouth three times a day as needed for  muscle spasm    traZODone (DESYREL) 100 mg, Oral, Nightly          Assessment:   Syncope x1, rule out orthostasis versus vasovagal.  Chiari malformation, CNS surgery.  Polypharmacy.  History of PATRICIA/CPAP.      Plan:   Echo  Holter monitor.          No follow-ups on file.       "

## 2024-03-26 ENCOUNTER — TELEPHONE (OUTPATIENT)
Dept: CARDIOLOGY | Facility: CLINIC | Age: 49
End: 2024-03-26
Payer: MEDICARE

## 2024-03-26 NOTE — TELEPHONE ENCOUNTER
----- Message from Ozzy Moon MD sent at 3/26/2024  8:32 AM CDT -----  Information reviewed, no changes in workup or treatment plan  ----- Message -----  From: Ursula Ge RN  Sent: 3/25/2024   4:25 PM CDT  To: Ozzy Moon MD    Please see the note at the bottom about pt's MRI's  ----- Message -----  From: Ellen Paredes  Sent: 3/25/2024   4:19 PM CDT  To: [facility] Staff    Type:  Needs Medical Advice    Who Called: pt    Symptoms (please be specific): na     How long has patient had these symptoms:  na    Pharmacy name and phone #:  na    Would the patient rather a call back or a response via [facility]? Call back    Best Call Back Number: [phone]      Additional Information: pt said she was seen by Dr Moon earlier and forgot to tell them that her last 2 MRI showed intercranial hypotension in the brain.  She said she isnt sure if that makes a difference but she wanted to notify him bc it may be important     Please call Back to advise. Thanks!

## 2024-03-28 ENCOUNTER — PATIENT MESSAGE (OUTPATIENT)
Dept: PSYCHIATRY | Facility: CLINIC | Age: 49
End: 2024-03-28
Payer: MEDICARE

## 2024-03-28 LAB
OHS QRS DURATION: 74 MS
OHS QTC CALCULATION: 434 MS

## 2024-04-04 ENCOUNTER — TELEPHONE (OUTPATIENT)
Dept: PSYCHIATRY | Facility: CLINIC | Age: 49
End: 2024-04-04
Payer: MEDICARE

## 2024-04-07 RX ORDER — LEVOTHYROXINE SODIUM 137 UG/1
264 TABLET ORAL
Qty: 180 TABLET | Refills: 3 | Status: SHIPPED | OUTPATIENT
Start: 2024-04-07 | End: 2024-04-13 | Stop reason: SDUPTHER

## 2024-04-07 NOTE — TELEPHONE ENCOUNTER
No care due was identified.  [facility] Embedded Care Due Messages. Reference number: [id number].   4/06/2024 7:22:27 PM CDT

## 2024-04-07 NOTE — TELEPHONE ENCOUNTER
Refill Decision Note   Dinah Mitchell  is requesting a refill authorization.  Brief Assessment and Rationale for Refill:  Approve     Medication Therapy Plan:       Medication Reconciliation Completed: No   Comments:     No Care Gaps recommended.     Note composed:12:15 PM 04/07/2024

## 2024-04-08 ENCOUNTER — LAB VISIT (OUTPATIENT)
Dept: LAB | Facility: HOSPITAL | Age: 49
End: 2024-04-08
Attending: FAMILY MEDICINE
Payer: MEDICARE

## 2024-04-08 DIAGNOSIS — E03.4 HYPOTHYROIDISM DUE TO ACQUIRED ATROPHY OF THYROID: ICD-10-CM

## 2024-04-08 LAB — TSH SERPL DL<=0.005 MIU/L-ACNC: 0.44 UIU/ML (ref 0.4–4)

## 2024-04-08 PROCEDURE — 36415 COLL VENOUS BLD VENIPUNCTURE: CPT | Mod: PO | Performed by: FAMILY MEDICINE

## 2024-04-08 PROCEDURE — 84443 ASSAY THYROID STIM HORMONE: CPT | Performed by: FAMILY MEDICINE

## 2024-04-08 RX ORDER — ESCITALOPRAM OXALATE 20 MG/1
20 TABLET ORAL DAILY
Qty: 30 TABLET | Refills: 3 | Status: SHIPPED | OUTPATIENT
Start: 2024-04-08 | End: 2025-04-08

## 2024-04-10 RX ORDER — DEXTROAMPHETAMINE SACCHARATE, AMPHETAMINE ASPARTATE MONOHYDRATE, DEXTROAMPHETAMINE SULFATE AND AMPHETAMINE SULFATE 5; 5; 5; 5 MG/1; MG/1; MG/1; MG/1
20 CAPSULE, EXTENDED RELEASE ORAL EVERY MORNING
Qty: 30 CAPSULE | Refills: 0 | Status: CANCELLED | OUTPATIENT
Start: 2024-04-10

## 2024-04-12 ENCOUNTER — HOSPITAL ENCOUNTER (OUTPATIENT)
Dept: CARDIOLOGY | Facility: HOSPITAL | Age: 49
Discharge: HOME | End: 2024-04-12
Attending: INTERNAL MEDICINE
Payer: MEDICARE

## 2024-04-12 VITALS — BODY MASS INDEX: 43.79 KG/M2 | HEIGHT: 67 IN | WEIGHT: 279 LBS

## 2024-04-12 DIAGNOSIS — G47.33 OSA (OBSTRUCTIVE SLEEP APNEA): ICD-10-CM

## 2024-04-12 DIAGNOSIS — M79.18 CERVICAL MYOFASCIAL PAIN SYNDROME: ICD-10-CM

## 2024-04-12 DIAGNOSIS — R55 SYNCOPE, UNSPECIFIED SYNCOPE TYPE: ICD-10-CM

## 2024-04-12 LAB
ASCENDING AORTA: 2.71 CM
AV INDEX (PROSTH): 0.9
AV MEAN GRADIENT: 5 MMHG
AV PEAK GRADIENT: 9 MMHG
AV VALVE AREA BY VELOCITY RATIO: 2.92 CM²
AV VALVE AREA: 2.79 CM²
AV VELOCITY RATIO: 0.94
BSA FOR ECHO PROCEDURE: 2.45 M2
CV ECHO LV RWT: 0.45 CM
DOP CALC AO PEAK VEL: 1.48 M/S
DOP CALC AO VTI: 30.2 CM
DOP CALC LVOT AREA: 3.1 CM2
DOP CALC LVOT DIAMETER: 1.99 CM
DOP CALC LVOT PEAK VEL: 1.39 M/S
DOP CALC LVOT STROKE VOLUME: 84.25 CM3
DOP CALCLVOT PEAK VEL VTI: 27.1 CM
E WAVE DECELERATION TIME: 207.94 MSEC
E/A RATIO: 0.95
E/E' RATIO: 7.25 M/S
ECHO LV POSTERIOR WALL: 1.1 CM (ref 0.6–1.1)
FRACTIONAL SHORTENING: 44 % (ref 28–44)
INTERVENTRICULAR SEPTUM: 0.72 CM (ref 0.6–1.1)
IVRT: 97.05 MSEC
LEFT ATRIUM SIZE: 3.94 CM
LEFT ATRIUM VOLUME INDEX MOD: 16.3 ML/M2
LEFT ATRIUM VOLUME MOD: 37.94 CM3
LEFT INTERNAL DIMENSION IN SYSTOLE: 2.76 CM (ref 2.1–4)
LEFT VENTRICLE DIASTOLIC VOLUME INDEX: 49.21 ML/M2
LEFT VENTRICLE DIASTOLIC VOLUME: 114.66 ML
LEFT VENTRICLE MASS INDEX: 67 G/M2
LEFT VENTRICLE SYSTOLIC VOLUME INDEX: 12.2 ML/M2
LEFT VENTRICLE SYSTOLIC VOLUME: 28.4 ML
LEFT VENTRICULAR INTERNAL DIMENSION IN DIASTOLE: 4.93 CM (ref 3.5–6)
LEFT VENTRICULAR MASS: 156.79 G
LV LATERAL E/E' RATIO: 5.8 M/S
LV SEPTAL E/E' RATIO: 9.67 M/S
LVOT MG: 4.34 MMHG
LVOT MV: 0.97 CM/S
MV PEAK A VEL: 0.92 M/S
MV PEAK E VEL: 0.87 M/S
MV STENOSIS PRESSURE HALF TIME: 60.3 MS
MV VALVE AREA P 1/2 METHOD: 3.65 CM2
PISA TR MAX VEL: 2.82 M/S
PULM VEIN S/D RATIO: 1.26
PV PEAK D VEL: 0.47 M/S
PV PEAK S VEL: 0.59 M/S
RA PRESSURE ESTIMATED: 3 MMHG
RIGHT VENTRICULAR END-DIASTOLIC DIMENSION: 4.67 CM
RIGHT VENTRICULAR LENGTH IN DIASTOLE (APICAL 4-CHAMBER VIEW): 5.52 CM
RV MID DIAMA: 2.69 CM
RV TB RVSP: 6 MMHG
RV TISSUE DOPPLER FREE WALL SYSTOLIC VELOCITY 1 (APICAL 4 CHAMBER VIEW): 14.13 CM/S
SINUS: 2.6 CM
STJ: 2.36 CM
TDI LATERAL: 0.15 M/S
TDI SEPTAL: 0.09 M/S
TDI: 0.12 M/S
TR MAX PG: 32 MMHG
TRICUSPID ANNULAR PLANE SYSTOLIC EXCURSION: 2.49 CM
TV REST PULMONARY ARTERY PRESSURE: 35 MMHG
Z-SCORE OF LEFT VENTRICULAR DIMENSION IN END DIASTOLE: -6.5
Z-SCORE OF LEFT VENTRICULAR DIMENSION IN END SYSTOLE: -5.71

## 2024-04-12 PROCEDURE — 93242 EXT ECG>48HR<7D RECORDING: CPT | Mod: PO

## 2024-04-12 PROCEDURE — 93306 TTE W/DOPPLER COMPLETE: CPT | Mod: PO

## 2024-04-12 PROCEDURE — 93244 EXT ECG>48HR<7D REV&INTERPJ: CPT | Mod: ,,, | Performed by: INTERNAL MEDICINE

## 2024-04-12 PROCEDURE — 93243 EXT ECG>48HR<7D SCAN A/R: CPT | Mod: PO

## 2024-04-12 PROCEDURE — 93306 TTE W/DOPPLER COMPLETE: CPT | Mod: 26,,, | Performed by: INTERNAL MEDICINE

## 2024-04-13 ENCOUNTER — PATIENT MESSAGE (OUTPATIENT)
Dept: FAMILY MEDICINE | Facility: CLINIC | Age: 49
End: 2024-04-13
Payer: MEDICARE

## 2024-04-13 RX ORDER — LEVOTHYROXINE SODIUM 137 UG/1
264 TABLET ORAL
Qty: 180 TABLET | Refills: 3 | Status: SHIPPED | OUTPATIENT
Start: 2024-04-13

## 2024-04-15 ENCOUNTER — TELEPHONE (OUTPATIENT)
Dept: PSYCHIATRY | Facility: CLINIC | Age: 49
End: 2024-04-15
Payer: MEDICARE

## 2024-04-15 RX ORDER — DEXTROAMPHETAMINE SACCHARATE, AMPHETAMINE ASPARTATE MONOHYDRATE, DEXTROAMPHETAMINE SULFATE AND AMPHETAMINE SULFATE 5; 5; 5; 5 MG/1; MG/1; MG/1; MG/1
20 CAPSULE, EXTENDED RELEASE ORAL EVERY MORNING
Qty: 30 CAPSULE | Refills: 0 | OUTPATIENT
Start: 2024-04-15

## 2024-04-16 DIAGNOSIS — G43.719 CHRONIC MIGRAINE WITHOUT AURA, INTRACTABLE, WITHOUT STATUS MIGRAINOSUS: ICD-10-CM

## 2024-04-16 RX ORDER — GABAPENTIN 600 MG/1
TABLET ORAL
Qty: 90 TABLET | Refills: 11 | Status: SHIPPED | OUTPATIENT
Start: 2024-04-16

## 2024-04-16 NOTE — TELEPHONE ENCOUNTER
Patient called needing to reschedule her next botox appt. 6/21.  I contacted her and was able to cancel her 6/21 appt and rescheduled her for 6/25 at 9:45 with Dr. Guevara.

## 2024-04-16 NOTE — TELEPHONE ENCOUNTER
Called and spoke to patient and scheduled patient for in clinic visit on Monday 04/22/2024 @ 330 PM in [facility] per dr shandra magallanes for earlier visit also sent to Arleen for override to be put on schedule    
[8:03 AM] Bebeto Resendiz  MRN: [id number] - this pt is requesting a med refill but it is a controlled substance. She has not been seen since December and needs an apt before I can approve it.  [8:03 AM] Bebeto Resendiz  I believe this needs to be in-person.   
verbal instruction

## 2024-04-17 LAB
OHS CV EVENT MONITOR DAY: 3
OHS CV HOLTER LENGTH DECIMAL HOURS: 74.57
OHS CV HOLTER LENGTH HOURS: 2
OHS CV HOLTER LENGTH MINUTES: 34
OHS CV HOLTER SINUS AVERAGE HR: 85
OHS CV HOLTER SINUS MAX HR: 163
OHS CV HOLTER SINUS MIN HR: 56

## 2024-04-18 ENCOUNTER — PATIENT MESSAGE (OUTPATIENT)
Dept: PSYCHIATRY | Facility: CLINIC | Age: 49
End: 2024-04-18
Payer: MEDICARE

## 2024-04-21 NOTE — PROGRESS NOTES
"Outpatient Psychiatry Follow-Up Visit    Clinical Status of Patient: Outpatient (Ambulatory)  04/22/2024     Chief Complaint: PTSD, Insomnia      Interval History and Content of Current Session:  Interim Events/Subjective Report/Content of Current Session:  follow-up appointment.    Pt is a 46 y/o female with past psychiatric hx of PTSD, insomnia who presents for follow-up treatment. Pt reported that she completely changed her diet. Stated that she is drinking more water and stopped all etoh consumption. Had a holter monitor placed last week by cardio. Stated that parents continue to talk about possibly passing away, leading to fears for the pt. Pt stated that she had an argument with friend/. Will be meeting with another home caregiver soon. Discussed fears that this "friend" will break into her home as she needs money.     Past Psychiatric hx:  prozac (can't recall), lexapro (effective, does not remember why stopped), celexa > effective, dec'd libido ( told pt he would "leave her" regarding the sex drive), depakote (ineffective), lamictal (rash), abilify (paranoid), seroquel (significant wgt gain), Lithium 300 mg po qam/600 mg po qhs (pt reports it worsened anxiety), prazosin (I had some allergic reaction- had been effective for months prior to this report), cymbalta 90mg (effective; pt self d/c'd), trazodone, ambien, Belsomra, Vyvanse    Past Medical hx:   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  Denies hopelessness/worthlessness.    Denies auditory/visual hallucinations      Alcohol: Pt denies  Drug: Pt denies  Caffeine: Not assessed  Tobacco: Pt denies      Review of Systems   PSYCHIATRIC: Pertinent items are noted in the narrative.        CONSTITUTIONAL: weight stable    Past Medical, Family and Social History: The patient's past medical, family and social history have been reviewed and updated as appropriate within the electronic medical record. See encounter notes.     Current Psychiatric Medication:  wellbutrin xl 150mg po qam, xanax 1mg po daily PRN anxiety (not using daily),  escitalopram 20 mg, Adderall XR 20 mg (not using daily), trazodone 100 mg     Compliance: yes      Side effects: Pt denied.      Risk Parameters:  Patient reports no suicidal ideation  Patient reports no homicidal ideation  Patient reports no self-injurious behavior  Patient reports no violent behavior     Exam (detailed: at least 9 elements; comprehensive: all 15 elements)   Constitutional  Vitals:  Most recent vital signs, dated less than 90 days prior to this appointment, were reviewed. BP: ()/()   Arterial Line BP: ()/()       General:  unremarkable, age appropriate, casual attire, good eye contact, good rapport       Musculoskeletal  Muscle Strength/Tone:  no flaccidity, no tremor    Gait & Station:  normal      Psychiatric                       Speech:  normal tone, normal rate, rhythm, and volume   Mood & Affect:   Mildly Depressed, anxious         Thought Process:   Goal directed; Linear    Associations:   intact   Thought Content:   No SI/HI, delusions, or paranoia, no AV/VH   Insight & Judgement:   Good, adequate to  circumstances   Orientation:   grossly intact; alert and oriented x 4    Memory:  intact for content of interview    Language:  grossly intact, can repeat    Attention Span  : Grossly intact for content of interview   Fund of Knowledge:   intact and appropriate to age and level of education        Assessment and Diagnosis   Status/Progress: Based on the examination today, the patient's problem(s) is/are not adequately controlled.  New problems have been presented today. Co-morbidities are complicating management of the primary condition. There are no active rule-out diagnoses for this patient at this time.      Impression: Pt continues to have mood and anxiety symptoms related to health concerns and psychosocial stressors. Continues to isolate and avoiding anxiety triggers, which is likely increasing her symptoms of anxiety. Encouraged pt to reschedule with Dr. Llanos. Will decrease dose of alprazolam and continue to do so in the future. Will continue with medication plan for now and monitor symptoms moving forward.      Diagnosis:   Posttraumatic Stress Disorder  Panic Disorder w/o agoraphobia  Attention Deficit Hyperactivity Disorder, Combined Type   Insomnia  Borderline personality disorder traits  Intervention/Counseling/Treatment Plan   Medication Management:      1. wellbutrin xl 150mg po qam    2. Decrease alprazolam 0.5 mg po daily PRN anxiety    3. escitalopram to 20 mg    4. Adderall XR 20 mg    5. trazodone 100 mg     6. Start trial of hydroxyzine 25 mg      7. Call to report any worsening of symptoms or problems with the medication. Pt instructed to go to ER with thoughts of harming self, others     8. Continue in therapy with Dr. Llanos as needed    Psychotherapy: 20 minutes  Target symptoms: anxiety  Why chosen therapy is appropriate versus another modality: CBT used; relevant to diagnosis, patient responds to this modality  Outcome monitoring methods: self-report, observation  Therapeutic intervention  type: Cognitive Behavioral Therapy, Exposure therapy.   Topics discussed/themes: building skills sets for symptom management, symptom recognition, nutrition, exercise  The patient's response to the intervention is accepting  Patient's response to treatment is: good.   The patient's progress toward treatment goals: limited     Return to clinic: 3 months     -Cognitive-Behavioral/Supportive therapy and psychoeducation provided  -R/B/SE's of medications discussed with the pt who expresses understanding and chooses to take medications as prescribed.   -Pt instructed to call clinic, 911 or go to nearest emergency room if sxs worsen or pt is in   crisis. The pt expresses understanding.    Bebeto Resendiz, PhD, MP     Antidepressant/Antianxiety Medication Initiation:  Patient informed of risks, benefits, and potential side effects of medication and accepts informed consent.  Common side effects include nausea, fatigue, headache, insomnia., Specifically discussed the possibility of new or worsening suicidal thoughts/depression.  Patient instructed to stop the medication immediately and seek urgent treatment if this occurs. Patient instructed not to abruptly discontinue medication without physician guidance except in cases of sudden onset or worsening of SI.       Stimulant Medication Initiation:  Patient advised of risks, benefits, and side effects of medication and accepts informed consent.  Common side effects include insomnia, irritability, jittery feeling, dry mouth, and agitation/hostility., Patient advised of potential addictive nature of medication and controlled substance classification.  Instructed to safeguard medication as no early refills can be given for lost or stolen medications.       Benzodiazepine Initiation:  Patient advised of the risks, benefits, and common side effects of medication and has accepted informed consent.  Common side effects include drowsiness, impaired coordination, possible memory loss.,  Patient advised NOT to operate a vehicle or machinery untiil they are sure how the medication will affec them.  Client also advised of danger of mixing this medication with alcohol., Patient advised of potential addictive nature of medication and need to safeguard medication as no early refills for lost or stolen medications can be authorized.       Pregnancy Warning:  Patient denies current pregnancy possibility.  Patient made aware that medications have not been proven safe in pregnancy and that she must maintain adequate birth control.  Patient instructed to alert us immediately if she becomes pregnant.

## 2024-04-22 ENCOUNTER — OFFICE VISIT (OUTPATIENT)
Dept: PSYCHIATRY | Facility: CLINIC | Age: 49
End: 2024-04-22
Payer: MEDICARE

## 2024-04-22 VITALS
HEART RATE: 87 BPM | HEIGHT: 67 IN | WEIGHT: 275.44 LBS | BODY MASS INDEX: 43.23 KG/M2 | DIASTOLIC BLOOD PRESSURE: 86 MMHG | SYSTOLIC BLOOD PRESSURE: 121 MMHG

## 2024-04-22 DIAGNOSIS — F41.0 PANIC DISORDER WITHOUT AGORAPHOBIA: ICD-10-CM

## 2024-04-22 DIAGNOSIS — F90.2 ATTENTION DEFICIT HYPERACTIVITY DISORDER (ADHD), COMBINED TYPE: ICD-10-CM

## 2024-04-22 DIAGNOSIS — F43.10 POSTTRAUMATIC STRESS DISORDER: Primary | ICD-10-CM

## 2024-04-22 DIAGNOSIS — F41.0 PANIC DISORDER: ICD-10-CM

## 2024-04-22 DIAGNOSIS — G47.00 INSOMNIA, UNSPECIFIED TYPE: ICD-10-CM

## 2024-04-22 DIAGNOSIS — F43.10 PTSD (POST-TRAUMATIC STRESS DISORDER): ICD-10-CM

## 2024-04-22 PROCEDURE — 3044F HG A1C LEVEL LT 7.0%: CPT | Mod: CPTII,S$GLB,, | Performed by: PSYCHOLOGIST

## 2024-04-22 PROCEDURE — 3008F BODY MASS INDEX DOCD: CPT | Mod: CPTII,S$GLB,, | Performed by: PSYCHOLOGIST

## 2024-04-22 PROCEDURE — 99214 OFFICE O/P EST MOD 30 MIN: CPT | Mod: S$GLB,,, | Performed by: PSYCHOLOGIST

## 2024-04-22 PROCEDURE — 3074F SYST BP LT 130 MM HG: CPT | Mod: CPTII,S$GLB,, | Performed by: PSYCHOLOGIST

## 2024-04-22 PROCEDURE — 99999 PR PBB SHADOW E&M-EST. PATIENT-LVL IV: CPT | Mod: PBBFAC,,, | Performed by: PSYCHOLOGIST

## 2024-04-22 PROCEDURE — 3079F DIAST BP 80-89 MM HG: CPT | Mod: CPTII,S$GLB,, | Performed by: PSYCHOLOGIST

## 2024-04-22 RX ORDER — ESCITALOPRAM OXALATE 20 MG/1
20 TABLET ORAL DAILY
Qty: 30 TABLET | Refills: 3 | Status: CANCELLED | OUTPATIENT
Start: 2024-04-22 | End: 2025-04-22

## 2024-04-22 RX ORDER — HYDROXYZINE HYDROCHLORIDE 25 MG/1
25 TABLET, FILM COATED ORAL 3 TIMES DAILY
Qty: 90 TABLET | Refills: 2 | Status: SHIPPED | OUTPATIENT
Start: 2024-04-22

## 2024-04-22 RX ORDER — ALPRAZOLAM 0.5 MG/1
0.5 TABLET ORAL DAILY PRN
Qty: 30 TABLET | Refills: 0 | Status: CANCELLED | OUTPATIENT
Start: 2024-04-22

## 2024-04-23 DIAGNOSIS — F41.0 PANIC DISORDER WITHOUT AGORAPHOBIA: ICD-10-CM

## 2024-04-23 DIAGNOSIS — F43.10 PTSD (POST-TRAUMATIC STRESS DISORDER): ICD-10-CM

## 2024-04-23 RX ORDER — ALPRAZOLAM 0.5 MG/1
0.5 TABLET ORAL DAILY PRN
Qty: 30 TABLET | Refills: 0 | Status: SHIPPED | OUTPATIENT
Start: 2024-04-23

## 2024-04-23 RX ORDER — DEXTROAMPHETAMINE SACCHARATE, AMPHETAMINE ASPARTATE MONOHYDRATE, DEXTROAMPHETAMINE SULFATE AND AMPHETAMINE SULFATE 5; 5; 5; 5 MG/1; MG/1; MG/1; MG/1
20 CAPSULE, EXTENDED RELEASE ORAL EVERY MORNING
Qty: 30 CAPSULE | Refills: 0 | Status: CANCELLED | OUTPATIENT
Start: 2024-04-23

## 2024-04-23 RX ORDER — ALPRAZOLAM 1 MG/1
1 TABLET ORAL DAILY PRN
Qty: 30 TABLET | Refills: 0 | Status: CANCELLED | OUTPATIENT
Start: 2024-04-23

## 2024-05-03 ENCOUNTER — TELEPHONE (OUTPATIENT)
Dept: FAMILY MEDICINE | Facility: CLINIC | Age: 49
End: 2024-05-03
Payer: MEDICARE

## 2024-05-03 DIAGNOSIS — J45.909 ASTHMA, UNSPECIFIED ASTHMA SEVERITY, UNSPECIFIED WHETHER COMPLICATED, UNSPECIFIED WHETHER PERSISTENT: Primary | ICD-10-CM

## 2024-05-21 ENCOUNTER — NURSE TRIAGE (OUTPATIENT)
Dept: ADMINISTRATIVE | Facility: CLINIC | Age: 49
End: 2024-05-21
Payer: MEDICARE

## 2024-05-21 NOTE — TELEPHONE ENCOUNTER
Pt reports that the inside of the upper lip is purple. Denies bruising or swelling. Pt also reports episodes of dizziness with headache and then both of legs get pins and needles feeling. Pt is wondering if they are related.Pt reports episodes last approx 5 minutes and is usually when she is moving (sometimes getting up or walking quickly). Episodes started 4 days ago and she has not been evaluated for before.      Dispo is to see PCP within 24 hours. No VV or inpatient appts available. Will send high priority messages to provider. OD VV offered and declined. Recommended ED/UC as a good source of care if needed. Care advice given. Pt v/u.   Reason for Disposition   [1] MODERATE dizziness (e.g., interferes with normal activities) AND [2] has NOT been evaluated by doctor (or NP/PA) for this  (Exception: Dizziness caused by heat exposure, sudden standing, or poor fluid intake.)    Additional Information   Negative: SEVERE difficulty breathing (e.g., struggling for each breath, speaks in single words)   Negative: [1] Difficulty breathing or swallowing AND [2] started suddenly after medicine, an allergic food or bee sting   Negative: Shock suspected (e.g., cold/pale/clammy skin, too weak to stand, low BP, rapid pulse)   Negative: Difficult to awaken or acting confused (e.g., disoriented, slurred speech)   Negative: [1] Weakness (i.e., paralysis, loss of muscle strength) of the face, arm or leg on one side of the body AND [2] sudden onset AND [3] present now   Negative: [1] Numbness (i.e., loss of sensation) of the face, arm or leg on one side of the body AND [2] sudden onset AND [3] present now   Negative: [1] Loss of speech or garbled speech AND [2] sudden onset AND [3] present now   Negative: Overdose (accidental or intentional) of medications   Negative: [1] Fainted > 15 minutes ago AND [2] still feels too weak or dizzy to stand   Negative: Heart beating < 50 beats per minute OR > 140 beats per minute   Negative:  "Sounds like a life-threatening emergency to the triager   Negative: Difficulty breathing   Negative: SEVERE dizziness (e.g., unable to stand, requires support to walk, feels like passing out now)   Negative: Extra heartbeats, irregular heart beating, or heart is beating very fast  (i.e., "palpitations")   Negative: [1] Drinking very little AND [2] dehydration suspected (e.g., no urine > 12 hours, very dry mouth, very lightheaded)   Negative: [1] Weakness (i.e., paralysis, loss of muscle strength) of the face, arm / hand, or leg / foot on one side of the body AND [2] sudden onset AND [3] brief (now gone)   Negative: [1] Numbness (i.e., loss of sensation) of the face, arm / hand, or leg / foot on one side of the body AND [2] sudden onset AND [3] brief (now gone)   Negative: [1] Loss of speech or garbled speech AND [2] sudden onset AND [3] brief (now gone)   Negative: Loss of vision or double vision  (Exception: Similar to previous migraines.)   Negative: Patient sounds very sick or weak to the triager   Negative: [1] Dizziness caused by heat exposure, sudden standing, or poor fluid intake AND [2] no improvement after 2 hours of rest and fluids   Negative: [1] Fever > 103 F (39.4 C) AND [2] not able to get the fever down using Fever Care Advice   Negative: [1] Fever > 101 F (38.3 C) AND [2] age > 60 years   Negative: [1] Fever > 100.0 F (37.8 C) AND [2] bedridden (e.g., CVA, chronic illness, recovering from surgery)   Negative: [1] Fever > 100.0 F (37.8 C) AND [2] diabetes mellitus or weak immune system (e.g., HIV positive, cancer chemo, splenectomy, organ transplant, chronic steroids)    Protocols used: Dizziness - [id number]    "

## 2024-05-22 NOTE — TELEPHONE ENCOUNTER
Called pt.  She advised that she is still having the same symptoms.  Pt was advised that provider is out of the office.  Offered to see if any other providers have openings, pt declined.  Then recommended urgent care, pt declined.  Pt advised that she has an appt today with [facility] at [facility].  Advised pt that they may not be able to address her complaints and that she should consider urgent care.  Pt advised that she will see them today and see if complaints can be addressed.

## 2024-05-27 ENCOUNTER — OFFICE VISIT (OUTPATIENT)
Dept: FAMILY MEDICINE | Facility: CLINIC | Age: 49
End: 2024-05-27
Payer: MEDICARE

## 2024-05-27 DIAGNOSIS — Z12.39 ENCOUNTER FOR SCREENING FOR MALIGNANT NEOPLASM OF BREAST, UNSPECIFIED SCREENING MODALITY: ICD-10-CM

## 2024-05-27 DIAGNOSIS — E03.4 HYPOTHYROIDISM DUE TO ACQUIRED ATROPHY OF THYROID: Primary | ICD-10-CM

## 2024-05-27 DIAGNOSIS — Z12.31 ENCOUNTER FOR SCREENING MAMMOGRAM FOR MALIGNANT NEOPLASM OF BREAST: ICD-10-CM

## 2024-05-27 PROCEDURE — 1159F MED LIST DOCD IN RCRD: CPT | Mod: CPTII,95,, | Performed by: FAMILY MEDICINE

## 2024-05-27 PROCEDURE — 99212 OFFICE O/P EST SF 10 MIN: CPT | Mod: 95,,, | Performed by: FAMILY MEDICINE

## 2024-05-27 PROCEDURE — 3044F HG A1C LEVEL LT 7.0%: CPT | Mod: CPTII,95,, | Performed by: FAMILY MEDICINE

## 2024-05-27 PROCEDURE — 1160F RVW MEDS BY RX/DR IN RCRD: CPT | Mod: CPTII,95,, | Performed by: FAMILY MEDICINE

## 2024-05-30 NOTE — TELEPHONE ENCOUNTER
Spoke with pt - pharmacy requested this refill by mistake. States she has almost a full bottle of the Lomotil from her last fill - does not take very often and does not need a refill.

## 2024-05-31 RX ORDER — DIPHENOXYLATE HYDROCHLORIDE AND ATROPINE SULFATE 2.5; .025 MG/1; MG/1
1 TABLET ORAL 2 TIMES DAILY PRN
Qty: 60 TABLET | Refills: 0 | OUTPATIENT
Start: 2024-05-31

## 2024-05-31 RX ORDER — KETOROLAC TROMETHAMINE 30 MG/ML
30 INJECTION, SOLUTION INTRAMUSCULAR; INTRAVENOUS ONCE AS NEEDED
Qty: 4 ML | Refills: 5 | Status: SHIPPED | OUTPATIENT
Start: 2024-05-31 | End: 2024-06-08

## 2024-05-31 RX ORDER — LIDOCAINE 50 MG/G
PATCH TOPICAL
Qty: 60 PATCH | Refills: 5 | Status: SHIPPED | OUTPATIENT
Start: 2024-05-31

## 2024-06-02 NOTE — PROGRESS NOTES
Subjective:       Patient ID: Dinah Mitchell is a 49 y.o. female.    Chief Complaint: Follow-up    HPI  The patient is a 49-year-old who is here today virtually for a follow-up.  Overall, she is doing well except for her headaches and presyncopal spells.    Today we discussed the followin) presyncopal spells.  She has not had any recent syncope.  She does feel as if she might pass out when she stands up as vision will narrow and she feels as if she has pins and needles from her knees down but this will pass quickly (typically within 30-45 seconds).  She recently had a cardiology workup which was unremarkable  2) headaches.  She is still is bothered by her headaches but she may be seeing some improvement.  She is working closely with headache specialist in his getting Botox injections.   3)  Hypothyroidism.  She is doing well with her Synthroid.  Her recent TSH was normal at 0.440    Of note, she does continue to follow with her [facility] to health team and is doing well with her current medications    Review of Systems   Constitutional:  Negative for activity change, appetite change, chills, diaphoresis, fatigue, fever and unexpected weight change.   HENT:  Negative for congestion, ear pain, hearing loss, postnasal drip, rhinorrhea, sinus pressure, sneezing, sore throat and trouble swallowing.    Eyes:  Negative for pain, discharge and visual disturbance.   Respiratory:  Positive for wheezing. Negative for cough, chest tightness and shortness of breath.    Cardiovascular:  Negative for chest pain, palpitations and leg swelling.   Gastrointestinal:  Positive for constipation. Negative for abdominal distention, abdominal pain, blood in stool, diarrhea, nausea and vomiting.   Endocrine: Negative for polydipsia and polyuria.   Genitourinary:  Positive for menstrual problem. Negative for difficulty urinating, dysuria and hematuria.   Musculoskeletal:  Positive for neck pain. Negative for arthralgias and joint swelling.    Skin:  Negative for rash.   Neurological:  Positive for weakness and headaches.   Psychiatric/Behavioral:  Positive for confusion. Negative for dysphoric mood.          Objective:      Physical Exam  Constitutional:       General: She is not in acute distress.     Appearance: Normal appearance.   Neurological:      Mental Status: She is alert.   Psychiatric:         Attention and Perception: Attention and perception normal.         Mood and Affect: Mood and affect normal.         Speech: Speech normal.         Behavior: Behavior normal. Behavior is cooperative.         Thought Content: Thought content normal.         Cognition and Memory: Cognition and memory normal.         Judgment: Judgment normal.       There were no vitals taken for this visit.There is no height or weight on file to calculate BMI.            A/P:  1) presyncope.  Mild.  She will make sure she stays hydrated and change positions slowly.  If this worsens, she will let us know   2)  Headaches.  Persistent.  Follow up with headache team as planned and continue with medication under their direction   3)  Hypothyroidism.  Well controlled.  Continue with Synthroid.  Recheck TSH in January   4) health maintenance issues.  We will schedule her for her mammogram      As long as she does well, I will see her back in 4 - 6 months or sooner if needed      The patient location is: home  The chief complaint leading to consultation is Follow-up     Visit type: Virtual visit with synchronous audio and video    Each patient to whom he or she provides medical services by telemedicine is:  (1) informed of the relationship between the physician and patient and the respective role of any other health care provider with respect to management of the patient; and (2) notified that he or she may decline to receive medical services by telemedicine and may withdraw from such care at any time.    I spent 14 minutes on this encounter.  This time includes face-to-face time  and non-face to face time including previsit chart review, obtaining and/or reviewing separately obtained history, documenting clinical information in the electronic or other health record, independently interpreting results and communicating results to the patient/family/caregiver, or care coordinator.

## 2024-06-06 RX ORDER — DEXTROAMPHETAMINE SACCHARATE, AMPHETAMINE ASPARTATE MONOHYDRATE, DEXTROAMPHETAMINE SULFATE AND AMPHETAMINE SULFATE 5; 5; 5; 5 MG/1; MG/1; MG/1; MG/1
20 CAPSULE, EXTENDED RELEASE ORAL EVERY MORNING
Qty: 30 CAPSULE | Refills: 0 | Status: SHIPPED | OUTPATIENT
Start: 2024-06-06

## 2024-06-06 RX ORDER — DEXTROAMPHETAMINE SACCHARATE, AMPHETAMINE ASPARTATE MONOHYDRATE, DEXTROAMPHETAMINE SULFATE AND AMPHETAMINE SULFATE 5; 5; 5; 5 MG/1; MG/1; MG/1; MG/1
20 CAPSULE, EXTENDED RELEASE ORAL EVERY MORNING
Qty: 30 CAPSULE | Refills: 0 | Status: CANCELLED | OUTPATIENT
Start: 2024-06-06

## 2024-06-17 ENCOUNTER — TELEPHONE (OUTPATIENT)
Dept: NEUROLOGY | Facility: CLINIC | Age: 49
End: 2024-06-17
Payer: MEDICARE

## 2024-06-17 NOTE — TELEPHONE ENCOUNTER
----- Message from Yang Villafuerte sent at 6/17/2024 10:43 AM CDT -----  Regarding: appt  Contact: JACINTO LINTON [[id number]]  Type:  Sooner Appointment Request    Caller is requesting a sooner appointment.      Name of Caller:  Jacinto    When is the first available appointment?  Dept book    Symptoms:  Botox    Would the patient rather a call back or a response via [facility]? Call    Best Call Back Number:  [phone] (home)     Additional Information:  Reschedule Botox. Please call to advise.

## 2024-06-20 RX ORDER — LEVOCARNITINE 330 MG/1
990 TABLET ORAL 2 TIMES DAILY
Qty: 180 TABLET | Refills: 2 | Status: SHIPPED | OUTPATIENT
Start: 2024-06-20

## 2024-06-25 ENCOUNTER — PATIENT MESSAGE (OUTPATIENT)
Dept: FAMILY MEDICINE | Facility: CLINIC | Age: 49
End: 2024-06-25
Payer: MEDICARE

## 2024-07-12 ENCOUNTER — TELEPHONE (OUTPATIENT)
Dept: PSYCHIATRY | Facility: CLINIC | Age: 49
End: 2024-07-12
Payer: MEDICARE

## 2024-07-12 ENCOUNTER — PATIENT MESSAGE (OUTPATIENT)
Dept: PSYCHIATRY | Facility: CLINIC | Age: 49
End: 2024-07-12
Payer: MEDICARE

## 2024-07-15 RX ORDER — HYDROXYZINE HYDROCHLORIDE 25 MG/1
25 TABLET, FILM COATED ORAL 3 TIMES DAILY
Qty: 90 TABLET | Refills: 2 | Status: CANCELLED | OUTPATIENT
Start: 2024-07-15

## 2024-07-15 RX ORDER — HYDROXYZINE HYDROCHLORIDE 25 MG/1
25 TABLET, FILM COATED ORAL 3 TIMES DAILY
Qty: 90 TABLET | Refills: 2 | Status: SHIPPED | OUTPATIENT
Start: 2024-07-15

## 2024-07-15 NOTE — TELEPHONE ENCOUNTER
Problem: Safety - Adult  Goal: Free from fall injury  7/15/2024 1037 by Rosemarie Malik RN  Outcome: Progressing  7/15/2024 0238 by Simona Hines RN  Outcome: Progressing     Problem: Chronic Conditions and Co-morbidities  Goal: Patient's chronic conditions and co-morbidity symptoms are monitored and maintained or improved  7/15/2024 1037 by Rosemarie Malik RN  Outcome: Progressing  7/15/2024 0238 by Simona Hines RN  Outcome: Progressing     Problem: Discharge Planning  Goal: Discharge to home or other facility with appropriate resources  7/15/2024 0238 by Simona Hines RN  Outcome: Progressing     Problem: Skin/Tissue Integrity  Goal: Absence of new skin breakdown  Description: 1.  Monitor for areas of redness and/or skin breakdown  2.  Assess vascular access sites hourly  3.  Every 4-6 hours minimum:  Change oxygen saturation probe site  4.  Every 4-6 hours:  If on nasal continuous positive airway pressure, respiratory therapy assess nares and determine need for appliance change or resting period.  7/15/2024 0238 by Simona Hines RN  Outcome: Progressing     Problem: Pain  Goal: Verbalizes/displays adequate comfort level or baseline comfort level  7/15/2024 0238 by Simona Hines, RN  Outcome: Progressing     Problem: Nutrition Deficit:  Goal: Optimize nutritional status  7/15/2024 0238 by Simona Hines RN  Outcome: Progressing      Lvm to confirm 4/8 appt

## 2024-07-19 RX ORDER — DEXTROAMPHETAMINE SACCHARATE, AMPHETAMINE ASPARTATE MONOHYDRATE, DEXTROAMPHETAMINE SULFATE AND AMPHETAMINE SULFATE 5; 5; 5; 5 MG/1; MG/1; MG/1; MG/1
20 CAPSULE, EXTENDED RELEASE ORAL EVERY MORNING
Qty: 30 CAPSULE | Refills: 0 | Status: SHIPPED | OUTPATIENT
Start: 2024-07-19

## 2024-07-19 RX ORDER — TRAZODONE HYDROCHLORIDE 100 MG/1
100 TABLET ORAL NIGHTLY
Qty: 30 TABLET | Refills: 3 | Status: SHIPPED | OUTPATIENT
Start: 2024-07-19 | End: 2025-07-19

## 2024-07-21 ENCOUNTER — PATIENT MESSAGE (OUTPATIENT)
Dept: ADMINISTRATIVE | Facility: OTHER | Age: 49
End: 2024-07-21
Payer: MEDICARE

## 2024-07-22 ENCOUNTER — TELEPHONE (OUTPATIENT)
Dept: FAMILY MEDICINE | Facility: CLINIC | Age: 49
End: 2024-07-22
Payer: MEDICARE

## 2024-07-22 ENCOUNTER — NURSE TRIAGE (OUTPATIENT)
Dept: ADMINISTRATIVE | Facility: CLINIC | Age: 49
End: 2024-07-22
Payer: MEDICARE

## 2024-07-22 NOTE — TELEPHONE ENCOUNTER
Pt aware . Pt will do another at home test charbel and will call us later in the week if she gets worse or does not get any better.--lp

## 2024-07-22 NOTE — TELEPHONE ENCOUNTER
Pt was exposed to Covid last Thursday . Pt has been having sore throat since yesterday . Pt denies fever . Pt feels fatigued , sinus congestion . Pt denies cough . Pt is wheezing . Pt is did breathing treatment yesterday and used her inhaler last night . Pt did home test this am , test was negative. Pt concerned about getting pneumonia. Does pt qualify for antiviral? --lp

## 2024-07-22 NOTE — TELEPHONE ENCOUNTER
If she tests + for covid, we could start an antiviral.    For now, continue to treat the symptoms and continue with nebs    If no better or if worse later this week, let us know

## 2024-07-22 NOTE — TELEPHONE ENCOUNTER
----- Message from Yuni Mira sent at 7/22/2024  8:55 AM CDT -----  Contact: pt  Type: Needs Medical Advice         Who Called: pt  Best Call Back Number:[phone]    Additional Information: Requesting a call back regarding  Pt was exposed to Covid by her Caregiver  Pt is asking for the office to call her     Symptoms: Headache, Body Aches, Chills, Sore Throat, Sinus Symptoms, Chest Tightness, - Adult    Outcome: Transfer to a nurse or provider NOW!  Reason: Started within the past 3 days    Pt is being transferred to O  but is a requesting for this inbasket to be sent also.       Please Advise- Thank you   .

## 2024-07-22 NOTE — TELEPHONE ENCOUNTER
----- Message from Tara Gallego sent at 7/22/2024 11:46 AM CDT -----  Type: Needs Medical Advice  Who Called:  pt  Symptoms (please be specific):  covid  How long has patient had these symptoms:  exposed on thursday  Pharmacy name and phone #:    [facility]  [address] [facility] [address]  [place] [address]  Phone: [phone] Fax: [phone]          Best Call Back Number: [phone]  Additional Information: pt thinks she has Covid and is not sure what to do   She had brain surgery 5 years ago   Please call to advise

## 2024-07-22 NOTE — TELEPHONE ENCOUNTER
Pt had a covid exposure. Pt concerned because she has lung problems and has had brain surgery. Pt has started with sore throat and sinus congestion as well as chills. Pt has used her breathing treatments and inhaler but is concerned for pneumonia. Dispo provided- discuss with pcp and call back by nurse today. Will send message with high priority to patient's pcp. Instructed to call back with additional questions or worsening of symptoms. Patient verbalized understanding.      Reason for Disposition   COVID-19 EXPOSURE within last 14 days and weak immune system (e.g., HIV positive, cancer chemo, splenectomy, organ transplant, chronic steroids) and NO symptoms    Additional Information   Negative: COVID-19 EXPOSURE within last 14 days and requests COVID-19 lab test to return to work and NO symptoms    Protocols used: Coronavirus (COVID-19) Exposure-A-OH     no abdominal pain/no diarrhea/no vomiting/no melena

## 2024-07-31 ENCOUNTER — TELEPHONE (OUTPATIENT)
Dept: NEUROLOGY | Facility: CLINIC | Age: 49
End: 2024-07-31
Payer: MEDICARE

## 2024-07-31 NOTE — TELEPHONE ENCOUNTER
----- Message from Mercedez Beba sent at 7/31/2024  3:38 PM CDT -----  Contact: Self  Type:  Book Appointment Request    Caller is requesting a sooner appointment.  Caller declined first available appointment listed below.  Caller will not accept being placed on the waitlist and is requesting a message be sent to doctor.    Name of Caller:  Patient  When is the first available appointment?  N/A  Symptoms:  Botox Appt  Would the patient rather a call back or a response via [facility]? Call  Best Call Back Number:  [phone]    Additional Information:  Pt is calling in regards to her Botox appt on Friday 08/02, stated her caregiver was diagnosed with Covid and she herself is not feeling well, And she was exposed to her, and wants to see about rescheduling that appt to be safe. Can we please call pt back to reschedule as soon as possible since she wants to have it done soon since its been awhile since her last botox appt. Thank You.

## 2024-07-31 NOTE — TELEPHONE ENCOUNTER
Spoke with patient regarding rescheduling appointment due to not feeling well. Appointment rescheduled to August 23 at 11:45 am.

## 2024-08-05 RX ORDER — ESCITALOPRAM OXALATE 20 MG/1
20 TABLET ORAL DAILY
Qty: 30 TABLET | Refills: 3 | Status: SHIPPED | OUTPATIENT
Start: 2024-08-05 | End: 2025-08-05

## 2024-08-09 ENCOUNTER — PATIENT MESSAGE (OUTPATIENT)
Dept: PSYCHIATRY | Facility: CLINIC | Age: 49
End: 2024-08-09
Payer: MEDICARE

## 2024-08-13 ENCOUNTER — OFFICE VISIT (OUTPATIENT)
Dept: PSYCHIATRY | Facility: CLINIC | Age: 49
End: 2024-08-13
Payer: MEDICARE

## 2024-08-13 DIAGNOSIS — F43.10 PTSD (POST-TRAUMATIC STRESS DISORDER): Primary | ICD-10-CM

## 2024-08-13 DIAGNOSIS — F60.3 BORDERLINE PERSONALITY DISORDER: ICD-10-CM

## 2024-08-13 DIAGNOSIS — F41.0 PANIC DISORDER WITHOUT AGORAPHOBIA: ICD-10-CM

## 2024-08-13 DIAGNOSIS — F90.2 ATTENTION DEFICIT HYPERACTIVITY DISORDER (ADHD), COMBINED TYPE: ICD-10-CM

## 2024-08-13 DIAGNOSIS — G47.00 INSOMNIA, UNSPECIFIED TYPE: ICD-10-CM

## 2024-08-13 PROCEDURE — 1159F MED LIST DOCD IN RCRD: CPT | Mod: CPTII,95,, | Performed by: PSYCHOLOGIST

## 2024-08-13 PROCEDURE — 3044F HG A1C LEVEL LT 7.0%: CPT | Mod: CPTII,95,, | Performed by: PSYCHOLOGIST

## 2024-08-13 PROCEDURE — 99214 OFFICE O/P EST MOD 30 MIN: CPT | Mod: 95,,, | Performed by: PSYCHOLOGIST

## 2024-08-13 RX ORDER — BUPROPION HYDROCHLORIDE 150 MG/1
150 TABLET ORAL DAILY
Qty: 90 TABLET | Refills: 1 | Status: SHIPPED | OUTPATIENT
Start: 2024-08-13 | End: 2025-08-13

## 2024-08-13 RX ORDER — DEXTROAMPHETAMINE SACCHARATE, AMPHETAMINE ASPARTATE MONOHYDRATE, DEXTROAMPHETAMINE SULFATE AND AMPHETAMINE SULFATE 5; 5; 5; 5 MG/1; MG/1; MG/1; MG/1
20 CAPSULE, EXTENDED RELEASE ORAL EVERY MORNING
Qty: 30 CAPSULE | Refills: 0 | Status: SHIPPED | OUTPATIENT
Start: 2024-08-13

## 2024-08-13 NOTE — PROGRESS NOTES
"The patient location is:  [place]  The patient location [place] is: [place]  The patient phone number is: [phone]  Visit type: Virtual visit with synchronous audio and video  Each patient to whom he or she provides medical services by telemedicine is:  (1) informed of the relationship between the physician and patient and the respective role of any other health care provider with respect to management of the patient; and (2) notified that he or she may decline to receive medical services by telemedicine and may withdraw from such care at any time.     Outpatient Psychiatry Follow-Up Visit    Clinical Status of Patient: Outpatient (Ambulatory)  08/13/2024     Chief Complaint: PTSD, Insomnia      Interval History and Content of Current Session:  Interim Events/Subjective Report/Content of Current Session:  follow-up appointment.    Pt is a 48 y/o female with past psychiatric hx of PTSD, insomnia who presents for follow-up treatment. Pt reported that she is doing well. Pt reported that she is lowing weight and is exercising about 4x per week. Has lost 50 pounds in the past 4 months. Mood and anxiety is stable. Pt stated that she has a new caretaker and reported having a good experience with her. Pt reported that she has been able to decreased the frequency of alprazolam without difficulty. Stated that the last time was about 3 weeks.     Past Psychiatric hx:  prozac (can't recall), lexapro (effective, does not remember why stopped), celexa > effective, dec'd libido ( told pt he would "leave her" regarding the sex drive), depakote (ineffective), lamictal (rash), abilify (paranoid), seroquel (significant wgt gain), Lithium 300 mg po qam/600 mg po qhs (pt reports it worsened anxiety), prazosin (I had some allergic reaction- had been effective for months prior to this report), cymbalta 90mg (effective; pt self d/c'd), trazodone, ambien, Belsomra, Vyvanse    Past Medical hx:   Past Medical History: "   Diagnosis Date    Arnold-Chiari deformity     Arthritis     Asthma     Borderline personality disorder     Carnitine deficiency     Colon polyp     Depression     Fatty liver     noted on 8/21 CT    Fibromyalgia     Headache     History of abnormal cervical Pap smear     colpo/ LEEP and CKC    History of nephrolithiasis     kidney stones    Hypothyroidism     IBS (irritable bowel syndrome)     GI smith negative    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     PATRICIA (obstructive sleep apnea)     severe - doesn't use CPAP    Panic attack     PTSD (post-traumatic stress disorder)         Interim hx:  Medication changes last visit: Decrease alprazolam 0.5 mg po daily PRN anxiety, start a trial of hydroxyzine 25 mg  Anxiety: stable  Depression: stable     Denies suicidal/homicidal ideations.  Denies hopelessness/worthlessness.    Denies auditory/visual hallucinations      Alcohol: Pt denies  Drug: Pt denies  Caffeine: Not assessed  Tobacco: Pt denies      Review of Systems   PSYCHIATRIC: Pertinent items are noted in the narrative.        CONSTITUTIONAL: weight stable    Past Medical, Family and Social History: The patient's past medical, family and social history have been reviewed and updated as appropriate within the electronic medical record. See encounter notes.     Current Psychiatric Medication:  wellbutrin xl 150mg po qam, xanax 0.5 mg po daily PRN anxiety (not using daily),  escitalopram 20 mg, Adderall XR 20 mg (not using daily), trazodone 100 mg     Compliance: yes      Side effects: Pt denied.      Risk Parameters:  Patient reports no suicidal ideation  Patient reports no homicidal ideation  Patient reports no self-injurious behavior  Patient reports no violent behavior     Exam (detailed: at least 9 elements; comprehensive: all 15 elements)   Constitutional  Vitals:  Most recent vital signs, dated less than 90 days prior to this appointment, were reviewed.        General:  unremarkable, age appropriate,  casual attire, good eye contact, good rapport       Musculoskeletal  Muscle Strength/Tone:  no flaccidity, no tremor    Gait & Station:  normal      Psychiatric                       Speech:  normal tone, normal rate, rhythm, and volume   Mood & Affect:   Mildly Depressed, anxious         Thought Process:   Goal directed; Linear    Associations:   intact   Thought Content:   No SI/HI, delusions, or paranoia, no AV/VH   Insight & Judgement:   Good, adequate to circumstances   Orientation:   grossly intact; alert and oriented x 4    Memory:  intact for content of interview    Language:  grossly intact, can repeat    Attention Span  : Grossly intact for content of interview   Fund of Knowledge:   intact and appropriate to age and level of education        Assessment and Diagnosis   Status/Progress: Based on the examination today, the patient's problem(s) is/are not adequately controlled.  New problems have been presented today. Co-morbidities are complicating management of the primary condition. There are no active rule-out diagnoses for this patient at this time.      Impression: Pt's mood and anxiety appear stable. Pt is interacting more with others and is actively engaged in exercise. Will continue with medication plan for now and monitor symptoms moving forward.      Diagnosis:   Posttraumatic Stress Disorder  Panic Disorder w/o agoraphobia  Attention Deficit Hyperactivity Disorder, Combined Type   Insomnia  Borderline personality disorder traits  Intervention/Counseling/Treatment Plan   Medication Management:      1. wellbutrin xl 150 mg po qam    2. alprazolam 0.5 mg po daily PRN anxiety    3. escitalopram to 20 mg    4. Adderall XR 20 mg    5. trazodone 100 mg     6. hydroxyzine 25 mg      7. Call to report any worsening of symptoms or problems with the medication. Pt instructed to go to ER with thoughts of harming self, others     8. Continue in therapy with Dr. Llanos as needed    Psychotherapy: 20  minutes  Target symptoms: anxiety  Why chosen therapy is appropriate versus another modality: CBT used; relevant to diagnosis, patient responds to this modality  Outcome monitoring methods: self-report, observation  Therapeutic intervention type: Cognitive Behavioral Therapy, Exposure therapy.   Topics discussed/themes: building skills sets for symptom management, symptom recognition, nutrition, exercise  The patient's response to the intervention is accepting  Patient's response to treatment is: good.   The patient's progress toward treatment goals: limited     Return to clinic: 3 months     -Cognitive-Behavioral/Supportive therapy and psychoeducation provided  -R/B/SE's of medications discussed with the pt who expresses understanding and chooses to take medications as prescribed.   -Pt instructed to call clinic, 911 or go to nearest emergency room if sxs worsen or pt is in   crisis. The pt expresses understanding.    Bebeto Resendiz, PhD, MP     Antidepressant/Antianxiety Medication Initiation:  Patient informed of risks, benefits, and potential side effects of medication and accepts informed consent.  Common side effects include nausea, fatigue, headache, insomnia., Specifically discussed the possibility of new or worsening suicidal thoughts/depression.  Patient instructed to stop the medication immediately and seek urgent treatment if this occurs. Patient instructed not to abruptly discontinue medication without physician guidance except in cases of sudden onset or worsening of SI.       Stimulant Medication Initiation:  Patient advised of risks, benefits, and side effects of medication and accepts informed consent.  Common side effects include insomnia, irritability, jittery feeling, dry mouth, and agitation/hostility., Patient advised of potential addictive nature of medication and controlled substance classification.  Instructed to safeguard medication as no early refills can be given for lost or stolen  medications.       Benzodiazepine Initiation:  Patient advised of the risks, benefits, and common side effects of medication and has accepted informed consent.  Common side effects include drowsiness, impaired coordination, possible memory loss., Patient advised NOT to operate a vehicle or machinery untiil they are sure how the medication will affec them.  Client also advised of danger of mixing this medication with alcohol., Patient advised of potential addictive nature of medication and need to safeguard medication as no early refills for lost or stolen medications can be authorized.       Pregnancy Warning:  Patient denies current pregnancy possibility.  Patient made aware that medications have not been proven safe in pregnancy and that she must maintain adequate birth control.  Patient instructed to alert us immediately if she becomes pregnant.

## 2024-08-26 NOTE — PROGRESS NOTES
MEDICATION ADMINISTRATION DURING SCHOOL HOURS    Student:  Bertin Sanders       YOB: 2018       Grade:   [id number] [facility] [address]    Emergency number:  ________________________    I herewith acknowledge that I am primarily responsible for administering medication to my child. However, in the event that I am unable to do so or in the event of a medical emergency, I hereby authorize ________________ [facility] and its employees and agents, on my behalf and stead, to administer or to attempt to administer to my child (or to allow my child to self-administer, while under the supervision of the employees and agents of the [facility]), lawfully prescribed medication in the manner described above. I acknowledge that it may be necessary for the administration of medications to my child to be performed by an individual other than a school nurse, and specifically consent to such practices. I further acknowledge and agree that, when the lawfully prescribed medication is so administered or attempted to be administered, I waive any claims I might have against the School District, its employees and agents arising out of the administration of said medication. In addition I agree to hold harmless and indemnify the [facility], its employees and agents, either jointly or severally, from and against any and all claims, damages, causes of action or injuries incurred or resulting from the administration or attempts at administration of said medication.    ___________________________________       ____________  Parent/guardian signature                                       Date    ---------------------------TO BE COMPLETED BY PHYSICIAN--------------------------    Name of medication:  EPINEPHrine EpiPenJr 0.15MG/0.15ML       Dosage:  1 DEVICE     Route to be given:  IM                Time to be given:  REFER TO FOOD ALLERGY ACTION PLAN     Adverse reactions:  INCREASED HEART RATE, SLIGHT  The patient location is: [place], [place]  The chief complaint leading to consultation is: insomnia  Visit type: Virtual visit with synchronous audio and video  Each patient to whom he or she provides medical services by telemedicine is:  (1) informed of the relationship between the physician and patient and the respective role of any other health care provider with respect to management of the patient; and (2) notified that he or she may decline to receive medical services by telemedicine and may withdraw from such care at any time.   Face-to-face time spent with patient: 45 minutes    Notes:       Outpatient Psychiatry Follow-Up Visit     Clinical Status of Patient: Outpatient (Ambulatory)       Chief Complaint: 47 year old female presenting today for a follow-up.       Interval History and Content of Current Session:  Interim Events/Subjective Report/Content of Current Session:  follow-up appointment.     Pt is a 48 year old female with past psychiatric hx of insomnia who presents for follow-up treatment. We completed CBT-I session 5 focused on the Sleep Hygiene.      Interim hx:  Medication changes last visit:   n/a      Review of Systems     Past Medical, Family and Social History: The patient's past medical, family and social history have been reviewed and updated as appropriate within the electronic medical record. See encounter notes.     Current Psychiatric Medication:  not assessed     Risk Parameters:  Patient reports no suicidal ideation  Patient reports no homicidal ideation  Patient reports no self-injurious behavior  Patient reports no violent behavior     Exam (detailed: at least 9 elements; comprehensive: all 15 elements)   Constitutional  Vitals:  Most recent vital signs, dated less than 90 days prior to this appointment, were reviewed.        General:  unremarkable, age appropriate, casual attire, good eye contact, good rapport       Musculoskeletal  Muscle Strength/Tone:  no flaccidity, no tremor     Gait & Station:  normal      Psychiatric                       Speech:  normal tone, normal rate, rhythm, and volume   Mood & Affect:   Depressed, anxious         Thought Process:   Goal directed; Linear    Associations:   intact   Thought Content:   No SI/HI, delusions, or paranoia, no AV/VH   Insight & Judgement:   Good, adequate to circumstances   Orientation:   grossly intact; alert and oriented x 4    Memory:  intact for content of interview    Language:  grossly intact, can repeat    Attention Span  : Grossly intact for content of interview   Fund of Knowledge:   intact and appropriate to age and level of education         Assessment and Diagnosis   Status/Progress: improving     Impression: Pt struggling with long-term insomnia and would benefit from completing CBT-I.     Diagnosis: Insomnia Disorder     Intervention/Counseling/Treatment Plan   Medication Management:      Continue with CBT-I     2.   Call to report any worsening of symptoms or problems with the medication. Pt instructed to go to ER with thoughts of harming self, others     Psychotherapy:   Target symptoms: insomnia  Why chosen therapy is appropriate versus another modality: CBT used; relevant to diagnosis, patient responds to this modality  Outcome monitoring methods: self-report, observation  Therapeutic intervention type: Cognitive Behavioral Therapy  Topics discussed/themes: building skills sets for symptom management, symptom recognition, nutrition, exercise  The patient's response to the intervention is good  Patient's response to treatment is: good.   The patient's progress toward treatment goals: improving  45 minutes of psychotherapy spent with pt     Return to clinic: PRN     -Cognitive-Behavioral/Supportive therapy and psychoeducation provided  -R/B/SE's of medications discussed with the pt who expresses understanding and chooses to take medications as prescribed.   -Pt instructed to call clinic, 911 or go to nearest emergency  TREMORS, CHILLS     Reason for medication: ANAPHYLAXIS     ___________________________________  Physician signature  Name of physician:  Umu Langford CNP Date:  8/26/2024   room if sxs worsen or pt is in   crisis. The pt expresses understanding.     Bronson Casas, PhD, MP

## 2024-09-06 ENCOUNTER — PROCEDURE VISIT (OUTPATIENT)
Dept: NEUROLOGY | Facility: CLINIC | Age: 49
End: 2024-09-06
Payer: MEDICARE

## 2024-09-06 VITALS
RESPIRATION RATE: 18 BRPM | HEART RATE: 84 BPM | DIASTOLIC BLOOD PRESSURE: 97 MMHG | HEIGHT: 67 IN | WEIGHT: 275.56 LBS | BODY MASS INDEX: 43.25 KG/M2 | SYSTOLIC BLOOD PRESSURE: 140 MMHG

## 2024-09-06 DIAGNOSIS — G43.719 INTRACTABLE CHRONIC MIGRAINE WITHOUT AURA AND WITHOUT STATUS MIGRAINOSUS: Primary | ICD-10-CM

## 2024-09-06 NOTE — PROCEDURES
Procedures     BOTOX PROCEDURE    PROCEDURE PERFORMED: Botulinum toxin injection (65205)    CLINICAL INDICATION: G43.719    The Botox injections have achieved well over 50%  improvement in the patient's symptoms. Migraines have been reduced at least 7 days per month and the number of cumulative hours suffering with headaches has been reduced at least 100 total hours per month. Today she does have a headache indicating that the Botox has worn off. Frequency of treatment is every 3 months unless no response to the treatments, at which time we will discontinue the injections.      A time out was conducted just before the start of the procedure to verify the correct patient and procedure, procedure location, and all relevant critical information.   Signed consent obtained prior to procedure     Conventional methods of treatment but the patient has been unresponsive and refractory.The patient meets criteria for chronic headaches according to the ICHD-III, the patient has more than 15 headaches a month at least 8 of them meet migraine criteria, which last for more than 4 hours a day.     Last Botox injections resulted in over 50%  improvement in the patient's symptoms. Frequency of treatment is every 3 months unless no response to the treatments, at which time we will discontinue the injections.     DESCRIPTION OF PROCEDURE: After obtaining informed consent and under aseptic technique, a total of 155 units of botulinum toxin type A were injected in the following muscles: Procerus 5 units,  5 units bilaterally, frontalis 20 units, temporalis 20 units bilaterally,   occipitalis 15 units, upper cervical paraspinals 10 units bilaterally and trapezius 15 units ilaterally. The patient was given a total of 155 units in 31 sites.    Special precaution taken given her posterior decompression.       The patient tolerated the procedure well. There were no complications. The patient was given a prescription for repeat  treatment in 3 months.     Unavoidable waste 45 units    Saeid Guevara M.D  Medical Director, Headache and Facial Pain  [facility]

## 2024-09-17 RX ORDER — DEXTROAMPHETAMINE SACCHARATE, AMPHETAMINE ASPARTATE MONOHYDRATE, DEXTROAMPHETAMINE SULFATE AND AMPHETAMINE SULFATE 5; 5; 5; 5 MG/1; MG/1; MG/1; MG/1
20 CAPSULE, EXTENDED RELEASE ORAL EVERY MORNING
Qty: 30 CAPSULE | Refills: 0 | Status: CANCELLED | OUTPATIENT
Start: 2024-09-17

## 2024-09-17 RX ORDER — DEXTROAMPHETAMINE SACCHARATE, AMPHETAMINE ASPARTATE MONOHYDRATE, DEXTROAMPHETAMINE SULFATE AND AMPHETAMINE SULFATE 5; 5; 5; 5 MG/1; MG/1; MG/1; MG/1
20 CAPSULE, EXTENDED RELEASE ORAL EVERY MORNING
Qty: 30 CAPSULE | Refills: 0 | Status: SHIPPED | OUTPATIENT
Start: 2024-09-17

## 2024-10-08 ENCOUNTER — OFFICE VISIT (OUTPATIENT)
Dept: CARDIOLOGY | Facility: CLINIC | Age: 49
End: 2024-10-08
Payer: MEDICARE

## 2024-10-08 VITALS
HEART RATE: 96 BPM | DIASTOLIC BLOOD PRESSURE: 92 MMHG | SYSTOLIC BLOOD PRESSURE: 135 MMHG | HEIGHT: 72 IN | WEIGHT: 221.31 LBS | BODY MASS INDEX: 29.97 KG/M2

## 2024-10-08 DIAGNOSIS — R55 SYNCOPE, UNSPECIFIED SYNCOPE TYPE: ICD-10-CM

## 2024-10-08 DIAGNOSIS — F60.3 BORDERLINE PERSONALITY DISORDER: ICD-10-CM

## 2024-10-08 DIAGNOSIS — F43.10 PTSD (POST-TRAUMATIC STRESS DISORDER): ICD-10-CM

## 2024-10-08 DIAGNOSIS — Z98.84 BARIATRIC SURGERY STATUS: ICD-10-CM

## 2024-10-08 DIAGNOSIS — G47.33 OSA (OBSTRUCTIVE SLEEP APNEA): ICD-10-CM

## 2024-10-08 DIAGNOSIS — F90.2 ATTENTION DEFICIT HYPERACTIVITY DISORDER (ADHD), COMBINED TYPE: Primary | ICD-10-CM

## 2024-10-08 PROCEDURE — 3044F HG A1C LEVEL LT 7.0%: CPT | Mod: CPTII,S$GLB,, | Performed by: INTERNAL MEDICINE

## 2024-10-08 PROCEDURE — 3075F SYST BP GE 130 - 139MM HG: CPT | Mod: CPTII,S$GLB,, | Performed by: INTERNAL MEDICINE

## 2024-10-08 PROCEDURE — 99214 OFFICE O/P EST MOD 30 MIN: CPT | Mod: S$GLB,,, | Performed by: INTERNAL MEDICINE

## 2024-10-08 PROCEDURE — 1160F RVW MEDS BY RX/DR IN RCRD: CPT | Mod: CPTII,S$GLB,, | Performed by: INTERNAL MEDICINE

## 2024-10-08 PROCEDURE — 1159F MED LIST DOCD IN RCRD: CPT | Mod: CPTII,S$GLB,, | Performed by: INTERNAL MEDICINE

## 2024-10-08 PROCEDURE — 3008F BODY MASS INDEX DOCD: CPT | Mod: CPTII,S$GLB,, | Performed by: INTERNAL MEDICINE

## 2024-10-08 PROCEDURE — 99999 PR PBB SHADOW E&M-EST. PATIENT-LVL IV: CPT | Mod: PBBFAC,,, | Performed by: INTERNAL MEDICINE

## 2024-10-08 PROCEDURE — 3080F DIAST BP >= 90 MM HG: CPT | Mod: CPTII,S$GLB,, | Performed by: INTERNAL MEDICINE

## 2024-10-08 NOTE — PROGRESS NOTES
Subjective:    Patient ID:  Dinah Mitchell is a 49 y.o. female patient here for evaluation Follow-up (Discuss results)      History of Present Illness:  Follow up visit.  Orthostatic hypertension symptomatic.  One episode since last visit.  Hospitalizations history Chiari malformation CNS status post surgery    Polypharmacy.    No other major CV risk factors.  No major medical changes since last visit.            Review of patient's allergies indicates:   Allergen Reactions    Lamotrigine      Other reaction(s): Rash  Other reaction(s): Nausea    Sulfa (sulfonamide antibiotics) Nausea Only     Other reaction(s): Unknown    Adhesive Rash    Zonisamide Nausea And Vomiting     Nausea/vomiting        Past Medical History:   Diagnosis Date    Arnold-Chiari deformity     Arthritis     Asthma     Borderline personality disorder     Carnitine deficiency     Colon polyp     Depression     Fatty liver     noted on 8/21 CT    Fibromyalgia     Headache     History of abnormal cervical Pap smear     colpo/ LEEP and CKC    History of nephrolithiasis     kidney stones    Hypothyroidism     IBS (irritable bowel syndrome)     GI smith negative    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     PATRICIA (obstructive sleep apnea)     severe - doesn't use CPAP    Panic attack     PTSD (post-traumatic stress disorder)      Past Surgical History:   Procedure Laterality Date    ANKLE SURGERY Right     x2 (#1 for ligament injury and #2 for fx and ligament)    AUGMENTATION OF BREAST      BILATERAL TUBAL LIGATION      BRAIN SURGERY  12/2018    decompression/craniotomy Arnold-Chiari syndrome    BREAST SURGERY  1997    B implants    CERVICAL BIOPSY  W/ LOOP ELECTRODE EXCISION      COLD KNIFE CONIZATION OF CERVIX N/A 03/17/2022    Procedure: CONE BIOPSY, CERVIX, USING COLD KNIFE;  Surgeon: Nikhil Franco MD;  Location: [facility];  Service: OB/GYN;  Laterality: N/A;    COLONOSCOPY N/A 09/29/2022    Procedure: COLONOSCOPY;  Surgeon: Martín GARCIA  MD Tanika;  Location: [facility];  Service: Endoscopy;  Laterality: N/A; Repeat colonoscopy in 5 years for surveillance    DECOMPRESSION OF CHIARI MALFORMATION BY REMOVAL OF POSTERIOR ARCH OF FIRST CERVICAL VERTEBRA N/A 12/13/2018    Procedure: DECOMPRESSION, CHIARI MALFORMATION, BY 1ST CERVICAL VERTEBRA POSTERIOR ARCH REMOVAL;  Surgeon: Sergio Busch MD;  Location: [facility] OR [address] [place] [address];  Service: Neurosurgery;  Laterality: N/A;    EPIDURAL STEROID INJECTION INTO CERVICAL SPINE N/A 12/05/2019    Procedure: Injection-steroid-epidural-cervical-C7-T1;  Surgeon: Noa Samano MD;  Location: [facility];  Service: Neurosurgery;  Laterality: N/A;    ESOPHAGOGASTRODUODENOSCOPY N/A 5/12/2023    Procedure: EGD (ESOPHAGOGASTRODUODENOSCOPY);  Surgeon: Martín Sagastume MD;  Location: [facility];  Service: Endoscopy;  Laterality: N/A;    FRACTURE SURGERY Right     Ankle    INJECTION OF ANESTHETIC AGENT AROUND MEDIAL BRANCH NERVES INNERVATING CERVICAL FACET JOINT Bilateral 08/06/2020    Procedure: Block-nerve-medial branch-cervical Bilateral  C3,4,5;  Surgeon: Noa Samano MD;  Location: [facility] OR;  Service: Anesthesiology;  Laterality: Bilateral;    INJECTION OF ANESTHETIC AGENT AROUND MEDIAL BRANCH NERVES INNERVATING CERVICAL FACET JOINT Bilateral 08/20/2020    Procedure: Block-nerve-medial branch-cervical Bilateral C3-4-5;  Surgeon: Noa Samano MD;  Location: [facility] OR;  Service: Anesthesiology;  Laterality: Bilateral;    LAPAROSCOPIC GASTRIC BANDING      with subsequent removal due to abscess    LASIK Bilateral     2009    LIPOSUCTION      x 2    PELVIC LAPAROSCOPY      for endometriosis eval with BTL    RADIOFREQUENCY ABLATION Bilateral 08/16/2021    Procedure: Radiofrequency Ablation Cervical C3-C5;  Surgeon: Robert Munoz MD;  Location: [facility] OR;  Service: Pain Management;  Laterality: Bilateral;    RADIOFREQUENCY THERMAL COAGULATION OF MEDIAL BRANCH OF POSTERIOR RAMUS OF CERVICAL SPINAL NERVE Bilateral 08/27/2020     "Procedure: RADIOFREQUENCY THERMAL COAGULATION, NERVE, SPINAL, CERVICAL, POSTERIOR RAMUS, MEDIAL BRANCH C3-4-5, bilateral;  Surgeon: Noa Samano MD;  Location: [facility];  Service: Anesthesiology;  Laterality: Bilateral;     Social History     Tobacco Use    Smoking status: Never    Smokeless tobacco: Never   Substance Use Topics    Alcohol use: Yes     Comment: occasional "maybe monthly"    Drug use: No        Review of Systems:    As noted in HPI in addition      REVIEW OF SYSTEMS  Review of Systems   Constitutional: Negative for decreased appetite, diaphoresis, night sweats, weight gain and weight loss.   HENT:  Negative for nosebleeds and odynophagia.    Eyes:  Negative for double vision and photophobia.   Cardiovascular:  Positive for near-syncope. Negative for chest pain, claudication, cyanosis, dyspnea on exertion, irregular heartbeat, leg swelling, orthopnea, palpitations, paroxysmal nocturnal dyspnea and syncope.   Respiratory:  Negative for cough, hemoptysis, shortness of breath and wheezing.    Hematologic/Lymphatic: Negative for adenopathy.   Skin:  Negative for flushing, skin cancer and suspicious lesions.   Musculoskeletal:  Negative for gout, myalgias and neck pain.   Gastrointestinal:  Negative for abdominal pain, heartburn, hematemesis and hematochezia.   Genitourinary:  Negative for bladder incontinence, hesitancy and nocturia.   Neurological:  Negative for focal weakness, headaches, light-headedness and paresthesias.   Psychiatric/Behavioral:  Negative for memory loss and substance abuse.               Objective:        Vitals:    10/08/24 1311   BP: (!) 135/92   Pulse: 96       Lab Results   Component Value Date    WBC 5.25 01/02/2024    HGB 13.6 01/02/2024    HCT 41.4 01/02/2024     01/02/2024    CHOL 222 (H) 01/02/2024    TRIG 133 01/02/2024    HDL 48 01/02/2024    ALT 20 01/02/2024    AST 25 01/02/2024     01/02/2024    K 4.9 01/02/2024     01/02/2024    CREATININE 1.1 " 01/02/2024    BUN 14 01/02/2024    CO2 26 01/02/2024    TSH 0.440 04/08/2024    INR 1.0 12/04/2018    HGBA1C 5.1 01/02/2024        ECHOCARDIOGRAM RESULTS  Results for orders placed during the hospital encounter of 04/12/24    Echo    Interpretation Summary    Left Ventricle: There is normal systolic function with a visually estimated ejection fraction of 60 - 65%. There is normal diastolic function.    Right Ventricle: Normal right ventricular cavity size. Wall thickness is normal. Right ventricle wall motion  is normal. Systolic function is normal.    Pulmonary Artery: There is borderline elevated pulmonary hypertension. The estimated pulmonary artery systolic pressure is 35 mmHg.    IVC/SVC: Normal venous pressure at 3 mmHg.    No results found for this or any previous visit.          CURRENT/PREVIOUS VISIT EKG  Results for orders placed or performed in visit on 03/25/24   IN OFFICE EKG 12-LEAD (to [place])    Collection Time: 03/25/24  1:48 PM   Result Value Ref Range    QRS Duration 74 ms    [facility] QTC Calculation 434 ms    Narrative    Test Reason : R55,    Vent. Rate : 076 BPM     Atrial Rate : 076 BPM     P-R Int : 160 ms          QRS Dur : 074 ms      QT Int : 386 ms       P-R-T Axes : 041 054 049 degrees     QTc Int : 434 ms    Normal sinus rhythm  Normal ECG  When compared with ECG of 14-MAR-2022 14:29,  No significant change was found  Confirmed by DOM DAVIS MD ([id number]) on 3/28/2024 7:14:01 PM    Referred By: IBAN SCHRADER           Confirmed By:DOM DAVIS MD     No valid procedures specified.   No results found for this or any previous visit.    No valid procedures specified.    PHYSICAL EXAM  GENERAL: well built, well nourished, well-developed in no apparent distress alert and oriented.   HEENT: Normocephalic. Pupils normal and conjunctivae normal.  Mucous membranes normal, no cyanosis or icterus, trachea central,no pallor or icterus is noted..   NECK: No JVD. No bruit..   THYROID: Thyroid not  enlarged. No nodules present..   CARDIAC:  Normal S1-S2.  No murmur rub click or gallop.  PMI nondisplaced.   LUNGS: Clear to auscultation. No wheezing or rhonchi..   ABDOMEN: Soft no masses or organomegaly.  No abdomen pulsations or bruits.  Normal bowel sounds. No pulsations and no masses felt, No guarding or rebound.   URINARY: No morris catheter   EXTREMITIES: No cyanosis, clubbing or edema noted at this time., no calf tenderness bilaterally.   PERIPHERAL VASCULAR SYSTEM: Good palpable distal pulses.  2+ femoral, popliteal and pedal pulses.  No bruits    CENTRAL NERVOUS SYSTEM: No focal motor or sensory deficits noted.   SKIN: Skin without lesions, moist, well perfused.   MUSCLE STRENGTH & TONE: No noteable weakness, atrophy or abnormal movement    I HAVE REVIEWED :    The vital signs, nurses notes, and all the pertinent radiology and labs.         Current Outpatient Medications   Medication Instructions    acetaminophen (TYLENOL) 650 MG TbSR Take 2 tablets by mouth every 8 hours as needed for pain and fever    acetylcysteine (N-ACETYL-L-CYSTEINE MISC) No dose, route, or frequency recorded.    albuterol (PROVENTIL) 2.5 mg, Nebulization, Every 4 hours PRN, Rescue    albuterol (PROVENTIL/VENTOLIN HFA) 90 mcg/actuation inhaler 1-2 puffs, Inhalation, Every 4 hours PRN, Rescue    ALPRAZolam (XANAX) 0.5 mg, Oral, Daily PRN    amoxicillin (AMOXIL) 500 MG capsule Take 2 capsules by mouth NOW, then take 1 capsule by mouth 3 times daily until all taken. Take with meals.    buPROPion (WELLBUTRIN XL) 150 mg, Oral, Daily    dextroamphetamine-amphetamine (ADDERALL XR) 20 MG 24 hr capsule 20 mg, Oral, Every morning    diphenoxylate-atropine 2.5-0.025 mg (LOMOTIL) 2.5-0.025 mg per tablet 1 tablet, Oral, 2 times daily PRN    EScitalopram oxalate (LEXAPRO) 20 mg, Oral, Daily    fluticasone propionate (FLONASE) 50 mcg/actuation nasal spray Spray 1 spray (50 mcg total) in each nostril once daily.    fluticasone-umeclidin-vilanter  "(TRELEGY ELLIPTA) 100-62.5-25 mcg DsDv 1 puff, Inhalation, Daily    gabapentin (NEURONTIN) 600 MG tablet TAKE 1 TABLET BY MOUTH 2 TO 3 TIMES A DAY FOR CHRONIC PAIN    galcanezumab-gnlm (EMGALITY PEN) 120 mg/mL PnIj ADMINISTER 1 ML UNDER THE SKIN EVERY 28 DAYS    hydrOXYzine HCL (ATARAX) 25 mg, Oral, 3 times daily    levOCARNitine (CARNITOR) 990 mg, Oral, 2 times daily    LIDOcaine (LIDODERM) 5 % Apply one patch to affected area every 12 hours. Keep on for 12 hours, remove for 12 hours.    montelukast (SINGULAIR) 10 mg, Oral, Daily    nebulizer and compressor (COMP-AIR NEBULIZER COMPRESSOR) Pat Use as directed    ofloxacin (OCUFLOX) 0.3 % ophthalmic solution Place 5 drops into left EAR twice a day for 5 to 7 days    oxyCODONE-acetaminophen (PERCOCET) 5-325 mg per tablet Take 1 tablet by mouth once a day as needed for pain    oxyCODONE-acetaminophen (PERCOCET) 5-325 mg per tablet Take 1 tablet by mouth once a day as needed for pain. More than 7 days supply is medically necessary    [START ON 10/18/2024] oxyCODONE-acetaminophen (PERCOCET) 5-325 mg per tablet Take 1 tablet by mouth once a day as needed for pain More than 7 days supply is medically necessary    oxyCODONE-acetaminophen (PERCOCET) 5-325 mg per tablet Take 1 tablet by mouth once a day as needed for pain More than 7 days supply is medically necessary    promethazine (PHENERGAN) 12.5 MG Tab Take 1 tablet by mouth every 6 hours as needed    SYNTHROID 274 mcg, Oral, Before breakfast    syringe with needle (BD LUER-LAURIE SYRINGE) 3 mL 25 gauge x 1" Syrg Use for Toradol IM only.    tiZANidine (ZANAFLEX) 4 MG tablet Take 1 tablet by mouth three times a day as needed for  muscle spasm    traZODone (DESYREL) 100 mg, Oral, Nightly          Assessment:   History of presyncope/syncope suspect mild orthostatic blood pressure changes.  Follow-up Holter monitor echo last visit unremarkable.  Benign ectopy.  No structural valvular heart issues    CNS Chiari malformation " status post surgery.    Polypharmacy.        Plan:   Continue risk factor modification.  No change in current medications.  CV exam review systems stable.  Follow up again in  6M          No follow-ups on file.

## 2024-10-11 ENCOUNTER — TELEPHONE (OUTPATIENT)
Dept: NEUROLOGY | Facility: CLINIC | Age: 49
End: 2024-10-11
Payer: MEDICARE

## 2024-10-11 NOTE — TELEPHONE ENCOUNTER
----- Message from Galileo sent at 10/11/2024 10:58 AM CDT -----  Type:  Needs Medical Advice    Who Called: Pt    Symptoms (please be specific): upcoming route canal     Would the patient rather a call back or a response via [facility]? Call back    Best Call Back Number: [phone]      Additional Information: Sts she is about to have a Route canal and needs a clearance to be sedated prior to by Thursday.    Sts it is for [facility]  [address] [place] [address].  Dr Baltazar Crews [phone]   Please advise -- Thank you

## 2024-10-14 ENCOUNTER — TELEPHONE (OUTPATIENT)
Dept: NEUROLOGY | Facility: CLINIC | Age: 49
End: 2024-10-14
Payer: MEDICARE

## 2024-10-14 ENCOUNTER — TELEPHONE (OUTPATIENT)
Dept: FAMILY MEDICINE | Facility: CLINIC | Age: 49
End: 2024-10-14
Payer: MEDICARE

## 2024-10-14 NOTE — TELEPHONE ENCOUNTER
----- Message from Nileshronny sent at 10/14/2024  1:26 PM CDT -----  Regarding: return call  Contact: patient  Type:  Patient Returning Call    Who Called:patient  Who Left Message for Patient:  Does the patient know what this is regarding?:clearance for dental procedure  Would the patient rather a call back or a response via [facility]? fax  Best Call Back Number:[phone]  Additional Information: Located within [facility] ph# [phone] MD Crews/ procedure is on Friday

## 2024-10-14 NOTE — TELEPHONE ENCOUNTER
Sts she is about to have a Route canal and needs a clearance to be sedated prior to by Thursday.    RUST it is for [facility]  [address] [place] [address] [address], [place].  Dr Baltazar Crews [phone]   Please advise -- Thank you

## 2024-10-23 DIAGNOSIS — J32.9 SINUSITIS, UNSPECIFIED CHRONICITY, UNSPECIFIED LOCATION: ICD-10-CM

## 2024-10-23 RX ORDER — LEVOCARNITINE 330 MG/1
990 TABLET ORAL 2 TIMES DAILY
Qty: 180 TABLET | Refills: 2 | Status: SHIPPED | OUTPATIENT
Start: 2024-10-23

## 2024-10-23 RX ORDER — FLUTICASONE PROPIONATE 50 MCG
1 SPRAY, SUSPENSION (ML) NASAL DAILY
Qty: 16 G | Refills: 2 | Status: CANCELLED | OUTPATIENT
Start: 2024-10-23

## 2024-10-23 RX ORDER — HYDROXYZINE HYDROCHLORIDE 25 MG/1
25 TABLET, FILM COATED ORAL 3 TIMES DAILY
Qty: 90 TABLET | Refills: 2 | Status: SHIPPED | OUTPATIENT
Start: 2024-10-23

## 2024-10-24 DIAGNOSIS — J32.9 SINUSITIS, UNSPECIFIED CHRONICITY, UNSPECIFIED LOCATION: ICD-10-CM

## 2024-10-24 RX ORDER — FLUTICASONE PROPIONATE 50 MCG
1 SPRAY, SUSPENSION (ML) NASAL DAILY
Qty: 16 G | Refills: 2 | Status: SHIPPED | OUTPATIENT
Start: 2024-10-24

## 2024-10-24 NOTE — TELEPHONE ENCOUNTER
No care due was identified.  [facility] Embedded Care Due Messages. Reference number: [id number].   10/24/2024 1:12:05 PM CDT

## 2024-10-24 NOTE — TELEPHONE ENCOUNTER
No care due was identified.  [facility] Embedded Care Due Messages. Reference number: [id number].   10/23/2024 9:01:59 PM CDT

## 2024-10-31 ENCOUNTER — TELEPHONE (OUTPATIENT)
Dept: NEUROLOGY | Facility: CLINIC | Age: 49
End: 2024-10-31
Payer: MEDICARE

## 2024-11-01 ENCOUNTER — TELEPHONE (OUTPATIENT)
Dept: CARDIOLOGY | Facility: CLINIC | Age: 49
End: 2024-11-01
Payer: MEDICARE

## 2024-11-04 DIAGNOSIS — F90.2 ATTENTION DEFICIT HYPERACTIVITY DISORDER (ADHD), COMBINED TYPE: Primary | ICD-10-CM

## 2024-11-04 RX ORDER — DEXTROAMPHETAMINE SACCHARATE, AMPHETAMINE ASPARTATE MONOHYDRATE, DEXTROAMPHETAMINE SULFATE AND AMPHETAMINE SULFATE 5; 5; 5; 5 MG/1; MG/1; MG/1; MG/1
20 CAPSULE, EXTENDED RELEASE ORAL EVERY MORNING
Qty: 30 CAPSULE | Refills: 0 | Status: SHIPPED | OUTPATIENT
Start: 2024-11-04

## 2024-11-06 ENCOUNTER — OFFICE VISIT (OUTPATIENT)
Dept: CARDIOLOGY | Facility: CLINIC | Age: 49
End: 2024-11-06
Payer: MEDICARE

## 2024-11-06 VITALS
WEIGHT: 213.88 LBS | SYSTOLIC BLOOD PRESSURE: 102 MMHG | DIASTOLIC BLOOD PRESSURE: 65 MMHG | HEART RATE: 76 BPM | HEIGHT: 67 IN | BODY MASS INDEX: 33.57 KG/M2

## 2024-11-06 DIAGNOSIS — G93.5 CHIARI MALFORMATION TYPE I: Primary | ICD-10-CM

## 2024-11-06 DIAGNOSIS — F90.2 ATTENTION DEFICIT HYPERACTIVITY DISORDER (ADHD), COMBINED TYPE: ICD-10-CM

## 2024-11-06 DIAGNOSIS — G47.33 OSA (OBSTRUCTIVE SLEEP APNEA): ICD-10-CM

## 2024-11-06 DIAGNOSIS — Z98.84 BARIATRIC SURGERY STATUS: ICD-10-CM

## 2024-11-06 DIAGNOSIS — G43.719 CHRONIC MIGRAINE WITHOUT AURA, INTRACTABLE, WITHOUT STATUS MIGRAINOSUS: ICD-10-CM

## 2024-11-06 DIAGNOSIS — F60.3 BORDERLINE PERSONALITY DISORDER: ICD-10-CM

## 2024-11-06 DIAGNOSIS — F43.10 PTSD (POST-TRAUMATIC STRESS DISORDER): ICD-10-CM

## 2024-11-06 DIAGNOSIS — I77.6 ARTERITIS, UNSPECIFIED: ICD-10-CM

## 2024-11-06 PROCEDURE — 3044F HG A1C LEVEL LT 7.0%: CPT | Mod: CPTII,S$GLB,, | Performed by: INTERNAL MEDICINE

## 2024-11-06 PROCEDURE — 3074F SYST BP LT 130 MM HG: CPT | Mod: CPTII,S$GLB,, | Performed by: INTERNAL MEDICINE

## 2024-11-06 PROCEDURE — 3008F BODY MASS INDEX DOCD: CPT | Mod: CPTII,S$GLB,, | Performed by: INTERNAL MEDICINE

## 2024-11-06 PROCEDURE — 99214 OFFICE O/P EST MOD 30 MIN: CPT | Mod: S$GLB,,, | Performed by: INTERNAL MEDICINE

## 2024-11-06 PROCEDURE — 1159F MED LIST DOCD IN RCRD: CPT | Mod: CPTII,S$GLB,, | Performed by: INTERNAL MEDICINE

## 2024-11-06 PROCEDURE — 99999 PR PBB SHADOW E&M-EST. PATIENT-LVL V: CPT | Mod: PBBFAC,,, | Performed by: INTERNAL MEDICINE

## 2024-11-06 PROCEDURE — 1160F RVW MEDS BY RX/DR IN RCRD: CPT | Mod: CPTII,S$GLB,, | Performed by: INTERNAL MEDICINE

## 2024-11-06 PROCEDURE — 3078F DIAST BP <80 MM HG: CPT | Mod: CPTII,S$GLB,, | Performed by: INTERNAL MEDICINE

## 2024-11-06 NOTE — PROGRESS NOTES
Subjective:    Patient ID:  Dinah Mitchell is a 49 y.o. female patient here for evaluation Follow-up      History of Present Illness:  History of Chiari malformation, status post in his surgery remote past.    Intermittent right left visual disturbances.  Sensory disturbances.  Seen by Neurology in the past for vascular headaches.  Further workup delayed with possible refer to outside tertiary care center.             Review of patient's allergies indicates:   Allergen Reactions    Lamotrigine      Other reaction(s): Rash  Other reaction(s): Nausea    Sulfa (sulfonamide antibiotics) Nausea Only     Other reaction(s): Unknown    Adhesive Rash    Zonisamide Nausea And Vomiting     Nausea/vomiting        Past Medical History:   Diagnosis Date    Arnold-Chiari deformity     Arthritis     Asthma     Borderline personality disorder     Carnitine deficiency     Colon polyp     Depression     Fatty liver     noted on 8/21 CT    Fibromyalgia     Headache     History of abnormal cervical Pap smear     colpo/ LEEP and CKC    History of nephrolithiasis     kidney stones    Hypothyroidism     IBS (irritable bowel syndrome)     GI smith negative    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     PATRICIA (obstructive sleep apnea)     severe - doesn't use CPAP    Panic attack     PTSD (post-traumatic stress disorder)      Past Surgical History:   Procedure Laterality Date    ANKLE SURGERY Right     x2 (#1 for ligament injury and #2 for fx and ligament)    AUGMENTATION OF BREAST      BILATERAL TUBAL LIGATION      BRAIN SURGERY  12/2018    decompression/craniotomy Arnold-Chiari syndrome    BREAST SURGERY  1997    B implants    CERVICAL BIOPSY  W/ LOOP ELECTRODE EXCISION      COLD KNIFE CONIZATION OF CERVIX N/A 03/17/2022    Procedure: CONE BIOPSY, CERVIX, USING COLD KNIFE;  Surgeon: Nikhil Franco MD;  Location: [facility];  Service: OB/GYN;  Laterality: N/A;    COLONOSCOPY N/A 09/29/2022    Procedure: COLONOSCOPY;  Surgeon: Martín GARCIA  MD Tanika;  Location: [facility];  Service: Endoscopy;  Laterality: N/A; Repeat colonoscopy in 5 years for surveillance    DECOMPRESSION OF CHIARI MALFORMATION BY REMOVAL OF POSTERIOR ARCH OF FIRST CERVICAL VERTEBRA N/A 12/13/2018    Procedure: DECOMPRESSION, CHIARI MALFORMATION, BY 1ST CERVICAL VERTEBRA POSTERIOR ARCH REMOVAL;  Surgeon: Sergio Busch MD;  Location: [facility] OR [address] [place] [address];  Service: Neurosurgery;  Laterality: N/A;    EPIDURAL STEROID INJECTION INTO CERVICAL SPINE N/A 12/05/2019    Procedure: Injection-steroid-epidural-cervical-C7-T1;  Surgeon: Noa Samano MD;  Location: [facility];  Service: Neurosurgery;  Laterality: N/A;    ESOPHAGOGASTRODUODENOSCOPY N/A 5/12/2023    Procedure: EGD (ESOPHAGOGASTRODUODENOSCOPY);  Surgeon: Martín Sagastume MD;  Location: [facility];  Service: Endoscopy;  Laterality: N/A;    FRACTURE SURGERY Right     Ankle    INJECTION OF ANESTHETIC AGENT AROUND MEDIAL BRANCH NERVES INNERVATING CERVICAL FACET JOINT Bilateral 08/06/2020    Procedure: Block-nerve-medial branch-cervical Bilateral  C3,4,5;  Surgeon: Noa Samano MD;  Location: [facility] OR;  Service: Anesthesiology;  Laterality: Bilateral;    INJECTION OF ANESTHETIC AGENT AROUND MEDIAL BRANCH NERVES INNERVATING CERVICAL FACET JOINT Bilateral 08/20/2020    Procedure: Block-nerve-medial branch-cervical Bilateral C3-4-5;  Surgeon: Noa Samano MD;  Location: [facility] OR;  Service: Anesthesiology;  Laterality: Bilateral;    LAPAROSCOPIC GASTRIC BANDING      with subsequent removal due to abscess    LASIK Bilateral     2009    LIPOSUCTION      x 2    PELVIC LAPAROSCOPY      for endometriosis eval with BTL    RADIOFREQUENCY ABLATION Bilateral 08/16/2021    Procedure: Radiofrequency Ablation Cervical C3-C5;  Surgeon: Robert Munoz MD;  Location: [facility] OR;  Service: Pain Management;  Laterality: Bilateral;    RADIOFREQUENCY THERMAL COAGULATION OF MEDIAL BRANCH OF POSTERIOR RAMUS OF CERVICAL SPINAL NERVE Bilateral 08/27/2020     "Procedure: RADIOFREQUENCY THERMAL COAGULATION, NERVE, SPINAL, CERVICAL, POSTERIOR RAMUS, MEDIAL BRANCH C3-4-5, bilateral;  Surgeon: Noa Samano MD;  Location: [facility];  Service: Anesthesiology;  Laterality: Bilateral;     Social History     Tobacco Use    Smoking status: Never    Smokeless tobacco: Never   Substance Use Topics    Alcohol use: Yes     Comment: occasional "maybe monthly"    Drug use: No        Review of Systems:    As noted in HPI in addition      REVIEW OF SYSTEMS  Review of Systems   Constitutional: Negative for decreased appetite, diaphoresis, night sweats, weight gain and weight loss.   HENT:  Negative for nosebleeds and odynophagia.    Eyes:  Positive for vision loss in left eye, vision loss in right eye and visual disturbance. Negative for double vision and photophobia.   Cardiovascular:  Negative for chest pain, claudication, cyanosis, dyspnea on exertion, irregular heartbeat, leg swelling, near-syncope, orthopnea, palpitations, paroxysmal nocturnal dyspnea and syncope.   Respiratory:  Negative for cough, hemoptysis, shortness of breath and wheezing.    Hematologic/Lymphatic: Negative for adenopathy.   Skin:  Negative for flushing, skin cancer and suspicious lesions.   Musculoskeletal:  Negative for gout, myalgias and neck pain.   Gastrointestinal:  Negative for abdominal pain, heartburn, hematemesis and hematochezia.   Genitourinary:  Negative for bladder incontinence, hesitancy and nocturia.   Neurological:  Negative for focal weakness, headaches, light-headedness and paresthesias.   Psychiatric/Behavioral:  Negative for memory loss and substance abuse.               Objective:        Vitals:    11/06/24 1023   BP: 102/65   Pulse: 76       Lab Results   Component Value Date    WBC 5.25 01/02/2024    HGB 13.6 01/02/2024    HCT 41.4 01/02/2024     01/02/2024    CHOL 222 (H) 01/02/2024    TRIG 133 01/02/2024    HDL 48 01/02/2024    ALT 20 01/02/2024    AST 25 01/02/2024     " 01/02/2024    K 4.9 01/02/2024     01/02/2024    CREATININE 1.1 01/02/2024    BUN 14 01/02/2024    CO2 26 01/02/2024    TSH 0.440 04/08/2024    INR 1.0 12/04/2018    HGBA1C 5.1 01/02/2024        ECHOCARDIOGRAM RESULTS  Results for orders placed during the hospital encounter of 04/12/24    Echo    Interpretation Summary    Left Ventricle: There is normal systolic function with a visually estimated ejection fraction of 60 - 65%. There is normal diastolic function.    Right Ventricle: Normal right ventricular cavity size. Wall thickness is normal. Right ventricle wall motion  is normal. Systolic function is normal.    Pulmonary Artery: There is borderline elevated pulmonary hypertension. The estimated pulmonary artery systolic pressure is 35 mmHg.    IVC/SVC: Normal venous pressure at 3 mmHg.    No results found for this or any previous visit.          CURRENT/PREVIOUS VISIT EKG  Results for orders placed or performed in visit on 03/25/24   IN OFFICE EKG 12-LEAD (to [place])    Collection Time: 03/25/24  1:48 PM   Result Value Ref Range    QRS Duration 74 ms    [facility] QTC Calculation 434 ms    Narrative    Test Reason : R55,    Vent. Rate : 076 BPM     Atrial Rate : 076 BPM     P-R Int : 160 ms          QRS Dur : 074 ms      QT Int : 386 ms       P-R-T Axes : 041 054 049 degrees     QTc Int : 434 ms    Normal sinus rhythm  Normal ECG  When compared with ECG of 14-MAR-2022 14:29,  No significant change was found  Confirmed by DOM DAVIS MD ([id number]) on 3/28/2024 7:14:01 PM    Referred By: IBAN SCHRADER           Confirmed By:DOM DAVIS MD     No valid procedures specified.   No results found for this or any previous visit.    No valid procedures specified.    PHYSICAL EXAM  GENERAL: well built, well nourished, well-developed in no apparent distress alert and oriented.   HEENT: Normocephalic. Pupils normal and conjunctivae normal.  Mucous membranes normal, no cyanosis or icterus, trachea central,no pallor or  icterus is noted..   NECK: No JVD. No bruit..   THYROID: Thyroid not enlarged. No nodules present..   CARDIAC:  Normal S1-S2.  No murmur rub click or gallop.  PMI nondisplaced.    LUNGS: Clear to auscultation. No wheezing or rhonchi..   ABDOMEN: Soft no masses or organomegaly.  No abdomen pulsations or bruits.  Normal bowel sounds. No pulsations and no masses felt, No guarding or rebound.   URINARY: No morris catheter   EXTREMITIES: No cyanosis, clubbing or edema noted at this time., no calf tenderness bilaterally.   PERIPHERAL VASCULAR SYSTEM: Good palpable distal pulses.  2+ femoral, popliteal and pedal pulses.  No bruits    CENTRAL NERVOUS SYSTEM: No focal motor or sensory deficits noted.   SKIN: Skin without lesions, moist, well perfused.   MUSCLE STRENGTH & TONE: No noteable weakness, atrophy or abnormal movement    I HAVE REVIEWED :    The vital signs, nurses notes, and all the pertinent radiology and labs.         Current Outpatient Medications   Medication Instructions    acetaminophen (TYLENOL) 650 MG TbSR Take 2 tablets by mouth every 8 hours as needed for pain and fever    acetylcysteine (N-ACETYL-L-CYSTEINE MISC) No dose, route, or frequency recorded.    albuterol (PROVENTIL) 2.5 mg, Nebulization, Every 4 hours PRN, Rescue    albuterol (PROVENTIL/VENTOLIN HFA) 90 mcg/actuation inhaler 1-2 puffs, Inhalation, Every 4 hours PRN, Rescue    ALPRAZolam (XANAX) 0.5 mg, Oral, Daily PRN    amoxicillin (AMOXIL) 500 MG capsule Take 2 capsules by mouth NOW, and then take 1 capsule by mouth three times daily until all taken. Take with meals.    buPROPion (WELLBUTRIN XL) 150 mg, Oral, Daily    dextroamphetamine-amphetamine (ADDERALL XR) 20 MG 24 hr capsule 20 mg, Oral, Every morning    diphenoxylate-atropine 2.5-0.025 mg (LOMOTIL) 2.5-0.025 mg per tablet 1 tablet, Oral, 2 times daily PRN    EScitalopram oxalate (LEXAPRO) 20 mg, Oral, Daily    fluticasone propionate (FLONASE) 50 mcg/actuation nasal spray Spray 1 spray  "(50 mcg total) in each nostril once daily.    gabapentin (NEURONTIN) 600 MG tablet TAKE 1 TABLET BY MOUTH 2 TO 3 TIMES A DAY FOR CHRONIC PAIN    galcanezumab-gnlm (EMGALITY PEN) 120 mg/mL PnIj ADMINISTER 1 ML UNDER THE SKIN EVERY 28 DAYS    HYDROcodone-acetaminophen (NORCO) 7.5-325 mg per tablet Take 1 tablet by mouth every 4 to 6 hours as needed for pain.    hydrOXYzine HCL (ATARAX) 25 mg, Oral, 3 times daily    levOCARNitine (CARNITOR) 990 mg, Oral, 2 times daily    LIDOcaine (LIDODERM) 5 % Apply one patch to affected area every 12 hours. Keep on for 12 hours, remove for 12 hours.    montelukast (SINGULAIR) 10 mg, Oral, Daily    nebulizer and compressor (COMP-AIR NEBULIZER COMPRESSOR) Pat Use as directed    oxyCODONE-acetaminophen (PERCOCET) 5-325 mg per tablet Take 1 tablet by mouth once a day as needed for pain    oxyCODONE-acetaminophen (PERCOCET) 5-325 mg per tablet Take 1 tablet by mouth once a day as needed for pain. More than 7 days supply is medically necessary    oxyCODONE-acetaminophen (PERCOCET) 5-325 mg per tablet Take 1 tablet by mouth once a day as needed for pain More than 7 days supply is medically necessary    oxyCODONE-acetaminophen (PERCOCET) 5-325 mg per tablet Take 1 tablet by mouth once a day as needed for pain More than 7 days supply is medically necessary    promethazine (PHENERGAN) 12.5 MG Tab Take 1 tablet by mouth every 6 hours as needed    SYNTHROID 274 mcg, Oral, Before breakfast    syringe with needle (BD LUER-LAURIE SYRINGE) 3 mL 25 gauge x 1" Syrg Use for Toradol IM only.    tiZANidine (ZANAFLEX) 4 MG tablet Take 1 tablet by mouth three times a day as needed for  muscle spasm    traZODone (DESYREL) 100 mg, Oral, Nightly          Assessment:   History of CNS Chiari malformation.  Status post surgery    Vascular headaches    History of orthostatic hypotension.    Recent problems with the right and left visual disturbance, loss of vision.  Paresthesias.  Negative brain MRI " 02/2024        Plan:     CTA head and neck.  Echo with bubble contrast        No follow-ups on file.

## 2024-11-08 ENCOUNTER — OFFICE VISIT (OUTPATIENT)
Dept: FAMILY MEDICINE | Facility: CLINIC | Age: 49
End: 2024-11-08
Payer: MEDICARE

## 2024-11-08 VITALS
BODY MASS INDEX: 33.01 KG/M2 | SYSTOLIC BLOOD PRESSURE: 110 MMHG | HEIGHT: 67 IN | TEMPERATURE: 99 F | HEART RATE: 74 BPM | OXYGEN SATURATION: 98 % | WEIGHT: 210.31 LBS | RESPIRATION RATE: 16 BRPM | DIASTOLIC BLOOD PRESSURE: 76 MMHG

## 2024-11-08 DIAGNOSIS — Z23 IMMUNIZATION DUE: ICD-10-CM

## 2024-11-08 DIAGNOSIS — E03.4 HYPOTHYROIDISM DUE TO ACQUIRED ATROPHY OF THYROID: Primary | ICD-10-CM

## 2024-11-08 DIAGNOSIS — E66.9 OBESITY, UNSPECIFIED CLASS, UNSPECIFIED OBESITY TYPE, UNSPECIFIED WHETHER SERIOUS COMORBIDITY PRESENT: ICD-10-CM

## 2024-11-08 RX ORDER — TIRZEPATIDE 2.5 MG/.5ML
2.5 INJECTION, SOLUTION SUBCUTANEOUS
Qty: 2 ML | Refills: 3 | Status: SHIPPED | OUTPATIENT
Start: 2024-11-08

## 2024-11-08 NOTE — PROGRESS NOTES
"  Patient ID: Dinah Mitchell is a 49 y.o. female.     History of Present Illness    CHIEF COMPLAINT:  Patient presents today for follow-up and reports feeling unwell.    SINUS ISSUES AND HEADACHES:  She reports experiencing sinus problems. Last week she had coughing when lying down, which has since resolved. She complains of severe headaches, causing her to go to bed as early as 6 PM on three recent nights due to fear of falling. She received IV vitamin C treatment on Tuesday, which included four administrations, in an attempt to alleviate her symptoms. Despite these interventions, she still feels "run down.".    CARDIOVASCULAR CONCERNS:  She is followed by cardiology. Additional cardiac tests are scheduled, including an echocardiogram (referred to as a "bubble" test) on Tuesday, and a carotid ultrasound on the 12th and 22nd.     THYROID FUNCTION:  Recent labs showed TSH of 0.01 at outside clinic where she is receiving terzepitide compounded.. She expresses concern about this result. She reports inconsistent adherence to prescribed thyroid medication, acknowledging forgetting to take it at times. She intends to improve compliance, aiming to take the medication daily for two weeks before retesting thyroid levels. We discussed that this would not make her levels go down (skipping).  Pt does have hypothyroidism   Currently on      SYNTHROID 137 mcg Tab tablet 274 mcg, Before breakfast           Summary:  Take 2 tablets (274 mcg total) by mouth before breakfast., Starting Sat 4/13/2024, Normal     SYNTHROID Note (3/5/2018): Takes 50 mcg and 200 mcg for TDD of 250 mcg             WEIGHT MANAGEMENT:  She reports successful weight loss of approximately 70 lbs while on current weight loss medication. She expresses concerns about the cost of the medication and is considering switching insurance plans to potentially improve coverage.    LIPID PROFILE:  Recent labs showed Total 195 HDL level of 44 (at outside " "clinic)    VACCINATIONS:  She is due for flu and COVID shots and expresses willingness to receive both vaccinations during the current visit.      ROS:  General: -fever, -chills, +fatigue, -weight gain, -weight loss  Eyes: -vision changes, -redness, -discharge  ENT: -ear pain, -nasal congestion, -sore throat  Cardiovascular: -chest pain, -palpitations, -lower extremity edema  Respiratory: -cough, -shortness of breath  Gastrointestinal: -abdominal pain, -nausea, -vomiting, -diarrhea, -constipation, -blood in stool  Genitourinary: -dysuria, -hematuria, -frequency  Musculoskeletal: -joint pain, -muscle pain  Skin: -rash, -lesion  Neurological: +headache, -dizziness, -numbness, -tingling  Psychiatric: -anxiety, -depression, -sleep difficulty         Physical Exam    Vitals:    11/08/24 1354   BP: 110/76   Pulse: 74   Resp: 16   Temp: 98.6 °F (37 °C)   SpO2: 98%   Weight: 95.4 kg (210 lb 5.1 oz)   Height: 5' 7" (1.702 m)   PainSc:   7   PainLoc: Back     Body mass index is 32.94 kg/m².  Physical Exam    General: No acute distress. Well-developed. obese  Eyes: . Sclerae anicteric.  Cardiovascular: Regular rate. Regular rhythm. No murmurs. No rubs. No gallops. Normal S1, S2. Normal heart sounds.  Respiratory: Normal respiratory effort. Clear to auscultation bilaterally. No rales. No rhonchi. No wheezing.  Extremities: No lower extremity edema.  Neurological: Alert & oriented x3.   Psychiatric: Normal mood. Pt changes from topic to topic needing refocus  Skin: Warm. Dry. No rash.                  Assessment & Plan    Reviewed recent labs (outside clinic)  Considered thyroid level may be erroneous; plan to repeat test to confirm before adjusting medication (pt had values written on a piece of paper)  Evaluated cholesterol levels; noted improvement  Assessed weight loss progress on tirzepatide : ~70 lbs lost  Considered options for continuing weight loss medication given patient's financial " constraints      HYPERTHYROIDISM:  Explained thyroid function: low TSH indicates hyperthyroidism, which can cause rapid heart rate, anxiety, and sleep disturbances.  Thyroid function test (TSH) ordered to be repeated next week.  Follow up next week for thyroid recheck.      WEIGHT MANAGEMENT:  Clarified insurance coverage limitations for weight loss medications.  Patient to continue current weight loss efforts.  I did send in a prescription for Zepbound to see if this was covered under her insurance    FLU VACCINATION:  Flu shot administered.    COVID-19 VACCINATION:  COVID-19 vaccine administered.    FOLLOW UP:  With PCP          This note was generated with the assistance of ambient listening technology. Verbal consent was obtained by the patient and accompanying visitor(s) for the recording of patient appointment to facilitate this note. I attest to having reviewed and edited the generated note for accuracy, though some syntax or spelling errors may persist. Please contact the author of this note for any clarification.    Answers submitted by the patient for this visit:  Review of Systems Questionnaire (Submitted on 11/7/2024)  activity change: No  unexpected weight change: No  neck pain: Yes  hearing loss: No  rhinorrhea: No  trouble swallowing: No  eye discharge: No  visual disturbance: Yes  chest tightness: No  wheezing: Yes  chest pain: No  palpitations: No  blood in stool: No  constipation: Yes  vomiting: No  diarrhea: No  polydipsia: No  polyuria: No  difficulty urinating: No  hematuria: No  menstrual problem: Yes  dysuria: No  joint swelling: No  arthralgias: No  headaches: Yes  weakness: No  confusion: No  dysphoric mood: No

## 2024-11-12 ENCOUNTER — HOSPITAL ENCOUNTER (OUTPATIENT)
Dept: CARDIOLOGY | Facility: HOSPITAL | Age: 49
Discharge: HOME | End: 2024-11-12
Attending: INTERNAL MEDICINE
Payer: MEDICARE

## 2024-11-12 VITALS — HEART RATE: 89 BPM | HEIGHT: 67 IN | BODY MASS INDEX: 32.96 KG/M2 | WEIGHT: 210 LBS

## 2024-11-12 DIAGNOSIS — F60.3 BORDERLINE PERSONALITY DISORDER: ICD-10-CM

## 2024-11-12 DIAGNOSIS — F90.2 ATTENTION DEFICIT HYPERACTIVITY DISORDER (ADHD), COMBINED TYPE: ICD-10-CM

## 2024-11-12 DIAGNOSIS — G93.5 CHIARI MALFORMATION TYPE I: ICD-10-CM

## 2024-11-12 DIAGNOSIS — G43.719 CHRONIC MIGRAINE WITHOUT AURA, INTRACTABLE, WITHOUT STATUS MIGRAINOSUS: ICD-10-CM

## 2024-11-12 PROCEDURE — 93306 TTE W/DOPPLER COMPLETE: CPT | Mod: 26,,, | Performed by: INTERNAL MEDICINE

## 2024-11-12 PROCEDURE — 93306 TTE W/DOPPLER COMPLETE: CPT | Mod: PO

## 2024-11-12 NOTE — NURSING NOTE
Bubble study procedure explained. 24g IV started in the RAC, flushed and secured. 30ml of agitated saline injected into IV. IV d/c, cath tip intact. Study complete.

## 2024-11-13 LAB
ASCENDING AORTA: 2.45 CM
AV INDEX (PROSTH): 0.68
AV MEAN GRADIENT: 6.4 MMHG
AV PEAK GRADIENT: 10.2 MMHG
AV VALVE AREA BY VELOCITY RATIO: 2 CM²
AV VALVE AREA: 2.1 CM²
AV VELOCITY RATIO: 0.63
BSA FOR ECHO PROCEDURE: 2.12 M2
CV ECHO LV RWT: 0.35 CM
DOP CALC AO PEAK VEL: 1.6 M/S
DOP CALC AO VTI: 34 CM
DOP CALC LVOT AREA: 3.1 CM2
DOP CALC LVOT DIAMETER: 2 CM
DOP CALC LVOT PEAK VEL: 1 M/S
DOP CALC LVOT STROKE VOLUME: 72.8 CM3
DOP CALCLVOT PEAK VEL VTI: 23.2 CM
E WAVE DECELERATION TIME: 176.8 MSEC
E/A RATIO: 1
E/E' RATIO: 8.56 M/S
ECHO LV POSTERIOR WALL: 0.9 CM (ref 0.6–1.1)
FRACTIONAL SHORTENING: 38.5 % (ref 28–44)
INTERVENTRICULAR SEPTUM: 0.9 CM (ref 0.6–1.1)
IVRT: 102.76 MSEC
LEFT ATRIUM AREA SYSTOLIC (APICAL 2 CHAMBER): 17.55 CM2
LEFT ATRIUM AREA SYSTOLIC (APICAL 4 CHAMBER): 14 CM2
LEFT ATRIUM SIZE: 3.41 CM
LEFT ATRIUM VOLUME INDEX MOD: 20.3 ML/M2
LEFT ATRIUM VOLUME MOD: 41.9 ML
LEFT INTERNAL DIMENSION IN SYSTOLE: 3.2 CM (ref 2.1–4)
LEFT VENTRICLE DIASTOLIC VOLUME INDEX: 63.45 ML/M2
LEFT VENTRICLE DIASTOLIC VOLUME: 130.7 ML
LEFT VENTRICLE END SYSTOLIC VOLUME APICAL 2 CHAMBER: 50.87 ML
LEFT VENTRICLE END SYSTOLIC VOLUME APICAL 4 CHAMBER: 32.66 ML
LEFT VENTRICLE MASS INDEX: 82 G/M2
LEFT VENTRICLE SYSTOLIC VOLUME INDEX: 19.9 ML/M2
LEFT VENTRICLE SYSTOLIC VOLUME: 41 ML
LEFT VENTRICULAR INTERNAL DIMENSION IN DIASTOLE: 5.2 CM (ref 3.5–6)
LEFT VENTRICULAR MASS: 169 G
LV LATERAL E/E' RATIO: 7.7 M/S
LV SEPTAL E/E' RATIO: 9.63 M/S
LVED V (TEICH): 130.7 ML
LVES V (TEICH): 41 ML
LVOT MG: 1.84 MMHG
LVOT MV: 0.63 CM/S
MV PEAK A VEL: 0.77 M/S
MV PEAK E VEL: 0.77 M/S
MV STENOSIS PRESSURE HALF TIME: 51.27 MS
MV VALVE AREA P 1/2 METHOD: 4.29 CM2
PISA MRMAX VEL: 4.71 M/S
PISA TR MAX VEL: 2.55 M/S
PULM VEIN S/D RATIO: 1.39
PV PEAK D VEL: 0.49 M/S
PV PEAK S VEL: 0.68 M/S
RA PRESSURE ESTIMATED: 3 MMHG
RA VOL SYS: 31.18 ML
RIGHT ATRIAL AREA: 12.4 CM2
RIGHT ATRIUM VOLUME AREA LENGTH APICAL 4 CHAMBER: 29.38 ML
RIGHT VENTRICLE DIASTOLIC LENGTH: 4.6 CM
RIGHT VENTRICLE DIASTOLIC MID DIMENSION: 3.3 CM
RIGHT VENTRICULAR END-DIASTOLIC DIMENSION: 3.71 CM
RIGHT VENTRICULAR LENGTH IN DIASTOLE (APICAL 4-CHAMBER VIEW): 4.61 CM
RV MID DIAMA: 3.25 CM
RV TB RVSP: 6 MMHG
RV TISSUE DOPPLER FREE WALL SYSTOLIC VELOCITY 1 (APICAL 4 CHAMBER VIEW): 17.16 CM/S
SINUS: 2.45 CM
STJ: 2.39 CM
TDI LATERAL: 0.1 M/S
TDI SEPTAL: 0.08 M/S
TDI: 0.09 M/S
TR MAX PG: 26 MMHG
TRICUSPID ANNULAR PLANE SYSTOLIC EXCURSION: 2.79 CM
TV REST PULMONARY ARTERY PRESSURE: 29 MMHG
Z-SCORE OF LEFT VENTRICULAR DIMENSION IN END DIASTOLE: -1.79
Z-SCORE OF LEFT VENTRICULAR DIMENSION IN END SYSTOLE: -1.38

## 2024-11-15 RX ORDER — TRAZODONE HYDROCHLORIDE 100 MG/1
100 TABLET ORAL NIGHTLY
Qty: 30 TABLET | Refills: 3 | Status: CANCELLED | OUTPATIENT
Start: 2024-11-15 | End: 2025-11-15

## 2024-11-15 RX ORDER — TRAZODONE HYDROCHLORIDE 100 MG/1
100 TABLET ORAL NIGHTLY
Qty: 30 TABLET | Refills: 3 | Status: SHIPPED | OUTPATIENT
Start: 2024-11-15 | End: 2025-11-15

## 2024-11-15 NOTE — TELEPHONE ENCOUNTER
Last ordered: 3 months ago (7/19/2024) by Bronson Casas, PhD, MP     Last dispensed: 10/10/2024       Nov 11/19/24

## 2024-11-21 ENCOUNTER — PATIENT MESSAGE (OUTPATIENT)
Dept: FAMILY MEDICINE | Facility: CLINIC | Age: 49
End: 2024-11-21

## 2024-11-21 ENCOUNTER — OFFICE VISIT (OUTPATIENT)
Dept: FAMILY MEDICINE | Facility: CLINIC | Age: 49
End: 2024-11-21
Payer: MEDICARE

## 2024-11-21 VITALS
DIASTOLIC BLOOD PRESSURE: 70 MMHG | BODY MASS INDEX: 33.62 KG/M2 | WEIGHT: 214.19 LBS | TEMPERATURE: 97 F | SYSTOLIC BLOOD PRESSURE: 100 MMHG | HEART RATE: 83 BPM | HEIGHT: 67 IN | OXYGEN SATURATION: 99 %

## 2024-11-21 DIAGNOSIS — E03.4 HYPOTHYROIDISM DUE TO ACQUIRED ATROPHY OF THYROID: ICD-10-CM

## 2024-11-21 DIAGNOSIS — R13.10 DYSPHAGIA, UNSPECIFIED TYPE: ICD-10-CM

## 2024-11-21 DIAGNOSIS — J32.9 SINUSITIS, UNSPECIFIED CHRONICITY, UNSPECIFIED LOCATION: Primary | ICD-10-CM

## 2024-11-21 PROCEDURE — G2211 COMPLEX E/M VISIT ADD ON: HCPCS | Mod: S$GLB,,, | Performed by: FAMILY MEDICINE

## 2024-11-21 PROCEDURE — 99214 OFFICE O/P EST MOD 30 MIN: CPT | Mod: S$GLB,,, | Performed by: FAMILY MEDICINE

## 2024-11-21 PROCEDURE — 3078F DIAST BP <80 MM HG: CPT | Mod: CPTII,S$GLB,, | Performed by: FAMILY MEDICINE

## 2024-11-21 PROCEDURE — 3074F SYST BP LT 130 MM HG: CPT | Mod: CPTII,S$GLB,, | Performed by: FAMILY MEDICINE

## 2024-11-21 PROCEDURE — 3044F HG A1C LEVEL LT 7.0%: CPT | Mod: CPTII,S$GLB,, | Performed by: FAMILY MEDICINE

## 2024-11-21 PROCEDURE — 1160F RVW MEDS BY RX/DR IN RCRD: CPT | Mod: CPTII,S$GLB,, | Performed by: FAMILY MEDICINE

## 2024-11-21 PROCEDURE — 1159F MED LIST DOCD IN RCRD: CPT | Mod: CPTII,S$GLB,, | Performed by: FAMILY MEDICINE

## 2024-11-21 PROCEDURE — 3008F BODY MASS INDEX DOCD: CPT | Mod: CPTII,S$GLB,, | Performed by: FAMILY MEDICINE

## 2024-11-21 RX ORDER — TIRZEPATIDE 10 MG/.5ML
10 INJECTION, SOLUTION SUBCUTANEOUS
Qty: 2 ML | Refills: 0 | OUTPATIENT
Start: 2024-11-21

## 2024-11-21 RX ORDER — TIRZEPATIDE 10 MG/.5ML
10 INJECTION, SOLUTION SUBCUTANEOUS
Qty: 2 ML | Refills: 0 | Status: SHIPPED | OUTPATIENT
Start: 2024-11-21 | End: 2024-11-25 | Stop reason: SDUPTHER

## 2024-11-21 RX ORDER — AMOXICILLIN AND CLAVULANATE POTASSIUM 875; 125 MG/1; MG/1
1 TABLET, FILM COATED ORAL 2 TIMES DAILY
Qty: 20 TABLET | Refills: 0 | Status: SHIPPED | OUTPATIENT
Start: 2024-11-21 | End: 2024-12-02

## 2024-11-21 NOTE — TELEPHONE ENCOUNTER
Care Due:                  Date            Visit Type   Department     Provider  --------------------------------------------------------------------------------                                MYCHART                              FOLLOWUP/OF  [facility] Amaya Jeterjordan  Last Visit: 11-      [facility] VISIT   MEDICINE       Anger                              EP -                              PRIMARY      [facility] Am[facility] Jeterjordan  Next Visit: 05-      CARE ([facility])   MEDICINE       Anger                                                            Last  Test          Frequency    Reason                     Performed    Due Date  --------------------------------------------------------------------------------    HBA1C.......  6 months...  tirzepatide,.............  01- 07-    [employer] Embedded Care Due Messages. Reference number: [id number].   11/21/2024 1:46:54 PM CST

## 2024-11-21 NOTE — TELEPHONE ENCOUNTER
----- Message from Basia sent at 11/21/2024 11:52 AM CST -----  Contact: [phone] Patient  Patient would like to get medical advice.  Symptoms (please be specific):   Pt is requesting the Rx for tirzepatide, weight loss, (ZEPBOUND) 10 mg/0.5 mL PnIj be sent to the pharmacy below. Pt states the Rx at the [facility] is $1500 more and she is not able to afford that.   How long have you had these symptoms: N/A  Would you like a call back, or a response through your [facility] portal?:   call back   Pharmacy name and phone # (copy from chart):     [facility]  [place], [place] - [address]   Phone: [phone]  Fax: [phone]         Comments:

## 2024-11-21 NOTE — PATIENT INSTRUCTIONS
Your provider has requested that you make an appointment with Dermatology, to  schedule an appointment, please contact one of the following providers:    Dr. Armando Arzola & MD Dr. Ni Lieberman PA  [address] [place]  [address]  Phone:  [phone]    Dr. Selam Monsalve & Dr. Jinny Hawkins   [address] [place]  [address]  Phone: [phone]    Dr. Armando Acosta & [address]  [address], [address]  [place]   [address]  Phone: [phone]    Dr. Yamileth Mena  [address]   [place]   [address]  Phone: [phone]    [facility]  [id number]  [place] [address] [phone]

## 2024-11-24 NOTE — PROGRESS NOTES
Subjective:       Patient ID: Dinah Mitchell is a 49 y.o. female.    Chief Complaint: Sore Throat, Nasal Congestion, and Cough (Pt been experiencing all systems for 3 days)    Sore Throat   This is a new problem. The current episode started in the past 7 days. The problem has been gradually worsening. The pain is worse on the left side. There has been no fever. The pain is at a severity of 6/10. The pain is moderate. Associated symptoms include congestion, coughing, headaches, neck pain, swollen glands and trouble swallowing. Pertinent negatives include no abdominal pain, diarrhea, drooling, ear discharge, ear pain, hoarse voice, plugged ear sensation, shortness of breath, stridor or vomiting. She has tried NSAIDs for the symptoms. The treatment provided no relief.     The patient is a 49-year-old who is here today for follow-up.  She is also sick.    Today we discussed the followin) illness.  She has been sick for the past week but her symptoms have been worse over the past 3 days.  Her symptoms include bad sinus congestion, rhinorrhea, postnasal drainage and a nonproductive cough.  She does try to minimize her coughing because that makes her head hurt.  2) obesity.  She has been going to [facility] for IV treatments.  In addition to IV vitamins, they have also been administering zepbound  8 mg to her but to increase her dose is too costly for her to do.  She wonders about getting her own prescription for Zepbound.  Amin[facility] recently sent this in for her but the dose was low and she wonders about getting a dose that is more similar to what she has been getting at [facility]   3) GI referral.  She would like referral to see the GI team.  She does have trouble swallowing and has previously had an upper scope done.  4) Derm referral.  She would like a referral to see the dermatologist.  She has several moles she would like checked.  She has a family history skin cancer    Review of Systems   Constitutional:  Negative for  appetite change, chills, diaphoresis, fatigue, fever and unexpected weight change.   HENT:  Positive for congestion, postnasal drip, rhinorrhea, sore throat and trouble swallowing. Negative for drooling, ear discharge, ear pain, hoarse voice, sinus pressure and sneezing.    Eyes:  Negative for pain, discharge and visual disturbance.   Respiratory:  Positive for cough. Negative for chest tightness, shortness of breath, wheezing and stridor.    Cardiovascular:  Negative for chest pain, palpitations and leg swelling.   Gastrointestinal:  Negative for abdominal distention, abdominal pain, blood in stool, constipation, diarrhea, nausea and vomiting.   Musculoskeletal:  Positive for neck pain.   Skin:  Negative for rash.   Neurological:  Positive for headaches.         Objective:      Physical Exam  Constitutional:       General: She is not in acute distress.     Appearance: Normal appearance. She is well-developed.   HENT:      Head: Normocephalic and atraumatic.      Right Ear: Hearing, tympanic membrane, ear canal and external ear normal.      Left Ear: Hearing, tympanic membrane, ear canal and external ear normal.      Nose: Nose normal.      Mouth/Throat:      Mouth: No oral lesions.      Pharynx: No oropharyngeal exudate or posterior oropharyngeal erythema.   Eyes:      General: Lids are normal. No scleral icterus.     Extraocular Movements: Extraocular movements intact.      Conjunctiva/sclera: Conjunctivae normal.      Pupils: Pupils are equal, round, and reactive to light.   Neck:      Thyroid: No thyroid mass or thyromegaly.      Vascular: No carotid bruit.   Cardiovascular:      Rate and Rhythm: Normal rate and regular rhythm. No extrasystoles are present.     Chest Wall: PMI is not displaced.      Heart sounds: Normal heart sounds. No murmur heard.     No gallop.   Pulmonary:      Effort: Pulmonary effort is normal. No accessory muscle usage or respiratory distress.      Breath sounds: Normal breath sounds.  "  Abdominal:      General: Bowel sounds are normal. There is no abdominal bruit.      Palpations: Abdomen is soft.      Tenderness: There is no abdominal tenderness. There is no rebound.   Musculoskeletal:      Cervical back: Normal range of motion and neck supple.   Lymphadenopathy:      Head:      Right side of head: No submental or submandibular adenopathy.      Left side of head: No submental or submandibular adenopathy.      Cervical:      Right cervical: No superficial, deep or posterior cervical adenopathy.     Left cervical: No superficial, deep or posterior cervical adenopathy.      Upper Body:      Right upper body: No supraclavicular adenopathy.      Left upper body: No supraclavicular adenopathy.   Skin:     General: Skin is warm and dry.   Neurological:      Mental Status: She is alert and oriented to person, place, and time.       Blood pressure 100/70, pulse 83, temperature 97.2 °F (36.2 °C), temperature source Temporal, height 5' 7" (1.702 m), weight 97.1 kg (214 lb 2.8 oz), SpO2 99%.Body mass index is 33.54 kg/m².        A/P:  1) sinusitis.  Acute.  I am going to treat her with a course of Augmentin.  She also will start Flonase and Sudafed.  If her symptoms do not improve or if they worsen, she will let me know   2) obesity with a BMI of 33.54.  Persistent.  I did prescribe zepbound 10 mg once a week.  She is aware of the pros and cons of this medicine.  If she has any trouble with this, she will let me know.  If she does well with this, she will send me an update with her response to this medicine in 4 weeks    3) dysphagia.  Persistent.  We will refer her to GI  4) family history of skin cancer.  I did provide a list of dermatologist for her to schedule an appointment with  5) health maintenance.  She will do her mammogram soon.  We will schedule labs prior to her next visit    As long as she does well, I will see her back in 4 weeks or sooner if needed  "

## 2024-11-25 ENCOUNTER — TELEPHONE (OUTPATIENT)
Dept: FAMILY MEDICINE | Facility: CLINIC | Age: 49
End: 2024-11-25
Payer: MEDICARE

## 2024-11-25 RX ORDER — TIRZEPATIDE 10 MG/.5ML
10 INJECTION, SOLUTION SUBCUTANEOUS
Qty: 2 ML | Refills: 0 | Status: SHIPPED | OUTPATIENT
Start: 2024-11-25

## 2024-11-25 NOTE — TELEPHONE ENCOUNTER
----- Message from Kike sent at 11/25/2024  2:16 PM CST -----  Contact: self  Type: Needs Medical Advice  Who Called:  PT    Best Call Back Number: [phone]   Additional Information: PT is calling to speak with Arlet regarding her Zepbound being sent to different pharmacy.  Pleae call ASAP.

## 2024-12-03 ENCOUNTER — PROCEDURE VISIT (OUTPATIENT)
Dept: NEUROLOGY | Facility: CLINIC | Age: 49
End: 2024-12-03
Payer: MEDICARE

## 2024-12-03 VITALS
RESPIRATION RATE: 17 BRPM | TEMPERATURE: 98 F | WEIGHT: 214.06 LBS | BODY MASS INDEX: 33.6 KG/M2 | HEIGHT: 67 IN | SYSTOLIC BLOOD PRESSURE: 109 MMHG | DIASTOLIC BLOOD PRESSURE: 74 MMHG | HEART RATE: 76 BPM

## 2024-12-03 DIAGNOSIS — G43.719 INTRACTABLE CHRONIC MIGRAINE WITHOUT AURA AND WITHOUT STATUS MIGRAINOSUS: Primary | ICD-10-CM

## 2024-12-03 PROCEDURE — 64615 CHEMODENERV MUSC MIGRAINE: CPT | Mod: S$GLB,,, | Performed by: PSYCHIATRY & NEUROLOGY

## 2024-12-03 PROCEDURE — 96372 THER/PROPH/DIAG INJ SC/IM: CPT | Mod: 59,S$GLB,, | Performed by: PSYCHIATRY & NEUROLOGY

## 2024-12-03 RX ORDER — ONDANSETRON 2 MG/ML
4 INJECTION INTRAMUSCULAR; INTRAVENOUS
Status: COMPLETED | OUTPATIENT
Start: 2024-12-03 | End: 2024-12-03

## 2024-12-03 RX ORDER — KETOROLAC TROMETHAMINE 30 MG/ML
30 INJECTION, SOLUTION INTRAMUSCULAR; INTRAVENOUS
Status: COMPLETED | OUTPATIENT
Start: 2024-12-03 | End: 2024-12-03

## 2024-12-03 RX ADMIN — KETOROLAC TROMETHAMINE 30 MG: 30 INJECTION, SOLUTION INTRAMUSCULAR; INTRAVENOUS at 04:12

## 2024-12-03 RX ADMIN — ONDANSETRON 4 MG: 2 INJECTION INTRAMUSCULAR; INTRAVENOUS at 04:12

## 2024-12-03 NOTE — PROCEDURES
Procedures     BOTOX PROCEDURE    PROCEDURE PERFORMED: Botulinum toxin injection (81262)    CLINICAL INDICATION: G43.719    The Botox injections have achieved well over 50%  improvement in the patient's symptoms. Migraines have been reduced at least 7 days per month and the number of cumulative hours suffering with headaches has been reduced at least 100 total hours per month. Today she does have a headache indicating that the Botox has worn off. Frequency of treatment is every 3 months unless no response to the treatments, at which time we will discontinue the injections.      A time out was conducted just before the start of the procedure to verify the correct patient and procedure, procedure location, and all relevant critical information.   Signed consent obtained prior to procedure     Conventional methods of treatment but the patient has been unresponsive and refractory.The patient meets criteria for chronic headaches according to the ICHD-III, the patient has more than 15 headaches a month at least 8 of them meet migraine criteria, which last for more than 4 hours a day.     Last Botox injections resulted in over 50%  improvement in the patient's symptoms. Frequency of treatment is every 3 months unless no response to the treatments, at which time we will discontinue the injections.     DESCRIPTION OF PROCEDURE: After obtaining informed consent and under aseptic technique, a total of 155 units of botulinum toxin type A were injected in the following muscles: Procerus 5 units,  5 units bilaterally, frontalis 20 units, temporalis 20 units bilaterally,   occipitalis 15 units, upper cervical paraspinals 10 units bilaterally and trapezius 15 units ilaterally. The patient was given a total of 155 units in 31 sites.    Special precaution taken given her posterior decompression.       The patient tolerated the procedure well. There were no complications. The patient was given a prescription for repeat  treatment in 3 months.     Unavoidable waste 45 units    Saeid Guevara M.D  Medical Director, Headache and Facial Pain  [facility]

## 2024-12-06 ENCOUNTER — OFFICE VISIT (OUTPATIENT)
Dept: OBSTETRICS AND GYNECOLOGY | Facility: CLINIC | Age: 49
End: 2024-12-06
Payer: MEDICARE

## 2024-12-06 ENCOUNTER — HOSPITAL ENCOUNTER (OUTPATIENT)
Dept: RADIOLOGY | Facility: HOSPITAL | Age: 49
Discharge: HOME | End: 2024-12-06
Attending: SPECIALIST
Payer: MEDICARE

## 2024-12-06 VITALS
BODY MASS INDEX: 32.25 KG/M2 | WEIGHT: 205.94 LBS | DIASTOLIC BLOOD PRESSURE: 68 MMHG | SYSTOLIC BLOOD PRESSURE: 104 MMHG

## 2024-12-06 DIAGNOSIS — Z12.31 VISIT FOR SCREENING MAMMOGRAM: ICD-10-CM

## 2024-12-06 DIAGNOSIS — Z12.4 ENCOUNTER FOR PAP SMEAR OF CERVIX WITH HPV DNA COTESTING: Primary | ICD-10-CM

## 2024-12-06 DIAGNOSIS — R10.2 PELVIC PAIN: ICD-10-CM

## 2024-12-06 PROCEDURE — 77063 BREAST TOMOSYNTHESIS BI: CPT | Mod: 26,,, | Performed by: RADIOLOGY

## 2024-12-06 PROCEDURE — 77067 SCR MAMMO BI INCL CAD: CPT | Mod: 26,,, | Performed by: RADIOLOGY

## 2024-12-06 PROCEDURE — 99999 PR PBB SHADOW E&M-EST. PATIENT-LVL V: CPT | Mod: PBBFAC,,, | Performed by: SPECIALIST

## 2024-12-06 PROCEDURE — 77067 SCR MAMMO BI INCL CAD: CPT | Mod: TC,PN

## 2024-12-06 NOTE — PROGRESS NOTES
48yo WF G0 presents for annual gyn eval  Occ right sided pelvic pain Denies dysuria, weight loss/gain, d/c, DUB, f/c, n/v  Past Medical History:   Diagnosis Date    Arnold-Chiari deformity     s/p decompression    Arthritis     Asthma     Borderline personality disorder     Carnitine deficiency     Colon polyp     Depression     Fatty liver     noted on 8/21 CT    Fibromyalgia     Headache     History of abnormal cervical Pap smear     colpo/ LEEP and CKC    History of nephrolithiasis     kidney stones    Hypothyroidism     IBS (irritable bowel syndrome)     GI smith negative    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     PATRICIA (obstructive sleep apnea)     severe - doesn't use CPAP    Panic attack     PTSD (post-traumatic stress disorder)        Past Surgical History:   Procedure Laterality Date    ANKLE SURGERY Right     x2 (#1 for ligament injury and #2 for fx and ligament)    AUGMENTATION OF BREAST      BILATERAL TUBAL LIGATION      BRAIN SURGERY  12/2018    decompression/craniotomy Arnold-Chiari syndrome    BREAST SURGERY  1997    B implants    CERVICAL BIOPSY  W/ LOOP ELECTRODE EXCISION      COLD KNIFE CONIZATION OF CERVIX N/A 03/17/2022    Procedure: CONE BIOPSY, CERVIX, USING COLD KNIFE;  Surgeon: Nikhil Franco MD;  Location: [facility];  Service: OB/GYN;  Laterality: N/A;    COLONOSCOPY N/A 09/29/2022    Procedure: COLONOSCOPY;  Surgeon: Martín Sagastume MD;  Location[facility];  Service: Endoscopy;  Laterality: N/A; Repeat colonoscopy in 5 years for surveillance    DECOMPRESSION OF CHIARI MALFORMATION BY REMOVAL OF POSTERIOR ARCH OF FIRST CERVICAL VERTEBRA N/A 12/13/2018    Procedure: DECOMPRESSION, CHIARI MALFORMATION, BY 1ST CERVICAL VERTEBRA POSTERIOR ARCH REMOVAL;  Surgeon: Sergio Busch MD;  Location: [address];  Service: Neurosurgery;  Laterality: N/A;    EPIDURAL STEROID INJECTION INTO CERVICAL SPINE N/A 12/05/2019    Procedure: Injection-steroid-epidural-cervical-C7-T1;  Surgeon:  "Noa Samano MD;  Location: [facility] OR;  Service: Neurosurgery;  Laterality: N/A;    ESOPHAGOGASTRODUODENOSCOPY N/A 5/12/2023    Procedure: EGD (ESOPHAGOGASTRODUODENOSCOPY);  Surgeon: Martín Sagastume MD;  Location: [facility] ENDO;  Service: Endoscopy;  Laterality: N/A;    FRACTURE SURGERY Right     Ankle    INJECTION OF ANESTHETIC AGENT AROUND MEDIAL BRANCH NERVES INNERVATING CERVICAL FACET JOINT Bilateral 08/06/2020    Procedure: Block-nerve-medial branch-cervical Bilateral  C3,4,5;  Surgeon: Noa Samano MD;  Location: [facility] OR;  Service: Anesthesiology;  Laterality: Bilateral;    INJECTION OF ANESTHETIC AGENT AROUND MEDIAL BRANCH NERVES INNERVATING CERVICAL FACET JOINT Bilateral 08/20/2020    Procedure: Block-nerve-medial branch-cervical Bilateral C3-4-5;  Surgeon: Noa Samano MD;  Location: [facility] OR;  Service: Anesthesiology;  Laterality: Bilateral;    LAPAROSCOPIC GASTRIC BANDING      with subsequent removal due to abscess    LASIK Bilateral     2009    LIPOSUCTION      x 2    PELVIC LAPAROSCOPY      for endometriosis eval with BTL    RADIOFREQUENCY ABLATION Bilateral 08/16/2021    Procedure: Radiofrequency Ablation Cervical C3-C5;  Surgeon: Robert Munoz MD;  Location: [facility] OR;  Service: Pain Management;  Laterality: Bilateral;    RADIOFREQUENCY THERMAL COAGULATION OF MEDIAL BRANCH OF POSTERIOR RAMUS OF CERVICAL SPINAL NERVE Bilateral 08/27/2020    Procedure: RADIOFREQUENCY THERMAL COAGULATION, NERVE, SPINAL, CERVICAL, POSTERIOR RAMUS, MEDIAL BRANCH C3-4-5, bilateral;  Surgeon: Noa Samano MD;  Location: [facility] OR;  Service: Anesthesiology;  Laterality: Bilateral;       Family History   Problem Relation Name Age of Onset    Retinal detachment Mother      Glaucoma Mother      Diabetes Mother      Stroke Mother  30    Mental illness Mother          depression    Hyperlipidemia Mother      Hypertension Mother      Hyperlipidemia Father      Heart disease Father          "athletes heart"    Glaucoma Sister      " "Diabetes Sister      Seizures Sister      Mitochondrial disorder Sister          "trent"    Mental illness Sister      Retinal detachment Sister      Seizures Sister      Cervical cancer Sister      Cervical cancer Sister      Ovarian cancer Paternal Aunt      Ovarian cancer Paternal Aunt Francine Mitchell     Retinal detachment Maternal Grandmother Madeline Henley     Colon cancer Maternal Grandmother Madeline Henley     Esophageal cancer Maternal Grandfather      Cancer Maternal Grandfather  65        throat ca    Ovarian cancer Cousin      Cervical cancer Paternal Cousin 1st     Cervical cancer Other niece     Breast cancer Neg Hx      Crohn's disease Neg Hx      Ulcerative colitis Neg Hx      Stomach cancer Neg Hx      Celiac disease Neg Hx      Macular degeneration Neg Hx         Social History     Socioeconomic History    Marital status:    Occupational History    Occupation: [email or web address]    Tobacco Use    Smoking status: Never    Smokeless tobacco: Never   Substance and Sexual Activity    Alcohol use: Yes     Comment: occasional "maybe monthly"    Drug use: No    Sexual activity: Yes     Partners: Male     Birth control/protection: See Surgical Hx, None     Comment: depo since age 18   Social History Narrative    Not on disability, not currently working.     Social Drivers of Health     Financial Resource Strain: Low Risk  (12/4/2023)    Overall Financial Resource Strain (CARDIA)     Difficulty of Paying Living Expenses: Not very hard   Food Insecurity: Food Insecurity Present (12/4/2023)    Hunger Vital Sign     Worried About Running Out of Food in the Last Year: Sometimes true     Ran Out of Food in the Last Year: Never true   Transportation Needs: Unmet Transportation Needs (12/4/2023)    PRAPARE - Transportation     Lack of Transportation (Medical): Yes     Lack of Transportation (Non-Medical): Yes   Physical Activity: Unknown (12/4/2023)    Exercise Vital Sign     Minutes of Exercise per Session: 0 min "   Recent Concern: Physical Activity - Inactive (12/4/2023)    Exercise Vital Sign     Days of Exercise per Week: 1 day     Minutes of Exercise per Session: 0 min   Stress: Stress Concern Present (12/4/2023)    [employer] [place] [place] [employer] - Occupational Stress Questionnaire     Feeling of Stress : Very much   Housing Stability: Low Risk  (12/4/2023)    Housing Stability Vital Sign     Unable to Pay for Housing in the Last Year: No     Number of Places Lived in the Last Year: 1     Unstable Housing in the Last Year: No       Current Outpatient Medications   Medication Sig Dispense Refill    acetaminophen (TYLENOL) 650 MG TbSR Take 2 tablets by mouth every 8 hours as needed for pain and fever 60 tablet 0    acetylcysteine (N-ACETYL-L-CYSTEINE MISC)       albuterol (PROVENTIL) 2.5 mg /3 mL (0.083 %) nebulizer solution Take 3 mLs (2.5 mg total) by nebulization every 4 (four) hours as needed for Wheezing or Shortness of Breath (chest tightness). Rescue 180 mL 6    albuterol (PROVENTIL/VENTOLIN HFA) 90 mcg/actuation inhaler Inhale 1-2 puffs into the lungs every 4 (four) hours as needed for Wheezing or Shortness of Breath (chest tightness). Rescue 8.5 g 3    ALPRAZolam (XANAX) 0.5 MG tablet Take 1 tablet (0.5 mg total) by mouth daily as needed for Anxiety. 30 tablet 0    ALPRAZolam (XANAX) 1 MG tablet Take 1 tablet by mouth 30 minutes before dental appointment. 1 tablet 0    buPROPion (WELLBUTRIN XL) 150 MG TB24 tablet Take 1 tablet (150 mg total) by mouth once daily. 90 tablet 1    dextroamphetamine-amphetamine (ADDERALL XR) 20 MG 24 hr capsule Take 1 capsule (20 mg total) by mouth every morning. 30 capsule 0    diphenoxylate-atropine 2.5-0.025 mg (LOMOTIL) 2.5-0.025 mg per tablet Take 1 tablet by mouth 2 (two) times daily as needed for Diarrhea. 60 tablet 0    EScitalopram oxalate (LEXAPRO) 20 MG tablet Take 1 tablet (20 mg total) by mouth once daily. 30 tablet 3    fluticasone propionate (FLONASE) 50  "mcg/actuation nasal spray Spray 1 spray (50 mcg total) in each nostril once daily. 16 g 2    gabapentin (NEURONTIN) 600 MG tablet TAKE 1 TABLET BY MOUTH 2 TO 3 TIMES A DAY FOR CHRONIC PAIN 90 tablet 11    galcanezumab-gnlm (EMGALITY PEN) 120 mg/mL PnIj ADMINISTER 1 ML UNDER THE SKIN EVERY 28 DAYS 1 mL 11    hydrOXYzine HCL (ATARAX) 25 MG tablet Take 1 tablet (25 mg total) by mouth 3 (three) times daily. 90 tablet 2    levOCARNitine (CARNITOR) 330 mg Tab Take 3 tablets (990 mg total) by mouth 2 (two) times daily. 180 tablet 2    LIDOcaine (LIDODERM) 5 % Apply one patch to affected area every 12 hours. Keep on for 12 hours, remove for 12 hours. 60 patch 5    montelukast (SINGULAIR) 10 mg tablet Take 1 tablet (10 mg total) by mouth once daily. 90 tablet 2    nebulizer and compressor (COMP-AIR NEBULIZER COMPRESSOR) Pat Use as directed 1 each 0    oxyCODONE-acetaminophen (PERCOCET) 5-325 mg per tablet Take 1 tablet by mouth once a day as needed for pain More than 7 days supply is medically necessary 30 tablet 0    [START ON 12/21/2024] oxyCODONE-acetaminophen (PERCOCET) 5-325 mg per tablet Take 1 tablet by mouth once a day as needed for pain More than 7 days supply is medically necessary 30 tablet 0    oxyCODONE-acetaminophen (PERCOCET) 5-325 mg per tablet Take 1 tablet by mouth once a day as needed for pain More than 7 days supply is medically necessary 30 tablet 0    SYNTHROID 137 mcg Tab tablet Take 2 tablets (274 mcg total) by mouth before breakfast. 180 tablet 3    syringe with needle (BD LUER-LAURIE SYRINGE) 3 mL 25 gauge x 1" Syrg Use for Toradol IM only. 8 each 0    tirzepatide, weight loss, (ZEPBOUND) 10 mg/0.5 mL PnIj Inject 10 mg into the skin every 7 days. 2 mL 0    tiZANidine (ZANAFLEX) 4 MG tablet Take 1 tablet by mouth three times a day as needed for  muscle spasm 90 tablet 11    traZODone (DESYREL) 100 MG tablet Take 1 tablet (100 mg total) by mouth every evening. 30 tablet 3     Current " Facility-Administered Medications   Medication Dose Route Frequency Provider Last Rate Last Admin    onabotulinumtoxina injection 200 Units  200 Units Intramuscular Q90 Days Padma Guevara MD   200 Units at 12/29/23 1005    onabotulinumtoxina injection 200 Units  200 Units Intramuscular Q90 Days Padma Guevara MD   200 Units at 03/22/24 1325    onabotulinumtoxina injection 200 Units  200 Units Intramuscular Q90 Days    200 Units at 09/06/24 1313    onabotulinumtoxina injection 200 Units  200 Units Intramuscular Q90 Days    200 Units at 12/03/24 1655       Review of patient's allergies indicates:   Allergen Reactions    Lamotrigine      Other reaction(s): Rash  Other reaction(s): Nausea    Sulfa (sulfonamide antibiotics) Nausea Only     Other reaction(s): Unknown    Adhesive Rash    Zonisamide Nausea And Vomiting     Nausea/vomiting        Review of System:   General: no chills, fever, night sweats, weight gain or weight loss  Psychological: no depression or suicidal ideation  Breasts: no new or changing breast lumps, nipple discharge or masses.  Respiratory: no cough, shortness of breath, or wheezing  Cardiovascular: no chest pain or dyspnea on exertion  Gastrointestinal: no abdominal pain, change in bowel habits, or black or bloody stools  Genito-Urinary: no incontinence, urinary frequency/urgency or vulvar/vaginal symptoms,  or abnormal vaginal bleeding.  Musculoskeletal: no gait disturbance or muscular weakness                                               General Appearance    A and O x 4, Cooperative, no distress   Breasts    Abdomen   Symmetrical, no masses, no discharge, skin changes , erythema or retraction. No adenopathy  Soft, non-tender, bowel sounds active all four quadrants,  no masses, no organomegaly    Genitourinary:   External rectal exam shows no thrombosed external hemorrhoids.   Pelvic exam was performed with patient supine.  No labial fusion.  There is no rash, lesion or injury on  the right labia.  There is no rash, lesion or injury on the left labia.  No bleeding and no signs of injury around the vaginal introitus, urethra is without lesions and well supported. The cervix is visualized with no discharge, lesions or friability.  No vaginal discharge found.  No significant Cystocele, Enterocele or rectocele, and uterus well supported.  Bimanual exam:  The urethra is normal to palpation and there are no palpable vaginal wall masses.  Uterus is not deviated, not enlarged, not fixed, normal shape and not tender.  Cervix exhibits no motion tenderness.   Right adnexum displays no mass and no tenderness.  Left adnexum displays no mass and no tenderness.   Extremities: Extremities normal, atraumatic, no cyanosis or edema                     NOTE  NURSING PERSONAL PRESENT FOR ENTIRE PHYSICAL EXAM   PAP submitted    I discussed etiologies of PP and rec pelvic u/s for baseline  Supportive care  BSE monthly  Screening mammogram and repeat yearly  Will follow  RTO 1 year/prn    I spent a total of 30 minutes on the day of the visit. This includes face to face time and non-face to face time preparing to see the patient (eg, review of tests), obtaining and/or reviewing separately obtained history, documenting clinical information in the electronic or other health record, independently interpreting results and communicating results to the patient/family/caregiver, or care coordinator.
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
## 2024-12-10 ENCOUNTER — OFFICE VISIT (OUTPATIENT)
Dept: PSYCHIATRY | Facility: CLINIC | Age: 49
End: 2024-12-10
Payer: MEDICARE

## 2024-12-10 DIAGNOSIS — F41.0 PANIC DISORDER WITHOUT AGORAPHOBIA: ICD-10-CM

## 2024-12-10 DIAGNOSIS — F90.2 ATTENTION DEFICIT HYPERACTIVITY DISORDER (ADHD), COMBINED TYPE: Primary | ICD-10-CM

## 2024-12-10 DIAGNOSIS — F60.3 BORDERLINE PERSONALITY DISORDER: ICD-10-CM

## 2024-12-10 DIAGNOSIS — G47.00 INSOMNIA, UNSPECIFIED TYPE: ICD-10-CM

## 2024-12-10 DIAGNOSIS — F43.10 PTSD (POST-TRAUMATIC STRESS DISORDER): ICD-10-CM

## 2024-12-10 PROCEDURE — 3044F HG A1C LEVEL LT 7.0%: CPT | Mod: CPTII,95,, | Performed by: PSYCHOLOGIST

## 2024-12-10 PROCEDURE — 90833 PSYTX W PT W E/M 30 MIN: CPT | Mod: 95,,, | Performed by: PSYCHOLOGIST

## 2024-12-10 PROCEDURE — G2211 COMPLEX E/M VISIT ADD ON: HCPCS | Mod: 95,,, | Performed by: PSYCHOLOGIST

## 2024-12-10 PROCEDURE — 1159F MED LIST DOCD IN RCRD: CPT | Mod: CPTII,95,, | Performed by: PSYCHOLOGIST

## 2024-12-10 PROCEDURE — 99215 OFFICE O/P EST HI 40 MIN: CPT | Mod: 95,,, | Performed by: PSYCHOLOGIST

## 2024-12-10 RX ORDER — BUSPIRONE HYDROCHLORIDE 10 MG/1
10 TABLET ORAL 2 TIMES DAILY
Qty: 60 TABLET | Refills: 1 | Status: SHIPPED | OUTPATIENT
Start: 2024-12-10 | End: 2024-12-11 | Stop reason: SDUPTHER

## 2024-12-11 ENCOUNTER — TELEPHONE (OUTPATIENT)
Dept: PHARMACY | Facility: CLINIC | Age: 49
End: 2024-12-11
Payer: MEDICARE

## 2024-12-11 ENCOUNTER — OFFICE VISIT (OUTPATIENT)
Dept: HOME HEALTH SERVICES | Facility: CLINIC | Age: 49
End: 2024-12-11
Payer: MEDICARE

## 2024-12-11 VITALS
HEIGHT: 67 IN | DIASTOLIC BLOOD PRESSURE: 75 MMHG | BODY MASS INDEX: 32.18 KG/M2 | SYSTOLIC BLOOD PRESSURE: 105 MMHG | HEART RATE: 76 BPM | WEIGHT: 205 LBS | OXYGEN SATURATION: 98 %

## 2024-12-11 DIAGNOSIS — R29.6 FALLS: ICD-10-CM

## 2024-12-11 DIAGNOSIS — R41.3 MEMORY LOSS: ICD-10-CM

## 2024-12-11 DIAGNOSIS — G93.5 CHIARI MALFORMATION TYPE I: ICD-10-CM

## 2024-12-11 DIAGNOSIS — F33.2 MAJOR DEPRESSIVE DISORDER, RECURRENT, SEVERE WITHOUT PSYCHOTIC FEATURES: ICD-10-CM

## 2024-12-11 DIAGNOSIS — M79.18 CERVICAL MYOFASCIAL PAIN SYNDROME: ICD-10-CM

## 2024-12-11 DIAGNOSIS — M54.81 BILATERAL OCCIPITAL NEURALGIA: ICD-10-CM

## 2024-12-11 DIAGNOSIS — R20.2 NUMBNESS AND TINGLING: ICD-10-CM

## 2024-12-11 DIAGNOSIS — G47.33 OSA (OBSTRUCTIVE SLEEP APNEA): ICD-10-CM

## 2024-12-11 DIAGNOSIS — E66.09 CLASS 1 OBESITY DUE TO EXCESS CALORIES WITH SERIOUS COMORBIDITY AND BODY MASS INDEX (BMI) OF 32.0 TO 32.9 IN ADULT: ICD-10-CM

## 2024-12-11 DIAGNOSIS — G47.09 OTHER INSOMNIA: ICD-10-CM

## 2024-12-11 DIAGNOSIS — F60.3 BORDERLINE PERSONALITY DISORDER: ICD-10-CM

## 2024-12-11 DIAGNOSIS — F43.10 PTSD (POST-TRAUMATIC STRESS DISORDER): ICD-10-CM

## 2024-12-11 DIAGNOSIS — R55 SYNCOPE, UNSPECIFIED SYNCOPE TYPE: ICD-10-CM

## 2024-12-11 DIAGNOSIS — E66.811 CLASS 1 OBESITY DUE TO EXCESS CALORIES WITH SERIOUS COMORBIDITY AND BODY MASS INDEX (BMI) OF 32.0 TO 32.9 IN ADULT: ICD-10-CM

## 2024-12-11 DIAGNOSIS — F41.0 PANIC DISORDER WITHOUT AGORAPHOBIA: ICD-10-CM

## 2024-12-11 DIAGNOSIS — G43.719 CHRONIC MIGRAINE WITHOUT AURA, INTRACTABLE, WITHOUT STATUS MIGRAINOSUS: ICD-10-CM

## 2024-12-11 DIAGNOSIS — Z00.00 ENCOUNTER FOR PREVENTIVE HEALTH EXAMINATION: Primary | ICD-10-CM

## 2024-12-11 DIAGNOSIS — R20.0 NUMBNESS AND TINGLING: ICD-10-CM

## 2024-12-11 DIAGNOSIS — F90.2 ATTENTION DEFICIT HYPERACTIVITY DISORDER (ADHD), COMBINED TYPE: ICD-10-CM

## 2024-12-11 PROCEDURE — 3044F HG A1C LEVEL LT 7.0%: CPT | Mod: CPTII,S$GLB,, | Performed by: NURSE PRACTITIONER

## 2024-12-11 PROCEDURE — 3078F DIAST BP <80 MM HG: CPT | Mod: CPTII,S$GLB,, | Performed by: NURSE PRACTITIONER

## 2024-12-11 PROCEDURE — 3074F SYST BP LT 130 MM HG: CPT | Mod: CPTII,S$GLB,, | Performed by: NURSE PRACTITIONER

## 2024-12-11 PROCEDURE — G0439 PPPS, SUBSEQ VISIT: HCPCS | Mod: S$GLB,,, | Performed by: NURSE PRACTITIONER

## 2024-12-11 RX ORDER — BUSPIRONE HYDROCHLORIDE 10 MG/1
10 TABLET ORAL 2 TIMES DAILY
Qty: 60 TABLET | Refills: 1 | Status: SHIPPED | OUTPATIENT
Start: 2024-12-11 | End: 2025-12-11

## 2024-12-11 NOTE — TELEPHONE ENCOUNTER
Nurtec does not currently appear in the patient's med list in [facility], so unfortunately, we are unable to check possible assistance opportunities at this time. If the provider would still like assistance for Nurtec, please add requested medication to the [facility] med list.       We have reached out to Ms. Mitchell via letter to inform her of the [facility] application process for Em[employer]. A follow-up phone call will be made in 5 business days.    Anna Smallwood  Pharmacy Patient Assistance Team

## 2024-12-11 NOTE — PROGRESS NOTES
"Dniah Mitchell presented for an initial Medicare AWV today. The following components were reviewed and updated:    Medical history  Family History  Social history  Allergies and Current Medications  Health Risk Assessment  Health Maintenance  Care Team    **See Completed Assessments for Annual Wellness visit with in the encounter summary    The following assessments were completed:  Depression Screening  Cognitive function Screening  Timed Get Up Test  Whisper Test      Opioid documentation:      Patient does have a current opioid prescription.      Patient accepted further discussion regarding opioid medication use.      Patient is currently taking oxycodone narcotic for neck/head and generalized pain.        Pain level today is 5/10.       In addition to narcotic pain medications, patient is also using NSAIDs and acetaminophen for pain control.       Patient is followed by a specialist currently for their pain and will not be referred today.       Patient's opioid risk potential based on ORT-OUD tool:       Bowen each box that applies   No   Yes     Family history of substance abuse   Alcohol [x] []   Illegal drugs [x] []   Rx drugs [x] []     Personal history of substance abuse   Alcohol [x] []   Illegal drugs [x] []   Rx drugs [x] []     Age between 16-45 years   [x]   []     Patient with ADD, OCD, Bipolar disorder, schizoprenia   []   [x]     Patient with depression   []   [x]                         Scoring total                                                    2             Non-opioid treatment options have been discussed today and added to the patient's after visit summary.          Vitals:    12/11/24 0907   BP: 105/75   BP Location: Left arm   Patient Position: Sitting   Pulse: 76   SpO2: 98%   Weight: 93 kg (205 lb)   Height: 5' 7" (1.702 m)     Body mass index is 32.11 kg/m².       Physical Exam  Constitutional:       Appearance: Normal appearance.   HENT:      Head: Normocephalic and atraumatic.      " Nose: Nose normal.      Mouth/Throat:      Mouth: Mucous membranes are moist.   Eyes:      Extraocular Movements: Extraocular movements intact.      Pupils: Pupils are equal, round, and reactive to light.   Cardiovascular:      Rate and Rhythm: Normal rate and regular rhythm.   Pulmonary:      Effort: Pulmonary effort is normal.      Breath sounds: Normal breath sounds.   Abdominal:      General: Bowel sounds are normal.      Palpations: Abdomen is soft.   Musculoskeletal:         General: Normal range of motion.      Cervical back: Normal range of motion and neck supple.   Skin:     General: Skin is warm and dry.   Neurological:      General: No focal deficit present.      Mental Status: She is alert and oriented to person, place, and time.   Psychiatric:         Mood and Affect: Mood normal.         Behavior: Behavior normal.           Diagnoses and health risks identified today and associated recommendations/orders:  1. Encounter for preventive health examination  Awv completed      2. Memory loss  Complaints of ST memory loss  - Ambulatory referral/consult to Neurology; Future  - Ambulatory referral/consult to Pharmacy Assistance; Future    3. Numbness and tingling  - Ambulatory referral/consult to Neurology; Future  - Ambulatory referral/consult to Pharmacy Assistance; Future    4. Falls  Safety     5. Chiari malformation type I  Seen by Neurosurgery  - Ambulatory referral/consult to Neurology; Future    6. Other insomnia  Trazodone per [facility]      7. Chronic migraine without aura, intractable, without status migrainosus  Seeing Iris Hammonds and on emgality  - Ambulatory referral/consult to Pharmacy Assistance; Future    8. Class 1 obesity due to excess calories with serious comorbidity and body mass index (BMI) of 32.0 to 32.9 in adult  Working on weight loss    9. Cervical myofascial pain syndrome  Chronic and stable. Continue current treatment. Follow with PCP.  Tizanadine      10. Bilateral occipital  neuralgia  Chronic and stable. Continue current treatment. Follow with PCP.  Seeing pain mangement      11. Syncope, unspecified syncope type  Seeing cardiology - tesing to be done      12. PATRICIA (obstructive sleep apnea)  Chronic and stable. Continue current treatment. Follow with PCP.  Not wearing CPAP      13. PTSD (post-traumatic stress disorder)  Chronic and stable. Continue current treatment. Follow with PCP.  On meds -seeing Psych and SW    14. Panic disorder without agoraphobia  Chronic and stable. Continue current treatment. Follow with PCP.      15. Major depressive disorder, recurrent, severe without psychotic features  On meds per psych    16. Attention deficit hyperactivity disorder (ADHD), combined type  Chronic and stable. Continue current treatment. Follow with PCP.    17. Borderline personality disorder  Chronic and stable. Continue current treatment. Follow with PCP.      Provided Tashai with a 5-10 year written screening schedule and personal prevention plan. Recommendations were developed using the USPSTF age appropriate recommendations. Education, counseling, and referrals were provided as needed.  After Visit Summary printed and given to patient which includes a list of additional screenings\tests needed.    Fu in1 yr for barb Costello, YONG, APRN

## 2024-12-11 NOTE — PATIENT INSTRUCTIONS
Counseling and Referral of Other Preventative  (Italic type indicates deductible and co-insurance are waived)    Patient Name: Dinah Mitchell  Today's Date: 12/11/2024    Health Maintenance       Date Due Completion Date    Sign Pain Contract Never done ---    Urine Drug Screen 10/05/2016 10/5/2015    TETANUS VACCINE 06/13/2024 6/13/2014    Lipid Panel 01/02/2025 1/2/2024    Mammogram 12/06/2025 12/6/2024    Hemoglobin A1c (Diabetic Prevention Screening) 01/02/2027 1/2/2024    Colorectal Cancer Screening 09/29/2027 9/29/2022    Cervical Cancer Screening 12/21/2027 12/6/2024    RSV Vaccine (Age 60+ and Pregnant patients) (1 - 1-dose 75+ series) 02/13/2052 ---        Orders Placed This Encounter   Procedures    Ambulatory referral/consult to Neurology    Ambulatory referral/consult to Pharmacy Assistance     The following information is provided to all patients.  This information is to help you find resources for any of the problems found today that may be affecting your health:                  Living healthy guide: www.[facility].[place].gov      Understanding Diabetes: www.diabetes.org      Eating healthy: www.cdc.gov/healthyweight      CDC home safety checklist: www.cdc.gov/steadi/patient.html      Agency on Aging: www.goea.[place].gov      Alcoholics anonymous (AA): www.aa.org      Physical Activity: www.dave.nih.gov/mt0ticf      Tobacco use: www.quitwithusla.org

## 2024-12-11 NOTE — LETTER
December 11, 2024    Dinah Mitchell  [address] [facility] [address]             Dear MsMarli Stephen,      My name is Anna Smallwood  I am reaching out on behalf of [facility]s Pharmacy Patient Assistance Team in regards to your request for medication assistance. Our goal is to assist qualified [facility] patients with obtaining financial assistance for prescribed medications.     Please note that enrollment into available support may require the following documents:      Proof of household Income (such as social security statement, pension statement,most recently filed taxes or 3 consecutive pay stubs)  Copy of all insurance cards (front and back)  Print out from your insurance or pharmacy showing how much you have spent on prescriptions for the current year  Completed Medication Access Center Authorization Forms       Please reach out to my phone number below if you are still in need of assistance with your medications. Follow-up call will be made in 5 business days. We look forward to hearing from you soon!    Thank you for choosing [facility] for your healthcare needs      Sincerely  Anna CLOUD @[phone]  Pharmacy Patient Assistance Team  [address] [place] [address]  [address]  [place] [address]  Fax: [phone]  Email: [email or web address]

## 2024-12-16 ENCOUNTER — HOSPITAL ENCOUNTER (OUTPATIENT)
Dept: RADIOLOGY | Facility: HOSPITAL | Age: 49
Discharge: HOME | End: 2024-12-16
Attending: INTERNAL MEDICINE
Payer: MEDICARE

## 2024-12-16 DIAGNOSIS — I77.6 ARTERITIS, UNSPECIFIED: ICD-10-CM

## 2024-12-16 PROCEDURE — 25500020 PHARM REV CODE 255: Mod: PO | Performed by: INTERNAL MEDICINE

## 2024-12-16 PROCEDURE — 70498 CT ANGIOGRAPHY NECK: CPT | Mod: 26,,, | Performed by: RADIOLOGY

## 2024-12-16 PROCEDURE — 70498 CT ANGIOGRAPHY NECK: CPT | Mod: TC,PO

## 2024-12-16 PROCEDURE — 70496 CT ANGIOGRAPHY HEAD: CPT | Mod: 26,,, | Performed by: RADIOLOGY

## 2024-12-16 RX ADMIN — IOHEXOL 100 ML: 350 INJECTION, SOLUTION INTRAVENOUS at 10:12

## 2024-12-26 DIAGNOSIS — F90.2 ATTENTION DEFICIT HYPERACTIVITY DISORDER (ADHD), COMBINED TYPE: ICD-10-CM

## 2024-12-26 RX ORDER — DEXTROAMPHETAMINE SACCHARATE, AMPHETAMINE ASPARTATE MONOHYDRATE, DEXTROAMPHETAMINE SULFATE AND AMPHETAMINE SULFATE 5; 5; 5; 5 MG/1; MG/1; MG/1; MG/1
20 CAPSULE, EXTENDED RELEASE ORAL EVERY MORNING
Qty: 30 CAPSULE | Refills: 0 | Status: CANCELLED | OUTPATIENT
Start: 2024-12-26

## 2024-12-27 RX ORDER — DEXTROAMPHETAMINE SACCHARATE, AMPHETAMINE ASPARTATE MONOHYDRATE, DEXTROAMPHETAMINE SULFATE AND AMPHETAMINE SULFATE 5; 5; 5; 5 MG/1; MG/1; MG/1; MG/1
20 CAPSULE, EXTENDED RELEASE ORAL EVERY MORNING
Qty: 30 CAPSULE | Refills: 0 | Status: SHIPPED | OUTPATIENT
Start: 2024-12-27
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
## 2025-01-14 ENCOUNTER — OFFICE VISIT (OUTPATIENT)
Dept: PSYCHIATRY | Facility: CLINIC | Age: 50
End: 2025-01-14
Payer: MEDICARE

## 2025-01-14 DIAGNOSIS — F41.0 PANIC DISORDER WITHOUT AGORAPHOBIA: ICD-10-CM

## 2025-01-14 DIAGNOSIS — F90.2 ATTENTION DEFICIT HYPERACTIVITY DISORDER (ADHD), COMBINED TYPE: Primary | ICD-10-CM

## 2025-01-14 DIAGNOSIS — F60.3 BORDERLINE PERSONALITY DISORDER: ICD-10-CM

## 2025-01-14 DIAGNOSIS — G47.00 INSOMNIA, UNSPECIFIED TYPE: ICD-10-CM

## 2025-01-14 RX ORDER — BUSPIRONE HYDROCHLORIDE 15 MG/1
15 TABLET ORAL 3 TIMES DAILY
Qty: 90 TABLET | Refills: 1 | Status: SHIPPED | OUTPATIENT
Start: 2025-01-14 | End: 2026-01-14

## 2025-01-14 RX ORDER — ESCITALOPRAM OXALATE 20 MG/1
20 TABLET ORAL DAILY
Qty: 30 TABLET | Refills: 3 | Status: SHIPPED | OUTPATIENT
Start: 2025-01-14 | End: 2026-01-14

## 2025-01-15 RX ORDER — LIDOCAINE 50 MG/G
PATCH TOPICAL
Qty: 60 PATCH | Refills: 5 | Status: SHIPPED | OUTPATIENT
Start: 2025-01-15

## 2025-01-17 ENCOUNTER — LAB VISIT (OUTPATIENT)
Dept: LAB | Facility: HOSPITAL | Age: 50
End: 2025-01-17
Attending: FAMILY MEDICINE
Payer: MEDICARE

## 2025-01-17 DIAGNOSIS — E03.4 HYPOTHYROIDISM DUE TO ACQUIRED ATROPHY OF THYROID: ICD-10-CM

## 2025-01-17 LAB
ALBUMIN SERPL BCP-MCNC: 3.9 G/DL (ref 3.5–5.2)
ALP SERPL-CCNC: 47 U/L (ref 40–150)
ALT SERPL W/O P-5'-P-CCNC: 15 U/L (ref 10–44)
ANION GAP SERPL CALC-SCNC: 8 MMOL/L (ref 8–16)
AST SERPL-CCNC: 13 U/L (ref 10–40)
BASOPHILS # BLD AUTO: 0.02 K/UL (ref 0–0.2)
BASOPHILS NFR BLD: 0.4 % (ref 0–1.9)
BILIRUB SERPL-MCNC: 0.5 MG/DL (ref 0.1–1)
BUN SERPL-MCNC: 9 MG/DL (ref 6–20)
CALCIUM SERPL-MCNC: 9.6 MG/DL (ref 8.7–10.5)
CHLORIDE SERPL-SCNC: 108 MMOL/L (ref 95–110)
CHOLEST SERPL-MCNC: 168 MG/DL (ref 120–199)
CHOLEST/HDLC SERPL: 4.7 {RATIO} (ref 2–5)
CO2 SERPL-SCNC: 24 MMOL/L (ref 23–29)
CREAT SERPL-MCNC: 1 MG/DL (ref 0.5–1.4)
DIFFERENTIAL METHOD BLD: ABNORMAL
EOSINOPHIL # BLD AUTO: 0.1 K/UL (ref 0–0.5)
EOSINOPHIL NFR BLD: 2 % (ref 0–8)
ERYTHROCYTE [DISTWIDTH] IN BLOOD BY AUTOMATED COUNT: 12.4 % (ref 11.5–14.5)
EST. GFR  (NO RACE VARIABLE): >60 ML/MIN/1.73 M^2
GLUCOSE SERPL-MCNC: 88 MG/DL (ref 70–110)
HCT VFR BLD AUTO: 39.6 % (ref 37–48.5)
HDLC SERPL-MCNC: 36 MG/DL (ref 40–75)
HDLC SERPL: 21.4 % (ref 20–50)
HGB BLD-MCNC: 12.9 G/DL (ref 12–16)
IMM GRANULOCYTES # BLD AUTO: 0.01 K/UL (ref 0–0.04)
IMM GRANULOCYTES NFR BLD AUTO: 0.2 % (ref 0–0.5)
LDLC SERPL CALC-MCNC: 118 MG/DL (ref 63–159)
LYMPHOCYTES # BLD AUTO: 1.5 K/UL (ref 1–4.8)
LYMPHOCYTES NFR BLD: 30.5 % (ref 18–48)
MCH RBC QN AUTO: 31.9 PG (ref 27–31)
MCHC RBC AUTO-ENTMCNC: 32.6 G/DL (ref 32–36)
MCV RBC AUTO: 98 FL (ref 82–98)
MONOCYTES # BLD AUTO: 0.4 K/UL (ref 0.3–1)
MONOCYTES NFR BLD: 8.4 % (ref 4–15)
NEUTROPHILS # BLD AUTO: 2.9 K/UL (ref 1.8–7.7)
NEUTROPHILS NFR BLD: 58.5 % (ref 38–73)
NONHDLC SERPL-MCNC: 132 MG/DL
NRBC BLD-RTO: 0 /100 WBC
PLATELET # BLD AUTO: 236 K/UL (ref 150–450)
PMV BLD AUTO: 11.7 FL (ref 9.2–12.9)
POTASSIUM SERPL-SCNC: 5.1 MMOL/L (ref 3.5–5.1)
PROT SERPL-MCNC: 6.6 G/DL (ref 6–8.4)
RBC # BLD AUTO: 4.04 M/UL (ref 4–5.4)
SODIUM SERPL-SCNC: 140 MMOL/L (ref 136–145)
T4 FREE SERPL-MCNC: 1.54 NG/DL (ref 0.71–1.51)
TRIGL SERPL-MCNC: 70 MG/DL (ref 30–150)
TSH SERPL DL<=0.005 MIU/L-ACNC: <0.01 UIU/ML (ref 0.4–4)
WBC # BLD AUTO: 4.88 K/UL (ref 3.9–12.7)

## 2025-01-17 PROCEDURE — 80061 LIPID PANEL: CPT | Performed by: FAMILY MEDICINE

## 2025-01-17 PROCEDURE — 84443 ASSAY THYROID STIM HORMONE: CPT | Performed by: FAMILY MEDICINE

## 2025-01-17 PROCEDURE — 84439 ASSAY OF FREE THYROXINE: CPT | Performed by: FAMILY MEDICINE

## 2025-01-17 PROCEDURE — 80053 COMPREHEN METABOLIC PANEL: CPT | Performed by: FAMILY MEDICINE

## 2025-01-17 PROCEDURE — 36415 COLL VENOUS BLD VENIPUNCTURE: CPT | Mod: PO | Performed by: FAMILY MEDICINE

## 2025-01-17 PROCEDURE — 85025 COMPLETE CBC W/AUTO DIFF WBC: CPT | Performed by: FAMILY MEDICINE

## 2025-01-20 ENCOUNTER — PATIENT MESSAGE (OUTPATIENT)
Dept: FAMILY MEDICINE | Facility: CLINIC | Age: 50
End: 2025-01-20
Payer: MEDICARE

## 2025-01-20 DIAGNOSIS — E03.4 HYPOTHYROIDISM DUE TO ACQUIRED ATROPHY OF THYROID: Primary | ICD-10-CM

## 2025-01-20 RX ORDER — LEVOTHYROXINE SODIUM 125 UG/1
250 TABLET ORAL
Qty: 60 TABLET | Refills: 1 | Status: SHIPPED | OUTPATIENT
Start: 2025-01-20 | End: 2026-01-20

## 2025-01-20 RX ORDER — LEVOTHYROXINE SODIUM 125 UG/1
250 TABLET ORAL
Qty: 60 TABLET | Refills: 1 | Status: SHIPPED | OUTPATIENT
Start: 2025-01-20 | End: 2025-01-20 | Stop reason: SDUPTHER

## 2025-01-20 NOTE — TELEPHONE ENCOUNTER
Care Due:                  Date            Visit Type   Department     Provider  --------------------------------------------------------------------------------                                MYCHART                              FOLLOWUP/OF  [facility] Amaya Jeterjordan  Last Visit: 11-      FICE VISIT   MEDICINE       Anger                              EP -                              PRIMARY      [facility] Amaya Jeterjordan  Next Visit: 05-      CARE ([facility])   MEDICINE       Anger                                                            Last  Test          Frequency    Reason                     Performed    Due Date  --------------------------------------------------------------------------------    HBA1C.......  6 months...  tirzepatide,.............  01- 07-    [employer] Embedded Care Due Messages. Reference number: [id number].   1/20/2025 3:12:36 PM CST

## 2025-01-24 RX ORDER — LEVOCARNITINE 330 MG/1
990 TABLET ORAL 2 TIMES DAILY
Qty: 180 TABLET | Refills: 2 | Status: SHIPPED | OUTPATIENT
Start: 2025-01-24

## 2025-01-24 NOTE — TELEPHONE ENCOUNTER
Refill Routing Note   Medication(s) are not appropriate for processing by [facility] for the following reason(s):        Outside of protocol    [facility] action(s):  Route               Appointments  past 12m or future 3m with PCP    Date Provider   Last Visit   11/21/2024 Amaya Aiken MD   Next Visit   5/23/2025 Amaya Aiken MD   ED visits in past 90 days: 0        Note composed:8:28 AM 01/24/2025

## 2025-02-05 DIAGNOSIS — G43.719 CHRONIC MIGRAINE WITHOUT AURA, INTRACTABLE, WITHOUT STATUS MIGRAINOSUS: ICD-10-CM

## 2025-02-05 RX ORDER — GALCANEZUMAB 120 MG/ML
INJECTION, SOLUTION SUBCUTANEOUS
Qty: 1 ML | Refills: 11 | Status: CANCELLED | OUTPATIENT
Start: 2025-02-05

## 2025-02-06 ENCOUNTER — OFFICE VISIT (OUTPATIENT)
Dept: CARDIOLOGY | Facility: CLINIC | Age: 50
End: 2025-02-06
Payer: MEDICARE

## 2025-02-06 VITALS
HEART RATE: 71 BPM | SYSTOLIC BLOOD PRESSURE: 116 MMHG | HEIGHT: 67 IN | DIASTOLIC BLOOD PRESSURE: 67 MMHG | BODY MASS INDEX: 29.58 KG/M2 | WEIGHT: 188.5 LBS

## 2025-02-06 DIAGNOSIS — F60.3 BORDERLINE PERSONALITY DISORDER: ICD-10-CM

## 2025-02-06 DIAGNOSIS — G43.719 CHRONIC MIGRAINE WITHOUT AURA, INTRACTABLE, WITHOUT STATUS MIGRAINOSUS: Primary | ICD-10-CM

## 2025-02-06 DIAGNOSIS — G43.719 INTRACTABLE CHRONIC MIGRAINE WITHOUT AURA AND WITHOUT STATUS MIGRAINOSUS: ICD-10-CM

## 2025-02-06 DIAGNOSIS — G43.719 CHRONIC MIGRAINE WITHOUT AURA, INTRACTABLE, WITHOUT STATUS MIGRAINOSUS: ICD-10-CM

## 2025-02-06 DIAGNOSIS — G93.5 CHIARI MALFORMATION TYPE I: ICD-10-CM

## 2025-02-06 DIAGNOSIS — Z98.84 BARIATRIC SURGERY STATUS: ICD-10-CM

## 2025-02-06 DIAGNOSIS — F90.2 ATTENTION DEFICIT HYPERACTIVITY DISORDER (ADHD), COMBINED TYPE: ICD-10-CM

## 2025-02-06 DIAGNOSIS — M47.812 CERVICAL SPONDYLOSIS: ICD-10-CM

## 2025-02-06 DIAGNOSIS — R27.0 ATAXIA: ICD-10-CM

## 2025-02-06 DIAGNOSIS — M54.12 CERVICAL RADICULOPATHY: ICD-10-CM

## 2025-02-06 PROCEDURE — 1159F MED LIST DOCD IN RCRD: CPT | Mod: CPTII,S$GLB,, | Performed by: INTERNAL MEDICINE

## 2025-02-06 PROCEDURE — 3078F DIAST BP <80 MM HG: CPT | Mod: CPTII,S$GLB,, | Performed by: INTERNAL MEDICINE

## 2025-02-06 PROCEDURE — 93010 ELECTROCARDIOGRAM REPORT: CPT | Mod: S$GLB,,, | Performed by: INTERNAL MEDICINE

## 2025-02-06 PROCEDURE — 99214 OFFICE O/P EST MOD 30 MIN: CPT | Mod: S$GLB,,, | Performed by: INTERNAL MEDICINE

## 2025-02-06 PROCEDURE — 99999 PR PBB SHADOW E&M-EST. PATIENT-LVL II: CPT | Mod: PBBFAC,,, | Performed by: INTERNAL MEDICINE

## 2025-02-06 PROCEDURE — 93005 ELECTROCARDIOGRAM TRACING: CPT | Mod: PO

## 2025-02-06 PROCEDURE — 3008F BODY MASS INDEX DOCD: CPT | Mod: CPTII,S$GLB,, | Performed by: INTERNAL MEDICINE

## 2025-02-06 PROCEDURE — 3074F SYST BP LT 130 MM HG: CPT | Mod: CPTII,S$GLB,, | Performed by: INTERNAL MEDICINE

## 2025-02-06 RX ORDER — GALCANEZUMAB 120 MG/ML
INJECTION, SOLUTION SUBCUTANEOUS
Qty: 1 ML | Refills: 11 | Status: SHIPPED | OUTPATIENT
Start: 2025-02-06

## 2025-02-06 NOTE — PROGRESS NOTES
Subjective:    Patient ID:  Dinah Mitchell is a 49 y.o. female patient here for evaluation Results (Pt stated every night when she stands up she get dizzy, have headaches, and feels tingling in toes and leg)      History of Present Illness:  Test results.  CTA negative for vascular structural issues.  Repeat bubble contrast echo negative for shunt.  Normal EF.  No valvular , structural abnormalities.             Review of patient's allergies indicates:   Allergen Reactions    Lamotrigine      Other reaction(s): Rash  Other reaction(s): Nausea    Sulfa (sulfonamide antibiotics) Nausea Only     Other reaction(s): Unknown    Adhesive Rash    Zonisamide Nausea And Vomiting     Nausea/vomiting        Past Medical History:   Diagnosis Date    Arnold-Chiari deformity     s/p decompression    Arthritis     Asthma     Borderline personality disorder     Carnitine deficiency     Colon polyp     Depression     Fatty liver     noted on 8/21 CT    Fibromyalgia     Headache     History of abnormal cervical Pap smear     colpo/ LEEP and CKC    History of nephrolithiasis     kidney stones    Hypothyroidism     IBS (irritable bowel syndrome)     GI smith negative    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     PATRICIA (obstructive sleep apnea)     severe - doesn't use CPAP    Panic attack     PTSD (post-traumatic stress disorder)      Past Surgical History:   Procedure Laterality Date    ANKLE SURGERY Right     x2 (#1 for ligament injury and #2 for fx and ligament)    AUGMENTATION OF BREAST      BILATERAL TUBAL LIGATION      BRAIN SURGERY  12/2018    decompression/craniotomy Arnold-Chiari syndrome    BREAST SURGERY  1997    B implants    CERVICAL BIOPSY  W/ LOOP ELECTRODE EXCISION      COLD KNIFE CONIZATION OF CERVIX N/A 03/17/2022    Procedure: CONE BIOPSY, CERVIX, USING COLD KNIFE;  Surgeon: Nikhil Franco MD;  Location: [facility];  Service: OB/GYN;  Laterality: N/A;    COLONOSCOPY N/A 09/29/2022    Procedure: COLONOSCOPY;   Surgeon: Martín Sagastume MD;  Location: [facility];  Service: Endoscopy;  Laterality: N/A; Repeat colonoscopy in 5 years for surveillance    DECOMPRESSION OF CHIARI MALFORMATION BY REMOVAL OF POSTERIOR ARCH OF FIRST CERVICAL VERTEBRA N/A 12/13/2018    Procedure: DECOMPRESSION, CHIARI MALFORMATION, BY 1ST CERVICAL VERTEBRA POSTERIOR ARCH REMOVAL;  Surgeon: Sergio Busch MD;  Location: [facility] OR [address] [place] [address];  Service: Neurosurgery;  Laterality: N/A;    EPIDURAL STEROID INJECTION INTO CERVICAL SPINE N/A 12/05/2019    Procedure: Injection-steroid-epidural-cervical-C7-T1;  Surgeon: Noa Samano MD;  Location: [facility] OR;  Service: Neurosurgery;  Laterality: N/A;    ESOPHAGOGASTRODUODENOSCOPY N/A 5/12/2023    Procedure: EGD (ESOPHAGOGASTRODUODENOSCOPY);  Surgeon: Martín Sagastume MD;  Location: [facility];  Service: Endoscopy;  Laterality: N/A;    FRACTURE SURGERY Right     Ankle    INJECTION OF ANESTHETIC AGENT AROUND MEDIAL BRANCH NERVES INNERVATING CERVICAL FACET JOINT Bilateral 08/06/2020    Procedure: Block-nerve-medial branch-cervical Bilateral  C3,4,5;  Surgeon: Noa Samano MD;  Location: [facility] OR;  Service: Anesthesiology;  Laterality: Bilateral;    INJECTION OF ANESTHETIC AGENT AROUND MEDIAL BRANCH NERVES INNERVATING CERVICAL FACET JOINT Bilateral 08/20/2020    Procedure: Block-nerve-medial branch-cervical Bilateral C3-4-5;  Surgeon: Noa Samnao MD;  Location: [facility] OR;  Service: Anesthesiology;  Laterality: Bilateral;    LAPAROSCOPIC GASTRIC BANDING      with subsequent removal due to abscess    LASIK Bilateral     2009    LIPOSUCTION      x 2    PELVIC LAPAROSCOPY      for endometriosis eval with BTL    RADIOFREQUENCY ABLATION Bilateral 08/16/2021    Procedure: Radiofrequency Ablation Cervical C3-C5;  Surgeon: Robert Munoz MD;  Location: [facility] OR;  Service: Pain Management;  Laterality: Bilateral;    RADIOFREQUENCY THERMAL COAGULATION OF MEDIAL BRANCH OF POSTERIOR RAMUS OF CERVICAL SPINAL NERVE Bilateral  "08/27/2020    Procedure: RADIOFREQUENCY THERMAL COAGULATION, NERVE, SPINAL, CERVICAL, POSTERIOR RAMUS, MEDIAL BRANCH C3-4-5, bilateral;  Surgeon: Noa Samano MD;  Location: [facility];  Service: Anesthesiology;  Laterality: Bilateral;     Social History     Tobacco Use    Smoking status: Never    Smokeless tobacco: Never   Substance Use Topics    Alcohol use: Yes     Comment: occasional "maybe monthly"    Drug use: No        Review of Systems:    As noted in HPI in addition      REVIEW OF SYSTEMS  Review of Systems   Constitutional: Negative for decreased appetite, diaphoresis, night sweats, weight gain and weight loss.   HENT:  Negative for nosebleeds and odynophagia.    Eyes:  Negative for double vision and photophobia.   Cardiovascular:  Negative for chest pain, claudication, cyanosis, dyspnea on exertion, irregular heartbeat, leg swelling, near-syncope, orthopnea, palpitations, paroxysmal nocturnal dyspnea and syncope.   Respiratory:  Negative for cough, hemoptysis, shortness of breath and wheezing.    Hematologic/Lymphatic: Negative for adenopathy.   Skin:  Negative for flushing, skin cancer and suspicious lesions.   Musculoskeletal:  Negative for gout, myalgias and neck pain.   Gastrointestinal:  Negative for abdominal pain, heartburn, hematemesis and hematochezia.   Genitourinary:  Negative for bladder incontinence, hesitancy and nocturia.   Neurological:  Negative for focal weakness, headaches, light-headedness and paresthesias.   Psychiatric/Behavioral:  Negative for memory loss and substance abuse.               Objective:        Vitals:    02/06/25 1107   BP: 116/67   Pulse: 71       Lab Results   Component Value Date    WBC 4.88 01/17/2025    HGB 12.9 01/17/2025    HCT 39.6 01/17/2025     01/17/2025    CHOL 168 01/17/2025    TRIG 70 01/17/2025    HDL 36 (L) 01/17/2025    ALT 15 01/17/2025    AST 13 01/17/2025     01/17/2025    K 5.1 01/17/2025     01/17/2025    CREATININE 1.0 01/17/2025 "    BUN 9 01/17/2025    CO2 24 01/17/2025    TSH <0.010 (L) 01/17/2025    INR 1.0 12/04/2018    HGBA1C 5.1 01/02/2024        ECHOCARDIOGRAM RESULTS  Results for orders placed during the hospital encounter of 11/12/24    Echo Saline Bubble? Yes    Interpretation Summary    Left Ventricle: The left ventricle is normal in size. Normal wall thickness. There is normal systolic function with a visually estimated ejection fraction of 60 - 65%. There is normal diastolic function.    Right Ventricle: Normal right ventricular cavity size. Wall thickness is normal. Systolic function is normal.    Pulmonary Artery: No pulmonary hypertension. The estimated pulmonary artery systolic pressure is 29 mmHg.    IVC/SVC: Normal venous pressure at 3 mmHg.    No results found for this or any previous visit.          CURRENT/PREVIOUS VISIT EKG  Results for orders placed or performed in visit on 03/25/24   IN OFFICE EKG 12-LEAD (to [place])    Collection Time: 03/25/24  1:48 PM   Result Value Ref Range    QRS Duration 74 ms    [facility] QTC Calculation 434 ms    Narrative    Test Reason : R55,    Vent. Rate : 076 BPM     Atrial Rate : 076 BPM     P-R Int : 160 ms          QRS Dur : 074 ms      QT Int : 386 ms       P-R-T Axes : 041 054 049 degrees     QTc Int : 434 ms    Normal sinus rhythm  Normal ECG  When compared with ECG of 14-MAR-2022 14:29,  No significant change was found  Confirmed by DOM DAVIS MD ([id number]) on 3/28/2024 7:14:01 PM    Referred By: IBAN SCHRADER           Confirmed By:DOM DAVIS MD     No valid procedures specified.   No results found for this or any previous visit.    No valid procedures specified.    PHYSICAL EXAM  GENERAL: well built, well nourished, well-developed in no apparent distress alert and oriented.   HEENT: Normocephalic. Pupils normal and conjunctivae normal.  Mucous membranes normal, no cyanosis or icterus, trachea central,no pallor or icterus is noted..   NECK: No JVD. No bruit..   THYROID:  Thyroid not enlarged. No nodules present..   CARDIAC:  Normal S1-S2.  No murmur rub click or gallop.  PMI nondisplaced.     LUNGS: Clear to auscultation. No wheezing or rhonchi..   ABDOMEN: Soft no masses or organomegaly.  No abdomen pulsations or bruits.  Normal bowel sounds. No pulsations and no masses felt, No guarding or rebound.   URINARY: No morris catheter   EXTREMITIES: No cyanosis, clubbing or edema noted at this time., no calf tenderness bilaterally.   PERIPHERAL VASCULAR SYSTEM: Good palpable distal pulses.  2+ femoral, popliteal and pedal pulses.  No bruits    CENTRAL NERVOUS SYSTEM: No focal motor or sensory deficits noted.   SKIN: Skin without lesions, moist, well perfused.   MUSCLE STRENGTH & TONE: No noteable weakness, atrophy or abnormal movement    I HAVE REVIEWED :    The vital signs, nurses notes, and all the pertinent radiology and labs.         Current Outpatient Medications   Medication Instructions    acetaminophen (TYLENOL) 650 MG TbSR Take 2 tablets by mouth every 8 hours as needed for pain and fever    acetylcysteine (N-ACETYL-L-CYSTEINE MISC) No dose, route, or frequency recorded.    albuterol (PROVENTIL) 2.5 mg, Nebulization, Every 4 hours PRN, Rescue    albuterol (PROVENTIL/VENTOLIN HFA) 90 mcg/actuation inhaler Inhale 1 to 2 puffs into the lungs every 4 (four) hours as needed for Wheezing or Shortness of Breath (chest tightness) - Rescue    ALPRAZolam (XANAX) 1 MG tablet Take 1 tablet by mouth 30 minutes before dental appointment.    busPIRone (BUSPAR) 15 mg, Oral, 3 times daily    dextroamphetamine-amphetamine (ADDERALL XR) 20 MG 24 hr capsule 20 mg, Oral, Every morning    diphenoxylate-atropine 2.5-0.025 mg (LOMOTIL) 2.5-0.025 mg per tablet 1 tablet, Oral, 2 times daily PRN    EScitalopram oxalate (LEXAPRO) 20 mg, Oral, Daily    fluticasone propionate (FLONASE) 50 mcg/actuation nasal spray Spray 1 spray (50 mcg total) in each nostril once daily.    gabapentin (NEURONTIN) 600 MG tablet  "TAKE 1 TABLET BY MOUTH 2 TO 3 TIMES A DAY FOR CHRONIC PAIN    galcanezumab-gnlm (EMGALITY PEN) 120 mg/mL PnIj ADMINISTER 1 ML UNDER THE SKIN EVERY 28 DAYS    levOCARNitine (CARNITOR) 990 mg, Oral, 2 times daily    LIDOcaine (LIDODERM) 5 % Apply one patch to affected area every 12 hours. Keep on for 12 hours, remove for 12 hours.    montelukast (SINGULAIR) 10 mg, Oral, Daily    oxyCODONE-acetaminophen (PERCOCET) 5-325 mg per tablet Take 1 tablet by mouth once a day as needed for pain More than 7 days supply is medically necessary    oxyCODONE-acetaminophen (PERCOCET) 5-325 mg per tablet Take 1 tablet by mouth once a day as needed for pain More than 7 days supply is medically necessary    oxyCODONE-acetaminophen (PERCOCET) 5-325 mg per tablet Take 1 tablet by mouth once a day as needed for pain More than 7 days supply is medically necessary    [START ON 2/21/2025] oxyCODONE-acetaminophen (PERCOCET) 5-325 mg per tablet Take 1 tablet by mouth once a day as needed for pain. More than 7 days supply is medically necessary    oxyCODONE-acetaminophen (PERCOCET) 5-325 mg per tablet Take 1 tablet by mouth once a day as needed for pain. More than 7 days supply is medically necessary    SYNTHROID 250 mcg, Oral, Before breakfast    syringe with needle (BD LUER-LAURIE SYRINGE) 3 mL 25 gauge x 1" Syrg Use for Toradol IM only.    tiZANidine (ZANAFLEX) 4 MG tablet Take 1 tablet by mouth three times a day as needed for  muscle spasm    traZODone (DESYREL) 100 mg, Oral, Nightly    ZEPBOUND 10 mg, Subcutaneous, Every 7 days          Assessment:     History of CNS Chiari malformation.  Status post surgery     Vascular headaches     History of orthostatic hypotension.     Recent problems with the right and left visual disturbance, loss of vision.  Paresthesias.  Negative brain MRI 02/2024         Plan:   Follow-up test results, both CTA of the head and neck and bubble contrast echo unremarkable.    Follow up with Neurology.          No " follow-ups on file.

## 2025-02-07 NOTE — TELEPHONE ENCOUNTER
Pls clarify with pt:  Is she currently on 10 mg?  How is she doing with this?   Any side effects?   How much weight loss has occurred?

## 2025-02-07 NOTE — TELEPHONE ENCOUNTER
----- Message from Aretha sent at 2/7/2025  2:41 PM CST -----  Contact: self  Type:  RX Refill Request    Who Called:  pt  Refill or New Rx:  refill  RX Name and Strength:  tirzepatide, weight loss, (ZEPBOUND) 10 mg/0.5 mL PnIj  How is the patient currently taking it? (ex. 1XDay):  as directed  Is this a 30 day or 90 day RX:  ?  Preferred Pharmacy with phone number:   [facility] [address] [facility] [address] [place] [address]  Phone: [phone] Fax: [phone]       Local or Mail Order:  local  Ordering Provider:  rosa m Bee Call Back Number:  [phone]   Additional Information:  pt would like the next dosage up sent on the prescription. Please call

## 2025-02-07 NOTE — TELEPHONE ENCOUNTER
No care due was identified.  [facility] Embedded Care Due Messages. Reference number: [id number].   2/07/2025 3:14:12 PM CST

## 2025-02-08 LAB
OHS QRS DURATION: 76 MS
OHS QTC CALCULATION: 426 MS

## 2025-02-12 RX ORDER — TIRZEPATIDE 12.5 MG/.5ML
12.5 INJECTION, SOLUTION SUBCUTANEOUS
Qty: 2 ML | Refills: 0 | OUTPATIENT
Start: 2025-02-12

## 2025-02-12 NOTE — TELEPHONE ENCOUNTER
----- Message from Beti sent at 2/11/2025  4:13 PM CST -----  Type: Needs Medical Advice  Who Called:  pt     Best Call Back Number: [phone] (home)     Additional Information: pt requesting call back in regards to wanting an update on medication requested please advise

## 2025-02-14 ENCOUNTER — TELEPHONE (OUTPATIENT)
Dept: FAMILY MEDICINE | Facility: CLINIC | Age: 50
End: 2025-02-14
Payer: MEDICARE

## 2025-02-17 ENCOUNTER — TELEPHONE (OUTPATIENT)
Dept: FAMILY MEDICINE | Facility: CLINIC | Age: 50
End: 2025-02-17
Payer: MEDICARE

## 2025-02-19 ENCOUNTER — PATIENT MESSAGE (OUTPATIENT)
Dept: CARDIOLOGY | Facility: CLINIC | Age: 50
End: 2025-02-19
Payer: MEDICARE

## 2025-02-20 ENCOUNTER — OFFICE VISIT (OUTPATIENT)
Dept: FAMILY MEDICINE | Facility: CLINIC | Age: 50
End: 2025-02-20
Payer: MEDICARE

## 2025-02-20 ENCOUNTER — PATIENT MESSAGE (OUTPATIENT)
Dept: FAMILY MEDICINE | Facility: CLINIC | Age: 50
End: 2025-02-20

## 2025-02-20 ENCOUNTER — TELEPHONE (OUTPATIENT)
Dept: FAMILY MEDICINE | Facility: CLINIC | Age: 50
End: 2025-02-20

## 2025-02-20 VITALS
WEIGHT: 181.13 LBS | DIASTOLIC BLOOD PRESSURE: 82 MMHG | OXYGEN SATURATION: 99 % | HEIGHT: 67 IN | TEMPERATURE: 98 F | RESPIRATION RATE: 16 BRPM | BODY MASS INDEX: 28.43 KG/M2 | SYSTOLIC BLOOD PRESSURE: 110 MMHG | HEART RATE: 84 BPM

## 2025-02-20 DIAGNOSIS — E03.4 HYPOTHYROIDISM DUE TO ACQUIRED ATROPHY OF THYROID: ICD-10-CM

## 2025-02-20 DIAGNOSIS — L98.7 EXCESS SKIN: ICD-10-CM

## 2025-02-20 DIAGNOSIS — H93.90 EAR LESION: Primary | ICD-10-CM

## 2025-02-20 RX ORDER — TIRZEPATIDE 10 MG/.5ML
10 INJECTION, SOLUTION SUBCUTANEOUS
Qty: 2 ML | Refills: 2 | Status: SHIPPED | OUTPATIENT
Start: 2025-02-20

## 2025-02-20 RX ORDER — MUPIROCIN 20 MG/G
OINTMENT TOPICAL 3 TIMES DAILY
Qty: 44 G | Refills: 1 | Status: SHIPPED | OUTPATIENT
Start: 2025-02-20

## 2025-02-20 NOTE — TELEPHONE ENCOUNTER
----- Message from Beti sent at 2/20/2025 12:08 PM CST -----  Type: Needs Medical AdviceWho Called:  pt Best Call Back Number: [phone] Information: pt requesting call back in regards to surgery clearance please advise

## 2025-02-20 NOTE — PROGRESS NOTES
Subjective:       Patient ID: Dinah Mitchell is a 50 y.o. female.    Chief Complaint: Pre-op Exam and Otalgia (Left ear pain)    History of Present Illness    CHIEF COMPLAINT:  Patient presents today for pre-operative clearance for plastic surgery.  She has had breast implants since 1997 and she is going to have implant replacement surgery soon. She denies any problems or ruptures with the current implants. She is also planning to have excess skin removed from her upper arms.  The surgery will take place at [facility], with an anticipated surgery date around April 15th.  Both procedures are expected to last approximately 5 hours and the surgeon is asking if the length of surgery for both procedures is appropriate or if the surgery should be split up on 2 different days.    NEUROLOGICAL:  She continues to experience transient vision changes and headache when lying supine for 5-6 hours, with vision returning within 20-30 seconds. She denies symptoms with shorter periods of recumbency (2-3 hours).  She attributes this to her past Arnold-Chiari malformation surgery    WEIGHT MANAGEMENT:  She started Mounjaro on April 1st through [facility].  When she started her weight loss journey, her initial weight was 286 lbs and a now her weight is 181 lb.  She would like to stop using the semaglutide from [facility] and move to Zepbound 10 mg which worked well for her previously    ENT:  She reports ear discomfort with a sore in the ear canal, specifically in the skin block area.     Of note, she has recently had a cardiac evaluation including a CTA of the head and neck and an echo bubble study all of which were normal          Review of Systems   Constitutional:  Negative for appetite change, chills, diaphoresis, fatigue, fever and unexpected weight change.   HENT:  Negative for congestion, ear pain, postnasal drip, rhinorrhea, sinus pressure, sneezing, sore throat and trouble swallowing.    Eyes:  Positive for visual  disturbance (per HPI). Negative for pain and discharge.   Respiratory:  Negative for cough, chest tightness, shortness of breath and wheezing.    Cardiovascular:  Negative for chest pain, palpitations and leg swelling.   Gastrointestinal:  Negative for abdominal distention, abdominal pain, blood in stool, constipation, diarrhea, nausea and vomiting.   Skin:  Negative for rash.   Neurological:  Positive for headaches.         Objective:      Physical Exam  Constitutional:       General: She is not in acute distress.     Appearance: Normal appearance. She is well-developed.   HENT:      Head: Normocephalic and atraumatic.      Right Ear: Hearing, tympanic membrane, ear canal and external ear normal.      Left Ear: Hearing, tympanic membrane, ear canal and external ear normal.      Nose: Nose normal.      Mouth/Throat:      Mouth: No oral lesions.      Pharynx: No oropharyngeal exudate or posterior oropharyngeal erythema.   Eyes:      General: Lids are normal. No scleral icterus.     Extraocular Movements: Extraocular movements intact.      Conjunctiva/sclera: Conjunctivae normal.      Pupils: Pupils are equal, round, and reactive to light.   Neck:      Thyroid: No thyroid mass or thyromegaly.      Vascular: No carotid bruit.   Cardiovascular:      Rate and Rhythm: Normal rate and regular rhythm. No extrasystoles are present.     Chest Wall: PMI is not displaced.      Heart sounds: Normal heart sounds. No murmur heard.     No gallop.   Pulmonary:      Effort: Pulmonary effort is normal. No accessory muscle usage or respiratory distress.      Breath sounds: Normal breath sounds.   Abdominal:      General: Bowel sounds are normal. There is no abdominal bruit.      Palpations: Abdomen is soft.      Tenderness: There is no abdominal tenderness. There is no rebound.   Musculoskeletal:      Cervical back: Normal range of motion and neck supple.   Lymphadenopathy:      Head:      Right side of head: No submental or  "submandibular adenopathy.      Left side of head: No submental or submandibular adenopathy.      Cervical:      Right cervical: No superficial, deep or posterior cervical adenopathy.     Left cervical: No superficial, deep or posterior cervical adenopathy.      Upper Body:      Right upper body: No supraclavicular adenopathy.      Left upper body: No supraclavicular adenopathy.   Skin:     General: Skin is warm and dry.   Neurological:      Mental Status: She is alert and oriented to person, place, and time.       Blood pressure 110/82, pulse 84, temperature 98.1 °F (36.7 °C), resp. rate 16, height 5' 7" (1.702 m), weight 82.2 kg (181 lb 1.7 oz), SpO2 99%.Body mass index is 28.37 kg/m².            Assessment & Plan    1. Considered plastic surgery for implant replacement and skin removal, weighing risks due to patient's skull defect and vision/pain episodes  2. Evaluated cardiologist's findings suggesting POTS-like condition or potential neurological disorder causing blood pressure drops and vision loss  3. Assessed weight loss progress on zepbound  4. Determined need for neurological evaluation to investigate vision loss episodes and potential neurological disorders    NEUROLOGICAL DISORDER WITH HISTORY OF ARNOLD-CHIARI MALFORMATION SURGERY AND TRANSIENT NEUROLOGIC SYMPTOMS:  - the patient's experience of temporary vision loss lasting 20-30 seconds and headache when lying on their back for extended periods.  - Acknowledged the patient's vision changes and discussed their occurrence in relation to lying down.  - considered that a neurological disorder might be causing the vision loss and other symptoms.  - Planned to refer the patient to a neurologist for further evaluation of vision changes.  - Referred the patient to [facility] and [facility] for neurological evaluation.  - Instructed the patient to contact the office with neurologist's decision regarding the length of plastic " surgery.    HISTORY OF OBESITY NOW WITH A BMI OF 28.37:  - Continue zepbound at dose of 10mg refilled Mounjaro through [facility].    MIGRAINES:  - Noted that the patient is being treated for migraines by Dr. Guevara.  - Advised the patient to continue treatment with Dr. Guevara for migraine management.    EAR SORE:  - Documented the patient's mention of ear issues and a sore in the ear canal.  - will manage with Bactroban    HYPOTHYROIDISM:  - recheck TSH as planned to assess response to new Synthroid dose    PREOP VISIT:  - given her transient neurologic events with laying supine, I am deferring surgery current to Neurology

## 2025-02-20 NOTE — PATIENT INSTRUCTIONS
Please call to schedule an appointment:    [facility] [address]  [address]  Phone: [phone]      [facility]  [facility] [address], [address]  [place] [address]  Phone: [phone]  Fax: [phone]

## 2025-02-21 NOTE — TELEPHONE ENCOUNTER
The prior authorization for Omayraemy DIETZ Bent's Zepbound prescription has been APPROVED FROM 2/21/25 TO 2/21/26 with copayment of $508.75.        [facility] [place] will reach out to patient for further correspondence.

## 2025-02-21 NOTE — TELEPHONE ENCOUNTER
Pt states she is going to call her neurologist today to schedule an appt and wanted to reiterate that if the surgeries are split up, she will not be able to afford them.

## 2025-02-24 ENCOUNTER — PATIENT MESSAGE (OUTPATIENT)
Dept: RADIOLOGY | Facility: HOSPITAL | Age: 50
End: 2025-02-24
Payer: MEDICARE

## 2025-02-24 DIAGNOSIS — Z00.00 ENCOUNTER FOR MEDICARE ANNUAL WELLNESS EXAM: ICD-10-CM

## 2025-02-25 ENCOUNTER — OFFICE VISIT (OUTPATIENT)
Dept: PSYCHIATRY | Facility: CLINIC | Age: 50
End: 2025-02-25
Payer: MEDICARE

## 2025-02-25 DIAGNOSIS — F43.10 POSTTRAUMATIC STRESS DISORDER: ICD-10-CM

## 2025-02-25 DIAGNOSIS — G89.4 CHRONIC PAIN SYNDROME: ICD-10-CM

## 2025-02-25 DIAGNOSIS — F60.3 BORDERLINE PERSONALITY DISORDER: Primary | ICD-10-CM

## 2025-02-25 DIAGNOSIS — G47.00 INSOMNIA, UNSPECIFIED TYPE: ICD-10-CM

## 2025-02-25 DIAGNOSIS — F90.2 ATTENTION DEFICIT HYPERACTIVITY DISORDER (ADHD), COMBINED TYPE: ICD-10-CM

## 2025-02-25 PROCEDURE — 98006 SYNCH AUDIO-VIDEO EST MOD 30: CPT | Mod: 95,,, | Performed by: PSYCHOLOGIST

## 2025-02-25 PROCEDURE — 90833 PSYTX W PT W E/M 30 MIN: CPT | Mod: 95,,, | Performed by: PSYCHOLOGIST

## 2025-02-25 PROCEDURE — G2211 COMPLEX E/M VISIT ADD ON: HCPCS | Mod: 95,,, | Performed by: PSYCHOLOGIST

## 2025-02-25 NOTE — PROGRESS NOTES
"The patient location is:  [place]  The patient location [place] is: [place]  The patient phone number is: [phone]  Visit type: Virtual visit with synchronous audio and video  Each patient to whom he or she provides medical services by telemedicine is:  (1) informed of the relationship between the physician and patient and the respective role of any other health care provider with respect to management of the patient; and (2) notified that he or she may decline to receive medical services by telemedicine and may withdraw from such care at any time.     Outpatient Psychiatry Follow-Up Visit    Clinical Status of Patient: Outpatient (Ambulatory)  02/25/2025     Chief Complaint: PTSD, Insomnia      Interval History and Content of Current Session:  Interim Events/Subjective Report/Content of Current Session:  follow-up appointment.    Pt is a 48 y/o female with past psychiatric hx of PTSD, insomnia who presents for follow-up treatment. Pt reported some improvement of irritability with medication change. Able to cope with stressors more easily in the moment. Less anxiety as well. Stated that she has been spending money as a coping strategy. Stated that good feelings after spending are quickly replaced by guilt. Stated that it has been resulting in debt. Has seen a plastic surgeon and plans lose skin removal. Pt reports a desire to increase socialization but admits that she does not get out of the house often enough to do so.     Past Psychiatric hx:  prozac (can't recall), lexapro (effective, does not remember why stopped), celexa > effective, dec'd libido ( told pt he would "leave her" regarding the sex drive), depakote (ineffective), lamictal (rash), abilify (paranoid), seroquel (significant wgt gain), Lithium 300 mg po qam/600 mg po qhs (pt reports it worsened anxiety), prazosin (I had some allergic reaction- had been effective for months prior to this report), cymbalta 90mg (effective; pt self d/c'd), " trazodone, ambien, Belsomra, Vyvanse    Past Medical hx:   Past Medical History:   Diagnosis Date    Arnold-Chiari deformity     s/p decompression    Arthritis     Asthma     Borderline personality disorder     Carnitine deficiency     Colon polyp     Depression     Fatty liver     noted on 8/21 CT    Fibromyalgia     Headache     History of abnormal cervical Pap smear     colpo/ LEEP and CKC    History of nephrolithiasis     kidney stones    Hypothyroidism     IBS (irritable bowel syndrome)     GI smith negative    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     PATRICIA (obstructive sleep apnea)     severe - doesn't use CPAP    Panic attack     PTSD (post-traumatic stress disorder)         Interim hx:  Medication changes last visit: D/C Wellbutrin xl 150 mg po qam, Increase buspirone to 15 mg TID  Anxiety: stable  Depression: stable     Denies suicidal/homicidal ideations.  Denies hopelessness/worthlessness.    Denies auditory/visual hallucinations      Alcohol: Pt denies  Drug: Pt denies  Caffeine: Not assessed  Tobacco: Pt denies      Review of Systems   PSYCHIATRIC: Pertinent items are noted in the narrative.        CONSTITUTIONAL: weight stable    Past Medical, Family and Social History: The patient's past medical, family and social history have been reviewed and updated as appropriate within the electronic medical record. See encounter notes.     Current Psychiatric Medication:  escitalopram 20 mg, Adderall XR 20 mg (not using daily), trazodone 100 mg, buspirone 15 mg BID     Compliance: yes      Side effects: Pt denied.      Risk Parameters:  Patient reports no suicidal ideation  Patient reports no homicidal ideation  Patient reports no self-injurious behavior  Patient reports no violent behavior     Exam (detailed: at least 9 elements; comprehensive: all 15 elements)   Constitutional  Vitals:  Most recent vital signs, dated less than 90 days prior to this appointment, were reviewed. BP: ()/()   Arterial Line  BP: ()/()       General:  unremarkable, age appropriate, casual attire, good eye contact, good rapport       Musculoskeletal  Muscle Strength/Tone:  no flaccidity, no tremor    Gait & Station:  normal      Psychiatric                       Speech:  normal tone, normal rate, rhythm, and volume   Mood & Affect:   Mildly Depressed, anxious         Thought Process:   Goal directed; Linear    Associations:   intact   Thought Content:   No SI/HI, delusions, or paranoia, no AV/VH   Insight & Judgement:   Good, adequate to circumstances   Orientation:   grossly intact; alert and oriented x 4    Memory:  intact for content of interview    Language:  grossly intact, can repeat    Attention Span  : Grossly intact for content of interview   Fund of Knowledge:   intact and appropriate to age and level of education        Assessment and Diagnosis   Status/Progress: Based on the examination today, the patient's problem(s) is/are not adequately controlled.  New problems have been presented today. Co-morbidities are complicating management of the primary condition. There are no active rule-out diagnoses for this patient at this time.      Impression: Pt's irritability and anxiety continues to decrease. Continue discussing engagement in activities, development of social support and staying active overall. Will continue with other medications for now and monitor symptoms moving forward. [place]  checked     Diagnosis:   Posttraumatic Stress Disorder  Panic Disorder w/o agoraphobia  Attention Deficit Hyperactivity Disorder, Combined Type   Insomnia  Borderline personality disorder traits  Intervention/Counseling/Treatment Plan   Medication Management:      1. trazodone 100 mg    2. Buspirone 15 mg TID    3. escitalopram to 20 mg    4. Adderall XR 20 mg    5. Call to report any worsening of symptoms or problems with the medication. Pt instructed to go to ER with thoughts of harming self, others     6. Continue in therapy with Dr. Llanos  as needed    Psychotherapy: 20 minutes  Target symptoms: anxiety  Why chosen therapy is appropriate versus another modality: CBT used; relevant to diagnosis, patient responds to this modality  Outcome monitoring methods: self-report, observation  Therapeutic intervention type: Cognitive Behavioral Therapy  Topics discussed/themes: building skills sets for symptom management, symptom recognition, nutrition, exercise  The patient's response to the intervention is accepting  Patient's response to treatment is: good.   The patient's progress toward treatment goals: limited     Return to clinic: 4-6 weeks     -Cognitive-Behavioral/Supportive therapy and psychoeducation provided  -R/B/SE's of medications discussed with the pt who expresses understanding and chooses to take medications as prescribed.   -Pt instructed to call clinic, 911 or go to nearest emergency room if sxs worsen or pt is in   crisis. The pt expresses understanding.    Bebeto Resendiz, PhD, MP     Antidepressant/Antianxiety Medication Initiation:  Patient informed of risks, benefits, and potential side effects of medication and accepts informed consent.  Common side effects include nausea, fatigue, headache, insomnia., Specifically discussed the possibility of new or worsening suicidal thoughts/depression.  Patient instructed to stop the medication immediately and seek urgent treatment if this occurs. Patient instructed not to abruptly discontinue medication without physician guidance except in cases of sudden onset or worsening of SI.       Stimulant Medication Initiation:  Patient advised of risks, benefits, and side effects of medication and accepts informed consent.  Common side effects include insomnia, irritability, jittery feeling, dry mouth, and agitation/hostility., Patient advised of potential addictive nature of medication and controlled substance classification.  Instructed to safeguard medication as no early refills can be given for lost or  stolen medications.       Benzodiazepine Initiation:  Patient advised of the risks, benefits, and common side effects of medication and has accepted informed consent.  Common side effects include drowsiness, impaired coordination, possible memory loss., Patient advised NOT to operate a vehicle or machinery untiil they are sure how the medication will affec them.  Client also advised of danger of mixing this medication with alcohol., Patient advised of potential addictive nature of medication and need to safeguard medication as no early refills for lost or stolen medications can be authorized.       Pregnancy Warning:  Patient denies current pregnancy possibility.  Patient made aware that medications have not been proven safe in pregnancy and that she must maintain adequate birth control.  Patient instructed to alert us immediately if she becomes pregnant.

## 2025-02-26 ENCOUNTER — TELEPHONE (OUTPATIENT)
Dept: FAMILY MEDICINE | Facility: CLINIC | Age: 50
End: 2025-02-26
Payer: MEDICARE

## 2025-02-26 ENCOUNTER — PROCEDURE VISIT (OUTPATIENT)
Dept: NEUROLOGY | Facility: CLINIC | Age: 50
End: 2025-02-26
Payer: MEDICARE

## 2025-02-26 VITALS
HEART RATE: 79 BPM | HEIGHT: 67 IN | DIASTOLIC BLOOD PRESSURE: 78 MMHG | WEIGHT: 181.19 LBS | BODY MASS INDEX: 28.44 KG/M2 | SYSTOLIC BLOOD PRESSURE: 110 MMHG | RESPIRATION RATE: 17 BRPM | TEMPERATURE: 97 F

## 2025-02-26 DIAGNOSIS — G43.719 INTRACTABLE CHRONIC MIGRAINE WITHOUT AURA AND WITHOUT STATUS MIGRAINOSUS: Primary | ICD-10-CM

## 2025-02-26 RX ORDER — KETOROLAC TROMETHAMINE 30 MG/ML
30 INJECTION, SOLUTION INTRAMUSCULAR; INTRAVENOUS EVERY 6 HOURS
Qty: 4 ML | Refills: 3 | Status: SHIPPED | OUTPATIENT
Start: 2025-02-26

## 2025-02-26 NOTE — PROCEDURES
Procedures     BOTOX PROCEDURE    PROCEDURE PERFORMED: Botulinum toxin injection (28609)    CLINICAL INDICATION: G43.719    The Botox injections have achieved well over 50%  improvement in the patient's symptoms. Migraines have been reduced at least 7 days per month and the number of cumulative hours suffering with headaches has been reduced at least 100 total hours per month. Today she does have a headache indicating that the Botox has worn off. Frequency of treatment is every 3 months unless no response to the treatments, at which time we will discontinue the injections.      A time out was conducted just before the start of the procedure to verify the correct patient and procedure, procedure location, and all relevant critical information.   Signed consent obtained prior to procedure     Conventional methods of treatment but the patient has been unresponsive and refractory.The patient meets criteria for chronic headaches according to the ICHD-III, the patient has more than 15 headaches a month at least 8 of them meet migraine criteria, which last for more than 4 hours a day.     Last Botox injections resulted in over 50%  improvement in the patient's symptoms. Frequency of treatment is every 3 months unless no response to the treatments, at which time we will discontinue the injections.     DESCRIPTION OF PROCEDURE: After obtaining informed consent and under aseptic technique, a total of 155 units of botulinum toxin type A were injected in the following muscles: Procerus 5 units,  5 units bilaterally, frontalis 20 units, temporalis 20 units bilaterally,   occipitalis 15 units, upper cervical paraspinals 10 units bilaterally and trapezius 15 units ilaterally. The patient was given a total of 155 units in 31 sites.    Special precaution taken given her posterior decompression.       The patient tolerated the procedure well. There were no complications. The patient was given a prescription for repeat  treatment in 3 months.     Unavoidable waste 45 units    Saeid Guevara M.D  Medical Director, Headache and Facial Pain  [facility]

## 2025-02-26 NOTE — TELEPHONE ENCOUNTER
----- Message from Last sent at 2/26/2025 12:36 PM CST -----  Type: Needs Medical AdviceWho Called:  PtBest Call Back Number: [phone] Information: Pt calling in regards to clearance letter for upcoming surgery. Dr. Rodriguez does not do surgery clearance so the final say is up to eric. The pt doesn't want eric to mention breaking up the surgery as she can't afford it and she does not believe it it needed. Please call back to advise, Thanks!

## 2025-03-01 NOTE — TELEPHONE ENCOUNTER
She will need clearance from her general neurologist once she sees them  She has unusual neurologic symptoms and an unusual neurologic history so this is out of my scope for this clearance

## 2025-03-02 RX ORDER — TRAZODONE HYDROCHLORIDE 100 MG/1
100 TABLET ORAL NIGHTLY
Qty: 30 TABLET | Refills: 3 | Status: SHIPPED | OUTPATIENT
Start: 2025-03-02 | End: 2026-03-02

## 2025-03-02 RX ORDER — SYRINGE WITH NEEDLE, 1 ML 25GX5/8"
SYRINGE, EMPTY DISPOSABLE MISCELLANEOUS
Qty: 8 EACH | Refills: 0 | Status: CANCELLED | OUTPATIENT
Start: 2025-03-02

## 2025-03-02 RX ORDER — BUSPIRONE HYDROCHLORIDE 15 MG/1
15 TABLET ORAL 3 TIMES DAILY
Qty: 90 TABLET | Refills: 1 | Status: SHIPPED | OUTPATIENT
Start: 2025-03-02 | End: 2026-03-02

## 2025-03-02 RX ORDER — DEXTROAMPHETAMINE SACCHARATE, AMPHETAMINE ASPARTATE MONOHYDRATE, DEXTROAMPHETAMINE SULFATE AND AMPHETAMINE SULFATE 5; 5; 5; 5 MG/1; MG/1; MG/1; MG/1
20 CAPSULE, EXTENDED RELEASE ORAL EVERY MORNING
Qty: 30 CAPSULE | Refills: 0 | Status: SHIPPED | OUTPATIENT
Start: 2025-03-02

## 2025-03-03 ENCOUNTER — TELEPHONE (OUTPATIENT)
Dept: NEUROLOGY | Facility: CLINIC | Age: 50
End: 2025-03-03
Payer: MEDICARE

## 2025-03-12 ENCOUNTER — TELEPHONE (OUTPATIENT)
Dept: FAMILY MEDICINE | Facility: CLINIC | Age: 50
End: 2025-03-12
Payer: MEDICARE

## 2025-03-12 ENCOUNTER — PATIENT MESSAGE (OUTPATIENT)
Dept: NEUROSURGERY | Facility: CLINIC | Age: 50
End: 2025-03-12
Payer: MEDICARE

## 2025-03-12 ENCOUNTER — PATIENT MESSAGE (OUTPATIENT)
Dept: FAMILY MEDICINE | Facility: CLINIC | Age: 50
End: 2025-03-12
Payer: MEDICARE

## 2025-03-19 ENCOUNTER — TELEPHONE (OUTPATIENT)
Dept: NEUROSURGERY | Facility: CLINIC | Age: 50
End: 2025-03-19
Payer: MEDICARE

## 2025-03-19 NOTE — TELEPHONE ENCOUNTER
Responded to pt via portal that we are awaiting on clearance from Dr. Busch.     ----- Message from Marie sent at 3/18/2025  1:26 PM CDT -----  Dr Ledesma calling in regards to a clearance form being faxed on 3-13  Call back [phone] Jeane

## 2025-03-28 ENCOUNTER — TELEPHONE (OUTPATIENT)
Dept: FAMILY MEDICINE | Facility: CLINIC | Age: 50
End: 2025-03-28
Payer: MEDICARE

## 2025-03-28 NOTE — TELEPHONE ENCOUNTER
Received another request for clearance from [facility] plastic surgery.  Pt noted in message that neuro cleared her but I do not see documentation of that.  Please advise.  Form in your box pending response.       Attempted to reach pt by phone, no answer.

## 2025-03-28 NOTE — TELEPHONE ENCOUNTER
----- Message from Renita sent at 3/28/2025  9:57 AM CDT -----  Contact: Shavonne from [facility]  Type:  Needs Medical AdviceWho Called:   Shavonne from [facility] SurgeryWould the patient rather a call back or a response via [facility]?   Call [facility] Call Back Number:   [phone] - MadelynAdditional Information:   States she would like to speak with someone - states she is following up on clearance that was requested on 2/13 - states they have already received clearance from Neurology - states her surgery is scheduled for 4/15 - please call - thank you

## 2025-03-29 NOTE — TELEPHONE ENCOUNTER
They said they needed mine or neuro clearance on the last note.  If they have neuro, do they still need mine?  If so, I'll send a letter than says if neurology clears pt I have not concerns about proceeding with surgery

## 2025-03-31 ENCOUNTER — TELEPHONE (OUTPATIENT)
Dept: FAMILY MEDICINE | Facility: CLINIC | Age: 50
End: 2025-03-31
Payer: MEDICARE

## 2025-03-31 NOTE — TELEPHONE ENCOUNTER
----- Message from Deshaun sent at 3/28/2025  2:56 PM CDT -----  Type:  Needs Medical AdviceWho Called: pt Symptoms (please be specific): surgery clearance  How long has patient had these symptoms:  a month Would the patient rather a call back or a response via [facility]? Call back Best Call Back Number: [phone] home Additional Information: pt needs paperwork clearing her for surgery,  please call to advise, Thank You.

## 2025-04-21 DIAGNOSIS — M47.812 SPONDYLOSIS OF CERVICAL REGION WITHOUT MYELOPATHY OR RADICULOPATHY: ICD-10-CM

## 2025-04-21 RX ORDER — LEVOTHYROXINE SODIUM 125 UG/1
250 TABLET ORAL
Qty: 60 TABLET | Refills: 1 | Status: CANCELLED | OUTPATIENT
Start: 2025-04-21 | End: 2026-04-21

## 2025-04-21 RX ORDER — TIZANIDINE 4 MG/1
TABLET ORAL
Qty: 90 TABLET | Refills: 11 | Status: CANCELLED | OUTPATIENT
Start: 2025-04-21

## 2025-04-21 NOTE — TELEPHONE ENCOUNTER
Care Due:                  Date            Visit Type   Department     Provider  --------------------------------------------------------------------------------                                SAME DAY -                              ESTABLISHED   [facility]    Amaya Knapp Attila  Last Visit: 02-      PATIENT      MEDICINE       Anger                              EP -                              PRIMARY      [facility]    Amaya Knapp Attila  Next Visit: 05-      CARE ([facility])   MEDICINE       Anger                                                            Last  Test          Frequency    Reason                     Performed    Due Date  --------------------------------------------------------------------------------    HBA1C.......  6 months...  tirzepatide,.............  01- 07-    [facility] Embedded Care Due Messages. Reference number: [id number].   4/21/2025 4:38:34 PM CDT

## 2025-04-21 NOTE — TELEPHONE ENCOUNTER
No care due was identified.  [facility] Embedded Care Due Messages. Reference number: [id number].   4/21/2025 5:57:36 PM CDT

## 2025-04-22 RX ORDER — TIZANIDINE 4 MG/1
TABLET ORAL
Qty: 90 TABLET | Refills: 11 | Status: SHIPPED | OUTPATIENT
Start: 2025-04-22

## 2025-04-23 NOTE — TELEPHONE ENCOUNTER
Pt missed her recent TSH to assess her response to this dose  Please schedule this ASAP and see if she can wait for RFs until lab is done

## 2025-04-24 RX ORDER — LEVOTHYROXINE SODIUM 125 UG/1
250 TABLET ORAL
Qty: 60 TABLET | Refills: 1 | Status: CANCELLED | OUTPATIENT
Start: 2025-04-21 | End: 2026-04-21

## 2025-04-24 NOTE — TELEPHONE ENCOUNTER
No care due was identified.  [facility] Embedded Care Due Messages. Reference number: [id number].   4/24/2025 1:34:43 PM CDT

## 2025-04-25 RX ORDER — LEVOTHYROXINE SODIUM 125 UG/1
250 TABLET ORAL
Qty: 60 TABLET | Refills: 1 | Status: CANCELLED | OUTPATIENT
Start: 2025-04-21 | End: 2026-04-21

## 2025-04-25 NOTE — TELEPHONE ENCOUNTER
No care due was identified.  [facility] Embedded Care Due Messages. Reference number: [id number].   4/25/2025 11:54:11 AM CDT

## 2025-04-29 ENCOUNTER — TELEPHONE (OUTPATIENT)
Dept: FAMILY MEDICINE | Facility: CLINIC | Age: 50
End: 2025-04-29
Payer: MEDICARE

## 2025-04-29 NOTE — TELEPHONE ENCOUNTER
----- Message from Whitney sent at 4/28/2025  4:28 PM CDT -----  Contact: self  Type:  RX Refill RequestWho Called:  pt Refill or New Rx:  refill RX Name and Strength:  SYNTHROID 125 mcg tabletHow is the patient currently taking it? (ex. 1XDay):  1XDay Is this a 30 day or 90 day RX:  30Preferred Pharmacy with phone number:  [facility] [address]: [phone] Fax: [phone] or Mail Order:  localOrdering Provider:  Dr Attila Bee Call Back Number:  [phone] Information:  pt needs a call back to discuss what she needs to do since she has been off of this medicine for about 2 wks now so make no sense to go have her labs drawn right now.  Please call back to let her know how long she should take it before she should go have her labs done.  Please send over the script and Call back to advise and thanks

## 2025-05-03 DIAGNOSIS — G43.719 CHRONIC MIGRAINE WITHOUT AURA, INTRACTABLE, WITHOUT STATUS MIGRAINOSUS: ICD-10-CM

## 2025-05-05 RX ORDER — GABAPENTIN 600 MG/1
TABLET ORAL
Qty: 90 TABLET | Refills: 11 | Status: SHIPPED | OUTPATIENT
Start: 2025-05-05

## 2025-05-12 RX ORDER — ESCITALOPRAM OXALATE 20 MG/1
20 TABLET ORAL DAILY
Qty: 30 TABLET | Refills: 3 | Status: CANCELLED | OUTPATIENT
Start: 2025-05-12 | End: 2026-05-12

## 2025-05-12 RX ORDER — ESCITALOPRAM OXALATE 20 MG/1
20 TABLET ORAL DAILY
Qty: 30 TABLET | Refills: 3 | Status: SHIPPED | OUTPATIENT
Start: 2025-05-12 | End: 2026-05-12

## 2025-05-12 RX ORDER — BUSPIRONE HYDROCHLORIDE 15 MG/1
15 TABLET ORAL 3 TIMES DAILY
Qty: 90 TABLET | Refills: 1 | Status: SHIPPED | OUTPATIENT
Start: 2025-05-12 | End: 2026-05-12

## 2025-05-12 RX ORDER — BUSPIRONE HYDROCHLORIDE 15 MG/1
15 TABLET ORAL 3 TIMES DAILY
Qty: 90 TABLET | Refills: 1 | Status: CANCELLED | OUTPATIENT
Start: 2025-05-12 | End: 2026-05-12

## 2025-05-14 DIAGNOSIS — J32.9 SINUSITIS, UNSPECIFIED CHRONICITY, UNSPECIFIED LOCATION: ICD-10-CM

## 2025-05-14 RX ORDER — FLUTICASONE PROPIONATE 50 MCG
1 SPRAY, SUSPENSION (ML) NASAL DAILY
Qty: 16 G | Refills: 2 | Status: CANCELLED | OUTPATIENT
Start: 2025-05-14

## 2025-05-14 RX ORDER — FLUTICASONE PROPIONATE 50 MCG
1 SPRAY, SUSPENSION (ML) NASAL DAILY
Qty: 16 G | Refills: 2 | Status: SHIPPED | OUTPATIENT
Start: 2025-05-14

## 2025-05-14 NOTE — TELEPHONE ENCOUNTER
No care due was identified.  [facility] Embedded Care Due Messages. Reference number: [id number].   5/14/2025 1:43:03 PM CDT

## 2025-05-14 NOTE — TELEPHONE ENCOUNTER
No care due was identified.  [facility] Embedded Care Due Messages. Reference number: [id number].   5/14/2025 8:04:17 AM CDT

## 2025-05-16 NOTE — TELEPHONE ENCOUNTER
No care due was identified.  [facility] Embedded Care Due Messages. Reference number: [id number].   5/16/2025 5:07:02 PM CDT

## 2025-05-17 RX ORDER — LEVOCARNITINE 330 MG/1
990 TABLET ORAL 2 TIMES DAILY
Qty: 180 TABLET | Refills: 2 | Status: SHIPPED | OUTPATIENT
Start: 2025-05-17

## 2025-05-17 RX ORDER — TIRZEPATIDE 10 MG/.5ML
10 INJECTION, SOLUTION SUBCUTANEOUS
Qty: 2 ML | Refills: 0 | Status: SHIPPED | OUTPATIENT
Start: 2025-05-17

## 2025-05-20 ENCOUNTER — OFFICE VISIT (OUTPATIENT)
Dept: PSYCHIATRY | Facility: CLINIC | Age: 50
End: 2025-05-20
Payer: MEDICARE

## 2025-05-20 DIAGNOSIS — F41.0 PANIC DISORDER WITHOUT AGORAPHOBIA: ICD-10-CM

## 2025-05-20 DIAGNOSIS — F43.10 POSTTRAUMATIC STRESS DISORDER: Primary | ICD-10-CM

## 2025-05-20 DIAGNOSIS — G47.00 INSOMNIA, UNSPECIFIED TYPE: ICD-10-CM

## 2025-05-20 DIAGNOSIS — F60.3 BORDERLINE PERSONALITY DISORDER: ICD-10-CM

## 2025-05-20 DIAGNOSIS — F90.2 ATTENTION DEFICIT HYPERACTIVITY DISORDER (ADHD), COMBINED TYPE: ICD-10-CM

## 2025-05-20 PROCEDURE — 90833 PSYTX W PT W E/M 30 MIN: CPT | Mod: 95,,, | Performed by: PSYCHOLOGIST

## 2025-05-20 PROCEDURE — G2211 COMPLEX E/M VISIT ADD ON: HCPCS | Mod: 95,,, | Performed by: PSYCHOLOGIST

## 2025-05-20 PROCEDURE — 98006 SYNCH AUDIO-VIDEO EST MOD 30: CPT | Mod: 95,,, | Performed by: PSYCHOLOGIST

## 2025-05-20 PROCEDURE — 1159F MED LIST DOCD IN RCRD: CPT | Mod: CPTII,95,, | Performed by: PSYCHOLOGIST

## 2025-05-20 RX ORDER — TRAZODONE HYDROCHLORIDE 100 MG/1
100 TABLET ORAL NIGHTLY
Qty: 30 TABLET | Refills: 2 | Status: SHIPPED | OUTPATIENT
Start: 2025-05-20 | End: 2026-05-20

## 2025-05-20 NOTE — PROGRESS NOTES
The patient location is: home  The chief complaint leading to consultation is: PTSD, anxiety, mood    Visit type: audiovisual    35 minutes of total time spent on the encounter, which includes face to face time and non-face to face time preparing to see the patient (eg, review of tests), Obtaining and/or reviewing separately obtained history, Documenting clinical information in the electronic or other health record, Independently interpreting results (not separately reported) and communicating results to the patient/family/caregiver, or Care coordination (not separately reported).     Each patient to whom he or she provides medical services by telemedicine is:  (1) informed of the relationship between the physician and patient and the respective role of any other health care provider with respect to management of the patient; and (2) notified that he or she may decline to receive medical services by telemedicine and may withdraw from such care at any time.    Notes:      Outpatient Psychiatry Follow-Up Visit    Clinical Status of Patient: Outpatient (Ambulatory)  05/20/2025     Chief Complaint: PTSD, Insomnia      Interval History and Content of Current Session:  Interim Events/Subjective Report/Content of Current Session:  follow-up appointment.    Pt is a 48 y/o female with past psychiatric hx of PTSD, insomnia who presents for follow-up treatment. Patient had surgery 5 weeks ago. Two days post-surgery, she experienced a syncopal event and was taken to the emergency room. She has no memory of the ambulance ride or initial tests, and woke up confused in the ER, not knowing why she was there.    Following the hospitalization, the patient's caregiver, whom she considered a close friend, quit. This has left her feeling depressed and abandoned. She expresses difficulty in finding a new caregiver and forming new social connections. Patient reports feeling depressed and lonely, crying frequently, especially at night. She  "acknowledges that her emotional state is likely influenced by her personality disorder and fear of abandonment. Patient expresses a desire to socialize more but finds it challenging to meet new people or engage in social activities. She feels self-conscious about being alone in public settings.    Patient has upcoming appointments with cardiology and neurology to follow up on the syncopal event and for Botox treatment for headaches. She also mentions having appointments with her PCP and Dr. Llanos.     Past Psychiatric hx:  prozac (can't recall), lexapro (effective, does not remember why stopped), celexa > effective, dec'd libido ( told pt he would "leave her" regarding the sex drive), depakote (ineffective), lamictal (rash), abilify (paranoid), seroquel (significant wgt gain), Lithium 300 mg po qam/600 mg po qhs (pt reports it worsened anxiety), prazosin (I had some allergic reaction- had been effective for months prior to this report), cymbalta 90mg (effective; pt self d/c'd), trazodone, ambien, Belsomra, Vyvanse    Past Medical hx:   Past Medical History:   Diagnosis Date    Arnold-Chiari deformity     s/p decompression    Arthritis     Asthma     Borderline personality disorder     Carnitine deficiency     Colon polyp     Depression     Fatty liver     noted on 8/21 CT    Fibromyalgia     Headache     History of abnormal cervical Pap smear     colpo/ LEEP and CKC    History of nephrolithiasis     kidney stones    Hypothyroidism     IBS (irritable bowel syndrome)     GI smith negative    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     PATRICIA (obstructive sleep apnea)     severe - doesn't use CPAP    Panic attack     PTSD (post-traumatic stress disorder)         Interim hx:  Medication changes last visit: none     Denies suicidal/homicidal ideations.  Denies hopelessness/worthlessness.    Denies auditory/visual hallucinations      Alcohol: Pt denies  Drug: Pt denies  Caffeine: Not assessed  Tobacco: Pt " denies      Review of Systems   PSYCHIATRIC: Pertinent items are noted in the narrative.        CONSTITUTIONAL: weight stable    Past Medical, Family and Social History: The patient's past medical, family and social history have been reviewed and updated as appropriate within the electronic medical record. See encounter notes.     Current Psychiatric Medication:  escitalopram 20 mg, Adderall XR 20 mg (not using daily), trazodone 100 mg, buspirone 15 mg TID     Compliance: yes      Side effects: Pt denied.      Risk Parameters:  Patient reports no suicidal ideation  Patient reports no homicidal ideation  Patient reports no self-injurious behavior  Patient reports no violent behavior     Exam (detailed: at least 9 elements; comprehensive: all 15 elements)   Constitutional  Vitals:  Most recent vital signs, dated less than 90 days prior to this appointment, were reviewed. BP: ()/()   Arterial Line BP: ()/()       General:  unremarkable, age appropriate, casual attire, good eye contact, good rapport       Musculoskeletal  Muscle Strength/Tone:  no flaccidity, no tremor    Gait & Station:  normal      Psychiatric                       Speech:  normal tone, normal rate, rhythm, and volume   Mood & Affect:   Mildly Depressed, anxious         Thought Process:   Goal directed; Linear    Associations:   intact   Thought Content:   No SI/HI, delusions, or paranoia, no AV/VH   Insight & Judgement:   Good, adequate to circumstances   Orientation:   grossly intact; alert and oriented x 4    Memory:  intact for content of interview    Language:  grossly intact, can repeat    Attention Span  : Grossly intact for content of interview   Fund of Knowledge:   intact and appropriate to age and level of education        Assessment and Diagnosis   Status/Progress/Impression:      Assessment & Plan    IMPRESSION:  - Considered mood symptoms in context of recent loss of caregiver relationship and social isolation.    PLAN SUMMARY:  -  Continue Lexapro at current dose  - Continue Buspar 3 times daily  - Continue Trazodone at night  - Recommend seeking social groups or volunteer opportunities, including contacting local animal shelters  - Follow up on August 26th at 5:00 PM    Diagnosis:   Posttraumatic Stress Disorder  Panic Disorder w/o agoraphobia  Attention Deficit Hyperactivity Disorder, Combined Type   Insomnia  Borderline personality disorder traits  Intervention/Counseling/Treatment Plan   Medication Management:      1. trazodone 100 mg    2. Buspirone 15 mg TID    3. escitalopram 20 mg    4. Adderall XR 20 mg    5. Call to report any worsening of symptoms or problems with the medication. Pt instructed to go to ER with thoughts of harming self, others     6. Continue in therapy with Dr. Llanos as needed    Psychotherapy: 20 minutes  Target symptoms: anxiety  Why chosen therapy is appropriate versus another modality: CBT used; relevant to diagnosis, patient responds to this modality  Outcome monitoring methods: self-report, observation  Therapeutic intervention type: Cognitive Behavioral Therapy  Topics discussed/themes: building skills sets for symptom management, symptom recognition, nutrition, exercise  The patient's response to the intervention is accepting  Patient's response to treatment is: good.   The patient's progress toward treatment goals: limited     Return to clinic: 3 months    -Cognitive-Behavioral/Supportive therapy and psychoeducation provided  -R/B/SE's of medications discussed with the pt who expresses understanding and chooses to take medications as prescribed.   -Pt instructed to call clinic, 911 or go to nearest emergency room if sxs worsen or pt is in   crisis. The pt expresses understanding.    Bebeto Resendiz, PhD, MP    Visit today included increased complexity associated with the care of the episodic problem associated with mental addressed and managed the longitudinal care of the patient due to the serious and/or  complex mental health diagnosis.       This note was generated with the assistance of ambient listening technology. Verbal consent was obtained by the patient and accompanying visitor(s) for the recording of patient appointment to facilitate this note. I attest to having reviewed and edited the generated note for accuracy, though some syntax or spelling errors may persist. Please contact the author of this note for any clarification.     Antidepressant/Antianxiety Medication Initiation:  Patient informed of risks, benefits, and potential side effects of medication and accepts informed consent.  Common side effects include nausea, fatigue, headache, insomnia., Specifically discussed the possibility of new or worsening suicidal thoughts/depression.  Patient instructed to stop the medication immediately and seek urgent treatment if this occurs. Patient instructed not to abruptly discontinue medication without physician guidance except in cases of sudden onset or worsening of SI.       Stimulant Medication Initiation:  Patient advised of risks, benefits, and side effects of medication and accepts informed consent.  Common side effects include insomnia, irritability, jittery feeling, dry mouth, and agitation/hostility., Patient advised of potential addictive nature of medication and controlled substance classification.  Instructed to safeguard medication as no early refills can be given for lost or stolen medications.       Benzodiazepine Initiation:  Patient advised of the risks, benefits, and common side effects of medication and has accepted informed consent.  Common side effects include drowsiness, impaired coordination, possible memory loss., Patient advised NOT to operate a vehicle or machinery untiil they are sure how the medication will affec them.  Client also advised of danger of mixing this medication with alcohol., Patient advised of potential addictive nature of medication and need to safeguard medication as  no early refills for lost or stolen medications can be authorized.       Pregnancy Warning:  Patient denies current pregnancy possibility.  Patient made aware that medications have not been proven safe in pregnancy and that she must maintain adequate birth control.  Patient instructed to alert us immediately if she becomes pregnant.

## 2025-05-23 ENCOUNTER — OFFICE VISIT (OUTPATIENT)
Dept: FAMILY MEDICINE | Facility: CLINIC | Age: 50
End: 2025-05-23
Payer: MEDICARE

## 2025-05-23 VITALS
TEMPERATURE: 98 F | WEIGHT: 177.5 LBS | RESPIRATION RATE: 16 BRPM | HEART RATE: 80 BPM | DIASTOLIC BLOOD PRESSURE: 74 MMHG | HEIGHT: 67 IN | BODY MASS INDEX: 27.86 KG/M2 | OXYGEN SATURATION: 98 % | SYSTOLIC BLOOD PRESSURE: 108 MMHG

## 2025-05-23 DIAGNOSIS — R55 SYNCOPE, UNSPECIFIED SYNCOPE TYPE: ICD-10-CM

## 2025-05-23 DIAGNOSIS — R89.9 ABNORMAL LABORATORY TEST RESULT: ICD-10-CM

## 2025-05-23 DIAGNOSIS — R41.82 ALTERED MENTAL STATUS, UNSPECIFIED ALTERED MENTAL STATUS TYPE: Primary | ICD-10-CM

## 2025-05-23 DIAGNOSIS — E03.9 HYPOTHYROIDISM, UNSPECIFIED TYPE: ICD-10-CM

## 2025-05-23 LAB
ALBUMIN SERPL BCP-MCNC: 4 G/DL (ref 3.5–5.2)
ALP SERPL-CCNC: 51 UNIT/L (ref 40–150)
ALT SERPL W/O P-5'-P-CCNC: 18 UNIT/L (ref 10–44)
ANION GAP (OHS): 9 MMOL/L (ref 8–16)
AST SERPL-CCNC: 16 UNIT/L (ref 11–45)
BILIRUB SERPL-MCNC: 0.5 MG/DL (ref 0.1–1)
BUN SERPL-MCNC: 8 MG/DL (ref 6–20)
CALCIUM SERPL-MCNC: 8.9 MG/DL (ref 8.7–10.5)
CHLORIDE SERPL-SCNC: 102 MMOL/L (ref 95–110)
CO2 SERPL-SCNC: 25 MMOL/L (ref 23–29)
CREAT SERPL-MCNC: 0.9 MG/DL (ref 0.5–1.4)
ERYTHROCYTE [DISTWIDTH] IN BLOOD BY AUTOMATED COUNT: 12.5 % (ref 11.5–14.5)
ESTRADIOL SERPL HS-MCNC: 22 PG/ML
FSH SERPL-ACNC: 107.27 MIU/ML
GFR SERPLBLD CREATININE-BSD FMLA CKD-EPI: >60 ML/MIN/1.73/M2
GLUCOSE SERPL-MCNC: 76 MG/DL (ref 70–110)
HCT VFR BLD AUTO: 39.4 % (ref 37–48.5)
HGB BLD-MCNC: 12.7 GM/DL (ref 12–16)
MCH RBC QN AUTO: 31.8 PG (ref 27–31)
MCHC RBC AUTO-ENTMCNC: 32.2 G/DL (ref 32–36)
MCV RBC AUTO: 99 FL (ref 82–98)
PLATELET # BLD AUTO: 233 K/UL (ref 150–450)
PMV BLD AUTO: 10.6 FL (ref 9.2–12.9)
POTASSIUM SERPL-SCNC: 4.6 MMOL/L (ref 3.5–5.1)
PROT SERPL-MCNC: 6.8 GM/DL (ref 6–8.4)
RBC # BLD AUTO: 3.99 M/UL (ref 4–5.4)
SODIUM SERPL-SCNC: 136 MMOL/L (ref 136–145)
WBC # BLD AUTO: 4.11 K/UL (ref 3.9–12.7)

## 2025-05-23 PROCEDURE — 85027 COMPLETE CBC AUTOMATED: CPT | Performed by: FAMILY MEDICINE

## 2025-05-23 PROCEDURE — 83001 ASSAY OF GONADOTROPIN (FSH): CPT | Performed by: FAMILY MEDICINE

## 2025-05-23 PROCEDURE — 82670 ASSAY OF TOTAL ESTRADIOL: CPT | Performed by: FAMILY MEDICINE

## 2025-05-23 PROCEDURE — 80053 COMPREHEN METABOLIC PANEL: CPT | Performed by: FAMILY MEDICINE

## 2025-05-23 RX ORDER — KETOROLAC TROMETHAMINE 30 MG/ML
30 INJECTION, SOLUTION INTRAMUSCULAR; INTRAVENOUS EVERY 6 HOURS
COMMUNITY

## 2025-05-23 RX ORDER — TRIAMCINOLONE ACETONIDE 1 MG/G
CREAM TOPICAL 2 TIMES DAILY
Qty: 160 G | Refills: 0 | Status: SHIPPED | OUTPATIENT
Start: 2025-05-23

## 2025-05-23 NOTE — PROGRESS NOTES
Subjective:       Patient ID: Dinah Mitchell is a 50 y.o. female.    Chief Complaint: Follow-up (Pt had skin removal surgery 5 weeks ago/   rash on arms )    History of Present Illness    CHIEF COMPLAINT:  Patient presents today for follow up after recent plastic surgery and subsequent ER visit    RECENT PLASTIC SURGERY:  She underwent breast augmentation, skin removal on arms, and liposuction on April 15th. She reports breast size is smaller than requested (desired full C or D cup), though notes improved neck and shoulder comfort. Her nipples continue to bleed during healing process but her surgeon it feels this is to be expected. She required three post-surgical draining procedures.  She next sees her surgeon in two months.    POST-SURGICAL COMPLICATIONS:  She experienced an unresponsive episode on April 17th, two days post-surgery.  It is unclear if this was an altered mental status or syncopal episode.  She is unclear of the details but remembers waking up in the ER.  Her ER evaluation was largely unremarkable.  She was discharged home with instructions to follow up with Cardiology, neurology and me.  Of note, after her surgery, she had been taking Phenergan, oxycodone and Valium prescribed by the surgeon    RASH:  She reports pruritic rash on bilateral upper arms, with symptoms exacerbating at night and upon awakening. She believes this is due to her compression garments she is wearing following her surgery and she has 1 more week left and them    MENOPAUSE:  She wonders if she is in menopause.  Her last menstrual period was approximately 1.5 years ago at age 50.    WEIGHT MANAGEMENT:  She is currently taking Zepbound 10 mg once a week.  She wonders about adjusting the dose.  She had initially started Zepbound with Restore and I started prescribing set lb in November.  Her initial weight was 286 lbs when she started Zepbound with [employer]ore.  Her weight with me in November when I started prescribing Zepbound was  214 lb.  Today she weighs 177 lb.  She has tolerated this well with no side effects.  She is continuing to lose weight.  She has thought about increasing the dose         Review of Systems   Constitutional:  Negative for appetite change, chills, diaphoresis, fatigue, fever and unexpected weight change.   HENT:  Negative for congestion, ear pain, postnasal drip, rhinorrhea, sinus pressure, sneezing, sore throat and trouble swallowing.    Eyes:  Negative for pain, discharge and visual disturbance.   Respiratory:  Negative for cough, chest tightness, shortness of breath and wheezing.    Cardiovascular:  Negative for chest pain, palpitations and leg swelling.   Gastrointestinal:  Negative for abdominal distention, abdominal pain, blood in stool, constipation, diarrhea, nausea and vomiting.   Skin:  Negative for rash.         Objective:      Physical Exam  Constitutional:       General: She is not in acute distress.     Appearance: Normal appearance. She is well-developed.   HENT:      Head: Normocephalic and atraumatic.      Right Ear: Hearing, tympanic membrane, ear canal and external ear normal.      Left Ear: Hearing, tympanic membrane, ear canal and external ear normal.      Nose: Nose normal.      Mouth/Throat:      Mouth: No oral lesions.      Pharynx: No oropharyngeal exudate or posterior oropharyngeal erythema.   Eyes:      General: Lids are normal. No scleral icterus.     Extraocular Movements: Extraocular movements intact.      Conjunctiva/sclera: Conjunctivae normal.      Pupils: Pupils are equal, round, and reactive to light.   Neck:      Thyroid: No thyroid mass or thyromegaly.      Vascular: No carotid bruit.   Cardiovascular:      Rate and Rhythm: Normal rate and regular rhythm. No extrasystoles are present.     Chest Wall: PMI is not displaced.      Heart sounds: Normal heart sounds. No murmur heard.     No gallop.   Pulmonary:      Effort: Pulmonary effort is normal. No accessory muscle usage or  "respiratory distress.      Breath sounds: Normal breath sounds.   Abdominal:      General: Bowel sounds are normal. There is no abdominal bruit.      Palpations: Abdomen is soft.      Tenderness: There is no abdominal tenderness. There is no rebound.   Musculoskeletal:      Cervical back: Normal range of motion and neck supple.   Lymphadenopathy:      Head:      Right side of head: No submental or submandibular adenopathy.      Left side of head: No submental or submandibular adenopathy.      Cervical:      Right cervical: No superficial, deep or posterior cervical adenopathy.     Left cervical: No superficial, deep or posterior cervical adenopathy.      Upper Body:      Right upper body: No supraclavicular adenopathy.      Left upper body: No supraclavicular adenopathy.   Skin:     General: Skin is warm and dry.   Neurological:      Mental Status: She is alert and oriented to person, place, and time.       Blood pressure 108/74, pulse 80, temperature 97.7 °F (36.5 °C), temperature source Temporal, resp. rate 16, height 5' 7" (1.702 m), weight 80.5 kg (177 lb 7.5 oz), SpO2 98%.Body mass index is 27.8 kg/m².          Assessment & Plan    1. Assessed post-op recovery from recent breast augmentation, arm skin removal, and localized liposuction.  2. Evaluated unresponsive episode 2 days post-op, likely due to medication interactions (pain medication, benzodiazepine, Phenergan).  3. Considered complaint of rash on arms, likely heat rash exacerbated by compression garments.  4. Reviewed weight loss progress on current Zepbound regimen (10 mg), noting significant reduction from 286 lbs to 177 lbs, with plan to maintain current dose as weight loss continues without significant side effects.    POST-OPERATIVE BREAST RECOVERY:.  - Scheduled follow-up with surgeon in 2 months.    SYNCOPE versus ALTERED MENTAL STATE:  - most likely due to medication interactions following surgery  - Referred patient to neurologist for " evaluation   - Patient scheduled to see cardiologist next week due to this with abnormal EKG noted in the ER.  - we will repeat labs today    PRURITUS AND SKIN RASH:  - Prescribed topical steroid cream for treatment.  - notify if symptoms worsen or do not improve    AMENORRHEA LIKELY DUE TO MENOPAUSE:  - we will check labs today    OBESITY MANAGEMENT:  - Weight loss continues but at a slower rate.  - Advised to continue current weight management program.  - Instructed to contact office if weight loss plateaus on current Zepbound.    HYPOTHYROIDISM:  - repeat labs in June as planned  - continue current Synthroid dose for now    FOLLOW-UP AND LAB TESTS:  - Follow up in 3 months to reassess overall progress and medication management or sooner if needed.         This note was generated with the assistance of ambient listening technology. Verbal consent was obtained by the patient and accompanying visitor(s) for the recording of patient appointment to facilitate this note. I attest to having reviewed and edited the generated note for accuracy, though some syntax or spelling errors may persist. Please contact the author of this note for any clarification.

## 2025-05-24 ENCOUNTER — PATIENT MESSAGE (OUTPATIENT)
Dept: FAMILY MEDICINE | Facility: CLINIC | Age: 50
End: 2025-05-24
Payer: MEDICARE

## 2025-05-27 ENCOUNTER — TELEPHONE (OUTPATIENT)
Dept: NEUROLOGY | Facility: CLINIC | Age: 50
End: 2025-05-27
Payer: MEDICARE

## 2025-05-27 NOTE — TELEPHONE ENCOUNTER
----- Message from Heather sent at 5/27/2025  9:57 AM CDT -----  Regarding: Sooner Appointment Reschedule Request  Contact: patient at [phone]  Type:  Sooner Appointment Reschedule RequestName of Caller:  patient at [phone]:  botoxAdditional Information:  Due to weather and patient's PTSD, patient cancelled her appointment today and would like to reschedule as soon as possible. Please call and advise. Thank you

## 2025-06-04 ENCOUNTER — OFFICE VISIT (OUTPATIENT)
Dept: PSYCHIATRY | Facility: CLINIC | Age: 50
End: 2025-06-04
Payer: MEDICARE

## 2025-06-04 ENCOUNTER — TELEPHONE (OUTPATIENT)
Dept: PSYCHIATRY | Facility: CLINIC | Age: 50
End: 2025-06-04
Payer: MEDICARE

## 2025-06-04 DIAGNOSIS — F43.10 POSTTRAUMATIC STRESS DISORDER: ICD-10-CM

## 2025-06-04 DIAGNOSIS — F41.0 PANIC DISORDER WITHOUT AGORAPHOBIA: Primary | ICD-10-CM

## 2025-06-04 PROCEDURE — 1159F MED LIST DOCD IN RCRD: CPT | Mod: CPTII,95,, | Performed by: PSYCHOLOGIST

## 2025-06-04 PROCEDURE — 90791 PSYCH DIAGNOSTIC EVALUATION: CPT | Mod: 95,,, | Performed by: PSYCHOLOGIST

## 2025-06-04 PROCEDURE — 1160F RVW MEDS BY RX/DR IN RCRD: CPT | Mod: CPTII,95,, | Performed by: PSYCHOLOGIST

## 2025-06-05 ENCOUNTER — OFFICE VISIT (OUTPATIENT)
Dept: FAMILY MEDICINE | Facility: CLINIC | Age: 50
End: 2025-06-05
Payer: MEDICARE

## 2025-06-05 DIAGNOSIS — Z78.0 MENOPAUSE: Primary | ICD-10-CM

## 2025-06-05 RX ORDER — MOMETASONE FUROATE 1 MG/G
CREAM TOPICAL DAILY
Qty: 45 G | Refills: 1 | Status: SHIPPED | OUTPATIENT
Start: 2025-06-05

## 2025-06-05 RX ORDER — CONJUGATED ESTROGENS AND MEDROXYPROGESTERONE ACETATE .3; 1.5 MG/1; MG/1
1 TABLET, SUGAR COATED ORAL DAILY
Qty: 30 TABLET | Refills: 1 | Status: SHIPPED | OUTPATIENT
Start: 2025-06-05 | End: 2026-06-05

## 2025-06-19 ENCOUNTER — TELEPHONE (OUTPATIENT)
Dept: PSYCHIATRY | Facility: CLINIC | Age: 50
End: 2025-06-19
Payer: MEDICARE

## 2025-06-19 ENCOUNTER — OFFICE VISIT (OUTPATIENT)
Dept: PSYCHIATRY | Facility: CLINIC | Age: 50
End: 2025-06-19
Payer: MEDICARE

## 2025-06-19 DIAGNOSIS — F43.10 POSTTRAUMATIC STRESS DISORDER: Primary | ICD-10-CM

## 2025-06-19 DIAGNOSIS — F41.0 PANIC DISORDER: ICD-10-CM

## 2025-06-19 PROCEDURE — 1160F RVW MEDS BY RX/DR IN RCRD: CPT | Mod: CPTII,95,, | Performed by: PSYCHOLOGIST

## 2025-06-19 PROCEDURE — 90837 PSYTX W PT 60 MINUTES: CPT | Mod: 95,,, | Performed by: PSYCHOLOGIST

## 2025-06-19 PROCEDURE — 1159F MED LIST DOCD IN RCRD: CPT | Mod: CPTII,95,, | Performed by: PSYCHOLOGIST

## 2025-06-19 NOTE — TELEPHONE ENCOUNTER
Please call pt to schedule a follow up appointment. Thank you.  Received: Today  Amelie Llanos, Alexandra Ray MA  Caller: Unspecified (Today,  3:53 PM)
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
## 2025-06-20 ENCOUNTER — LAB VISIT (OUTPATIENT)
Dept: LAB | Facility: HOSPITAL | Age: 50
End: 2025-06-20
Attending: FAMILY MEDICINE
Payer: MEDICARE

## 2025-06-20 DIAGNOSIS — E03.4 HYPOTHYROIDISM DUE TO ACQUIRED ATROPHY OF THYROID: ICD-10-CM

## 2025-06-20 LAB
T4 FREE SERPL-MCNC: 1.57 NG/DL (ref 0.71–1.51)
TSH SERPL-ACNC: <0.01 UIU/ML (ref 0.4–4)

## 2025-06-20 PROCEDURE — 36415 COLL VENOUS BLD VENIPUNCTURE: CPT | Mod: PO

## 2025-06-20 PROCEDURE — 84443 ASSAY THYROID STIM HORMONE: CPT

## 2025-06-20 PROCEDURE — 84439 ASSAY OF FREE THYROXINE: CPT

## 2025-06-21 ENCOUNTER — PATIENT MESSAGE (OUTPATIENT)
Dept: FAMILY MEDICINE | Facility: CLINIC | Age: 50
End: 2025-06-21
Payer: MEDICARE

## 2025-06-21 DIAGNOSIS — N95.1 MENOPAUSAL SYMPTOM: Primary | ICD-10-CM

## 2025-06-21 RX ORDER — LEVOTHYROXINE SODIUM 112 UG/1
112 TABLET ORAL
Qty: 30 TABLET | Refills: 1 | Status: SHIPPED | OUTPATIENT
Start: 2025-06-21 | End: 2026-06-21

## 2025-06-26 ENCOUNTER — PROCEDURE VISIT (OUTPATIENT)
Dept: NEUROLOGY | Facility: CLINIC | Age: 50
End: 2025-06-26
Payer: MEDICARE

## 2025-06-26 VITALS — RESPIRATION RATE: 17 BRPM | SYSTOLIC BLOOD PRESSURE: 96 MMHG | DIASTOLIC BLOOD PRESSURE: 62 MMHG | HEART RATE: 88 BPM

## 2025-06-26 DIAGNOSIS — G43.719 CHRONIC MIGRAINE WITHOUT AURA, INTRACTABLE, WITHOUT STATUS MIGRAINOSUS: Primary | ICD-10-CM

## 2025-06-26 NOTE — PROCEDURES
Procedures     BOTOX PROCEDURE    PROCEDURE PERFORMED: Botulinum toxin injection (68674)    CLINICAL INDICATION: G43.719    The Botox injections have achieved well over 50%  improvement in the patient's symptoms. Migraines have been reduced at least 7 days per month and the number of cumulative hours suffering with headaches has been reduced at least 100 total hours per month. Today she does have a headache indicating that the Botox has worn off. Frequency of treatment is every 3 months unless no response to the treatments, at which time we will discontinue the injections.      A time out was conducted just before the start of the procedure to verify the correct patient and procedure, procedure location, and all relevant critical information.   Signed consent obtained prior to procedure     Conventional methods of treatment but the patient has been unresponsive and refractory.The patient meets criteria for chronic headaches according to the ICHD-III, the patient has more than 15 headaches a month at least 8 of them meet migraine criteria, which last for more than 4 hours a day.     Last Botox injections resulted in over 50%  improvement in the patient's symptoms. Frequency of treatment is every 3 months unless no response to the treatments, at which time we will discontinue the injections.     DESCRIPTION OF PROCEDURE: After obtaining informed consent and under aseptic technique, a total of 155 units of botulinum toxin type A were injected in the following muscles: Procerus 5 units,  5 units bilaterally, frontalis 20 units, temporalis 20 units bilaterally,   occipitalis 15 units, upper cervical paraspinals 10 units bilaterally and trapezius 15 units ilaterally. The patient was given a total of 155 units in 31 sites.    Special precaution taken given her posterior decompression.       The patient tolerated the procedure well. There were no complications. The patient was given a prescription for repeat  treatment in 3 months.     Unavoidable waste 45 units    Saeid Guevara M.D  Medical Director, Headache and Facial Pain  [facility]

## 2025-06-28 RX ORDER — NORETHINDRONE ACETATE AND ETHINYL ESTRADIOL .5; 2.5 MG/1; UG/1
1 TABLET ORAL DAILY
Qty: 30 TABLET | Refills: 1 | Status: SHIPPED | OUTPATIENT
Start: 2025-06-28 | End: 2026-06-28

## 2025-07-03 ENCOUNTER — TELEPHONE (OUTPATIENT)
Dept: PSYCHIATRY | Facility: CLINIC | Age: 50
End: 2025-07-03
Payer: MEDICARE

## 2025-07-03 ENCOUNTER — OFFICE VISIT (OUTPATIENT)
Dept: PSYCHIATRY | Facility: CLINIC | Age: 50
End: 2025-07-03
Payer: MEDICARE

## 2025-07-03 DIAGNOSIS — F43.10 POSTTRAUMATIC STRESS DISORDER: Primary | ICD-10-CM

## 2025-07-03 PROCEDURE — 90834 PSYTX W PT 45 MINUTES: CPT | Mod: 95,,, | Performed by: PSYCHOLOGIST

## 2025-07-03 RX ORDER — TIRZEPATIDE 10 MG/.5ML
10 INJECTION, SOLUTION SUBCUTANEOUS
Qty: 2 ML | Refills: 0 | Status: CANCELLED | OUTPATIENT
Start: 2025-07-03
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
## 2025-07-03 NOTE — TELEPHONE ENCOUNTER
No care due was identified.  [facility] Embedded Care Due Messages. Reference number: [id number].   7/03/2025 3:39:21 PM CDT

## 2025-07-03 NOTE — TELEPHONE ENCOUNTER
Refill Routing Note   Medication(s) are not appropriate for processing by [facility] for the following reason(s):        Required labs outdated - A1C    [facility] action(s):  Defer             Appointments  past 12m or future 3m with PCP    Date Provider   Last Visit   6/5/2025 Amaya Aiken MD   Next Visit   8/28/2025 Amaya Aiken MD   ED visits in past 90 days: 1        Note composed:3:10 PM 07/03/2025

## 2025-07-03 NOTE — TELEPHONE ENCOUNTER
No care due was identified.  [facility] Embedded Care Due Messages. Reference number: [id number].   7/03/2025 9:26:32 AM CDT

## 2025-07-03 NOTE — TELEPHONE ENCOUNTER
Please schedule a follow up in 1 week. Thank you  Received: Today  Amelie Llanos, Alexandra Ray MA  Caller: Unspecified (Today,  3:52 PM)

## 2025-07-03 NOTE — TELEPHONE ENCOUNTER
Care Due:                  Date            Visit Type   Department     Provider  --------------------------------------------------------------------------------                                ESTABLISHED                              PATIENT -    [facility]    Amaya Aria Aiken  Last Visit: 06-      VIRTUAL      MEDICINE       Anger                              EP -                              PRIMARY      [facility]    Amaya Aria Aiken  Next Visit: 08-      CARE ([facility])   MEDICINE       Anger                                                            Last  Test          Frequency    Reason                     Performed    Due Date  --------------------------------------------------------------------------------    HBA1C.......  6 months...  tirzepatide,.............  01- 07-    [employer] Embedded Care Due Messages. Reference number: [id number].   7/03/2025 8:53:19 AM CDT

## 2025-07-05 RX ORDER — TIRZEPATIDE 10 MG/.5ML
10 INJECTION, SOLUTION SUBCUTANEOUS
Qty: 2 ML | Refills: 1 | Status: SHIPPED | OUTPATIENT
Start: 2025-07-05
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
## 2025-07-15 ENCOUNTER — OFFICE VISIT (OUTPATIENT)
Dept: PSYCHIATRY | Facility: CLINIC | Age: 50
End: 2025-07-15
Payer: MEDICARE

## 2025-07-15 DIAGNOSIS — F60.3 BORDERLINE PERSONALITY DISORDER: ICD-10-CM

## 2025-07-15 DIAGNOSIS — F90.2 ATTENTION DEFICIT HYPERACTIVITY DISORDER (ADHD), COMBINED TYPE: ICD-10-CM

## 2025-07-15 DIAGNOSIS — G47.00 INSOMNIA, UNSPECIFIED TYPE: ICD-10-CM

## 2025-07-15 DIAGNOSIS — F41.0 PANIC DISORDER: ICD-10-CM

## 2025-07-15 DIAGNOSIS — F43.10 POSTTRAUMATIC STRESS DISORDER: Primary | ICD-10-CM

## 2025-07-15 PROCEDURE — 90833 PSYTX W PT W E/M 30 MIN: CPT | Mod: 95,,, | Performed by: PSYCHOLOGIST

## 2025-07-15 PROCEDURE — G2211 COMPLEX E/M VISIT ADD ON: HCPCS | Mod: 95,,, | Performed by: PSYCHOLOGIST

## 2025-07-15 PROCEDURE — 98006 SYNCH AUDIO-VIDEO EST MOD 30: CPT | Mod: 95,,, | Performed by: PSYCHOLOGIST

## 2025-07-15 PROCEDURE — 1159F MED LIST DOCD IN RCRD: CPT | Mod: CPTII,95,, | Performed by: PSYCHOLOGIST

## 2025-07-15 RX ORDER — ALPRAZOLAM 0.5 MG/1
0.5 TABLET ORAL DAILY PRN
Qty: 30 TABLET | Refills: 0 | Status: SHIPPED | OUTPATIENT
Start: 2025-07-15 | End: 2025-08-16

## 2025-07-15 RX ORDER — BUSPIRONE HYDROCHLORIDE 15 MG/1
15 TABLET ORAL 3 TIMES DAILY
Qty: 90 TABLET | Refills: 1 | Status: SHIPPED | OUTPATIENT
Start: 2025-07-15 | End: 2026-07-15

## 2025-07-17 ENCOUNTER — TELEPHONE (OUTPATIENT)
Dept: PSYCHIATRY | Facility: CLINIC | Age: 50
End: 2025-07-17
Payer: MEDICARE

## 2025-07-17 ENCOUNTER — OFFICE VISIT (OUTPATIENT)
Dept: PSYCHIATRY | Facility: CLINIC | Age: 50
End: 2025-07-17
Payer: MEDICARE

## 2025-07-17 ENCOUNTER — PATIENT MESSAGE (OUTPATIENT)
Dept: PSYCHIATRY | Facility: CLINIC | Age: 50
End: 2025-07-17
Payer: MEDICARE

## 2025-07-17 DIAGNOSIS — F43.10 POSTTRAUMATIC STRESS DISORDER: Primary | ICD-10-CM

## 2025-07-17 PROCEDURE — 1159F MED LIST DOCD IN RCRD: CPT | Mod: CPTII,95,, | Performed by: PSYCHOLOGIST

## 2025-07-17 PROCEDURE — 1160F RVW MEDS BY RX/DR IN RCRD: CPT | Mod: CPTII,95,, | Performed by: PSYCHOLOGIST

## 2025-07-17 PROCEDURE — 90837 PSYTX W PT 60 MINUTES: CPT | Mod: 95,,, | Performed by: PSYCHOLOGIST

## 2025-07-17 NOTE — TELEPHONE ENCOUNTER
Please call pt to schedule a follow up appointment. Thank you  Received: Today  Amelie Llanos, Alexandra Ray MA  Caller: Unspecified (Today,  3:43 PM)
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
## 2025-07-17 NOTE — TELEPHONE ENCOUNTER
Dr Llanos/teams    Hi  Please move Shante up to 3pm. Shes aware.     Its mom stuff and I explained. Shes okay with it.     Thank you!!!

## 2025-07-31 ENCOUNTER — PATIENT MESSAGE (OUTPATIENT)
Dept: PSYCHIATRY | Facility: CLINIC | Age: 50
End: 2025-07-31
Payer: MEDICARE

## 2025-07-31 ENCOUNTER — TELEPHONE (OUTPATIENT)
Dept: PSYCHIATRY | Facility: CLINIC | Age: 50
End: 2025-07-31
Payer: MEDICARE

## 2025-07-31 NOTE — TELEPHONE ENCOUNTER
Patient called and left a message last night advising that they would not be able to make appt this evening at 4:00.

## 2025-07-31 NOTE — TELEPHONE ENCOUNTER
NOTED CALLED PT TO GET RESCHEDULED NO ANSWER NO VOICE MESSAGE WILL SEND MY CHART TO GET RESCHEDULED.

## 2025-08-01 RX ORDER — MOMETASONE FUROATE 1 MG/G
CREAM TOPICAL DAILY
Qty: 45 G | Refills: 1 | Status: CANCELLED | OUTPATIENT
Start: 2025-08-01

## 2025-08-01 NOTE — TELEPHONE ENCOUNTER
No care due was identified.  [facility] Embedded Care Due Messages. Reference number: [id number].   8/01/2025 4:08:04 PM CDT

## 2025-08-01 NOTE — TELEPHONE ENCOUNTER
No care due was identified.  [facility] Embedded Care Due Messages. Reference number: [id number].   8/01/2025 5:06:14 PM CDT

## 2025-08-04 ENCOUNTER — PATIENT MESSAGE (OUTPATIENT)
Dept: FAMILY MEDICINE | Facility: CLINIC | Age: 50
End: 2025-08-04
Payer: MEDICARE

## 2025-08-04 DIAGNOSIS — R39.9 UTI SYMPTOMS: Primary | ICD-10-CM

## 2025-08-04 RX ORDER — MOMETASONE FUROATE 1 MG/G
CREAM TOPICAL DAILY
Qty: 45 G | Refills: 1 | Status: SHIPPED | OUTPATIENT
Start: 2025-08-04

## 2025-08-14 DIAGNOSIS — E03.4 HYPOTHYROIDISM DUE TO ACQUIRED ATROPHY OF THYROID: Primary | ICD-10-CM

## 2025-08-14 RX ORDER — LEVOTHYROXINE SODIUM 112 UG/1
112 TABLET ORAL
Qty: 30 TABLET | Refills: 1 | Status: CANCELLED | OUTPATIENT
Start: 2025-08-14 | End: 2026-08-14

## 2025-08-19 ENCOUNTER — HOSPITAL ENCOUNTER (OUTPATIENT)
Dept: RADIOLOGY | Facility: HOSPITAL | Age: 50
Discharge: HOME | End: 2025-08-19
Attending: INTERNAL MEDICINE
Payer: MEDICARE

## 2025-08-19 DIAGNOSIS — J45.909 ASTHMA, UNSPECIFIED ASTHMA SEVERITY, UNSPECIFIED WHETHER COMPLICATED, UNSPECIFIED WHETHER PERSISTENT: ICD-10-CM

## 2025-08-19 PROCEDURE — 71046 X-RAY EXAM CHEST 2 VIEWS: CPT | Mod: 26,,, | Performed by: RADIOLOGY

## 2025-08-19 PROCEDURE — 71046 X-RAY EXAM CHEST 2 VIEWS: CPT | Mod: TC,PO

## 2025-08-24 ENCOUNTER — PATIENT MESSAGE (OUTPATIENT)
Dept: FAMILY MEDICINE | Facility: CLINIC | Age: 50
End: 2025-08-24
Payer: MEDICARE

## 2025-08-24 RX ORDER — LEVOTHYROXINE SODIUM 112 UG/1
112 TABLET ORAL
Qty: 90 TABLET | Refills: 1 | Status: SHIPPED | OUTPATIENT
Start: 2025-08-24 | End: 2026-08-24

## 2025-08-25 ENCOUNTER — PATIENT MESSAGE (OUTPATIENT)
Dept: OTOLARYNGOLOGY | Facility: CLINIC | Age: 50
End: 2025-08-25
Payer: MEDICARE

## 2025-08-25 RX ORDER — TRAZODONE HYDROCHLORIDE 100 MG/1
100 TABLET ORAL NIGHTLY
Qty: 30 TABLET | Refills: 2 | Status: SHIPPED | OUTPATIENT
Start: 2025-08-25 | End: 2026-08-25

## 2025-08-26 ENCOUNTER — OFFICE VISIT (OUTPATIENT)
Dept: PSYCHIATRY | Facility: CLINIC | Age: 50
End: 2025-08-26
Payer: MEDICARE

## 2025-08-26 DIAGNOSIS — F90.2 ATTENTION DEFICIT HYPERACTIVITY DISORDER (ADHD), COMBINED TYPE: ICD-10-CM

## 2025-08-26 DIAGNOSIS — F43.10 POSTTRAUMATIC STRESS DISORDER: Primary | ICD-10-CM

## 2025-08-26 DIAGNOSIS — F41.0 PANIC DISORDER: ICD-10-CM

## 2025-08-26 DIAGNOSIS — G47.00 INSOMNIA, UNSPECIFIED TYPE: ICD-10-CM

## 2025-08-26 PROCEDURE — 90833 PSYTX W PT W E/M 30 MIN: CPT | Mod: 95,,, | Performed by: PSYCHOLOGIST

## 2025-08-26 PROCEDURE — 98006 SYNCH AUDIO-VIDEO EST MOD 30: CPT | Mod: 95,,, | Performed by: PSYCHOLOGIST

## 2025-08-26 PROCEDURE — G2211 COMPLEX E/M VISIT ADD ON: HCPCS | Mod: 95,,, | Performed by: PSYCHOLOGIST

## 2025-08-26 PROCEDURE — 1159F MED LIST DOCD IN RCRD: CPT | Mod: CPTII,95,, | Performed by: PSYCHOLOGIST

## 2025-08-26 RX ORDER — ALPRAZOLAM 0.5 MG/1
0.5 TABLET ORAL DAILY PRN
Qty: 30 TABLET | Refills: 0 | Status: SHIPPED | OUTPATIENT
Start: 2025-08-26 | End: 2025-09-25

## 2025-08-26 RX ORDER — ESCITALOPRAM OXALATE 20 MG/1
20 TABLET ORAL DAILY
Qty: 90 TABLET | Refills: 0 | Status: SHIPPED | OUTPATIENT
Start: 2025-08-26 | End: 2026-08-26

## 2025-08-26 RX ORDER — BUSPIRONE HYDROCHLORIDE 15 MG/1
15 TABLET ORAL 3 TIMES DAILY
Qty: 90 TABLET | Refills: 1 | Status: SHIPPED | OUTPATIENT
Start: 2025-08-26 | End: 2026-08-26

## 2025-08-27 RX ORDER — TIRZEPATIDE 10 MG/.5ML
10 INJECTION, SOLUTION SUBCUTANEOUS
Qty: 2 ML | Refills: 1 | Status: CANCELLED | OUTPATIENT
Start: 2025-08-27

## 2025-08-28 ENCOUNTER — OFFICE VISIT (OUTPATIENT)
Dept: FAMILY MEDICINE | Facility: CLINIC | Age: 50
End: 2025-08-28
Payer: MEDICARE

## 2025-08-28 VITALS
OXYGEN SATURATION: 98 % | WEIGHT: 151.56 LBS | HEART RATE: 88 BPM | TEMPERATURE: 98 F | DIASTOLIC BLOOD PRESSURE: 80 MMHG | SYSTOLIC BLOOD PRESSURE: 120 MMHG | BODY MASS INDEX: 23.79 KG/M2 | HEIGHT: 67 IN | RESPIRATION RATE: 18 BRPM

## 2025-08-28 DIAGNOSIS — E03.4 HYPOTHYROIDISM DUE TO ACQUIRED ATROPHY OF THYROID: ICD-10-CM

## 2025-08-28 DIAGNOSIS — R13.10 PILL DYSPHAGIA: Primary | ICD-10-CM

## 2025-08-28 DIAGNOSIS — F32.1 CURRENT MODERATE EPISODE OF MAJOR DEPRESSIVE DISORDER, UNSPECIFIED WHETHER RECURRENT: ICD-10-CM

## 2025-08-28 DIAGNOSIS — M79.10 MYALGIA: ICD-10-CM

## 2025-08-28 DIAGNOSIS — F43.10 PTSD (POST-TRAUMATIC STRESS DISORDER): ICD-10-CM

## 2025-08-28 DIAGNOSIS — Z78.0 MENOPAUSE: ICD-10-CM

## 2025-08-28 RX ORDER — KETOROLAC TROMETHAMINE 15 MG/ML
30 INJECTION, SOLUTION INTRAMUSCULAR; INTRAVENOUS
Status: COMPLETED | OUTPATIENT
Start: 2025-08-28 | End: 2025-08-28

## 2025-08-28 RX ORDER — TIRZEPATIDE 10 MG/.5ML
10 INJECTION, SOLUTION SUBCUTANEOUS
Qty: 2 ML | Refills: 1 | Status: SHIPPED | OUTPATIENT
Start: 2025-08-28

## 2025-08-28 RX ORDER — FEZOLINETANT 45 MG/1
45 TABLET, FILM COATED ORAL DAILY
Qty: 30 TABLET | Refills: 0 | Status: SHIPPED | OUTPATIENT
Start: 2025-08-28

## 2025-08-28 RX ADMIN — KETOROLAC TROMETHAMINE 30 MG: 15 INJECTION, SOLUTION INTRAMUSCULAR; INTRAVENOUS at 01:08

## 2025-09-02 ENCOUNTER — TELEPHONE (OUTPATIENT)
Dept: FAMILY MEDICINE | Facility: CLINIC | Age: 50
End: 2025-09-02
Payer: MEDICARE

## 2025-09-02 ENCOUNTER — TELEPHONE (OUTPATIENT)
Dept: PSYCHIATRY | Facility: CLINIC | Age: 50
End: 2025-09-02
Payer: MEDICARE

## 2025-09-02 DIAGNOSIS — Z79.899 LONG-TERM USE OF HIGH-RISK MEDICATION: Primary | ICD-10-CM

## 2025-09-02 DIAGNOSIS — N95.1 MENOPAUSAL SYMPTOM: ICD-10-CM

## 2025-09-02 RX ORDER — NORETHINDRONE ACETATE AND ETHINYL ESTRADIOL .5; 2.5 MG/1; UG/1
1 TABLET ORAL DAILY
Qty: 30 TABLET | Refills: 1 | Status: CANCELLED | OUTPATIENT
Start: 2025-09-02 | End: 2026-09-02

## 2025-09-03 DIAGNOSIS — N95.1 MENOPAUSAL SYMPTOM: ICD-10-CM

## 2025-09-03 RX ORDER — NORETHINDRONE ACETATE AND ETHINYL ESTRADIOL .5; 2.5 MG/1; UG/1
1 TABLET ORAL DAILY
Qty: 28 TABLET | Refills: 1 | Status: SHIPPED | OUTPATIENT
Start: 2025-09-03 | End: 2026-09-03

## (undated) DEVICE — BLADE SURG STAINLESS STEEL #11

## (undated) DEVICE — PAD ELECTROSURGICAL PAT PLATE

## (undated) DEVICE — APPLICATOR CHLORAPREP CLR 10.5

## (undated) DEVICE — TRAY FOLEY 16FR INFECTION CONT

## (undated) DEVICE — BIT DRILL PERFORATOR DISP 14MM

## (undated) DEVICE — CARTRIDGE OIL

## (undated) DEVICE — MARKER SKIN STND TIP BLUE BARR

## (undated) DEVICE — TRAY NERVE BLOCK

## (undated) DEVICE — Device

## (undated) DEVICE — BUR BONE CUT MICRO TPS 3X3.8MM

## (undated) DEVICE — CLIP MED TICALL

## (undated) DEVICE — DIFFUSER

## (undated) DEVICE — DRESSING AQUACEL FOAM NON 4X4

## (undated) DEVICE — GLOVE SURGICAL LATEX SZ 7

## (undated) DEVICE — SUT VICRYL PLUS 2-0 CT1 18

## (undated) DEVICE — SEE MEDLINE ITEM 152622

## (undated) DEVICE — DURAPREP SURG SCRUB 26ML

## (undated) DEVICE — DRESSING AQUACEL FOAM 5 X 5

## (undated) DEVICE — SEE MEDLINE ITEM 157150

## (undated) DEVICE — DRESSING ABSRBNT ISLAND 3.6X8

## (undated) DEVICE — CATH SUCTION 10FR

## (undated) DEVICE — HEMOSTAT SURGICEL 2X14IN

## (undated) DEVICE — DRAPE STERI-DRAPE 1000 17X11IN

## (undated) DEVICE — DURASEAL

## (undated) DEVICE — NDL HYPO REG 25G X 1 1/2

## (undated) DEVICE — PINS SKULL ADULT MAYFIELD
Type: IMPLANTABLE DEVICE | Site: SCALP | Status: NON-FUNCTIONAL
Removed: 2018-12-13

## (undated) DEVICE — DRESSING AQUACEL SACRAL LARGE

## (undated) DEVICE — DRAPE THYROID WITH ARMBOARD

## (undated) DEVICE — CORD BIPOLAR 12 FOOT

## (undated) DEVICE — SEE MEDLINE ITEM 146292

## (undated) DEVICE — STAPLER SKIN PROXIMATE WIDE

## (undated) DEVICE — ROUTER TAPERED 2.3MM

## (undated) DEVICE — DRESSING TELFA STRL 4X3 LF

## (undated) DEVICE — PACK SET UP CONVERTORS

## (undated) DEVICE — HEMOSTAT SURGICEL 2X3IN

## (undated) DEVICE — SPONGE PATTY SURGICAL .5X3IN

## (undated) DEVICE — SUT VICRYL+ 2-0 SH 18IN

## (undated) DEVICE — APPLICATOR MICROMYST

## (undated) DEVICE — SEE MEDLINE ITEM 157128

## (undated) DEVICE — SYR ONLY LUER LOCK 20CC

## (undated) DEVICE — DRAPE STERI INSTRUMENT 1018

## (undated) DEVICE — DRESSING SURGICAL 1X3

## (undated) DEVICE — TUBE FRAZIER 5MM 2FT SOFT TIP

## (undated) DEVICE — KIT POWDER ABSORBABLE GELATIN

## (undated) DEVICE — SUT VICRYL PLUS 3-0 SH 18IN

## (undated) DEVICE — SUT 4/0 18IN NUROLON BLK B

## (undated) DEVICE — ELECTRODE REM PLYHSV RETURN 9

## (undated) DEVICE — SEE MEDLINE ITEM 156905

## (undated) DEVICE — NDL 18GA X1 1/2 REG BEVEL

## (undated) DEVICE — DRAPE INCISE IOBAN 2 23X17IN

## (undated) DEVICE — BLADE 4 INCH EDGE UN-INS

## (undated) DEVICE — DRESSING ADH FILM TELFA 2X3IN